# Patient Record
Sex: MALE | Race: WHITE | Employment: UNEMPLOYED | URBAN - METROPOLITAN AREA
[De-identification: names, ages, dates, MRNs, and addresses within clinical notes are randomized per-mention and may not be internally consistent; named-entity substitution may affect disease eponyms.]

---

## 2019-08-18 ENCOUNTER — TRANSFERRED RECORDS (OUTPATIENT)
Dept: HEALTH INFORMATION MANAGEMENT | Facility: CLINIC | Age: 59
End: 2019-08-18

## 2019-08-19 ENCOUNTER — TRANSFERRED RECORDS (OUTPATIENT)
Dept: HEALTH INFORMATION MANAGEMENT | Facility: CLINIC | Age: 59
End: 2019-08-19

## 2019-08-22 ENCOUNTER — TRANSFERRED RECORDS (OUTPATIENT)
Dept: HEALTH INFORMATION MANAGEMENT | Facility: CLINIC | Age: 59
End: 2019-08-22

## 2019-08-23 ENCOUNTER — TRANSFERRED RECORDS (OUTPATIENT)
Dept: HEALTH INFORMATION MANAGEMENT | Facility: CLINIC | Age: 59
End: 2019-08-23

## 2019-08-26 ENCOUNTER — MEDICAL CORRESPONDENCE (OUTPATIENT)
Dept: HEALTH INFORMATION MANAGEMENT | Facility: CLINIC | Age: 59
End: 2019-08-26

## 2019-08-26 NOTE — TELEPHONE ENCOUNTER
REFERRAL INFORMATION:    Referring provider:   Dr. Romi Snowden    Referring providers clinic: [facility]      Reason for visit/diagnosis  : Maxillary Sinus cancer    RECORDS REQUESTED FROM:       Clinic name Comments Records Status Imaging Status   [facility] 8/26/19 referral   Scanned in [facility]   08/22/2019 RIGHT NASAL ENDOSCOPY AND BIOPSIES OF MAXILLARY SINUS MASS (Case: [id number] ) req 8/26    [facility] images 8/19/19 MR Orbit Face  8/19/19 Ct Orbit  11/21/13 CT Head Care Everywhere  req 8/26 - PACS                       8/26/19 5:30PM sent a fax to [facility] for pathology slides to be mailed out . Sent a fax to [facility] spec [facility] for recs and [facility] for images - [facility]    8/28/19 10:23AM [facility] faxed back no notes, patient was only seen inpatient by Dr Edge - [facility]   8/28/19 12:12PM images from [facility] in PACS, pathology from [facility] received and sent to surgical pathology with consult form - amay

## 2019-08-26 NOTE — TELEPHONE ENCOUNTER
ONCOLOGY INTAKE: Records Information      APPT INFORMATION:  Referring provider:  Dr. Romi Snowden  Referring provider s clinic:  [facility]  Reason for visit/diagnosis:  Maxillary Sinus cancer  Has patient been notified of appointment date and time?: yes    RECORDS INFORMATION:  Were the records received with the referral (via [employer])? no    Has patient been seen for any external appt for this diagnosis? yes    If yes, where? [facility]    Has patient had any imaging or procedures outside of [place]  [place] for this condition? yes      If Yes, where? [facility]    ADDITIONAL INFORMATION:

## 2019-08-28 ENCOUNTER — DOCUMENTATION ONLY (OUTPATIENT)
Dept: CARE COORDINATION | Facility: CLINIC | Age: 59
End: 2019-08-28

## 2019-08-28 LAB — COPATH REPORT: NORMAL

## 2019-08-28 PROCEDURE — 00000346 ZZHCL STATISTIC REVIEW OUTSIDE SLIDES TC 88321: Performed by: OTOLARYNGOLOGY

## 2019-08-29 ENCOUNTER — OFFICE VISIT (OUTPATIENT)
Dept: OTOLARYNGOLOGY | Facility: CLINIC | Age: 59
End: 2019-08-29
Payer: MEDICARE

## 2019-08-29 VITALS
WEIGHT: 172 LBS | DIASTOLIC BLOOD PRESSURE: 74 MMHG | SYSTOLIC BLOOD PRESSURE: 131 MMHG | HEIGHT: 72 IN | RESPIRATION RATE: 16 BRPM | BODY MASS INDEX: 23.3 KG/M2 | HEART RATE: 78 BPM

## 2019-08-29 DIAGNOSIS — C31.0 MAXILLARY SINUS CANCER (H): Primary | ICD-10-CM

## 2019-08-29 RX ORDER — OXYCODONE HYDROCHLORIDE 10 MG/1
TABLET ORAL
Refills: 0 | Status: ON HOLD | COMMUNITY
Start: 2019-08-28 | End: 2019-09-30

## 2019-08-29 ASSESSMENT — MIFFLIN-ST. JEOR: SCORE: 1638.19

## 2019-08-29 ASSESSMENT — PAIN SCALES - GENERAL: PAINLEVEL: MILD PAIN (3)

## 2019-08-29 NOTE — LETTER
2019       RE: Eduardo Rubio  [address] [id number]  [facility] [address]     Dear Colleague,    Thank you for referring your patient, Eduardo Rubio, to the [facility] EAR NOSE AND THROAT at [facility]. Please see a copy of my visit note below.      Otolaryngology Clinic      Name: Eduardo Rubio  MRN: [id number]  Age: 58 year old  : 1960  2019   Referring Provider: Romi Snowden     Patient presents for consult regarding:  Right maxillary sinus papillary squamous carcinoma     History of Present Illness:   Eduardo Rubio is a 58 year old male who presents for consultation regarding a right maxillary sinus mass.  The patient presented to the ED on 19 for right facial pain and pressure, blurred vision and numbness. They performed MRI of the orbit which revealed right maxillary sinus mass as below. He was seen by Dr. Romi Snowden of ENT specialty care who performed a biopsy with results of squamous cell carcinoma and was referred here for further evaluation.     The patient reports 10 days of numbness around his right maxilla with constant burning and pounding pain behind his right eye and entire right maxilla. He has been taking tylenol 3 for the pain and had difficulty sleeping secondary to this. He has had worsening blurred vision since onset but is still able to see. Has had occasional nosebleeds usually after blowing his nose. Has had a plugged sensation in his left nostril for about 10 days as well. He has never been a wood worker, denies daily chemical exposures. He currently smokes, estimates about 3/4 pack a day. Denies alcohol consumption. No significant known medical history, did have a gastric ulcer requiring surgical intervention about 4 years ago.     The patient is retired and lives in [place] with a roommate. He reports significant family support in the area.     Review of Systems:   Pertinent items are noted in HPI or as in patient  entered ROS below, remainder of complete ROS is negative.     Active Medications:     Current Outpatient Medications:      oxyCODONE IR (ROXICODONE) 10 MG tablet, TK 1 T PO 5 TIMES A DAY PRF SEVERE PAIN RELIEF., Disp: , Rfl: 0      Allergies:   Patient has no known allergies.      Past Medical History:  Maxillary sinus mass     Past Surgical History:  No past surgical history on file.    Family History:   No family history on file.      Social History:   Presents to clinic Accompanied by a friend  Tobacco Use: Current daily smoker  Alcohol Use: None     Physical Exam:   /74 (BP Location: Right arm, Patient Position: Sitting, Cuff Size: Adult Regular)   Pulse 78   Resp 16   Ht 1.829 m (6')   Wt 78 kg (172 lb)   BMI 23.33 kg/m         Constitutional: The patient was well-groomed and in no acute distress.    Skin: Warm and pink.    Neurologic: Alert and oriented x3. Cranial nerves: there is hypoesthesia of V2 distribution on the right. All other CN within normal limits. Voice quality is normal.    Psychiatric: The patient's affect was calm, cooperative and appropriate.    Respiratory: Breathing comfortably without stridor or exertion of accessory muscles.    Eyes: Pupils were equal and reactive. Extraocular movements are intact.    Head: Normocephalic and atraumatic. No lesions or scars.    Nose: Sinuses were nontender. Anterior rhinoscopy revealed midline septum and absence of purulence or polyps.    Oral cavity/Oropharynx: Edentulous.  Normal tongue, floor of mouth, buccal mucosa and palate. No lesions or masses on inspection or palpation. No abnormal lymph tissue in the oropharynx.    Neck: The parotids are soft without masses. Supple with normal laryngeal and tracheal landmarks.    Lymphatic: There is no palpable lymphadenopathy or other masses in the neck or parotids.      Nasal endoscopy performed to examine the posterior nasal passages for right maxillary sinus mass.    Verbal consent obtained.  Topical anesthetic/decongestant solution applied per patient request.   A rigid endoscope was passed into each nasal cavity separately.  Findings are as follows:   Left middle meatus:  Normal healthy meatus, no purulence or polyps.    Left posterior nasal cavity:  No masses, lesions or abnormal tissue.   Right middle meatus:  Mass emanating from the right middle meatus, extending towards the nasal floor.  Nasopharynx:  Clear, no lesions, crusting or inflammation    Imaging:  MR Orbit face neck w/wo contrast 8/19/19:   IMPRESSION:   1.  Large, heterogeneously enhancing mass lesion centered in the superior aspect  of the right maxillary sinus with extension to the ostiomeatal unit into the  right nasal cavity, where it engulfs the right superior turbinate. Extension  cephalad into the inferior/extraconal right orbit where it abuts and slightly  elevates the inferior rectus muscle. Extension anteriorly through the anterior  wall of the maxillary sinus. Posteriorly the mass does not appear to involve the  pterygopalatine fossa. 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  2.  Trapped secretions in the inferior maxillary sinus.  3.  The intracranial contents are normal for patient age.    CT orbital w contrast 8/19/19:   Impression:  1. Aggressive appearing somewhat hyperdense masslike process involving the entirety of the right maxillary sinus with extension through the right ostiomeatal unit into the right nasal cavity. There is evidence of dehiscence and erosion of the anterior wall of the maxillary sinus where this masslike process extends into the superficial soft tissues of the right malar region. There is dehiscence and erosion of the inferior floor of the right orbit with extension of the mass into the inferior aspect of the right orbit where it abuts the inferior rectus muscle resulting in stretching of the right optic nerve, slight superior elevation of the right globe, and slight  right-sided proptosis. This process appears aggressive and is of indeterminate etiology, but aggressive infectious processes including fungal etiologies cannot be excluded. Malignancy certainly could present in this fashion. See complete description above. ENT and ophthalmologic consultation suggested.  2. Note made of old appearing fracture of the medial right orbital wall involving the lamina papyracea.    Assessment & Plan:  Mr. Rubio is a 58 year old male with a right sided maxillary mass, confirmed to be squamous cell carcinoma on biopsy. We reviewed his images together and discussed his prognosis including likely right partial or complete visual loss. Will obtain PET scan within the next week and refer to ophthalmology. We discussed the indication for surgery and radiation therapy, including risks and benefits. His case will be discussed at our tumor board to determine a final plan. Neck dissection with lymph node removal may be indicated. He does have a support system with his roommate and family living close by. We discussed possible timeline and expected post-surgical course. All of the patient's questions were answered fully and he is comfortable with the plan.     Scribe Disclosure:  I, Trish Dawn, am serving as a scribe to document services personally performed by Colt Puentes MD at this visit, based upon the provider's statements to me. All documentation has been reviewed by the aforementioned provider prior to being entered into the official medical record.    The documentation recorded by the scribe accurately reflects the services I personally performed and the decisions made by me.      Again, thank you for allowing me to participate in the care of your patient.      Sincerely,    Colt Puentes MD

## 2019-08-29 NOTE — NURSING NOTE
Chief Complaint   Patient presents with     Consult     Maxillary Sinus cancer     /74 (BP Location: Right arm, Patient Position: Sitting, Cuff Size: Adult Regular)   Pulse 78   Resp 16   Ht 1.829 m (6')   Wt 78 kg (172 lb)   BMI 23.33 kg/m      Uriel Coleman CMA

## 2019-08-29 NOTE — PROGRESS NOTES
Otolaryngology Clinic      Name: Eduardo Rubio  MRN: [id number]  Age: 58 year old  : 1960  2019   Referring Provider: Romi Snowden     Patient presents for consult regarding:  Right maxillary sinus papillary squamous carcinoma     History of Present Illness:   Eduadro Rubio is a 58 year old male who presents for consultation regarding a right maxillary sinus mass.  The patient presented to the ED on 19 for right facial pain and pressure, blurred vision and numbness. They performed MRI of the orbit which revealed right maxillary sinus mass as below. He was seen by Dr. Romi Snowden of ENT specialty care who performed a biopsy with results of squamous cell carcinoma and was referred here for further evaluation.     The patient reports 10 days of numbness around his right maxilla with constant burning and pounding pain behind his right eye and entire right maxilla. He has been taking tylenol 3 for the pain and had difficulty sleeping secondary to this. He has had worsening blurred vision since onset but is still able to see. Has had occasional nosebleeds usually after blowing his nose. Has had a plugged sensation in his left nostril for about 10 days as well. He has never been a wood worker, denies daily chemical exposures. He currently smokes, estimates about 3/4 pack a day. Denies alcohol consumption. No significant known medical history, did have a gastric ulcer requiring surgical intervention about 4 years ago.     The patient is retired and lives in [place] with a roommate. He reports significant family support in the area.     Review of Systems:   Pertinent items are noted in HPI or as in patient entered ROS below, remainder of complete ROS is negative.     Active Medications:     Current Outpatient Medications:      oxyCODONE IR (ROXICODONE) 10 MG tablet, TK 1 T PO 5 TIMES A DAY PRF SEVERE PAIN RELIEF., Disp: , Rfl: 0      Allergies:   Patient has no known allergies.       Past Medical History:  Maxillary sinus mass     Past Surgical History:  No past surgical history on file.    Family History:   No family history on file.      Social History:   Presents to clinic Accompanied by a friend  Tobacco Use: Current daily smoker  Alcohol Use: None     Physical Exam:   /74 (BP Location: Right arm, Patient Position: Sitting, Cuff Size: Adult Regular)   Pulse 78   Resp 16   Ht 1.829 m (6')   Wt 78 kg (172 lb)   BMI 23.33 kg/m        Constitutional: The patient was well-groomed and in no acute distress.    Skin: Warm and pink.    Neurologic: Alert and oriented x3. Cranial nerves: there is hypoesthesia of V2 distribution on the right. All other CN within normal limits. Voice quality is normal.    Psychiatric: The patient's affect was calm, cooperative and appropriate.    Respiratory: Breathing comfortably without stridor or exertion of accessory muscles.    Eyes: Pupils were equal and reactive. Extraocular movements are intact.    Head: Normocephalic and atraumatic. No lesions or scars.    Nose: Sinuses were nontender. Anterior rhinoscopy revealed midline septum and absence of purulence or polyps.    Oral cavity/Oropharynx: Edentulous.  Normal tongue, floor of mouth, buccal mucosa and palate. No lesions or masses on inspection or palpation. No abnormal lymph tissue in the oropharynx.    Neck: The parotids are soft without masses. Supple with normal laryngeal and tracheal landmarks.    Lymphatic: There is no palpable lymphadenopathy or other masses in the neck or parotids.      Nasal endoscopy performed to examine the posterior nasal passages for right maxillary sinus mass.    Verbal consent obtained. Topical anesthetic/decongestant solution applied per patient request.   A rigid endoscope was passed into each nasal cavity separately.  Findings are as follows:   Left middle meatus:  Normal healthy meatus, no purulence or polyps.    Left posterior nasal cavity:  No masses, lesions or  abnormal tissue.   Right middle meatus:  Mass emanating from the right middle meatus, extending towards the nasal floor.  Nasopharynx:  Clear, no lesions, crusting or inflammation    Imaging:  MR Orbit face neck w/wo contrast 8/19/19:   IMPRESSION:   1.  Large, heterogeneously enhancing mass lesion centered in the superior aspect  of the right maxillary sinus with extension to the ostiomeatal unit into the  right nasal cavity, where it engulfs the right superior turbinate. Extension  cephalad into the inferior/extraconal right orbit where it abuts and slightly  elevates the inferior rectus muscle. Extension anteriorly through the anterior  wall of the maxillary sinus. Posteriorly the mass does not appear to involve the  pterygopalatine fossa. 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  2.  Trapped secretions in the inferior maxillary sinus.  3.  The intracranial contents are normal for patient age.    CT orbital w contrast 8/19/19:   Impression:  1. Aggressive appearing somewhat hyperdense masslike process involving the entirety of the right maxillary sinus with extension through the right ostiomeatal unit into the right nasal cavity. There is evidence of dehiscence and erosion of the anterior wall of the maxillary sinus where this masslike process extends into the superficial soft tissues of the right malar region. There is dehiscence and erosion of the inferior floor of the right orbit with extension of the mass into the inferior aspect of the right orbit where it abuts the inferior rectus muscle resulting in stretching of the right optic nerve, slight superior elevation of the right globe, and slight right-sided proptosis. This process appears aggressive and is of indeterminate etiology, but aggressive infectious processes including fungal etiologies cannot be excluded. Malignancy certainly could present in this fashion. See complete description above. ENT and ophthalmologic consultation  suggested.  2. Note made of old appearing fracture of the medial right orbital wall involving the lamina papyracea.    Assessment & Plan:  Mr. Rubio is a 58 year old male with a right sided maxillary mass, confirmed to be squamous cell carcinoma on biopsy. We reviewed his images together and discussed his prognosis including likely right partial or complete visual loss. Will obtain PET scan within the next week and refer to ophthalmology. We discussed the indication for surgery and radiation therapy, including risks and benefits. His case will be discussed at our tumor board to determine a final plan. Neck dissection with lymph node removal may be indicated. He does have a support system with his roommate and family living close by. We discussed possible timeline and expected post-surgical course. All of the patient's questions were answered fully and he is comfortable with the plan.     Scribe Disclosure:  I, Trish Dawn, am serving as a scribe to document services personally performed by Colt Puentes MD at this visit, based upon the provider's statements to me. All documentation has been reviewed by the aforementioned provider prior to being entered into the official medical record.    The documentation recorded by the scribe accurately reflects the services I personally performed and the decisions made by me.

## 2019-08-29 NOTE — PATIENT INSTRUCTIONS
1. You were seen in the ENT Clinic today by Dr. Puentes  If you have any questions or concerns after your appointment, please call   - Option 1: ENT Clinic: [phone]  - Option 2: [facility] Nurse Coordinator: [phone]    2. Plan: complete PET  Referral for opthomologist completed.       Thank you for allowing us to be apart of your care!  Evelin Whittaker LPN

## 2019-09-09 ENCOUNTER — HOSPITAL ENCOUNTER (OUTPATIENT)
Dept: PET IMAGING | Facility: CLINIC | Age: 59
Discharge: HOME | End: 2019-09-09
Attending: OTOLARYNGOLOGY | Admitting: OTOLARYNGOLOGY
Payer: MEDICARE

## 2019-09-09 ENCOUNTER — HOSPITAL ENCOUNTER (OUTPATIENT)
Dept: PET IMAGING | Facility: CLINIC | Age: 59
End: 2019-09-09
Attending: OTOLARYNGOLOGY
Payer: MEDICARE

## 2019-09-09 DIAGNOSIS — C31.0 MAXILLARY SINUS CANCER (H): ICD-10-CM

## 2019-09-09 PROCEDURE — 74177 CT ABD & PELVIS W/CONTRAST: CPT

## 2019-09-09 PROCEDURE — 70491 CT SOFT TISSUE NECK W/DYE: CPT

## 2019-09-09 PROCEDURE — A9552 F18 FDG: HCPCS | Performed by: OTOLARYNGOLOGY

## 2019-09-09 PROCEDURE — 34300033 ZZH RX 343: Performed by: OTOLARYNGOLOGY

## 2019-09-09 PROCEDURE — 25000128 H RX IP 250 OP 636: Performed by: OTOLARYNGOLOGY

## 2019-09-09 PROCEDURE — 71260 CT THORAX DX C+: CPT

## 2019-09-09 RX ORDER — IOPAMIDOL 755 MG/ML
10-140 INJECTION, SOLUTION INTRAVASCULAR ONCE
Status: COMPLETED | OUTPATIENT
Start: 2019-09-09 | End: 2019-09-09

## 2019-09-09 RX ADMIN — FLUDEOXYGLUCOSE F-18 10.36 MCI.: 500 INJECTION, SOLUTION INTRAVENOUS at 14:43

## 2019-09-09 RX ADMIN — IOPAMIDOL 105 ML: 755 INJECTION, SOLUTION INTRAVENOUS at 15:38

## 2019-09-10 ENCOUNTER — TELEPHONE (OUTPATIENT)
Dept: OTOLARYNGOLOGY | Facility: CLINIC | Age: 59
End: 2019-09-10

## 2019-09-10 DIAGNOSIS — C31.0 MAXILLARY SINUS CANCER (H): Primary | ICD-10-CM

## 2019-09-10 NOTE — TELEPHONE ENCOUNTER
Pt called with recent PET results    CT NECK  Impression:   4.0 x 3.9 x 4.2 cm FDG avid mass centered in the right maxillary sinus  compatible with squamosal cancer. The mass erodes surrounding osseous  structures including the right orbital floor and maxillary sinus walls  with extension into the orbital cavity, right nasal cavity,  retromaxillary fat pad, premaxillary fat tissues and pterygopalatine  fossa. No cervical lymphadenopathy.    PET  IMPRESSION: This patient with a recently diagnosed maxillary sinus  squamous cell cancer  1. Single 7 mm nodule in the right upper lobe that demonstrates mild  FDG avidity. Follow up in 3 months with a chest CT can be considered.  2. Sequela from chronic granulomatous disease.  4. Moderate coronary artery calcifications.  5. Bladder wall thickening with prostatomegaly.    Explained that there is a mass in the R maxillary sinus (which he's aware of). There is also a small lung nodule that we will have reviewed by lung nodule conference this weekend. Moderate coronary artery calcification noted; suggested f/o with PCP. Explained that we would like to review his case in our Tumor Board this Friday for official recommendation as how to proceed with treatment. Explained that in order to get him into the OR sooner we may need to see a new head a neck provider; Eduardo stated he was fine with this as he just wants move forward with surgery. Direct line given for additional questions/concerns.     Natice Schwab, RN BSN

## 2019-09-11 ENCOUNTER — TELEPHONE (OUTPATIENT)
Dept: ONCOLOGY | Facility: CLINIC | Age: 59
End: 2019-09-11

## 2019-09-11 ENCOUNTER — OFFICE VISIT (OUTPATIENT)
Dept: OPHTHALMOLOGY | Facility: CLINIC | Age: 59
End: 2019-09-11
Attending: OPHTHALMOLOGY
Payer: MEDICARE

## 2019-09-11 ENCOUNTER — PRE VISIT (OUTPATIENT)
Dept: OTOLARYNGOLOGY | Facility: CLINIC | Age: 59
End: 2019-09-11

## 2019-09-11 DIAGNOSIS — C31.0 MAXILLARY SINUS CANCER (H): Primary | ICD-10-CM

## 2019-09-11 DIAGNOSIS — H52.13 MYOPIA OF BOTH EYES: ICD-10-CM

## 2019-09-11 DIAGNOSIS — H05.20 PROPTOSIS: ICD-10-CM

## 2019-09-11 PROCEDURE — 92083 EXTENDED VISUAL FIELD XM: CPT | Mod: ZF | Performed by: OPHTHALMOLOGY

## 2019-09-11 PROCEDURE — G0463 HOSPITAL OUTPT CLINIC VISIT: HCPCS | Mod: ZF

## 2019-09-11 ASSESSMENT — VISUAL ACUITY
METHOD: SNELLEN - LINEAR
OD_SC+: +1
OS_SC: 20/60
OS_PH_SC: 20/40
OD_SC: 20/60
OD_PH_SC: 20/40

## 2019-09-11 ASSESSMENT — SLIT LAMP EXAM - LIDS
COMMENTS: NORMAL, HIGH TEAR LAKE
COMMENTS: NORMAL

## 2019-09-11 ASSESSMENT — CONF VISUAL FIELD
OD_SUPERIOR_TEMPORAL_RESTRICTION: 3
OD_INFERIOR_NASAL_RESTRICTION: 3
METHOD: COUNTING FINGERS
OD_SUPERIOR_NASAL_RESTRICTION: 3
OD_INFERIOR_TEMPORAL_RESTRICTION: 3
OS_NORMAL: 1

## 2019-09-11 ASSESSMENT — TONOMETRY
OS_IOP_MMHG: 12
OD_IOP_MMHG: 16
IOP_METHOD: TONOPEN

## 2019-09-11 ASSESSMENT — EXTERNAL EXAM - LEFT EYE: OS_EXAM: NORMAL

## 2019-09-11 ASSESSMENT — CUP TO DISC RATIO
OD_RATIO: 0.25
OS_RATIO: 0.25

## 2019-09-11 NOTE — TELEPHONE ENCOUNTER
Lung Nodule Conference      Patient Name: Eduardo Rubio    Reason for conference discussion (brief overview): PET IMPRESSION: This patient with a recently diagnosed maxillary sinus   squamous cell cancer 4.0 x 3.9 x 4.2 FDG avid  1. Single 7 mm nodule in the right upper lobe that demonstrates mild   FDG avidity. Follow up in 3 months with a chest CT can be considered.   2. Sequela from chronic granulomatous disease.   Current smoker 3/4 pack a day    Specific Question: Should the new 7mm nodule be biopsied or if short interval f/u recommended?    Pertinent Histology:  Path 8/28: Papillary squamous cell carcinoma with stromal invasion        Referring Physician:  JERMAN BOYD MD    The patient's case was presented at the multidisciplinary conference for the above noted reason.  There was a consensus recommendation for the following actions:     Follow up in 3 months with a CT Scan or EBUS and see thoracic surgeon.          Case Lead:  Rena Orr    Interventional Radiology Staff Present: N/A

## 2019-09-11 NOTE — NURSING NOTE
Chief Complaints and History of Present Illnesses   Patient presents with     maxillary sinus cancer     Chief Complaint(s) and History of Present Illness(es)     maxillary sinus cancer               Comments     Pt states that the pain, pressure and burning in his right eye wakes him up every 1 1/2 hours. Blurred VA, diplopia, shadows.  VA worse in bright light.     Jerri KEYES 8:16 AM September 11, 2019   TERENCE Lambert September 11, 2019 8:39 AM

## 2019-09-11 NOTE — PROGRESS NOTES
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 Puentes of ENT [facility] eye exam prior to pursuing surgical resection and radiation (pending discussion at tumor board). Patient endorses right facial pain, dysesthesia and numbness under the eye and at the upper lip, and blurry vision in the right eye. Associated epiphora, excess lacrimation, and headache. He was prescribed Tobradex and ATs but is only using the ATs. He also picked up OTC Visine. He states all of his symptoms have progressively worsened since diagnosis.      POH: wore glasses in the past but rarely wore them   PMH: right maxillary sinus squamous cell carcinoma, frost bite in toes as a child s/p amputation  FOH: none - healthy  SHx: current every day smoker (0.5-1ppd)    Laboratory:  Surgical path:  A,B) RIGHT MAXILLARY SINUS MASS, BIOPSIES:  1. Squamous cell carcinoma, papillary type, with focal areas of alexis invasive carcinoma  2. Results of p16 will be issued in an addendum    Imaging:  CT soft tissue neck with contrast (9/9/2019)  4.0 x 3.9 x 4.2 cm FDG avid mass centered in the right maxillary sinus  compatible with squamosal cancer. The mass erodes surrounding osseous  structures including the right orbital floor and maxillary sinus walls  with extension into the orbital cavity, right nasal cavity,  retromaxillary fat pad, premaxillary fat tissues and pterygopalatine  fossa. No cervical lymphadenopathy.    PET oncology (9/9/2019):  IMPRESSION: This patient with a recently diagnosed maxillary sinus  squamous cell cancer  1. Single 7 mm nodule in the right upper lobe that demonstrates mild FDG avidity. Follow up in 3 months with a chest CT can be considered.  2. Sequela from chronic granulomatous disease.  4. Moderate coronary artery calcifications.  5. Bladder wall thickening with  prostatomegaly    MR Orbit face neck w/wo contrast (8/19/19):   IMPRESSION:   1.  Large, heterogeneously enhancing mass lesion centered in the superior aspect  of the right maxillary sinus with extension to the ostiomeatal unit into the  right nasal cavity, where it engulfs the right superior turbinate. Extension  cephalad into the inferior/extraconal right orbit where it abuts and slightly  elevates the inferior rectus muscle. Extension anteriorly through the anterior  wall of the maxillary sinus. Posteriorly the mass does not appear to involve the  pterygopalatine fossa. 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  2.  Trapped secretions in the inferior maxillary sinus.  3.  The intracranial contents are normal for patient age.    Visual Field 24-2  right eye: reliable; nonspecific superior field defects.   left eye: reliable; nonspecific field defects.     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   -Mild restriction of EOMs, proptosis, and hyperglobus, not unexpected for tumor size and location. Associated epiphora.    -Numbness and dysesthesia in distribution of infraorbital nerve suggests perineural invasion.   -Overall, the optic nerve and globe appear to be intact and healthy. Will need close monitoring after surgery and radiation therapy.    Plan:   - Continue ATs  - Discuss gabapentin with primary provider  - Return for follow-up after his surgery and radiation complete or if any new change in vision.    (H52.13) Myopia of both eyes  Comment: hold off on MRx today given plans for orbital surgery with ENT.   Plan: Recheck refraction in 1-2 months.       Jordy High MD  Ophthalmology PGY-2  [facility]  Pager: [phone]    Teaching statement:  Complete documentation of historical and exam elements from today's encounter can  be found in the full encounter summary report (not reduplicated in this progress note). I personally obtained the chief complaint(s) and history of present illness.  I confirmed and edited as necessary the review of systems, past medical/surgical history, family history, social history, and examination findings as documented by others; and I examined the patient myself. I personally reviewed the relevant tests, images, and reports as documented above.     I formulated and edited as necessary the assessment and plan and discussed the findings and management plan with the patient and family.    Alona Cedillo MD  Comprehensive Ophthalmology & Ocular Pathology  Department of Ophthalmology and Visual Neurosciences  [email or web address]  Pager [phone]

## 2019-09-13 ENCOUNTER — PRE VISIT (OUTPATIENT)
Dept: SURGERY | Facility: CLINIC | Age: 59
End: 2019-09-13

## 2019-09-13 ENCOUNTER — DOCUMENTATION ONLY (OUTPATIENT)
Dept: SURGERY | Facility: CLINIC | Age: 59
End: 2019-09-13

## 2019-09-13 DIAGNOSIS — C31.0 MAXILLARY SINUS CANCER (H): Primary | ICD-10-CM

## 2019-09-13 NOTE — PROGRESS NOTES
"RN coordinator from ENT, Frank, messaged us back after our lung nodule conference:    \"We discussed this patient at our tumor board today as well. He is going to have a large resection and reconstructive surgery in a few weeks. I believe both providers are in agreement that regardless of what that nodule shows it will not change the course of their treatment plan so 3m scan it is\".  "

## 2019-09-13 NOTE — TELEPHONE ENCOUNTER
FUTURE VISIT INFORMATION      SURGERY INFORMATION:    Date: TBD,pre-op maxillectomy with free flap reconstruction       RECORDS REQUESTED FROM:       Primary Care Provider: Noel    Most recent EKG+ Tracin19- Noel- requested tracing    Most recent Cardiac Stress Test: 19

## 2019-09-13 NOTE — PROGRESS NOTES
Patient contacted JI Marie, about pain.  Oxycodone not working.  He wants to try T#3.    He is currently being treated for maxillary sinus cancer.    I think it is appropriate to try this, or we could even try hydromorphone.    We will start with T#3, #30.    Rx sent.  Juli Christy PA-C, 9/13/2019, 11:06 AM

## 2019-09-16 ENCOUNTER — TELEPHONE (OUTPATIENT)
Dept: SURGERY | Facility: CLINIC | Age: 59
End: 2019-09-16

## 2019-09-16 ENCOUNTER — OFFICE VISIT (OUTPATIENT)
Dept: SURGERY | Facility: CLINIC | Age: 59
End: 2019-09-16
Payer: MEDICARE

## 2019-09-16 ENCOUNTER — ANESTHESIA EVENT (OUTPATIENT)
Dept: SURGERY | Facility: CLINIC | Age: 59
End: 2019-09-16

## 2019-09-16 VITALS
RESPIRATION RATE: 16 BRPM | WEIGHT: 174.9 LBS | OXYGEN SATURATION: 96 % | HEIGHT: 72 IN | TEMPERATURE: 97.9 F | HEART RATE: 83 BPM | SYSTOLIC BLOOD PRESSURE: 119 MMHG | BODY MASS INDEX: 23.69 KG/M2 | DIASTOLIC BLOOD PRESSURE: 86 MMHG

## 2019-09-16 DIAGNOSIS — Z01.818 PREOP EXAMINATION: ICD-10-CM

## 2019-09-16 DIAGNOSIS — Z01.818 PREOP EXAMINATION: Primary | ICD-10-CM

## 2019-09-16 LAB
ALBUMIN SERPL-MCNC: 3.8 G/DL (ref 3.4–5)
ALP SERPL-CCNC: 70 U/L (ref 40–150)
ALT SERPL W P-5'-P-CCNC: 28 U/L (ref 0–70)
ANION GAP SERPL CALCULATED.3IONS-SCNC: 2 MMOL/L (ref 3–14)
AST SERPL W P-5'-P-CCNC: 26 U/L (ref 0–45)
BILIRUB SERPL-MCNC: 0.4 MG/DL (ref 0.2–1.3)
BUN SERPL-MCNC: 10 MG/DL (ref 7–30)
CALCIUM SERPL-MCNC: 9.3 MG/DL (ref 8.5–10.1)
CHLORIDE SERPL-SCNC: 99 MMOL/L (ref 94–109)
CO2 SERPL-SCNC: 30 MMOL/L (ref 20–32)
CREAT SERPL-MCNC: 0.75 MG/DL (ref 0.66–1.25)
ERYTHROCYTE [DISTWIDTH] IN BLOOD BY AUTOMATED COUNT: 14.2 % (ref 10–15)
GFR SERPL CREATININE-BSD FRML MDRD: >90 ML/MIN/{1.73_M2}
GLUCOSE SERPL-MCNC: 102 MG/DL (ref 70–99)
HCT VFR BLD AUTO: 42.6 % (ref 40–53)
HGB BLD-MCNC: 14.1 G/DL (ref 13.3–17.7)
MCH RBC QN AUTO: 32.5 PG (ref 26.5–33)
MCHC RBC AUTO-ENTMCNC: 33.1 G/DL (ref 31.5–36.5)
MCV RBC AUTO: 98 FL (ref 78–100)
PLATELET # BLD AUTO: 221 10E9/L (ref 150–450)
POTASSIUM SERPL-SCNC: 3.8 MMOL/L (ref 3.4–5.3)
PROT SERPL-MCNC: 8.5 G/DL (ref 6.8–8.8)
RBC # BLD AUTO: 4.34 10E12/L (ref 4.4–5.9)
SODIUM SERPL-SCNC: 131 MMOL/L (ref 133–144)
WBC # BLD AUTO: 6 10E9/L (ref 4–11)

## 2019-09-16 PROCEDURE — 86923 COMPATIBILITY TEST ELECTRIC: CPT | Performed by: PHYSICIAN ASSISTANT

## 2019-09-16 RX ORDER — ACETAMINOPHEN 325 MG/1
325-650 TABLET ORAL EVERY 6 HOURS PRN
COMMUNITY
End: 2021-01-01

## 2019-09-16 ASSESSMENT — MIFFLIN-ST. JEOR: SCORE: 1651.34

## 2019-09-16 ASSESSMENT — LIFESTYLE VARIABLES: TOBACCO_USE: 1

## 2019-09-16 NOTE — ANESTHESIA PREPROCEDURE EVALUATION
Anesthesia Pre-Procedure Evaluation    Patient: Eduardo Rubio   MRN:     [id number] Gender:   male   Age:    58 year old :      1960        Preoperative Diagnosis: * No surgery found *        No past medical history on file.   No past surgical history on file.       Anesthesia Evaluation     . Pt has had prior anesthetic.     No history of anesthetic complications          ROS/MED HX    ENT/Pulmonary:     (+)tobacco use, Current use , . .   (-) sleep apnea   Neurologic:  - neg neurologic ROS     Cardiovascular:  - neg cardiovascular ROS   (+) ----. : . . . :. . Previous cardiac testing Echodate:2019results:Stress Testdate:2019 results: date: results: date: results:          METS/Exercise Tolerance:  4 - Raking leaves, gardening   Hematologic:  - neg hematologic  ROS       Musculoskeletal: Comment: Low back pain        GI/Hepatic: Comment: H/o gastric ulcer         Renal/Genitourinary:  - ROS Renal section negative       Endo:  - neg endo ROS       Psychiatric:  - neg psychiatric ROS       Infectious Disease:  - neg infectious disease ROS       Malignancy:   (+) Malignancy History of Other  Other CA right maxillary sinus SCC Active status post         Other: Comment: Pt takes oxycodone 10mg, 2-3 tabs daily  30-45MME daily   (+) H/O Chronic Pain,H/O chronic opiod use ,                        PHYSICAL EXAM:   Mental Status/Neuro: A/A/O; Age Appropriate   Airway: Facies: Feasible  Mallampati: I  Mouth/Opening: Full  TM distance: > 6 cm  Neck ROM: Full   Respiratory: Auscultation: CTAB     Resp. Rate: Normal     Resp. Effort: Normal      CV: Rhythm: Regular  Rate: Age appropriate  Heart: Normal Sounds  Edema: None   Comments: Full upper denture, partial lower denture     Dental: Dentures  Dentures: Upper; Lower                LABS:  CBC:   Lab Results   Component Value Date    WBC 3.0 (L) 10/13/2010    WBC 5.6 2006    HGB 13.8 10/13/2010    HGB 16.0 2006    HCT 41.4 10/13/2010    HCT  47.4 05/24/2006     (L) 10/13/2010     05/24/2006     BMP:   Lab Results   Component Value Date     (H) 10/13/2010     05/24/2006    POTASSIUM 3.9 10/13/2010    POTASSIUM 5.0 05/24/2006    CHLORIDE 109 10/13/2010    CHLORIDE 108 05/24/2006    CO2 29 10/13/2010    CO2 21 05/24/2006    BUN 7 10/13/2010    BUN 11 05/24/2006    CR 0.78 10/13/2010    CR 0.75 (L) 05/24/2006    GLC 89 10/13/2010     05/24/2006     COAGS: No results found for: PTT, INR, FIBR  POC: No results found for: BGM, HCG, HCGS  OTHER:   Lab Results   Component Value Date    MIKEY 8.5 10/13/2010    PHOS 4.7 (H) 10/13/2010    MAG 2.3 10/13/2010    ALBUMIN 4.2 10/13/2010    PROTTOTAL 8.2 10/13/2010    ALT 79 (H) 10/13/2010    AST 98 (H) 10/13/2010    GGT 12 05/24/2006    ALKPHOS 96 10/13/2010    BILITOTAL 0.4 10/13/2010        Preop Vitals    BP Readings from Last 3 Encounters:   08/29/19 131/74    Pulse Readings from Last 3 Encounters:   08/29/19 78      Resp Readings from Last 3 Encounters:   08/29/19 16    SpO2 Readings from Last 3 Encounters:   No data found for SpO2      Temp Readings from Last 1 Encounters:   No data found for Temp    Ht Readings from Last 1 Encounters:   08/29/19 1.829 m (6')      Wt Readings from Last 1 Encounters:   08/29/19 78 kg (172 lb)    Estimated body mass index is 23.33 kg/m  as calculated from the following:    Height as of 8/29/19: 1.829 m (6').    Weight as of 8/29/19: 78 kg (172 lb).     LDA:        ZAIDA FV AN PLAN NO PONV RULE       PAC Discussion and Assessment    ASA Classification: 3  Case is suitable for: Esko  Anesthetic techniques and relevant risks discussed:   Invasive monitoring and risk discussed: No  Types:   Possibility and Risk of blood transfusion discussed: Yes  NPO instructions given:   Additional anesthetic preparation and risks discussed:   Needs early admission to pre-op area:   Other:     PAC Resident/NP Anesthesia Assessment:  Eduardo Rubio is a 58 year old  male scheduled for Right orbital exenteration with maxillectomy and free tissue reconstruction on TBD by  in treatment of right maxillary sinus cancer.  PAC referral for risk assessment and optimization for anesthesia with comorbid conditions of smoking:    Pre-operative considerations:  1.  Cardiac:  Functional status- METS 4. Pt not active at the moment due to loss of sight in right eye, but before he was active. No issues climbing stairs. High risk surgery with 0.4% (RCRI #) risk of major adverse cardiac event.   2.  Pulm:  Airway feasible.  CECIL risk: low  3.  GI:  Risk of PONV score = 1.  If > 2, anti-emetic intervention recommended.    VTE risk: 3% (gender, cancer)    #cardiac  -denies CP, SOB, ANDERSEN, palpitations  -denies cardiac history  -EKG 8/21/2019, sinus bradycardia, cannot rule out anterior infarct  -stress echo 8/21/2019:  FINAL CONCLUSIONS   Normal stress echocardiogram.   Reduced exercise tolerance, achieving 7.0 METs and 93.8% of max predicted heart rate.     #pulmonary  -current smoker, 0.75 ppd  -denies any current cough   -PET scan revealed RUL nodule    #GI  -h/o gastric ulcer approximately 4 years ago  -pt states he had abdominal surgery related to ulcer but could not remember details or surgery type    Patient is optimized and is acceptable candidate for the proposed procedure pending his visits with Dr. Roberts and Dr. Pulliam on 9/20.     Patient discussed with Dr. Pineda.     **For further details of assessment, testing, and physical exam please see H and P completed on same date.          Peace Beltran PA-C, MMS      Reviewed and Signed by PAC Mid-Level Provider/Resident  Mid-Level Provider/Resident: Peace Beltran  Date: 9/16/2019  Time:     Attending Anesthesiologist Anesthesia Assessment:        Anesthesiologist:   Date:   Time:   Pass/Fail:   Disposition:     PAC Pharmacist Assessment:        Pharmacist:   Date:   Time:    Peace Beltran PA-C

## 2019-09-16 NOTE — H&P
Pre-Operative H & P     CC:  Preoperative exam to assess for increased cardiopulmonary risk while undergoing surgery and anesthesia.    Date of Encounter: 9/16/2019  Primary Care Physician:  No Ref-Primary, Physician  Associated Diagnosis: Right maxillary sinus cancer    HPI  Eduardo Rubio is a 58 year old male who presents for pre-operative H & P in preparation for right orbital exenteration, right neck exploration, right maxillectomy, nasogastric tube placement,right Latissmus free flap reconstruction with Dr. Roberts and Dr. Pulliam on 9/24/2019 at [facility] under general anesthesia.    This is a 58 year old male with history of right maxillary sinus mass. He initally presented to outside ED 8/18/19 for right sided facial pain, numbness, right eye visual changes and right nasal drainage.  He is a current everyday smoker.  He was seen by ENT specialty after MRI revealed the mass.  Biopsy proved SCC.  He was referred to [facility] for further evaluation and treatment. PET/CT of the mass of the right maxillary sinus (9/9/19) fills the sinus and invades into the orbital cavity. There is an additional nodule in the right upper lobe of the lung. His case was presented at tumor board and the above procedure is planned.      History is obtained from the patient.     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       Past Surgical History  Past Surgical History:   Procedure Laterality Date     ABDOMEN SURGERY      [facility] [place], surgery related to gastric ulcer     AS AMPUTATION TOE,I-P JT Bilateral     all ten toes       Hx of Blood transfusions/reactions: none     Hx of abnormal bleeding or anti-platelet use: none    Menstrual history: No LMP for male patient.:     Steroid use in the last year: none    Personal or FH with difficulty with Anesthesia:   none    Prior to Admission Medications  Current Outpatient Medications   Medication Sig Dispense Refill     acetaminophen (TYLENOL) 325 MG tablet Take 325-650 mg by mouth every 6 hours as needed for mild pain       acetaminophen-codeine (TYLENOL #3) 300-30 MG tablet Take 1-2 tablets by mouth every 6 hours as needed for severe pain 30 tablet 0     oxyCODONE IR (ROXICODONE) 10 MG tablet TK 1 T PO 5 TIMES A DAY PRF SEVERE PAIN RELIEF.  0       Allergies  No Known Allergies    Social History  Social History     Socioeconomic History     Marital status:      Spouse name: Not on file     Number of children: Not on file     Years of education: Not on file     Highest education level: Not on file   Occupational History     Not on file   Social Needs     Financial resource strain: Not on file     Food insecurity:     Worry: Not on file     Inability: Not on file     Transportation needs:     Medical: Not on file     Non-medical: Not on file   Tobacco Use     Smoking status: Current Every Day Smoker     Packs/day: 0.50     Years: 15.00     Pack years: 7.50     Types: Cigarettes     Start date: 2004     Smokeless tobacco: Never Used   Substance and Sexual Activity     Alcohol use: Not Currently     Drug use: Not Currently     Sexual activity: Not on file   Lifestyle     Physical activity:     Days per week: Not on file     Minutes per session: Not on file     Stress: Not on file   Relationships     Social connections:     Talks on phone: Not on file     Gets together: Not on file     Attends [employer] service: Not on file     Active member of club or organization: Not on file     Attends meetings of clubs or organizations: Not on file     Relationship status: Not on file     Intimate partner violence:     Fear of current or ex partner: Not on file     Emotionally abused: Not on file     Physically abused: Not on file     Forced sexual activity: Not on file   Other Topics Concern     Not on file   Social History  "Narrative     Not on file       Family History  Family History   Problem Relation Age of Onset     Breast Cancer Mother      Glaucoma No family hx of      Macular Degeneration No family hx of      Anesthesia Evaluation     . Pt has had prior anesthetic.     No history of anesthetic complications          ROS/MED HX  The complete review of systems is negative other than noted in the HPI or here.  ENT/Pulmonary:     (+)tobacco use, Current use , . .   (-) sleep apnea   Neurologic:  - neg neurologic ROS     Cardiovascular:  - neg cardiovascular ROS   (+) ----. : . . . :. . Previous cardiac testing Echodate:8/21/2019results:Stress Testdate:8/21/2019 results: date: results: date: results:          METS/Exercise Tolerance:  4 - Raking leaves, gardening   Hematologic:  - neg hematologic  ROS       Musculoskeletal: Comment: Low back pain        GI/Hepatic: Comment: H/o gastric ulcer 2015        Renal/Genitourinary:  - ROS Renal section negative       Endo:  - neg endo ROS       Psychiatric:  - neg psychiatric ROS       Infectious Disease:  - neg infectious disease ROS       Malignancy:   (+) Malignancy History of Other  Other CA right maxillary sinus SCC Active status post         Other: Comment: Pt takes oxycodone 10mg, 2-3 tabs daily  30-45MME daily   (+) H/O Chronic Pain,H/O chronic opiod use ,            PHYSICAL EXAM:   Mental Status/Neuro: A/A/O; Age Appropriate   Airway: Facies: Feasible  Mallampati: I  Mouth/Opening: Full  TM distance: > 6 cm  Neck ROM: Full   Respiratory: Auscultation: CTAB     Resp. Rate: Normal     Resp. Effort: Normal      CV: Rhythm: Regular  Rate: Age appropriate  Heart: Normal Sounds  Edema: None   Comments:         Temp: 97.9  F (36.6  C) Temp src: Oral BP: 119/86 Pulse: 83   Resp: 16 SpO2: 96 %         174 lbs 14.4 oz  6' 0\"   Body mass index is 23.72 kg/m .       Physical Exam  Constitutional: Awake, alert, cooperative, no apparent distress, and appears stated age.  Eyes: Pupils equal, " round and reactive to light, extra ocular muscles intact, sclera clear, conjunctiva normal.  HENT: Normocephalic, oral pharynx with moist mucus membranes, fair dentition, dentures. No goiter appreciated.   Respiratory: Clear to auscultation bilaterally, no crackles or wheezing.  Cardiovascular: Regular rate and rhythm, normal S1 and S2, and no murmur noted.  Carotids +2, no bruits. No edema. Palpable pulses to radial  DP and PT arteries.   GI: Normal bowel sounds, soft, non-distended, non-tender.  Lymph/Hematologic: No cervical lymphadenopathy and no supraclavicular lymphadenopathy.  Genitourinary:  deferred  Skin: Warm and dry.  No rashes at anticipated surgical site.   Musculoskeletal: Full ROM of neck. There is no redness, warmth, or swelling of the joints. Gross motor strength is normal.    Neurologic: Awake, alert, oriented to name, place and time. Cranial nerves II-XII are grossly intact. Gait is normal.   Neuropsychiatric: Calm, cooperative. Normal affect.     Labs: (personally reviewed)  Last Comprehensive Metabolic Panel:  Sodium   Date Value Ref Range Status   09/16/2019 131 (L) 133 - 144 mmol/L Final     Potassium   Date Value Ref Range Status   09/16/2019 3.8 3.4 - 5.3 mmol/L Final     Chloride   Date Value Ref Range Status   09/16/2019 99 94 - 109 mmol/L Final     Carbon Dioxide   Date Value Ref Range Status   09/16/2019 30 20 - 32 mmol/L Final     Anion Gap   Date Value Ref Range Status   09/16/2019 2 (L) 3 - 14 mmol/L Final     Glucose   Date Value Ref Range Status   09/16/2019 102 (H) 70 - 99 mg/dL Final     Urea Nitrogen   Date Value Ref Range Status   09/16/2019 10 7 - 30 mg/dL Final     Creatinine   Date Value Ref Range Status   09/16/2019 0.75 0.66 - 1.25 mg/dL Final     GFR Estimate   Date Value Ref Range Status   09/16/2019 >90 >60 mL/min/[1.73_m2] Final     Comment:     Non  GFR Calc  Starting 12/18/2018, serum creatinine based estimated GFR (eGFR) will be   calculated using  the Chronic Kidney Disease Epidemiology Collaboration   (CKD-EPI) equation.       Calcium   Date Value Ref Range Status   2019 9.3 8.5 - 10.1 mg/dL Final     Bilirubin Total   Date Value Ref Range Status   2019 0.4 0.2 - 1.3 mg/dL Final     Alkaline Phosphatase   Date Value Ref Range Status   2019 70 40 - 150 U/L Final     ALT   Date Value Ref Range Status   2019 28 0 - 70 U/L Final     AST   Date Value Ref Range Status   2019 26 0 - 45 U/L Final       Lab Results   Component Value Date    WBC 6.0 2019     Lab Results   Component Value Date    RBC 4.34 2019     Lab Results   Component Value Date    HGB 14.1 2019     Lab Results   Component Value Date    HCT 42.6 2019     Lab Results   Component Value Date    MCV 98 2019     Lab Results   Component Value Date    MCH 32.5 2019     Lab Results   Component Value Date    MCHC 33.1 2019     Lab Results   Component Value Date    RDW 14.2 2019     Lab Results   Component Value Date     2019       EKG: Personally reviewed but formal cardiology read pendin2019  Sinus bradycardia  Cannot rule out Anterior infarct , age undetermined  Abnormal ECG  No previous ECGs available    Stress test: 2019, Echo Stress without contrast  FINAL CONCLUSIONS   Normal stress echocardiogram.   Reduced exercise tolerance, achieving 7.0 METs and 93.8% of max predicted heart rate.    CT soft tissue neck with contrast 2019                                                                 Impression:      4.0 x 3.9 x 4.2 cm FDG avid mass centered in the right maxillary sinus  compatible with squamosal cancer. The mass erodes surrounding osseous  structures including the right orbital floor and maxillary sinus walls  with extension into the orbital cavity, right nasal cavity,  retromaxillary fat pad, premaxillary fat tissues and pterygopalatine  fossa. No cervical lymphadenopathy.    Combined  Report of:    PET and CT on  9/9/2019 4:11 PM :     1. PET of the neck, chest, abdomen, and pelvis.  2. PET CT Fusion for Attenuation Correction and Anatomical  Localization:    3. Diagnostic CT scan of the chest, abdomen, and pelvis with  intravenous contrast for interpretation.  3. CT of the chest, abdomen and pelvis obtained for diagnostic  interpretation.  4. 3D MIP and PET-CT fused images were processed on an independent  workstation and archived to PACS and reviewed by a radiologist.     Technique:     1. PET: The patient received 10.36 mCi of F-18-FDG; the serum glucose  was 102 prior to administration, body weight was 78 kg. Images were  evaluated in the axial, sagittal, and coronal planes as well as the  rotational whole body MIP. Images were acquired from the Vertex to the  Feet.     UPTAKE WAS MEASURED AT 63 MINUTES.      BACKGROUND:  Liver SUV max= 3.7,   Aorta Blood SUV Max: 3.0.      2. CT: Volumetric acquisition for clinical interpretation of the  chest, abdomen, and pelvis acquired at 3 mm sections . The chest,  abdomen, and pelvis were evaluated at 5 mm sections in bone, soft  tissue, and lung windows.       The patient received 105 cc. Of Isovue 370 intravenously for the  examination.    High resolution images of the neck were obtained with multiple oblique  projection reformats.     3. 3D MIP and PET-CT fused images were processed on an independent  workstation and archived to PACS and reviewed by a radiologist.     INDICATION: maxillary sinus cancer; Maxillary sinus cancer (H)     ADDITIONAL INFORMATION OBTAINED FROM EMR: none     COMPARISON: Outside MRI 8/18/2019, outside CT 8/18/2019     FINDINGS:      HEAD/NECK:  Findings in the head and neck are dictated on dedicated exam.     CHEST:  There is a right apical pulmonary node that measures 7 mm that  demonstrates very trace FDG avidity with some surrounding groundglass.        Numerous calcified granulomas present throughout the lungs.  Apical  predominant emphysematous changes and minimal peripheral reticular  interstitial thickening more pronounced at the bibasilar lungs.     Numerous calcified mediastinal and hilar lymph nodes. There are a  couple subcarinal subcentimeter lymph nodes that are mildly FDG avid  with a SUV max of 4.2. Another area of FDG avidity in a subcentimeter  lymph node adjacent to the calcified right hilar lymph nodes. Adjacent  to the distal esophagus there is a prominent lymph node demonstrates  mild FDG avidity with an SUV max of 3.0 and measures 8 mm in short  axis diameter.     No evidence for active infection.     There is no significant pericardial or pleural effusions. Heart size  is not enlarged. Moderate coronary artery calcifications.        ABDOMEN AND PELVIS:  There is no suspicious FDG uptake in the abdomen or pelvis.     There are no suspicious hepatic lesions. There is no splenomegaly or  evidence for splenic or pancreatic mass lesion. Numerous splenic  granulomas.     There are no suspicious adrenal mass lesions or opaque gallbladder  calculi.  There is symmetric nephrographic renal phase without  hydronephrosis. Bladder wall thickening with prostatomegaly and  numerous prostate calcifications.     There is no evidence for diverticulitis, bowel obstruction or free  fluid.       LOWER EXTREMITIES:   No abnormal masses or hypermetabolic lesions.     BONES:   There are no suspicious lytic or blastic osseous lesions.  There is no  abnormal FDG uptake in the skeleton. Numerous posterior right rib  fractures that demonstrate minimal FDG avidity.  Dextroscoliotic curvature of the lumbar spine. Mild right lateral  subluxation of L4 on L5. Moderate degenerative changes of the  thoracolumbosacral spine. Moderate degenerative changes of the  bilateral hips temperature.                                                                      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 Single 7 mm nodule in the right upper lobe that demonstrates mild  FDG avidity. Follow up in 3 months with a chest CT can be considered.  2. Sequela from chronic granulomatous disease.  4. Moderate coronary artery calcifications.  5. Bladder wall thickening with prostatomegaly.      Outside records reviewed from: care everywhere    ASSESSMENT and PLAN  Eduardo Rubio is a 58 year old male scheduled for Right orbital exenteration with maxillectomy and free tissue reconstruction on TBD by  in treatment of right maxillary sinus cancer.  PAC referral for risk assessment and optimization for anesthesia with comorbid conditions of smoking:    Pre-operative considerations:  1.  Cardiac:  Functional status- METS 4. Pt not active at the moment due to loss of sight in right eye, but before he was active. No issues climbing stairs. High risk surgery with 0.4% (RCRI #) risk of major adverse cardiac event.   2.  Pulm:  Airway feasible.  CECIL risk: low  3.  GI:  Risk of PONV score = 1.  If > 2, anti-emetic intervention recommended.    VTE risk: 3% (gender, cancer)    #cardiac  -denies CP, SOB, ANDERSEN, palpitations  -denies cardiac history  -EKG 8/21/2019, sinus bradycardia, cannot rule out anterior infarct  -stress echo 8/21/2019:  FINAL CONCLUSIONS   Normal stress echocardiogram.   Reduced exercise tolerance, achieving 7.0 METs and 93.8% of max predicted heart rate.     #pulmonary  -current smoker, 0.75 ppd  -denies any current cough   -PET scan revealed RUL nodule    #GI  -h/o gastric ulcer approximately 4 years ago  -pt states he had abdominal surgery related to ulcer but could not remember details or surgery type    #Type and Screen, CBC, CMP ordered today    Patient is optimized and is acceptable candidate for the proposed procedure pending his visits with Dr. Roberts and Dr. Pulliam on 9/20.     NOTE: Sodium today slightly decreased at 131.  Called patient to discuss, left voicemail with call back number.  Sent in  basket message to Dr. Pulliam.      Patient was discussed with Dr Mendes.    Peace Beltran PA-C  Preoperative Assessment Center  [facility]  Phone: [phone]  Fax: [phone]

## 2019-09-16 NOTE — TELEPHONE ENCOUNTER
Spoke to patient on the phone regarding mildly decreased sodium.  Explained this could be a side effect of the tumor.  Recommended he substitute a gatorade for a glass of water during the day.  Pt is agreeable.

## 2019-09-16 NOTE — PATIENT INSTRUCTIONS
Preparing for Your Surgery      Name:  Eduardo Rubio   MRN:  [id number]   :  1960   Today's Date:  2019     Arriving for surgery:  Surgery date:  19  Arrival time:  6AM      Please come to:       [facility] [address]  [address] [place]  [address]    - ? parking is available in front of the hospital      -    Please proceed to [address] on the 3rd floor. [phone]?     - ?If you are in need of directions, wheelchair or escort please stop at the Information Desk in the lobby.  Inform the information person that you are here for surgery; a wheelchair and escort to [address] will be provided.?     What can I eat or drink?  -  You may have solid food or milk products until 8 hours prior to your surgery. Midnight  -  You may have water, apple juice or 7up/Sprite until 2 hours prior to your surgery. 19, 6AM    Which medicines can I take?  Stop Aspirin, vitamins and supplements one week prior to surgery.  Hold Ibuprofen for 24 hours and/or Naproxen for 48 hours prior to surgery.     -  Please take these medications the day of surgery:    Acetaminophen(Tylenol) or Acetaminophen with Codeine as needed for pain    Oxycodone as needed for severe pain    How do I prepare myself?  -  Take two showers: one the night before surgery; and one the morning of surgery.         Use Scrubcare or Hibiclens to wash from neck down, leave soap on your skin for up to one minute.  Do not get soap in your eyes or ears.  You may use your own shampoo and conditioner; no other hair products.   -  Do NOT use lotion, powder, deodorant, or antiperspirant the day of your surgery.  -  Do NOT wear jewelry.  - Do not bring your own medications to the hospital, except for inhalers and eye   drops.  -  Bring your ID and insurance card.      Questions or Concerns:  -If you are scheduled on the [facility] or [facility] and have questions or concerns regarding the day of surgery, please call  Preadmission Nursing at [phone].       -For questions after surgery please call your surgeons office.           AFTER YOUR SURGERY  Breathing exercises   Breathing exercises help you recover faster. Take deep breaths and let the air out slowly. This will:     Help you wake up after surgery.    Help prevent complications like pneumonia.  Preventing complications will help you go home sooner.   We may give you a breathing device (incentive spirometer) to encourage you to breathe deeply.   Nausea and vomiting   You may feel sick to your stomach after surgery; if so, let your nurse know.    Pain control:  After surgery, you may have pain. Our goal is to help you manage your pain. Pain medicine will help you feel comfortable enough to do activities that will help you heal.  These activities may include breathing exercises, walking and physical therapy.   To help your health care team treat your pain we will ask: 1) If you have pain  2) where it is located 3) describe your pain in your words  Methods of pain control include medications given by mouth, vein or by nerve block for some surgeries.  Sequential Compression Device (SCD) or Pneumo Boots:  You may need to wear SCD S on your legs or feet. These are wraps connected to a machine that pumps in air and releases it. The repeated pumping helps prevent blood clots from forming.

## 2019-09-20 ENCOUNTER — OFFICE VISIT (OUTPATIENT)
Dept: OTOLARYNGOLOGY | Facility: CLINIC | Age: 59
End: 2019-09-20
Payer: MEDICARE

## 2019-09-20 VITALS
WEIGHT: 175 LBS | TEMPERATURE: 98.1 F | HEIGHT: 72 IN | BODY MASS INDEX: 23.7 KG/M2 | SYSTOLIC BLOOD PRESSURE: 114 MMHG | RESPIRATION RATE: 16 BRPM | DIASTOLIC BLOOD PRESSURE: 69 MMHG | HEART RATE: 79 BPM

## 2019-09-20 VITALS
RESPIRATION RATE: 16 BRPM | HEART RATE: 79 BPM | WEIGHT: 175 LBS | SYSTOLIC BLOOD PRESSURE: 114 MMHG | BODY MASS INDEX: 23.7 KG/M2 | TEMPERATURE: 98.1 F | DIASTOLIC BLOOD PRESSURE: 69 MMHG | HEIGHT: 72 IN

## 2019-09-20 DIAGNOSIS — C31.0 MAXILLARY SINUS CANCER (H): Primary | ICD-10-CM

## 2019-09-20 RX ORDER — GABAPENTIN 100 MG/1
100 CAPSULE ORAL AT BEDTIME
Qty: 60 CAPSULE | Refills: 1 | Status: SHIPPED | OUTPATIENT
Start: 2019-09-20 | End: 2019-09-23

## 2019-09-20 ASSESSMENT — PAIN SCALES - GENERAL
PAINLEVEL: NO PAIN (0)
PAINLEVEL: NO PAIN (0)

## 2019-09-20 ASSESSMENT — MIFFLIN-ST. JEOR
SCORE: 1651.79
SCORE: 1651.79

## 2019-09-20 NOTE — PATIENT INSTRUCTIONS
1. Please call the ENT clinic with any questions,concerns, new or worsening symptoms.    -Clinic number is [phone]   - Svitlana's direct line (Dr. Roberts's and 's nurse) [phone]  2. A prescription was sent to your pharmacy for Tylenol #3 and Gabapentin     Arriving for surgery:  Surgery date:  9/24/19  Arrival time:  6AM       Please come to:        [facility] [address]  [address] [place]  [address]     - ? parking is available in front of the hospital      -    Please proceed to [address] on the 3rd floor. [phone]?     - ?If you are in need of directions, wheelchair or escort please stop at the Information Desk in the lobby.  Inform the information person that you are here for surgery; a wheelchair and escort to [address] will be provided.?      What can I eat or drink?  -  You may have solid food or milk products until 8 hours prior to your surgery. Midnight  -  You may have water, apple juice or 7up/Sprite until 2 hours prior to your surgery. 9/24/19, 6AM     Which medicines can I take?  Stop Aspirin, vitamins and supplements one week prior to surgery.  Hold Ibuprofen for 24 hours and/or Naproxen for 48 hours prior to surgery.      -  Please take these medications the day of surgery:          Acetaminophen(Tylenol) or Acetaminophen with Codeine as needed for pain          Oxycodone as needed for severe pain     How do I prepare myself?  -  Take two showers: one the night before surgery; and one the morning of surgery.           Use Scrubcare or Hibiclens to wash from neck down, leave soap on your skin for up to one minute.  Do not get soap in your eyes or ears.  You may use your own shampoo and conditioner; no other hair products.   -  Do NOT use lotion, powder, deodorant, or antiperspirant the day of your surgery.  -  Do NOT wear jewelry.  - Do not bring your own medications to the hospital, except for inhalers and eye   drops.  -  Bring  your ID and insurance card.

## 2019-09-20 NOTE — PATIENT INSTRUCTIONS
1. Please call the ENT clinic with any questions,concerns, new or worsening symptoms.    -Clinic number is [phone]   - Svitlana's direct line (Dr. Roberts's and 's nurse) [phone]  2. A prescription was sent to your pharmacy for Tylenol #3 and Gabapentin     Arriving for surgery:  Surgery date:  9/24/19  Arrival time:  6AM       Please come to:        [facility] [address]  [address] [place]  [address]     - ? parking is available in front of the hospital      -    Please proceed to [address] on the 3rd floor. [phone]?     - ?If you are in need of directions, wheelchair or escort please stop at the Information Desk in the lobby.  Inform the information person that you are here for surgery; a wheelchair and escort to [address] will be provided.?      What can I eat or drink?  -  You may have solid food or milk products until 8 hours prior to your surgery. Midnight  -  You may have water, apple juice or 7up/Sprite until 2 hours prior to your surgery. 9/24/19, 6AM     Which medicines can I take?  Stop Aspirin, vitamins and supplements one week prior to surgery.  Hold Ibuprofen for 24 hours and/or Naproxen for 48 hours prior to surgery.      -  Please take these medications the day of surgery:          Acetaminophen(Tylenol) or Acetaminophen with Codeine as needed for pain          Oxycodone as needed for severe pain     How do I prepare myself?  -  Take two showers: one the night before surgery; and one the morning of surgery.           Use Scrubcare or Hibiclens to wash from neck down, leave soap on your skin for up to one minute.  Do not get soap in your eyes or ears.  You may use your own shampoo and conditioner; no other hair products.   -  Do NOT use lotion, powder, deodorant, or antiperspirant the day of your surgery.  -  Do NOT wear jewelry.  - Do not bring your own medications to the hospital, except for inhalers and eye   drops.  -  Bring  your ID and insurance card.

## 2019-09-20 NOTE — PROGRESS NOTES
Dear Dr. Roberts:    I had the pleasure of meeting Eduardo Rubio in consultation today at the [facility] at your request.     History of Present Illness:   Eduardo Rubio is a 58 year old man with a T4N0 SCC of the maxillary sinus. The patient has a history of facial pain and numbness along with vision changes that resulted in a visit to the ER on 8/18/2019. An MRI was obtained which demonstrated a maxillary sinus mass with extension to the orbit with involvement of the inferior rectus muscle. He had a biopsy performed locally which was consistent with SCC. He saw Dr Wick on 8/29/2019. He was referred to ophthalmology for evaluation of his vision changes. He had a PET scan which showed the tumor in the maxillary sinus with bony erosion, extension to orbit, small lung nodule, no ramona disease. His case was reviewed at tumor conference with recommendation for surgery with total maxillectomy and orbital exenteration with free flap reconstruction. He saw Dr Roberts today to discuss surgery. He is scheduled for surgery next week.    He continues to report pain. He has double vision and leaves his eye patched to help with his symptoms. He has lip and cheek numbness.      Past medical/surgical history: gastric ulcer requiring surgical intervention, foot surgery. Reports being otherwise healthy.    Social history: Smoker of 1/2 ppd. No alcohol use. Not currently working.     Family history: negative for H&N cancer    MEDICATIONS:     Current Outpatient Medications   Medication Sig Dispense Refill     acetaminophen (TYLENOL) 325 MG tablet Take 325-650 mg by mouth every 6 hours as needed for mild pain       oxyCODONE IR (ROXICODONE) 10 MG tablet TK 1 T PO 5 TIMES A DAY PRF SEVERE PAIN RELIEF.  0     acetaminophen-codeine (TYLENOL #3) 300-30 MG tablet Take 1-2 tablets by mouth every 6 hours as needed for severe pain 30 tablet 0     gabapentin (NEURONTIN) 100 MG capsule Take 1  capsule (100 mg) by mouth At Bedtime You can increase to 200mg at night after 1 week, then after an additional week you can increase to 300mg at night. 60 capsule 1       ALLERGIES:  No Known Allergies    HABITS/SOCIAL HISTORY:   Smoker 1/2 ppd  No chewing tobacco use  Lives with girlfriend or with friends  No alcohol use  Not currently employed    Social History     Socioeconomic History     Marital status:      Spouse name: Not on file     Number of children: Not on file     Years of education: Not on file     Highest education level: Not on file   Occupational History     Not on file   Social Needs     Financial resource strain: Not on file     Food insecurity:     Worry: Not on file     Inability: Not on file     Transportation needs:     Medical: Not on file     Non-medical: Not on file   Tobacco Use     Smoking status: Current Every Day Smoker     Packs/day: 0.50     Years: 15.00     Pack years: 7.50     Types: Cigarettes     Start date: 2004     Smokeless tobacco: Never Used   Substance and Sexual Activity     Alcohol use: Not Currently     Drug use: Not Currently     Sexual activity: Not on file   Lifestyle     Physical activity:     Days per week: Not on file     Minutes per session: Not on file     Stress: Not on file   Relationships     Social connections:     Talks on phone: Not on file     Gets together: Not on file     Attends Judaism service: Not on file     Active member of club or organization: Not on file     Attends meetings of clubs or organizations: Not on file     Relationship status: Not on file     Intimate partner violence:     Fear of current or ex partner: Not on file     Emotionally abused: Not on file     Physically abused: Not on file     Forced sexual activity: Not on file   Other Topics Concern     Not on file   Social History Narrative     Not on file       PAST MEDICAL HISTORY:   Past Medical History:   Diagnosis Date     Gastric ulcer      Low back pain      Maxillary  sinus cancer (H)      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  Patient Supplied Answers to Review of Systems  [facility] ROS 9/20/2019   Constitutional Problems with sleep   Neurology Numbness, Headache   Eyes Double vision   Musculoskeletal Back pain   Endocrine Heat or cold intolerance         PHYSICAL EXAMINATION:   /69 (BP Location: Left arm, Patient Position: Sitting, Cuff Size: Adult Regular)   Pulse 79   Temp 98.1  F (36.7  C) (Oral)   Resp 16   Ht 1.829 m (6')   Wt 79.4 kg (175 lb)   BMI 23.73 kg/m     Appearance:   normal; NAD, age-appropriate appearance, well-developed, normal habitus   Communication:   normal; communicates verbally, normal voice quality   Head/Face:   inspection -  Normal; no scars or visible lesions   Salivary glands -  Normal size, no tenderness, swelling, or palpable masses   Facial strength -  Normal and symmetric bilateral; H/B I/VI   Skin:  normal, no rash   Ocular Motility:  diplopia in multiple gazes   Ears:  auricle (AD) -  normal  auricle (AS) -  normal  Normal clinical speech reception   Nose:  Ext. inspection -  Normal   Oral Cavity:  lips -  Normal mucosa, oral competence, and stoma size  Upper denture, lower partial plate with few remaining teeth - patient declined removed dentures  Tongue protrudes midline   Neck: No visible mass or asymmetry    Normal range of motion   Lymphatic:  no abnormal nodes   Cardiovascular:  warm, pink, well-perfused extremities without swelling, tenderness, or edema    Respiratory:  Normal respiratory effort, no stridor   Neuro/Psych.:  mood/affect -  normal  mental status -  normal  cranial nerves -  V2 numbness on right        RESULTS REVIEWED:   I reviewed the MRI and CT images which show the primary lesion in the maxillary sinus, bony erosion, involvement of the inferior rectus    I reviewed the referral notes from Dr Bronson    IMPRESSION AND PLAN:   Eduardo Rubio is a 58 year old man with a likely T4N0 SCC of the right maxilla. He is indicated for surgical resection with maxillectomy and orbital exenteration. Given the extent of the resection he will need a free flap reconstruction. I believe he would be best served by a right latissimus free flap (soft tissue).    Microvascular reconstructive techniques were counseled to the patient. In this procedure, tissue is harvested with its associated blood supply from a distant site of the body, and then transplanted to the wound. The blood supply is then sutured with the aid of a microscope to an artery and vein near the wound so that the tissue can survive in its new environment. The main risk of the surgery is insult to the connected artery and vein, such as by a blood clot, which can then lead to flap death. In the case that a blood clot is detected, the patient will be taken directly to the operating room in an effort to salvage the flap tissue. The risk of complete flap failure is estimated in the literature at between 1-5%. Other risks of surgery include hematoma, infection, donor site weakness, scarring, and poor cosmesis. We discussed the risks of blood transfusion. He will likely have lifelong nasal obstruction after surgery. The patient was counseled that he will be hospitalized for about 7 days, with about 3 days in the ICU. He will be NPO after surgery with a NG tube in place. His diet will be determined based on his wound healing.    He was counseled that he will not be allowed to smoke in the hospital. We  discussed the risks of vasoconstriction from nicotine. He is planning to quit smoking the day of surgery.     After a lengthy discussion he is in agreement with proceeding as scheduled next week.    Thank you very much for the opportunity to participate in the care of your patient.      Teresa Pulliam MD, M.D.  Otolaryngology- Head & Neck Surgery      This note was dictated with voice recognition software and then edited. Please excuse any unintentional errors.           CC:  Jose Roberts MD  Otolaryngology/Head & Neck Surgery  [facility]      Colt Puentes MD  Otolaryngology/Head & Neck Surgery  [facility]

## 2019-09-20 NOTE — PROGRESS NOTES
Teaching Flowsheet - ENT   Relevant Diagnosis: maxillary sinus cancer  Teaching Topic: right orbital exenteration, right neck exploration, right maxillectomy,nasogastric tube placement,right Latissmus free flap reconstruction  Person(s) involved in teaching: patient        Motivation Level:  Asks Questions:   Yes  Eager to Learn:   Yes  Cooperative:   Yes  Receptive (willing/able to accept information):   Yes  Comments: Reviewed pre-op H and P,  NPO prior to  surgery,  pre-op scrub (given Hibiclens)  Reviewed post-op  cares , activity and pain.     Patient demonstrates understanding of the following:  Reason for the appointment, diagnosis and treatment plan:   Yes  Knowledge of proper use of medications and conditions for which they are ordered (with special attention to potential side effects or drug interactions):  stop aspirin products 1 week before surgery Yes  Which situations necessitate calling provider and whom to contact:   Yes  Nutritional needs and diet plan:   Yes  Pain management techniques:   Yes  Patient instructed on hand hygiene:  Yes  How and/when to access community resources:   Yes     Infection Prevention:  Patient   demonstrates understanding of the following:  Surgical procedure site care taught Yes  Signs and symptoms of infection taught Yes  Wound care taught Yes  Instructional Materials Used/Given: pre- op booklet,verbal  Instruction.      Svitlana Hammonds, RN, BSN

## 2019-09-20 NOTE — NURSING NOTE
Chief Complaint   Patient presents with     Consult     Maxillary sinus cancer      /69 (BP Location: Left arm, Patient Position: Sitting, Cuff Size: Adult Regular)   Pulse 79   Temp 98.1  F (36.7  C) (Oral)   Resp 16   Ht 1.829 m (6')   Wt 79.4 kg (175 lb)   BMI 23.73 kg/m      Uriel Coleman, CMA

## 2019-09-20 NOTE — LETTER
9/20/2019       RE: Eduardo Rubio  [address] [address]  [facility] [address]     Dear Colleague,    Thank you for referring your patient, Eduardo Rubio, to the [facility] EAR [facility] at [facility]. Please see a copy of my visit note below.    HISTORY OF PRESENT ILLNESS:  Mr. Rubio is seen at the request of Dr. 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  He had noticed numbness in his right cheek and some swelling underneath his eye.  He currently has double vision. CT scan revealed a destructive lesion in the medial wall in the right maxillary sinus with destruction of the orbital floor as well and involvement of the inferior rectus.  We did review his case at Tumor Board and recommended right maxillectomy with orbital exenteration given that the orbital margin would be quite close as it is very close to the globe and also the fact that postoperative radiation is likely to be indicated and would result in orbital pain if the eye was still in place.  The patient is aware of this.  He currently only has burning and numbness over his right cheek and upper lip.  He does have double vision but does not have much in the way of orbital pain today.  He has not noticed any lumps or bumps in his neck.  He does not have any numbness in his forehead.         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     Past Surgical History:   Procedure Laterality Date     ABDOMEN SURGERY      [facility], surgery related to gastric ulcer     AS AMPUTATION TOE,I-P JT Bilateral     all ten toes       Current Outpatient Medications:      acetaminophen (TYLENOL) 325 MG tablet, Take 325-650 mg by mouth every 6 hours as needed for mild pain, Disp: , Rfl:      acetaminophen-codeine (TYLENOL #3) 300-30 MG tablet, Take 1-2 tablets by mouth  every 6 hours as needed for severe pain, Disp: 30 tablet, Rfl: 0     gabapentin (NEURONTIN) 100 MG capsule, Take 1 capsule (100 mg) by mouth At Bedtime You can increase to 200mg at night after 1 week, then after an additional week you can increase to 300mg at night., Disp: 60 capsule, Rfl: 1     oxyCODONE IR (ROXICODONE) 10 MG tablet, TK 1 T PO 5 TIMES A DAY PRF SEVERE PAIN RELIEF., Disp: , Rfl: 0  No Known Allergies  Social History     Tobacco Use     Smoking status: Current Every Day Smoker     Packs/day: 0.50     Years: 15.00     Pack years: 7.50     Types: Cigarettes     Start date: 2004     Smokeless tobacco: Never Used   Substance Use Topics     Alcohol use: Not Currently     Review Of Systems  Skin: negative  Eyes: negative  Ears/Nose/Throat: negative  Respiratory: No shortness of breath, dyspnea on exertion, cough, or hemoptysis  Cardiovascular: negative  Gastrointestinal: negative  Genitourinary: negative  Musculoskeletal: negative  Neurologic: negative  Psychiatric: negative  Hematologic/Lymphatic/Immunologic: negative  Endocrine: negative      PHYSICAL EXAMINATION:  He is well appearing, in no distress.  He has a dressing taped over the eye which I removed.  The skin in the infraorbital area is very close to the tumor, if not involved, although it has not been broken through yet.  I only did anterior rhinoscopy today given that he has had prior exams and I can see on the CT scan that the tumor is abutting the septum.  He is reluctant to repeat the endoscopic exam today.  Examination of the oral cavity reveals he is edentulous in the maxilla.  I do not see any evidence of tumor breaking through the palate.  Examination of the neck reveals no mass or lymphadenopathy.      IMAGING:  The patient has a CT scan that shows destruction of the medial maxillary wall involving the inferior turbinate and tumor either abutting or invading the septum.  It is also invading the ethmoids and the orbital floor.  The floor  of the maxillary sinus itself is not destroyed, but there is some break in the posterolateral maxillary sinus.  The pterygopalatine fossa does not appear to be diffusely infiltrated and the pterygoid plates appear to be intact.      IMPRESSION:  T3 versus T4 maxillary sinus carcinoma.      PLAN:   1.  The patient will require orbital exenteration with maxillectomy.  I believe he will need resection of the palate for obtaining a margin.  I had considered entering through the anterior face of the maxillary sinus inferiorly to try to clear the tumor that way and leave his palate in place, but I believe that we will be very close to the tumor that way.  Thus, I believe he would be most well served by a traditional maxillectomy.   2.  Dr. Pulliam will see him today to discuss reconstruction.   3.  I counseled him about the feeding tube and I do not think he will need a trach, but it is a small possibility.   4.  He understands he will need postoperative radiation.  We will also plan for a right neck exploration for vessel procurement.       CC:      Colt Puentes M.D.  Department of Otolaryngology-Head & Neck Surgery  [address] [place] [address]    Teresa Pulliam M.D.   Department of Otolaryngology- Head & Neck Surgery  [address] [place] [address]        Teaching Flowsheet - ENT   Relevant Diagnosis: maxillary sinus cancer  Teaching Topic: right orbital exenteration, right neck exploration, right maxillectomy,nasogastric tube placement,right Latissmus free flap reconstruction  Person(s) involved in teaching: patient        Motivation Level:  Asks Questions:   Yes  Eager to Learn:   Yes  Cooperative:   Yes  Receptive (willing/able to accept information):   Yes  Comments: Reviewed pre-op H and P,  NPO prior to  surgery,  pre-op scrub (given Hibiclens)  Reviewed post-op  cares , activity and pain.     Patient demonstrates understanding of the  following:  Reason for the appointment, diagnosis and treatment plan:   Yes  Knowledge of proper use of medications and conditions for which they are ordered (with special attention to potential side effects or drug interactions):  stop aspirin products 1 week before surgery Yes  Which situations necessitate calling provider and whom to contact:   Yes  Nutritional needs and diet plan:   Yes  Pain management techniques:   Yes  Patient instructed on hand hygiene:  Yes  How and/when to access community resources:   Yes     Infection Prevention:  Patient   demonstrates understanding of the following:  Surgical procedure site care taught Yes  Signs and symptoms of infection taught Yes  Wound care taught Yes  Instructional Materials Used/Given: pre- op booklet,verbal  Instruction.      Svitlana Hammonds, RN, BSN      HISTORY OF PRESENT ILLNESS:  Mr. Rubio is a patient who has been diagnosed with a right-sided maxillary squamous cell carcinoma.  He has previously been reviewed here at our Tumor Board and he appears to have a tumor that is slightly more superiorly located with destruction of medial maxillary wall and the orbital floor with involvement of the inferior rectus muscle.  The patient himself has noted double vision for some time.  He is unsure about the quality of his vision, but does note numbness in his entire infraorbital cheek and upper lip with some burning sensation.  He currently prefers to keep the eye covered as it reduces the sensation of double vision.  He has not noticed any masses in his mouth.      Again, thank you for allowing me to participate in the care of your patient.      Sincerely,    Jose Roberts MD

## 2019-09-20 NOTE — LETTER
9/20/2019       RE: Eduardo Rubio  [address] [address]  [facility] [address]     Dear Colleague,    Thank you for referring your patient, Eduardo Rubio, to the [facility] EAR NOSE [facility] THROAT at [facility]. Please see a copy of my visit note below.    Dear Dr. Roberts:    I had the pleasure of meeting Eduardo Rubio in consultation today at the [facility] at your request.     History of Present Illness:   Eduardo Rubio is a 58 year old man with a T4N0 SCC of the maxillary sinus. The patient has a history of facial pain and numbness along with vision changes that resulted in a visit to the ER on 8/18/2019. An MRI was obtained which demonstrated a maxillary sinus mass with extension to the orbit with involvement of the inferior rectus muscle. He had a biopsy performed locally which was consistent with SCC. He saw Dr Wick on 8/29/2019. He was referred to ophthalmology for evaluation of his vision changes. He had a PET scan which showed the tumor in the maxillary sinus with bony erosion, extension to orbit, small lung nodule, no ramona disease. His case was reviewed at tumor conference with recommendation for surgery with total maxillectomy and orbital exenteration with free flap reconstruction. He saw Dr Roberts today to discuss surgery. He is scheduled for surgery next week.    He continues to report pain. He has double vision and leaves his eye patched to help with his symptoms. He has lip and cheek numbness.      Past medical/surgical history: gastric ulcer requiring surgical intervention, foot surgery. Reports being otherwise healthy.    Social history: Smoker of 1/2 ppd. No alcohol use. Not currently working.     Family history: negative for H&N cancer    MEDICATIONS:     Current Outpatient Medications   Medication Sig Dispense Refill     acetaminophen (TYLENOL) 325 MG tablet Take 325-650 mg by mouth every 6 hours as needed for mild pain        oxyCODONE IR (ROXICODONE) 10 MG tablet TK 1 T PO 5 TIMES A DAY PRF SEVERE PAIN RELIEF.  0     acetaminophen-codeine (TYLENOL #3) 300-30 MG tablet Take 1-2 tablets by mouth every 6 hours as needed for severe pain 30 tablet 0     gabapentin (NEURONTIN) 100 MG capsule Take 1 capsule (100 mg) by mouth At Bedtime You can increase to 200mg at night after 1 week, then after an additional week you can increase to 300mg at night. 60 capsule 1       ALLERGIES:  No Known Allergies    HABITS/SOCIAL HISTORY:   Smoker 1/2 ppd  No chewing tobacco use  Lives with girlfriend or with friends  No alcohol use  Not currently employed    Social History     Socioeconomic History     Marital status:      Spouse name: Not on file     Number of children: Not on file     Years of education: Not on file     Highest education level: Not on file   Occupational History     Not on file   Social Needs     Financial resource strain: Not on file     Food insecurity:     Worry: Not on file     Inability: Not on file     Transportation needs:     Medical: Not on file     Non-medical: Not on file   Tobacco Use     Smoking status: Current Every Day Smoker     Packs/day: 0.50     Years: 15.00     Pack years: 7.50     Types: Cigarettes     Start date: 2004     Smokeless tobacco: Never Used   Substance and Sexual Activity     Alcohol use: Not Currently     Drug use: Not Currently     Sexual activity: Not on file   Lifestyle     Physical activity:     Days per week: Not on file     Minutes per session: Not on file     Stress: Not on file   Relationships     Social connections:     Talks on phone: Not on file     Gets together: Not on file     Attends Anabaptism service: Not on file     Active member of club or organization: Not on file     Attends meetings of clubs or organizations: Not on file     Relationship status: Not on file     Intimate partner violence:     Fear of current or ex partner: Not on file     Emotionally abused: Not on file      Physically abused: Not on file     Forced sexual activity: Not on file   Other Topics Concern     Not on file   Social History Narrative     Not on file       PAST MEDICAL HISTORY:   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  Patient Supplied Answers to Review of Systems  [facility] ROS 9/20/2019   Constitutional Problems with sleep   Neurology Numbness, Headache   Eyes Double vision   Musculoskeletal Back pain   Endocrine Heat or cold intolerance         PHYSICAL EXAMINATION:   /69 (BP Location: Left arm, Patient Position: Sitting, Cuff Size: Adult Regular)   Pulse 79   Temp 98.1  F (36.7  C) (Oral)   Resp 16   Ht 1.829 m (6')   Wt 79.4 kg (175 lb)   BMI 23.73 kg/m      Appearance:   normal; NAD, age-appropriate appearance, well-developed, normal habitus   Communication:   normal; communicates verbally, normal voice quality   Head/Face:   inspection -  Normal; no scars or visible lesions   Salivary glands -  Normal size, no tenderness, swelling, or palpable masses   Facial strength -  Normal and symmetric bilateral; H/B I/VI   Skin:  normal, no rash   Ocular Motility:  diplopia in multiple gazes   Ears:  auricle (AD) -  normal  auricle (AS) -  normal  Normal clinical speech reception   Nose:  Ext. inspection -  Normal   Oral Cavity:  lips -  Normal mucosa, oral competence, and stoma size  Upper denture, lower partial plate with few  remaining teeth - patient declined removed dentures  Tongue protrudes midline   Neck: No visible mass or asymmetry    Normal range of motion   Lymphatic:  no abnormal nodes   Cardiovascular:  warm, pink, well-perfused extremities without swelling, tenderness, or edema   Respiratory:  Normal respiratory effort, no stridor   Neuro/Psych.:  mood/affect -  normal  mental status -  normal  cranial nerves -  V2 numbness on right        RESULTS REVIEWED:   I reviewed the MRI and CT images which show the primary lesion in the maxillary sinus, bony erosion, involvement of the inferior rectus    I reviewed the referral notes from Dr Bronson    IMPRESSION AND PLAN:   Eduardo Rubio is a 58 year old man with a likely T4N0 SCC of the right maxilla. He is indicated for surgical resection with maxillectomy and orbital exenteration. Given the extent of the resection he will need a free flap reconstruction. I believe he would be best served by a right latissimus free flap (soft tissue).    Microvascular reconstructive techniques were counseled to the patient. In this procedure, tissue is harvested with its associated blood supply from a distant site of the body, and then transplanted to the wound. The blood supply is then sutured with the aid of a microscope to an artery and vein near the wound so that the tissue can survive in its new environment. The main risk of the surgery is insult to the connected artery and vein, such as by a blood clot, which can then lead to flap death. In the case that a blood clot is detected, the patient will be taken directly to the operating room in an effort to salvage the flap tissue. The risk of complete flap failure is estimated in the literature at between 1-5%. Other risks of surgery include hematoma, infection, donor site weakness, scarring, and poor cosmesis. We discussed the risks of blood transfusion. He will likely have lifelong nasal obstruction after surgery. The patient was counseled  that he will be hospitalized for about 7 days, with about 3 days in the ICU. He will be NPO after surgery with a NG tube in place. His diet will be determined based on his wound healing.    He was counseled that he will not be allowed to smoke in the hospital. We discussed the risks of vasoconstriction from nicotine. He is planning to quit smoking the day of surgery.     After a lengthy discussion he is in agreement with proceeding as scheduled next week.    Thank you very much for the opportunity to participate in the care of your patient.      Teresa Pulliam MD, M.D.  Otolaryngology- Head & Neck Surgery      This note was dictated with voice recognition software and then edited. Please excuse any unintentional errors.     CC:  Jose Roberts MD  Otolaryngology/Head & Neck Surgery  [facility] [address]      Colt Puentes MD  Otolaryngology/Head & Neck Surgery  [facility]

## 2019-09-20 NOTE — NURSING NOTE
Chief Complaint   Patient presents with     Consult     Maxillectomy     /69 (BP Location: Left arm, Patient Position: Sitting, Cuff Size: Adult Regular)   Pulse 79   Temp 98.1  F (36.7  C) (Oral)   Resp 16   Ht 1.829 m (6')   Wt 79.4 kg (175 lb)   BMI 23.73 kg/m      Uriel Coleman, CMA

## 2019-09-20 NOTE — PROGRESS NOTES
HISTORY OF PRESENT ILLNESS:  Mr. Rubio is seen at the request of Dr. 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  He had noticed numbness in his right cheek and some swelling underneath his eye.  He currently has double vision. CT scan revealed a destructive lesion in the medial wall in the right maxillary sinus with destruction of the orbital floor as well and involvement of the inferior rectus.  We did review his case at Tumor Board and recommended right maxillectomy with orbital exenteration given that the orbital margin would be quite close as it is very close to the globe and also the fact that postoperative radiation is likely to be indicated and would result in orbital pain if the eye was still in place.  The patient is aware of this.  He currently only has burning and numbness over his right cheek and upper lip.  He does have double vision but does not have much in the way of orbital pain today.  He has not noticed any lumps or bumps in his neck.  He does not have any numbness in his forehead.         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     Past Surgical History:   Procedure Laterality Date     ABDOMEN SURGERY      [facility] [place], surgery related to gastric ulcer     AS AMPUTATION TOE,I-P JT Bilateral     all ten toes       Current Outpatient Medications:      acetaminophen (TYLENOL) 325 MG tablet, Take 325-650 mg by mouth every 6 hours as needed for mild pain, Disp: , Rfl:      acetaminophen-codeine (TYLENOL #3) 300-30 MG tablet, Take 1-2 tablets by mouth every 6 hours as needed for severe pain, Disp: 30 tablet, Rfl: 0     gabapentin (NEURONTIN) 100 MG capsule, Take 1 capsule (100 mg) by mouth At Bedtime You can increase to 200mg at night after 1 week, then after an additional week you can increase to 300mg at night., Disp: 60  capsule, Rfl: 1     oxyCODONE IR (ROXICODONE) 10 MG tablet, TK 1 T PO 5 TIMES A DAY PRF SEVERE PAIN RELIEF., Disp: , Rfl: 0  No Known Allergies  Social History     Tobacco Use     Smoking status: Current Every Day Smoker     Packs/day: 0.50     Years: 15.00     Pack years: 7.50     Types: Cigarettes     Start date: 2004     Smokeless tobacco: Never Used   Substance Use Topics     Alcohol use: Not Currently     Review Of Systems  Skin: negative  Eyes: negative  Ears/Nose/Throat: negative  Respiratory: No shortness of breath, dyspnea on exertion, cough, or hemoptysis  Cardiovascular: negative  Gastrointestinal: negative  Genitourinary: negative  Musculoskeletal: negative  Neurologic: negative  Psychiatric: negative  Hematologic/Lymphatic/Immunologic: negative  Endocrine: negative      PHYSICAL EXAMINATION:  He is well appearing, in no distress.  He has a dressing taped over the eye which I removed.  The skin in the infraorbital area is very close to the tumor, if not involved, although it has not been broken through yet.  I only did anterior rhinoscopy today given that he has had prior exams and I can see on the CT scan that the tumor is abutting the septum.  He is reluctant to repeat the endoscopic exam today.  Examination of the oral cavity reveals he is edentulous in the maxilla.  I do not see any evidence of tumor breaking through the palate.  Examination of the neck reveals no mass or lymphadenopathy.      IMAGING:  The patient has a CT scan that shows destruction of the medial maxillary wall involving the inferior turbinate and tumor either abutting or invading the septum.  It is also invading the ethmoids and the orbital floor.  The floor of the maxillary sinus itself is not destroyed, but there is some break in the posterolateral maxillary sinus.  The pterygopalatine fossa does not appear to be diffusely infiltrated and the pterygoid plates appear to be intact.      IMPRESSION:  T3 versus T4 maxillary sinus  carcinoma.      PLAN:   1.  The patient will require orbital exenteration with maxillectomy.  I believe he will need resection of the palate for obtaining a margin.  I had considered entering through the anterior face of the maxillary sinus inferiorly to try to clear the tumor that way and leave his palate in place, but I believe that we will be very close to the tumor that way.  Thus, I believe he would be most well served by a traditional maxillectomy.   2.  Dr. Pulliam will see him today to discuss reconstruction.   3.  I counseled him about the feeding tube and I do not think he will need a trach, but it is a small possibility.   4.  He understands he will need postoperative radiation.  We will also plan for a right neck exploration for vessel procurement.       CC:      Colt Puentes M.D.  Department of Otolaryngology-Head & Neck Surgery  [address] [place] [address]    Teresa Pulliam M.D.   Department of Otolaryngology- Head & Neck Surgery  [address] [place], [place] [address]

## 2019-09-23 DIAGNOSIS — C31.0 MAXILLARY SINUS CANCER (H): ICD-10-CM

## 2019-09-23 RX ORDER — ACETAMINOPHEN 325 MG/1
325-650 TABLET ORAL EVERY 6 HOURS PRN
Qty: 100 TABLET | Refills: 1 | Status: CANCELLED | OUTPATIENT
Start: 2019-09-23

## 2019-09-23 RX ORDER — GABAPENTIN 100 MG/1
100 CAPSULE ORAL AT BEDTIME
Qty: 60 CAPSULE | Refills: 1 | Status: ON HOLD | OUTPATIENT
Start: 2019-09-23 | End: 2019-09-30

## 2019-09-23 NOTE — TELEPHONE ENCOUNTER
Tylenol      HISTORICAL ONLY      LAST CLINIC VISIT: 9-20-19  Future Office visit:  none    Routing refill request to provider for review/approval because:  Medication is reported/historical      Kathleen M Doege RN

## 2019-09-23 NOTE — TELEPHONE ENCOUNTER
[facility] Call Center    Phone Message    May a detailed message be left on voicemail: yes    Reason for Call: Other: Patient said he did not get his scripts for GABEPENTIN and Tylenol. He said he call the [facility] and they have not received it.     Action Taken: Other: ump ENT

## 2019-09-24 ENCOUNTER — SURGERY (OUTPATIENT)
Age: 59
End: 2019-09-24
Payer: MEDICARE

## 2019-09-24 ENCOUNTER — APPOINTMENT (OUTPATIENT)
Dept: GENERAL RADIOLOGY | Facility: CLINIC | Age: 59
DRG: 131 | End: 2019-09-24
Attending: OTOLARYNGOLOGY
Payer: MEDICARE

## 2019-09-24 ENCOUNTER — ANESTHESIA (OUTPATIENT)
Dept: SURGERY | Facility: CLINIC | Age: 59
DRG: 131 | End: 2019-09-24
Payer: MEDICARE

## 2019-09-24 ENCOUNTER — ANESTHESIA EVENT (OUTPATIENT)
Dept: SURGERY | Facility: CLINIC | Age: 59
DRG: 131 | End: 2019-09-24
Payer: MEDICARE

## 2019-09-24 ENCOUNTER — HOSPITAL ENCOUNTER (INPATIENT)
Facility: CLINIC | Age: 59
LOS: 6 days | Discharge: HOME HEALTH | DRG: 131 | End: 2019-09-30
Attending: OTOLARYNGOLOGY
Payer: MEDICARE

## 2019-09-24 DIAGNOSIS — Z98.890 S/P FLAP GRAFT: Primary | ICD-10-CM

## 2019-09-24 PROBLEM — C44.92 SCC (SQUAMOUS CELL CARCINOMA): Status: ACTIVE | Noted: 2019-09-24

## 2019-09-24 LAB
ABO + RH BLD: NORMAL
ABO + RH BLD: NORMAL
ANION GAP SERPL CALCULATED.3IONS-SCNC: 7 MMOL/L (ref 3–14)
ANION GAP SERPL CALCULATED.3IONS-SCNC: 9 MMOL/L (ref 3–14)
BASE DEFICIT BLDA-SCNC: 0.8 MMOL/L
BASE EXCESS BLDA CALC-SCNC: 1.1 MMOL/L
BASE EXCESS BLDA CALC-SCNC: 1.7 MMOL/L
BASE EXCESS BLDA CALC-SCNC: 2.3 MMOL/L
BASE EXCESS BLDA CALC-SCNC: 2.4 MMOL/L
BLD GP AB SCN SERPL QL: NORMAL
BLD PROD TYP BPU: NORMAL
BLOOD BANK CMNT PATIENT-IMP: NORMAL
BLOOD BANK CMNT PATIENT-IMP: NORMAL
BUN SERPL-MCNC: 7 MG/DL (ref 7–30)
BUN SERPL-MCNC: 8 MG/DL (ref 7–30)
CA-I BLD-MCNC: 4.5 MG/DL (ref 4.4–5.2)
CA-I BLD-MCNC: 4.8 MG/DL (ref 4.4–5.2)
CA-I BLD-MCNC: 5 MG/DL (ref 4.4–5.2)
CA-I BLD-MCNC: 5.1 MG/DL (ref 4.4–5.2)
CA-I BLD-MCNC: 6.3 MG/DL (ref 4.4–5.2)
CALCIUM SERPL-MCNC: 8.5 MG/DL (ref 8.5–10.1)
CALCIUM SERPL-MCNC: 8.7 MG/DL (ref 8.5–10.1)
CHLORIDE SERPL-SCNC: 99 MMOL/L (ref 94–109)
CHLORIDE SERPL-SCNC: 99 MMOL/L (ref 94–109)
CO2 SERPL-SCNC: 26 MMOL/L (ref 20–32)
CO2 SERPL-SCNC: 27 MMOL/L (ref 20–32)
CREAT SERPL-MCNC: 0.49 MG/DL (ref 0.66–1.25)
CREAT SERPL-MCNC: 0.66 MG/DL (ref 0.66–1.25)
ERYTHROCYTE [DISTWIDTH] IN BLOOD BY AUTOMATED COUNT: 13.5 % (ref 10–15)
GFR SERPL CREATININE-BSD FRML MDRD: >90 ML/MIN/{1.73_M2}
GFR SERPL CREATININE-BSD FRML MDRD: >90 ML/MIN/{1.73_M2}
GLUCOSE BLD-MCNC: 175 MG/DL (ref 70–99)
GLUCOSE BLD-MCNC: 177 MG/DL (ref 70–99)
GLUCOSE BLD-MCNC: 182 MG/DL (ref 70–99)
GLUCOSE BLD-MCNC: 188 MG/DL (ref 70–99)
GLUCOSE BLD-MCNC: 189 MG/DL (ref 70–99)
GLUCOSE BLDC GLUCOMTR-MCNC: 162 MG/DL (ref 70–99)
GLUCOSE BLDC GLUCOMTR-MCNC: 94 MG/DL (ref 70–99)
GLUCOSE SERPL-MCNC: 106 MG/DL (ref 70–99)
GLUCOSE SERPL-MCNC: 159 MG/DL (ref 70–99)
HCO3 BLD-SCNC: 25 MMOL/L (ref 21–28)
HCO3 BLD-SCNC: 27 MMOL/L (ref 21–28)
HCO3 BLD-SCNC: 28 MMOL/L (ref 21–28)
HCT VFR BLD AUTO: 24.2 % (ref 40–53)
HGB BLD-MCNC: 10 G/DL (ref 13.3–17.7)
HGB BLD-MCNC: 11.5 G/DL (ref 13.3–17.7)
HGB BLD-MCNC: 13.1 G/DL (ref 13.3–17.7)
HGB BLD-MCNC: 8.2 G/DL (ref 13.3–17.7)
HGB BLD-MCNC: 8.9 G/DL (ref 13.3–17.7)
HGB BLD-MCNC: 9.6 G/DL (ref 13.3–17.7)
MAGNESIUM SERPL-MCNC: 1.5 MG/DL (ref 1.6–2.3)
MCH RBC QN AUTO: 32 PG (ref 26.5–33)
MCHC RBC AUTO-ENTMCNC: 33.9 G/DL (ref 31.5–36.5)
MCV RBC AUTO: 95 FL (ref 78–100)
NUM BPU REQUESTED: 4
O2/TOTAL GAS SETTING VFR VENT: 27 %
O2/TOTAL GAS SETTING VFR VENT: 30 %
O2/TOTAL GAS SETTING VFR VENT: 31 %
O2/TOTAL GAS SETTING VFR VENT: 33 %
O2/TOTAL GAS SETTING VFR VENT: 33 %
PCO2 BLD: 47 MM HG (ref 35–45)
PCO2 BLD: 50 MM HG (ref 35–45)
PCO2 BLD: 51 MM HG (ref 35–45)
PH BLD: 7.34 PH (ref 7.35–7.45)
PH BLD: 7.34 PH (ref 7.35–7.45)
PH BLD: 7.36 PH (ref 7.35–7.45)
PH BLD: 7.37 PH (ref 7.35–7.45)
PH BLD: 7.38 PH (ref 7.35–7.45)
PHOSPHATE SERPL-MCNC: 4.2 MG/DL (ref 2.5–4.5)
PLATELET # BLD AUTO: 199 10E9/L (ref 150–450)
PO2 BLD: 101 MM HG (ref 80–105)
PO2 BLD: 80 MM HG (ref 80–105)
PO2 BLD: 81 MM HG (ref 80–105)
PO2 BLD: 84 MM HG (ref 80–105)
PO2 BLD: 87 MM HG (ref 80–105)
POTASSIUM BLD-SCNC: 3.6 MMOL/L (ref 3.4–5.3)
POTASSIUM BLD-SCNC: 3.8 MMOL/L (ref 3.4–5.3)
POTASSIUM BLD-SCNC: 4.1 MMOL/L (ref 3.4–5.3)
POTASSIUM BLD-SCNC: 4.2 MMOL/L (ref 3.4–5.3)
POTASSIUM BLD-SCNC: 4.4 MMOL/L (ref 3.4–5.3)
POTASSIUM SERPL-SCNC: 3.5 MMOL/L (ref 3.4–5.3)
POTASSIUM SERPL-SCNC: 4.1 MMOL/L (ref 3.4–5.3)
RBC # BLD AUTO: 2.56 10E12/L (ref 4.4–5.9)
SODIUM BLD-SCNC: 134 MMOL/L (ref 133–144)
SODIUM BLD-SCNC: 135 MMOL/L (ref 133–144)
SODIUM SERPL-SCNC: 133 MMOL/L (ref 133–144)
SODIUM SERPL-SCNC: 134 MMOL/L (ref 133–144)
SPECIMEN EXP DATE BLD: NORMAL
WBC # BLD AUTO: 13.4 10E9/L (ref 4–11)

## 2019-09-24 PROCEDURE — 25000132 ZZH RX MED GY IP 250 OP 250 PS 637: Mod: GY | Performed by: STUDENT IN AN ORGANIZED HEALTH CARE EDUCATION/TRAINING PROGRAM

## 2019-09-24 PROCEDURE — 25000131 ZZH RX MED GY IP 250 OP 636 PS 637: Mod: GY | Performed by: STUDENT IN AN ORGANIZED HEALTH CARE EDUCATION/TRAINING PROGRAM

## 2019-09-24 PROCEDURE — 40000275 ZZH STATISTIC RCP TIME EA 10 MIN

## 2019-09-24 PROCEDURE — 0KBF0ZZ EXCISION OF RIGHT TRUNK MUSCLE, OPEN APPROACH: ICD-10-PCS | Performed by: OTOLARYNGOLOGY

## 2019-09-24 PROCEDURE — 0HX5XZZ TRANSFER CHEST SKIN, EXTERNAL APPROACH: ICD-10-PCS | Performed by: OTOLARYNGOLOGY

## 2019-09-24 PROCEDURE — 25800030 ZZH RX IP 258 OP 636: Performed by: OTOLARYNGOLOGY

## 2019-09-24 PROCEDURE — 88311 DECALCIFY TISSUE: CPT | Performed by: OTOLARYNGOLOGY

## 2019-09-24 PROCEDURE — 01BK0ZX EXCISION OF HEAD AND NECK SYMPATHETIC NERVE, OPEN APPROACH, DIAGNOSTIC: ICD-10-PCS | Performed by: OTOLARYNGOLOGY

## 2019-09-24 PROCEDURE — 25000565 ZZH ISOFLURANE, EA 15 MIN: Performed by: OTOLARYNGOLOGY

## 2019-09-24 PROCEDURE — 27810169 ZZH OR IMPLANT GENERAL: Performed by: OTOLARYNGOLOGY

## 2019-09-24 PROCEDURE — 25800030 ZZH RX IP 258 OP 636

## 2019-09-24 PROCEDURE — 27210794 ZZH OR GENERAL SUPPLY STERILE: Performed by: OTOLARYNGOLOGY

## 2019-09-24 PROCEDURE — 40000014 ZZH STATISTIC ARTERIAL MONITORING DAILY

## 2019-09-24 PROCEDURE — 40000986 XR ABDOMEN PORT 1 VW

## 2019-09-24 PROCEDURE — 20000004 ZZH R&B ICU UMMC

## 2019-09-24 PROCEDURE — 25000125 ZZHC RX 250: Performed by: OTOLARYNGOLOGY

## 2019-09-24 PROCEDURE — 27210995 ZZH RX 272: Performed by: OTOLARYNGOLOGY

## 2019-09-24 PROCEDURE — 87640 STAPH A DNA AMP PROBE: CPT | Performed by: STUDENT IN AN ORGANIZED HEALTH CARE EDUCATION/TRAINING PROGRAM

## 2019-09-24 PROCEDURE — 37000009 ZZH ANESTHESIA TECHNICAL FEE, EACH ADDTL 15 MIN: Performed by: OTOLARYNGOLOGY

## 2019-09-24 PROCEDURE — 80048 BASIC METABOLIC PNL TOTAL CA: CPT | Performed by: STUDENT IN AN ORGANIZED HEALTH CARE EDUCATION/TRAINING PROGRAM

## 2019-09-24 PROCEDURE — 99221 1ST HOSP IP/OBS SF/LOW 40: CPT | Mod: GC

## 2019-09-24 PROCEDURE — P9041 ALBUMIN (HUMAN),5%, 50ML: HCPCS | Performed by: NURSE ANESTHETIST, CERTIFIED REGISTERED

## 2019-09-24 PROCEDURE — 25000132 ZZH RX MED GY IP 250 OP 250 PS 637: Mod: GY | Performed by: NURSE ANESTHETIST, CERTIFIED REGISTERED

## 2019-09-24 PROCEDURE — 0KR207Z REPLACEMENT OF RIGHT NECK MUSCLE WITH AUTOLOGOUS TISSUE SUBSTITUTE, OPEN APPROACH: ICD-10-PCS | Performed by: OTOLARYNGOLOGY

## 2019-09-24 PROCEDURE — 84100 ASSAY OF PHOSPHORUS: CPT | Performed by: STUDENT IN AN ORGANIZED HEALTH CARE EDUCATION/TRAINING PROGRAM

## 2019-09-24 PROCEDURE — 88309 TISSUE EXAM BY PATHOLOGIST: CPT | Performed by: OTOLARYNGOLOGY

## 2019-09-24 PROCEDURE — 82330 ASSAY OF CALCIUM: CPT | Performed by: OTOLARYNGOLOGY

## 2019-09-24 PROCEDURE — 25000128 H RX IP 250 OP 636: Performed by: NURSE ANESTHETIST, CERTIFIED REGISTERED

## 2019-09-24 PROCEDURE — 82803 BLOOD GASES ANY COMBINATION: CPT | Performed by: OTOLARYNGOLOGY

## 2019-09-24 PROCEDURE — 40000170 ZZH STATISTIC PRE-PROCEDURE ASSESSMENT II: Performed by: OTOLARYNGOLOGY

## 2019-09-24 PROCEDURE — 84295 ASSAY OF SERUM SODIUM: CPT | Performed by: OTOLARYNGOLOGY

## 2019-09-24 PROCEDURE — 25000128 H RX IP 250 OP 636: Performed by: STUDENT IN AN ORGANIZED HEALTH CARE EDUCATION/TRAINING PROGRAM

## 2019-09-24 PROCEDURE — 25800030 ZZH RX IP 258 OP 636: Performed by: NURSE ANESTHETIST, CERTIFIED REGISTERED

## 2019-09-24 PROCEDURE — 37000008 ZZH ANESTHESIA TECHNICAL FEE, 1ST 30 MIN: Performed by: OTOLARYNGOLOGY

## 2019-09-24 PROCEDURE — 82947 ASSAY GLUCOSE BLOOD QUANT: CPT | Performed by: OTOLARYNGOLOGY

## 2019-09-24 PROCEDURE — 88305 TISSUE EXAM BY PATHOLOGIST: CPT | Performed by: OTOLARYNGOLOGY

## 2019-09-24 PROCEDURE — 84132 ASSAY OF SERUM POTASSIUM: CPT | Performed by: OTOLARYNGOLOGY

## 2019-09-24 PROCEDURE — 87641 MR-STAPH DNA AMP PROBE: CPT | Performed by: STUDENT IN AN ORGANIZED HEALTH CARE EDUCATION/TRAINING PROGRAM

## 2019-09-24 PROCEDURE — 88313 SPECIAL STAINS GROUP 2: CPT | Performed by: OTOLARYNGOLOGY

## 2019-09-24 PROCEDURE — 83735 ASSAY OF MAGNESIUM: CPT | Performed by: STUDENT IN AN ORGANIZED HEALTH CARE EDUCATION/TRAINING PROGRAM

## 2019-09-24 PROCEDURE — 25000128 H RX IP 250 OP 636: Performed by: OTOLARYNGOLOGY

## 2019-09-24 PROCEDURE — 00000146 ZZHCL STATISTIC GLUCOSE BY METER IP

## 2019-09-24 PROCEDURE — 36000070 ZZH SURGERY LEVEL 5 EA 15 ADDTL MIN - UMMC: Performed by: OTOLARYNGOLOGY

## 2019-09-24 PROCEDURE — 85027 COMPLETE CBC AUTOMATED: CPT | Performed by: STUDENT IN AN ORGANIZED HEALTH CARE EDUCATION/TRAINING PROGRAM

## 2019-09-24 PROCEDURE — 25000125 ZZHC RX 250: Performed by: NURSE ANESTHETIST, CERTIFIED REGISTERED

## 2019-09-24 PROCEDURE — 88331 PATH CONSLTJ SURG 1 BLK 1SPC: CPT | Performed by: OTOLARYNGOLOGY

## 2019-09-24 PROCEDURE — 80048 BASIC METABOLIC PNL TOTAL CA: CPT | Performed by: PHYSICIAN ASSISTANT

## 2019-09-24 PROCEDURE — 09TQ0ZZ RESECTION OF RIGHT MAXILLARY SINUS, OPEN APPROACH: ICD-10-PCS | Performed by: OTOLARYNGOLOGY

## 2019-09-24 PROCEDURE — 36415 COLL VENOUS BLD VENIPUNCTURE: CPT | Performed by: PHYSICIAN ASSISTANT

## 2019-09-24 PROCEDURE — 36000068 ZZH SURGERY LEVEL 5 1ST 30 MIN - UMMC: Performed by: OTOLARYNGOLOGY

## 2019-09-24 PROCEDURE — 0KR107Z REPLACEMENT OF FACIAL MUSCLE WITH AUTOLOGOUS TISSUE SUBSTITUTE, OPEN APPROACH: ICD-10-PCS | Performed by: OTOLARYNGOLOGY

## 2019-09-24 PROCEDURE — 08T0XZZ RESECTION OF RIGHT EYE, EXTERNAL APPROACH: ICD-10-PCS | Performed by: OTOLARYNGOLOGY

## 2019-09-24 DEVICE — IMP PROBE DOPPLER FLOW STD CUFF DP-SDP001: Type: IMPLANTABLE DEVICE | Site: NECK | Status: FUNCTIONAL

## 2019-09-24 DEVICE — IMP DEVICE ANASTOMOTIC 3.0MM COUPLER GOLD GEM2754: Type: IMPLANTABLE DEVICE | Site: MAXILLA | Status: FUNCTIONAL

## 2019-09-24 RX ORDER — DEXAMETHASONE SODIUM PHOSPHATE 10 MG/ML
10 INJECTION, SOLUTION INTRAMUSCULAR; INTRAVENOUS ONCE
Status: COMPLETED | OUTPATIENT
Start: 2019-09-24 | End: 2019-09-24

## 2019-09-24 RX ORDER — SODIUM CHLORIDE, SODIUM LACTATE, POTASSIUM CHLORIDE, CALCIUM CHLORIDE 600; 310; 30; 20 MG/100ML; MG/100ML; MG/100ML; MG/100ML
INJECTION, SOLUTION INTRAVENOUS CONTINUOUS PRN
Status: DISCONTINUED | OUTPATIENT
Start: 2019-09-24 | End: 2019-09-24

## 2019-09-24 RX ORDER — AMOXICILLIN 250 MG
1 CAPSULE ORAL 2 TIMES DAILY PRN
Status: DISCONTINUED | OUTPATIENT
Start: 2019-09-24 | End: 2019-09-30 | Stop reason: HOSPADM

## 2019-09-24 RX ORDER — SODIUM CHLORIDE, SODIUM LACTATE, POTASSIUM CHLORIDE, CALCIUM CHLORIDE 600; 310; 30; 20 MG/100ML; MG/100ML; MG/100ML; MG/100ML
INJECTION, SOLUTION INTRAVENOUS CONTINUOUS
Status: DISCONTINUED | OUTPATIENT
Start: 2019-09-24 | End: 2019-09-30 | Stop reason: HOSPADM

## 2019-09-24 RX ORDER — POTASSIUM CHLORIDE 7.45 MG/ML
10 INJECTION INTRAVENOUS
Status: DISCONTINUED | OUTPATIENT
Start: 2019-09-24 | End: 2019-09-30 | Stop reason: HOSPADM

## 2019-09-24 RX ORDER — NICOTINE POLACRILEX 4 MG
15-30 LOZENGE BUCCAL
Status: DISCONTINUED | OUTPATIENT
Start: 2019-09-24 | End: 2019-09-30 | Stop reason: HOSPADM

## 2019-09-24 RX ORDER — HYDROMORPHONE HYDROCHLORIDE 1 MG/ML
.3-.5 INJECTION, SOLUTION INTRAMUSCULAR; INTRAVENOUS; SUBCUTANEOUS
Status: DISCONTINUED | OUTPATIENT
Start: 2019-09-24 | End: 2019-09-28

## 2019-09-24 RX ORDER — SODIUM CHLORIDE, SODIUM LACTATE, POTASSIUM CHLORIDE, CALCIUM CHLORIDE 600; 310; 30; 20 MG/100ML; MG/100ML; MG/100ML; MG/100ML
INJECTION, SOLUTION INTRAVENOUS
Status: COMPLETED
Start: 2019-09-24 | End: 2019-09-24

## 2019-09-24 RX ORDER — GINSENG 100 MG
CAPSULE ORAL 2 TIMES DAILY
Status: DISCONTINUED | OUTPATIENT
Start: 2019-09-24 | End: 2019-09-24 | Stop reason: HOSPADM

## 2019-09-24 RX ORDER — AMPICILLIN AND SULBACTAM 2; 1 G/1; G/1
3 INJECTION, POWDER, FOR SOLUTION INTRAMUSCULAR; INTRAVENOUS
Status: COMPLETED | OUTPATIENT
Start: 2019-09-24 | End: 2019-09-24

## 2019-09-24 RX ORDER — PROPOFOL 10 MG/ML
INJECTION, EMULSION INTRAVENOUS PRN
Status: DISCONTINUED | OUTPATIENT
Start: 2019-09-24 | End: 2019-09-24

## 2019-09-24 RX ORDER — CALCIUM CHLORIDE 100 MG/ML
INJECTION INTRAVENOUS; INTRAVENTRICULAR PRN
Status: DISCONTINUED | OUTPATIENT
Start: 2019-09-24 | End: 2019-09-24

## 2019-09-24 RX ORDER — GABAPENTIN 250 MG/5ML
100 SOLUTION ORAL AT BEDTIME
Status: DISCONTINUED | OUTPATIENT
Start: 2019-09-24 | End: 2019-09-30 | Stop reason: HOSPADM

## 2019-09-24 RX ORDER — GLYCOPYRROLATE 0.2 MG/ML
INJECTION, SOLUTION INTRAMUSCULAR; INTRAVENOUS PRN
Status: DISCONTINUED | OUTPATIENT
Start: 2019-09-24 | End: 2019-09-24

## 2019-09-24 RX ORDER — ONDANSETRON 4 MG/1
4 TABLET, FILM COATED ORAL EVERY 6 HOURS
Status: COMPLETED | OUTPATIENT
Start: 2019-09-24 | End: 2019-09-26

## 2019-09-24 RX ORDER — POTASSIUM CHLORIDE 29.8 MG/ML
20 INJECTION INTRAVENOUS
Status: DISCONTINUED | OUTPATIENT
Start: 2019-09-24 | End: 2019-09-30 | Stop reason: HOSPADM

## 2019-09-24 RX ORDER — ACETAMINOPHEN 325 MG/1
650 TABLET ORAL EVERY 4 HOURS PRN
Status: DISCONTINUED | OUTPATIENT
Start: 2019-09-24 | End: 2019-09-30 | Stop reason: HOSPADM

## 2019-09-24 RX ORDER — POTASSIUM CHLORIDE 750 MG/1
20-40 TABLET, EXTENDED RELEASE ORAL
Status: DISCONTINUED | OUTPATIENT
Start: 2019-09-24 | End: 2019-09-30 | Stop reason: HOSPADM

## 2019-09-24 RX ORDER — ASPIRIN 325 MG
325 TABLET ORAL DAILY
Status: DISCONTINUED | OUTPATIENT
Start: 2019-09-25 | End: 2019-09-30 | Stop reason: HOSPADM

## 2019-09-24 RX ORDER — FENTANYL CITRATE 50 UG/ML
INJECTION, SOLUTION INTRAMUSCULAR; INTRAVENOUS PRN
Status: DISCONTINUED | OUTPATIENT
Start: 2019-09-24 | End: 2019-09-24

## 2019-09-24 RX ORDER — OXYCODONE HCL 5 MG/5 ML
5-10 SOLUTION, ORAL ORAL EVERY 4 HOURS PRN
Status: DISCONTINUED | OUTPATIENT
Start: 2019-09-24 | End: 2019-09-26

## 2019-09-24 RX ORDER — PROCHLORPERAZINE 25 MG
25 SUPPOSITORY, RECTAL RECTAL EVERY 12 HOURS PRN
Status: DISCONTINUED | OUTPATIENT
Start: 2019-09-24 | End: 2019-09-30 | Stop reason: HOSPADM

## 2019-09-24 RX ORDER — PROCHLORPERAZINE MALEATE 10 MG
10 TABLET ORAL EVERY 6 HOURS PRN
Status: DISCONTINUED | OUTPATIENT
Start: 2019-09-24 | End: 2019-09-30 | Stop reason: HOSPADM

## 2019-09-24 RX ORDER — AMPICILLIN AND SULBACTAM 2; 1 G/1; G/1
3 INJECTION, POWDER, FOR SOLUTION INTRAMUSCULAR; INTRAVENOUS EVERY 6 HOURS
Status: COMPLETED | OUTPATIENT
Start: 2019-09-25 | End: 2019-09-26

## 2019-09-24 RX ORDER — POTASSIUM CL/LIDO/0.9 % NACL 10MEQ/0.1L
10 INTRAVENOUS SOLUTION, PIGGYBACK (ML) INTRAVENOUS
Status: DISCONTINUED | OUTPATIENT
Start: 2019-09-24 | End: 2019-09-30 | Stop reason: HOSPADM

## 2019-09-24 RX ORDER — AMOXICILLIN 250 MG
2 CAPSULE ORAL 2 TIMES DAILY PRN
Status: DISCONTINUED | OUTPATIENT
Start: 2019-09-24 | End: 2019-09-30 | Stop reason: HOSPADM

## 2019-09-24 RX ORDER — ONDANSETRON 4 MG/1
4 TABLET, ORALLY DISINTEGRATING ORAL EVERY 6 HOURS PRN
Status: DISCONTINUED | OUTPATIENT
Start: 2019-09-24 | End: 2019-09-30 | Stop reason: HOSPADM

## 2019-09-24 RX ORDER — POTASSIUM CHLORIDE 1.5 G/1.58G
20-40 POWDER, FOR SOLUTION ORAL
Status: DISCONTINUED | OUTPATIENT
Start: 2019-09-24 | End: 2019-09-30 | Stop reason: HOSPADM

## 2019-09-24 RX ORDER — POLYETHYLENE GLYCOL 3350 17 G/17G
17 POWDER, FOR SOLUTION ORAL DAILY PRN
Status: DISCONTINUED | OUTPATIENT
Start: 2019-09-24 | End: 2019-09-30 | Stop reason: HOSPADM

## 2019-09-24 RX ORDER — AMOXICILLIN 250 MG
2 CAPSULE ORAL
Status: DISCONTINUED | OUTPATIENT
Start: 2019-09-24 | End: 2019-09-30 | Stop reason: HOSPADM

## 2019-09-24 RX ORDER — BISACODYL 10 MG
10 SUPPOSITORY, RECTAL RECTAL DAILY PRN
Status: DISCONTINUED | OUTPATIENT
Start: 2019-09-24 | End: 2019-09-30 | Stop reason: HOSPADM

## 2019-09-24 RX ORDER — CHLORHEXIDINE GLUCONATE ORAL RINSE 1.2 MG/ML
15 SOLUTION DENTAL EVERY 8 HOURS
Status: DISCONTINUED | OUTPATIENT
Start: 2019-09-25 | End: 2019-09-25

## 2019-09-24 RX ORDER — ONDANSETRON 2 MG/ML
4 INJECTION INTRAMUSCULAR; INTRAVENOUS EVERY 6 HOURS PRN
Status: DISCONTINUED | OUTPATIENT
Start: 2019-09-24 | End: 2019-09-30 | Stop reason: HOSPADM

## 2019-09-24 RX ORDER — DEXTROSE MONOHYDRATE 25 G/50ML
25-50 INJECTION, SOLUTION INTRAVENOUS
Status: DISCONTINUED | OUTPATIENT
Start: 2019-09-24 | End: 2019-09-30 | Stop reason: HOSPADM

## 2019-09-24 RX ORDER — NALOXONE HYDROCHLORIDE 0.4 MG/ML
.1-.4 INJECTION, SOLUTION INTRAMUSCULAR; INTRAVENOUS; SUBCUTANEOUS
Status: DISCONTINUED | OUTPATIENT
Start: 2019-09-24 | End: 2019-09-30 | Stop reason: HOSPADM

## 2019-09-24 RX ORDER — ONDANSETRON 2 MG/ML
INJECTION INTRAMUSCULAR; INTRAVENOUS PRN
Status: DISCONTINUED | OUTPATIENT
Start: 2019-09-24 | End: 2019-09-24

## 2019-09-24 RX ORDER — LIDOCAINE HYDROCHLORIDE 20 MG/ML
INJECTION, SOLUTION INFILTRATION; PERINEURAL PRN
Status: DISCONTINUED | OUTPATIENT
Start: 2019-09-24 | End: 2019-09-24

## 2019-09-24 RX ORDER — ALBUMIN, HUMAN INJ 5% 5 %
SOLUTION INTRAVENOUS CONTINUOUS PRN
Status: DISCONTINUED | OUTPATIENT
Start: 2019-09-24 | End: 2019-09-24

## 2019-09-24 RX ORDER — MAGNESIUM SULFATE HEPTAHYDRATE 40 MG/ML
4 INJECTION, SOLUTION INTRAVENOUS EVERY 4 HOURS PRN
Status: DISCONTINUED | OUTPATIENT
Start: 2019-09-24 | End: 2019-09-30 | Stop reason: HOSPADM

## 2019-09-24 RX ORDER — NALOXONE HYDROCHLORIDE 0.4 MG/ML
.1-.4 INJECTION, SOLUTION INTRAMUSCULAR; INTRAVENOUS; SUBCUTANEOUS
Status: DISCONTINUED | OUTPATIENT
Start: 2019-09-24 | End: 2019-09-24

## 2019-09-24 RX ORDER — DEXAMETHASONE SODIUM PHOSPHATE 4 MG/ML
10 INJECTION, SOLUTION INTRA-ARTICULAR; INTRALESIONAL; INTRAMUSCULAR; INTRAVENOUS; SOFT TISSUE EVERY 8 HOURS
Status: COMPLETED | OUTPATIENT
Start: 2019-09-25 | End: 2019-09-25

## 2019-09-24 RX ADMIN — ROCURONIUM BROMIDE 20 MG: 10 INJECTION INTRAVENOUS at 18:09

## 2019-09-24 RX ADMIN — ONDANSETRON 4 MG: 2 INJECTION INTRAMUSCULAR; INTRAVENOUS at 08:45

## 2019-09-24 RX ADMIN — SODIUM CHLORIDE, POTASSIUM CHLORIDE, SODIUM LACTATE AND CALCIUM CHLORIDE: 600; 310; 30; 20 INJECTION, SOLUTION INTRAVENOUS at 10:20

## 2019-09-24 RX ADMIN — METHYLENE BLUE 0.1 ML: 5 INJECTION INTRAVENOUS at 09:15

## 2019-09-24 RX ADMIN — ROCURONIUM BROMIDE 30 MG: 10 INJECTION INTRAVENOUS at 19:20

## 2019-09-24 RX ADMIN — ROCURONIUM BROMIDE 20 MG: 10 INJECTION INTRAVENOUS at 17:44

## 2019-09-24 RX ADMIN — CALCIUM CHLORIDE 200 MG: 100 INJECTION, SOLUTION INTRAVENOUS at 17:10

## 2019-09-24 RX ADMIN — MIDAZOLAM 2 MG: 1 INJECTION INTRAMUSCULAR; INTRAVENOUS at 08:15

## 2019-09-24 RX ADMIN — FENTANYL CITRATE 50 MCG: 50 INJECTION, SOLUTION INTRAMUSCULAR; INTRAVENOUS at 09:25

## 2019-09-24 RX ADMIN — AMPICILLIN SODIUM AND SULBACTAM SODIUM 1.5 G: 2; 1 INJECTION, POWDER, FOR SOLUTION INTRAMUSCULAR; INTRAVENOUS at 15:00

## 2019-09-24 RX ADMIN — SODIUM CHLORIDE, POTASSIUM CHLORIDE, SODIUM LACTATE AND CALCIUM CHLORIDE: 600; 310; 30; 20 INJECTION, SOLUTION INTRAVENOUS at 22:21

## 2019-09-24 RX ADMIN — FENTANYL CITRATE 50 MCG: 50 INJECTION, SOLUTION INTRAMUSCULAR; INTRAVENOUS at 13:22

## 2019-09-24 RX ADMIN — FENTANYL CITRATE 50 MCG: 50 INJECTION, SOLUTION INTRAMUSCULAR; INTRAVENOUS at 09:31

## 2019-09-24 RX ADMIN — ROCURONIUM BROMIDE 20 MG: 10 INJECTION INTRAVENOUS at 14:11

## 2019-09-24 RX ADMIN — SENNOSIDES AND DOCUSATE SODIUM 2 TABLET: 8.6; 5 TABLET ORAL at 22:29

## 2019-09-24 RX ADMIN — ROCURONIUM BROMIDE 20 MG: 10 INJECTION INTRAVENOUS at 09:00

## 2019-09-24 RX ADMIN — GABAPENTIN 100 MG: 250 SUSPENSION ORAL at 22:28

## 2019-09-24 RX ADMIN — ALBUMIN HUMAN: 0.05 INJECTION, SOLUTION INTRAVENOUS at 18:36

## 2019-09-24 RX ADMIN — Medication 2 G: at 20:15

## 2019-09-24 RX ADMIN — ROCURONIUM BROMIDE 20 MG: 10 INJECTION INTRAVENOUS at 16:35

## 2019-09-24 RX ADMIN — CALCIUM CHLORIDE 200 MG: 100 INJECTION, SOLUTION INTRAVENOUS at 13:30

## 2019-09-24 RX ADMIN — REMIFENTANIL HYDROCHLORIDE 0.1 MCG/KG/MIN: 1 INJECTION, POWDER, LYOPHILIZED, FOR SOLUTION INTRAVENOUS at 10:18

## 2019-09-24 RX ADMIN — DEXAMETHASONE SODIUM PHOSPHATE 10 MG: 10 INJECTION, SOLUTION INTRAMUSCULAR; INTRAVENOUS at 16:45

## 2019-09-24 RX ADMIN — ROCURONIUM BROMIDE 20 MG: 10 INJECTION INTRAVENOUS at 18:35

## 2019-09-24 RX ADMIN — ALBUMIN HUMAN: 0.05 INJECTION, SOLUTION INTRAVENOUS at 13:00

## 2019-09-24 RX ADMIN — EPINEPHRINE 10 ML: 1 INJECTION PARENTERAL at 09:10

## 2019-09-24 RX ADMIN — ALBUMIN HUMAN: 0.05 INJECTION, SOLUTION INTRAVENOUS at 15:48

## 2019-09-24 RX ADMIN — FENTANYL CITRATE 50 MCG: 50 INJECTION, SOLUTION INTRAMUSCULAR; INTRAVENOUS at 16:47

## 2019-09-24 RX ADMIN — ALBUMIN HUMAN: 0.05 INJECTION, SOLUTION INTRAVENOUS at 11:32

## 2019-09-24 RX ADMIN — LIDOCAINE HYDROCHLORIDE: 10 INJECTION, SOLUTION INFILTRATION; PERINEURAL at 20:00

## 2019-09-24 RX ADMIN — AMPICILLIN SODIUM AND SULBACTAM SODIUM 1.5 G: 2; 1 INJECTION, POWDER, FOR SOLUTION INTRAMUSCULAR; INTRAVENOUS at 13:00

## 2019-09-24 RX ADMIN — CALCIUM CHLORIDE 500 MG: 100 INJECTION, SOLUTION INTRAVENOUS at 11:34

## 2019-09-24 RX ADMIN — SODIUM CHLORIDE, POTASSIUM CHLORIDE, SODIUM LACTATE AND CALCIUM CHLORIDE: 600; 310; 30; 20 INJECTION, SOLUTION INTRAVENOUS at 16:55

## 2019-09-24 RX ADMIN — ROCURONIUM BROMIDE 50 MG: 10 INJECTION INTRAVENOUS at 08:33

## 2019-09-24 RX ADMIN — EPINEPHRINE 6 ML: 1 INJECTION PARENTERAL at 09:29

## 2019-09-24 RX ADMIN — ROCURONIUM BROMIDE 10 MG: 10 INJECTION INTRAVENOUS at 09:47

## 2019-09-24 RX ADMIN — AMPICILLIN SODIUM AND SULBACTAM SODIUM 3 G: 2; 1 INJECTION, POWDER, FOR SOLUTION INTRAMUSCULAR; INTRAVENOUS at 09:00

## 2019-09-24 RX ADMIN — POLYETHYLENE GLYCOL 400 AND PROPYLENE GLYCOL 3 DROP: 4; 3 SOLUTION/ DROPS OPHTHALMIC at 08:33

## 2019-09-24 RX ADMIN — FENTANYL CITRATE 50 MCG: 50 INJECTION, SOLUTION INTRAMUSCULAR; INTRAVENOUS at 19:20

## 2019-09-24 RX ADMIN — SUGAMMADEX 160 MG: 100 INJECTION, SOLUTION INTRAVENOUS at 20:24

## 2019-09-24 RX ADMIN — ROCURONIUM BROMIDE 30 MG: 10 INJECTION INTRAVENOUS at 14:42

## 2019-09-24 RX ADMIN — CALCIUM CHLORIDE 300 MG: 100 INJECTION, SOLUTION INTRAVENOUS at 13:04

## 2019-09-24 RX ADMIN — ENOXAPARIN SODIUM 40 MG: 40 INJECTION SUBCUTANEOUS at 17:15

## 2019-09-24 RX ADMIN — HYDROMORPHONE HYDROCHLORIDE 0.5 MG: 1 INJECTION, SOLUTION INTRAMUSCULAR; INTRAVENOUS; SUBCUTANEOUS at 23:21

## 2019-09-24 RX ADMIN — CALCIUM CHLORIDE 500 MG: 100 INJECTION, SOLUTION INTRAVENOUS at 11:56

## 2019-09-24 RX ADMIN — LIDOCAINE HYDROCHLORIDE 100 ML: 10 INJECTION, SOLUTION INFILTRATION; PERINEURAL at 13:16

## 2019-09-24 RX ADMIN — CHLORHEXIDINE GLUCONATE 0.12% ORAL RINSE 15 ML: 1.2 LIQUID ORAL at 23:23

## 2019-09-24 RX ADMIN — ROCURONIUM BROMIDE 30 MG: 10 INJECTION INTRAVENOUS at 13:24

## 2019-09-24 RX ADMIN — SODIUM CHLORIDE, POTASSIUM CHLORIDE, SODIUM LACTATE AND CALCIUM CHLORIDE: 600; 310; 30; 20 INJECTION, SOLUTION INTRAVENOUS at 12:15

## 2019-09-24 RX ADMIN — AMPICILLIN SODIUM AND SULBACTAM SODIUM 1.5 G: 2; 1 INJECTION, POWDER, FOR SOLUTION INTRAMUSCULAR; INTRAVENOUS at 19:00

## 2019-09-24 RX ADMIN — ROCURONIUM BROMIDE 20 MG: 10 INJECTION INTRAVENOUS at 10:03

## 2019-09-24 RX ADMIN — DEXAMETHASONE SODIUM PHOSPHATE 10 MG: 10 INJECTION, SOLUTION INTRAMUSCULAR; INTRAVENOUS at 08:45

## 2019-09-24 RX ADMIN — SODIUM CHLORIDE, POTASSIUM CHLORIDE, SODIUM LACTATE AND CALCIUM CHLORIDE: 600; 310; 30; 20 INJECTION, SOLUTION INTRAVENOUS at 08:15

## 2019-09-24 RX ADMIN — HYDROMORPHONE HYDROCHLORIDE 0.5 MG: 1 INJECTION, SOLUTION INTRAMUSCULAR; INTRAVENOUS; SUBCUTANEOUS at 20:15

## 2019-09-24 RX ADMIN — ROCURONIUM BROMIDE 20 MG: 10 INJECTION INTRAVENOUS at 15:42

## 2019-09-24 RX ADMIN — AMPICILLIN SODIUM AND SULBACTAM SODIUM 1.5 G: 2; 1 INJECTION, POWDER, FOR SOLUTION INTRAMUSCULAR; INTRAVENOUS at 17:00

## 2019-09-24 RX ADMIN — FENTANYL CITRATE 50 MCG: 50 INJECTION, SOLUTION INTRAMUSCULAR; INTRAVENOUS at 10:12

## 2019-09-24 RX ADMIN — FENTANYL CITRATE 50 MCG: 50 INJECTION, SOLUTION INTRAMUSCULAR; INTRAVENOUS at 21:01

## 2019-09-24 RX ADMIN — MIDAZOLAM 2 MG: 1 INJECTION INTRAMUSCULAR; INTRAVENOUS at 08:10

## 2019-09-24 RX ADMIN — ROCURONIUM BROMIDE 20 MG: 10 INJECTION INTRAVENOUS at 17:10

## 2019-09-24 RX ADMIN — PROPOFOL 100 MG: 10 INJECTION, EMULSION INTRAVENOUS at 08:32

## 2019-09-24 RX ADMIN — CALCIUM CHLORIDE 300 MG: 100 INJECTION, SOLUTION INTRAVENOUS at 13:32

## 2019-09-24 RX ADMIN — FENTANYL CITRATE 50 MCG: 50 INJECTION, SOLUTION INTRAMUSCULAR; INTRAVENOUS at 09:00

## 2019-09-24 RX ADMIN — ALBUMIN HUMAN: 0.05 INJECTION, SOLUTION INTRAVENOUS at 14:07

## 2019-09-24 RX ADMIN — FENTANYL CITRATE 50 MCG: 50 INJECTION, SOLUTION INTRAMUSCULAR; INTRAVENOUS at 18:11

## 2019-09-24 RX ADMIN — THROMBIN HUMAN 1 KIT: 2000 POWDER, FOR SOLUTION TOPICAL at 14:31

## 2019-09-24 RX ADMIN — AMPICILLIN SODIUM AND SULBACTAM SODIUM 1.5 G: 2; 1 INJECTION, POWDER, FOR SOLUTION INTRAMUSCULAR; INTRAVENOUS at 11:00

## 2019-09-24 RX ADMIN — PROPOFOL 60 MG: 10 INJECTION, EMULSION INTRAVENOUS at 08:33

## 2019-09-24 RX ADMIN — EPINEPHRINE 2 ML: 1 INJECTION PARENTERAL at 12:03

## 2019-09-24 RX ADMIN — ALBUMIN HUMAN: 0.05 INJECTION, SOLUTION INTRAVENOUS at 11:21

## 2019-09-24 RX ADMIN — LIDOCAINE HYDROCHLORIDE 100 MG: 20 INJECTION, SOLUTION INFILTRATION; PERINEURAL at 08:31

## 2019-09-24 RX ADMIN — ONDANSETRON HYDROCHLORIDE 4 MG: 4 TABLET, FILM COATED ORAL at 22:28

## 2019-09-24 RX ADMIN — GLYCOPYRROLATE 0.2 MG: 0.2 INJECTION, SOLUTION INTRAMUSCULAR; INTRAVENOUS at 12:29

## 2019-09-24 ASSESSMENT — PAIN DESCRIPTION - DESCRIPTORS: DESCRIPTORS: ACHING

## 2019-09-24 ASSESSMENT — MIFFLIN-ST. JEOR: SCORE: 1660.13

## 2019-09-24 NOTE — OP NOTE
Date of Procedure: 9/24/2019    Attending Physician: Teresa Pulliam MD    Fellow Physician: Sherry Chapa MD    Resident Physicians:   1. Isauro Vega MD  2. 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    Postoperative Diagnosis: same    Anesthesia: General    Blood loss: 50 cc    Specimens:   ID Type Source Tests Collected by Time   A : Inferior Rectus Posterior Margin  Tissue Mouth SURGICAL PATHOLOGY EXAM Jose Roberts MD 9/24/2019 11:23 AM   Priority: STAT        B : Posterior Wall Of Maxillary Sinus Tissue Mouth SURGICAL PATHOLOGY EXAM Jose Roberts MD 9/24/2019 11:23 AM   Priority: Routine        C : middle turbinate margin  Tissue Mouth SURGICAL PATHOLOGY EXAM Jose Roberts MD 9/24/2019 11:28 AM   Priority: STAT        D : orbital apex margin  Tissue Mouth SURGICAL PATHOLOGY EXAM Jose Roberts MD 9/24/2019 11:30 AM   Priority: STAT        E : pterygopalatine fossa margin  Tissue Mouth SURGICAL PATHOLOGY EXAM Jose Roberts MD 9/24/2019 11:32 AM   Priority: STAT        F : pterygopalatine ganglion Tissue Mouth SURGICAL PATHOLOGY EXAM Jose Roberts MD 9/24/2019 11:36 AM   Priority: STAT        G : additional pterygopalatine fossa Tissue Mouth SURGICAL PATHOLOGY EXAM Jose Roberts MD 9/24/2019 11:38 AM   Priority: STAT        H : pterygoid muscle at plate Tissue Mouth SURGICAL PATHOLOGY EXAM Jose Roberts MD 9/24/2019 11:39 AM   Priority: STAT        I : buccal fat Tissue Mouth SURGICAL PATHOLOGY EXAM Jose Roberts MD 9/24/2019 11:43 AM   Priority: Routine        J : Radical Maxillectomy Tissue Mouth SURGICAL PATHOLOGY EXAM Jose Roberts MD 9/24/2019 11:46 AM           K : right level 1B Tissue Other SURGICAL PATHOLOGY EXAM Jose Roberts MD 9/24/2019 12:14 PM   Priority: Routine         L : reresection lateral nasal wall  Tissue Other SURGICAL PATHOLOGY EXAM Jose Roberts MD 9/24/2019 12:39 PM   Priority: Routine        M : lateral nasal wall margin near superior turbinate Tissue Other SURGICAL PATHOLOGY EXAM Jose Roberts MD 9/24/2019 12:39 PM   Priority: STAT        N : pterygopalatine fossa reresection #2 Tissue Mouth SURGICAL PATHOLOGY EXAM Jose Roberts MD 9/24/2019 12:42 PM   Priority: Routine        O : vidian nerve Tissue Other SURGICAL PATHOLOGY EXAM Jose Roberts MD 9/24/2019 12:46 PM   Priority: STAT        P : tissue in vidian canal Tissue Mouth SURGICAL PATHOLOGY EXAM Jose Roberts MD 9/24/2019 1:23 PM   Priority: Routine        Q : Final vidian canal margin for frozen  Tissue Mouth SURGICAL PATHOLOGY EXAM Jose Robrets MD 9/24/2019 1:23 PM   Priority: Routine          Implants:  MARQUEZ drain x 2   1/4 in penrose  Cook implantable Doppler    Complications: None    Findings:  Latissimus free flap harvested with 15 x 7.5 cm skin paddle for reconstruction of right palate and right orbital exenteration site  Ischemia time: 2885-4772  Thoracodorsal artery anastomosed to facial artery - large size mismatch with facial artery of smaller diameter than thoracodorsal artery  Thoracodorsal vein anastomosed to facial vein with 3.0 mm   Latissimus donor site closed with local advancement flaps with undermining of 11 x 24 cm anteriorly and 24 x 4 cm posteriorly, with removal of two 7 x 5 cm burrows triangles    Indications:  Eduardo Rubio is a 58 year old man with a T4N0 SCC of the maxillary sinus. The patient has a history of facial pain and numbness along with vision changes that resulted in a visit to the ER on 8/18/2019. An MRI was obtained which demonstrated a maxillary sinus mass with extension to the orbit with involvement of the inferior rectus muscle. He had a biopsy performed locally which was consistent with SCC. He had a PET scan which  showed the tumor in the maxillary sinus with bony erosion, extension to orbit, small lung nodule, no ramona disease. His case was reviewed at tumor conference with recommendation for surgery with total maxillectomy and orbital exenteration with free flap reconstruction.     Description of Procedure:  After Dr Roberts had completed the resection, the defect was inspected. It consisted of a right orbital exenteration, right ethmoidectomy, right total maxillectomy with resection of the right hard palate with sparing of the soft palate. A template was created of the defect. The patient was deemed most appropriate for a latissimus free flap reconstruction.     The patient had been prepped and draped for a right latissimus flap. The patient was positioned into a lateral position (right side up) with the beanbag inflated. The latissimus muscle borders were palpated.  The iliac crest and the mid-axillary line were marked. The planned 7.5 x 17 cm skin paddle was marked. The anterior incision was made with a 15 blade down through the skin, extending the incision inferiorly and superiorly beyond the planned skin paddle in order to have adequate access. The pectoralis muscle and serratus muscle were identified. The latissimus muscle was then encountered. The anterior border of the latissimus muscle was released and retracted laterally. The latissimus was released working from anterior to posterior, while attempting to identify the latissimus pedicle. The pedicle was identified on the surface of the latissimus muscle. The posterior skin cut was made with a 15 blade and then extended with the monopolar cautery through the subcutaneous fat down to the latissimus muscle. The inferior border of the latissimus muscle was released near the iliac crest using monopolar cautery. The latissimus muscle was divided posteriorly near the spine. The muscle was then raised off the posterior chest wall, clipping the small perforating vessels.  The latissimus muscle continued to be released posteriorly working from inferior to superior. The serratus pedicle was able to be visualized entering into the thoracodorsal artery. The angular artery branched from the serratus artery. The superior aspect of the latissimus muscle was defined. The latissimus muscle was divided superiorly, taking care when dividing the muscle to avoid injury to the pedicle.  At this point the serratus pedicle was divided. The latissimus muscle was then pedicled off the thoracodorsal artery and vein. These were traced superiorly toward the axilla in order to ensure adequate length. The nerve was clipped. The artery and vein were divided and the flap was made ischemic.      Attention was turned to the closure of the back wound. Wide undermining was performed along the back, about 11 cm anteriorly and 4 cm posteriorly, for about 24 cm in length. Hemostasis was obtained. Two 10 mm fully perforated flat drains were then placed in the wound and secured with a nylon suture. [place] triangles measuring about 5 x 7 cm were excised to eliminate tissue redundancy. The incision was closed with a combination of buried interrupted 2-0 and 3-0 vicryl sutures followed by a running 4-0 nylon. The incision was dressed with bacitracin, telfa and tegaderm.  The beanbag was deflated and the patient was repositioned into a supine position.    The defect was irrigated with saline. Considerable time was spent obtaining hemostasis along the orbital apex, ethmoids, and pterygopalatine fossa using a combination of bipolar, surgiflo, surgicel, and gelfoam. A tunnel was created from the maxillary defect to the neck incision using a tonsil hemostat and then widened with the Marc dilators. The latissimus flap was brought to the head of the bed and placed within the defect. The distal end of the skin paddle was placed within the orbital defect and the proximal skin paddle was placed within the hard palate defect.  The pedicle was passed into the neck through the tunnel using a penrose. The pedicle was unable to reach the neck vasculature. The flap was removed from the defect and taken to the back table. The latissimus muscle along the proximal end of the flap was debulked lateral to the pedicle to try to increase the relative length of the pedicle with decreased bulk. The flap was replaced in the defect, again with the proximal skin paddle along the hard palate and the distal skin paddle along the orbital exenteration. The pedicle was passed through the tunnel and with some tension was able to reach the neck vasculature. The proximal flap skin paddle was sutured to the soft palate mucosa with 3-0 vicryl in horizontal mattress fashion. The flap was then sutured along the hard palate mucosa medially with 3-0 vicryl in horizontal mattress fashion. The flap was then closed to the buccal mucosa laterally with 3-0 vicryl, taking care around the pedicle.     Attention was then turned to the anastamosis of the vessels. The vessels had been placed into the right neck through the tunnel, taking care to ensure that the pedicle was not compressed within the tunnel. The facial artery and the facial vein were inspected and deemed appropriate for use.  The microscope was brought into place. The facial artery and facial vein were cleaned circumferentially. The distal most aspect of the facial artery was ligated and a vascular clamp was placed across the base after testing for adequate flow. The facial vein was ligated distally and a vascular clamp was placed across its base. The vessel dilator was used to dilate the vessels which were then irrigated. The thoracodorsal artery and vein were similarly cleaned circumferentially and dilated.     The facial artery and thoracodorsal artery were brought into apposition using a double 3A vascular clamp and circumferential interrupted 9-0 nylon was used to approximate the arteries. There was a size  mismatch between the facial artery and thoracodorsal artery, with the thoracodorsal artery significantly larger in diameter. The venous coupling sizer had been used to measure the size of the veins. The coupling device was brought into place and a 3.0 mm  used to anastomose the veins. The vein was placed lateral and superficial to the artery. Papaverine was applied to the vessels. The vascular clamp was released off the artery with good flow noted. The vein clamp was then released. A Kreix implantable doppler was placed around the artery and strong signal was obtained. The Kreix implantable doppler was sutured to the skin.     The midportion of the skin paddle of the flap was de-epithelialized where it was under the cheek skin flap, for about 7 cm. The de-epithelialized portion of the skin paddle inferiorly was closed to the intraoral gingival mucosa to re-create the gingivobuccal sulcus. At the midline lip, the orbicularis oris muscle was reapproximated to itself. The lateral rhinotomy incision was closed with buried interrupted 3-0 vicryl, taking care to re-approximate the vermilion border with a 4-0 chromic. The red lip was closed with buried interrupted 3-0 vicryl followed by a 4-0 chromic. The lateral rhinotomy incision was then closed with an interrupted 5-0 prolene suture. The distal end of the skin paddle was closed to the remnant lower eyelid and the lateral nasal wall with buried interrupted 3-0 vicryl. Some of the distal latissimus muscle was debulked with monopolar cautery to assist with closure of the defect. The skin paddle of the latissimus flap was then closed to the superior remnant eyelid with 3-0 vicryl. The flap was then inset superficially with a running 5-0 prolene.     A 1/4 inch penrose was placed within the neck, medial to the vessels and secured with a 3-0 nylon. The neck incision was closed with a buried interrupted 3-0 vicryl followed by a running 4-0 prolene. Care was taken with the  skin closure as the anastomoses were just deep to the skin. Bacitracin was applied to the incisions. An NG tube was placed on the left side of the nose and secured to the septum with a silk suture. The patient was handed back to anesthesia for extubation. He was transported to the SICU in stable condition.     I was present for and participated in the entire reconstructive portion of the procedure.       Teresa Pulliam MD    Department of Otolaryngology

## 2019-09-24 NOTE — ANESTHESIA PROCEDURE NOTES
Arterial Line Procedure Note  Staff:     Anesthesiologist:  Josh Marquez MD    Resident/CRNA:  Kaylyn Pineda APRN CRNA    Arterial line performed by resident/CRNA in presence of a teaching physician    Location: In OR After Induction  Procedure Start/Stop Times:     patient identified, IV checked, site marked, risks and benefits discussed, informed consent, monitors and equipment checked, pre-op evaluation and at physician/surgeon's request      Correct Patient: Yes      Correct Position: Yes      Correct Site: Yes      Correct Procedure: Yes      Correct Laterality:  Yes    Site Marked:  N/A  Line Placement:     Procedure:  Arterial Line    Insertion Site:  Radial    Insertion laterality:  Left    Skin Prep: Chloraprep      Patient Prep: mask, sterile gloves, hat and hand hygiene      Local skin infiltration:  None    Ultrasound Guided?: No      Catheter size:  20 gauge, Quick cath    Cath secured with: other (comment)      Cath secured with comment:  Tape and tagaderm    Dressing:  Tegaderm    Complications:  None obvious    Arterial waveform: Yes      IBP within 10% of NIBP: Yes

## 2019-09-24 NOTE — ANESTHESIA PREPROCEDURE EVALUATION
Anesthesia Pre-Procedure Evaluation    Patient: Eduardo Rubio   MRN:     [id number] Gender:   male   Age:    58 year old :      1960        Preoperative Diagnosis: Maxillary Sinus Cancer   Procedure(s):  Right Orbital Exenteration  Right Neck Exploration  Right Maxillectomy, Nasogastric Tube Placement  Right Latissmus Free Flap Reconstruction     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      Past Surgical History:   Procedure Laterality Date     ABDOMEN SURGERY      [facility], surgery related to gastric ulcer     AS AMPUTATION TOE,I-P JT Bilateral     all ten toes          Anesthesia Evaluation     . Pt has had prior anesthetic.            ROS/MED HX    ENT/Pulmonary:       Neurologic:       Cardiovascular:         METS/Exercise Tolerance:     Hematologic:         Musculoskeletal:         GI/Hepatic:         Renal/Genitourinary:         Endo:         Psychiatric:         Infectious Disease:         Malignancy:         Other:                         PHYSICAL EXAM:   Mental Status/Neuro:    Airway: Facies: Feasible  Mallampati: I  Mouth/Opening: Full  TM distance: > 6 cm  Neck ROM: Full   Respiratory: Auscultation: CTAB     Resp. Rate: Normal     Resp. Effort: Normal      CV: Rhythm: Regular  Rate: Age appropriate  Heart: Normal Sounds  Edema: None   Comments:      Dental: Normal Dentition                LABS:  CBC:   Lab Results   Component Value Date    WBC 6.0 2019    WBC 3.0 (L) 10/13/2010    HGB 14.1 2019    HGB 13.8 10/13/2010    HCT 42.6 2019    HCT 41.4 10/13/2010     2019     (L) 10/13/2010     BMP:   Lab Results   Component Value Date     2019     (L) 2019    POTASSIUM 3.5 2019    POTASSIUM 3.8 2019    CHLORIDE 99 2019    CHLORIDE 99 2019    CO2 27 2019    CO2 30 2019    BUN 7 2019    BUN  "10 09/16/2019    CR 0.66 09/24/2019    CR 0.75 09/16/2019     (H) 09/24/2019     (H) 09/16/2019     COAGS: No results found for: PTT, INR, FIBR  POC:   Lab Results   Component Value Date    BGM 94 09/24/2019     OTHER:   Lab Results   Component Value Date    MIKEY 8.7 09/24/2019    PHOS 4.7 (H) 10/13/2010    MAG 2.3 10/13/2010    ALBUMIN 3.8 09/16/2019    PROTTOTAL 8.5 09/16/2019    ALT 28 09/16/2019    AST 26 09/16/2019    GGT 12 05/24/2006    ALKPHOS 70 09/16/2019    BILITOTAL 0.4 09/16/2019        Preop Vitals    BP Readings from Last 3 Encounters:   09/24/19 (!) 123/90   09/20/19 114/69   09/20/19 114/69    Pulse Readings from Last 3 Encounters:   09/24/19 83   09/20/19 79   09/20/19 79      Resp Readings from Last 3 Encounters:   09/24/19 18   09/20/19 16   09/20/19 16    SpO2 Readings from Last 3 Encounters:   09/24/19 100%   09/16/19 96%      Temp Readings from Last 1 Encounters:   09/24/19 36.6  C (97.8  F) (Oral)    Ht Readings from Last 1 Encounters:   09/24/19 1.829 m (6' 0.01\")      Wt Readings from Last 1 Encounters:   09/24/19 80.2 kg (176 lb 12.9 oz)    Estimated body mass index is 23.97 kg/m  as calculated from the following:    Height as of this encounter: 1.829 m (6' 0.01\").    Weight as of this encounter: 80.2 kg (176 lb 12.9 oz).     LDA:  Arterial Line 09/24/19 (Active)   Number of days: 0        Assessment:   ASA SCORE: 2    H&P: History and physical reviewed and following examination; no interval change.     Smoking Status:  Active Smoker       - patient did not smoke on day of surgery       - instructed to abstain from smoking on day of procedure   NPO Status: NPO Appropriate     Plan:   Anes. Type:  General   Pre-Medication: None   Induction:  IV (Standard)   Airway: ETT; Oral   Access/Monitoring: PIV   Maintenance: Balanced     Postop Plan:   Postop Pain: Opioids  Postop Sedation/Airway: Not planned  Disposition: Inpatient/Admit     PONV Management:   Adult Risk Factors:, Postop " Opioids   Prevention: Ondansetron, Dexamethasone     CONSENT: Direct conversation   Plan and risks discussed with: Patient   Blood Products: Consented (ALL Blood Products)                   Josh Marquez MD

## 2019-09-24 NOTE — H&P
SURGICAL ICU ADMISSION NOTE  9/24/2019    PRIMARY TEAM: ENT   PRIMARY PHYSICIAN: Dr. Pulliam    REASON FOR CRITICAL CARE ADMISSION: Flap monitoring   ADMITTING PHYSICIAN: Dr. Gtz    ASSESSMENT: Eduardo Rubio is a 58 year old man with PMH tobacco use, toe amputation, LBP, gastric ulcer, and T4N0 SCC of the right maxillary sinus s/p Latissimus myocutaneous free flap reconstruction with microvascular anastomosis with Local advancement flaps on 9/24/19 with Dr. Pulliam.    PLAN:   Neuro/ pain/ sedation:  #Acute post-op pain  -Monitor neurological status. Notify the MD for any acute changes in exam.  -prn IV Dilaudid, prn oxy, and prn tylenol for pain.  - Gabapentin qhs    HEENT:  - doppler monitoring of flaps  - flap checks by ENT  - Avoid straps across face     Pulmonary care:   -PET 9/9/19: 1. Single 7 mm nodule in the right upper lobe that demonstrates mild  FDG avidity.   -Supplemental oxygen to keep saturation above 92 %.     Cardiovascular:    - 8/21/2019: stress echocardiogram without contrast normal   -Monitor hemodynamic status. MAP goal >65  - Avoid pressors and fluid boluses, contact ENT if needed to maintain MAPs  - Start ASA daily     GI care:   -h/o gastric ulcer ~2015 (status post abdominal surgery, does not know details)   - NPO no exceptions  - scheduled zofran q6h  - NGT in place okay for meds  - MARQUEZ x2 from latissimus donor site     Fluids/ Electrolytes/ Nutrition:   -LR 75/hr for IV fluid hydration  -Avoid additional boluses. Discuss with ENT if necessary.  -ICU electrolyte replacement protocol  - Will consider tube feeds tomorrow     Renal/ Fluid Balance:    -Urine output is adequate so far.  -Will continue to monitor intake and output.  - [facility]     Endocrine:    - Monitor for steroid-induced hyperglycemia  - Decadron 10mg q8h  - Blood glucose checks q4h  - Med SSI     ID/ Antibiotics:  -Perioperative abx Unasyn x48 hours     Heme:     #Acute blood loss anemia (EBL 1.3L)  - Monitor hgb      Prophylaxis:    -Mechanical prophylaxis for DVT.   - Lovenox ppx     MSK:    -PT and OT consulted. Appreciate recs.     Lines/ tubes/ drains:  -NGT  -Left radial A-line  - Left foot PIV  - MARQUEZ x2 on right flank  - Cohn     Disposition:  -Surgical ICU.     Patient seen, findings and plan discussed with surgical ICU staff, Dr. Tresa Huynh,   General Surgery, PGY2  [id number]    - - - - - - - - - - - - - - - - - - - - - - - - - - - - - - - - - - - - - - - - - - - - - - - - - - - - - - - - - - - - - - - - - - - - - - - -     HISTORY PRESENTING ILLNESS:   Eduardo Rubio is a 58 year old man with a T4N0 SCC of the maxillary sinus. The patient has a history of facial pain and numbness along with vision changes that resulted in a visit to the ER on 8/18/2019. An MRI was obtained which demonstrated a maxillary sinus mass with extension to the orbit with involvement of the inferior rectus muscle. He had a biopsy performed locally which was consistent with SCC. He saw Dr Wick on 8/29/2019. He was referred to ophthalmology for evaluation of his vision changes. He had a PET scan which showed the tumor in the maxillary sinus with bony erosion, extension to orbit, small lung nodule, no ramona disease. His case was reviewed at tumor conference with recommendation for surgery with total maxillectomy and orbital exenteration with free flap reconstruction. He presents for the above elective surgery.    REVIEW OF SYSTEMS: 10 point ROS neg other than the symptoms noted above in the HPI.    PAST MEDICAL HISTORY:    has a past medical history of Gastric ulcer, Low back pain, Maxillary sinus cancer (H), and Toe amputation status (H).    SURGICAL HISTORY:    has a past surgical history that includes AMPUTATION TOE,I-P JT (Bilateral) and Abdomen surgery.    SOCIAL HISTORY:    reports that he has been smoking cigarettes. He started smoking about 15 years ago. He has a 7.50 pack-year smoking history. He has never used  smokeless tobacco. He reports previous alcohol use. He reports previous drug use.    FAMILY HISTORY: N/A    ALLERGIES:    No Known Allergies    MEDICATIONS:  No current facility-administered medications on file prior to encounter.   aspirin (ASA) 325 MG EC tablet, Take 325 mg by mouth every 6 hours as needed for moderate pain  oxyCODONE IR (ROXICODONE) 10 MG tablet, TK 1 T PO 5 TIMES A DAY PRF SEVERE PAIN RELIEF.  acetaminophen (TYLENOL) 325 MG tablet, Take 325-650 mg by mouth every 6 hours as needed for mild pain        PHYSICAL EXAMINATION:  Temp:  [97.8  F (36.6  C)] 97.8  F (36.6  C)  Pulse:  [83] 83  Resp:  [18] 18  BP: (123)/(90) 123/90  SpO2:  [100 %] 100 %    GEN: NAD   HEENT: right globe enlarged with flap c/d/i, palette flap intact; right neck flap intact; NJT in place  CV: Non cyanotic, RRR  RESP: nonlabored on room air  ABD: Soft, NT, ND, R flank dressing c/d/i, MARQUEZ x2 with serosanguinous output  : scott with yellow urine  EXT: WWP, amputated toes bilaterally  SKIN: Normal  PSYCH: Cooperative      LABS: Reviewed.   Arterial Blood Gases   No lab results found in last 7 days.  Complete Blood Count   No lab results found in last 7 days.  Basic Metabolic Panel  Recent Labs   Lab 09/24/19  0610      POTASSIUM 3.5   CHLORIDE 99   CO2 27   BUN 7   CR 0.66   *     Liver Function Tests  No lab results found in last 7 days.  Pancreatic Enzymes  No lab results found in last 7 days.  Coagulation Profile  No lab results found in last 7 days.  Lactate  Invalid input(s): LACTATE    IMAGING:  No results found for this or any previous visit (from the past 24 hour(s)).

## 2019-09-25 ENCOUNTER — APPOINTMENT (OUTPATIENT)
Dept: GENERAL RADIOLOGY | Facility: CLINIC | Age: 59
DRG: 131 | End: 2019-09-25
Attending: OTOLARYNGOLOGY
Payer: MEDICARE

## 2019-09-25 ENCOUNTER — APPOINTMENT (OUTPATIENT)
Dept: PHYSICAL THERAPY | Facility: CLINIC | Age: 59
DRG: 131 | End: 2019-09-25
Attending: OTOLARYNGOLOGY
Payer: MEDICARE

## 2019-09-25 LAB
ALBUMIN SERPL-MCNC: 3.3 G/DL (ref 3.4–5)
ANION GAP SERPL CALCULATED.3IONS-SCNC: 7 MMOL/L (ref 3–14)
BUN SERPL-MCNC: 8 MG/DL (ref 7–30)
CALCIUM SERPL-MCNC: 8.4 MG/DL (ref 8.5–10.1)
CHLORIDE SERPL-SCNC: 99 MMOL/L (ref 94–109)
CO2 SERPL-SCNC: 26 MMOL/L (ref 20–32)
CREAT SERPL-MCNC: 0.52 MG/DL (ref 0.66–1.25)
ERYTHROCYTE [DISTWIDTH] IN BLOOD BY AUTOMATED COUNT: 13.7 % (ref 10–15)
GFR SERPL CREATININE-BSD FRML MDRD: >90 ML/MIN/{1.73_M2}
GLUCOSE BLDC GLUCOMTR-MCNC: 137 MG/DL (ref 70–99)
GLUCOSE BLDC GLUCOMTR-MCNC: 138 MG/DL (ref 70–99)
GLUCOSE BLDC GLUCOMTR-MCNC: 147 MG/DL (ref 70–99)
GLUCOSE BLDC GLUCOMTR-MCNC: 149 MG/DL (ref 70–99)
GLUCOSE BLDC GLUCOMTR-MCNC: 153 MG/DL (ref 70–99)
GLUCOSE SERPL-MCNC: 144 MG/DL (ref 70–99)
HCT VFR BLD AUTO: 23.4 % (ref 40–53)
HGB BLD-MCNC: 7.8 G/DL (ref 13.3–17.7)
MAGNESIUM SERPL-MCNC: 2.9 MG/DL (ref 1.6–2.3)
MCH RBC QN AUTO: 32 PG (ref 26.5–33)
MCHC RBC AUTO-ENTMCNC: 33.3 G/DL (ref 31.5–36.5)
MCV RBC AUTO: 96 FL (ref 78–100)
MRSA DNA SPEC QL NAA+PROBE: NEGATIVE
PHOSPHATE SERPL-MCNC: 4.3 MG/DL (ref 2.5–4.5)
PLATELET # BLD AUTO: 204 10E9/L (ref 150–450)
POTASSIUM SERPL-SCNC: 4 MMOL/L (ref 3.4–5.3)
PREALB SERPL IA-MCNC: 20 MG/DL (ref 15–45)
RBC # BLD AUTO: 2.44 10E12/L (ref 4.4–5.9)
SODIUM SERPL-SCNC: 132 MMOL/L (ref 133–144)
SPECIMEN SOURCE: NORMAL
TSH SERPL DL<=0.005 MIU/L-ACNC: 0.34 MU/L (ref 0.4–4)
WBC # BLD AUTO: 14.9 10E9/L (ref 4–11)

## 2019-09-25 PROCEDURE — 97116 GAIT TRAINING THERAPY: CPT | Mod: GP

## 2019-09-25 PROCEDURE — 40000141 ZZH STATISTIC PERIPHERAL IV START W/O US GUIDANCE

## 2019-09-25 PROCEDURE — 25000131 ZZH RX MED GY IP 250 OP 636 PS 637: Mod: GY | Performed by: STUDENT IN AN ORGANIZED HEALTH CARE EDUCATION/TRAINING PROGRAM

## 2019-09-25 PROCEDURE — 27210429 ZZH NUTRITION PRODUCT INTERMEDIATE LITER

## 2019-09-25 PROCEDURE — 20000004 ZZH R&B ICU UMMC

## 2019-09-25 PROCEDURE — 25000128 H RX IP 250 OP 636: Performed by: STUDENT IN AN ORGANIZED HEALTH CARE EDUCATION/TRAINING PROGRAM

## 2019-09-25 PROCEDURE — 27210338 ZZH CIRCUIT HUMID FACE/TRACH MSK

## 2019-09-25 PROCEDURE — 40000986 XR ABDOMEN PORT 1 VW

## 2019-09-25 PROCEDURE — 84443 ASSAY THYROID STIM HORMONE: CPT | Performed by: STUDENT IN AN ORGANIZED HEALTH CARE EDUCATION/TRAINING PROGRAM

## 2019-09-25 PROCEDURE — 97530 THERAPEUTIC ACTIVITIES: CPT | Mod: GP

## 2019-09-25 PROCEDURE — 25000132 ZZH RX MED GY IP 250 OP 250 PS 637: Mod: GY | Performed by: STUDENT IN AN ORGANIZED HEALTH CARE EDUCATION/TRAINING PROGRAM

## 2019-09-25 PROCEDURE — 25000132 ZZH RX MED GY IP 250 OP 250 PS 637: Mod: GY | Performed by: PHYSICIAN ASSISTANT

## 2019-09-25 PROCEDURE — 99207 ZZC NO CHARGE LOS: CPT | Performed by: PHYSICIAN ASSISTANT

## 2019-09-25 PROCEDURE — 80069 RENAL FUNCTION PANEL: CPT | Performed by: STUDENT IN AN ORGANIZED HEALTH CARE EDUCATION/TRAINING PROGRAM

## 2019-09-25 PROCEDURE — 97162 PT EVAL MOD COMPLEX 30 MIN: CPT | Mod: GP

## 2019-09-25 PROCEDURE — 25800030 ZZH RX IP 258 OP 636: Performed by: PHYSICIAN ASSISTANT

## 2019-09-25 PROCEDURE — 25000125 ZZHC RX 250

## 2019-09-25 PROCEDURE — 84134 ASSAY OF PREALBUMIN: CPT | Performed by: STUDENT IN AN ORGANIZED HEALTH CARE EDUCATION/TRAINING PROGRAM

## 2019-09-25 PROCEDURE — 85027 COMPLETE CBC AUTOMATED: CPT | Performed by: STUDENT IN AN ORGANIZED HEALTH CARE EDUCATION/TRAINING PROGRAM

## 2019-09-25 PROCEDURE — 00000146 ZZHCL STATISTIC GLUCOSE BY METER IP

## 2019-09-25 PROCEDURE — 83735 ASSAY OF MAGNESIUM: CPT | Performed by: STUDENT IN AN ORGANIZED HEALTH CARE EDUCATION/TRAINING PROGRAM

## 2019-09-25 RX ORDER — LIDOCAINE HYDROCHLORIDE 20 MG/ML
JELLY TOPICAL
Status: COMPLETED
Start: 2019-09-25 | End: 2019-09-25

## 2019-09-25 RX ORDER — AMINO AC/PROTEIN HYDR/WHEY PRO 10G-100/30
2 LIQUID (ML) ORAL DAILY
Status: DISCONTINUED | OUTPATIENT
Start: 2019-09-25 | End: 2019-09-30 | Stop reason: HOSPADM

## 2019-09-25 RX ORDER — CHLORHEXIDINE GLUCONATE ORAL RINSE 1.2 MG/ML
15 SOLUTION DENTAL 4 TIMES DAILY
Status: DISCONTINUED | OUTPATIENT
Start: 2019-09-25 | End: 2019-09-30 | Stop reason: HOSPADM

## 2019-09-25 RX ADMIN — OXYCODONE HYDROCHLORIDE 5 MG: 5 SOLUTION ORAL at 22:33

## 2019-09-25 RX ADMIN — DEXAMETHASONE SODIUM PHOSPHATE 10 MG: 4 INJECTION, SOLUTION INTRAMUSCULAR; INTRAVENOUS at 00:30

## 2019-09-25 RX ADMIN — MAGNESIUM SULFATE HEPTAHYDRATE 4 G: 40 INJECTION, SOLUTION INTRAVENOUS at 01:34

## 2019-09-25 RX ADMIN — AMPICILLIN SODIUM AND SULBACTAM SODIUM 3 G: 2; 1 INJECTION, POWDER, FOR SOLUTION INTRAMUSCULAR; INTRAVENOUS at 06:45

## 2019-09-25 RX ADMIN — CHLORHEXIDINE GLUCONATE 0.12% ORAL RINSE 15 ML: 1.2 LIQUID ORAL at 16:23

## 2019-09-25 RX ADMIN — SODIUM CHLORIDE, POTASSIUM CHLORIDE, SODIUM LACTATE AND CALCIUM CHLORIDE: 600; 310; 30; 20 INJECTION, SOLUTION INTRAVENOUS at 17:35

## 2019-09-25 RX ADMIN — CHLORHEXIDINE GLUCONATE 0.12% ORAL RINSE 15 ML: 1.2 LIQUID ORAL at 20:41

## 2019-09-25 RX ADMIN — DEXAMETHASONE SODIUM PHOSPHATE 10 MG: 4 INJECTION, SOLUTION INTRAMUSCULAR; INTRAVENOUS at 16:23

## 2019-09-25 RX ADMIN — OXYCODONE HYDROCHLORIDE 10 MG: 5 SOLUTION ORAL at 18:22

## 2019-09-25 RX ADMIN — INSULIN ASPART 1 UNITS: 100 INJECTION, SOLUTION INTRAVENOUS; SUBCUTANEOUS at 11:53

## 2019-09-25 RX ADMIN — GABAPENTIN 100 MG: 250 SUSPENSION ORAL at 22:17

## 2019-09-25 RX ADMIN — ONDANSETRON HYDROCHLORIDE 4 MG: 4 TABLET, FILM COATED ORAL at 03:00

## 2019-09-25 RX ADMIN — OXYCODONE HYDROCHLORIDE 5 MG: 5 SOLUTION ORAL at 01:58

## 2019-09-25 RX ADMIN — INSULIN ASPART 1 UNITS: 100 INJECTION, SOLUTION INTRAVENOUS; SUBCUTANEOUS at 20:38

## 2019-09-25 RX ADMIN — AMPICILLIN SODIUM AND SULBACTAM SODIUM 3 G: 2; 1 INJECTION, POWDER, FOR SOLUTION INTRAMUSCULAR; INTRAVENOUS at 20:40

## 2019-09-25 RX ADMIN — ASPIRIN 325 MG ORAL TABLET 325 MG: 325 PILL ORAL at 07:42

## 2019-09-25 RX ADMIN — ACETAMINOPHEN 650 MG: 325 TABLET, FILM COATED ORAL at 00:45

## 2019-09-25 RX ADMIN — AMPICILLIN SODIUM AND SULBACTAM SODIUM 3 G: 2; 1 INJECTION, POWDER, FOR SOLUTION INTRAMUSCULAR; INTRAVENOUS at 00:44

## 2019-09-25 RX ADMIN — CHLORHEXIDINE GLUCONATE 0.12% ORAL RINSE 15 ML: 1.2 LIQUID ORAL at 07:42

## 2019-09-25 RX ADMIN — AMPICILLIN SODIUM AND SULBACTAM SODIUM 3 G: 2; 1 INJECTION, POWDER, FOR SOLUTION INTRAMUSCULAR; INTRAVENOUS at 14:11

## 2019-09-25 RX ADMIN — INSULIN ASPART 1 UNITS: 100 INJECTION, SOLUTION INTRAVENOUS; SUBCUTANEOUS at 03:45

## 2019-09-25 RX ADMIN — OXYCODONE HYDROCHLORIDE 10 MG: 5 SOLUTION ORAL at 07:42

## 2019-09-25 RX ADMIN — CHLORHEXIDINE GLUCONATE 0.12% ORAL RINSE 15 ML: 1.2 LIQUID ORAL at 11:50

## 2019-09-25 RX ADMIN — SENNOSIDES AND DOCUSATE SODIUM 2 TABLET: 8.6; 5 TABLET ORAL at 20:30

## 2019-09-25 RX ADMIN — ENOXAPARIN SODIUM 40 MG: 40 INJECTION SUBCUTANEOUS at 18:13

## 2019-09-25 RX ADMIN — DEXAMETHASONE SODIUM PHOSPHATE 10 MG: 4 INJECTION, SOLUTION INTRAMUSCULAR; INTRAVENOUS at 07:42

## 2019-09-25 RX ADMIN — ONDANSETRON HYDROCHLORIDE 4 MG: 4 TABLET, FILM COATED ORAL at 08:04

## 2019-09-25 RX ADMIN — HYDROMORPHONE HYDROCHLORIDE 0.5 MG: 1 INJECTION, SOLUTION INTRAMUSCULAR; INTRAVENOUS; SUBCUTANEOUS at 04:11

## 2019-09-25 RX ADMIN — OXYCODONE HYDROCHLORIDE 10 MG: 5 SOLUTION ORAL at 12:00

## 2019-09-25 RX ADMIN — ONDANSETRON HYDROCHLORIDE 4 MG: 4 TABLET, FILM COATED ORAL at 20:30

## 2019-09-25 RX ADMIN — LIDOCAINE HYDROCHLORIDE: 20 JELLY TOPICAL at 09:59

## 2019-09-25 RX ADMIN — SENNOSIDES AND DOCUSATE SODIUM 2 TABLET: 8.6; 5 TABLET ORAL at 07:42

## 2019-09-25 RX ADMIN — MULTIVIT AND MINERALS-FERROUS GLUCONATE 9 MG IRON/15 ML ORAL LIQUID 15 ML: at 11:50

## 2019-09-25 RX ADMIN — ONDANSETRON HYDROCHLORIDE 4 MG: 4 TABLET, FILM COATED ORAL at 14:11

## 2019-09-25 ASSESSMENT — ACTIVITIES OF DAILY LIVING (ADL)
ADLS_ACUITY_SCORE: 13
ADLS_ACUITY_SCORE: 11
ADLS_ACUITY_SCORE: 11

## 2019-09-25 ASSESSMENT — PAIN DESCRIPTION - DESCRIPTORS
DESCRIPTORS: SHARP
DESCRIPTORS: ACHING
DESCRIPTORS: ACHING;DISCOMFORT
DESCRIPTORS: SHARP
DESCRIPTORS: SHARP

## 2019-09-25 ASSESSMENT — MIFFLIN-ST. JEOR: SCORE: 1646.13

## 2019-09-25 NOTE — PLAN OF CARE
Admitted/transferred from: O.R.  Reason for admission/transfer: Frequent flap checks post ENT surgery  Patient status upon admission/transfer: Extubated, drowsy, following commands, arterial doppler in place. NJ tube sutured in place.  Right eye flap pale, arterial pulse at suture site strong, warm.  Continuous arterial doppler in neck site strong.  Inner mouth flap pale and warm. 2 MARQUEZ flank sites  Intact.   Interventions: Labs drawn, Abdominal X-RAY done to check NJ placement. FLAP checks q hour.   MIV at 75/cchr. EKG monitoring. Patient on room air.    Plan: Pain control. Monitor for flap changes. Keep NPO.  Follow lab results.    2 RN skin assessment: completed by gabriel robledo and Mahsa Penaloza RN.  Result of skin assessment and interventions/actions:No adverse conditions visualized.  Reposition q 2 hours.    Height, weight, drug calc weight: done  Patient belongings: On 4 A. One bag, one backpack  MDRO education (if applicable):

## 2019-09-25 NOTE — ANESTHESIA CARE TRANSFER NOTE
Patient: Eduardo Rubio    Procedure(s):  Right Orbital Exenteration  Right Neck Exploration  Right Radicall Maxillectomy, Nasogastric Tube Placement  Right Latissmus Free Flap Reconstruction    Diagnosis: Maxillary Sinus Cancer  Diagnosis Additional Information: No value filed.    Anesthesia Type:   No value filed.     Note:  Airway :Face Mask  Patient transferred to:ICU  Comments: Anesthesia Care Transfer Note    Patient: Eduardo Rubio    Transferred to: 4A    Patient vital signs: stable    Airway: none    Monitors placed. VSS. PIVs, alanna patent. 8L 02 FM (blow by). Patient awake, comfortable. Report given and care transferred to RN.     Nelsy Velasquez CRNA   9/24/2019  ICU Handoff: Call for PAUSE to initiate/utilize ICU HANDOFF, Identified Patient, Identified Responsible Provider, Reviewed the Pertinent Medical History, Discussed Surgical Course, Reviewed Intra-OP Anesthesia Management and Issues during Anesthesia, Set Expectations for Post Procedure Period and Allowed Opportunity for Questions and Acknowledgement of Understanding      Vitals: (Last set prior to Anesthesia Care Transfer)    CRNA VITALS  9/24/2019 2013 - 9/24/2019 2053 9/24/2019             Resp Rate (observed):  18    EKG:  Sinus rhythm                Electronically Signed By: ABNER Ramos CRNA  September 24, 2019  8:53 PM

## 2019-09-25 NOTE — PROGRESS NOTES
"Otolaryngology Progress Note  September 25, 2019    S: Flap check done at 2PM and 630PM. Flap is stable.     O: /63   Pulse 73   Temp 98.2  F (36.8  C) (Axillary)   Resp 13   Ht 1.829 m (6' 0.01\")   Wt 78.8 kg (173 lb 11.6 oz)   SpO2 94%   BMI 23.56 kg/m      General: Alert and oriented x 3, No acute distress              HEENT: Right orbital aspect of flap pale, soft, warm , strong arterial Doppler signal at inferior suture. Right palatal intraoral flap warm, soft, with minimum dried crusting, without evidence of congestion, no hematoma or incision dehiscence. Neck flat with clean/dry/intact suture line. Right neck Penrose drain with serosanguinous output. Right neck Implantable Doppler with strong arterial signal.              MSK: Right back with 2 MARQUEZ drains with serosang drainage, dressing covering surgical incision with minimum blood, no ecchymosis              Pulmonary: Breathing non-labored, no stridor, no accessory muscle use.       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     - Flap currently stable    Sommer Sandoval MD PGY1   Otolaryngology-Head & Neck Surgery  Please contact ENT with questions by dialing [phone] and entering job code [phone] when prompted.  "

## 2019-09-25 NOTE — PROGRESS NOTES
"Otolaryngology Progress Note  September 25, 2019    S: Flap check and repositioning of NG tube.     O: /64   Pulse 79   Temp 98.8  F (37.1  C) (Axillary)   Resp 14   Ht 1.829 m (6' 0.01\")   Wt 78.8 kg (173 lb 11.6 oz)   SpO2 94%   BMI 23.56 kg/m                  General: Alert and oriented x 3, No acute distress              HEENT: Right orbital aspect of flap pale, soft, warm , strong arterial Doppler signal at inferior suture. Right palatal intraoral flap warm, soft, with minimum dried crusting, without evidence of congestion, no hematoma or incision dehiscence. Neck flat with clean/dry/intact suture line. Right neck Penrose drain with serosanguinous output. Right neck Implantable Doppler with strong arterial signal.              MSK: Right back with 2 MARQUEZ drains with serosang drainage, dressing covering surgical incision with minimum blood, no ecchymosis              Pulmonary: Breathing non-labored, no stridor, no accessory muscle use.    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    - Flap currently stable  - NG tube advanced from 60cm to 70cm. KUB ordered for placement confirmation.       Sommer Sandoval MD PGY1   Otolaryngology-Head & Neck Surgery  Please contact ENT with questions by dialing [phone] and entering job code [phone] when prompted.  "

## 2019-09-25 NOTE — PROGRESS NOTES
CLINICAL NUTRITION SERVICES - ASSESSMENT NOTE     Nutrition Prescription    RECOMMENDATIONS FOR MDs/PROVIDERS TO ORDER:  Diet adv as appropriate.     Malnutrition Status:    Non-severe malnutrition in the context of acute illness    Recommendations already ordered by Registered Dietitian (RD):  1. Once new TF is placed and confirmed by XR, begin TF with NUtrem @ 10 ml/hr and adv by 10 ml Q8, ( ONLY advance if K+/mg++ WNL and Phos >1.9) to goal @ 65 ml/hr.       - Nutren 1.5 @ 65 ml/hr to provide 2340 kcals, 106 g PRO, 1186 mL H2O, 275 g CHO and no fiber daily.  + 2 pkts Prosource   TF+ Prosource: 2420 kcals ( 31), 128 g pro ( 1.6)      2. Monitor K+/Mg++/Phos daily with TF start and advancement to goal infusion to evaluate for refeeding risk, replace per protocol       3. Order free water flushes 30 ml every 4 hours for tube patency.      4. Recommend Certavite (15 ml/day via FT) to ensure adequate micronutrient needs being met.    Future/Additional Recommendations:  Once tolerating continuous goal TF and approp to transition to [place] Bolus regimen, recommend do so as follows: begin first bolus with 0.5 cans (125 ml) and if tolerates after approx 4 hrs without GI complaints and/or residuals < 500 ml, rec adv each subsequent bolus by 0.5 cans (125 ml) every ~4 hrs until reach goal regimen of 2 cans BID for a total of 6 cans daily   + 2 pkts Prosource   *Do not give feedings at night while pt asleep (during the day only).  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 KUB ordered for placement confirmation.     Per pt, reports that he did have a 30# wt loss noted, but then a 15# wt gain possible in the pact 6 weeks? He reports he had no issues with PO intakes or appetite.    CURRENT NUTRITION ORDERS  Diet: NPO    LABS  Na: 132 (L), Cr: .52 (L), Labs reviewed    MEDICATIONS  Medications reviewed    ANTHROPOMETRICS  Height: 182.9 cm (6' .008\")  Most Recent Weight: 78.8 kg (173 lb 11.6 oz)    IBW: 80.9 kg  BMI: Normal BMI  Weight History: Per pt, reports a 30# wt loss and then a 14# wt gain however difficult to obtain dates/ time frames. Reports that his UBW was 195# prior to his diagnosis.  Wt Readings from Last 10 Encounters:   09/25/19 78.8 kg (173 lb 11.6 oz)   09/20/19 79.4 kg (175 lb)   09/20/19 79.4 kg (175 lb)   09/16/19 79.3 kg (174 lb 14.4 oz)   08/29/19 78 kg (172 lb)     Dosing Weight: 79 kg ( actual)    ASSESSED NUTRITION NEEDS  Estimated Energy Needs: 4149-6182 kcals/day (30 - 35 kcals/kg )  Justification: Increased needs and Post-op  Estimated Protein Needs: 119-158 grams protein/day (1.5 - 2 grams of pro/kg)  Justification: Hypercatabolism with acute illness, Increased needs and Post-op  Estimated Fluid Needs: 1 mL/kcal/day  Justification: Maintenance and Per provider pending fluid status    PHYSICAL FINDINGS  See malnutrition section below.  toe amputation    MALNUTRITION  % Intake: Decreased intake does not meet criteria  % Weight Loss: > 5% in 1 month (severe)  Subcutaneous Fat Loss: Thoracic/intercostal:  mild  Muscle Loss: Thoracic region (clavicle, acromium bone, deltoid, trapezius, pectoral) and Upper arm (bicep, tricep): mild  Fluid Accumulation/Edema: None noted  Malnutrition Diagnosis: Non-severe malnutrition in the context of acute illness    NUTRITION DIAGNOSIS  Inadequate oral intake related to NPO diet order 2/2 to ENT surgery inhibiting " PO intakes as evidenced by need for En to provide 100% of needs.    INTERVENTIONS  Implementation  Nutrition Education: Provided education on role of RD and nutrition POC.    Enteral Nutrition - Initiate     Goals  Total avg nutritional intake to meet a minimum of 30 kcal/kg and 1.5 g PRO/kg daily (per dosing wt 79 kg).     Monitoring/Evaluation  Progress toward goals will be monitored and evaluated per protocol.      Rani Martines, LUPE, MS, LD  SICU: [phone]

## 2019-09-25 NOTE — PROGRESS NOTES
09/25/19 1600   Quick Adds   Type of Visit Initial PT Evaluation   Living Environment   Lives With friend(s)   Living Arrangements house   Home Accessibility no concerns   Transportation Anticipated family or friend will provide   Living Environment Comment Per pt he is planning to stay in a friends house/apartment that has an elevator to access and no stairs within the home. Has access to a walk-in shower. Pt is not current working and does not drive. Enjoys walking his dog.    Self-Care   Usual Activity Tolerance good   Current Activity Tolerance moderate   Regular Exercise No   Equipment Currently Used at Home none   Functional Level Prior   Ambulation 0-->independent   Transferring 0-->independent   Toileting 0-->independent   Bathing 0-->independent   Communication 0-->understands/communicates without difficulty   Swallowing 0-->swallows foods/liquids without difficulty   Cognition 0 - no cognition issues reported   Fall history within last six months no   Which of the above functional risks had a recent onset or change? ambulation;transferring;toileting;bathing;dressing   Prior Functional Level Comment Pt was previously IND with ADLs and IADLs per discussion with patient.    General Information   Onset of Illness/Injury or Date of Surgery - Date 09/24/19   Referring Physician Nahid Morin MD   Patient/Family Goals Statement to get out of the hospital and into his own clothes   Pertinent History of Current Problem (include personal factors and/or comorbidities that impact the POC)  58 year old man with PMH tobacco use, toe amputation, LBP, gastric ulcer, and T4N0 SCC of the right maxillary sinus s/p Latissimus myocutaneous free flap reconstruction with microvascular anastomosis with Local advancement flaps on 9/24/19 with Dr. Pulliam   Precautions/Limitations fall precautions   Heart Disease Risk Factors Smoking;Gender;Age  (tobacco history)   General Observations Appears to have B transmetatarsal  amputations   General Info Comments Per pt he would like to only stay in hospital for 4 days-1 week vs the 2 weeks the MDs told him he would be here.   Cognitive Status Examination   Orientation orientation to person, place and time   Level of Consciousness alert   Follows Commands and Answers Questions 100% of the time   Personal Safety and Judgment at risk behaviors demonstrated   Pain Assessment   Patient Currently in Pain Yes, see Vital Sign flowsheet   Integumentary/Edema   Integumentary/Edema Comments Facia edema   Posture    Posture Forward head position   Range of Motion (ROM)   ROM Comment BUE and BLE grossly WFL   Strength   Strength Comments BUE and BLE grossly demonstrate at least 3/5   Bed Mobility   Bed Mobility Comments Supine > sit with CGA   Transfer Skills   Transfer Comments Sit <> stand with CGA   Gait   Gait Comments Ambulation x 10' with CGA   Balance   Balance Comments Unsteadiness with gait and R deviation   Sensory Examination   Sensory Perception Comments Impaired vision on R   General Therapy Interventions   Planned Therapy Interventions balance training;bed mobility training;gait training;neuromuscular re-education;ROM;strengthening;stretching;transfer training;risk factor education;progressive activity/exercise;home program guidelines   Clinical Impression   Criteria for Skilled Therapeutic Intervention yes, treatment indicated   PT Diagnosis Impaired functional mobility   Influenced by the following impairments vision, balance, pain, activity tolerance, impulsive   Functional limitations due to impairments gait, stairs, transfers, bed mobility, functional endurance   Clinical Presentation Evolving/Changing   Clinical Presentation Rationale S/p orbital/maxillary reconstruction, medical history, amputations, clinical reasoning   Clinical Decision Making (Complexity) Moderate complexity   Therapy Frequency 5x/week   Predicted Duration of Therapy Intervention (days/wks) 2 weeks  "  Anticipated Discharge Disposition Home with Assist;Home with Outpatient Therapy   Risk & Benefits of therapy have been explained Yes   Patient, Family & other staff in agreement with plan of care Yes   [facility] TM \"6 Clicks\"   2016, Trustees of [facility], under license to [employer].  All rights reserved.   6 Clicks Short Forms Basic Mobility Inpatient Short Form   [facility] AM-PAC  \"6 Clicks\" V.2 Basic Mobility Inpatient Short Form   1. Turning from your back to your side while in a flat bed without using bedrails? 3 - A Little   2. Moving from lying on your back to sitting on the side of a flat bed without using bedrails? 3 - A Little   3. Moving to and from a bed to a chair (including a wheelchair)? 3 - A Little   4. Standing up from a chair using your arms (e.g., wheelchair, or bedside chair)? 3 - A Little   5. To walk in hospital room? 3 - A Little   6. Climbing 3-5 steps with a railing? 3 - A Little   Basic Mobility Raw Score (Score out of 24.Lower scores equate to lower levels of function) 18   Total Evaluation Time   Total Evaluation Time (Minutes) 10     "

## 2019-09-25 NOTE — PLAN OF CARE
4A - PT evaluation completed and treatment initiated.   Discharge Planner PT   Patient plan for discharge: to a friends home  Current status: Pt CGA-min A with mobility, cues for safety and slowing gait due somewhat impulsive, minor unsteadiness, and quick moving, Pt able to visually scan to right but noted increased preference for ambulating closer to wall on R side. Min A to don shorts in supine. VSS throughout ambulation, Recommend pt ambulate 3-4x/day with RN staff and gait belt.  Barriers to return to prior living situation: medical status, impaired function mobility  Recommendations for discharge: home with assist, OP PT  Rationale for recommendations: Pt moving well with CGA-min A, minor unsteadiness and visual deficits are pts primary deficits, Anticipate will continued progress will be appropriate for home with assist and may benefit from PT to improve deficits.        Entered by: Jami Shields 09/25/2019 5:58 PM

## 2019-09-25 NOTE — PROGRESS NOTES
"/63   Pulse 73   Temp 98.2  F (36.8  C) (Axillary)   Resp 13   Ht 1.829 m (6' 0.01\")   Wt 78.8 kg (173 lb 11.6 oz)   SpO2 94%   BMI 23.56 kg/m       Post- op Day 1- Latissiumus myocutaneous free flap reconstruction w/t microvascular anastomosis w/t local advancement flaps.    HEENT: Swelling right face, nose, lip, palate- incision sutures- scant sero sang drainage.   Neuro: A&O times 4.   Cardiac: HR 70's- 80's, 's/ 60's, MAP 80's, Sinus rhythm.   Respiratory: Lungs clear, productive sputum/ oral suction with RR tube.   GI/: Cohn removed 1630 after PT eval, hypo active BS, +flatus & no BM post surgery- senna.  Diet/Appetite: Strict NPO/ TF started at 15 ml/hr with 30 ml Q4H flush, next increase by 10ml ~2200. Goal 65.  Skin: Face, nose, upper lip sutured incision with scant sero sang drainage. R neck incision sutures/ penrose drain- minimal drainage. Flap internal upper palate/ pale, doppler strong signal- continuous/ dry drainage. R orbital flap, pale, doppler signal strong, sutures intact, scant drainage. R lateral back incision with MARQUEZ bulb- small sero sang drainage. Incision site covered- flap site, dressing marked no change- MD perform 1 st dx change.   LDA: PIV X2, dx cdi. R lateral back MARQUEZ with small sero sang.   Activity: Up with SBA/ ambulated in hallway w/t PT.  Pain: Oxy with adequate pain mgt.  Plan: NPO for 2 weeks/ PLC tomorrow for TF & drain teaching.    "

## 2019-09-25 NOTE — ANESTHESIA POSTPROCEDURE EVALUATION
Anesthesia POST Procedure Evaluation    Patient: Eduardo Rubio   MRN:     [id number] Gender:   male   Age:    58 year old :      1960        Preoperative Diagnosis: Maxillary Sinus Cancer   Procedure(s):  Right Orbital Exenteration  Right Neck Exploration  Right Radicall Maxillectomy, Nasogastric Tube Placement  Right Latissmus Free Flap Reconstruction   Postop Comments: No value filed.       Anesthesia Type:  Not documented  No value filed.    Reportable Event: NO     PAIN: Uncomplicated   Sign Out status: Comfortable, Well controlled pain     PONV: No PONV   Sign Out status:  No Nausea or Vomiting     Neuro/Psych: Uneventful perioperative course   Sign Out Status: Preoperative baseline; Age appropriate mentation     Airway/Resp.: Uneventful perioperative course   Sign Out Status: Non labored breathing, age appropriate RR; Resp. Status within EXPECTED Parameters     CV: Uneventful perioperative course   Sign Out status: Appropriate BP and perfusion indices; Appropriate HR/Rhythm     Disposition:   Sign Out in:  ICU  Disposition:  ICU  Recovery Course: Recovery in ICU  Follow-Up: Not required           Last Anesthesia Record Vitals:  CRNA VITALS  2019 2013 - 2019 2113      2019             EKG:  Sinus rhythm          Last PACU Vitals:  Vitals Value Taken Time   /79 2019  9:12 PM   Temp     Pulse 71 2019  9:12 PM   Resp 13 2019  9:24 PM   SpO2 96 % 2019  9:24 PM   Temp src     NIBP     Pulse     SpO2     Resp     Temp     Ht Rate     Temp 2     Vitals shown include unvalidated device data.      Electronically Signed By: Estuardo Monroy MD, 2019, 9:26 PM

## 2019-09-25 NOTE — PLAN OF CARE
OT-4a: Hold- per PT, pt may not have acute OT needs this admission, will hold OT eval and initiate if needs arise, PT will follow.

## 2019-09-25 NOTE — PROGRESS NOTES
SURGICAL ICU PROGRESS NOTE  September 25, 2019      ASSESSMENT: Eduardo Rubio is a 58 year old man with PMH tobacco use, toe amputation, LBP, gastric ulcer, and T4N0 SCC of the right maxillary sinus s/p Latissimus myocutaneous free flap reconstruction with microvascular anastomosis with Local advancement flaps on 9/24/19 with Dr. Pulliam.    CHANGES TODAY:   - NG to be advanced by ENT   - TF to start today, decrease IVF as able.   - Continue scheduled zofran   - Cohn out, remove arterial line, remove PIV in foot     PLAN:   Neuro/ pain/ sedation:    #Acute post-op pain  - Monitor neurological status.   - prn IV Dilaudid, prn oxy, and prn tylenol for pain.  - Gabapentin 100qhs     HEENT:  # S/p maxillary sinus s/p latissimus free flap with ENT 9/24/19 due to SCC of right maxillary sinus   - doppler monitoring of flaps, q 1 hr checks   - drain and wound cares per ENT.   - Decadron per ENT x 3 doses. Unasyn x48 hour.   - Avoid straps across face and neck per ENT      Pulmonary care:   # no active issues   - PET 9/9/19: 1. Single 7 mm nodule in the right upper lobe that demonstrates mild  FDG avidity.   - Supplemental oxygen to keep saturation above 92 %.    Cardiovascular:    # no issues   - 8/21/2019: stress echocardiogram without contrast normal   - Monitor hemodynamic status. MAP goal >65  - Avoid pressors and fluid boluses, contact ENT if needed to maintain MAPs  - ASA  325mg daily     GI care:   # h/o gastric ulcer ~2015 (status post abdominal surgery, does not know details)   - scheduled zofran q6h  - NGT in place okay for meds after NG advanced today         Fluids/ Electrolytes/ Nutrition:   # hyponatremia   # hypomagnesia  - LR 75/hr for IV fluid hydration, decrease MIVF rate when feeds increased.   - ICU electrolyte replacement protocol in place      Renal/ Fluid Balance:    # no issue  - Urine output is adequate so far.  - Will continue to monitor intake and output.  - Cohn cares      Endocrine:    #  stress hyperglycemia  - Monitor for steroid-induced hyperglycemia  - Decadron 10mg q8h PER ENT   - Blood glucose checks q4h. Med SSI      ID/ Antibiotics:  # leukocytosis   - reactive due to surgery, perioperative abx Unasyn x48 hours total       Heme:     # Acute blood loss anemia (EBL 1.3L)  - Monitor hgb, transfuse if hgb <7.0 or active bleeding        MSK:   # weakness and deconditioning of critical illness   - PT and OT consulted. Appreciate recs.     General cares and Prophylaxis:    - Mechanical prophylaxis for DVT and heparin 5000 subcutaneous TID      Lines/ tubes/ drains:  - NGT  - Left foot PIV  - MARQUEZ x2 on right flank  - Cohn      Disposition:  - Surgical ICU for flap checks x 72 hours      Time spent on this Encounter   Billing:  I spent total of 30 minutes bedside and on the inpatient unit today managing the care of Eduardo Rubio in relation to the issues listed in this note.    Rajan Lehman PA-C    ====================================  SUBJECTIVE:  Course reviewed. No acute events overnight. Reports pain controlled with current regimen. Denies chest pain, shortness of breath, fever or chills.      OBJECTIVE:     1. VITAL SIGNS:   Temp:  [97.5  F (36.4  C)-99.3  F (37.4  C)] 98.8  F (37.1  C)  Pulse:  [70-80] 73  Heart Rate:  [64-83] 75  Resp:  [9-24] 10  BP: ()/(57-79) 102/63  MAP:  [63 mmHg-119 mmHg] 119 mmHg  Arterial Line BP: ()/(46-86) 165/83  FiO2 (%):  [4 %] 4 %  SpO2:  [92 %-100 %] 96 %    2. INTAKE/ OUTPUT:   I/O last 3 completed shifts:  In: 5212.5 [I.V.:3517.5; NG/GT:195]  Out: 3103 [Urine:1706; Drains:97; Blood:1300]    3. PHYSICAL EXAMINATION:   General: sitting up in bed, awake, alert and interactive   HEENT: right eye flap (+) doppler, incision intact, edges well approximated. NG in place and sutured. OP clear. Penrose drain in right neck, serosangious drainage.   Neuro: A&Ox3, NAD. LOAIZA.   Resp: Breathing non-labored. BBS, on room air.   CV: RRR, S1, S2   Abdomen: Soft,  Non-distended, Non-tender.   Extremities: warm and well perfused, calves soft and compressible. Pulses 2+.     4. INVESTIGATIONS:   Arterial Blood Gases   Recent Labs   Lab 09/24/19 1854 09/24/19  1605 09/24/19  1339 09/24/19  1129   PH 7.36 7.38 7.37 7.34*   PCO2 50* 47* 47* 47*   PO2 87 84 101 81   HCO3 28 28 27 25     Complete Blood Count   Recent Labs   Lab 09/25/19  0340 09/24/19 2213 09/24/19 1854 09/24/19  1605   WBC 14.9* 13.4*  --   --    HGB 7.8* 8.2* 8.9* 9.6*    199  --   --      Basic Metabolic Panel  Recent Labs   Lab 09/25/19 0340 09/24/19 2213 09/24/19 1854 09/24/19  1605  09/24/19  0610   * 134 134 135   < > 133   POTASSIUM 4.0 4.1 4.1 4.4   < > 3.5   CHLORIDE 99 99  --   --   --  99   CO2 26 26  --   --   --  27   BUN 8 8  --   --   --  7   CR 0.52* 0.49*  --   --   --  0.66   * 159* 182* 175*   < > 106*    < > = values in this interval not displayed.     Liver Function Tests  Recent Labs   Lab 09/25/19 0340   ALBUMIN 3.3*     Pancreatic Enzymes  No lab results found in last 7 days.  Coagulation Profile  No lab results found in last 7 days.      =========================================

## 2019-09-25 NOTE — PROGRESS NOTES
"Otolaryngology Progress Note  September 25, 2019    S: No acute events overnight. Flap stable overnight. Patient denies N/V no uncontrolled pain.     O: BP 99/57 (BP Location: Left arm)   Pulse 74   Temp 98.8  F (37.1  C) (Axillary)   Resp 12   Ht 1.829 m (6' 0.01\")   Wt 78.8 kg (173 lb 11.6 oz)   SpO2 94%   BMI 23.56 kg/m     General: Alert and oriented x 3, No acute distress   HEENT: Right orbital aspect of flap pale, soft, warm , strong arterial Doppler signal at inferior suture. Right palatal intraoral flap warm, soft, with minimum dried crusting, without evidence of congestion, no hematoma or incision dehiscence. Neck flat with clean/dry/intact suture line. Right neck Penrose drain with serosanguinous output. Right neck Implantable Doppler with strong arterial signal.   MSK: Right back with 2 MARQUEZ drains with serosang drainage, dressing covering surgical incision with minimum blood, no ecchymosis   Pulmonary: Breathing non-labored, no stridor, no accessory muscle use.      Intake/Output Summary (Last 24 hours) at 9/25/2019 0835  Last data filed at 9/25/2019 0800  Gross per 24 hour   Intake 5543.5 ml   Output 3103 ml   Net 2440.5 ml     MARQUEZ drain output(s): (last 24 hours)/(last shift)  1- Right sup chest -/27/15  2- Right inf chest -35/20  3 - Right neck penrose     LABS:  ROUTINE IP LABS (Last four results)  BMP  Recent Labs   Lab 09/25/19  0340 09/24/19  2213 09/24/19  1854 09/24/19  1605  09/24/19  0610   * 134 134 135   < > 133   POTASSIUM 4.0 4.1 4.1 4.4   < > 3.5   CHLORIDE 99 99  --   --   --  99   MIKEY 8.4* 8.5  --   --   --  8.7   CO2 26 26  --   --   --  27   BUN 8 8  --   --   --  7   CR 0.52* 0.49*  --   --   --  0.66   * 159* 182* 175*   < > 106*    < > = values in this interval not displayed.     CBC  Recent Labs   Lab 09/25/19  0340 09/24/19  2213 09/24/19  1854 09/24/19  1605   WBC 14.9* 13.4*  --   --    RBC 2.44* 2.56*  --   --    HGB 7.8* 8.2* 8.9* 9.6*   HCT 23.4* 24.2*  --   " --    MCV 96 95  --   --    MCH 32.0 32.0  --   --    MCHC 33.3 33.9  --   --    RDW 13.7 13.5  --   --     199  --   --      INRNo lab results found in last 7 days.    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    Neuro:  - Pain control: PRN tylenol,  IV Dilaudid, oxy,   - Gabapentin at bedtime    HEENT:  - Incision cares: clean with 0.9% sodium chloride and apply Aquaphor Q8H   - Peridex TID   - Humidified mask tent. Do not secure around neck but place on chest angled towards mouth. To prevent crusting of oral hard palate flap.   - Monitor and record MARQUEZ drain output Qshift  - Nurse Flap Check:   - Q1H x POD 0-3   -  Q4H POD 4-6   - Q8H POD 6+   - ENT Flap Check: Q4H 24hrs, Q6H x 48hrs, Q8H x 48hrs, Q12hrs   - 3 doses decadron post-op     Respiratory:  - PET 9/9/19 7mm nodule right upper lobe with mild FDG avidity  - supplemental O2 PRN to keep sats >92%    CV:  - hemodynamically stable. MaP > 65  - Avoid pressors and fluid boluses, contact ENT if needed to maintain MAPs  - ASA daily    FEN/GI:  - Gastric ulcer 2015 (status post abdominal surgery, does not know details)   - NPO no exceptions  - NGT left nare in place okay for meds   - tube projecting over GE junction. Will advance and obtain XR  - Scheduled zofran q6h  - Bowel regimen: PRN Dulcolax , Miralax/Glycolax, senna     :  - Catheter in place can be discontinued      Endo  - Post-op Decadron 10mg q8H. Only 24hrs post.     ID/Heme:  -Perioperative abx Unasyn x48 hours  - Pre-op Hgb 13. Post op 7.8    - Monitor. Transfuse if  < 7     PPX:  - Lovenox   - SCDs  - IS    Dispo: Surgical ICU    -- Patient and above plan discussed with Dr. Garcia  suki.     Sommer Sandoval MD PGY1   Otolaryngology-Head & Neck Surgery  Please contact ENT with questions by dialing * [phone] and entering job code [phone] when prompted.

## 2019-09-25 NOTE — PROGRESS NOTES
ENT Free Flap Check  09/25/19 1765    S: No issues with the flap per nursing. Patient denies uncontrolled pain. His greatest area of discomfort is from the bridle of the feeding tube. Mild incisional pain at the right flank.    O: Temp: 99.3  F (37.4  C) Temp src: Axillary BP: 105/63 Pulse: 72 Heart Rate: 73 Resp: 8 SpO2: 95 % O2 Device: None (Room air)    General - No acute distress, patient resting comfortably in bed.  HEENT - Right orbital aspect of flap pale, soft, warm to the touch but slightly cooler than surrounding native skin, with strong arterial Doppler signal at inferior suture. Right palatal intraoral flap warm, soft, appears viable without evidence of congestion, no hematoma or incision dehiscence. Neck flat with clean/dry/intact suture line. Penrose drain with serosanguinous output. Implantable Doppler with strong arterial signal.  MSK: Right latissimus dorsi donor site intact, no evidence of hematoma or incision dehiscence. MARQUEZ drains x2 with appropriate serosanguinous output from the right flank.  Pulm - Non-labored breathing on room air without stridor or stertor, muffled speech, nasal feeding tube in place    A/P: 58-year-old man POD #1 right maxillectomy and orbital exenteration with right latissimus dorsi free flap reconstruction. Flap appears healthy and viable.    - Continue current plan of care, Q4H resident checks and Q1H nursing checks.    Daxa Salguero MD, PGY-2  Otolaryngology-Head & Neck Surgery  Pager: [phone]

## 2019-09-25 NOTE — PROGRESS NOTES
ENT Free Flap Check  09/25/19 1230    S: No issues with the flap per nursing. Patient denies uncontrolled pain and is in good spirits.    O: Temp: 98.6  F (37  C) Temp src: Axillary BP: 112/68 Pulse: 80 Heart Rate: 80 Resp: 11 SpO2: 98 % O2 Device: None (Room air)    General - No acute distress, patient resting comfortably in bed.  HEENT - Right orbital aspect of flap pale, soft, warm to the touch, with strong arterial Doppler signal at inferior suture. Right palatal intraoral flap warm, soft, appears viable without evidence of congestion, no hematoma or incision dehiscence. Neck flat with clean/dry/intact suture line. Penrose drain with serosanguinous output. Implantable Doppler with strong arterial signal.  MSK: Right latissimus dorsi donor site intact, no evidence of hematoma or incision dehiscence. MARQUEZ drains x2 with appropriate serosanguinous output from the right flank.  Pulm - Non-labored breathing on room air without stridor or stertor, muffled speech, nasal feeding tube in place    A/P: 58-year-old man POD #1 right maxillectomy and orbital exenteration with right latissimus dorsi free flap reconstruction. Flap appears healthy and viable.    - Continue current plan of care, Q4H resident checks and Q1H nursing checks.    Daxa Salguero MD, PGY-2  Otolaryngology-Head & Neck Surgery  Pager: [phone]

## 2019-09-25 NOTE — PLAN OF CARE
D/I: Patient's palate flap and right eye flap, continue to be warm, pale in color and have strong        Dopplerable arterial pulses.  Two MARQUEZ drains have put out about 100 ml for the past 8 hours.        Patient state most of his pain is from his nose area, and the right flank Freeflap graft site.           IV dilaudid and oxycodone have been controlling pain well per patient.           Right eye flap is remarkably swollen.  Decadron IV is being given per schedule.           ENT physicians have examined patient several times and performed flap checks.          VSS.  MAPS > 65. No IV fluid bolus' given,  MIV aat 75 ml/hr.  HGB 7.8 this morning.   A: Flaps remain viable.  Hemodynamically stable.    P: continue serial flap checks q hour/24 hours.  Keep room warm.  Increase activity as able. discontinue       Cohn this a.m.  Begin tube feeds later today per nutrition.

## 2019-09-26 ENCOUNTER — HOME INFUSION (PRE-WILLOW HOME INFUSION) (OUTPATIENT)
Dept: PHARMACY | Facility: CLINIC | Age: 59
End: 2019-09-26

## 2019-09-26 ENCOUNTER — APPOINTMENT (OUTPATIENT)
Dept: PHYSICAL THERAPY | Facility: CLINIC | Age: 59
DRG: 131 | End: 2019-09-26
Attending: OTOLARYNGOLOGY
Payer: MEDICARE

## 2019-09-26 LAB
ANION GAP SERPL CALCULATED.3IONS-SCNC: 7 MMOL/L (ref 3–14)
BUN SERPL-MCNC: 10 MG/DL (ref 7–30)
CALCIUM SERPL-MCNC: 8.2 MG/DL (ref 8.5–10.1)
CHLORIDE SERPL-SCNC: 100 MMOL/L (ref 94–109)
CO2 SERPL-SCNC: 28 MMOL/L (ref 20–32)
CREAT SERPL-MCNC: 0.56 MG/DL (ref 0.66–1.25)
ERYTHROCYTE [DISTWIDTH] IN BLOOD BY AUTOMATED COUNT: 14.1 % (ref 10–15)
GFR SERPL CREATININE-BSD FRML MDRD: >90 ML/MIN/{1.73_M2}
GLUCOSE BLDC GLUCOMTR-MCNC: 108 MG/DL (ref 70–99)
GLUCOSE BLDC GLUCOMTR-MCNC: 112 MG/DL (ref 70–99)
GLUCOSE BLDC GLUCOMTR-MCNC: 120 MG/DL (ref 70–99)
GLUCOSE BLDC GLUCOMTR-MCNC: 125 MG/DL (ref 70–99)
GLUCOSE BLDC GLUCOMTR-MCNC: 145 MG/DL (ref 70–99)
GLUCOSE BLDC GLUCOMTR-MCNC: 147 MG/DL (ref 70–99)
GLUCOSE SERPL-MCNC: 148 MG/DL (ref 70–99)
HCT VFR BLD AUTO: 22.9 % (ref 40–53)
HGB BLD-MCNC: 7.4 G/DL (ref 13.3–17.7)
MAGNESIUM SERPL-MCNC: 2.2 MG/DL (ref 1.6–2.3)
MCH RBC QN AUTO: 32.2 PG (ref 26.5–33)
MCHC RBC AUTO-ENTMCNC: 32.3 G/DL (ref 31.5–36.5)
MCV RBC AUTO: 100 FL (ref 78–100)
PHOSPHATE SERPL-MCNC: 3.7 MG/DL (ref 2.5–4.5)
PLATELET # BLD AUTO: 199 10E9/L (ref 150–450)
POTASSIUM SERPL-SCNC: 3.8 MMOL/L (ref 3.4–5.3)
RBC # BLD AUTO: 2.3 10E12/L (ref 4.4–5.9)
SODIUM SERPL-SCNC: 136 MMOL/L (ref 133–144)
WBC # BLD AUTO: 17.5 10E9/L (ref 4–11)

## 2019-09-26 PROCEDURE — 80048 BASIC METABOLIC PNL TOTAL CA: CPT | Performed by: STUDENT IN AN ORGANIZED HEALTH CARE EDUCATION/TRAINING PROGRAM

## 2019-09-26 PROCEDURE — 25000128 H RX IP 250 OP 636: Performed by: STUDENT IN AN ORGANIZED HEALTH CARE EDUCATION/TRAINING PROGRAM

## 2019-09-26 PROCEDURE — 97116 GAIT TRAINING THERAPY: CPT | Mod: GP | Performed by: REHABILITATION PRACTITIONER

## 2019-09-26 PROCEDURE — 20000004 ZZH R&B ICU UMMC

## 2019-09-26 PROCEDURE — 84100 ASSAY OF PHOSPHORUS: CPT | Performed by: STUDENT IN AN ORGANIZED HEALTH CARE EDUCATION/TRAINING PROGRAM

## 2019-09-26 PROCEDURE — 25000132 ZZH RX MED GY IP 250 OP 250 PS 637: Mod: GY | Performed by: STUDENT IN AN ORGANIZED HEALTH CARE EDUCATION/TRAINING PROGRAM

## 2019-09-26 PROCEDURE — 83735 ASSAY OF MAGNESIUM: CPT | Performed by: STUDENT IN AN ORGANIZED HEALTH CARE EDUCATION/TRAINING PROGRAM

## 2019-09-26 PROCEDURE — 00000146 ZZHCL STATISTIC GLUCOSE BY METER IP

## 2019-09-26 PROCEDURE — 25000131 ZZH RX MED GY IP 250 OP 636 PS 637: Mod: GY | Performed by: STUDENT IN AN ORGANIZED HEALTH CARE EDUCATION/TRAINING PROGRAM

## 2019-09-26 PROCEDURE — 36415 COLL VENOUS BLD VENIPUNCTURE: CPT | Performed by: STUDENT IN AN ORGANIZED HEALTH CARE EDUCATION/TRAINING PROGRAM

## 2019-09-26 PROCEDURE — 85027 COMPLETE CBC AUTOMATED: CPT

## 2019-09-26 PROCEDURE — 25000132 ZZH RX MED GY IP 250 OP 250 PS 637: Mod: GY | Performed by: PHYSICIAN ASSISTANT

## 2019-09-26 PROCEDURE — 27210429 ZZH NUTRITION PRODUCT INTERMEDIATE LITER

## 2019-09-26 PROCEDURE — 97530 THERAPEUTIC ACTIVITIES: CPT | Mod: GP | Performed by: REHABILITATION PRACTITIONER

## 2019-09-26 RX ORDER — OXYCODONE HCL 5 MG/5 ML
5-10 SOLUTION, ORAL ORAL
Status: DISCONTINUED | OUTPATIENT
Start: 2019-09-26 | End: 2019-09-29

## 2019-09-26 RX ORDER — POLYETHYLENE GLYCOL 3350 17 G/17G
17 POWDER, FOR SOLUTION ORAL DAILY
Status: DISCONTINUED | OUTPATIENT
Start: 2019-09-26 | End: 2019-09-30 | Stop reason: HOSPADM

## 2019-09-26 RX ORDER — CYCLOBENZAPRINE HCL 5 MG
5-10 TABLET ORAL 3 TIMES DAILY PRN
Status: DISCONTINUED | OUTPATIENT
Start: 2019-09-26 | End: 2019-09-29

## 2019-09-26 RX ADMIN — ACETAMINOPHEN 650 MG: 325 TABLET, FILM COATED ORAL at 01:44

## 2019-09-26 RX ADMIN — AMPICILLIN SODIUM AND SULBACTAM SODIUM 3 G: 2; 1 INJECTION, POWDER, FOR SOLUTION INTRAMUSCULAR; INTRAVENOUS at 07:53

## 2019-09-26 RX ADMIN — OXYCODONE HYDROCHLORIDE 10 MG: 5 SOLUTION ORAL at 01:44

## 2019-09-26 RX ADMIN — POLYETHYLENE GLYCOL 3350 17 G: 17 POWDER, FOR SOLUTION ORAL at 07:52

## 2019-09-26 RX ADMIN — ACETAMINOPHEN 650 MG: 325 TABLET, FILM COATED ORAL at 10:57

## 2019-09-26 RX ADMIN — MULTIVIT AND MINERALS-FERROUS GLUCONATE 9 MG IRON/15 ML ORAL LIQUID 15 ML: at 07:52

## 2019-09-26 RX ADMIN — ONDANSETRON HYDROCHLORIDE 4 MG: 4 TABLET, FILM COATED ORAL at 13:25

## 2019-09-26 RX ADMIN — Medication 2 PACKET: at 08:12

## 2019-09-26 RX ADMIN — OXYCODONE HYDROCHLORIDE 10 MG: 5 SOLUTION ORAL at 11:29

## 2019-09-26 RX ADMIN — ONDANSETRON HYDROCHLORIDE 4 MG: 4 TABLET, FILM COATED ORAL at 07:52

## 2019-09-26 RX ADMIN — SENNOSIDES AND DOCUSATE SODIUM 2 TABLET: 8.6; 5 TABLET ORAL at 07:52

## 2019-09-26 RX ADMIN — CHLORHEXIDINE GLUCONATE 0.12% ORAL RINSE 15 ML: 1.2 LIQUID ORAL at 11:01

## 2019-09-26 RX ADMIN — ACETAMINOPHEN 650 MG: 325 TABLET, FILM COATED ORAL at 06:11

## 2019-09-26 RX ADMIN — OXYCODONE HYDROCHLORIDE 10 MG: 5 SOLUTION ORAL at 06:10

## 2019-09-26 RX ADMIN — ENOXAPARIN SODIUM 40 MG: 40 INJECTION SUBCUTANEOUS at 17:09

## 2019-09-26 RX ADMIN — GABAPENTIN 100 MG: 250 SUSPENSION ORAL at 20:19

## 2019-09-26 RX ADMIN — CHLORHEXIDINE GLUCONATE 0.12% ORAL RINSE 15 ML: 1.2 LIQUID ORAL at 07:52

## 2019-09-26 RX ADMIN — CHLORHEXIDINE GLUCONATE 0.12% ORAL RINSE 15 ML: 1.2 LIQUID ORAL at 15:07

## 2019-09-26 RX ADMIN — AMPICILLIN SODIUM AND SULBACTAM SODIUM 3 G: 2; 1 INJECTION, POWDER, FOR SOLUTION INTRAMUSCULAR; INTRAVENOUS at 14:36

## 2019-09-26 RX ADMIN — AMPICILLIN SODIUM AND SULBACTAM SODIUM 3 G: 2; 1 INJECTION, POWDER, FOR SOLUTION INTRAMUSCULAR; INTRAVENOUS at 01:49

## 2019-09-26 RX ADMIN — ASPIRIN 325 MG ORAL TABLET 325 MG: 325 PILL ORAL at 07:52

## 2019-09-26 RX ADMIN — ONDANSETRON HYDROCHLORIDE 4 MG: 4 TABLET, FILM COATED ORAL at 01:44

## 2019-09-26 RX ADMIN — OXYCODONE HYDROCHLORIDE 10 MG: 5 SOLUTION ORAL at 15:07

## 2019-09-26 RX ADMIN — INSULIN ASPART 1 UNITS: 100 INJECTION, SOLUTION INTRAVENOUS; SUBCUTANEOUS at 00:13

## 2019-09-26 RX ADMIN — CHLORHEXIDINE GLUCONATE 0.12% ORAL RINSE 15 ML: 1.2 LIQUID ORAL at 20:27

## 2019-09-26 RX ADMIN — POTASSIUM CHLORIDE 20 MEQ: 1.5 POWDER, FOR SOLUTION ORAL at 06:10

## 2019-09-26 RX ADMIN — AMPICILLIN SODIUM AND SULBACTAM SODIUM 3 G: 2; 1 INJECTION, POWDER, FOR SOLUTION INTRAMUSCULAR; INTRAVENOUS at 20:31

## 2019-09-26 RX ADMIN — SENNOSIDES AND DOCUSATE SODIUM 2 TABLET: 8.6; 5 TABLET ORAL at 20:19

## 2019-09-26 RX ADMIN — OXYCODONE HYDROCHLORIDE 10 MG: 5 SOLUTION ORAL at 20:19

## 2019-09-26 RX ADMIN — INSULIN ASPART 1 UNITS: 100 INJECTION, SOLUTION INTRAVENOUS; SUBCUTANEOUS at 04:19

## 2019-09-26 ASSESSMENT — ACTIVITIES OF DAILY LIVING (ADL)
ADLS_ACUITY_SCORE: 13

## 2019-09-26 ASSESSMENT — PAIN DESCRIPTION - DESCRIPTORS
DESCRIPTORS: ACHING

## 2019-09-26 ASSESSMENT — MIFFLIN-ST. JEOR: SCORE: 1625.13

## 2019-09-26 NOTE — PROGRESS NOTES
"ENT Free Flap Check Telephone   September 26, 2019    S: No issues per nursing with the flap.    O: /61   Pulse 64   Temp 98.2  F (36.8  C) (Axillary)   Resp 9   Ht 1.829 m (6' 0.01\")   Wt 78.8 kg (173 lb 11.6 oz)   SpO2 96%   BMI 23.56 kg/m    Flap appears viable and does not appear congested, no hematoma visualized, pulses doppler able.       A/p: stable flap  -continue current plan of care  -page with concerns    Nahid Morin MD  ENT Resident  Pgr [phone]    09/26/19 1:33 AM    "

## 2019-09-26 NOTE — PLAN OF CARE
"S: Stated pain prevented rest with just 5mg oxy. Increased to 10mg and added tylenol. Pt reported was comfortable enough to sleep between flap checks. Flaps pale, warm, soft with strong arterial flow via doppler. Pt refused bladder scan. Did finally void 0100. Agreed to put SCD's on at that time. Refused hygiene except for oral care. Refused full skin inspection-\"there aren't any sores on my butt, I move enough, you don't need to look at it\". Did turn enough to check right flank drsg. Increased TF per orders.  B: S/p resections/flaps.  A: Stable.  R: Flap checks per orders. Pain meds as needed. Encourage CHG bath to keep bacterial load down for infection prevention. Check lower back/buttocks when next out of bed. Monitor electrolytes and replete as needed. Advance TF if parameters met.  "

## 2019-09-26 NOTE — PROGRESS NOTES
SURGICAL ICU PROGRESS NOTE  September 26, 2019      ASSESSMENT: Eduardo Rubio is a 58 year old man with PMH tobacco use, toe amputation, LBP, gastric ulcer, and T4N0 SCC of the right maxillary sinus s/p Latissimus myocutaneous free flap reconstruction with microvascular anastomosis with local advancement flaps on 9/24/19 with Dr. Pulliam.    CHANGES TODAY:   - Continue flap checks q1 hour   - Increase bowel regimen today   - Oxycodone 5-10 mg q3 prn pain, add prn muscle relaxant  - TKO IVF     PLAN:    Neuro/ pain/ sedation:  #Acute post-op pain  - prn IV Dilaudid, prn oxy, and prn tylenol for pain.  - add prn muscle relaxant   - Gabapentin 100qhs     HEENT:  # S/p maxillary sinus s/p latissimus free flap with ENT 9/24/19 due to SCC of right maxillary sinus   - doppler monitoring of flaps, q 1 hr checks   -  Wound cares and drain management per ENT  ENT.   - Decadron per ENT x 3 doses.  Unasyn x48 hour (perioperative abx)  - Avoid straps across face and neck per ENT      Pulmonary care:   # no active issues   - PET 9/9/19: 1. Single 7 mm nodule in the right upper lobe that demonstrates mild FDG avidity.   - Supplemental oxygen with humifity to keep saturation above 92 %.     Cardiovascular:    # no issues   - 8/21/2019: stress echocardiogram without contrast normal   - Monitor hemodynamic status. MAP goal >65  - Avoid pressors and fluid boluses, contact ENT if needed. None required thus far.   - ASA  325mg daily     GI care:   # h/o gastric ulcer ~2015 (status post abdominal surgery, does not know details)   - scheduled zofran q6h x48 hours per ENT   - NGT in place for tube feed. RD following. NPO for now     Fluids/ Electrolytes/ Nutrition:   # s/p NG   - RD following. Plan for NG feeds increase towards goal. Free water flushes.    - TKO IVF   - ICU electrolyte replacement protocol in place      Renal/ Fluid Balance:    # no issue  - Will continue to monitor intake and output.  - Cohn removed 9/25/19, voiding  without issues       Endocrine:    # stress hyperglycemia  - Monitor for steroid-induced hyperglycemia  - Decadron 10mg q8h PER ENT, set to complete today   - Blood glucose checks q4h. Med SSI      ID/ Antibiotics:  # leukocytosis   - reactive due to surgery, perioperative abx Unasyn x48 hours total       Heme:     # Acute blood loss anemia (EBL 1.3L)  - Monitor hgb, transfuse if hgb <7.0 or active bleeding        MSK:   # weakness and deconditioning of critical illness   - PT and OT consulted. Appreciate recs.  - OOB today ,ambulate halls      General cares and Prophylaxis:    - Mechanical prophylaxis for DVT  - Lovenox      Lines/ tubes/ drains:  - NGT  - Right neck penrose drank  - MARQUEZ x2 on right flank  - Cohn      Disposition:  - Surgical ICU for flap checks x 72 hours      Time spent on this Encounter   Billing:  I spent total of 30 minutes bedside and on the inpatient unit today managing the care of Eduardo Rubio in relation to the issues listed in this note.    Rajan Lehman PA-C    ====================================  SUBJECTIVE:  Course reviewed. No acute events overnight. Pt reports pain controlled with current regimen.  Wants ice water and nicotine patch this am. Denies chest pain, shortness of breath, fever or chills. Reports passing flatus, denies abdominal pain or nausea.     OBJECTIVE:     1. VITAL SIGNS:   Temp:  [98.2  F (36.8  C)-99.2  F (37.3  C)] 98.8  F (37.1  C)  Pulse:  [64-98] 70  Heart Rate:  [] 64  Resp:  [8-34] 10  BP: ()/(59-75) 117/68  MAP:  [119 mmHg] 119 mmHg  Arterial Line BP: (165)/(83) 165/83  FiO2 (%):  [28 %] 28 %  SpO2:  [92 %-99 %] 94 %    2. INTAKE/ OUTPUT:   I/O last 3 completed shifts:  In: 3000 [I.V.:2000; NG/GT:690]  Out: 2251 [Urine:2170; Drains:81]    3. PHYSICAL EXAMINATION:   General: sitting up in bed, awake, alert and interactive   HEENT: Right sided facial swelling. Right eye flap (+) doppler, incision intact, edges well approximated. NG in place and  sutured. OP clear. Penrose drain in right neck.   Neuro: A&Ox3, NAD. LOAIZA.   Resp: Breathing non-labored. BBS, on room air.   CV: RRR, S1, S2   Chest: MARQUEZ drains x2 with pink serous fluid in MARQUEZ bulbs. No TTP.   Abdomen: Soft, Non-distended, Non-tender.   Extremities: warm and well perfused, calves soft and compressible. Pulses 2+.     4. INVESTIGATIONS:   Arterial Blood Gases   Recent Labs   Lab 09/24/19 1854 09/24/19  1605 09/24/19  1339 09/24/19  1129   PH 7.36 7.38 7.37 7.34*   PCO2 50* 47* 47* 47*   PO2 87 84 101 81   HCO3 28 28 27 25     Complete Blood Count   Recent Labs   Lab 09/26/19 0424 09/25/19 0340 09/24/19 2213 09/24/19  1854   WBC 17.5* 14.9* 13.4*  --    HGB 7.4* 7.8* 8.2* 8.9*    204 199  --      Basic Metabolic Panel  Recent Labs   Lab 09/26/19  0424 09/25/19  0340 09/24/19  2213 09/24/19  1854  09/24/19  0610    132* 134 134   < > 133   POTASSIUM 3.8 4.0 4.1 4.1   < > 3.5   CHLORIDE 100 99 99  --   --  99   CO2 28 26 26  --   --  27   BUN 10 8 8  --   --  7   CR 0.56* 0.52* 0.49*  --   --  0.66   * 144* 159* 182*   < > 106*    < > = values in this interval not displayed.     Liver Function Tests  Recent Labs   Lab 09/25/19  0340   ALBUMIN 3.3*       =========================================

## 2019-09-26 NOTE — PLAN OF CARE
Cardiac: Sinus rhythm. BP 110s/60s. Afebrile.     Pulm: Lungs clear. Room air with humidity applied for flap, per ENT.     Neuro: Intact. Pain managed with PRN tylenol and oxy. Found home meds (Percocet and Flexiril) in patient's backpack around 0900. Sent with security, team notified. Patient did not confirm or deny if he had taken any of these meds. Will continue to monitor.     GI: Passing gas, no BM.   NG sutured in left nostril. TF at 45mL/hr. Next rate change at 0000 to 55. Goal rate 65.    : Voids in urinal.    Skin: Right flank incision w/ sutures open to air, small amount of serosang drainage. 2 JPs on right, minimal serosang output. Right neck penrose open, no drainage this shift.   Thin serosang drainage from right nostril.     LDAs:  2 PIV  NG @ 70 in left nostril     Jesi Daly RN on 9/26/2019 at 4:35 PM

## 2019-09-26 NOTE — PROGRESS NOTES
"Otolaryngology Progress Note  September 26, 2019    S: No acute events overnight. Flap stable overnight. Patient denies any concerns.     O: /59   Pulse 70   Temp 98.8  F (37.1  C) (Axillary)   Resp 8   Ht 1.829 m (6' 0.01\")   Wt 76.7 kg (169 lb 1.5 oz)   SpO2 94%   BMI 22.93 kg/m     General: Alert and oriented x 3, No acute distress   HEENT: Right external orbital aspect of flap pale, soft, warm, strong handheld arterial Doppler signal at inferior suture. Right palatal intraoral flap warm, soft, with minimum dried crusting, without evidence of congestion, no hematoma or incision dehiscence. Neck flat with clean/dry/intact suture line. Right neck Penrose drain with small amount of serosanguinous output. Right neck Implantable Doppler with strong arterial signal.   MSK: Right back/flank with 2 MARQUEZ drains with serosang drainage, island dressing removed, incision c/d/i   Pulmonary: Breathing non-labored, no stridor, no accessory muscle use.    Intake/Output Summary (Last 24 hours) at 9/26/2019 0844  Last data filed at 9/26/2019 0800  Gross per 24 hour   Intake 3144 ml   Output 2111 ml   Net 1033 ml     MARQUEZ drain output(s): (last 24 hours)/(last shift)  1- Right sup chest 20 / 0 / 25  2- Right inf chest 21 / 0 / 15    LABS:  ROUTINE IP LABS (Last four results)  BMP  Recent Labs   Lab 09/26/19 0424 09/25/19 0340 09/24/19 2213 09/24/19  1854  09/24/19  0610    132* 134 134   < > 133   POTASSIUM 3.8 4.0 4.1 4.1   < > 3.5   CHLORIDE 100 99 99  --   --  99   MIKEY 8.2* 8.4* 8.5  --   --  8.7   CO2 28 26 26  --   --  27   BUN 10 8 8  --   --  7   CR 0.56* 0.52* 0.49*  --   --  0.66   * 144* 159* 182*   < > 106*    < > = values in this interval not displayed.     CBC  Recent Labs   Lab 09/26/19 0424 09/25/19  0340 09/24/19 2213 09/24/19  4154   WBC 17.5* 14.9* 13.4*  --    RBC 2.30* 2.44* 2.56*  --    HGB 7.4* 7.8* 8.2* 8.9*   HCT 22.9* 23.4* 24.2*  --     96 95  --    MCH 32.2 32.0 32.0  --  "   MCHC 32.3 33.3 33.9  --    RDW 14.1 13.7 13.5  --     204 199  --      INRNo lab results found in last 7 days.    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    Neuro:  - Pain control: PRN tylenol,  PRN oxycodone, PRN IV dilaudid for breakthough pain only   - Gabapentin at bedtime    HEENT:  - Nothing around the head or neck (no ties, straps, ect)  - Incision cares: clean with 0.9% sodium chloride and apply Aquaphor Q8H   - Peridex QID   - Humidified mask tent. Do not secure around neck but place on chest angled towards mouth. To prevent crusting of oral hard palate flap.   - Monitor and record MARQUEZ drain output Qshift  - Nurse Flap Check:   - Q1H x POD 0-3   -  Q4H POD 4-6   - Q8H POD 6+   - ENT Flap Check: Q4H 24hrs, Q6H x 48hrs, Q8H x 48hrs, Q12hrs   - 3 doses decadron post-op     Respiratory:  - PET 9/9/19 7mm nodule right upper lobe with mild FDG avidity  - supplemental O2 PRN to keep sats >92%    CV:  - hemodynamically stable. MaP > 65  - Avoid pressors and fluid boluses, contact ENT if needed to maintain MAPs  - ASA 325mg daily for flap  - Lovenox 40mg daily     FEN/GI:  - NPO no exceptions 2/2 oral flap  - Non severe malnutrition in the context of acute illness:    - NGT left nare - TFs advancing to goal    - Nutrition following   - Scheduled zofran q6h x 48h post-op  - Bowel regimen: PRN Dulcolax , Miralax/Glycolax, senna  - Hx Gastric ulcer 2015 (status post abdominal surgery, does not know details)      /Fluids:  - Cohn removed yesterday, voiding independently   - mIVF TKO     Endo  - Post-op Decadron 10mg q8H x 24hrs post.     ID:  - Afebrile  - Perioperative  antibiotics Unasyn x 48h post op     PPX:  - Lovenox   - SCDs  - IS    Dispo: Surgical ICU x 72h post-op for flap monitoring    -- Patient and above plan discussed with Dr. Jose cohn and Dr. Pulliam.     ANG PrasadC  Otolaryngology-Head & Neck Surgery  Please contact ENT by dialing * [phone] and entering job code [phone].  ns by dialing * [phone] and entering job code [phone] when prompted.

## 2019-09-26 NOTE — PROGRESS NOTES
Care Coordinator Progress Note    Admission Date/Time:  9/24/2019  Attending MD:  Jose Roberts MD    Data  Chart reviewed, discussed with interdisciplinary team.   Patient was admitted for:    S/P flap graft  Squamous cell carcinoma.    Concerns with insurance coverage for discharge needs: None.  Current Living Situation: Patient lives alone.  Support System: Siblings and friends- Supportive and Involved  Services Involved: None  Transportation at Discharge: Family or friend will provide  Barriers to Discharge: Medical stability.    Assessment  Pt is POD # 2 right maxillectomy and orbital exenteration with right latissimus dorsi free flap reconstruction.  ENT team reported pt will be discharge over the weekend and will be going home with TF.  Pt will need arrangement made.  PT/OT evaluated pt and home with family and OP PT is rec.    Coordination of Care and Referrals: Provided patient/family with options for Home Infusion for TF.     RNCC visited pt to introduce RNCC role and discussed about the discharge plan.  RNCC role discussed and contact info provided.  Pt stated the team have been updating him well about the plan of care.  RNCC discussed about TF and home care service.  Pt stated he is ind. with all his cares and declined for RN service and agreed referral to be send to [facility] only for TF and supplies.  Referral sent to [facility] and request coverage info for TF.  [facility] reported pt has 100% coverage and a monthly $ 3.20 deductible.  RNCC shared coverage info with pt.  Pt agreed with coverage info.  Pt lives alone and stated he is planning to stay with his friend.  Pt doesn't have his friend address.  Pt agreed to get his friend address and will provide to RNCC.      Plan  Anticipated Discharge Date:  TBD.  Anticipated Discharge Plan:   Home with family assist, OP PT and TF.  [facility] will provide the feeding and supplies.  Pt will provide his discharge place address.   RNCC will cont to follow plan of  care.      Aishwarya Sheridan RN, PHN, BSN  4A and 4E/ ICU  Care Coordinator  Phone: [phone]  Pager: [phone]

## 2019-09-26 NOTE — PLAN OF CARE
Discharge Planner PT   Patient plan for discharge: anticipates discharge to his friend's home  Current status: Pt amb ~ 750 feet with CGA, minimal path deviation, no overt LOB. Pt tx supine->sit with SBA. Pt tx sit->stand with SBA.   Barriers to return to prior living situation: medical status, impaired balance, decreased strength  Recommendations for discharge: Home with Assist  Rationale for recommendations: Anticipate pt will be safe to discharge home when medically ready for discharge.        Entered by: Chelo Curtis 09/26/2019 12:29 PM

## 2019-09-26 NOTE — OP NOTE
Procedure Date: 09/24/2019      SURGEON:  Jose Roberts MD   RESIDENT SURGEONS:     1.  Isauro Vega MD   2.  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   PROCEDURES PERFORMED:   1.  Total maxillectomy with orbital exenteration.   2.  Exploration of right neck for procurement of vessels appropriate for microvascular anastomosis.   INDICATIONS FOR PROCEDURE:  Eduardo Rubio is a 58-year-old man with recently diagnosed maxillary sinus tumor.  This is consistent with papillary squamous cell carcinoma.  Staging PET CT was without any evidence of regional or distal metastases.  He therefore presents for the above-stated procedure.  Orbital exenteration was indicated given the invasion of the tumor into the inferior rectus muscle.  Consent was obtained after explanation of the risks, benefits and alternatives.   FINDINGS:     1.  The right maxilla and orbital contents were excised en bloc with the exception of the posterior maxillary sinus wall which was sent to the mental [facility].   2.  Intraoperative frozen sections were notable for a suspicious area of the middle turbinate as well as the vidian nerve.  The vidian nerve was biopsied at its foramen and the frozen section indicated that this was likely positive as well.   3.  The facial artery and vein were identified and level IB then prepared for a free tissue transfer.   ANESTHESIA:  General endotracheal.   ESTIMATED BLOOD LOSS:  1000 mL for our portion.   CONDITION:  The patient is to remain in the operating room to undergo reconstruction of defect with a latissimus free flap reconstruction by Dr. Pulliam.   DESCRIPTION OF PROCEDURE:  The patient was met in the preoperative area, where informed consent was obtained.  He was then brought back to the operating room and transferred to the operating table, and laid in the  supine position.  General anesthesia was induced and he was intubated with a wire reinforced endotracheal tube.  This was sutured into the retromolar trigone and the hard palate.  Head of bed was rotated 180 degrees.  The patient was adequately positioned such that he would be able to be placed into a lateral position for free flap harvest following the ablation.  A Isaac Murillo incision was marked and infiltrated with 1% lidocaine with epinephrine.  The head, neck, chest, and right aspect of the patient's body were prepped and draped in the usual fashion.  The institutional timeout was performed to verify the correct patient, procedure and site, and all present were in agreement.     We began by incising the lateral rhinotomy and lip splitting incisions with a #15 blade.  These were carried down to the subcutaneous tissues.  Given the close proximity of tumor to the skin, we carefully dissected laterally over the malar cheek area.  Fortunately, there was no evidence of tumor invasion into the skin and we were able to save this.  Supra and subciliary incisions were fashioned out to the lateral canthus and dissection was established superficial to the orbital septum.  The lip split was then completed.  Our mucosal incision extended into the gingival buccal sulcus of the maxilla.  This was carried posterior to the maxillary tuberosity and brought anteriorly to the midline hard palate.  The orbital contents were dissected out in a subperiosteal fashion.  Having completed the soft tissue dissection, we then proceeded with the bony osteotomies.  After osteotomies were completed at the zygomatic arch and zygomaticofrontal suture line with a sagittal saw.  The medial orbit and nasal maxillary buttress osteotomies were then completed in a similar fashion. The sagittal osteotomy through the midline palate was completed with the reciprocating saw.  Finally, the posterior osteotomy through the pterygoid plates was completed  with a combination of osteotomes, maxillectomy scissors, and a right angled sagittal saw.  The specimen was downfractured and remaining soft tissue attachments were transected.  The internal maxillary artery was transected and this maneuver identified clips.  The specimen was then handed off the field.  Frozen sections were taken, most notably at the maxillary nerves, pterygopalatine ganglion, median nerve, posterior maxillary sinus wall, and the inferior rectus muscle.   Additional incisions re-resected at the middle turbinate given the concern for atypical cells.  There was a positive margin at the vidian nerve, which was taken at its exit from the vidian canal.  Additional resection in this canal was not possible. Hemostasis was then confirmed.  The oral cavity and orbit were packed.   Attention was then turned to the right neck.  The facial notch was palpated.  A transverse cervical incision was made approximately 2 fingerbreadths beneath the mandible.  Skin was incised sharply with a #15 blade and carried down to the subcutaneous tissues.  The cyst was identified and divided.  The marginal mandibular nerve was identified coursing over the submandibular gland and the superficial investing fascia.  The facial vessels were then identified, isolated, and ligated.  The patient then remained in the operating room to undergo reconstruction of the defect to the microvascular free tissue transfer.  That portion of the procedure will be dictated under a separate cover by Dr. Pulliam.   Dr. Jose Haddad was present for all portions of the procedure.         JOSE HADDAD MD       As dictated by TENNILLE KLEIN MD       I was present for the entire procedure  Jose Haddad M.D.  I was present for the entire procedure  Jose Haddad M.D.       D: 2019   T: 2019   MT: LAWSON      Name:     CLYDE WEST   MRN:      [id number]        Account:        [id number]   :      1960            Procedure Date: 09/24/2019      Document: [id number]

## 2019-09-26 NOTE — CONSULTS
Met with Eduardo at his bedside for NG and bolus TF instructions. Patient was attentive and asked appropriate questions. He was able to properly demonstrate how to flush his NG tube and how to set up a bolus TF with a gravity bag. Eduardo would like to practice flushing his NG and administering his meds with his nurses.    Literature given: Handwashing and Skin Care, NG Tube Cares at home, Tube Feeding at Home-Bolus Method Using Syringe and Plunger, and Tube Feedings at Home-[place] Method.

## 2019-09-26 NOTE — PROGRESS NOTES
0925: Patient requested backpack, opened. Pills bottles found: green pill bottle containing 8 pills of Percocet (4 of which were in half portions), orange bottle containing 4 pills of Cyclobenzaprine. Patient did not confirm or deny taking any of the pills. When asked, stated that takes at home to counteract diarrhea from antibiotics but did not confirm or deny taking while here. Security called to lock up medications, patient states understanding. Would not allow RN to look through rest of back pack to inventory other belongings.  0950: SICU notified. RN and Nurse Manager searched patient's backpack with patient's permission. 10 Nicotine patches found, removed to be sent to security (patient gave permission).  Other belongings found in backpack: dentures, toothpaste and toothbrush, shampoo and conditioner, hair balm, dentures cleanser, white t-shirts, 2 wash cloths, cell phone X2.

## 2019-09-26 NOTE — PLAN OF CARE
Cardiac: Sinus rhythm. BP 110s/60s. Afebrile.      Pulm: Lungs clear. Room air with humidity applied for flap, per ENT.      Neuro: Intact. Pain managed with PRN tylenol and oxy. Found home meds (Percocet and Flexiril) in patient's backpack around 0900. Sent with security, team notified. Patient did not confirm or deny if he had taken any of these meds. Will continue to monitor.      GI: Passing gas, BM this evening.   NG sutured in left nostril. TF at 45mL/hr. Next rate change at 0000 to 55. Goal rate 65.     : Voids in urinal.     Skin: Right flank incision w/ sutures open to air, small amount of serosang drainage. 2 JPs on right, minimal serosang output. Right neck penrose open, no drainage this shift.   Thin serosang drainage from right nostril.      LDAs:  2 PIV  NG @ 70 in left nostril      Jesi Daly RN on 9/26/2019 at 5:40 PM

## 2019-09-26 NOTE — PROGRESS NOTES
HISTORY OF PRESENT ILLNESS:  Mr. Rubio is a patient who has been diagnosed with a right-sided maxillary squamous cell carcinoma.  He has previously been reviewed here at our Tumor Board and he appears to have a tumor that is slightly more superiorly located with destruction of medial maxillary wall and the orbital floor with involvement of the inferior rectus muscle.  The patient himself has noted double vision for some time.  He is unsure about the quality of his vision, but does note numbness in his entire infraorbital cheek and upper lip with some burning sensation.  He currently prefers to keep the eye covered as it reduces the sensation of double vision.  He has not noticed any masses in his mouth.

## 2019-09-26 NOTE — PROGRESS NOTES
Therapy: Enteral  Insurance: [place] and [place]  Ded:$3.20 monthly ([place])  100% coverage  Patient meets Medicare criteria for enteral TF    In reference to admission date 9/24/19 [facility] 4AB to check enteral TF coverage.    Please contact Intake with any questions, [phone] or In Basket pool, [facility] ([phone]).

## 2019-09-27 ENCOUNTER — APPOINTMENT (OUTPATIENT)
Dept: PHYSICAL THERAPY | Facility: CLINIC | Age: 59
DRG: 131 | End: 2019-09-27
Attending: OTOLARYNGOLOGY
Payer: MEDICARE

## 2019-09-27 LAB
ANION GAP SERPL CALCULATED.3IONS-SCNC: 4 MMOL/L (ref 3–14)
BUN SERPL-MCNC: 13 MG/DL (ref 7–30)
CALCIUM SERPL-MCNC: 8.2 MG/DL (ref 8.5–10.1)
CHLORIDE SERPL-SCNC: 102 MMOL/L (ref 94–109)
CO2 SERPL-SCNC: 31 MMOL/L (ref 20–32)
CREAT SERPL-MCNC: 0.64 MG/DL (ref 0.66–1.25)
ERYTHROCYTE [DISTWIDTH] IN BLOOD BY AUTOMATED COUNT: 13.9 % (ref 10–15)
GFR SERPL CREATININE-BSD FRML MDRD: >90 ML/MIN/{1.73_M2}
GLUCOSE BLDC GLUCOMTR-MCNC: 102 MG/DL (ref 70–99)
GLUCOSE BLDC GLUCOMTR-MCNC: 102 MG/DL (ref 70–99)
GLUCOSE BLDC GLUCOMTR-MCNC: 103 MG/DL (ref 70–99)
GLUCOSE BLDC GLUCOMTR-MCNC: 105 MG/DL (ref 70–99)
GLUCOSE BLDC GLUCOMTR-MCNC: 107 MG/DL (ref 70–99)
GLUCOSE BLDC GLUCOMTR-MCNC: 116 MG/DL (ref 70–99)
GLUCOSE SERPL-MCNC: 98 MG/DL (ref 70–99)
HCT VFR BLD AUTO: 23.5 % (ref 40–53)
HGB BLD-MCNC: 7.6 G/DL (ref 13.3–17.7)
MAGNESIUM SERPL-MCNC: 2 MG/DL (ref 1.6–2.3)
MCH RBC QN AUTO: 31.8 PG (ref 26.5–33)
MCHC RBC AUTO-ENTMCNC: 32.3 G/DL (ref 31.5–36.5)
MCV RBC AUTO: 98 FL (ref 78–100)
PHOSPHATE SERPL-MCNC: 4 MG/DL (ref 2.5–4.5)
PLATELET # BLD AUTO: 228 10E9/L (ref 150–450)
POTASSIUM SERPL-SCNC: 3.7 MMOL/L (ref 3.4–5.3)
RBC # BLD AUTO: 2.39 10E12/L (ref 4.4–5.9)
SODIUM SERPL-SCNC: 138 MMOL/L (ref 133–144)
WBC # BLD AUTO: 8.5 10E9/L (ref 4–11)

## 2019-09-27 PROCEDURE — 12000001 ZZH R&B MED SURG/OB UMMC

## 2019-09-27 PROCEDURE — 27210429 ZZH NUTRITION PRODUCT INTERMEDIATE LITER

## 2019-09-27 PROCEDURE — 85027 COMPLETE CBC AUTOMATED: CPT | Performed by: STUDENT IN AN ORGANIZED HEALTH CARE EDUCATION/TRAINING PROGRAM

## 2019-09-27 PROCEDURE — 80048 BASIC METABOLIC PNL TOTAL CA: CPT | Performed by: STUDENT IN AN ORGANIZED HEALTH CARE EDUCATION/TRAINING PROGRAM

## 2019-09-27 PROCEDURE — 83735 ASSAY OF MAGNESIUM: CPT | Performed by: STUDENT IN AN ORGANIZED HEALTH CARE EDUCATION/TRAINING PROGRAM

## 2019-09-27 PROCEDURE — 36415 COLL VENOUS BLD VENIPUNCTURE: CPT | Performed by: STUDENT IN AN ORGANIZED HEALTH CARE EDUCATION/TRAINING PROGRAM

## 2019-09-27 PROCEDURE — 84100 ASSAY OF PHOSPHORUS: CPT | Performed by: STUDENT IN AN ORGANIZED HEALTH CARE EDUCATION/TRAINING PROGRAM

## 2019-09-27 PROCEDURE — 00000146 ZZHCL STATISTIC GLUCOSE BY METER IP

## 2019-09-27 PROCEDURE — 25000132 ZZH RX MED GY IP 250 OP 250 PS 637: Mod: GY | Performed by: PHYSICIAN ASSISTANT

## 2019-09-27 PROCEDURE — 25000128 H RX IP 250 OP 636: Performed by: STUDENT IN AN ORGANIZED HEALTH CARE EDUCATION/TRAINING PROGRAM

## 2019-09-27 PROCEDURE — 25000125 ZZHC RX 250: Performed by: STUDENT IN AN ORGANIZED HEALTH CARE EDUCATION/TRAINING PROGRAM

## 2019-09-27 PROCEDURE — 40000894 ZZH STATISTIC OT IP EVAL DEFER: Performed by: OCCUPATIONAL THERAPIST

## 2019-09-27 PROCEDURE — 97116 GAIT TRAINING THERAPY: CPT | Mod: GP | Performed by: PHYSICAL THERAPIST

## 2019-09-27 PROCEDURE — 97530 THERAPEUTIC ACTIVITIES: CPT | Mod: GP | Performed by: PHYSICAL THERAPIST

## 2019-09-27 PROCEDURE — 25000132 ZZH RX MED GY IP 250 OP 250 PS 637: Mod: GY | Performed by: STUDENT IN AN ORGANIZED HEALTH CARE EDUCATION/TRAINING PROGRAM

## 2019-09-27 PROCEDURE — 99233 SBSQ HOSP IP/OBS HIGH 50: CPT | Performed by: NURSE PRACTITIONER

## 2019-09-27 RX ADMIN — OXYCODONE HYDROCHLORIDE 10 MG: 5 SOLUTION ORAL at 08:34

## 2019-09-27 RX ADMIN — GABAPENTIN 100 MG: 250 SUSPENSION ORAL at 20:04

## 2019-09-27 RX ADMIN — ENOXAPARIN SODIUM 40 MG: 40 INJECTION SUBCUTANEOUS at 17:05

## 2019-09-27 RX ADMIN — CHLORHEXIDINE GLUCONATE 0.12% ORAL RINSE 15 ML: 1.2 LIQUID ORAL at 20:04

## 2019-09-27 RX ADMIN — POTASSIUM CHLORIDE 20 MEQ: 1.5 POWDER, FOR SOLUTION ORAL at 06:37

## 2019-09-27 RX ADMIN — ACETAMINOPHEN 650 MG: 325 TABLET, FILM COATED ORAL at 14:09

## 2019-09-27 RX ADMIN — Medication 2 G: at 06:34

## 2019-09-27 RX ADMIN — CYCLOBENZAPRINE HYDROCHLORIDE 5 MG: 5 TABLET, FILM COATED ORAL at 17:06

## 2019-09-27 RX ADMIN — ACETAMINOPHEN 650 MG: 325 TABLET, FILM COATED ORAL at 04:44

## 2019-09-27 RX ADMIN — CHLORHEXIDINE GLUCONATE 0.12% ORAL RINSE 15 ML: 1.2 LIQUID ORAL at 08:34

## 2019-09-27 RX ADMIN — SENNOSIDES AND DOCUSATE SODIUM 2 TABLET: 8.6; 5 TABLET ORAL at 20:04

## 2019-09-27 RX ADMIN — ASPIRIN 325 MG ORAL TABLET 325 MG: 325 PILL ORAL at 08:34

## 2019-09-27 RX ADMIN — CHLORHEXIDINE GLUCONATE 0.12% ORAL RINSE 15 ML: 1.2 LIQUID ORAL at 11:42

## 2019-09-27 RX ADMIN — OXYCODONE HYDROCHLORIDE 10 MG: 5 SOLUTION ORAL at 00:33

## 2019-09-27 RX ADMIN — OXYCODONE HYDROCHLORIDE 10 MG: 5 SOLUTION ORAL at 04:44

## 2019-09-27 RX ADMIN — CHLORHEXIDINE GLUCONATE 0.12% ORAL RINSE 15 ML: 1.2 LIQUID ORAL at 17:05

## 2019-09-27 RX ADMIN — SENNOSIDES AND DOCUSATE SODIUM 2 TABLET: 8.6; 5 TABLET ORAL at 08:34

## 2019-09-27 RX ADMIN — OXYCODONE HYDROCHLORIDE 10 MG: 5 SOLUTION ORAL at 14:09

## 2019-09-27 RX ADMIN — ACETAMINOPHEN 650 MG: 325 TABLET, FILM COATED ORAL at 00:33

## 2019-09-27 RX ADMIN — POLYETHYLENE GLYCOL 3350 17 G: 17 POWDER, FOR SOLUTION ORAL at 08:34

## 2019-09-27 RX ADMIN — OXYCODONE HYDROCHLORIDE 10 MG: 5 SOLUTION ORAL at 17:05

## 2019-09-27 RX ADMIN — Medication 2 PACKET: at 09:54

## 2019-09-27 RX ADMIN — MULTIVIT AND MINERALS-FERROUS GLUCONATE 9 MG IRON/15 ML ORAL LIQUID 15 ML: at 08:34

## 2019-09-27 RX ADMIN — OXYCODONE HYDROCHLORIDE 10 MG: 5 SOLUTION ORAL at 20:04

## 2019-09-27 ASSESSMENT — ACTIVITIES OF DAILY LIVING (ADL)
ADLS_ACUITY_SCORE: 13

## 2019-09-27 ASSESSMENT — PAIN DESCRIPTION - DESCRIPTORS
DESCRIPTORS: ACHING;SORE
DESCRIPTORS: ACHING;SORE
DESCRIPTORS: ACHING
DESCRIPTORS: ACHING;SORE

## 2019-09-27 ASSESSMENT — MIFFLIN-ST. JEOR: SCORE: 1598.13

## 2019-09-27 NOTE — PLAN OF CARE
OT 4AB: OT DEFER. Per chart review and discussion with interdisciplinary team and patient.  Pt. Is indep. with  BADLs and amb. with SBA without AD. No acute IP OT needs identified at this time. OT orders completed.

## 2019-09-27 NOTE — PROGRESS NOTES
Care Coordinator - Discharge Planning    Admission Date/Time:  9/24/2019  Attending MD:  Jose Roberts MD     Data  Chart reviewed, discussed with interdisciplinary team.   Patient was admitted for:   1. S/P flap graft    2. Squamous cell carcinoma         Assessment   Full assessment completed in previous note     Pt remain  in ICU for frequent flap check.  See yesterday's note, 9/26 regarding TF and supplies arrangement.  [facility] will provide the service.  Pt will be staying with his friend.  Pt provided his friend place address.  Alyson Nieto  [address]  [place] [address]  Pt phone # [phone]    Coordination of Care and Referrals: Provided patient/family with options for Home Infusion for TF      Plan  Anticipated Discharge Date:  TBD.  Anticipated Discharge Plan:  Home with family assist, OP PT and TF.  pt will be staying with his friend,  Alyson Nieto  [address]  [place] [address]  Pt phone # [phone].  [facility] will provide the feeding and supplies.  RNCC will cont to follow plan of care.      Aishwarya Sheridan RN, PHN, BSN  4A and 4E/ ICU  Care Coordinator  Phone: [phone]  Pager: [phone]

## 2019-09-27 NOTE — PLAN OF CARE
S: States pain adequately controlled with prns. X2 right eye socket flap cool to touch and harder to find arterial flow with doppler. Spontaneously warmed with stronger pulse. More swelling noted than previous night. ENT here 00 and aware. X1 c/o heartburn. Noted had scrunched down in bed. Resolved with repositioning. Tolerated subsequent increase in tube feeding. MIV adjusted so that TF rate + MIV = goal rate.  B: S/p flap x2.  A: Stable.  R: Monitor flap, replace K/Mg, increase TF to goal 0800 and saline lock IV.

## 2019-09-27 NOTE — PLAN OF CARE
PT 4A    Discharge Planner PT   Patient plan for discharge: friend's home  Current status: patient transferred OOB independently, ambulated 800' without AD and SBA.  Gait deviations present due to hx B toe amputations but no overt LOB.    Barriers to return to prior living situation: medical status  Recommendations for discharge: friends home/home  Rationale for recommendations: anticipate patient will be independent with functional mobility during expected length of acute stay.        Entered by: Casey Pardo 09/27/2019 11:48 AM

## 2019-09-27 NOTE — PLAN OF CARE
Neuro: AxOx4. Mild generalized weakness. Able to walk in hallway with SBA.    Cardiac: NSR. Normotensive and afebrile.    Resp: Clear lung sounds on room air.    GI/: TF now at goal 65mL/hr. BM this morning. Voiding without issue.     Skin: Right eye flap increased swelling throughout the day, with good arterial pulse, pale and warm to touch. Right neck flap warm to touch with strong pulse. Right flank C/D/I JPsx2 with small amount of drainage.    Plan: May transfer to floor after 2000 pending bed availability.

## 2019-09-27 NOTE — PROGRESS NOTES
"Otolaryngology Progress Note  September 27, 2019    S: No acute events overnight. Patient is doing well.  TF 55ml/hr.     O: /67   Pulse 67   Temp 98.4  F (36.9  C) (Axillary)   Resp 17   Ht 1.829 m (6' 0.01\")   Wt 74 kg (163 lb 2.3 oz)   SpO2 94%   BMI 22.12 kg/m      General: Alert and oriented x 3, No acute distress              HEENT: Right orbital aspect of flap pale, soft, warm , strong arterial Doppler signal at inferior suture. Right palatal intraoral flap warm, soft, with minimum dried crusting, without evidence of congestion, no hematoma or incision dehiscence. Neck flat with clean/dry/intact suture line. Right neck Penrose drain with serosanguinous output. Right neck Implantable Doppler with strong arterial signal.              MSK: Right back with 2 MARQUEZ drains with serosang drainage, no ecchymosis              Pulmonary: Breathing non-labored, no stridor, no accessory muscle use.       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     Neuro:  - Pain control: PRN tylenol,  IV Dilaudid, oxy,   - Gabapentin at bedtime     HEENT:  - Incision cares: clean with 0.9% sodium chloride and apply Aquaphor Q8H   - Peridex TID   - Humidified mask tent. Do not secure around neck but place on chest angled towards mouth. To prevent crusting of oral hard palate flap.   - Monitor and record MARQUEZ drain output Qshift  - Nurse Flap Check:      "         - Q1H x POD 0-3              -  Q4H POD 4-6              - Q8H POD 6+   - ENT Flap Check: Q4H 24hrs, Q6H x 48hrs, Q8H x 48hrs, Q12hrs   - 3 doses decadron post-op      Respiratory:  - PET 9/9/19 7mm nodule right upper lobe with mild FDG avidity  - supplemental O2 PRN to keep sats >92%     CV:  - hemodynamically stable. MAP > 65  - Avoid pressors and fluid boluses, contact ENT if needed to maintain MAPs  - ASA daily     FEN/GI:  - Gastric ulcer 2015 (status post abdominal surgery, does not know details)   - NPO no exceptions  - NGT left nare in place okay for meds  - Scheduled zofran q6h  - Bowel regimen: PRN Dulcolax , Miralax/Glycolax, senna      :  - Catheter in place can be discontinued      Endo  - Post-op Decadron 10mg q8H (Completed)     ID/Heme:  -Perioperative abx Unasyn x48 hours (Complete)  - Pre-op Hgb 13. Post op 7.8               - Monitor. Transfuse if  < 7      PPX:  - Lovenox   - SCDs  - IS     Dispo: Surgical ICU     -- Patient and above plan discussed with Dr. Jose cohn.     Sommer Sandoval MD PGY1   Otolaryngology-Head & Neck Surgery  Please contact ENT with questions by dialing [phone] and entering job code [phone] when prompted.

## 2019-09-27 NOTE — PROGRESS NOTES
SURGICAL ICU PROGRESS NOTE  September 26, 2019      ASSESSMENT: Eduardo Rubio is a 58 year old man with PMH tobacco use, toe amputation, LBP, gastric ulcer, and T4N0 SCC of the right maxillary sinus s/p Latissimus myocutaneous free flap reconstruction with microvascular anastomosis with local advancement flaps on 9/24/19 with Dr. Pulliam.    CHANGES TODAY:   - Continue tube feeds at goal   - Continue frequent flap checks  - TTF after 72 hours at 2000    PLAN:  Neuro/ pain/ sedation:  #Acute post-op pain  - prn IV Dilaudid, prn oxy, and prn tylenol for pain.  - add prn muscle relaxant   - Gabapentin 100qhs     HEENT:  # S/p maxillary sinus s/p latissimus free flap with ENT 9/24/19 due to SCC of right maxillary sinus   - doppler monitoring of flaps, q 1 hr checks   - Wound cares and drain management per ENT   - Decadron per ENT x 3 doses.  Unasyn x48 hour (perioperative abx)  - Avoid straps across face and neck per ENT      Pulmonary care:   # no active issues   - PET 9/9/19: 1. Single 7 mm nodule in the right upper lobe that demonstrates mild FDG avidity.   - Supplemental oxygen with humifity to keep saturation above 92 %.     Cardiovascular:    # no issues   - 8/21/2019: stress echocardiogram without contrast normal   - Monitor hemodynamic status. MAP goal >65  - Avoid pressors and fluid boluses, contact ENT if needed. None required thus far.   - ASA  325mg daily     GI care:   # h/o gastric ulcer ~2015 (status post abdominal surgery, does not know details)   - scheduled zofran q6h x48 hours per ENT completed  - NGT in place for tube feed. RD following.    Fluids/ Electrolytes/ Nutrition:   #Dysphagia  - RD following.   - gastric TF at goal, tolerating well.   - TKO IVF   - ICU electrolyte replacement protocol in place      Renal/ Fluid Balance:    # no active issues  - Will continue to monitor intake and output.  - No sonia      Endocrine:    # stress hyperglycemia  - Monitor for steroid-induced hyperglycemia  -  Decadron 10mg q8h PER ENT completed  - Blood glucose checks q4h. Med sliding scale insulin available. Currently euglycemic.       ID/ Antibiotics:  # leukocytosis resolved  - reactive due to surgery, perioperative abx Unasyn x48 hours total       Heme:     # Acute blood loss anemia (EBL 1.3L)  - Monitor hgb, transfuse if hgb <7.0 or active bleeding   - hemoglobin stable at 7.6       MSK:   # weakness and deconditioning of critical illness   - PT and OT consulted. Appreciate recs.  - OOB today ,ambulate halls      General cares and Prophylaxis:    - Mechanical prophylaxis for DVT  - Lovenox      Lines/ tubes/ drains:  - NGT  - Right neck penrose drank  - MARQUEZ x2 on right flank        Disposition:  - Surgical ICU for flap checks x 72 hours. Transfer to floor at 2000     [facility]    ====================================  SUBJECTIVE:  Course reviewed. No acute events overnight. Pt reports pain controlled with current regimen.  Denies nausea, chest pain, abdominal pain, dyspnea. Passing gas.     OBJECTIVE:     1. VITAL SIGNS:   Temp:  [98.2  F (36.8  C)-98.8  F (37.1  C)] 98.5  F (36.9  C)  Pulse:  [59-69] 67  Heart Rate:  [58-73] 68  Resp:  [7-23] 15  BP: (100-123)/(62-75) 118/67  SpO2:  [92 %-99 %] 97 %    2. INTAKE/ OUTPUT:   I/O last 3 completed shifts:  In: 2835 [I.V.:850; NG/GT:980]  Out: 875 [Urine:725; Drains:150]    3. PHYSICAL EXAMINATION:   General: sitting up in bed, awake, alert and interactive   HEENT: Right sided facial swelling. Right eye flap (+) doppler, incision intact, edges well approximated. NG in place and sutured. OP clear. Penrose drain in right neck.   Neuro: A&Ox3, NAD. LOAIZA.   Resp: Breathing non-labored. BBS, on room air.   CV: RRR, S1, S2   Chest: MARQUEZ drains x2 with pink serous fluid in MARQUEZ bulbs. No TTP.   Abdomen: Soft, Non-distended, Non-tender.   Extremities: warm and well perfused, calves soft and compressible. Pulses 2+.     4. INVESTIGATIONS:   Arterial Blood Gases   Recent Labs    Lab 09/24/19  1854 09/24/19  1605 09/24/19  1339 09/24/19  1129   PH 7.36 7.38 7.37 7.34*   PCO2 50* 47* 47* 47*   PO2 87 84 101 81   HCO3 28 28 27 25     Complete Blood Count   Recent Labs   Lab 09/27/19  0455 09/26/19  0424 09/25/19  0340 09/24/19  2213   WBC 8.5 17.5* 14.9* 13.4*   HGB 7.6* 7.4* 7.8* 8.2*    199 204 199     Basic Metabolic Panel  Recent Labs   Lab 09/27/19  0455 09/26/19  0424 09/25/19  0340 09/24/19  2213    136 132* 134   POTASSIUM 3.7 3.8 4.0 4.1   CHLORIDE 102 100 99 99   CO2 31 28 26 26   BUN 13 10 8 8   CR 0.64* 0.56* 0.52* 0.49*   GLC 98 148* 144* 159*     Liver Function Tests  Recent Labs   Lab 09/25/19  0340   ALBUMIN 3.3*       =========================================

## 2019-09-27 NOTE — PROGRESS NOTES
"ENT Free Flap Check  September 27, 2019, 0:00- In person    S: Nursing reports that the skin paddle was a little cool earlier but now is warm and doppler has always been found, skin never pale    O: /75   Pulse 69   Temp 98.3  F (36.8  C) (Axillary)   Resp 17   Ht 1.829 m (6' 0.01\")   Wt 76.7 kg (169 lb 1.5 oz)   SpO2 96%   BMI 22.93 kg/m    General: A&O x3, NAD  HEENT: Flap appears viable, warm, soft, without evidence of congestion at both eye site and intraoral. Strong implantable doppler signal, strong and easily dopplered with hand held. Incisions lines clean, dry, intact. Neck is soft and flat without signs of hematoma.  Pulmonary: breathing comfortably on room air  Extremities: right flank is without evidence of hematoma    A/P: Stable flap  -- continue current plan of care    Leila Saeed MD PGY4  Otolaryngology-Head & Neck Surgery  To contact ENT please dial [phone] and enter job code [phone].    "

## 2019-09-28 LAB
BLD PROD TYP BPU: NORMAL
BLD UNIT ID BPU: 0
BLOOD PRODUCT CODE: NORMAL
BPU ID: NORMAL
ERYTHROCYTE [DISTWIDTH] IN BLOOD BY AUTOMATED COUNT: 13.6 % (ref 10–15)
GLUCOSE BLDC GLUCOMTR-MCNC: 106 MG/DL (ref 70–99)
GLUCOSE BLDC GLUCOMTR-MCNC: 107 MG/DL (ref 70–99)
GLUCOSE BLDC GLUCOMTR-MCNC: 108 MG/DL (ref 70–99)
GLUCOSE BLDC GLUCOMTR-MCNC: 117 MG/DL (ref 70–99)
GLUCOSE BLDC GLUCOMTR-MCNC: 125 MG/DL (ref 70–99)
GLUCOSE BLDC GLUCOMTR-MCNC: 95 MG/DL (ref 70–99)
HCT VFR BLD AUTO: 24.1 % (ref 40–53)
HGB BLD-MCNC: 7.8 G/DL (ref 13.3–17.7)
MAGNESIUM SERPL-MCNC: 2.3 MG/DL (ref 1.6–2.3)
MCH RBC QN AUTO: 32 PG (ref 26.5–33)
MCHC RBC AUTO-ENTMCNC: 32.4 G/DL (ref 31.5–36.5)
MCV RBC AUTO: 99 FL (ref 78–100)
PHOSPHATE SERPL-MCNC: 5.1 MG/DL (ref 2.5–4.5)
PLATELET # BLD AUTO: 265 10E9/L (ref 150–450)
RBC # BLD AUTO: 2.44 10E12/L (ref 4.4–5.9)
TRANSFUSION STATUS PATIENT QL: NORMAL
WBC # BLD AUTO: 6.7 10E9/L (ref 4–11)

## 2019-09-28 PROCEDURE — 25000128 H RX IP 250 OP 636: Performed by: PHYSICIAN ASSISTANT

## 2019-09-28 PROCEDURE — 36415 COLL VENOUS BLD VENIPUNCTURE: CPT | Performed by: PHYSICIAN ASSISTANT

## 2019-09-28 PROCEDURE — 25000132 ZZH RX MED GY IP 250 OP 250 PS 637: Mod: GY | Performed by: PHYSICIAN ASSISTANT

## 2019-09-28 PROCEDURE — 12000001 ZZH R&B MED SURG/OB UMMC

## 2019-09-28 PROCEDURE — 83735 ASSAY OF MAGNESIUM: CPT | Performed by: PHYSICIAN ASSISTANT

## 2019-09-28 PROCEDURE — 85027 COMPLETE CBC AUTOMATED: CPT | Performed by: PHYSICIAN ASSISTANT

## 2019-09-28 PROCEDURE — 00000146 ZZHCL STATISTIC GLUCOSE BY METER IP

## 2019-09-28 PROCEDURE — 84100 ASSAY OF PHOSPHORUS: CPT | Performed by: PHYSICIAN ASSISTANT

## 2019-09-28 PROCEDURE — 27210429 ZZH NUTRITION PRODUCT INTERMEDIATE LITER

## 2019-09-28 RX ADMIN — CYCLOBENZAPRINE HYDROCHLORIDE 5 MG: 5 TABLET, FILM COATED ORAL at 07:59

## 2019-09-28 RX ADMIN — CYCLOBENZAPRINE HYDROCHLORIDE 5 MG: 5 TABLET, FILM COATED ORAL at 18:44

## 2019-09-28 RX ADMIN — OXYCODONE HYDROCHLORIDE 10 MG: 5 SOLUTION ORAL at 04:10

## 2019-09-28 RX ADMIN — ASPIRIN 325 MG ORAL TABLET 325 MG: 325 PILL ORAL at 08:08

## 2019-09-28 RX ADMIN — CHLORHEXIDINE GLUCONATE 0.12% ORAL RINSE 15 ML: 1.2 LIQUID ORAL at 12:05

## 2019-09-28 RX ADMIN — ACETAMINOPHEN 650 MG: 325 TABLET, FILM COATED ORAL at 00:17

## 2019-09-28 RX ADMIN — POLYETHYLENE GLYCOL 3350 17 G: 17 POWDER, FOR SOLUTION ORAL at 08:07

## 2019-09-28 RX ADMIN — OXYCODONE HYDROCHLORIDE 10 MG: 5 SOLUTION ORAL at 15:30

## 2019-09-28 RX ADMIN — OXYCODONE HYDROCHLORIDE 10 MG: 5 SOLUTION ORAL at 07:59

## 2019-09-28 RX ADMIN — OXYCODONE HYDROCHLORIDE 10 MG: 5 SOLUTION ORAL at 22:29

## 2019-09-28 RX ADMIN — CHLORHEXIDINE GLUCONATE 0.12% ORAL RINSE 15 ML: 1.2 LIQUID ORAL at 08:08

## 2019-09-28 RX ADMIN — CHLORHEXIDINE GLUCONATE 0.12% ORAL RINSE 15 ML: 1.2 LIQUID ORAL at 15:30

## 2019-09-28 RX ADMIN — ENOXAPARIN SODIUM 40 MG: 40 INJECTION SUBCUTANEOUS at 18:48

## 2019-09-28 RX ADMIN — ACETAMINOPHEN 650 MG: 325 TABLET, FILM COATED ORAL at 04:10

## 2019-09-28 RX ADMIN — Medication 2 PACKET: at 12:05

## 2019-09-28 RX ADMIN — OXYCODONE HYDROCHLORIDE 10 MG: 5 SOLUTION ORAL at 12:05

## 2019-09-28 RX ADMIN — OXYCODONE HYDROCHLORIDE 10 MG: 5 SOLUTION ORAL at 00:17

## 2019-09-28 RX ADMIN — POLYETHYLENE GLYCOL 3350 17 G: 17 POWDER, FOR SOLUTION ORAL at 15:47

## 2019-09-28 RX ADMIN — SENNOSIDES AND DOCUSATE SODIUM 2 TABLET: 8.6; 5 TABLET ORAL at 08:08

## 2019-09-28 RX ADMIN — SENNOSIDES AND DOCUSATE SODIUM 2 TABLET: 8.6; 5 TABLET ORAL at 20:52

## 2019-09-28 RX ADMIN — CHLORHEXIDINE GLUCONATE 0.12% ORAL RINSE 15 ML: 1.2 LIQUID ORAL at 20:52

## 2019-09-28 RX ADMIN — GABAPENTIN 100 MG: 250 SUSPENSION ORAL at 20:52

## 2019-09-28 RX ADMIN — OXYCODONE HYDROCHLORIDE 10 MG: 5 SOLUTION ORAL at 18:44

## 2019-09-28 RX ADMIN — MULTIVIT AND MINERALS-FERROUS GLUCONATE 9 MG IRON/15 ML ORAL LIQUID 15 ML: at 08:08

## 2019-09-28 ASSESSMENT — ACTIVITIES OF DAILY LIVING (ADL)
ADLS_ACUITY_SCORE: 13
ADLS_ACUITY_SCORE: 11
ADLS_ACUITY_SCORE: 13

## 2019-09-28 ASSESSMENT — MIFFLIN-ST. JEOR: SCORE: 1630.15

## 2019-09-28 NOTE — PLAN OF CARE
Status: POD#4 R maxillectomy and orbital exenteration with right latissimus dorsi free flap reconstruction   Vitals: VSS on RA  Neuros: Pt is A&Ox4. AMBROCIO R eye. Facial asymmetry d/t R facial swelling    IV: PIV SL  Resp/trach: LS clear. Humidified face tent on per orders, pt removing independently   Diet: NPO. TF via NG at goal of 65ml/hr  Bowel status: Last BM 9/27  : Voiding spontanoeusly   Skin: R orbital flap pale, soft and warm with + hand held Doppler (difficulty getting pulse earlier in shift, see note). R intraoral flap warm and soft. Strong implantable continuous Doppler. R neck incision intact with sutures. R neck penrose drain; minimal output. R back/flank incision intact with sutures. All incisions cleansed with NS and aquaphor applied. MARQUEZ x2 to R back/flank with serosang output.     Pain: Pain managed with PRN Tylenol and Oxycodone   Activity: Up with SBA, steady on feet  Social: No family or visitors present  Plan: Continue to monitor and follow POC

## 2019-09-28 NOTE — PLAN OF CARE
Status: POD#4 R maxillectomy and orbital exenteration with right latissimus dorsi free flap reconstruction  VS: VSS on RA  Neuros: A&Ox4. R facial numbness d/t surgery; swelling on R side of face  GI: NPO; transitioning to bolus feeds; tolerating well. Next bolus due at 1930 (1 can). Pt performing med/bolus admin w/RN supervision. No BM today, passing gas  : Voiding w/out difficulty  IV: PIV SL  Activity: Up indpendently  Pain: Incisional pain managed well w/oxycodone and flexeril  Skin:R orbital flap pale, soft and warm, Doppler +. R intraoral flap warm and soft. Implantable continuous Doppler +. R neck incision intact with sutures. R neck penrose drain, no output this shift. R back/flank incision intact with sutures. Incisional care complete. MARQUEZ x2 to R back/flank with serosang output.  Social: Family at bedside, supportive  Plan of care: Continue to increase bolus feeds as tolerated. Continue to encourage independence w/jazmin, TF PLC completed. Possible discharge Monday/Tuesday

## 2019-09-28 NOTE — PROVIDER NOTIFICATION
RN in room to perform assessment and complete q2hr flap checks. Attempted doppler check to R eye for 30 mins but unable to get a pulse. Also tried 2 different dopplers. Continuous doppler strong. ENT Resident Tay phone paged and txt paged. No response yet.      * ENT Tay came bedside to assess flap with RN. Doppler check was successful and pulse heard. Stated to check again at 0400. Stated the team would be around by 0600

## 2019-09-28 NOTE — PROGRESS NOTES
"Otolaryngology Progress Note     S: No acute events overnight. Patient is doing well.       O: /68 (BP Location: Left arm)   Pulse 70   Temp 97.9  F (36.6  C) (Axillary)   Resp 14   Ht 1.829 m (6' 0.01\")   Wt 77.2 kg (170 lb 3.2 oz)   SpO2 94%   BMI 23.08 kg/m                 General: Alert and oriented x 3, No acute distress              HEENT: Right orbital aspect of flap pale, soft, warm , strong arterial Doppler signal at inferior suture. Right palatal intraoral flap warm, soft, with minimum dried crusting, without evidence of congestion, no hematoma or incision dehiscence. Neck flat with clean/dry/intact suture line. Right neck Penrose drain removed on rounds. Right neck Implantable Doppler with strong arterial signal.              MSK: Right back with 2 MARQUEZ drains with serosang drainage, no ecchymosis              Pulmonary: Breathing non-labored, no stridor, no accessory muscle use.         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     Neuro:  - Pain: tylenol, oxycodone. Taking tylenol 650 x3 yesterday and 10 mg q4h oxy consistently  - Gabapentin at bedtime     HEENT:  - Incision cares: clean with 0.9% sodium chloride and apply Aquaphor Q8H   - Peridex TID   - Humidified mask tent. Do not secure around neck but place on chest angled towards mouth. To prevent crusting of oral hard palate flap.   - Monitor and record MARQUEZ drain output Qshift  - Nurse Flap Check:              -  Q4H POD 4-6              - Q8H POD 6+  - 3 doses " decadron post-op      Respiratory:  - PET 9/9/19 7mm nodule right upper lobe with mild FDG avidity  - supplemental O2 PRN to keep sats >92%     CV:  - hemodynamically stable  - ASA daily     FEN/GI:  - Gastric ulcer 2015 (status post abdominal surgery, does not know details)   - NPO no exceptions  - NGT left nare in place okay for meds  - Scheduled zofran q6h  - Bowel regimen: PRN Dulcolax, Miralax/Glycolax, senna   - Bolus tube feeds today  + 2 pkts Prosource      : Catheter removed, voiding spontaneously.     Endo  - Post-op Decadron 10mg q8H (Completed)     ID/Heme:  -Perioperative abx Unasyn x48 hours (Complete)  - Pre-op Hgb 13. Post op 7.8               - Monitor. Transfuse if  < 7      PPX:  - Lovenox   - SCDs  - IS     Dispo: Pending clinical course     -- Patient and above plan discussed with Dr. Jose cohn.

## 2019-09-28 NOTE — PLAN OF CARE
Per Mobility Level Guideline;  Bed/chair alarm Yes.  Patient may ambulate stand by assist  Patient requires the following assistive equipment: None   PT/OT consult orders in place Yes

## 2019-09-28 NOTE — PLAN OF CARE
Status: POD#4 R maxillectomy and orbital exenteration with right latissimus dorsi free flap reconstruction   Vitals: VSS on RA  Neuros: AO x 4, facial asymmetry, R side periorbital edema d/t surgery.   IV: PIV SL  Resp/trach: LS clear, equal bilaterally.   Diet: NPO. Switched to bolus feeds at 1300, start first bolus around 1530 (1/2 can).   Bowel status: Last BM 9/27  : Voiding spontaneously   Skin: R orbital flap pale, soft and warm, positive Doppler. R intraoral flap warm and soft. Strong implantable continuous Doppler. R neck incision intact with sutures. R neck penrose drain, no output on our shift. R back/flank incision intact with sutures. All incisions cleansed with NS and aquaphor applied. MARQUEZ x2 to R back/flank with serosang output.     Pain: Pain managed with PRN Tylenol and Oxycodone   Activity: Up with SBA, steady on feet, walks in hallways.   Social: No family or visitors present  Plan: Continue to monitor and follow POC

## 2019-09-28 NOTE — PROGRESS NOTES
"ENT Free Flap Check     S: Nursing reports that the doppler was not able to be found. It was found and was the same as prior exams. Flap is warm and appears similar to prior exam.      O: /75 (BP Location: Left arm)   Pulse 70   Temp 98.1  F (36.7  C) (Axillary)   Resp 16   Ht 1.829 m (6' 0.01\")   Wt 74 kg (163 lb 2.3 oz)   SpO2 97%   BMI 22.12 kg/m    General: A&O x3, NAD  HEENT: Flap appears viable, warm, soft, without evidence of congestion at both eye site and intraoral. Strong implantable doppler signal, strong and easily dopplered with hand held. Incisions lines clean, dry, intact. Neck is soft and flat without signs of hematoma.  Pulmonary: breathing comfortably on room air  Extremities: right flank is without evidence of hematoma     A/P: Stable flap  -- continue current plan of care    Tyler Cross MD   "

## 2019-09-29 LAB
ERYTHROCYTE [DISTWIDTH] IN BLOOD BY AUTOMATED COUNT: 13.7 % (ref 10–15)
GLUCOSE BLDC GLUCOMTR-MCNC: 106 MG/DL (ref 70–99)
GLUCOSE BLDC GLUCOMTR-MCNC: 108 MG/DL (ref 70–99)
GLUCOSE BLDC GLUCOMTR-MCNC: 123 MG/DL (ref 70–99)
GLUCOSE BLDC GLUCOMTR-MCNC: 179 MG/DL (ref 70–99)
GLUCOSE BLDC GLUCOMTR-MCNC: 79 MG/DL (ref 70–99)
GLUCOSE BLDC GLUCOMTR-MCNC: 91 MG/DL (ref 70–99)
HCT VFR BLD AUTO: 25.5 % (ref 40–53)
HGB BLD-MCNC: 8.4 G/DL (ref 13.3–17.7)
MAGNESIUM SERPL-MCNC: 2.3 MG/DL (ref 1.6–2.3)
MCH RBC QN AUTO: 32.3 PG (ref 26.5–33)
MCHC RBC AUTO-ENTMCNC: 32.9 G/DL (ref 31.5–36.5)
MCV RBC AUTO: 98 FL (ref 78–100)
PHOSPHATE SERPL-MCNC: 3.8 MG/DL (ref 2.5–4.5)
PLATELET # BLD AUTO: 302 10E9/L (ref 150–450)
RBC # BLD AUTO: 2.6 10E12/L (ref 4.4–5.9)
WBC # BLD AUTO: 6.7 10E9/L (ref 4–11)

## 2019-09-29 PROCEDURE — 25000132 ZZH RX MED GY IP 250 OP 250 PS 637: Mod: GY | Performed by: STUDENT IN AN ORGANIZED HEALTH CARE EDUCATION/TRAINING PROGRAM

## 2019-09-29 PROCEDURE — 00000146 ZZHCL STATISTIC GLUCOSE BY METER IP

## 2019-09-29 PROCEDURE — 84100 ASSAY OF PHOSPHORUS: CPT | Performed by: PHYSICIAN ASSISTANT

## 2019-09-29 PROCEDURE — 85027 COMPLETE CBC AUTOMATED: CPT | Performed by: PHYSICIAN ASSISTANT

## 2019-09-29 PROCEDURE — 36415 COLL VENOUS BLD VENIPUNCTURE: CPT | Performed by: PHYSICIAN ASSISTANT

## 2019-09-29 PROCEDURE — 12000001 ZZH R&B MED SURG/OB UMMC

## 2019-09-29 PROCEDURE — 25000132 ZZH RX MED GY IP 250 OP 250 PS 637: Mod: GY | Performed by: PHYSICIAN ASSISTANT

## 2019-09-29 PROCEDURE — 25000128 H RX IP 250 OP 636: Performed by: PHYSICIAN ASSISTANT

## 2019-09-29 PROCEDURE — 83735 ASSAY OF MAGNESIUM: CPT | Performed by: PHYSICIAN ASSISTANT

## 2019-09-29 PROCEDURE — 90682 RIV4 VACC RECOMBINANT DNA IM: CPT | Performed by: PHYSICIAN ASSISTANT

## 2019-09-29 RX ORDER — OXYCODONE HCL 5 MG/5 ML
5 SOLUTION, ORAL ORAL
Status: DISCONTINUED | OUTPATIENT
Start: 2019-09-29 | End: 2019-09-30 | Stop reason: HOSPADM

## 2019-09-29 RX ORDER — CYCLOBENZAPRINE HCL 5 MG
5 TABLET ORAL 3 TIMES DAILY PRN
Status: DISCONTINUED | OUTPATIENT
Start: 2019-09-29 | End: 2019-09-30 | Stop reason: HOSPADM

## 2019-09-29 RX ADMIN — Medication 2 PACKET: at 08:09

## 2019-09-29 RX ADMIN — CHLORHEXIDINE GLUCONATE 0.12% ORAL RINSE 15 ML: 1.2 LIQUID ORAL at 12:45

## 2019-09-29 RX ADMIN — CYCLOBENZAPRINE HYDROCHLORIDE 5 MG: 5 TABLET, FILM COATED ORAL at 03:21

## 2019-09-29 RX ADMIN — CHLORHEXIDINE GLUCONATE 0.12% ORAL RINSE 15 ML: 1.2 LIQUID ORAL at 08:09

## 2019-09-29 RX ADMIN — CHLORHEXIDINE GLUCONATE 0.12% ORAL RINSE 15 ML: 1.2 LIQUID ORAL at 15:54

## 2019-09-29 RX ADMIN — SENNOSIDES AND DOCUSATE SODIUM 2 TABLET: 8.6; 5 TABLET ORAL at 17:26

## 2019-09-29 RX ADMIN — CYCLOBENZAPRINE HYDROCHLORIDE 5 MG: 5 TABLET, FILM COATED ORAL at 20:18

## 2019-09-29 RX ADMIN — ASPIRIN 325 MG ORAL TABLET 325 MG: 325 PILL ORAL at 08:07

## 2019-09-29 RX ADMIN — CHLORHEXIDINE GLUCONATE 0.12% ORAL RINSE 15 ML: 1.2 LIQUID ORAL at 20:01

## 2019-09-29 RX ADMIN — INSULIN ASPART 1 UNITS: 100 INJECTION, SOLUTION INTRAVENOUS; SUBCUTANEOUS at 08:09

## 2019-09-29 RX ADMIN — GABAPENTIN 100 MG: 250 SUSPENSION ORAL at 22:03

## 2019-09-29 RX ADMIN — OXYCODONE HYDROCHLORIDE 10 MG: 5 SOLUTION ORAL at 03:21

## 2019-09-29 RX ADMIN — POLYETHYLENE GLYCOL 3350 17 G: 17 POWDER, FOR SOLUTION ORAL at 08:08

## 2019-09-29 RX ADMIN — MULTIVIT AND MINERALS-FERROUS GLUCONATE 9 MG IRON/15 ML ORAL LIQUID 15 ML: at 08:11

## 2019-09-29 RX ADMIN — INFLUENZA A VIRUS A/BRISBANE/02/2018 (H1N1) RECOMBINANT HEMAGGLUTININ ANTIGEN, INFLUENZA A VIRUS A/KANSAS/14/2017 (H3N2) RECOMBINANT HEMAGGLUTININ ANTIGEN, INFLUENZA B VIRUS B/PHUKET/3073/2013 RECOMBINANT HEMAGGLUTININ ANTIGEN, AND INFLUENZA B VIRUS B/MARYLAND/15/2016 RECOMBINANT HEMAGGLUTININ ANTIGEN 0.5 ML: 45; 45; 45; 45 INJECTION INTRAMUSCULAR at 11:20

## 2019-09-29 RX ADMIN — OXYCODONE HYDROCHLORIDE 5 MG: 5 SOLUTION ORAL at 14:40

## 2019-09-29 RX ADMIN — OXYCODONE HYDROCHLORIDE 10 MG: 5 SOLUTION ORAL at 06:53

## 2019-09-29 RX ADMIN — ENOXAPARIN SODIUM 40 MG: 40 INJECTION SUBCUTANEOUS at 17:26

## 2019-09-29 RX ADMIN — OXYCODONE HYDROCHLORIDE 5 MG: 5 SOLUTION ORAL at 17:26

## 2019-09-29 RX ADMIN — SENNOSIDES AND DOCUSATE SODIUM 2 TABLET: 8.6; 5 TABLET ORAL at 08:11

## 2019-09-29 RX ADMIN — POLYETHYLENE GLYCOL 3350 17 G: 17 POWDER, FOR SOLUTION ORAL at 17:26

## 2019-09-29 RX ADMIN — OXYCODONE HYDROCHLORIDE 5 MG: 5 SOLUTION ORAL at 11:17

## 2019-09-29 RX ADMIN — SENNOSIDES AND DOCUSATE SODIUM 2 TABLET: 8.6; 5 TABLET ORAL at 20:01

## 2019-09-29 RX ADMIN — OXYCODONE HYDROCHLORIDE 5 MG: 5 SOLUTION ORAL at 20:18

## 2019-09-29 ASSESSMENT — ACTIVITIES OF DAILY LIVING (ADL)
ADLS_ACUITY_SCORE: 11

## 2019-09-29 ASSESSMENT — MIFFLIN-ST. JEOR: SCORE: 1617.91

## 2019-09-29 NOTE — PLAN OF CARE
[id number]  Status: POD#5 R maxillectomy and orbital exenteration with right latissimus dorsi free flap reconstruction  VS: VSS on RA  Neuros: A&Ox4. R facial numbness d/t surgery; swelling on R side of face  GI: NPO; tolerating boluses well, 5 and 1/2 cans completed. Pt performing med/bolus admin w/RN supervision. No BM today, passing gas, bowel meds given  : Voiding w/out difficulty  IV: PIV SL  Activity: Up indpendently  Pain: Incisional pain managed well w/oxycodone and flexeril  Skin:R orbital flap pale, soft and warm, Doppler +. R intraoral flap warm and soft. Implantable continuous Doppler +. R neck incision intact with sutures. R neck penrose drain, no output this shift. R back/flank incision intact with sutures. Incisional care complete. MARQUEZ x2 to R back/flank with serosang output.  Social: Family at bedside, supportive  Plan of care: Continue to encourage independence w/cares, TF PLC completed. Possible discharge Monday/Tuesday

## 2019-09-29 NOTE — PLAN OF CARE
Status: POD#5 R maxillectomy and orbital exenteration with right latissimus dorsi free flap reconstruction   Vitals: VSS on RA  Neuros: Pt is A&Ox4. Facial Asymmetry, localized swelling on R. side d/t surgery, AMBROCIO R eye.   IV: PIV SL  Resp/trach: LS clear.    Diet: NPO; bolus TF. At goal, 2 cans at 1200, pt. tolerated well.   Bowel status: Last BM 9/27  : Voiding spontaneously   Skin: R orbital flap, + Doppler, soft, pale. R intraoral flap, + implantable Doppler, pale, soft, warm. R neck incision, sutured, CDI. R back incision, sutures, OBI, CDI. 2 back/flank MARQUEZ, serosanguinous output. All incisions cleaned and aquaphor applied.   Pain: PRN Oxycodone Q3   Activity: Up independently  Social: No family or visitors present overnight  Plan: Continue to monitor and follow POC , potential d/c to home on Tuesday per pt.

## 2019-09-29 NOTE — PROGRESS NOTES
"Otolaryngology Progress Note     S: No acute events overnight. Patient is doing well.       O: /69 (BP Location: Left arm)   Pulse 70   Temp 97.8  F (36.6  C) (Axillary)   Resp 14   Ht 1.829 m (6' 0.01\")   Wt 76 kg (167 lb 8 oz)   SpO2 95%   BMI 22.71 kg/m                 General: Alert and oriented x 3, No acute distress              HEENT: Right orbital aspect of flap pale, soft, warm , strong arterial Doppler signal at inferior suture. Right palatal intraoral flap warm, soft, with minimum dried crusting, without evidence of congestion, no hematoma or incision dehiscence. Neck flat with clean/dry/intact suture line. Right neck Implantable Doppler with strong arterial signal.              MSK: Right back with 2 MARQUEZ drains with serosang drainage, no ecchymosis              Pulmonary: Breathing non-labored, no stridor, no accessory muscle use.         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 Taking tylenol 650 x3 yesterday and 10 mg q4h oxy consistently, decreasing pain meds today  - Gabapentin at bedtime     HEENT:  - Incision cares: clean with 0.9% sodium chloride and apply Aquaphor Q8H   - Peridex TID   - Humidified mask tent. Do not secure around neck but place on chest angled towards mouth. To prevent crusting of oral hard palate flap.   - Monitor and record MARQUEZ drain output Qshift  - Nurse Flap Check:              -  Q4H POD 4-6              - Q8H POD 6+     Respiratory: PET 9/9/19 7mm nodule right upper lobe with mild FDG " avidity  - supplemental O2 PRN to keep sats >92%     CV: hemodynamically stable  - ASA daily     FEN/GI: Gastric ulcer 2015 (status post abdominal surgery, does not know details)   - NPO no exceptions  - NGT left nare in place okay for meds  - Scheduled zofran q6h  - Bowel regimen: PRN Dulcolax, Miralax/Glycolax, senna   -Tolerating bolus tube feeds with plan to reach goal today  -+ 2 pkts Prosource      : Catheter removed, voiding spontaneously.     ID/Heme: Perioperative abx Unasyn x48 hours (Complete)     PPX:  - Lovenox   - SCDs  - IS     Dispo: Pending clinical course     -- Patient and above plan discussed with Dr. Jose Roberts and Dr. Pulliam

## 2019-09-29 NOTE — PROGRESS NOTES
"Flap Check  9/28/2019     S: no issues; patient anxious to go home    O: /76 (BP Location: Left arm)   Pulse 70   Temp 97.9  F (36.6  C) (Axillary)   Resp 16   Ht 1.829 m (6' 0.01\")   Wt 77.2 kg (170 lb 3.2 oz)   SpO2 97%   BMI 23.08 kg/m    A&O x3, NAD  Flap appears viable, warm, soft, without evidence of congestion; intraoral flap very clean w/o crusting. Strong implantable doppler signal, strong and easily dopplered with hand held. Incisions lines clean, dry, intact. Neck is soft and flat without signs of hematoma.  Breathing comfortably on room air  Right flank is without evidence of hematoma     A/P: Stable flap  -- continue current plan of care    Stephanie Crain MD  OtoHNS PGY4     "

## 2019-09-29 NOTE — PLAN OF CARE
Per Mobility Level Guideline;  Bed/chair alarm No.  Patient may ambulate independently  Patient requires the following assistive equipment: None  PT/OT consult orders in place No

## 2019-09-29 NOTE — PLAN OF CARE
Status: POD#5 R maxillectomy and orbital exenteration with right latissimus dorsi free flap reconstruction   Vitals: VSS on RA  Neuros: Pt is A&Ox4. AMBROCIO R eye. Facial asymmetry d/t R facial swelling. Slight R facial numbness present since surgery   IV: PIV SL  Resp/trach: LS clear.    Diet: NPO. Transitioned to bolus TF. Received 1 can last evening. Will given 1.5 cans this AM. Pt administering medications and bolus feedings with RN supervision   Bowel status: Last BM 9/27  : Voiding spontanoeusly   Skin: R orbital flap pale, soft and warm with + hand held Doppler. R intraoral flap warm and soft. Strong implantable continuous Doppler. R neck incision intact with sutures. R neck penrose drain removed yesterday. R back/flank incision intact with sutures. All incisions cleansed with NS and aquaphor applied. MARQUEZ x2 to R back/flank with serosang output.     Pain: Pain managed with PRN Flexeril and Oxycodone   Activity: Up independently  Social: No family or visitors present overnight  Plan: TF PLC completed yesterday. Continue to monitor and follow POC

## 2019-09-29 NOTE — PROGRESS NOTES
"ENT Free Flap Check  September 29, 2019    S: No issues per nursing with the flap. Patient wants us to \"start filling out the discharge paperwork\" as he is planning on leaving on Tuesday and is excited to get out of here.    O: /75 (BP Location: Left arm)   Pulse 70   Temp 97.8  F (36.6  C) (Axillary)   Resp 16   Ht 1.829 m (6' 0.01\")   Wt 76 kg (167 lb 8 oz)   SpO2 96%   BMI 22.71 kg/m    General: NAD, very pleasant  HEENT: Flap appears viable, warm, soft, without evidence of congestion. Strong implantable doppler signal, and hand-held signal found easily near inferior prolene suture at eye defect. Incisions lines clean, dry, intact. Intraoral portion is excellent. Suture line intact and healthy in appearance.  Pulmonary: breathing comfortably on RA  Extremities: flank is soft and flat, drains with serous fluid    A/P: Stable flap  -- continue current plan of care    Leila Saeed MD PGY4  Otolaryngology-Head & Neck Surgery  To contact ENT please dial [phone] and enter job code [phone].    "

## 2019-09-30 ENCOUNTER — APPOINTMENT (OUTPATIENT)
Dept: PHYSICAL THERAPY | Facility: CLINIC | Age: 59
DRG: 131 | End: 2019-09-30
Attending: OTOLARYNGOLOGY
Payer: MEDICARE

## 2019-09-30 ENCOUNTER — HOME INFUSION (PRE-WILLOW HOME INFUSION) (OUTPATIENT)
Dept: PHARMACY | Facility: CLINIC | Age: 59
End: 2019-09-30

## 2019-09-30 VITALS
SYSTOLIC BLOOD PRESSURE: 116 MMHG | DIASTOLIC BLOOD PRESSURE: 75 MMHG | WEIGHT: 167.5 LBS | BODY MASS INDEX: 22.69 KG/M2 | TEMPERATURE: 97.8 F | OXYGEN SATURATION: 98 % | HEIGHT: 72 IN | HEART RATE: 70 BPM | RESPIRATION RATE: 16 BRPM

## 2019-09-30 LAB
ERYTHROCYTE [DISTWIDTH] IN BLOOD BY AUTOMATED COUNT: 13.9 % (ref 10–15)
GLUCOSE BLDC GLUCOMTR-MCNC: 133 MG/DL (ref 70–99)
GLUCOSE BLDC GLUCOMTR-MCNC: 83 MG/DL (ref 70–99)
GLUCOSE BLDC GLUCOMTR-MCNC: 97 MG/DL (ref 70–99)
GLUCOSE BLDC GLUCOMTR-MCNC: 98 MG/DL (ref 70–99)
HCT VFR BLD AUTO: 26.5 % (ref 40–53)
HGB BLD-MCNC: 8.5 G/DL (ref 13.3–17.7)
MAGNESIUM SERPL-MCNC: 2.1 MG/DL (ref 1.6–2.3)
MCH RBC QN AUTO: 32.2 PG (ref 26.5–33)
MCHC RBC AUTO-ENTMCNC: 32.1 G/DL (ref 31.5–36.5)
MCV RBC AUTO: 100 FL (ref 78–100)
PHOSPHATE SERPL-MCNC: 3.6 MG/DL (ref 2.5–4.5)
PLATELET # BLD AUTO: 313 10E9/L (ref 150–450)
RBC # BLD AUTO: 2.64 10E12/L (ref 4.4–5.9)
WBC # BLD AUTO: 7.5 10E9/L (ref 4–11)

## 2019-09-30 PROCEDURE — 36415 COLL VENOUS BLD VENIPUNCTURE: CPT | Performed by: PHYSICIAN ASSISTANT

## 2019-09-30 PROCEDURE — 97116 GAIT TRAINING THERAPY: CPT | Mod: GP

## 2019-09-30 PROCEDURE — 25000132 ZZH RX MED GY IP 250 OP 250 PS 637: Mod: GY | Performed by: PHYSICIAN ASSISTANT

## 2019-09-30 PROCEDURE — 84100 ASSAY OF PHOSPHORUS: CPT | Performed by: PHYSICIAN ASSISTANT

## 2019-09-30 PROCEDURE — 85027 COMPLETE CBC AUTOMATED: CPT | Performed by: PHYSICIAN ASSISTANT

## 2019-09-30 PROCEDURE — 00000146 ZZHCL STATISTIC GLUCOSE BY METER IP

## 2019-09-30 PROCEDURE — 25000132 ZZH RX MED GY IP 250 OP 250 PS 637: Mod: GY | Performed by: STUDENT IN AN ORGANIZED HEALTH CARE EDUCATION/TRAINING PROGRAM

## 2019-09-30 PROCEDURE — 83735 ASSAY OF MAGNESIUM: CPT | Performed by: PHYSICIAN ASSISTANT

## 2019-09-30 RX ORDER — AMINO AC/PROTEIN HYDR/WHEY PRO 10G-100/30
2 LIQUID (ML) ORAL DAILY
Qty: 28 PACKET | Refills: 0 | Status: SHIPPED | OUTPATIENT
Start: 2019-10-01 | End: 2019-11-01

## 2019-09-30 RX ORDER — GABAPENTIN 250 MG/5ML
100 SOLUTION ORAL AT BEDTIME
Qty: 14 ML | Refills: 0 | Status: SHIPPED | OUTPATIENT
Start: 2019-09-30 | End: 2019-11-01

## 2019-09-30 RX ORDER — POLYETHYLENE GLYCOL 3350 17 G/17G
17 POWDER, FOR SOLUTION ORAL DAILY PRN
Qty: 15 PACKET | Refills: 0 | Status: SHIPPED | OUTPATIENT
Start: 2019-09-30 | End: 2019-12-13

## 2019-09-30 RX ORDER — OXYCODONE HCL 5 MG/5 ML
5 SOLUTION, ORAL ORAL EVERY 4 HOURS PRN
Qty: 300 ML | Refills: 0 | Status: SHIPPED | OUTPATIENT
Start: 2019-09-30 | End: 2019-11-14

## 2019-09-30 RX ORDER — CYCLOBENZAPRINE HCL 5 MG
5 TABLET ORAL DAILY PRN
Qty: 3 TABLET | Refills: 0 | Status: SHIPPED | OUTPATIENT
Start: 2019-09-30 | End: 2020-01-22

## 2019-09-30 RX ORDER — CHLORHEXIDINE GLUCONATE ORAL RINSE 1.2 MG/ML
15 SOLUTION DENTAL 4 TIMES DAILY
Qty: 473 ML | Refills: 0 | Status: SHIPPED | OUTPATIENT
Start: 2019-09-30 | End: 2019-11-01

## 2019-09-30 RX ORDER — AMOXICILLIN 250 MG
1 CAPSULE ORAL 2 TIMES DAILY PRN
Qty: 30 TABLET | Refills: 0 | Status: SHIPPED | OUTPATIENT
Start: 2019-09-30 | End: 2020-01-22

## 2019-09-30 RX ADMIN — CYCLOBENZAPRINE HYDROCHLORIDE 5 MG: 5 TABLET, FILM COATED ORAL at 04:49

## 2019-09-30 RX ADMIN — MULTIVIT AND MINERALS-FERROUS GLUCONATE 9 MG IRON/15 ML ORAL LIQUID 15 ML: at 07:59

## 2019-09-30 RX ADMIN — CHLORHEXIDINE GLUCONATE 0.12% ORAL RINSE 15 ML: 1.2 LIQUID ORAL at 12:26

## 2019-09-30 RX ADMIN — Medication 2 PACKET: at 08:00

## 2019-09-30 RX ADMIN — SENNOSIDES AND DOCUSATE SODIUM 2 TABLET: 8.6; 5 TABLET ORAL at 07:59

## 2019-09-30 RX ADMIN — OXYCODONE HYDROCHLORIDE 5 MG: 5 SOLUTION ORAL at 00:43

## 2019-09-30 RX ADMIN — OXYCODONE HYDROCHLORIDE 5 MG: 5 SOLUTION ORAL at 04:47

## 2019-09-30 RX ADMIN — POLYETHYLENE GLYCOL 3350 17 G: 17 POWDER, FOR SOLUTION ORAL at 07:58

## 2019-09-30 RX ADMIN — CHLORHEXIDINE GLUCONATE 0.12% ORAL RINSE 15 ML: 1.2 LIQUID ORAL at 07:59

## 2019-09-30 RX ADMIN — OXYCODONE HYDROCHLORIDE 5 MG: 5 SOLUTION ORAL at 12:24

## 2019-09-30 RX ADMIN — ASPIRIN 325 MG ORAL TABLET 325 MG: 325 PILL ORAL at 07:58

## 2019-09-30 ASSESSMENT — ACTIVITIES OF DAILY LIVING (ADL)
ADLS_ACUITY_SCORE: 11

## 2019-09-30 ASSESSMENT — PAIN DESCRIPTION - DESCRIPTORS: DESCRIPTORS: ACHING

## 2019-09-30 NOTE — PLAN OF CARE
Status: POD#6 R maxillectomy and orbital exenteration with right latissimus dorsi free flap reconstruction   Vitals: VSS on RA  Neuros: Pt is A&Ox4. AMBROCIO R eye. Facial asymmetry d/t R facial swelling. Slight R facial numbness present since surgery   IV: PIV SL  Resp/trach: LS clear.    Diet: NPO. Transitioned to bolus TF. Will start 2 cans this AM. Pt administering medications and bolus feedings with RN supervision   Bowel status: Last BM 9/27. C/o constipation, declines suppository  : Voiding spontanoeusly   Skin: R orbital flap pale, soft and warm with + hand held Doppler. R intraoral flap pale pink, warm and soft. Strong implantable continuous Doppler. R neck incision intact with sutures. R back/flank incision intact with sutures. All incisions cleansed with NS and aquaphor applied. MARQUEZ x2 to R back/flank with serosang output.     Pain: Pain managed with PRN Flexeril and Oxycodone   Activity: Up independently  Social: No family or visitors present overnight  Plan: TF PLC completed 9/28. Possible discharge Tuesday. Continue to monitor and follow POC

## 2019-09-30 NOTE — PROGRESS NOTES
Patient discharged home to [facility] today at 1430 via wheelchair transport to [facility]. Patient is stable and medically ready for discharge. Patient was sent home with all belongings and discharge medications were filled and sent with patient. Discharge paperwork was given to and discussed with patient. Extra info regarding MARQUZE care & TF printed from [facility] and sent with pt. Patient verbalized understanding of discharge instructions and follow up appointments. Pt performed teach-back for TF & MARQUEZ cares this shift. Will continue to monitor.

## 2019-09-30 NOTE — PLAN OF CARE
6A Discharge Planner PT   Patient plan for discharge: home/friend's home with assist prn  Current status: Supine <> EOB, SBA for drains. Transfers, SBA for drains, mild unsteadiness on feet upon standing though able to adjust safely with increased steps. Pt mobilizing near reported baseline. Ambulated > 1,000 ft, IND without assistive device - Continues to demonstrate unequal step lengths and inconsistently with an increased ROBIN and/or too NBS 2/2 B toe amputees and new visual deficit post op - no overt LOB as pt is able to adjust safely. Pt scanning hallways and completing turns in busy hallways. Completed 1 x 3 stairs with intermittent use of 1 railing, step through pattern for ascent/descent - steady on feet. Recommend SBA for hallway ambulation.     Barriers to return to prior living situation: medical status  Recommendations for discharge: home/friend's home with assist prn  Rationale for recommendations: Pt is safe to discharge to home/friends home though would recommend increased assistance for IADLs.          Entered by: Estella Paige 09/30/2019 8:45 AM

## 2019-09-30 NOTE — DISCHARGE SUMMARY
Discharge Summary  Eduardo Rubio  [id number]  1960    Date of Admission: 9/24/2019  Date of Discharge: 9/30/2019    Admission Diagnosis: Maxillary Sinus Cancer  Squamous cell carcinoma  Secondary malignancy of bone    Discharge Diagnosis:   Squamous cell carcinoma of maxillary sinus  Non-severe malnutrition in the context of acute illness  Secondary malignancy of bone  Secondary malignancy of nerve    Procedures:  Date: 9/24/2019  Procedure(s):  Right Orbital Exenteration  Right Neck Exploration  Right Radicall Maxillectomy, Nasogastric Tube Placement  Right Latissmus Free Flap Reconstruction    Pathology: pending  SCC involving maxillary sinus with bony erosion, positive margin at vidian nerve    HPI: Eduardo Rubio is a 58 year old male with history of gastric ulcer, tobacco use disorder, 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 It was recommended that he undergo operative intervention and the patient consented to the above procedure after detailed explanation of the risks and benefits of said procedure.    Hospital Course: The patient was admitted to the hospital and underwent the above mentioned procedure. He tolerated the procedure without any intra- or leigh ann-operative complications. Please see the operative report for full details of the procedure. The patient was admitted for post-operative monitoring. His postoperative course was uneventful. At discharge, the patient's pain was well controlled, the patient was voiding on his own, and was ambulating and tolerating a bolus tube feeding diet. He was discharged with MARQUEZ drains in place.    Discharge Exam:  Vitals:    09/29/19 0730 09/29/19 1520 09/30/19 0030 09/30/19 0739   BP: 105/69 115/75 113/73 116/75   BP Location: Left arm Left arm Right arm Left arm   Pulse:       Resp: 14 16 16 16   Temp: 97.8  F (36.6  C) 97.8  F (36.6  C) 98.2  F  (36.8  C) 97.8  F (36.6  C)   TempSrc: Axillary Axillary Axillary Axillary   SpO2: 95% 96% 97% 98%   Weight:       Height:                       General: Alert and oriented x 3, No acute distress              HEENT: Right orbit is surgically absent with free flap overlying defect. Right orbital aspect of flap pale, soft, warm , strong arterial Doppler signal at inferior suture. Right palatal intraoral flap warm, soft, with minimum dried crusting, without evidence of congestion, no hematoma or incision dehiscence. Neck flat with clean/dry/intact suture line.              MSK: Right back/flank with 2 MARQUEZ drains with serosang drainage, no ecchymosis              Pulmonary: Breathing non-labored, no stridor, no accessory muscle use.     Discharge Medications:   Eduardo Rubio   Home Medication Instructions PORTIA:[id number]    Printed on:09/30/19 7793   Medication Information                      acetaminophen (TYLENOL) 325 MG tablet  Take 325-650 mg by mouth every 6 hours as needed for mild pain             aspirin (ASA) 325 MG EC tablet  Take 325 mg by mouth every 6 hours as needed for moderate pain             chlorhexidine (PERIDEX) 0.12 % solution  Swish and spit 15 mLs in mouth 4 times daily             cyclobenzaprine (FLEXERIL) 5 MG tablet  1 tablet (5 mg) by Per Feeding Tube route daily as needed for muscle spasms             gabapentin (NEURONTIN) 250 MG/5ML solution  2 mLs (100 mg) by Per Feeding Tube route At Bedtime for 7 days             multivitamins w/minerals (CERTAVITE) liquid  15 mLs by Per Feeding Tube route daily for 14 days             order for DME  Equipment being ordered: Nasogastric bolus tube feeding supplies  Formula: Nutren 1.5, 6 cans per day  Gravity feeding bags  60 mL syringes    Treatment Diagnosis: 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, s/p flap graft             oxyCODONE (ROXICODONE) 5 MG/5ML solution  5 mLs (5 mg) by Per NG tube route every 4 hours as needed for moderate to  severe pain             polyethylene glycol (MIRALAX/GLYCOLAX) packet  17 g by Per NG tube route daily as needed for constipation             protein modular (PROSOURCE TF) LIQD  2 packets by Per Feeding Tube route daily for 14 days             senna-docusate (SENOKOT-S/PERICOLACE) 8.6-50 MG tablet  1 tablet by Per Feeding Tube route 2 times daily as needed for constipation                 Discharge Procedure Orders   Home infusion referral   Referral Priority: Routine   Number of Visits Requested: 1     Reason for your hospital stay   Order Comments: Post-operative care     Adult [facility] Follow-up and recommended labs and tests   Order Comments: Follow up in ENT clinic with Dr. Pulliam as directed. Please call the clinic with questions/concerns: [phone].    Otolaryngology/ENT Clinic:  [facility]  [address] [place] [address]      Appointments on [place] and/or [facility] (with [facility] or [facility] provider or service). Call [phone] if you haven't heard regarding these appointments within 7 days of discharge.     Activity   Order Comments: Your activity upon discharge: No heavy lifting greater than 10 lbs and no strenuous exercise for 2 weeks or until follow up appointment. No driving while taking narcotic pain medications.     Order Specific Question Answer Comments   Is discharge order? Yes      Monitor and record   Order Comments: You are going home with surgical drains in place. Empty the drains and record output 3 times per day. Squeeze the bulb of the drain and replace cap.  If you have multiple drains, record the outputs separately. Once output is less than 30 mL in 24 hours, please contact the ENT clinic to schedule an appointment for drain removal.     When to contact your care team   Order Comments: Please notify your doctor if you experience wound breakdown, sustained bleeding from the wound site, or increasing redness,  "swelling, and/or purulent malorodorous discharge from the wound site which may indicate infection. If you feel it is acute, or experience sudden changes in breathing, chest pain, or excessive sleepiness/somnolence please return to the emergency department or call 911. If you have questions or concerns during the day please call ENT clinic and [phone]. If at night you can call [facility] at [phone] and ask for the \"ENT resident on call\".     Wound care and dressings   Order Comments: Instructions to care for your wound at home: Keep incisions clean and dry. Apply Aquaphor ointment to incisions three times daily to keep moist. You may shower, do not soak, scrub, or submerge incisions under water. If you have a surgical drain, do not get the drain site wet until 24 hours after the drain has been removed.     Full Code     Order Specific Question Answer Comments   Code status determined by: Discussion with patient/legal decision maker      Diet   Order Comments: Follow this diet upon discharge:Nothing to eat or drink by mouth. Bolus tube feeding via nasogastric feeding tube. Formula: Nutren 1.5, 6 cans per day. Give 2 cans three times daily, separate feedings by 3-4 hours. Flush tube with 120 mL water before and after each bolus and with 30 mL water before and after medications.     Order Specific Question Answer Comments   Is discharge order? Yes        Dispo: To home in good condition. All of the patient's questions/concerns have been addressed at this time.     Michelle Herrera PA-C  Otolaryngology-Head & Neck Surgery  Please contact ENT by dialing * [phone] and entering job code [phone].      Teaching statement:  I saw the patient on the day of discharge. Comfortable discharging with MARQUEZ drains and doing NG feeds. Follow-up in clinic in about a week.    Teresa Pulliam MD    Department of Otolaryngology    "

## 2019-10-01 ENCOUNTER — APPOINTMENT (OUTPATIENT)
Dept: LAB | Facility: CLINIC | Age: 59
End: 2019-10-01
Attending: OTOLARYNGOLOGY
Payer: MEDICARE

## 2019-10-01 ENCOUNTER — PATIENT OUTREACH (OUTPATIENT)
Dept: CARE COORDINATION | Facility: CLINIC | Age: 59
End: 2019-10-01

## 2019-10-01 PROBLEM — C44.92 SQUAMOUS CELL CARCINOMA OF SKIN, UNSPECIFIED: Status: ACTIVE | Noted: 2019-09-24

## 2019-10-01 NOTE — PROGRESS NOTES
This is a recent snapshot of the patient's [facility] medical record.  For current drug dose and complete information and questions, call [phone] or In Basket pool, [facility] home infusion ([id number])  CSN Number:  [id number]

## 2019-10-01 NOTE — PLAN OF CARE
Physical Therapy Discharge Summary    Reason for therapy discharge:    Discharged to home.    Progress towards therapy goal(s). See goals on Care Plan in [facility] electronic health record for goal details.  Goals met : adequate for a safe discharge home.    Therapy recommendation(s):    No further therapy is recommended.

## 2019-10-01 NOTE — PROGRESS NOTES
Date: 10/4/2019 Status: Toro   Time: 8:15 AM  8:15 AM Length: 5  5   Visit Type: ENT TUMOR CONFERENCE [2371] PORTIA:     Provider: ENT TUMOR CONFERENCE  Jose Roberts MD (Surgical Onc) Department:  VIR TUMOR CONF   VIR TUMOR CONF   Patient was contacted by an RN for post DC follow up so no duplicate post DC follow up call will be made

## 2019-10-02 ENCOUNTER — PATIENT OUTREACH (OUTPATIENT)
Dept: OTOLARYNGOLOGY | Facility: CLINIC | Age: 59
End: 2019-10-02

## 2019-10-02 NOTE — PROGRESS NOTES
Called to check in with patient following discharge. He states that he is doing well since leaving the hospital. He has 2 MARQUEZ drains in place which both are still putting out greater then 30 ml in 24 hours. He will continue to empty and monitor this output and contact writer when they are less than 30 ml so we can plan for removal.     Patient does report his friend he is staying with has some concern with the swelling behind where his eye was removed. They sent the below picture which was reviewed with Dr. Pulliam:               Dr. Pulliam believes that the amount of swelling is similar to after surgery and we can continue with his appointment scheduled on Monday for follow-up. Patient was advised to continue to monitor and contact writer if there is any new or worsening symptoms.     Svitlana Hammonds, RN, BSN

## 2019-10-04 ENCOUNTER — OFFICE VISIT (OUTPATIENT)
Dept: OTOLARYNGOLOGY | Facility: CLINIC | Age: 59
End: 2019-10-04
Payer: MEDICARE

## 2019-10-04 ENCOUNTER — THERAPY VISIT (OUTPATIENT)
Dept: SPEECH THERAPY | Facility: CLINIC | Age: 59
End: 2019-10-04
Payer: MEDICARE

## 2019-10-04 VITALS
HEIGHT: 72 IN | DIASTOLIC BLOOD PRESSURE: 80 MMHG | SYSTOLIC BLOOD PRESSURE: 151 MMHG | RESPIRATION RATE: 17 BRPM | HEART RATE: 84 BPM | WEIGHT: 172 LBS | BODY MASS INDEX: 23.3 KG/M2

## 2019-10-04 DIAGNOSIS — C31.0 MAXILLARY SINUS CANCER (H): Primary | ICD-10-CM

## 2019-10-04 DIAGNOSIS — R13.12 OROPHARYNGEAL DYSPHAGIA: ICD-10-CM

## 2019-10-04 LAB — COPATH REPORT: NORMAL

## 2019-10-04 ASSESSMENT — MIFFLIN-ST. JEOR: SCORE: 1633.19

## 2019-10-04 ASSESSMENT — PAIN SCALES - GENERAL: PAINLEVEL: NO PAIN (0)

## 2019-10-04 NOTE — DISCHARGE INSTRUCTIONS
Full liquid:     strained creamy soups, tea, juice, jell-o, milkshakes, pudding, popsicles, yogurt, custard, frozen yogurt, plain ice cream, boost, ensure, resource, other liquid supplements, tea or coffee with milk, cream,  sugar, or honey. Cream of wheat, grits, oatmeal, cream of rice

## 2019-10-04 NOTE — NURSING NOTE
Chief Complaint   Patient presents with     RECHECK     follow up      Blood pressure (!) 151/80, pulse 84, resp. rate 17, height 1.829 m (6'), weight 78 kg (172 lb).    Juanjose Carrasco LPN

## 2019-10-04 NOTE — LETTER
10/4/2019       RE: Eduardo Rubio  [address] [address]  [facility] [address]     Dear Colleague,    Thank you for referring your patient, Eduardo Rubio, to the [facility] EAR NOSE AND THROAT at [facility]. Please see a copy of my visit note below.    Dear Dr. Roberts:    I had the pleasure of seeing Eduardo Rubio in follow-up today at the [facility].     History of Present Illness:   Eduardo Rubio is a 59 year old man with a T4N0 SCC of the maxillary sinus. The patient has a history of facial pain and numbness along with vision changes that resulted in a visit to the ER on 8/18/2019. An MRI was obtained which demonstrated a maxillary sinus mass with extension to the orbit with involvement of the inferior rectus muscle. He had a biopsy performed locally which was consistent with SCC. He saw Dr Wick on 8/29/2019. He was referred to ophthalmology for evaluation of his vision changes. He had a PET scan which showed the tumor in the maxillary sinus with bony erosion, extension to orbit, small lung nodule, no ramona disease. His case was reviewed at tumor conference with recommendation for surgery with total maxillectomy and orbital exenteration with free flap reconstruction.     Teaching statement:  He comes in today for follow-up. He was taken to the OR on 9/24/2019. He had reconstruction with a latissimus free flap. His postoperative course was uncomplicated. His final pathology demonstrated a 4.4 cm SCC with involvement of the zygomatic and nasal bone, PNI, no LVSI, positive margin at the pterygopalatine fossa with positive vidian nerve. He was recommended for postoperative radiation with consideration of weekly cisplatin. He comes in today because he accidentally pulled out his NG today. He has swelling of his flap and face which he notices is worse in the morning, better throughout the day. He says one of his drains is ready to be removed  from his back.      MEDICATIONS:     Current Outpatient Medications   Medication Sig Dispense Refill     acetaminophen (TYLENOL) 325 MG tablet Take 325-650 mg by mouth every 6 hours as needed for mild pain       aspirin (ASA) 325 MG EC tablet Take 325 mg by mouth every 6 hours as needed for moderate pain       chlorhexidine (PERIDEX) 0.12 % solution Swish and spit 15 mLs in mouth 4 times daily 473 mL 0     multivitamins w/minerals (CERTAVITE) liquid 15 mLs by Per Feeding Tube route daily for 14 days 210 mL 0     order for DME Equipment being ordered: Nasogastric bolus tube feeding supplies  Formula: Nutren 1.5, 6 cans per day  Gravity feeding bags  60 mL syringes    Treatment Diagnosis: 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, s/p flap graft 14 days 1     oxyCODONE (ROXICODONE) 5 MG/5ML solution 5 mLs (5 mg) by Per NG tube route every 4 hours as needed for moderate to severe pain 300 mL 0     polyethylene glycol (MIRALAX/GLYCOLAX) packet 17 g by Per NG tube route daily as needed for constipation 15 packet 0     protein modular (PROSOURCE TF) LIQD 2 packets by Per Feeding Tube route daily for 14 days 28 packet 0     senna-docusate (SENOKOT-S/PERICOLACE) 8.6-50 MG tablet 1 tablet by Per Feeding Tube route 2 times daily as needed for constipation 30 tablet 0     cyclobenzaprine (FLEXERIL) 5 MG tablet 1 tablet (5 mg) by Per Feeding Tube route daily as needed for muscle spasms (Patient not taking: Reported on 10/4/2019) 3 tablet 0     gabapentin (NEURONTIN) 250 MG/5ML solution 2 mLs (100 mg) by Per Feeding Tube route At Bedtime for 7 days (Patient not taking: Reported on 10/4/2019) 14 mL 0       ALLERGIES:  No Known Allergies    HABITS/SOCIAL HISTORY:   Smoker 1/2 ppd  No chewing tobacco use  Lives with girlfriend or with friends  No alcohol use  Not currently employed    Social History     Socioeconomic History     Marital status:      Spouse name: Not on file     Number of children: Not on file     Years  of education: Not on file     Highest education level: Not on file   Occupational History     Not on file   Social Needs     Financial resource strain: Not on file     Food insecurity:     Worry: Not on file     Inability: Not on file     Transportation needs:     Medical: Not on file     Non-medical: Not on file   Tobacco Use     Smoking status: Current Every Day Smoker     Packs/day: 0.50     Years: 15.00     Pack years: 7.50     Types: Cigarettes     Start date: 2004     Smokeless tobacco: Never Used   Substance and Sexual Activity     Alcohol use: Not Currently     Drug use: Not Currently     Sexual activity: Not on file   Lifestyle     Physical activity:     Days per week: Not on file     Minutes per session: Not on file     Stress: Not on file   Relationships     Social connections:     Talks on phone: Not on file     Gets together: Not on file     Attends Faith service: Not on file     Active member of club or organization: Not on file     Attends meetings of clubs or organizations: Not on file     Relationship status: Not on file     Intimate partner violence:     Fear of current or ex partner: Not on file     Emotionally abused: Not on file     Physically abused: Not on file     Forced sexual activity: Not on file   Other Topics Concern     Not on file   Social History Narrative     Not on file       PAST MEDICAL HISTORY:   Past Medical History:   Diagnosis Date     Gastric ulcer      Low back pain      Maxillary sinus cancer (H)      Toe amputation status     all ten toes        PAST SURGICAL HISTORY:   Past Surgical History:   Procedure Laterality Date     ABDOMEN SURGERY      [facility], surgery related to gastric ulcer     AS AMPUTATION TOE,I-P JT Bilateral     all ten toes     EXENTERATION ORBIT Right 9/24/2019    Procedure: Right Orbital Exenteration;  Surgeon: Jose Roberts MD;  Location: [facility] OR     EXPLORE NECK Right 9/24/2019    Procedure: Right Neck Exploration;  Surgeon: Jose Roberts,  MD;  Location: [facility] OR     GRAFT FREE VASCULARIZED LATISSIMUS DORSI Right 9/24/2019    Procedure: Right Latissmus Free Flap Reconstruction;  Surgeon: Teresa Pulliam MD;  Location: [facility] OR     OSTEOTOMY MAXILLA N/A 9/24/2019    Procedure: Right Radicall Maxillectomy, Nasogastric Tube Placement;  Surgeon: Jose Roberts MD;  Location: [facility] OR       FAMILY HISTORY:    Family History   Problem Relation Age of Onset     Breast Cancer Mother      Glaucoma No family hx of      Macular Degeneration No family hx of        REVIEW OF SYSTEMS:  12 point ROS was negative other than the symptoms noted above in the HPI.  Patient Supplied Answers to Review of Systems  [facility] ENT ROS 10/4/2019   Constitutional Weight loss, Problems with sleep   Neurology Dizzy spells, Headache   Eyes -   Ears, Nose, Throat Nasal congestion or drainage   Musculoskeletal -   Endocrine -         PHYSICAL EXAMINATION:   BP (!) 151/80   Pulse 84   Resp 17   Ht 1.829 m (6')   Wt 78 kg (172 lb)   BMI 23.33 kg/m      Patient in NAD  Breathing comfortably on room air  NG almost completely out on left side of nose, stitch still in place  Healthy appearing skin paddle at orbital exenteration site  Some swelling of the right face and right orbit, soft, unable to aspirate any fluid   Healthy appearing skin paddle intraorally, no dehiscence, no significant swelling  Lateral rhinotomy incision healing appropriately  Latissimus donor site healing appropriately, nylon sutures in place, 2 MARQUEZ drains with serous fluid      RESULTS REVIEWED:   SPECIMEN(S):   A: Inferior rectus posterior margin   B: Posterior wall of maxillary sinus   C: Middle turbinate margin   D: Orbital apex margin   E: Pterygopalatine fossa margin   F: Pterygopalatine ganglion   G: Additional pterygopalatine fossa   H: Pterygoid muscle at plate   I: Buccal fat   J: Radical maxillectomy   K: Right level 1B   L: Reresection lateral nasal wall   M: Lateral nasal wall margin, near superior  turbinate   N: Pterygopalatine fossa reresection #2   O: Vidian nerve   P: Tissue in vidian canal   Q: Final vidian canal margin     FINAL DIAGNOSIS:   A. INFERIOR RECTUS POSTERIOR MARGIN, EXCISION:   - Skeletal muscle, negative for malignancy.     B. POSTERIOR WALL OF MAXILLARY SINUS, BIOPSY:   - Bone, negative for malignancy.     C. MIDDLE TURBINATE MARGIN, EXCISION:   - POSITIVE FOR SQUAMOUS CELL CARCINOMA.     D. ORBITAL APEX MARGIN, EXCISION:   - Negative for malignancy.     E. PTERYGOPALATINE FOSSA MARGIN, EXCISION:   - POSITIVE FOR SQUAMOUS CELL CARCINOMA.     F. PTERYGOPALATINE GANGLION, EXCISION:   - POSITIVE FOR SQUAMOUS CELL CARCINOMA.   - Perineural invasion is present.     G. ADDITIONAL PTERYGOPALATINE FOSSA, EXCISION:   - Negative for malignancy.     H. PTERYGOID MUSCLE AT PLATE, BIOPSY:   - Skeletal muscle, negative for malignancy.     I. BUCCAL FAT, BIOPSY:   - Negative for malignancy.     J. MAXILLA, RIGHT, RADICAL MAXILLECTOMY WITH ORBITAL EXENTERATION:   - INVASIVE KERATINIZING SQUAMOUS CELL CARCINOMA, moderately   differentiated, 4.4 cm in greatest dimension.   - Tumor is centered in maxilla and involves zygomatic and nasal bones.   - Angiolymphatic invasion is not seen.   - Perineural invasion is present.   - Surgical margins are negative for tumor in this part; closest margin is   anterolateral at 2 mm.   - AJCC pathologic staging is pT4a N0.   - See synoptic report.   - Eye globe pathologic findings will be reported in addendum.     K. LYMPH NODE, RIGHT LEVEL 1B, EXCISION:   - One lymph node, negative for malignancy (0/1).     L. LATERAL NASAL WALL, RERESECTION:   - FOCALLY POSITIVE FOR SQUAMOUS CELL CARCINOMA.     M. LATERAL NASAL WALL MARGIN, NEAR SUPERIOR TURBINATE, EXCISION:   - Sinonasal mucosa, negative for malignancy.     N. PTERYGOPALATINE FOSSA RERESECTION #2, EXCISION:   - POSITIVE FOR SQUAMOUS CELL CARCINOMA.   - Perineural invasion is also present.     O. VIDIAN NERVE, BIOPSY:   -  POSITIVE FOR SQUAMOUS CELL CARCINOMA.     P. TISSUE IN VIDIAN CANAL, BIOPSY:   - POSITIVE FOR SQUAMOUS CELL CARCINOMA.     Q. FINAL VIDIAN CANAL MARGIN, BIOPSY:   - POSITIVE FOR SQUAMOUS CELL CARCINOMA.   - Michelle/intraneural invasion is also present.     Report Name: Nasal Cavity and Paranasal Sinuses        Status: Submitted Checklist Inst: 1      Last Updated By: Jose Martin Menard M.D., [facility], 10/03/2019   12:12:37   Part(s) Involved:   J: Radical maxillectomy     Synoptic Report:     SPECIMEN     Procedure:         - Radical maxillectomy         right orbital exenteration     TUMOR     Tumor Site:         - Paranasal sinus(es), maxillary     Histologic Type:           - Squamous cell carcinoma, keratinizing     Tumor Laterality:         - Right     Histologic Grade:         - G2: Moderately differentiated     Tumor Focality:         - Unifocal     Tumor Size: 4.4 x 4.0 x 3.8 cm     Tumor Extension: involves nasal bone and zygomatic bone     Lymphovascular Invasion:         - Not Identified     Perineural Invasion:         - Present     MARGINS     Margins:         - Involved by invasive tumor       Margin(s), per Orientation:           final vidian canal margin     LYMPH NODES     Number of Lymph Nodes Involved:         - 0     Number of Lymph Nodes Examined: 1     PATHOLOGIC STAGE CLASSIFICATION (PTNM, AJCC 8TH EDITION)     Primary Tumor (pT):         - pT4a     Regional Lymph Nodes (pN):         - pN0       IMPRESSION AND PLAN:   Eduardo Rubio is a 59 year old man with a T4N0 SCC of the right maxilla. He underwent surgical resection with maxillectomy and orbital exenteration with reconstruction using a latissimus free flap.     His pathology was reviewed at tumor conference today.  He is reminded for postoperative radiation and consideration of weekly cisplatin given the positive margin at the vidian canal.  This information was communicated to the patient today.    The rest of his NG tube was removed  as it was sitting at 10 cm at the nose.  Given that he is healing well we will go ahead and let him start a liquid diet after evaluation by speech pathology today.  I do not want him starting on solid foods yet.      He does have quite a bit of swelling of the right side of his face and behind his orbital exenteration reconstruction.  However on attempted aspiration there was no fluid obtained.  He was instructed to keep his head elevated.  There may be just swelling secondary to laying flat as he says it is worse in the morning and better throughout the day.    He is scheduled for an appointment on Monday.  I would like to keep this appointment given the fact that we are starting him on a diet in order to ensure that he is doing well.    Thank you very much for the opportunity to participate in the care of your patient.      Teresa Pulliam MD, M.D.  Otolaryngology- Head & Neck Surgery      This note was dictated with voice recognition software and then edited. Please excuse any unintentional errors.     CC:  Jose Roberts MD  Otolaryngology/Head & Neck Surgery  [facility]      Colt Puentes MD  Otolaryngology/Head & Neck Surgery  [facility]

## 2019-10-04 NOTE — PATIENT INSTRUCTIONS
1. Please continue with plan to follow-up with Dr. Pulliam on Monday at 12:20pm.   2. Please call the ENT clinic with any questions,concerns, new or worsening symptoms.    -Clinic number is [phone]   - Svitlana's direct line (Dr. Pulliam's nurse) [phone]  3. You are scheduled for a dental appointment on Monday as you are aware of. Please continue with plan for this appointment.   4. You are scheduled to see Dr. Santillan for radiation consult on Friday 10/11 at 10:00am. This is located on the first floor of the [facility] ([address])  5. We will arrange a medical oncology consult and contact you with this information.

## 2019-10-04 NOTE — TELEPHONE ENCOUNTER
ONCOLOGY INTAKE: Records Information      APPT INFORMATION: 11/19/19 - House - [facility]  Referring provider:  Shasha  Referring provider s clinic:  ENT Onc  Reason for visit/diagnosis:  maxillary sinus cancer  Has patient been notified of appointment date and time?: Yes    RECORDS INFORMATION:  Were the records received with the referral (via [employer])? Internal referral    Has patient been seen for any external appt for this diagnosis? No    If yes, where? NA    Has patient had any imaging or procedures outside of [facility] for this condition? No      If Yes, where? NA    ADDITIONAL INFORMATION:  Scheduled via inbasket from Svitlana B  Call to pt - voicemail left requesting a return call to confirm  Pt added to waitlist

## 2019-10-05 NOTE — PROGRESS NOTES
Dear Dr. Roberts:    I had the pleasure of seeing Eduardo Rubio in follow-up today at the [facility].     History of Present Illness:   Eduardo Rubio is a 59 year old man with a T4N0 SCC of the maxillary sinus. The patient has a history of facial pain and numbness along with vision changes that resulted in a visit to the ER on 8/18/2019. An MRI was obtained which demonstrated a maxillary sinus mass with extension to the orbit with involvement of the inferior rectus muscle. He had a biopsy performed locally which was consistent with SCC. He saw Dr Wick on 8/29/2019. He was referred to ophthalmology for evaluation of his vision changes. He had a PET scan which showed the tumor in the maxillary sinus with bony erosion, extension to orbit, small lung nodule, no ramona disease. His case was reviewed at tumor conference with recommendation for surgery with total maxillectomy and orbital exenteration with free flap reconstruction. He was taken to the OR on 9/24/2019. He had reconstruction with a latissimus free flap. His postoperative course was uncomplicated. His final pathology demonstrated a 4.4 cm SCC with involvement of the zygomatic and nasal bone, PNI, no LVSI, positive margin at the pterygopalatine fossa with positive vidian nerve. He was recommended for postoperative radiation with consideration of weekly cisplatin.     Teaching statement:  He comes in today for follow-up. He was last seen 10/4/2019. At that time his NG was dislodged. He was started on a liquid diet.  He says he is doing well with the liquid diet. He thinks his drain is ready to be removed. He is worried he accidentally partially pulled the drain this morning. He saw dental today and is going to get some teeth pulled.      MEDICATIONS:     Current Outpatient Medications   Medication Sig Dispense Refill     acetaminophen (TYLENOL) 325 MG tablet Take 325-650 mg by mouth every 6 hours as needed for mild pain        aspirin (ASA) 325 MG EC tablet Take 325 mg by mouth every 6 hours as needed for moderate pain       chlorhexidine (PERIDEX) 0.12 % solution Swish and spit 15 mLs in mouth 4 times daily 473 mL 0     cyclobenzaprine (FLEXERIL) 5 MG tablet 1 tablet (5 mg) by Per Feeding Tube route daily as needed for muscle spasms (Patient not taking: Reported on 10/4/2019) 3 tablet 0     gabapentin (NEURONTIN) 250 MG/5ML solution 2 mLs (100 mg) by Per Feeding Tube route At Bedtime for 7 days (Patient not taking: Reported on 10/4/2019) 14 mL 0     multivitamins w/minerals (CERTAVITE) liquid 15 mLs by Per Feeding Tube route daily for 14 days 210 mL 0     order for DME Equipment being ordered: Nasogastric bolus tube feeding supplies  Formula: Nutren 1.5, 6 cans per day  Gravity feeding bags  60 mL syringes    Treatment Diagnosis: 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, s/p flap graft 14 days 1     oxyCODONE (ROXICODONE) 5 MG/5ML solution 5 mLs (5 mg) by Per NG tube route every 4 hours as needed for moderate to severe pain 300 mL 0     polyethylene glycol (MIRALAX/GLYCOLAX) packet 17 g by Per NG tube route daily as needed for constipation 15 packet 0     protein modular (PROSOURCE TF) LIQD 2 packets by Per Feeding Tube route daily for 14 days 28 packet 0     senna-docusate (SENOKOT-S/PERICOLACE) 8.6-50 MG tablet 1 tablet by Per Feeding Tube route 2 times daily as needed for constipation 30 tablet 0       ALLERGIES:  No Known Allergies    HABITS/SOCIAL HISTORY:   Smoker 1/2 ppd  No chewing tobacco use  Lives with girlfriend or with friends  No alcohol use  Not currently employed    Social History     Socioeconomic History     Marital status:      Spouse name: Not on file     Number of children: Not on file     Years of education: Not on file     Highest education level: Not on file   Occupational History     Not on file   Social Needs     Financial resource strain: Not on file     Food insecurity:     Worry: Not on  file     Inability: Not on file     Transportation needs:     Medical: Not on file     Non-medical: Not on file   Tobacco Use     Smoking status: Current Every Day Smoker     Packs/day: 0.50     Years: 15.00     Pack years: 7.50     Types: Cigarettes     Start date: 2004     Smokeless tobacco: Never Used   Substance and Sexual Activity     Alcohol use: Not Currently     Drug use: Not Currently     Sexual activity: Not on file   Lifestyle     Physical activity:     Days per week: Not on file     Minutes per session: Not on file     Stress: Not on file   Relationships     Social connections:     Talks on phone: Not on file     Gets together: Not on file     Attends Anabaptist service: Not on file     Active member of club or organization: Not on file     Attends meetings of clubs or organizations: Not on file     Relationship status: Not on file     Intimate partner violence:     Fear of current or ex partner: Not on file     Emotionally abused: Not on file     Physically abused: Not on file     Forced sexual activity: Not on file   Other Topics Concern     Not on file   Social History Narrative     Not on file       PAST MEDICAL HISTORY:   Past Medical History:   Diagnosis Date     Gastric ulcer      Low back pain      Maxillary sinus cancer (H)      Toe amputation status     all ten toes        PAST SURGICAL HISTORY:   Past Surgical History:   Procedure Laterality Date     ABDOMEN SURGERY      [facility], surgery related to gastric ulcer     AS AMPUTATION TOE,I-P JT Bilateral     all ten toes     EXENTERATION ORBIT Right 9/24/2019    Procedure: Right Orbital Exenteration;  Surgeon: Jose Roberts MD;  Location: [facility] OR     EXPLORE NECK Right 9/24/2019    Procedure: Right Neck Exploration;  Surgeon: Jose Roberts MD;  Location: [facility] OR     GRAFT FREE VASCULARIZED LATISSIMUS DORSI Right 9/24/2019    Procedure: Right Latissmus Free Flap Reconstruction;  Surgeon: Teresa Pulliam MD;  Location: [facility] OR     OSTEOTOMY  MAXILLA N/A 9/24/2019    Procedure: Right Radicall Maxillectomy, Nasogastric Tube Placement;  Surgeon: Jose Roberts MD;  Location: [facility] OR       FAMILY HISTORY:    Family History   Problem Relation Age of Onset     Breast Cancer Mother      Glaucoma No family hx of      Macular Degeneration No family hx of        REVIEW OF SYSTEMS:  12 point ROS was negative other than the symptoms noted above in the HPI.  Patient Supplied Answers to Review of Systems  [facility] ROS 10/4/2019   Constitutional Weight loss, Problems with sleep   Neurology Dizzy spells, Headache   Eyes -   Ears, Nose, Throat Nasal congestion or drainage   Musculoskeletal -   Endocrine -         PHYSICAL EXAMINATION:   Wt 78.9 kg (174 lb)   BMI 23.60 kg/m     Patient in NAD  Breathing comfortably on room air  Healthy appearing skin paddle at orbital exenteration site  Some swelling of the right face and right orbit, soft, decreased from previous   Healthy appearing skin paddle intraorally, no dehiscence, incomplete healing of medial suture line but no obvious breakdown, no significant swelling  Lateral rhinotomy incision healing appropriately  Latissimus donor site healing appropriately, nylon sutures in place, MARQUEZ drain with serous fluid and some thick clot within the tubing      RESULTS REVIEWED:         IMPRESSION AND PLAN:   Eduardo Rubio is a 59 year old man with a T4N0 SCC of the right maxilla. He underwent surgical resection with maxillectomy and orbital exenteration with reconstruction using a latissimus free flap.     I would like him to stay on the liquid diet to allow a bit more healing.     He saw dental today. He has an appointment scheduled with radiation oncology.    I had speech do pre-radiation teaching today.    He will see Dr Roberts next week for another wound check. I will see him one more time before radiation after he starts a solid diet.    Thank you very much for the opportunity to participate in the care of your patient.       Teresa Pulliam MD, M.D.  Otolaryngology- Head & Neck Surgery      This note was dictated with voice recognition software and then edited. Please excuse any unintentional errors.           CC:  Jose Roberts MD  Otolaryngology/Head & Neck Surgery  [facility] 396      Colt Puentes MD  Otolaryngology/Head & Neck Surgery  [facility] 396

## 2019-10-05 NOTE — PROGRESS NOTES
Dear Dr. Roberts:    I had the pleasure of seeing Eduardo Rubio in follow-up today at the [facility].     History of Present Illness:   Eduardo Rubio is a 59 year old man with a T4N0 SCC of the maxillary sinus. The patient has a history of facial pain and numbness along with vision changes that resulted in a visit to the ER on 8/18/2019. An MRI was obtained which demonstrated a maxillary sinus mass with extension to the orbit with involvement of the inferior rectus muscle. He had a biopsy performed locally which was consistent with SCC. He saw Dr Wick on 8/29/2019. He was referred to ophthalmology for evaluation of his vision changes. He had a PET scan which showed the tumor in the maxillary sinus with bony erosion, extension to orbit, small lung nodule, no ramona disease. His case was reviewed at tumor conference with recommendation for surgery with total maxillectomy and orbital exenteration with free flap reconstruction.     Teaching statement:  He comes in today for follow-up. He was taken to the OR on 9/24/2019. He had reconstruction with a latissimus free flap. His postoperative course was uncomplicated. His final pathology demonstrated a 4.4 cm SCC with involvement of the zygomatic and nasal bone, PNI, no LVSI, positive margin at the pterygopalatine fossa with positive vidian nerve. He was recommended for postoperative radiation with consideration of weekly cisplatin. He comes in today because he accidentally pulled out his NG today. He has swelling of his flap and face which he notices is worse in the morning, better throughout the day. He says one of his drains is ready to be removed from his back.      MEDICATIONS:     Current Outpatient Medications   Medication Sig Dispense Refill     acetaminophen (TYLENOL) 325 MG tablet Take 325-650 mg by mouth every 6 hours as needed for mild pain       aspirin (ASA) 325 MG EC tablet Take 325 mg by mouth every 6 hours as  needed for moderate pain       chlorhexidine (PERIDEX) 0.12 % solution Swish and spit 15 mLs in mouth 4 times daily 473 mL 0     multivitamins w/minerals (CERTAVITE) liquid 15 mLs by Per Feeding Tube route daily for 14 days 210 mL 0     order for DME Equipment being ordered: Nasogastric bolus tube feeding supplies  Formula: Nutren 1.5, 6 cans per day  Gravity feeding bags  60 mL syringes    Treatment Diagnosis: 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, s/p flap graft 14 days 1     oxyCODONE (ROXICODONE) 5 MG/5ML solution 5 mLs (5 mg) by Per NG tube route every 4 hours as needed for moderate to severe pain 300 mL 0     polyethylene glycol (MIRALAX/GLYCOLAX) packet 17 g by Per NG tube route daily as needed for constipation 15 packet 0     protein modular (PROSOURCE TF) LIQD 2 packets by Per Feeding Tube route daily for 14 days 28 packet 0     senna-docusate (SENOKOT-S/PERICOLACE) 8.6-50 MG tablet 1 tablet by Per Feeding Tube route 2 times daily as needed for constipation 30 tablet 0     cyclobenzaprine (FLEXERIL) 5 MG tablet 1 tablet (5 mg) by Per Feeding Tube route daily as needed for muscle spasms (Patient not taking: Reported on 10/4/2019) 3 tablet 0     gabapentin (NEURONTIN) 250 MG/5ML solution 2 mLs (100 mg) by Per Feeding Tube route At Bedtime for 7 days (Patient not taking: Reported on 10/4/2019) 14 mL 0       ALLERGIES:  No Known Allergies    HABITS/SOCIAL HISTORY:   Smoker 1/2 ppd  No chewing tobacco use  Lives with girlfriend or with friends  No alcohol use  Not currently employed    Social History     Socioeconomic History     Marital status:      Spouse name: Not on file     Number of children: Not on file     Years of education: Not on file     Highest education level: Not on file   Occupational History     Not on file   Social Needs     Financial resource strain: Not on file     Food insecurity:     Worry: Not on file     Inability: Not on file     Transportation needs:     Medical: Not on  file     Non-medical: Not on file   Tobacco Use     Smoking status: Current Every Day Smoker     Packs/day: 0.50     Years: 15.00     Pack years: 7.50     Types: Cigarettes     Start date: 2004     Smokeless tobacco: Never Used   Substance and Sexual Activity     Alcohol use: Not Currently     Drug use: Not Currently     Sexual activity: Not on file   Lifestyle     Physical activity:     Days per week: Not on file     Minutes per session: Not on file     Stress: Not on file   Relationships     Social connections:     Talks on phone: Not on file     Gets together: Not on file     Attends Spiritism service: Not on file     Active member of club or organization: Not on file     Attends meetings of clubs or organizations: Not on file     Relationship status: Not on file     Intimate partner violence:     Fear of current or ex partner: Not on file     Emotionally abused: Not on file     Physically abused: Not on file     Forced sexual activity: Not on file   Other Topics Concern     Not on file   Social History Narrative     Not on file       PAST MEDICAL HISTORY:   Past Medical History:   Diagnosis Date     Gastric ulcer      Low back pain      Maxillary sinus cancer (H)      Toe amputation status     all ten toes        PAST SURGICAL HISTORY:   Past Surgical History:   Procedure Laterality Date     ABDOMEN SURGERY      [facility], surgery related to gastric ulcer     AS AMPUTATION TOE,I-P JT Bilateral     all ten toes     EXENTERATION ORBIT Right 9/24/2019    Procedure: Right Orbital Exenteration;  Surgeon: Jose Roberts MD;  Location: [facility] OR     EXPLORE NECK Right 9/24/2019    Procedure: Right Neck Exploration;  Surgeon: Jose Roberts MD;  Location: [facility] OR     GRAFT FREE VASCULARIZED LATISSIMUS DORSI Right 9/24/2019    Procedure: Right Latissmus Free Flap Reconstruction;  Surgeon: Teresa Pulliam MD;  Location: [facility] OR     OSTEOTOMY MAXILLA N/A 9/24/2019    Procedure: Right Radicall Maxillectomy, Nasogastric  Tube Placement;  Surgeon: Jose Roberts MD;  Location:  OR       FAMILY HISTORY:    Family History   Problem Relation Age of Onset     Breast Cancer Mother      Glaucoma No family hx of      Macular Degeneration No family hx of        REVIEW OF SYSTEMS:  12 point ROS was negative other than the symptoms noted above in the HPI.  Patient Supplied Answers to Review of Systems  [facility] ROS 10/4/2019   Constitutional Weight loss, Problems with sleep   Neurology Dizzy spells, Headache   Eyes -   Ears, Nose, Throat Nasal congestion or drainage   Musculoskeletal -   Endocrine -         PHYSICAL EXAMINATION:   BP (!) 151/80   Pulse 84   Resp 17   Ht 1.829 m (6')   Wt 78 kg (172 lb)   BMI 23.33 kg/m     Patient in NAD  Breathing comfortably on room air  NG almost completely out on left side of nose, stitch still in place  Healthy appearing skin paddle at orbital exenteration site  Some swelling of the right face and right orbit, soft, unable to aspirate any fluid   Healthy appearing skin paddle intraorally, no dehiscence, no significant swelling  Lateral rhinotomy incision healing appropriately  Latissimus donor site healing appropriately, nylon sutures in place, 2 MARQUEZ drains with serous fluid      RESULTS REVIEWED:   SPECIMEN(S):   A: Inferior rectus posterior margin   B: Posterior wall of maxillary sinus   C: Middle turbinate margin   D: Orbital apex margin   E: Pterygopalatine fossa margin   F: Pterygopalatine ganglion   G: Additional pterygopalatine fossa   H: Pterygoid muscle at plate   I: Buccal fat   J: Radical maxillectomy   K: Right level 1B   L: Reresection lateral nasal wall   M: Lateral nasal wall margin, near superior turbinate   N: Pterygopalatine fossa reresection #2   O: Vidian nerve   P: Tissue in vidian canal   Q: Final vidian canal margin     FINAL DIAGNOSIS:   A. INFERIOR RECTUS POSTERIOR MARGIN, EXCISION:   - Skeletal muscle, negative for malignancy.     B. POSTERIOR WALL OF MAXILLARY SINUS,  BIOPSY:   - Bone, negative for malignancy.     C. MIDDLE TURBINATE MARGIN, EXCISION:   - POSITIVE FOR SQUAMOUS CELL CARCINOMA.     D. ORBITAL APEX MARGIN, EXCISION:   - Negative for malignancy.     E. PTERYGOPALATINE FOSSA MARGIN, EXCISION:   - POSITIVE FOR SQUAMOUS CELL CARCINOMA.     F. PTERYGOPALATINE GANGLION, EXCISION:   - POSITIVE FOR SQUAMOUS CELL CARCINOMA.   - Perineural invasion is present.     G. ADDITIONAL PTERYGOPALATINE FOSSA, EXCISION:   - Negative for malignancy.     H. PTERYGOID MUSCLE AT PLATE, BIOPSY:   - Skeletal muscle, negative for malignancy.     I. BUCCAL FAT, BIOPSY:   - Negative for malignancy.     J. MAXILLA, RIGHT, RADICAL MAXILLECTOMY WITH ORBITAL EXENTERATION:   - INVASIVE KERATINIZING SQUAMOUS CELL CARCINOMA, moderately   differentiated, 4.4 cm in greatest dimension.   - Tumor is centered in maxilla and involves zygomatic and nasal bones.   - Angiolymphatic invasion is not seen.   - Perineural invasion is present.   - Surgical margins are negative for tumor in this part; closest margin is   anterolateral at 2 mm.   - AJCC pathologic staging is pT4a N0.   - See synoptic report.   - Eye globe pathologic findings will be reported in addendum.     K. LYMPH NODE, RIGHT LEVEL 1B, EXCISION:   - One lymph node, negative for malignancy (0/1).     L. LATERAL NASAL WALL, RERESECTION:   - FOCALLY POSITIVE FOR SQUAMOUS CELL CARCINOMA.     M. LATERAL NASAL WALL MARGIN, NEAR SUPERIOR TURBINATE, EXCISION:   - Sinonasal mucosa, negative for malignancy.     N. PTERYGOPALATINE FOSSA RERESECTION #2, EXCISION:   - POSITIVE FOR SQUAMOUS CELL CARCINOMA.   - Perineural invasion is also present.     O. VIDIAN NERVE, BIOPSY:   - POSITIVE FOR SQUAMOUS CELL CARCINOMA.     P. TISSUE IN VIDIAN CANAL, BIOPSY:   - POSITIVE FOR SQUAMOUS CELL CARCINOMA.     Q. FINAL VIDIAN CANAL MARGIN, BIOPSY:   - POSITIVE FOR SQUAMOUS CELL CARCINOMA.   - Michelle/intraneural invasion is also present.     Report Name: Nasal Cavity and  Paranasal Sinuses        Status: Submitted Checklist Inst: 1      Last Updated By: Jose Martin Menard M.D., [facility], 10/03/2019   12:12:37   Part(s) Involved:   J: Radical maxillectomy     Synoptic Report:     SPECIMEN     Procedure:         - Radical maxillectomy         right orbital exenteration     TUMOR     Tumor Site:         - Paranasal sinus(es), maxillary     Histologic Type:           - Squamous cell carcinoma, keratinizing     Tumor Laterality:         - Right     Histologic Grade:         - G2: Moderately differentiated     Tumor Focality:         - Unifocal     Tumor Size: 4.4 x 4.0 x 3.8 cm     Tumor Extension: involves nasal bone and zygomatic bone     Lymphovascular Invasion:         - Not Identified     Perineural Invasion:         - Present     MARGINS     Margins:         - Involved by invasive tumor       Margin(s), per Orientation:           final vidian canal margin     LYMPH NODES     Number of Lymph Nodes Involved:         - 0     Number of Lymph Nodes Examined: 1     PATHOLOGIC STAGE CLASSIFICATION (PTNM, AJCC 8TH EDITION)     Primary Tumor (pT):         - pT4a     Regional Lymph Nodes (pN):         - pN0       IMPRESSION AND PLAN:   Eduardo Rubio is a 59 year old man with a T4N0 SCC of the right maxilla. He underwent surgical resection with maxillectomy and orbital exenteration with reconstruction using a latissimus free flap.     His pathology was reviewed at tumor conference today.  He is reminded for postoperative radiation and consideration of weekly cisplatin given the positive margin at the vidian canal.  This information was communicated to the patient today.    The rest of his NG tube was removed as it was sitting at 10 cm at the nose.  Given that he is healing well we will go ahead and let him start a liquid diet after evaluation by speech pathology today.  I do not want him starting on solid foods yet.      He does have quite a bit of swelling of the right side of his face  and behind his orbital exenteration reconstruction.  However on attempted aspiration there was no fluid obtained.  He was instructed to keep his head elevated.  There may be just swelling secondary to laying flat as he says it is worse in the morning and better throughout the day.    He is scheduled for an appointment on Monday.  I would like to keep this appointment given the fact that we are starting him on a diet in order to ensure that he is doing well.    Thank you very much for the opportunity to participate in the care of your patient.      Teresa Pulliam MD, M.D.  Otolaryngology- Head & Neck Surgery      This note was dictated with voice recognition software and then edited. Please excuse any unintentional errors.           CC:  Jose Roberts MD  Otolaryngology/Head & Neck Surgery  [facility] [address]      Colt Puentes MD  Otolaryngology/Head & Neck Surgery  [facility] 396

## 2019-10-07 ENCOUNTER — DOCUMENTATION ONLY (OUTPATIENT)
Dept: DENTISTRY | Facility: CLINIC | Age: 59
End: 2019-10-07

## 2019-10-07 ENCOUNTER — OFFICE VISIT (OUTPATIENT)
Dept: OTOLARYNGOLOGY | Facility: CLINIC | Age: 59
End: 2019-10-07
Payer: MEDICARE

## 2019-10-07 ENCOUNTER — HOME INFUSION (PRE-WILLOW HOME INFUSION) (OUTPATIENT)
Dept: PHARMACY | Facility: CLINIC | Age: 59
End: 2019-10-07

## 2019-10-07 VITALS — WEIGHT: 174 LBS | BODY MASS INDEX: 23.6 KG/M2

## 2019-10-07 DIAGNOSIS — C31.0 MAXILLARY SINUS CANCER (H): Primary | ICD-10-CM

## 2019-10-07 ASSESSMENT — PAIN SCALES - GENERAL: PAINLEVEL: MILD PAIN (3)

## 2019-10-07 NOTE — PROGRESS NOTES
"Dental Service Outpatient Consultation        Eduardo Rubio MRN# [id number]   YOB: 1960 Age: 59 year old              Chief Complaint:   \"They told me to come here. Can you take my stiches out?\"     History is obtained from the patient       History of Present Illness:   This patient is a 59 year old male who presents with SSC of the right maxillary sinus and underwent surgical resection on 9/24/19, to the [facility] for dental clearance exam prior to chemoradiation treatment.               Past Medical History:     Past Medical History:   Diagnosis Date     Gastric ulcer      Low back pain      Maxillary sinus cancer (H)      Toe amputation status     all ten toes             Past Surgical History:     Past Surgical History:   Procedure Laterality Date     ABDOMEN SURGERY      [facility], surgery related to gastric ulcer     AS AMPUTATION TOE,I-P JT Bilateral     all ten toes     EXENTERATION ORBIT Right 9/24/2019    Procedure: Right Orbital Exenteration;  Surgeon: Jose Roberts MD;  Location: [facility] OR     EXPLORE NECK Right 9/24/2019    Procedure: Right Neck Exploration;  Surgeon: Jose Roberts MD;  Location: [facility] OR     GRAFT FREE VASCULARIZED LATISSIMUS DORSI Right 9/24/2019    Procedure: Right Latissmus Free Flap Reconstruction;  Surgeon: Teresa Pulliam MD;  Location: [facility] OR     OSTEOTOMY MAXILLA N/A 9/24/2019    Procedure: Right Radicall Maxillectomy, Nasogastric Tube Placement;  Surgeon: Jose Roberts MD;  Location: [facility] OR               Social History:     Social History     Tobacco Use     Smoking status: Current Every Day Smoker     Packs/day: 0.50     Years: 15.00     Pack years: 7.50     Types: Cigarettes     Start date: 2004     Smokeless tobacco: Never Used   Substance Use Topics     Alcohol use: Not Currently             Family History:     Family History   Problem Relation Age of Onset     Breast Cancer Mother      Glaucoma No family hx " of      Macular Degeneration No family hx of              Immunizations:     Immunization History   Administered Date(s) Administered     Influenza Quad, Recombinant, p-free (RIV4) 09/29/2019             Allergies:   No Known Allergies          Medications:     Current Outpatient Medications Ordered in Epic   Medication     acetaminophen (TYLENOL) 325 MG tablet     aspirin (ASA) 325 MG EC tablet     chlorhexidine (PERIDEX) 0.12 % solution     cyclobenzaprine (FLEXERIL) 5 MG tablet     gabapentin (NEURONTIN) 250 MG/5ML solution     multivitamins w/minerals (CERTAVITE) liquid     order for DME     oxyCODONE (ROXICODONE) 5 MG/5ML solution     polyethylene glycol (MIRALAX/GLYCOLAX) packet     protein modular (PROSOURCE TF) LIQD     senna-docusate (SENOKOT-S/PERICOLACE) 8.6-50 MG tablet     No current Epic-ordered facility-administered medications on file.             Review of Systems:   ROS negative except for that noted in HPI.          Physical Exam:   Constitutional  Vital signs (3 of these items)  BP: 142/80  Pulse: 82  General appearance: pt wearing bandana to cover right eye and face  Ability to communicate/voice quality: normal   Constitutional: alert, active, mild distress and cooperative  Head: Right eye and face with swelling. Right eye sutures in tact.   Neck: Neck supple. No adenopathy. Thyroid symmetric, normal size,  No swelling, masses or cervical lymphadenopathy.     TMJ: Mild trismus.  No clicking, popping, or crepitus.     Soft Tissue exam:  Tongue and FOM normal in appearance.   Hard palate, soft palate, right buccal and labial mucosa with swelling. Sutures present in area of hard palate.    Gingiva: generalized erythema and edema, soft.    Hard Tissue exam:  Edentulous maxilla, multiple missing teeth in mandible. Tooth #21,23,24,25,26,27,28 present in mandible. Multiple carious lesions and clinical attachment loss present.     Neurologic: Gait normal. Reflexes normal and symmetric. Sensation grossly  WNL.  Psychiatric: mentation appears normal          Data:   Radiographic interpretation: 4 periapical radiographs exposed today, multiple radiolucencies noted which are consistent with caries, horizontal bone loss.   Date: 10/7/19  Osseous pathology: negative   Pulpal pathology: negative   Periodontal Pathology: stage II grade B periodontitis   Caries: active   Odontogenic pathology: negative          Assessment and Plan:   Assessment: Active periodontitis, stage II grade B, with multiple carious lesions on remaining teeth, poor prognosis.    Plan: Extract remaining dentition, teeth #21,23,24,25,26,27,28.       The patient was seen by and discussed with: Jordy Tyson DDS, Azar Narvaez DDS      Next Appointment: 10/15/19 @1:00 PM   Procedure: sofia Brennan PGY 1  Pager: [phone]

## 2019-10-07 NOTE — NURSING NOTE
Chief Complaint   Patient presents with     RECHECK     Follow-up     Weight 78.9 kg (174 lb).      Elaine Meek EMT

## 2019-10-07 NOTE — PROGRESS NOTES
10/04/19 1200   General Information   Type Of Visit Initial   Start Of Care Date 10/04/19   Referring Physician Dr. Teresa Pulliam   Orders Evaluate And Treat   Orders Comment Clinical Swallow Eval   Medical Diagnosis Maxillary sinus cancer; oropharyngeal dysphagia   Onset Of Illness/injury Or Date Of Surgery 09/24/19   Precautions/limitations Swallowing Precautions   Hearing Adequate for conversation   Pertinent History of Current Problem/OT: Additional Occupational Profile Info Eduardo Rubio is a 59 year old male with a T4N0 SCC of the maxillary sinus s/p total maxillectomy and orbital exenteration with latissimus free flap reconstruction on 9/24/19.  He has been recommended for postoperative radiation with consideration of weekly cisplatin.  Patient was seen in conjunction with ENT clinic today as he accidentally pulled out his NG tube.  Upon clinical interview today, patient denied difficulty swallowing prior to surgery.  He reported he is not swallowing anything since surgery other than his saliva which he is able to swallow without difficulty.   Respiratory Status Room air   Prior Level Of Function Swallowing   Prior Level Of Function Comment NPO; no difficulties prior to surgery per pt report   General Observations Pt pleasant and cooperative throughout evaluation   Patient/family Goals To eat and drink by mouth; to avoid having NG replaced   Clinical Swallow Evaluation   Oral Musculature anomalies present   Structural Abnormalities present  (s/p total maxillectomy and orbital exenteration; free flap)   Dentition upper dentures  (not wearing today)   Mucosal Quality adequate   Mandibular Strength and Mobility intact   Oral Labial Strength and Mobility impaired retraction;impaired pursing;impaired seal;impaired coordination   Lingual Strength and Mobility WFL   Velar Elevation intact   Buccal Strength and Mobility impaired   Laryngeal Function Cough;Swallow;Voicing initiated   Oral Musculature Comments Right  sided facial weakness.    Additional Documentation Yes   Clinical Swallow Eval: Thin Liquid Texture Trial   Mode of Presentation, Thin Liquids cup;self-fed   Volume of Liquid or Food Presented ~8 oz   Oral Phase of Swallow other (see comments)  (occasional anterior spillage)   Oral Residue right lip drooling   Pharyngeal Phase of Swallow intact   Diagnostic Statement No overt s/sx of aspiration.    Educational Assessment   Barriers to Learning No barriers   Esophageal Phase of Swallow   Patient reports or presents with symptoms of esophageal dysphagia No   General Therapy Interventions   Planned Therapy Interventions Dysphagia Treatment   Dysphagia treatment Oropharyngeal exercise training;Instruction of safe swallow strategies;Modified diet education;Compensatory strategies for swallowing   Swallow Eval: Clinical Impressions   Skilled Criteria for Therapy Intervention Skilled criteria met.  Treatment indicated.   Functional Assessment Scale (FAS) 5   Treatment Diagnosis Mild oral dysphagia   Diet texture recommendations Full liquid  (per MD discretion)   Recommended Feeding/Eating Techniques alternate between small bites and sips of food/liquid;hard swallow w/ each bite or sip;small sips/bites;other (see comments)  (oral cares as able)   Rehab Potential good, to achieve stated therapy goals   Therapy Frequency   (1x/week x 4 weeks)   Anticipated Discharge Disposition home w/ outpatient services   Risks and Benefits of Treatment have been explained. Yes   Patient, family and/or staff in agreement with Plan of Care Yes   Clinical Impression Comments Patient presents with mild-moderate oral dysphagia s/p total maxillectomy and orbital exenteration with free flap reconstruction.  Patient demonstrates reduced strength, coordination and range of motion of oral musculature on the right side of the face, resulting in occasional anterior spillage of thin liquids.  Otherwise,  oral musculature is within functional limits.   Patient demonstrates no overt signs or symptoms of aspiration with thin liquids.  Only thin liquids were trialed today per MD request.  Per discussion with MD, recommend initiation of full thin liquid diet.  Diet to be advanced per MD discretion.  Recommend use of swallowing strategies including small sips and slow pace.  Patient trained on diet recommendations and swallowing strategies as listed above.  He verbalizes agreement to return to clinic on Monday to evaluate tolerance of full thin liquid diet and use of swallowing strategies.    Swallow Goals   SLP Swallow Goals 1;2   Swallow Goal 1   Goal Identifier Diet   Goal Description 1.  Patient will tolerate soft moist solid foods and thin liquids with independent use of swallowing strategies and no overt signs or symptoms of aspiration and at least 90% of trials.   Target Date 01/03/20   Swallow Goal 2   Goal Identifier Exercises   Goal Description 2.  Pt will improve ROM and strength of tongue, BOT, pharynx and jaw by completing 10 repetitions per exercise at least 3 times daily with minimal written or verbal cues.    Target Date 01/03/20   Total Session Time   SLP Eval: oral/pharyngeal swallow function, clinical minutes (38123) 15   Total Evaluation Time 15     Thank you for the referral of Eduardo Rubio. If you have any questions about this report, please contact me using the information below.     Tahmina Cruz MA, CCC-SLP  Speech-Language Pathologist   [facility]  Department of Otolaryngology/D&T - 4th floor  Pager: [phone]  Phone: [phone]  Email: [email or web address]

## 2019-10-07 NOTE — PATIENT INSTRUCTIONS
1. Please follow-up in clinic on Monday 10/14 at 9:00am.   2. Please call the ENT clinic with any questions,concerns, new or worsening symptoms.    -Clinic number is [phone]   - Svitlana's direct line (Dr. Pulliam's nurse) [phone]

## 2019-10-07 NOTE — LETTER
10/7/2019        RE: Eduardo Rubio  [address] [id number]  [facility] [facility] [place] [address]     Dear Colleague,    Thank you for referring your patient, Eduardo Rubio, to the [facility] at [facility]. Please see a copy of my visit note below.    Dear Dr. Roberts:    I had the pleasure of seeing Eduardo Rubio in follow-up today at the [facility].     History of Present Illness:   Eduardo Rubio is a 59 year old man with a T4N0 SCC of the maxillary sinus. The patient has a history of facial pain and numbness along with vision changes that resulted in a visit to the ER on 8/18/2019. An MRI was obtained which demonstrated a maxillary sinus mass with extension to the orbit with involvement of the inferior rectus muscle. He had a biopsy performed locally which was consistent with SCC. He saw Dr Wick on 8/29/2019. He was referred to ophthalmology for evaluation of his vision changes. He had a PET scan which showed the tumor in the maxillary sinus with bony erosion, extension to orbit, small lung nodule, no ramona disease. His case was reviewed at tumor conference with recommendation for surgery with total maxillectomy and orbital exenteration with free flap reconstruction. He was taken to the OR on 9/24/2019. He had reconstruction with a latissimus free flap. His postoperative course was uncomplicated. His final pathology demonstrated a 4.4 cm SCC with involvement of the zygomatic and nasal bone, PNI, no LVSI, positive margin at the pterygopalatine fossa with positive vidian nerve. He was recommended for postoperative radiation with consideration of weekly cisplatin.     Teaching statement:  He comes in today for follow-up. He was last seen 10/4/2019. At that time his NG was dislodged. He was started on a liquid diet.  He says he is doing well with the liquid diet. He thinks his drain is ready to be removed. He is worried he accidentally  partially pulled the drain this morning. He saw dental today and is going to get some teeth pulled.      MEDICATIONS:     Current Outpatient Medications   Medication Sig Dispense Refill     acetaminophen (TYLENOL) 325 MG tablet Take 325-650 mg by mouth every 6 hours as needed for mild pain       aspirin (ASA) 325 MG EC tablet Take 325 mg by mouth every 6 hours as needed for moderate pain       chlorhexidine (PERIDEX) 0.12 % solution Swish and spit 15 mLs in mouth 4 times daily 473 mL 0     cyclobenzaprine (FLEXERIL) 5 MG tablet 1 tablet (5 mg) by Per Feeding Tube route daily as needed for muscle spasms (Patient not taking: Reported on 10/4/2019) 3 tablet 0     gabapentin (NEURONTIN) 250 MG/5ML solution 2 mLs (100 mg) by Per Feeding Tube route At Bedtime for 7 days (Patient not taking: Reported on 10/4/2019) 14 mL 0     multivitamins w/minerals (CERTAVITE) liquid 15 mLs by Per Feeding Tube route daily for 14 days 210 mL 0     order for DME Equipment being ordered: Nasogastric bolus tube feeding supplies  Formula: Nutren 1.5, 6 cans per day  Gravity feeding bags  60 mL syringes    Treatment Diagnosis: 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, s/p flap graft 14 days 1     oxyCODONE (ROXICODONE) 5 MG/5ML solution 5 mLs (5 mg) by Per NG tube route every 4 hours as needed for moderate to severe pain 300 mL 0     polyethylene glycol (MIRALAX/GLYCOLAX) packet 17 g by Per NG tube route daily as needed for constipation 15 packet 0     protein modular (PROSOURCE TF) LIQD 2 packets by Per Feeding Tube route daily for 14 days 28 packet 0     senna-docusate (SENOKOT-S/PERICOLACE) 8.6-50 MG tablet 1 tablet by Per Feeding Tube route 2 times daily as needed for constipation 30 tablet 0       ALLERGIES:  No Known Allergies    HABITS/SOCIAL HISTORY:   Smoker 1/2 ppd  No chewing tobacco use  Lives with girlfriend or with friends  No alcohol use  Not currently employed    Social History     Socioeconomic History     Marital  status:      Spouse name: Not on file     Number of children: Not on file     Years of education: Not on file     Highest education level: Not on file   Occupational History     Not on file   Social Needs     Financial resource strain: Not on file     Food insecurity:     Worry: Not on file     Inability: Not on file     Transportation needs:     Medical: Not on file     Non-medical: Not on file   Tobacco Use     Smoking status: Current Every Day Smoker     Packs/day: 0.50     Years: 15.00     Pack years: 7.50     Types: Cigarettes     Start date: 2004     Smokeless tobacco: Never Used   Substance and Sexual Activity     Alcohol use: Not Currently     Drug use: Not Currently     Sexual activity: Not on file   Lifestyle     Physical activity:     Days per week: Not on file     Minutes per session: Not on file     Stress: Not on file   Relationships     Social connections:     Talks on phone: Not on file     Gets together: Not on file     Attends Faith service: Not on file     Active member of club or organization: Not on file     Attends meetings of clubs or organizations: Not on file     Relationship status: Not on file     Intimate partner violence:     Fear of current or ex partner: Not on file     Emotionally abused: Not on file     Physically abused: Not on file     Forced sexual activity: Not on file   Other Topics Concern     Not on file   Social History Narrative     Not on file       PAST MEDICAL HISTORY:   Past Medical History:   Diagnosis Date     Gastric ulcer      Low back pain      Maxillary sinus cancer (H)      Toe amputation status     all ten toes        PAST SURGICAL HISTORY:   Past Surgical History:   Procedure Laterality Date     ABDOMEN SURGERY      [facility], surgery related to gastric ulcer     AS AMPUTATION TOE,I-P JT Bilateral     all ten toes     EXENTERATION ORBIT Right 9/24/2019    Procedure: Right Orbital Exenteration;  Surgeon: Jose Roberts MD;  Location:  OR     [facility]  NECK Right 9/24/2019    Procedure: Right Neck Exploration;  Surgeon: Jose Roberts MD;  Location: [facility] OR     GRAFT FREE VASCULARIZED LATISSIMUS DORSI Right 9/24/2019    Procedure: Right Latissmus Free Flap Reconstruction;  Surgeon: Teresa Pulliam MD;  Location: [facility] OR     OSTEOTOMY MAXILLA N/A 9/24/2019    Procedure: Right Radicall Maxillectomy, Nasogastric Tube Placement;  Surgeon: Jose Roberts MD;  Location: [facility] OR       FAMILY HISTORY:    Family History   Problem Relation Age of Onset     Breast Cancer Mother      Glaucoma No family hx of      Macular Degeneration No family hx of        REVIEW OF SYSTEMS:  12 point ROS was negative other than the symptoms noted above in the HPI.  Patient Supplied Answers to Review of Systems  [facility] ROS 10/4/2019   Constitutional Weight loss, Problems with sleep   Neurology Dizzy spells, Headache   Eyes -   Ears, Nose, Throat Nasal congestion or drainage   Musculoskeletal -   Endocrine -         PHYSICAL EXAMINATION:   Wt 78.9 kg (174 lb)   BMI 23.60 kg/m      Patient in NAD  Breathing comfortably on room air  Healthy appearing skin paddle at orbital exenteration site  Some swelling of the right face and right orbit, soft, decreased from previous   Healthy appearing skin paddle intraorally, no dehiscence, incomplete healing of medial suture line but no obvious breakdown, no significant swelling  Lateral rhinotomy incision healing appropriately  Latissimus donor site healing appropriately, nylon sutures in place, MARQUEZ drain with serous fluid and some thick clot within the tubing      RESULTS REVIEWED:         IMPRESSION AND PLAN:   Eduardo Rubio is a 59 year old man with a T4N0 SCC of the right maxilla. He underwent surgical resection with maxillectomy and orbital exenteration with reconstruction using a latissimus free flap.     I would like him to stay on the liquid diet to allow a bit more healing.     He saw dental today. He has an appointment scheduled with  radiation oncology.    I had speech do pre-radiation teaching today.    He will see Dr Roberts next week for another wound check. I will see him one more time before radiation after he starts a solid diet.    Thank you very much for the opportunity to participate in the care of your patient.      Teresa Pulliam MD, M.D.  Otolaryngology- Head & Neck Surgery      This note was dictated with voice recognition software and then edited. Please excuse any unintentional errors.           CC:  Jose Roberts MD  Otolaryngology/Head & Neck Surgery  [facility] [address]      Colt Puentes MD  Otolaryngology/Head & Neck Surgery  [facility] [address]

## 2019-10-07 NOTE — PROGRESS NOTES
Attending addendum:   I saw and examined the patient with the resident and agree with the documented plan of care.    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 His pathology demonstrates a 4.4 x 4 x 3.8 cm moderately differentiated squamous cell carcinoma with involvement of the zygomatic and nasal bones, perineural invasion and microscopically positive margins involving the vidian nerve at the vidian canal. On examination, he continues to have marked edema within the right maxillectomy surgical bed although he does report that this has significantly decreased in size over the past week.      Given the adverse pathologic features including the presence of a positive surgical margin, I concurred with the tumor board's recommendations to proceed with adjuvant chemoradiotherapy with the goal of improved locoregional disease control. I specifically discussed the plan of care consisting of 66 Gy delivered in 33 daily fractions delivered to the surgical bed with elective coverage of the high-risk lymphatics. I also briefly discussed the use of concurrent chemotherapy for radiosensitization purposes although deferred a more extensive discussion on this topic to my colleagues in medical oncology. I reviewed the acute and late-term radiation-induced toxicities associated with head and neck radiotherapy in general as well as the potential side effects associated with my planned regimen and answered Mr. Rubio's questions to his stated satisfaction. I will have Mr. Rubio return to clinic on Monday, 10/21/2019, for a CT-simulation session with treatment to tentatively start the week of 10/28/2019.    Eric Santillan MD/PhD    Dept of Radiation Oncology  [facility]           Department of Radiation Oncology  [facility]  [address] [address]  [phone]       Consultation Note    Name: Eduardo Rubio MRN: [id number]   : 1960   Date of Service: 10/11/2019  Referring: Dr. 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 Rubio is a 59 year old male who was recently diagnosed with a right maxillary sinus cancer after he initially presented to an outside ED in 2019 with complaints of right facial pressure, numbness, right-sided visual change and nasal drainage for several days' duration. Imaging workup revealed a large lesion in the right maxillary sinus. Biopsy on 2019 ([id number]) was consistent with p16 negative papillary squamous cell carcinoma. Mr. Rubio was referred to [facility]. Staging PET/CT on 2019 measured the FDG-avid maxillary mass at 4.0 x 3.9 x 4.2 cm. There was tumor extension into the right orbital cavity superiorly, the right nasal cavity medially, the retromaxillary fat region posterolaterally, the right pterygopalatine fossa posteriorly, and the premaxillary fat anteriorly. In the orbital cavity there was abutment of the inferior rectus muscle. There was no evidence of lymphadenopathy or systemic disease.     Mr. Rubio underwent a total maxillectomy with orbital exenteration on 2019 with Dr. Roberts, followed by reconstruction with a latissimus free flap with Dr. Pulliam. Surgical pathology ([id number]) showed a 4.4 cm moderately differentiated squamous cell carcinoma, (-) LVSI, (+)PNI. There was a positive margin at the pterygopalatine fossa, specifically the vidian nerve taken at its exit from the vidian canal, and additional resection into the canal was not possible. Mr. Rubio's case was discussed in the [facility]'s multidisciplinary head and neck tumor board, with the consensus  recommendation to proceed with adjuvant chemoradiation given the positive margin status.    Mr. Rubio presents today for consultation. On exam, he is recovering well from surgery. He has remnant pain from surgery which is well-controlled with oxycodone 5 mg every 4 hour as needed. He had 2 MARQUEZ drains in place post operatively, and had one removed recently when he was seen by Dr. Pulliam. He is concerned that an area of swelling has developed in the right lateral back where the removed drain previously was situated. He endorses right-sided facial numbness, denies any vision/hearing change, focal weakness/change in sensation, dysphasia, odynophagia or any respiratory symptoms. The remaining of his ROS was within normal.    Past Medical History:    Tobacco abuse    Gastric ulcer    Maxillary sinus cancer per HPI    Past Surgical History:  Per HPI  Toe amputation  Abdomen surgery, unspecified, related to gastric ulcer    Chemotherapy History:  None    Radiation History:  None    Pregnant: Not Applicable  Implanted Cardiac Devices: No    Medications:  Current Outpatient Medications   Medication     acetaminophen (TYLENOL) 325 MG tablet     aspirin (ASA) 325 MG EC tablet     chlorhexidine (PERIDEX) 0.12 % solution     cyclobenzaprine (FLEXERIL) 5 MG tablet     gabapentin (NEURONTIN) 250 MG/5ML solution     multivitamins w/minerals (CERTAVITE) liquid     order for DME     oxyCODONE (ROXICODONE) 5 MG/5ML solution     polyethylene glycol (MIRALAX/GLYCOLAX) packet     protein modular (PROSOURCE TF) LIQD     senna-docusate (SENOKOT-S/PERICOLACE) 8.6-50 MG tablet     No current facility-administered medications for this visit.      Allergies:  No Known Allergies    Social History:  Tobacco: Current 1ppd, since 2004, 7.5 pack-year  Alcohol: Not currently    Family History:    Mother had a history of breast cancer    Review of Systems   A 10-point review of systems was performed. Pertinent findings are noted in the  HPI.    Physical Exam   ECOG Status: 0    VITALS: /81   Wt 78 kg (172 lb)   BMI 23.33 kg/m    GEN: Appears well, alert, oriented, and in NAD  HEENT: Bulky right orbital/maxillary free flap. Mild surrounding erythema with no palpable induration or masses. Left eye with normal EOM.  NECK: Mild fibrosis within the right upper neck. No palpable cervical or supraclavicular lymphadenopathy  CV: Good distal perfusion  RESP: Breathing comfortably on room air  SKIN: Very mild erythema surrounding the drain site along the right lateral chest wall. No fluctuance or induration.  NEURO: Normal and symmetric motor and sensory exam in bilateral upper and lower extremities, normal gait  PSYCH: Appropriate mood and affect    Imaging/Path/Labs   Imaging: Reviewed    Path: Reviewed    Labs: Reviewed    Assessment    Mr. 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 He is status post  total maxillectomy with orbital exenteration. There was a positive margin at the vidian nerve.    Plan   We had a long discussion with Mr. Rubio regarding the rationale for the use of adjuvant radiotherapy in the treatment of his disease. Our intension is to improve locoregional disease control. We discussed our tentative plan to treat the highest-risk areas to a dose of 66 Gy delivered in 33 once-daily fractions with additional elective coverage of the surrounding at-risk tissues and ipsilateral cervical lymphatics Ib and II. We reviewed the logistics, alteratives, and potential acute and late-term toxicities associated with this regimen. Mr. Rubio asked plenty of questions, which were answered to his satisfaction, and verbalized understanding. An informed consent was signed. He will return on 10/21/2019 for CT simulation with a plant to start treatment on 10/28/2019. In the interim, he was instructed to call or return to clinic with any additional questions  or concerns and was furthermore strongly counseled to not resume cigarette smoking.     The patient was seen and discussed with staff, Dr. Santillan.    Daphne Cochran MD  Resident, PGY-2  Department of Radiation Oncology  [facility]

## 2019-10-08 NOTE — PROGRESS NOTES
This is a recent snapshot of the patient's [facility] medical record.  For current drug dose and complete information and questions, call [phone] or In Basket pool, [facility] home infusion ([id number])  CSN Number:  [id number]

## 2019-10-10 ENCOUNTER — HOME INFUSION (PRE-WILLOW HOME INFUSION) (OUTPATIENT)
Dept: PHARMACY | Facility: CLINIC | Age: 59
End: 2019-10-10

## 2019-10-10 NOTE — TELEPHONE ENCOUNTER
*Pt was rescheduled from 11/19/19 with Dr House to 10/23/19 with Dr Ferraro*      ONCOLOGY INTAKE: Records Information        APPT INFORMATION: 10/23/19 - J[facility] - [facility]  Referring provider:  Shasha  Referring provider s clinic:  [facility]  Reason for visit/diagnosis:  maxillary sinus cancer  Has patient been notified of appointment date and time?: Yes     RECORDS INFORMATION:  Were the records received with the referral (via [employer])? Internal referral     Has patient been seen for any external appt for this diagnosis? No     If yes, where? NA     Has patient had any imaging or procedures outside of [place]  [facility] for this condition? No                                If Yes, where? NA     ADDITIONAL INFORMATION:  Rescheduled via inbasket from Cailin LEBLANC & Shira MOLINA  I called & spoke with pt to confirm

## 2019-10-11 ENCOUNTER — OFFICE VISIT (OUTPATIENT)
Dept: RADIATION ONCOLOGY | Facility: CLINIC | Age: 59
End: 2019-10-11
Attending: OTOLARYNGOLOGY
Payer: MEDICARE

## 2019-10-11 VITALS — DIASTOLIC BLOOD PRESSURE: 81 MMHG | WEIGHT: 172 LBS | SYSTOLIC BLOOD PRESSURE: 126 MMHG | BODY MASS INDEX: 23.33 KG/M2

## 2019-10-11 DIAGNOSIS — C31.0 MAXILLARY SINUS CANCER (H): ICD-10-CM

## 2019-10-11 PROCEDURE — G0463 HOSPITAL OUTPT CLINIC VISIT: HCPCS | Performed by: RADIOLOGY

## 2019-10-11 ASSESSMENT — LIFESTYLE VARIABLES: SUBSTANCE_ABUSE: 0

## 2019-10-11 ASSESSMENT — ENCOUNTER SYMPTOMS
DOUBLE VISION: 0
FLANK PAIN: 0
MYALGIAS: 0
DIARRHEA: 0
HEMATURIA: 0
TREMORS: 0
INSOMNIA: 0
FREQUENCY: 0
HALLUCINATIONS: 0
DIZZINESS: 1
NAUSEA: 0
EYE PAIN: 1
CONSTIPATION: 1
DEPRESSION: 0
ORTHOPNEA: 0
WEIGHT LOSS: 1
BLOOD IN STOOL: 0
FALLS: 0
FEVER: 1
MEMORY LOSS: 0
CLAUDICATION: 0
VOMITING: 0
HEADACHES: 0
HEMOPTYSIS: 0
BLURRED VISION: 1
FOCAL WEAKNESS: 0
DYSURIA: 0
STRIDOR: 0
ABDOMINAL PAIN: 0
BACK PAIN: 1
TINGLING: 0
SHORTNESS OF BREATH: 0
PALPITATIONS: 0
SEIZURES: 0
POLYDIPSIA: 0
EYE DISCHARGE: 0
HEARTBURN: 0
COUGH: 0
SPEECH CHANGE: 0
DIAPHORESIS: 0
EYE REDNESS: 0
SPUTUM PRODUCTION: 0
LOSS OF CONSCIOUSNESS: 0
WHEEZING: 0
PHOTOPHOBIA: 0
SINUS PAIN: 0
SORE THROAT: 0
NECK PAIN: 0
CHILLS: 1
NERVOUS/ANXIOUS: 1
SENSORY CHANGE: 0
BRUISES/BLEEDS EASILY: 0
WEAKNESS: 0
PND: 0

## 2019-10-11 NOTE — LETTER
10/11/2019      RE: Eduardo Rubio  [address] [facility] [address] [address]  [facility] [address]       Attending addendum:   I saw and examined the patient with the resident and agree with the documented plan of care.    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 His pathology demonstrates a 4.4 x 4 x 3.8 cm moderately differentiated squamous cell carcinoma with involvement of the zygomatic and nasal bones, perineural invasion and microscopically positive margins involving the vidian nerve at the vidian canal. On examination, he continues to have marked edema within the right maxillectomy surgical bed although he does report that this has significantly decreased in size over the past week.      Given the adverse pathologic features including the presence of a positive surgical margin, I concurred with the tumor board's recommendations to proceed with adjuvant chemoradiotherapy with the goal of improved locoregional disease control. I specifically discussed the plan of care consisting of 66 Gy delivered in 33 daily fractions delivered to the surgical bed with elective coverage of the high-risk lymphatics. I also briefly discussed the use of concurrent chemotherapy for radiosensitization purposes although deferred a more extensive discussion on this topic to my colleagues in medical oncology. I reviewed the acute and late-term radiation-induced toxicities associated with head and neck radiotherapy in general as well as the potential side effects associated with my planned regimen and answered Mr. Rubio's questions to his stated satisfaction. I will have Mr. Rubio return to clinic on Monday, 10/21/2019, for a CT-simulation session with treatment to tentatively start the week of 10/28/2019.    Eric Santillan MD/PhD    Dept of Radiation  Oncology  [facility]           Department of Radiation Oncology  [facility]  [address] [place] [address]  [phone]       Consultation Note    Name: Eduardo Rubio MRN: [id number]   : 1960   Date of Service: 10/11/2019  Referring: Dr. 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 Rubio is a 59 year old male who was recently diagnosed with a right maxillary sinus cancer after he initially presented to an outside ED in 2019 with complaints of right facial pressure, numbness, right-sided visual change and nasal drainage for several days' duration. Imaging workup revealed a large lesion in the right maxillary sinus. Biopsy on 2019 ([id number]) was consistent with p16 negative papillary squamous cell carcinoma. Mr. Rubio was referred to [facility]. Staging PET/CT on 2019 measured the FDG-avid maxillary mass at 4.0 x 3.9 x 4.2 cm. There was tumor extension into the right orbital cavity superiorly, the right nasal cavity medially, the retromaxillary fat region posterolaterally, the right pterygopalatine fossa posteriorly, and the premaxillary fat anteriorly. In the orbital cavity there was abutment of the inferior rectus muscle. There was no evidence of lymphadenopathy or systemic disease.     Mr. Rubio underwent a total maxillectomy with orbital exenteration on 2019 with Dr. Roberts, followed by reconstruction with a latissimus free flap with Dr. Pulliam. Surgical pathology ([id number]) showed a 4.4 cm moderately differentiated squamous cell carcinoma, (-) LVSI, (+)PNI. There was a positive margin at the pterygopalatine fossa, specifically the vidian nerve taken at its exit from the vidian canal, and additional resection into the canal was not possible. Mr. Rubio's case was discussed  in the [facility]'s multidisciplinary head and neck tumor board, with the consensus recommendation to proceed with adjuvant chemoradiation given the positive margin status.    Mr. Rubio presents today for consultation. On exam, he is recovering well from surgery. He has remnant pain from surgery which is well-controlled with oxycodone 5 mg every 4 hour as needed. He had 2 MARQUEZ drains in place post operatively, and had one removed recently when he was seen by Dr. Pulliam. He is concerned that an area of swelling has developed in the right lateral back where the removed drain previously was situated. He endorses right-sided facial numbness, denies any vision/hearing change, focal weakness/change in sensation, dysphasia, odynophagia or any respiratory symptoms. The remaining of his ROS was within normal.    Past Medical History:    Tobacco abuse    Gastric ulcer    Maxillary sinus cancer per HPI    Past Surgical History:  Per HPI  Toe amputation  Abdomen surgery, unspecified, related to gastric ulcer    Chemotherapy History:  None    Radiation History:  None    Pregnant: Not Applicable  Implanted Cardiac Devices: No    Medications:  Current Outpatient Medications   Medication     acetaminophen (TYLENOL) 325 MG tablet     aspirin (ASA) 325 MG EC tablet     chlorhexidine (PERIDEX) 0.12 % solution     cyclobenzaprine (FLEXERIL) 5 MG tablet     gabapentin (NEURONTIN) 250 MG/5ML solution     multivitamins w/minerals (CERTAVITE) liquid     order for DME     oxyCODONE (ROXICODONE) 5 MG/5ML solution     polyethylene glycol (MIRALAX/GLYCOLAX) packet     protein modular (PROSOURCE TF) LIQD     senna-docusate (SENOKOT-S/PERICOLACE) 8.6-50 MG tablet     No current facility-administered medications for this visit.      Allergies:  No Known Allergies    Social History:  Tobacco: Current 1ppd, since 2004, 7.5 pack-year  Alcohol: Not currently    Family History:    Mother had a history of breast cancer    Review of  Systems   A 10-point review of systems was performed. Pertinent findings are noted in the HPI.    Physical Exam   ECOG Status: 0    VITALS: /81   Wt 78 kg (172 lb)   BMI 23.33 kg/m     GEN: Appears well, alert, oriented, and in NAD  HEENT: Bulky right orbital/maxillary free flap. Mild surrounding erythema with no palpable induration or masses. Left eye with normal EOM.  NECK: Mild fibrosis within the right upper neck. No palpable cervical or supraclavicular lymphadenopathy  CV: Good distal perfusion  RESP: Breathing comfortably on room air  SKIN: Very mild erythema surrounding the drain site along the right lateral chest wall. No fluctuance or induration.  NEURO: Normal and symmetric motor and sensory exam in bilateral upper and lower extremities, normal gait  PSYCH: Appropriate mood and affect    Imaging/Path/Labs   Imaging: Reviewed    Path: Reviewed    Labs: Reviewed    Assessment    Mr. 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 He is status post  total maxillectomy with orbital exenteration. There was a positive margin at the vidian nerve.    Plan   We had a long discussion with Mr. Rubio regarding the rationale for the use of adjuvant radiotherapy in the treatment of his disease. Our intension is to improve locoregional disease control. We discussed our tentative plan to treat the highest-risk areas to a dose of 66 Gy delivered in 33 once-daily fractions with additional elective coverage of the surrounding at-risk tissues and ipsilateral cervical lymphatics Ib and II.  We reviewed the logistics, alteratives, and potential acute and late-term toxicities associated with this regimen. Mr. Rubio asked plenty of questions, which were answered to his satisfaction, and verbalized understanding. An informed consent was signed. He will return on 10/21/2019 for CT simulation with a plant to start treatment on  10/28/2019.  In the interim, he was instructed to call or return to clinic with any additional questions or concerns and was furthermore strongly counseled to not resume cigarette smoking.     The patient was seen and discussed with staff, Dr. Santillan.    Daphne Cochran MD  Resident, PGY-2  Department of Radiation Oncology  [facility]             HPI    INITIAL PATIENT ASSESSMENT    Diagnosis: Max Sinus Cancer    Prior radiation therapy: None    Prior chemotherapy: None    Prior hormonal therapy:No    Pain Eval:  Current history of pain associated with this visit:   Intensity: 5/10  Current: dull and aching  Location: All over  Treatment: Oxycodone every 4 hours    Psychosocial  Living arrangements: Lives alone  Fall Risk: independent   referral needs: Not needed    Advanced Directive: No  Implantable Cardiac Device? No        Nurse face-to-face time: Level 5:  over 15 min face to face time  Review of Systems   Constitutional: Positive for chills, fever, malaise/fatigue and weight loss. Negative for diaphoresis.   HENT: Positive for congestion. Negative for ear discharge, ear pain, hearing loss, nosebleeds, sinus pain, sore throat and tinnitus.    Eyes: Positive for blurred vision and pain. Negative for double vision, photophobia, discharge and redness.   Respiratory: Negative for cough, hemoptysis, sputum production, shortness of breath, wheezing and stridor.    Cardiovascular: Negative for chest pain, palpitations, orthopnea, claudication, leg swelling and PND.   Gastrointestinal: Positive for constipation. Negative for abdominal pain, blood in stool, diarrhea, heartburn, melena, nausea and vomiting.   Genitourinary: Negative for dysuria, flank pain, frequency, hematuria and urgency.   Musculoskeletal: Positive for back pain. Negative for falls, joint pain, myalgias and neck pain.   Skin: Negative for itching and rash.   Neurological: Positive for dizziness. Negative for tingling,  tremors, sensory change, speech change, focal weakness, seizures, loss of consciousness, weakness and headaches.   Endo/Heme/Allergies: Negative for environmental allergies and polydipsia. Does not bruise/bleed easily.   Psychiatric/Behavioral: Negative for depression, hallucinations, memory loss, substance abuse and suicidal ideas. The patient is nervous/anxious. The patient does not have insomnia.        Eric Santillan MD

## 2019-10-11 NOTE — PROGRESS NOTES
HPI    INITIAL PATIENT ASSESSMENT    Diagnosis: Max Sinus Cancer    Prior radiation therapy: None    Prior chemotherapy: None    Prior hormonal therapy:No    Pain Eval:  Current history of pain associated with this visit:   Intensity: 5/10  Current: dull and aching  Location: All over  Treatment: Oxycodone every 4 hours    Psychosocial  Living arrangements: Lives alone  Fall Risk: independent   referral needs: Not needed    Advanced Directive: No  Implantable Cardiac Device? No        Nurse face-to-face time: Level 5:  over 15 min face to face time  Review of Systems   Constitutional: Positive for chills, fever, malaise/fatigue and weight loss. Negative for diaphoresis.   HENT: Positive for congestion. Negative for ear discharge, ear pain, hearing loss, nosebleeds, sinus pain, sore throat and tinnitus.    Eyes: Positive for blurred vision and pain. Negative for double vision, photophobia, discharge and redness.   Respiratory: Negative for cough, hemoptysis, sputum production, shortness of breath, wheezing and stridor.    Cardiovascular: Negative for chest pain, palpitations, orthopnea, claudication, leg swelling and PND.   Gastrointestinal: Positive for constipation. Negative for abdominal pain, blood in stool, diarrhea, heartburn, melena, nausea and vomiting.   Genitourinary: Negative for dysuria, flank pain, frequency, hematuria and urgency.   Musculoskeletal: Positive for back pain. Negative for falls, joint pain, myalgias and neck pain.   Skin: Negative for itching and rash.   Neurological: Positive for dizziness. Negative for tingling, tremors, sensory change, speech change, focal weakness, seizures, loss of consciousness, weakness and headaches.   Endo/Heme/Allergies: Negative for environmental allergies and polydipsia. Does not bruise/bleed easily.   Psychiatric/Behavioral: Negative for depression, hallucinations, memory loss, substance abuse and suicidal ideas. The patient is  nervous/anxious. The patient does not have insomnia.

## 2019-10-14 ENCOUNTER — THERAPY VISIT (OUTPATIENT)
Dept: SPEECH THERAPY | Facility: CLINIC | Age: 59
End: 2019-10-14
Payer: MEDICARE

## 2019-10-14 ENCOUNTER — OFFICE VISIT (OUTPATIENT)
Dept: OTOLARYNGOLOGY | Facility: CLINIC | Age: 59
End: 2019-10-14
Payer: MEDICARE

## 2019-10-14 VITALS
RESPIRATION RATE: 16 BRPM | SYSTOLIC BLOOD PRESSURE: 117 MMHG | TEMPERATURE: 97.7 F | HEART RATE: 93 BPM | HEIGHT: 72 IN | OXYGEN SATURATION: 98 % | WEIGHT: 168.8 LBS | DIASTOLIC BLOOD PRESSURE: 71 MMHG | BODY MASS INDEX: 22.86 KG/M2

## 2019-10-14 DIAGNOSIS — C31.0 MAXILLARY SINUS CANCER (H): ICD-10-CM

## 2019-10-14 DIAGNOSIS — R13.12 OROPHARYNGEAL DYSPHAGIA: Primary | ICD-10-CM

## 2019-10-14 DIAGNOSIS — C31.0 MAXILLARY SINUS CANCER (H): Primary | ICD-10-CM

## 2019-10-14 ASSESSMENT — PAIN SCALES - GENERAL: PAINLEVEL: MODERATE PAIN (4)

## 2019-10-14 ASSESSMENT — MIFFLIN-ST. JEOR: SCORE: 1618.67

## 2019-10-14 NOTE — PROGRESS NOTES
This is a recent snapshot of the patient's [facility] medical record.  For current drug dose and complete information and questions, call [phone] or In Basket pool, [facility]v home infusion ([id number])  CSN Number:  [id number]

## 2019-10-14 NOTE — PROGRESS NOTES
Right flank sutures removed without difficulty. Patient educated on signs and symptoms of infection and incision care.    MARQUEZ drain still greater than 30ml in 24 hours. MARQUEZ drain secured in place with Tegederm dressing and patient will call when less than 30ml in 24 hours to plan for removal.     Svitlana Hammonds, RN, BSN

## 2019-10-14 NOTE — PROGRESS NOTES
HISTORY OF PRESENT ILLNESS:  Mr. Rubio returns.  He is about three weeks out from a right-sided radical maxillectomy with orbital exenteration for squamous cell carcinoma.  He had a positive margin in the vidian canal and is expected to get radiation therapy.  Dr. Pulliam performed a latissimus flap.  He has done very well in the hospital and postoperatively.  Today, he notes that one of his drains was taken out last week.   He was started on liquids and is enjoying this, but would like to advance his diet.  He also notes that he did have some swelling in his flank incision recently.      PHYSICAL EXAMINATION:  The latissimus flap continues to look well and is swollen at the site of the orbital reconstruction but continues to look healthy.  Intraorally, the healing seems excellent.  Circumferentially in the mouth, I do not see any evidence of any breakdown.  Examination of the right flank reveals the incision is healing nicely.  Sutures will be removed today.      IMPRESSION:  Healing well.      PLAN:   1.  I feel comfortable advancing him slightly to soft foods.  He will see Nelida Giron about that today.   2.  He will follow up with Dr. Pulliam in about 10 days.

## 2019-10-14 NOTE — PATIENT INSTRUCTIONS
1. Please follow-up in clinic on 10/21 at 9:00am with Dr. Pulliam.   2. Please call the ENT clinic with any questions,concerns, new or worsening symptoms.    -Clinic number is [phone]   - Svitlana's direct line (Dr. Roberts's nurse) [phone]

## 2019-10-14 NOTE — NURSING NOTE
Chief Complaint   Patient presents with     Post-op Visit     Right Orbital Exenteration, DOS 09/24     /71 (BP Location: Right arm, Patient Position: Sitting, Cuff Size: Adult Regular)   Pulse 93   Temp 97.7  F (36.5  C) (Oral)   Resp 16   Ht 1.829 m (6')   Wt 76.6 kg (168 lb 12.8 oz)   SpO2 98%   BMI 22.89 kg/m      Uriel Coleman, CMA

## 2019-10-14 NOTE — LETTER
10/14/2019       RE: Eduardo Rubio  [address] [facility] [address] [address]  [facility] [address]     Dear Colleague,    Thank you for referring your patient, Eduardo Rubio, to the [facility] EAR NOSE AND THROAT at [facility]. Please see a copy of my visit note below.    HISTORY OF PRESENT ILLNESS:  Mr. Rubio returns.  He is about three weeks out from a right-sided radical maxillectomy with orbital exenteration for squamous cell carcinoma.  He had a positive margin in the vidian canal and is expected to get radiation therapy.  Dr. Pulliam performed a latissimus flap.  He has done very well in the hospital and postoperatively.  Today, he notes that one of his drains was taken out last week.   He was started on liquids and is enjoying this, but would like to advance his diet.  He also notes that he did have some swelling in his flank incision recently.      PHYSICAL EXAMINATION:  The latissimus flap continues to look well and is swollen at the site of the orbital reconstruction but continues to look healthy.  Intraorally, the healing seems excellent.  Circumferentially in the mouth, I do not see any evidence of any breakdown.  Examination of the right flank reveals the incision is healing nicely.  Sutures will be removed today.      IMPRESSION:  Healing well.      PLAN:   1.  I feel comfortable advancing him slightly to soft foods.  He will see Nelida Giron about that today.   2.  He will follow up with Dr. Pulliam in about 10 days.         Right flank sutures removed without difficulty. Patient educated on signs and symptoms of infection and incision care.    MARQUEZ drain still greater than 30ml in 24 hours. MARQUEZ drain secured in place with Tegederm dressing and patient will call when less than 30ml in 24 hours to plan for removal.     Svitlana Hammonds, RN, BSN      Again, thank you for allowing me to participate in the care of your patient.      Sincerely,    Jose Roberts,  MD

## 2019-10-17 ENCOUNTER — PATIENT OUTREACH (OUTPATIENT)
Dept: OTOLARYNGOLOGY | Facility: CLINIC | Age: 59
End: 2019-10-17

## 2019-10-17 NOTE — PROGRESS NOTES
Received a call from patient who states that he woke up and his MARQUEZ drain from his side was laying on the ground. Patient instructed to cover drain site with gauze and tape and monitor for signs of swelling or redness. He does state that he output from the drain was slowing over the last few days and was close to 30ml in 24 hour period. He was instructed to call if he has any new swelling redness or warmth at the site.     He is scheduled to follow-up with Dr. Pulliam on Monday but was encouraged to call writer sooner with any concerns. He verbalized understanding of this.     Update sent to Dr. Pulliam.     Svitlana Hammonds, RN, BSN

## 2019-10-18 ENCOUNTER — THERAPY VISIT (OUTPATIENT)
Dept: SPEECH THERAPY | Facility: CLINIC | Age: 59
End: 2019-10-18
Payer: MEDICARE

## 2019-10-18 ENCOUNTER — PATIENT OUTREACH (OUTPATIENT)
Dept: OTOLARYNGOLOGY | Facility: CLINIC | Age: 59
End: 2019-10-18

## 2019-10-18 ENCOUNTER — OFFICE VISIT (OUTPATIENT)
Dept: OTOLARYNGOLOGY | Facility: CLINIC | Age: 59
End: 2019-10-18
Payer: MEDICARE

## 2019-10-18 VITALS
OXYGEN SATURATION: 100 % | BODY MASS INDEX: 23.09 KG/M2 | TEMPERATURE: 98.9 F | HEART RATE: 100 BPM | WEIGHT: 170.5 LBS | HEIGHT: 72 IN | RESPIRATION RATE: 16 BRPM

## 2019-10-18 DIAGNOSIS — R13.12 OROPHARYNGEAL DYSPHAGIA: ICD-10-CM

## 2019-10-18 DIAGNOSIS — C31.0 MAXILLARY SINUS CANCER (H): Primary | ICD-10-CM

## 2019-10-18 ASSESSMENT — MIFFLIN-ST. JEOR: SCORE: 1626.38

## 2019-10-18 ASSESSMENT — PAIN SCALES - GENERAL: PAINLEVEL: MILD PAIN (3)

## 2019-10-18 NOTE — LETTER
10/18/2019       RE: Eduardo Rubio  [address] [facility] [address] [address]  [facility] [address]     Dear Colleague,    Thank you for referring your patient, Eduardo Rubio, to the [facility] EAR NOSE AND THROAT at [facility]. Please see a copy of my visit note below.    Dear Dr. Roberts:    I had the pleasure of seeing Eduardo Rubio in follow-up today at the [facility] Otolaryngology [facility].     History of Present Illness:   Eduardo Rubio is a 59 year old man with a T4N0 SCC of the maxillary sinus. The patient has a history of facial pain and numbness along with vision changes that resulted in a visit to the ER on 8/18/2019. An MRI was obtained which demonstrated a maxillary sinus mass with extension to the orbit with involvement of the inferior rectus muscle. He had a biopsy performed locally which was consistent with SCC. He saw Dr Wick on 8/29/2019. He was referred to ophthalmology for evaluation of his vision changes. He had a PET scan which showed the tumor in the maxillary sinus with bony erosion, extension to orbit, small lung nodule, no ramona disease. His case was reviewed at tumor conference with recommendation for surgery with total maxillectomy and orbital exenteration with free flap reconstruction. He was taken to the OR on 9/24/2019. He had reconstruction with a latissimus free flap. His postoperative course was uncomplicated. His final pathology demonstrated a 4.4 cm SCC with involvement of the zygomatic and nasal bone, PNI, no LVSI, positive margin at the pterygopalatine fossa with positive vidian nerve. He was recommended for postoperative radiation with consideration of weekly cisplatin.     Teaching statement:  He comes in today for follow-up. He was last seen 10/14/2019. At that time he had his diet advanced to soft foods. He comes in today because earlier this week he accidentally dislodged his MARQUEZ drain from his back. He is concerned  there may be some swelling in his back. He is tolerating his diet by mouth. He is having some difficulty eating secondary to pain from his dental extractions.       MEDICATIONS:     Current Outpatient Medications   Medication Sig Dispense Refill     acetaminophen (TYLENOL) 325 MG tablet Take 325-650 mg by mouth every 6 hours as needed for mild pain       aspirin (ASA) 325 MG EC tablet Take 325 mg by mouth every 6 hours as needed for moderate pain       chlorhexidine (PERIDEX) 0.12 % solution Swish and spit 15 mLs in mouth 4 times daily (Patient not taking: Reported on 10/18/2019) 473 mL 0     cyclobenzaprine (FLEXERIL) 5 MG tablet 1 tablet (5 mg) by Per Feeding Tube route daily as needed for muscle spasms (Patient not taking: Reported on 10/4/2019) 3 tablet 0     gabapentin (NEURONTIN) 250 MG/5ML solution 2 mLs (100 mg) by Per Feeding Tube route At Bedtime for 7 days (Patient not taking: Reported on 10/4/2019) 14 mL 0     order for DME Equipment being ordered: Nasogastric bolus tube feeding supplies  Formula: Nutren 1.5, 6 cans per day  Gravity feeding bags  60 mL syringes    Treatment Diagnosis: 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, s/p flap graft (Patient not taking: Reported on 10/18/2019) 14 days 1     oxyCODONE (ROXICODONE) 5 MG/5ML solution 5 mLs (5 mg) by Per NG tube route every 4 hours as needed for moderate to severe pain (Patient not taking: Reported on 10/18/2019) 300 mL 0     polyethylene glycol (MIRALAX/GLYCOLAX) packet 17 g by Per NG tube route daily as needed for constipation (Patient not taking: Reported on 10/18/2019) 15 packet 0     senna-docusate (SENOKOT-S/PERICOLACE) 8.6-50 MG tablet 1 tablet by Per Feeding Tube route 2 times daily as needed for constipation (Patient not taking: Reported on 10/18/2019) 30 tablet 0       ALLERGIES:  No Known Allergies    HABITS/SOCIAL HISTORY:   Smoker 1/2 ppd  No chewing tobacco use  Lives with girlfriend or with friends  No alcohol use  Not currently  employed    Social History     Socioeconomic History     Marital status:      Spouse name: Not on file     Number of children: Not on file     Years of education: Not on file     Highest education level: Not on file   Occupational History     Not on file   Social Needs     Financial resource strain: Not on file     Food insecurity:     Worry: Not on file     Inability: Not on file     Transportation needs:     Medical: Not on file     Non-medical: Not on file   Tobacco Use     Smoking status: Current Every Day Smoker     Packs/day: 0.50     Years: 15.00     Pack years: 7.50     Types: Cigarettes     Start date: 2004     Smokeless tobacco: Never Used   Substance and Sexual Activity     Alcohol use: Not Currently     Drug use: Not Currently     Sexual activity: Not on file   Lifestyle     Physical activity:     Days per week: Not on file     Minutes per session: Not on file     Stress: Not on file   Relationships     Social connections:     Talks on phone: Not on file     Gets together: Not on file     Attends Oriental orthodox service: Not on file     Active member of club or organization: Not on file     Attends meetings of clubs or organizations: Not on file     Relationship status: Not on file     Intimate partner violence:     Fear of current or ex partner: Not on file     Emotionally abused: Not on file     Physically abused: Not on file     Forced sexual activity: Not on file   Other Topics Concern     Not on file   Social History Narrative     Not on file       PAST MEDICAL HISTORY:   Past Medical History:   Diagnosis Date     Gastric ulcer      Low back pain      Maxillary sinus cancer (H)      Toe amputation status     all ten toes        PAST SURGICAL HISTORY:   Past Surgical History:   Procedure Laterality Date     ABDOMEN SURGERY      [facility], surgery related to gastric ulcer     AS AMPUTATION TOE,I-P JT Bilateral     all ten toes     EXENTERATION ORBIT Right 9/24/2019    Procedure: Right Orbital  Exenteration;  Surgeon: Jose Roberts MD;  Location: [facility]     EXPLORE NECK Right 9/24/2019    Procedure: Right Neck Exploration;  Surgeon: Jose Roberts MD;  Location: [facility] OR     GRAFT FREE VASCULARIZED LATISSIMUS DORSI Right 9/24/2019    Procedure: Right Latissmus Free Flap Reconstruction;  Surgeon: Teresa Pulliam MD;  Location: [facility] OR     OSTEOTOMY MAXILLA N/A 9/24/2019    Procedure: Right Radicall Maxillectomy, Nasogastric Tube Placement;  Surgeon: Jose Roberts MD;  Location: [facility] OR       FAMILY HISTORY:    Family History   Problem Relation Age of Onset     Breast Cancer Mother      Glaucoma No family hx of      Macular Degeneration No family hx of        REVIEW OF SYSTEMS:  12 point ROS was negative other than the symptoms noted above in the HPI.  Patient Supplied Answers to Review of Systems  [facility] ENT ROS 10/14/2019   Constitutional Unexplained fatigue   Neurology Numbness   Eyes Double vision   Ears, Nose, Throat -   Musculoskeletal -   Endocrine -         PHYSICAL EXAMINATION:   Pulse 100   Temp 98.9  F (37.2  C) (Oral)   Resp 16   Ht 1.829 m (6')   Wt 77.3 kg (170 lb 8 oz)   SpO2 100%   BMI 23.12 kg/m      Patient in NAD  Breathing comfortably on room air  Healthy appearing skin paddle at orbital exenteration site, some crusting present along orbital suture line (removed)  Some swelling of the right face and right orbit, soft, significantly decreased  Healthy appearing skin paddle intraorally, no dehiscence, no suture line breakdown, no significant swelling, dental extraction sites with vicryl sutures in place  Lateral rhinotomy incision healing appropriately  Latissimus donor site healed, MARQUEZ drain site with mild ecchymosis and minimal drainage. Some redundancy at superior aspect of incision with questionable fluid collection       RESULTS REVIEWED:         IMPRESSION AND PLAN:   Eduardo Rubio is a 59 year old man with a T4N0 SCC of the right maxilla. He underwent surgical  resection with maxillectomy and orbital exenteration with reconstruction using a latissimus free flap.     He is doing well. He should continue the soft diet especially in light of the recent dental extractions.     He is concerned about a seroma at his latissimus donor site. If there is anything its minimal and he was not inclined to have an attempt at needle aspiration. We gave him an abdominal binder to wear.    He was seen by SLP today.    He has an appointment on Monday. He will call to cancel if he does not have any new issues over the weekend.      Thank you very much for the opportunity to participate in the care of your patient.      Teresa Pulliam MD, M.D.  Otolaryngology- Head & Neck Surgery      This note was dictated with voice recognition software and then edited. Please excuse any unintentional errors.     CC:  Jose Roberts MD  Otolaryngology/Head & Neck Surgery  [facility] 396      Colt Puentes MD  Otolaryngology/Head & Neck Surgery  [facility] 396

## 2019-10-18 NOTE — PROGRESS NOTES
Patient calls to state that he has some swelling at his MARQUEZ drain site. He was planning to come to clinic to  an abdominal binder today but feels that there is new swelling at the drain site.  Discussed with patient that we should have him return to clinic today to evaluate prior to the weekend.     Patient will follow-up with Dr. Pulliam this afternoon.     Svitlana Hammonds, RN, BSN

## 2019-10-18 NOTE — PROGRESS NOTES
Dear Dr. Roberts:    I had the pleasure of seeing Eduardo Rubio in follow-up today at the [facility] Otolaryngology Clinic.     History of Present Illness:   Eduardo Rubio is a 59 year old man with a T4N0 SCC of the maxillary sinus. The patient has a history of facial pain and numbness along with vision changes that resulted in a visit to the ER on 8/18/2019. An MRI was obtained which demonstrated a maxillary sinus mass with extension to the orbit with involvement of the inferior rectus muscle. He had a biopsy performed locally which was consistent with SCC. He saw Dr Wick on 8/29/2019. He was referred to ophthalmology for evaluation of his vision changes. He had a PET scan which showed the tumor in the maxillary sinus with bony erosion, extension to orbit, small lung nodule, no ramona disease. His case was reviewed at tumor conference with recommendation for surgery with total maxillectomy and orbital exenteration with free flap reconstruction. He was taken to the OR on 9/24/2019. He had reconstruction with a latissimus free flap. His postoperative course was uncomplicated. His final pathology demonstrated a 4.4 cm SCC with involvement of the zygomatic and nasal bone, PNI, no LVSI, positive margin at the pterygopalatine fossa with positive vidian nerve. He was recommended for postoperative radiation with consideration of weekly cisplatin.     Teaching statement:  He comes in today for follow-up. He was last seen 10/14/2019. At that time he had his diet advanced to soft foods. He comes in today because earlier this week he accidentally dislodged his MARQUEZ drain from his back. He is concerned there may be some swelling in his back. He is tolerating his diet by mouth. He is having some difficulty eating secondary to pain from his dental extractions.       MEDICATIONS:     Current Outpatient Medications   Medication Sig Dispense Refill     acetaminophen (TYLENOL) 325 MG tablet Take 325-650 mg by mouth  every 6 hours as needed for mild pain       aspirin (ASA) 325 MG EC tablet Take 325 mg by mouth every 6 hours as needed for moderate pain       chlorhexidine (PERIDEX) 0.12 % solution Swish and spit 15 mLs in mouth 4 times daily (Patient not taking: Reported on 10/18/2019) 473 mL 0     cyclobenzaprine (FLEXERIL) 5 MG tablet 1 tablet (5 mg) by Per Feeding Tube route daily as needed for muscle spasms (Patient not taking: Reported on 10/4/2019) 3 tablet 0     gabapentin (NEURONTIN) 250 MG/5ML solution 2 mLs (100 mg) by Per Feeding Tube route At Bedtime for 7 days (Patient not taking: Reported on 10/4/2019) 14 mL 0     order for DME Equipment being ordered: Nasogastric bolus tube feeding supplies  Formula: Nutren 1.5, 6 cans per day  Gravity feeding bags  60 mL syringes    Treatment Diagnosis: 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, s/p flap graft (Patient not taking: Reported on 10/18/2019) 14 days 1     oxyCODONE (ROXICODONE) 5 MG/5ML solution 5 mLs (5 mg) by Per NG tube route every 4 hours as needed for moderate to severe pain (Patient not taking: Reported on 10/18/2019) 300 mL 0     polyethylene glycol (MIRALAX/GLYCOLAX) packet 17 g by Per NG tube route daily as needed for constipation (Patient not taking: Reported on 10/18/2019) 15 packet 0     senna-docusate (SENOKOT-S/PERICOLACE) 8.6-50 MG tablet 1 tablet by Per Feeding Tube route 2 times daily as needed for constipation (Patient not taking: Reported on 10/18/2019) 30 tablet 0       ALLERGIES:  No Known Allergies    HABITS/SOCIAL HISTORY:   Smoker 1/2 ppd  No chewing tobacco use  Lives with girlfriend or with friends  No alcohol use  Not currently employed    Social History     Socioeconomic History     Marital status:      Spouse name: Not on file     Number of children: Not on file     Years of education: Not on file     Highest education level: Not on file   Occupational History     Not on file   Social Needs     Financial resource strain: Not  on file     Food insecurity:     Worry: Not on file     Inability: Not on file     Transportation needs:     Medical: Not on file     Non-medical: Not on file   Tobacco Use     Smoking status: Current Every Day Smoker     Packs/day: 0.50     Years: 15.00     Pack years: 7.50     Types: Cigarettes     Start date: 2004     Smokeless tobacco: Never Used   Substance and Sexual Activity     Alcohol use: Not Currently     Drug use: Not Currently     Sexual activity: Not on file   Lifestyle     Physical activity:     Days per week: Not on file     Minutes per session: Not on file     Stress: Not on file   Relationships     Social connections:     Talks on phone: Not on file     Gets together: Not on file     Attends Hoahaoism service: Not on file     Active member of club or organization: Not on file     Attends meetings of clubs or organizations: Not on file     Relationship status: Not on file     Intimate partner violence:     Fear of current or ex partner: Not on file     Emotionally abused: Not on file     Physically abused: Not on file     Forced sexual activity: Not on file   Other Topics Concern     Not on file   Social History Narrative     Not on file       PAST MEDICAL HISTORY:   Past Medical History:   Diagnosis Date     Gastric ulcer      Low back pain      Maxillary sinus cancer (H)      Toe amputation status     all ten toes        PAST SURGICAL HISTORY:   Past Surgical History:   Procedure Laterality Date     [facility]     AS AMPUTATION TOE,I-P JT Bilateral     all ten toes     EXENTERATION ORBIT Right 9/24/2019    Procedure: Right Orbital Exenteration;  Surgeon: Jose Roberts MD;  Location: [facility] OR     EXPLORE NECK Right 9/24/2019    Procedure: Right Neck Exploration;  Surgeon: Jose Roberts MD;  Location: [facility] OR     GRAFT FREE VASCULARIZED LATISSIMUS DORSI Right 9/24/2019    Procedure: Right Latissmus Free Flap Reconstruction;  Surgeon: Shasha  Teresa Castellano MD;  Location: [facility] OR     OSTEOTOMY MAXILLA N/A 9/24/2019    Procedure: Right Radicall Maxillectomy, Nasogastric Tube Placement;  Surgeon: Jose Roberts MD;  Location: [facility] OR       FAMILY HISTORY:    Family History   Problem Relation Age of Onset     Breast Cancer Mother      Glaucoma No family hx of      Macular Degeneration No family hx of        REVIEW OF SYSTEMS:  12 point ROS was negative other than the symptoms noted above in the HPI.  Patient Supplied Answers to Review of Systems  [facility] ENT ROS 10/14/2019   Constitutional Unexplained fatigue   Neurology Numbness   Eyes Double vision   Ears, Nose, Throat -   Musculoskeletal -   Endocrine -         PHYSICAL EXAMINATION:   Pulse 100   Temp 98.9  F (37.2  C) (Oral)   Resp 16   Ht 1.829 m (6')   Wt 77.3 kg (170 lb 8 oz)   SpO2 100%   BMI 23.12 kg/m     Patient in NAD  Breathing comfortably on room air  Healthy appearing skin paddle at orbital exenteration site, some crusting present along orbital suture line (removed)  Some swelling of the right face and right orbit, soft, significantly decreased  Healthy appearing skin paddle intraorally, no dehiscence, no suture line breakdown, no significant swelling, dental extraction sites with vicryl sutures in place  Lateral rhinotomy incision healing appropriately  Latissimus donor site healed, MARQUEZ drain site with mild ecchymosis and minimal drainage. Some redundancy at superior aspect of incision with questionable fluid collection       RESULTS REVIEWED:         IMPRESSION AND PLAN:   Eduardo Rubio is a 59 year old man with a T4N0 SCC of the right maxilla. He underwent surgical resection with maxillectomy and orbital exenteration with reconstruction using a latissimus free flap.     He is doing well. He should continue the soft diet especially in light of the recent dental extractions.     He is concerned about a seroma at his latissimus donor site. If there is anything its minimal and he was not  inclined to have an attempt at needle aspiration. We gave him an abdominal binder to wear.    He was seen by SLP today.    He has an appointment on Monday. He will call to cancel if he does not have any new issues over the weekend.      Thank you very much for the opportunity to participate in the care of your patient.      Teresa Pulliam MD, M.D.  Otolaryngology- Head & Neck Surgery      This note was dictated with voice recognition software and then edited. Please excuse any unintentional errors.           CC:  Jose Roberts MD  Otolaryngology/Head & Neck Surgery  [facility] 396      Colt Puentes MD  Otolaryngology/Head & Neck Surgery  [facility] 396

## 2019-10-18 NOTE — NURSING NOTE
Chief Complaint   Patient presents with     Post-op Visit     Right Latissmus Free Flap Reconstruction, DOS 09/24     Pulse 100   Temp 98.9  F (37.2  C) (Oral)   Resp 16   Ht 1.829 m (6')   Wt 77.3 kg (170 lb 8 oz)   SpO2 100%   BMI 23.12 kg/m        Uriel Coleman CMA

## 2019-10-21 ENCOUNTER — ALLIED HEALTH/NURSE VISIT (OUTPATIENT)
Dept: RADIATION ONCOLOGY | Facility: CLINIC | Age: 59
End: 2019-10-21
Attending: RADIOLOGY
Payer: MEDICARE

## 2019-10-21 DIAGNOSIS — C31.0 MAXILLARY SINUS CANCER (H): Primary | ICD-10-CM

## 2019-10-21 PROCEDURE — 77334 RADIATION TREATMENT AID(S): CPT | Performed by: RADIOLOGY

## 2019-10-21 NOTE — PROGRESS NOTES
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 Pulliam.     CHANGES TODAY:   - Continue tube feeds at goal   - Continue frequent flap checks  - TTF after 72 hours at 2000     PLAN:  Neuro/ pain/ sedation:  #Acute post-op pain  - prn IV Dilaudid, prn oxy, and prn tylenol for pain.  - add prn muscle relaxant   - Gabapentin 100qhs     HEENT:  # S/p maxillary sinus s/p latissimus free flap with ENT 9/24/19 due to SCC of right maxillary sinus   - doppler monitoring of flaps, q 1 hr checks   - Wound cares and drain management per ENT   - Decadron per ENT x 3 doses.  Unasyn x48 hour (perioperative abx)  - Avoid straps across face and neck per ENT      Pulmonary care:   # no active issues   - PET 9/9/19: 1. Single 7 mm nodule in the right upper lobe that demonstrates mild FDG avidity.   - Supplemental oxygen with humifity to keep saturation above 92 %.     Cardiovascular:    # no issues   - 8/21/2019: stress echocardiogram without contrast normal   - Monitor hemodynamic status. MAP goal >65  - Avoid pressors and fluid boluses, contact ENT if needed. None required thus far.   - ASA  325mg daily     GI care:   # h/o gastric ulcer ~2015 (status post abdominal surgery, does not know details)   - scheduled zofran q6h x48 hours per ENT completed  - NGT in place for tube feed. RD following.     Fluids/ Electrolytes/ Nutrition:   #Dysphagia  - RD following.   - gastric TF at goal, tolerating well.   - TKO IVF   - ICU electrolyte replacement protocol in place      Renal/ Fluid Balance:    # no active issues  - Will continue to monitor intake and output.  - No sonia      Endocrine:    # stress hyperglycemia  - Monitor for steroid-induced  hyperglycemia  - Decadron 10mg q8h PER ENT completed  - Blood glucose checks q4h. Med sliding scale insulin available. Currently euglycemic.       ID/ Antibiotics:  # leukocytosis resolved  - reactive due to surgery, perioperative abx Unasyn x48 hours total       Heme:     # Acute blood loss anemia (EBL 1.3L)  - Monitor hgb, transfuse if hgb <7.0 or active bleeding   - hemoglobin stable at 7.6       MSK:   # weakness and deconditioning of critical illness   - PT and OT consulted. Appreciate recs.  - OOB today ,ambulate halls       General cares and Prophylaxis:    - Mechanical prophylaxis for DVT  - Lovenox       Lines/ tubes/ drains:  - NGT  - Right neck penrose drank  - MARQUEZ x2 on right flank         Disposition:  - Surgical ICU for flap checks x 72 hours. Transfer to floor at 2000     [facility]     ====================================  SUBJECTIVE:  Course reviewed. No acute events overnight. Pt reports pain controlled with current regimen.  Denies nausea, chest pain, abdominal pain, dyspnea. Passing gas.      OBJECTIVE:      1. VITAL SIGNS:   Temp:  [98.2  F (36.8  C)-98.8  F (37.1  C)] 98.5  F (36.9  C)  Pulse:  [59-69] 67  Heart Rate:  [58-73] 68  Resp:  [7-23] 15  BP: (100-123)/(62-75) 118/67  SpO2:  [92 %-99 %] 97 %     2. INTAKE/ OUTPUT:   I/O last 3 completed shifts:  In: 2835 [I.V.:850; NG/GT:980]  Out: 875 [Urine:725; Drains:150]     3. PHYSICAL EXAMINATION:   General: sitting up in bed, awake, alert and interactive   HEENT: Right sided facial swelling. Right eye flap (+) doppler, incision intact, edges well approximated. NG in place and sutured. OP clear. Penrose drain in right neck.   Neuro: A&Ox3, NAD. LOAIZA.   Resp: Breathing non-labored. BBS, on room air.   CV: RRR, S1, S2   Chest: MARQUEZ drains x2 with pink serous fluid in MARQUEZ bulbs. No TTP.   Abdomen: Soft, Non-distended, Non-tender.   Extremities: warm and well perfused, calves soft and compressible. Pulses 2+.      4. INVESTIGATIONS:   Arterial  Blood Gases          Recent Labs   Lab 09/24/19  1854 09/24/19  1605 09/24/19  1339 09/24/19  1129   PH 7.36 7.38 7.37 7.34*   PCO2 50* 47* 47* 47*   PO2 87 84 101 81   HCO3 28 28 27 25      Complete Blood Count          Recent Labs   Lab 09/27/19  0455 09/26/19  0424 09/25/19  0340 09/24/19  2213   WBC 8.5 17.5* 14.9* 13.4*   HGB 7.6* 7.4* 7.8* 8.2*    199 204 199      Basic Metabolic Panel         Recent Labs   Lab 09/27/19  0455 09/26/19  0424 09/25/19  0340 09/24/19  2213    136 132* 134   POTASSIUM 3.7 3.8 4.0 4.1   CHLORIDE 102 100 99 99   CO2 31 28 26 26   BUN 13 10 8 8   CR 0.64* 0.56* 0.52* 0.49*   GLC 98 148* 144* 159*      Liver Function Tests      Recent Labs   Lab 09/25/19  0340   ALBUMIN 3.3*         =========================================               Cosigned by: Hema Gutierrez MD at 9/27/2019  1:24 PM

## 2019-10-21 NOTE — PROGRESS NOTES
Radiation Therapy Patient Education    Person involved with teaching: Patient and brother    Patient educational needs for self management of treatment-related side effects assessment completed.  EPIC Patient Ed tab contains Patient Learning Assessment    Education Materials Given  Radiation Therapy for Head and Neck    Educational Topics Discussed  Side effects expected, Pain management, Skin care and When to call MD/RN    Response To Teaching  Verbalizes understanding    GYN Only  Vaginal Dilator-given and educated: N/A    Referrals sent: Dental and Nutrition    Chemotherapy?  No

## 2019-10-23 ENCOUNTER — ONCOLOGY VISIT (OUTPATIENT)
Dept: ONCOLOGY | Facility: CLINIC | Age: 59
End: 2019-10-23
Attending: OTOLARYNGOLOGY
Payer: MEDICARE

## 2019-10-23 ENCOUNTER — PRE VISIT (OUTPATIENT)
Dept: ONCOLOGY | Facility: CLINIC | Age: 59
End: 2019-10-23

## 2019-10-23 ENCOUNTER — PATIENT OUTREACH (OUTPATIENT)
Dept: ONCOLOGY | Facility: CLINIC | Age: 59
End: 2019-10-23

## 2019-10-23 ENCOUNTER — PATIENT OUTREACH (OUTPATIENT)
Dept: CARE COORDINATION | Facility: CLINIC | Age: 59
End: 2019-10-23

## 2019-10-23 VITALS
HEIGHT: 70 IN | RESPIRATION RATE: 16 BRPM | WEIGHT: 169.8 LBS | OXYGEN SATURATION: 98 % | HEART RATE: 74 BPM | TEMPERATURE: 98.4 F | SYSTOLIC BLOOD PRESSURE: 138 MMHG | BODY MASS INDEX: 24.31 KG/M2 | DIASTOLIC BLOOD PRESSURE: 80 MMHG

## 2019-10-23 DIAGNOSIS — R13.12 OROPHARYNGEAL DYSPHAGIA: Primary | ICD-10-CM

## 2019-10-23 DIAGNOSIS — C31.0 SQUAMOUS CELL CARCINOMA OF MAXILLARY SINUS (H): ICD-10-CM

## 2019-10-23 DIAGNOSIS — C31.0 MAXILLARY SINUS CANCER (H): ICD-10-CM

## 2019-10-23 PROCEDURE — 99205 OFFICE O/P NEW HI 60 MIN: CPT | Mod: ZP | Performed by: INTERNAL MEDICINE

## 2019-10-23 RX ORDER — LORAZEPAM 2 MG/ML
0.5 INJECTION INTRAMUSCULAR EVERY 4 HOURS PRN
Status: CANCELLED
Start: 2019-12-13

## 2019-10-23 RX ORDER — METHYLPREDNISOLONE SODIUM SUCCINATE 125 MG/2ML
125 INJECTION, POWDER, LYOPHILIZED, FOR SOLUTION INTRAMUSCULAR; INTRAVENOUS
Status: CANCELLED
Start: 2019-11-20

## 2019-10-23 RX ORDER — SODIUM CHLORIDE 9 MG/ML
1000 INJECTION, SOLUTION INTRAVENOUS CONTINUOUS PRN
Status: CANCELLED
Start: 2019-11-20

## 2019-10-23 RX ORDER — PALONOSETRON 0.05 MG/ML
0.25 INJECTION, SOLUTION INTRAVENOUS ONCE
Status: CANCELLED
Start: 2019-11-20

## 2019-10-23 RX ORDER — PALONOSETRON 0.05 MG/ML
0.25 INJECTION, SOLUTION INTRAVENOUS ONCE
Status: CANCELLED
Start: 2019-12-13

## 2019-10-23 RX ORDER — EPINEPHRINE 1 MG/ML
0.3 INJECTION, SOLUTION INTRAMUSCULAR; SUBCUTANEOUS EVERY 5 MIN PRN
Status: CANCELLED | OUTPATIENT
Start: 2019-12-13

## 2019-10-23 RX ORDER — ALBUTEROL SULFATE 90 UG/1
1-2 AEROSOL, METERED RESPIRATORY (INHALATION)
Status: CANCELLED
Start: 2019-12-13

## 2019-10-23 RX ORDER — MEPERIDINE HYDROCHLORIDE 25 MG/ML
25 INJECTION INTRAMUSCULAR; INTRAVENOUS; SUBCUTANEOUS EVERY 30 MIN PRN
Status: CANCELLED | OUTPATIENT
Start: 2019-11-20

## 2019-10-23 RX ORDER — EPINEPHRINE 0.3 MG/.3ML
0.3 INJECTION SUBCUTANEOUS EVERY 5 MIN PRN
Status: CANCELLED | OUTPATIENT
Start: 2019-11-01

## 2019-10-23 RX ORDER — NALOXONE HYDROCHLORIDE 0.4 MG/ML
.1-.4 INJECTION, SOLUTION INTRAMUSCULAR; INTRAVENOUS; SUBCUTANEOUS
Status: CANCELLED | OUTPATIENT
Start: 2019-12-13

## 2019-10-23 RX ORDER — DIPHENHYDRAMINE HYDROCHLORIDE 50 MG/ML
50 INJECTION INTRAMUSCULAR; INTRAVENOUS
Status: CANCELLED
Start: 2019-11-20

## 2019-10-23 RX ORDER — METHYLPREDNISOLONE SODIUM SUCCINATE 125 MG/2ML
125 INJECTION, POWDER, LYOPHILIZED, FOR SOLUTION INTRAMUSCULAR; INTRAVENOUS
Status: CANCELLED
Start: 2019-12-13

## 2019-10-23 RX ORDER — NALOXONE HYDROCHLORIDE 0.4 MG/ML
.1-.4 INJECTION, SOLUTION INTRAMUSCULAR; INTRAVENOUS; SUBCUTANEOUS
Status: CANCELLED | OUTPATIENT
Start: 2019-11-20

## 2019-10-23 RX ORDER — NALOXONE HYDROCHLORIDE 0.4 MG/ML
.1-.4 INJECTION, SOLUTION INTRAMUSCULAR; INTRAVENOUS; SUBCUTANEOUS
Status: CANCELLED | OUTPATIENT
Start: 2019-11-01

## 2019-10-23 RX ORDER — EPINEPHRINE 1 MG/ML
0.3 INJECTION, SOLUTION INTRAMUSCULAR; SUBCUTANEOUS EVERY 5 MIN PRN
Status: CANCELLED | OUTPATIENT
Start: 2019-11-01

## 2019-10-23 RX ORDER — EPINEPHRINE 0.3 MG/.3ML
0.3 INJECTION SUBCUTANEOUS EVERY 5 MIN PRN
Status: CANCELLED | OUTPATIENT
Start: 2019-11-20

## 2019-10-23 RX ORDER — ALBUTEROL SULFATE 0.83 MG/ML
2.5 SOLUTION RESPIRATORY (INHALATION)
Status: CANCELLED | OUTPATIENT
Start: 2019-11-01

## 2019-10-23 RX ORDER — METHYLPREDNISOLONE SODIUM SUCCINATE 125 MG/2ML
125 INJECTION, POWDER, LYOPHILIZED, FOR SOLUTION INTRAMUSCULAR; INTRAVENOUS
Status: CANCELLED
Start: 2019-11-01

## 2019-10-23 RX ORDER — EPINEPHRINE 1 MG/ML
0.3 INJECTION, SOLUTION INTRAMUSCULAR; SUBCUTANEOUS EVERY 5 MIN PRN
Status: CANCELLED | OUTPATIENT
Start: 2019-11-20

## 2019-10-23 RX ORDER — PALONOSETRON 0.05 MG/ML
0.25 INJECTION, SOLUTION INTRAVENOUS ONCE
Status: CANCELLED
Start: 2019-11-01

## 2019-10-23 RX ORDER — DIPHENHYDRAMINE HYDROCHLORIDE 50 MG/ML
50 INJECTION INTRAMUSCULAR; INTRAVENOUS
Status: CANCELLED
Start: 2019-11-01

## 2019-10-23 RX ORDER — ALBUTEROL SULFATE 90 UG/1
1-2 AEROSOL, METERED RESPIRATORY (INHALATION)
Status: CANCELLED
Start: 2019-11-20

## 2019-10-23 RX ORDER — SODIUM CHLORIDE 9 MG/ML
1000 INJECTION, SOLUTION INTRAVENOUS CONTINUOUS PRN
Status: CANCELLED
Start: 2019-11-01

## 2019-10-23 RX ORDER — MEPERIDINE HYDROCHLORIDE 25 MG/ML
25 INJECTION INTRAMUSCULAR; INTRAVENOUS; SUBCUTANEOUS EVERY 30 MIN PRN
Status: CANCELLED | OUTPATIENT
Start: 2019-11-01

## 2019-10-23 RX ORDER — EPINEPHRINE 0.3 MG/.3ML
0.3 INJECTION SUBCUTANEOUS EVERY 5 MIN PRN
Status: CANCELLED | OUTPATIENT
Start: 2019-12-13

## 2019-10-23 RX ORDER — ALBUTEROL SULFATE 0.83 MG/ML
2.5 SOLUTION RESPIRATORY (INHALATION)
Status: CANCELLED | OUTPATIENT
Start: 2019-11-20

## 2019-10-23 RX ORDER — LORAZEPAM 2 MG/ML
0.5 INJECTION INTRAMUSCULAR EVERY 4 HOURS PRN
Status: CANCELLED
Start: 2019-11-01

## 2019-10-23 RX ORDER — SODIUM CHLORIDE 9 MG/ML
1000 INJECTION, SOLUTION INTRAVENOUS CONTINUOUS PRN
Status: CANCELLED
Start: 2019-12-13

## 2019-10-23 RX ORDER — ALBUTEROL SULFATE 0.83 MG/ML
2.5 SOLUTION RESPIRATORY (INHALATION)
Status: CANCELLED | OUTPATIENT
Start: 2019-12-13

## 2019-10-23 RX ORDER — MEPERIDINE HYDROCHLORIDE 25 MG/ML
25 INJECTION INTRAMUSCULAR; INTRAVENOUS; SUBCUTANEOUS EVERY 30 MIN PRN
Status: CANCELLED | OUTPATIENT
Start: 2019-12-13

## 2019-10-23 RX ORDER — LORAZEPAM 2 MG/ML
0.5 INJECTION INTRAMUSCULAR EVERY 4 HOURS PRN
Status: CANCELLED
Start: 2019-11-20

## 2019-10-23 RX ORDER — DIPHENHYDRAMINE HYDROCHLORIDE 50 MG/ML
50 INJECTION INTRAMUSCULAR; INTRAVENOUS
Status: CANCELLED
Start: 2019-12-13

## 2019-10-23 RX ORDER — ALBUTEROL SULFATE 90 UG/1
1-2 AEROSOL, METERED RESPIRATORY (INHALATION)
Status: CANCELLED
Start: 2019-11-01

## 2019-10-23 ASSESSMENT — MIFFLIN-ST. JEOR: SCORE: 1598.95

## 2019-10-23 ASSESSMENT — PAIN SCALES - GENERAL: PAINLEVEL: NO PAIN (0)

## 2019-10-23 NOTE — PROGRESS NOTES
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  - 9/24/19: Total maxillectomy with orbital exenteration performed by Dr. Roberts, followed by reconstruction with a latissimus free flap with Dr. Pulliam.   - Surgical pathology showed a 4.4 cm moderately differentiated squamous cell carcinoma, (-) LVSI, (+)PNI. There was a positive margin at the pterygopalatine fossa, specifically the vidian nerve taken at its exit from the vidian canal, and additional resection into the canal was not possible.    CURRENT INTERVENTIONS:  Post surgery with planned radiation therapy     HISTORY OF PRESENT ILLNESS   Eduardo Rubio is 59 year old  who has been diagnosed with locally advanced right maxillary sinus cancer    Eduardo is accompanied by his niece and her 2 young kids at this visit. History is per charts and per patient.     Eduardo presented to an outside ED in 08/18/2019 with complaints of right facial pressure, numbness, right-sided visual change and nasal drainage for several days' duration. Imaging workup included an MRI which demonstrated a maxillary sinus mass with extension to the orbit with involvement of the inferior rectus muscle. 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 Mr. Rubio was referred to [facility]. He had staging PET/CT on 9/9/2019 which revealed a FDG-avid maxillary mass at 4.0 x 3.9 x 4.2  cm. There was tumor extension into the right orbital cavity superiorly, the right nasal cavity medially, the retromaxillary fat region posterolaterally, the right pterygopalatine fossa posteriorly, and the premaxillary fat anteriorly. In the orbital cavity there was abutment of the inferior rectus muscle. There was no evidence of lymphadenopathy or systemic disease. His case was reviewed at tumor conference with recommendation for surgery with total maxillectomy and orbital exenteration with free flap reconstruction.     Mr. Rubio underwent a total maxillectomy with orbital exenteration on 9/24/2019 with Dr. Roberts, followed by reconstruction with a latissimus free flap with Dr. Pulliam. Surgical pathology showed a 4.4 cm moderately differentiated squamous cell carcinoma, (-) LVSI, (+)PNI. There was a positive margin at the pterygopalatine fossa, specifically the vidian nerve taken at its exit from the vidian canal, and additional resection into the canal was not possible. Mr. Rubio's case was discussed in the [facility]'s multidisciplinary head and neck tumor board, with the consensus recommendation to proceed with adjuvant chemoradiation given the positive margin status.    Eduardo has recovered from his surgery. He has been taking in soft food like mashed potatoes. His weight has been stable. He denies any hearing loss. He denies any chronic illness - no HTN, DM TII, CAD, peripheral neuropathy.      PAST MEDICAL HISTORY     Past Medical History:   Diagnosis Date     Gastric ulcer      Low back pain      Maxillary sinus cancer (H)      Toe amputation status     all ten toes          CURRENT OUTPATIENT MEDICATIONS     Current Outpatient Medications   Medication Sig     acetaminophen (TYLENOL) 325 MG tablet Take 325-650 mg by mouth every 6 hours as needed for mild pain     aspirin (ASA) 325 MG EC tablet Take 325 mg by mouth every 6 hours as needed for moderate pain     chlorhexidine (PERIDEX)  "0.12 % solution Swish and spit 15 mLs in mouth 4 times daily (Patient not taking: Reported on 10/18/2019)     cyclobenzaprine (FLEXERIL) 5 MG tablet 1 tablet (5 mg) by Per Feeding Tube route daily as needed for muscle spasms (Patient not taking: Reported on 10/4/2019)     gabapentin (NEURONTIN) 250 MG/5ML solution 2 mLs (100 mg) by Per Feeding Tube route At Bedtime for 7 days (Patient not taking: Reported on 10/4/2019)     order for DME Equipment being ordered: Nasogastric bolus tube feeding supplies  Formula: Nutren 1.5, 6 cans per day  Gravity feeding bags  60 mL syringes    Treatment Diagnosis: 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, s/p flap graft (Patient not taking: Reported on 10/18/2019)     oxyCODONE (ROXICODONE) 5 MG/5ML solution 5 mLs (5 mg) by Per NG tube route every 4 hours as needed for moderate to severe pain (Patient not taking: Reported on 10/18/2019)     polyethylene glycol (MIRALAX/GLYCOLAX) packet 17 g by Per NG tube route daily as needed for constipation (Patient not taking: Reported on 10/18/2019)     senna-docusate (SENOKOT-S/PERICOLACE) 8.6-50 MG tablet 1 tablet by Per Feeding Tube route 2 times daily as needed for constipation (Patient not taking: Reported on 10/18/2019)     No current facility-administered medications for this visit.         ALLERGIES    No Known Allergies     SOCIAL HISTORY     Social History     Patient does not qualify to have social determinant information on file (likely too young).   Social History Narrative     Not on file          FAMILY HISTORY        REVIEW OF SYSTEMS   As above in the HPI, o/w complete 12-point ROS was negative.     PHYSICAL EXAM   /80   Pulse 74   Temp 98.4  F (36.9  C) (Oral)   Resp 16   Ht 1.79 m (5' 10.47\")   Wt 77 kg (169 lb 12.8 oz)   SpO2 98%   BMI 24.04 kg/m  `   Wt Readings from Last 3 Encounters:   10/18/19 77.3 kg (170 lb 8 oz)   10/14/19 76.6 kg (168 lb 12.8 oz)   10/11/19 78 kg (172 lb)     GEN: NAD  HEENT: " PERRL, EOMI, no icterus, injection or pallor. Oropharynx is clear.  NECK: no cervical or supraclavicular lymphadenopathy  LUNGS: clear bilaterally  CV: regular, no murmurs, rubs, or gallops  ABDOMEN: soft, non-tender, non-distended, normal bowel sounds, no hepatosplenomegaly by percussion or palpation  EXT: warm, well perfused, no edema  NEURO: alert  SKIN: no rashes     LABORATORY AND IMAGING STUDIES     Recent Labs   Lab Test 09/27/19  0455 09/26/19  0424 09/25/19  0340 09/24/19  2213 09/24/19  1854  09/24/19  0610    136 132* 134 134   < > 133   POTASSIUM 3.7 3.8 4.0 4.1 4.1   < > 3.5   CHLORIDE 102 100 99 99  --   --  99   CO2 31 28 26 26  --   --  27   ANIONGAP 4 7 7 9  --   --  7   BUN 13 10 8 8  --   --  7   CR 0.64* 0.56* 0.52* 0.49*  --   --  0.66   GLC 98 148* 144* 159* 182*   < > 106*   MIKEY 8.2* 8.2* 8.4* 8.5  --   --  8.7    < > = values in this interval not displayed.     Recent Labs   Lab Test 09/30/19  0638 09/29/19  0710 09/28/19  0716 09/27/19  0455 09/26/19  0424   MAG 2.1 2.3 2.3 2.0 2.2   PHOS 3.6 3.8 5.1* 4.0 3.7     Recent Labs   Lab Test 09/30/19  0638 09/29/19  0710 09/28/19  0716 09/27/19  0455 09/26/19  0424   WBC 7.5 6.7 6.7 8.5 17.5*   HGB 8.5* 8.4* 7.8* 7.6* 7.4*    302 265 228 199    98 99 98 100     Recent Labs   Lab Test 09/25/19  0340 09/16/19  0839   BILITOTAL  --  0.4   ALKPHOS  --  70   ALT  --  28   AST  --  26   ALBUMIN 3.3* 3.8     TSH   Date Value Ref Range Status   09/25/2019 0.34 (L) 0.40 - 4.00 mU/L Final     No results for input(s): CEA in the last 00072 hours.  Results for orders placed or performed during the hospital encounter of 09/24/19   XR Abdomen Port 1 View    Narrative    Exam:  XR ABDOMEN PORT 1 VW, 9/24/2019 10:19 PM    History: Check NG/OG tube placement    Comparison:  PET/CT 9/9/2019    Findings:  Single supine radiograph of the abdomen. Enteric tube tip  projects over the stomach with the sidehole at the GE junction. Right  upper  quadrant drain. Mild gaseous distention of the colon. No  abnormal dilated small bowel. No pneumatosis or portal venous gas.  Visualized lung bases are clear.      Impression    Impression:    1. Feeding tube sidehole projects over the GE junction. Recommend  advancement.  2. Mild nonobstructive gaseous distention of the colon.    I have personally reviewed the examination and initial interpretation  and I agree with the findings.    KINGA GARRISON MD   XR Abdomen Port 1 View    Narrative    Examination:  XR ABDOMEN PORT 1 VW    Date:  9/25/2019 10:42 AM     Clinical Information: NG tube to left nare. Initially located at GE  junction advanced from 60 to 70. Please confirm placement     Comparison: Abdominal radiograph 9/24/2019.    Findings:     Gastric tube is visualized coursing into the stomach with the tip near  the gastric fundus. Right upper quadrant MARQUEZ drain. Surgical clips  noted overlying the right upper quadrant. Unchanged, mild gaseous  distention of the colon. No pneumatosis or portal venous gas. The  visualized lung bases are clear. Multilevel degenerative changes of  the lumbar spine with a rightward convexity.      Impression    Impression:  1. Gastric tube in correct position, with side port projecting over  the gastric fundus.  2. Unchanged mild nonobstructive gaseous distention of the colon.    I have personally reviewed the examination and initial interpretation  and I agree with the findings.    ALIYA ROGER MD       Lab Results   Component Value Date    PATH  09/24/2019     Patient Name: CLYDE WEST  MR#: [id number]  Specimen #: [id number]  Collected: 9/24/2019  Received: 9/24/2019  Reported: 10/3/2019 12:14  Ordering Phy(s): MIGUE HADDAD  Additional Phy(s): KERRI BROWN    For improved result formatting, select 'View Enhanced Report Format' under   Linked Documents section.    +++Original Report Follows Addendum+++    TO ORIGINAL REPORT  Status: Signed Out  Date  Ordered:10/4/2019  Date Reported:10/4/2019 13:20  Signed Out By: Alona Cedillo M.D., [facility]    INTERPRETATION:  This addendum is to report the findings of the right eye globe, part J.    Microscopic Description:  The tissue consists of a globe. Focal hemorrhage is present in the stroma   of the limbal conjunctiva. The  remainder of the limbal conjunctiva is unremarkable. The corneal   epithelium is intact with hydropic  degeneration. Rosenthal's layer is intact. The stroma is unremarkable.   Descemet's membrane is unremarkable on PAS  stain. There is mild corneal endothelial cell loss. The anterior chamber   is deep. The angle is open. There is  an artifactitious separation of the ciliary body from the scleral spur on   one side. The iris is unremarkable.  There is stromal hyalinization of the ciliary processes. The lens   demonstrates early cortical liquefaction  near the equator. The vitreous cavity is clear. The neurosensory retina,   retinal pigment epithelium, and  choroid are unremarkable. The optic nerve head is not present in the   sections examined. The sclera is  unremarkable. No tumor is identified in the optic nerve margin cross   sections. The globe is uninvolved by  tumor.    Final Diagnosis:  Eye, right, exenteration as part of radical maxillectomy:  - No involvement of globe or optic nerve by tumor in the sections   examined.  - Presence of mild cortical cataract    ORIGINAL REPORT:    SPECIMEN(S):  A: Inferior rectus posterior margin  B: Posterior wall of maxillary sinus  C: Middle turbinate margin  D: Orbital apex margin  E: Pterygopalatine fossa margin  F: Pterygopalatine ganglion  G: Additional pterygopalatine fossa  H: Pterygoid muscle at plate  I: Buccal fat  J: Radical maxillectomy  K: Right level 1B  L: Reresection lateral nasal wall  M: Lateral nasal wall margin, near superior turbinate  N: Pterygopalatine fossa reresection #2  O: Vidian nerve  P: Tissue in vidian canal  Q: Final vidian  canal margin    FINAL DIAGNOSIS:  A. INFERIOR RECTUS POSTERIOR MARGIN, EXCISION:  - Skeletal muscle, negative for malignancy.    B. POSTERIOR WALL OF MAXILLARY SINUS, BIOPSY:  - Bone, negative for malignancy.    C. MIDDLE TURBINATE MARGIN, EXCISION:  - POSITIVE FOR SQUAMOUS CELL CARCINOMA.    D. ORBITAL APEX MARGIN, EXCISION:  - Negative for malignancy.    E. PTERYGOPALATINE FOSSA MARGIN, EXCISION:  - POSITIVE FOR SQUAMOUS CELL CARCINOMA.    F. PTERYGOPALATINE GANGLION, EXCISION:  - POSITIVE FOR SQUAMOUS CELL CARCINOMA.  - Perineural invasion is present.    G. ADDITIONAL PTERYGOPALATINE FOSSA, EXCISION:  - Negative for malignancy.    H. PTERYGOID MUSCLE AT PLATE, BIOPSY:  - Skeletal muscle, negative for malignancy.    I. BUCCAL FAT, BIOPSY:  - Negative for malignancy.    J. MAXILLA, RIGHT, RADICAL MAXILLECTOMY WITH ORBITAL EXENTERATION:  - INVASIVE KERATINIZING SQUAMOUS CELL CARCINOMA, moderately   differentiated, 4.4 cm in greatest dimension.  - Tumor is centered in maxilla and involves zygomatic and nasal bones.  - Angiolymphatic invasion is not seen.  - Perineural invasion is present.  - Surgical margins are negative for tumor in this part; closest margin is   anterolateral at 2 mm.  - AJCC pathologic staging is pT4a N0.  - See synoptic report.  - Eye globe pathologic findings will be reported in addendum.    K. LYMPH NODE, RIGHT LEVEL 1B, EXCISION:  - One lymph node, negative for malignancy (0/1).    L. LATERAL NASAL WALL, RERESECTION:  - FOCALLY POSITIVE FOR SQUAMOUS CELL CARCINOMA.    M. LATERAL NASAL WALL MARGIN, NEAR SUPERIOR TURBINATE, EXCISION:  - Sinonasal mucosa, negative for malignancy.    N. PTERYGOPALATINE FOSSA RERESECTION #2, EXCISION:  - POSITIVE FOR SQUAMOUS CELL CARCINOMA.  - Perineural invasion is also present.    O. VIDIAN NERVE, BIOPSY:  - POSITIVE FOR SQUAMOUS CELL CARCINOMA.    P. TISSUE IN VIDIAN CANAL, BIOPSY:  - POSITIVE FOR SQUAMOUS CELL CARCINOMA.    Q. FINAL VIDIAN CANAL MARGIN,  "BIOPSY:  - POSITIVE FOR SQUAMOUS CELL CARCINOMA.  - Michelle/intraneural invasion is also present.    Report Name: Nasal Cavity and Paranasal Sinuses       Status: Submitted Checklist Inst: 1      Last Updated By: Jose Martin Menard M.D., Danielle, 10/03/2019  12:12:37  Part(s) Involved:  J: Radical maxillectomy    Synoptic Report:    SPECIMEN    Procedure:        - Radical maxillectomy        right orbital exenteration    TUMOR    Tumor Site:        - Paranasal sinus(es), maxillary    Histologic Type:          - Squamous cell carcinoma, keratinizing    Tumor Laterality:        - Right    Histologic Grade:        - G2: Moderately differentiated    Tumor Focality:        - Unifocal    Tumor Size: 4.4 x 4.0 x 3.8 cm    Tumor Extension: involves nasal bone and zygomatic bone    Lymphovascular Invasion:        - Not Identified    Perineural Invasion:        - Present    MARGINS    Margins:        - Involved by invasive tumor      Margin(s), per Orientation:          final vidian canal margin    LYMPH NODES    Number of Lymph Nodes Involved:        - 0    Number of Lymph Nodes Examined: 1    PATHOLOGIC STAGE CLASSIFICATION (PTNM, AJCC 8TH EDITION)    Primary Tumor (pT):        - pT4a    Regional Lymph Nodes (pN):        - pN0    COMMENTS   Best block for ancillary tests is J6.    CAP eCC February 2019 Annual Release    I have personally reviewed all specimens and/or slides, including the   listed special stains, and used them  with my medical judgement to determine or confirm the final diagnosis.    Electronically signed out by:    Jose Martin Menard M.D., [place]le    CLINICAL HISTORY:  58 year-old man with papillary squamous cell carcinoma of right maxilla   (see [id number])    GROSS:  A: The specimen is received fresh with proper patient identification,   labeled \"inferior rectus posterior  margin\".  The specimen consists of a 1.2 x 0.7 x 0.2 cm red-tan piece of   soft tissue, which is submitted  entirely for frozen " "and subsequently submitted in cassette A1FS for   permanent.    B: The specimen is received in formalin with proper patient   identification, labeled \"posterior wall of  maxillary sinus\".  The specimen consists of a 1.5 x 1.5 x 0.6 cm piece of   white-tan bone which is submitted  entirely in cassette B1DE.    C: The specimen is received fresh with proper patient identification,   labeled \"middle turbinate margin\".  The  specimen consists of a 3.4 x 1.1 x 0.5 cm red-tan piece of soft tissue   which is submitted entirely for frozen  and subsequently in cassette C1FS for permanent.    D: The specimen is received fresh with proper patient identification,   labeled \"orbital Granton margin\".  The  specimen consists of a 0.5 x 0.4 x 0.1 cm red-tan piece of soft tissue   which is submitted entirely for frozen  and subsequently in cassette D1FS for permanent.    E: The specimen is received fresh with proper patient identification,   labeled \"pterygopalatine fossa margin\".  The specimen consists of a 0.5 x 0.2 x 0.1 cm red-tan piece of soft tissue   which is submitted entirely for  frozen and subsequently in cassette E1FS for permanent.    F: The specimen is received fresh with proper patient identification,   labeled \"pterygopalatine ganglion\".  The  specimen consists of a 0.5 x 0.1 x 0.1 cm red-tan piece of soft tissue   which is submitted entirely for frozen  and subsequently submitted in cassette F1FS for permanent.    G: The specimen is received fresh with proper patient identification,   labeled \"additional pterygopalatine  fossa\".  The specimen consists of a 0.6 x 0.5 x 0.1 cm red-tan piece of   soft tissue which is submitted  entirely for frozen and subsequently in cassette G1FS for permanent.    H: The specimen is r...       ASSESSMENT    1. Stage IV [id number] right maxillary sinus squamous cell cancer   2. No medical comorbidities  3. Nicotine abuse - chronic smoker   4. Poor social support; lives alone with a " dog    DISCUSSION   I had a lengthy discussion with the patient who is alone at this clinic visit. He has locally advanced maxillary sinus squamous cell cancer that extended in to the right orbit. His surgical margin was positive making it a high risk disease for recurrence.  He would benefit from multi-modality treatment owing to a high risk for recurrence. Chemotherapy concurrent with radiation therapy will be the standard adjuvant therapy to decrease the risk of recurrence.     Radiation therapy is the primary backbone of adjuvant treatment. The role of chemotherapy in this setting is to sensitize radiation therapy. We reviewed in detail the two approved frontline agents; cisplatin and cetuximab. Cisplatin which is given intravenously once every three weeks along with the radiation therapy has activity against a broad range of cancers. Cisplatin causes a lot of nausea, vomiting, can diminish hearing, lead to renal insufficiency and peripheral neuropathy. It can suppress blood counts - including white blood cells, red blood cells and platelets. This might put him at risk for serious life threatening infection and he should report every incidence of fever with temperature >100.4 the same day.    I reviewed options of weekly carboplatin with paclitaxel or weekly cisplatin. All of these agents are administered weekly and better tolerated than the high dose cisplatin. However all of the landmark studies have been done with high dose cisplatin. Weekly chemotherapy has been reserved for patients who are unfit for high dose cisplatin therapy.     Several current and past trials have added one or more drugs to cisplatin to improve its efficacy, with no definite benefit as yet. It is more important to complete the course of chemoradiation therapy than to be very aggressive upfront with multiple agents as radiation sensitizer with questionable benefit and have to delay or worse discontinue treatment midway owing to  toxicity.     We will coordinate with radiation oncology and initiate treatment with cisplatin on the same day as his radiation therapy start date.    I also discussed port-a-cath and PEG placement with the patient. The need for port-a-cath is not mandatory, but many patients need intravenous fluid support during course of treatment and in addition to this port-a-cath placement comes in handy for all blood draws and also cisplatin administration. He had a PEG placed but he notes that the PEG tube fell off and he does not have it any more. He is doing well without the tube for now. He would like to defer replacement of his PEG. He is already able to only handle soft diet, it would only get more and more challenging over time. We could address this at a later date.     We discussed side effects of radiation therapy including mucositis, decreased salivary secretions, hypothyroidism. This would again be reinforced at patient visits in Radiation Oncology.       PLAN   1. We recommend chemotherapy with cisplatin q. 3 weeks as a radiation sensitizer.  2. Risks, benefits, alternatives have been discussed with patient. Printed information on chemotherapy has been provided to him.   3. Smoking: Will encourage to quit smoking  4. We will coordinate the chemotherapy along with the radiation therapy and start it on the same day as radiation therapy.  5. He would be seen weekly while on active treatment either by me or by a nurse practitioner    Over 80 minutes were spent face to face with patient with more than 50% of time spent counselling and co-ordinating care.    Javier Montana ,  Division of Hematology, Oncology & Transplantation  [facility].

## 2019-10-23 NOTE — PROGRESS NOTES
"Oncology RN Care Coordination Note:     Met with Eduardo and his niece to discuss chemotherapy and resources available at the [facility]. Provided patient with \"My Cancer Guidebook\" and [employer] printouts on Cisplatin. Reviewed administration, side effects (including myelosuppression, nausea/vomiting, diarrhea/constipation, hair loss, mouth sores) and ongoing symptom management by APPs in clinic. Provided phone numbers for triage and after hours care. Highlighted steps to expect when getting chemotherapy (check in, labs, pre- meds, infusion), Discussed that chemo treatment may be delayed a day or two due to blood counts, infusion schedule or patient's need to modify. Included a one page resource of symptoms and when to contact the Cancer Clinic with questions or concerns.      Eduardo signed Authorization to Discuss paperwork. Discussed and encouraged Eduardo to sign up for [employer] to assist in managing appointments and asking future questions of health care team.    Dr. Ferraro had discussed a port with the patient, but he would prefer to start with peripheral IV's.      Eduardo and his niece had told Dr. Ferraro they were worried about his transportation to radiation 5 days a week and Dr. Ferraro requested social work go to speak with him.  Writer spoke with NAOMI Willingham and she states she connected with Svitlana Mackay, [facility] with radiation oncology and they have already advised patient he has transportation available via his medicaid insurance and he has the contact information for them to set up all rides.     Answered all of Eduardo's other questions to his stated satisfaction.                    Cailin Diaz RN BSN   [facility]  Nurse Coordinator        "

## 2019-10-23 NOTE — LETTER
10/23/2019       RE: Eduardo Rubio  [address] [facility] [address] [address]  [facility] [address]     Dear Colleague,    Thank you for referring your patient, Eduardo Rubio, to the [facility]. Please see a copy of my visit note below.    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  - 9/24/19: T otal maxillectomy with orbital exenteration performed by Dr. Roberts, followed by reconstruction with a latissimus free flap with Dr. Pulliam.   - Surgical pathology showed a 4.4 cm moderately differentiated squamous cell carcinoma, (-) LVSI, (+)PNI. There was a positive margin at the pterygopalatine fossa, specifically the vidian nerve taken at its exit from the vidian canal, and additional resection into the canal was not possible.    CURRENT INTERVENTIONS:  Post surgery with planned radiation therapy     HISTORY OF PRESENT ILLNESS   Eduardo Rubio is 59 year old  who has been diagnosed with locally advanced right maxillary sinus cancer    Eduardo is accompanied by his niece and her 2 young kids at this visit. History is per charts and per patient.     Eduardo presented to an outside ED in 08/18/2019 with complaints of right facial pressure, numbness, right-sided visual change and nasal drainage for several days' duration. Imaging workup included an MRI which demonstrated a maxillary sinus mass with extension to the orbit with  involvement of the inferior rectus muscle. 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 Mr. Rubio was referred to [facility]. He had staging PET/CT on 9/9/2019 which revealed a FDG-avid maxillary mass at 4.0 x 3.9 x 4.2 cm. There was tumor extension into the right orbital cavity superiorly, the right nasal cavity medially, the retromaxillary fat region posterolaterally, the right pterygopalatine fossa posteriorly, and the premaxillary fat anteriorly. In the orbital cavity there was abutment of the inferior rectus muscle. There was no evidence of lymphadenopathy or systemic disease. His case was reviewed at tumor conference with recommendation for surgery with total maxillectomy and orbital exenteration with free flap reconstruction.     Mr. Rubio underwent a total maxillectomy with orbital exenteration on 9/24/2019 with Dr. Roberts, followed by reconstruction with a latissimus free flap with Dr. Pulliam. Surgical pathology showed a 4.4 cm moderately differentiated squamous cell carcinoma, (-) LVSI, (+)PNI. There was a positive margin at the pterygopalatine fossa, specifically the vidian nerve taken at its exit from the vidian canal, and additional resection into the canal was not possible. Mr. Rubio's case was discussed in the [facility]'s multidisciplinary head and neck tumor board, with the consensus recommendation to proceed with adjuvant chemoradiation given the positive margin status.    Eduardo has recovered from his surgery. He has been taking in soft food like mashed potatoes. His weight has been stable. He denies any hearing loss. He denies any chronic illness - no HTN, DM TII, CAD, peripheral neuropathy.      PAST MEDICAL HISTORY     Past Medical History:   Diagnosis Date     Gastric ulcer      Low back pain      Maxillary sinus cancer (H)      Toe amputation status     all ten toes          CURRENT OUTPATIENT MEDICATIONS     Current Outpatient  Medications   Medication Sig     acetaminophen (TYLENOL) 325 MG tablet Take 325-650 mg by mouth every 6 hours as needed for mild pain     aspirin (ASA) 325 MG EC tablet Take 325 mg by mouth every 6 hours as needed for moderate pain     chlorhexidine (PERIDEX) 0.12 % solution Swish and spit 15 mLs in mouth 4 times daily (Patient not taking: Reported on 10/18/2019)     cyclobenzaprine (FLEXERIL) 5 MG tablet 1 tablet (5 mg) by Per Feeding Tube route daily as needed for muscle spasms (Patient not taking: Reported on 10/4/2019)     gabapentin (NEURONTIN) 250 MG/5ML solution 2 mLs (100 mg) by Per Feeding Tube route At Bedtime for 7 days (Patient not taking: Reported on 10/4/2019)     order for DME Equipment being ordered: Nasogastric bolus tube feeding supplies  Formula: Nutren 1.5, 6 cans per day  Gravity feeding bags  60 mL syringes    Treatment Diagnosis: 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, s/p flap graft (Patient not taking: Reported on 10/18/2019)     oxyCODONE (ROXICODONE) 5 MG/5ML solution 5 mLs (5 mg) by Per NG tube route every 4 hours as needed for moderate to severe pain (Patient not taking: Reported on 10/18/2019)     polyethylene glycol (MIRALAX/GLYCOLAX) packet 17 g by Per NG tube route daily as needed for constipation (Patient not taking: Reported on 10/18/2019)     senna-docusate (SENOKOT-S/PERICOLACE) 8.6-50 MG tablet 1 tablet by Per Feeding Tube route 2 times daily as needed for constipation (Patient not taking: Reported on 10/18/2019)     No current facility-administered medications for this visit.         ALLERGIES    No Known Allergies     SOCIAL HISTORY     Social History     Patient does not qualify to have social determinant information on file (likely too young).   Social History Narrative     Not on file          FAMILY HISTORY        REVIEW OF SYSTEMS   As above in the HPI, o/w complete 12-point ROS was negative.     PHYSICAL EXAM   /80   Pulse 74   Temp 98.4  F (36.9  C)  "(Oral)   Resp 16   Ht 1.79 m (5' 10.47\")   Wt 77 kg (169 lb 12.8 oz)   SpO2 98%   BMI 24.04 kg/m   `   Wt Readings from Last 3 Encounters:   10/18/19 77.3 kg (170 lb 8 oz)   10/14/19 76.6 kg (168 lb 12.8 oz)   10/11/19 78 kg (172 lb)     GEN: NAD  HEENT: PERRL, EOMI, no icterus, injection or pallor. Oropharynx is clear.  NECK: no cervical or supraclavicular lymphadenopathy  LUNGS: clear bilaterally  CV: regular, no murmurs, rubs, or gallops  ABDOMEN: soft, non-tender, non-distended, normal bowel sounds, no hepatosplenomegaly by percussion or palpation  EXT: warm, well perfused, no edema  NEURO: alert  SKIN: no rashes     LABORATORY AND IMAGING STUDIES     Recent Labs   Lab Test 09/27/19  0455 09/26/19  0424 09/25/19  0340 09/24/19  2213 09/24/19  1854  09/24/19  0610    136 132* 134 134   < > 133   POTASSIUM 3.7 3.8 4.0 4.1 4.1   < > 3.5   CHLORIDE 102 100 99 99  --   --  99   CO2 31 28 26 26  --   --  27   ANIONGAP 4 7 7 9  --   --  7   BUN 13 10 8 8  --   --  7   CR 0.64* 0.56* 0.52* 0.49*  --   --  0.66   GLC 98 148* 144* 159* 182*   < > 106*   MIKEY 8.2* 8.2* 8.4* 8.5  --   --  8.7    < > = values in this interval not displayed.     Recent Labs   Lab Test 09/30/19  0638 09/29/19  0710 09/28/19  0716 09/27/19  0455 09/26/19  0424   MAG 2.1 2.3 2.3 2.0 2.2   PHOS 3.6 3.8 5.1* 4.0 3.7     Recent Labs   Lab Test 09/30/19  0638 09/29/19  0710 09/28/19  0716 09/27/19  0455 09/26/19  0424   WBC 7.5 6.7 6.7 8.5 17.5*   HGB 8.5* 8.4* 7.8* 7.6* 7.4*    302 265 228 199    98 99 98 100     Recent Labs   Lab Test 09/25/19  0340 09/16/19  0839   BILITOTAL  --  0.4   ALKPHOS  --  70   ALT  --  28   AST  --  26   ALBUMIN 3.3* 3.8     TSH   Date Value Ref Range Status   09/25/2019 0.34 (L) 0.40 - 4.00 mU/L Final     No results for input(s): CEA in the last 07376 hours.  Results for orders placed or performed during the hospital encounter of 09/24/19   XR Abdomen Port 1 View    Narrative    Exam:  XR " ABDOMEN PORT 1 VW, 9/24/2019 10:19 PM    History: Check NG/OG tube placement    Comparison:  PET/CT 9/9/2019    Findings:  Single supine radiograph of the abdomen. Enteric tube tip  projects over the stomach with the sidehole at the GE junction. Right  upper quadrant drain. Mild gaseous distention of the colon. No  abnormal dilated small bowel. No pneumatosis or portal venous gas.  Visualized lung bases are clear.      Impression    Impression:    1. Feeding tube sidehole projects over the GE junction. Recommend  advancement.  2. Mild nonobstructive gaseous distention of the colon.    I have personally reviewed the examination and initial interpretation  and I agree with the findings.    KINGA GARRISON MD   XR Abdomen Port 1 View    Narrative    Examination:  XR ABDOMEN PORT 1 VW    Date:  9/25/2019 10:42 AM     Clinical Information: NG tube to left nare. Initially located at GE  junction advanced from 60 to 70. Please confirm placement     Comparison: Abdominal radiograph 9/24/2019.    Findings:     Gastric tube is visualized coursing into the stomach with the tip near  the gastric fundus. Right upper quadrant MARQUEZ drain. Surgical clips  noted overlying the right upper quadrant. Unchanged, mild gaseous  distention of the colon. No pneumatosis or portal venous gas. The  visualized lung bases are clear. Multilevel degenerative changes of  the lumbar spine with a rightward convexity.      Impression    Impression:  1. Gastric tube in correct position, with side port projecting over  the gastric fundus.  2. Unchanged mild nonobstructive gaseous distention of the colon.    I have personally reviewed the examination and initial interpretation  and I agree with the findings.    ALIYA ROGER MD       Lab Results   Component Value Date    PATH  09/24/2019     Patient Name: CLYDE WEST  MR#: [id number]  Specimen #: [id number]  Collected: 9/24/2019  Received: 9/24/2019  Reported: 10/3/2019 12:14  Ordering  Phy(s): MIGUE HADDAD  Additional Phy(s): KERRI BROWN    For improved result formatting, select 'View Enhanced Report Format' under   Linked Documents section.    +++Original Report Follows Addendum+++    TO ORIGINAL REPORT  Status: Signed Out  Date Ordered:10/4/2019  Date Reported:10/4/2019 13:20  Signed Out By: Alona Cedillo M.D., [facility]    INTERPRETATION:  This addendum is to report the findings of the right eye globe, part J.    Microscopic Description:  The tissue consists of a globe. Focal hemorrhage is present in the stroma   of the limbal conjunctiva. The  remainder of the limbal conjunctiva is unremarkable. The corneal   epithelium is intact with hydropic  degeneration. Rosenthal's layer is intact. The stroma is unremarkable.   Descemet's membrane is unremarkable on PAS  stain. There is mild corneal endothelial cell loss. The anterior chamber   is deep. The angle is open. There is  an artifactitious separation of the ciliary body from the scleral spur on   one side. The iris is unremarkable.  There is stromal hyalinization of the ciliary processes. The lens   demonstrates early cortical liquefaction  near the equator. The vitreous cavity is clear. The neurosensory retina,   retinal pigment epithelium, and  choroid are unremarkable. The optic nerve head is not present in the   sections examined. The sclera is  unremarkable. No tumor is identified in the optic nerve margin cross   sections. The globe is uninvolved by  tumor.    Final Diagnosis:  Eye, right, exenteration as part of radical maxillectomy:  - No involvement of globe or optic nerve by tumor in the sections   examined.  - Presence of mild cortical cataract    ORIGINAL REPORT:    SPECIMEN(S):  A: Inferior rectus posterior margin  B: Posterior wall of maxillary sinus  C: Middle turbinate margin  D: Orbital apex margin  E: Pterygopalatine fossa margin  F: Pterygopalatine ganglion  G: Additional pterygopalatine fossa  H: Pterygoid muscle  at plate  I: Buccal fat  J: Radical maxillectomy  K: Right level 1B  L: Reresection lateral nasal wall  M: Lateral nasal wall margin, near superior turbinate  N: Pterygopalatine fossa reresection #2  O: Vidian nerve  P: Tissue in vidian canal  Q: Final vidian canal margin    FINAL DIAGNOSIS:  A. INFERIOR RECTUS POSTERIOR MARGIN, EXCISION:  - Skeletal muscle, negative for malignancy.    B. POSTERIOR WALL OF MAXILLARY SINUS, BIOPSY:  - Bone, negative for malignancy.    C. MIDDLE TURBINATE MARGIN, EXCISION:  - POSITIVE FOR SQUAMOUS CELL CARCINOMA.    D. ORBITAL APEX MARGIN, EXCISION:  - Negative for malignancy.    E. PTERYGOPALATINE FOSSA MARGIN, EXCISION:  - POSITIVE FOR SQUAMOUS CELL CARCINOMA.    F. PTERYGOPALATINE GANGLION, EXCISION:  - POSITIVE FOR SQUAMOUS CELL CARCINOMA.  - Perineural invasion is present.    G. ADDITIONAL PTERYGOPALATINE FOSSA, EXCISION:  - Negative for malignancy.    H. PTERYGOID MUSCLE AT PLATE, BIOPSY:  - Skeletal muscle, negative for malignancy.    I. BUCCAL FAT, BIOPSY:  - Negative for malignancy.    J. MAXILLA, RIGHT, RADICAL MAXILLECTOMY WITH ORBITAL EXENTERATION:  - INVASIVE KERATINIZING SQUAMOUS CELL CARCINOMA, moderately   differentiated, 4.4 cm in greatest dimension.  - Tumor is centered in maxilla and involves zygomatic and nasal bones.  - Angiolymphatic invasion is not seen.  - Perineural invasion is present.  - Surgical margins are negative for tumor in this part; closest margin is   anterolateral at 2 mm.  - AJCC pathologic staging is pT4a N0.  - See synoptic report.  - Eye globe pathologic findings will be reported in addendum.    K. LYMPH NODE, RIGHT LEVEL 1B, EXCISION:  - One lymph node, negative for malignancy (0/1).    L. LATERAL NASAL WALL, RERESECTION:  - FOCALLY POSITIVE FOR SQUAMOUS CELL CARCINOMA.    M. LATERAL NASAL WALL MARGIN, NEAR SUPERIOR TURBINATE, EXCISION:  - Sinonasal mucosa, negative for malignancy.    N. PTERYGOPALATINE FOSSA RERESECTION #2, EXCISION:  -  POSITIVE FOR SQUAMOUS CELL CARCINOMA.  - Perineural invasion is also present.    O. VIDIAN NERVE, BIOPSY:  - POSITIVE FOR SQUAMOUS CELL CARCINOMA.    P. TISSUE IN VIDIAN CANAL, BIOPSY:  - POSITIVE FOR SQUAMOUS CELL CARCINOMA.    Q. FINAL VIDIAN CANAL MARGIN, BIOPSY:  - POSITIVE FOR SQUAMOUS CELL CARCINOMA.  - Michelle/intraneural invasion is also present.    Report Name: Nasal Cavity and Paranasal Sinuses       Status: Submitted Checklist Inst: 1      Last Updated By: Jose Martin Menard M.D., Danielle, 10/03/2019  12:12:37  Part(s) Involved:  J: Radical maxillectomy    Synoptic Report:    SPECIMEN    Procedure:        - Radical maxillectomy        right orbital exenteration    TUMOR    Tumor Site:        - Paranasal sinus(es), maxillary    Histologic Type:          - Squamous cell carcinoma, keratinizing    Tumor Laterality:        - Right    Histologic Grade:        - G2: Moderately differentiated    Tumor Focality:        - Unifocal    Tumor Size: 4.4 x 4.0 x 3.8 cm    Tumor Extension: involves nasal bone and zygomatic bone    Lymphovascular Invasion:        - Not Identified    Perineural Invasion:        - Present    MARGINS    Margins:        - Involved by invasive tumor      Margin(s), per Orientation:          final vidian canal margin    LYMPH NODES    Number of Lymph Nodes Involved:        - 0    Number of Lymph Nodes Examined: 1    PATHOLOGIC STAGE CLASSIFICATION (PTNM, AJCC 8TH EDITION)    Primary Tumor (pT):        - pT4a    Regional Lymph Nodes (pN):        - pN0    COMMENTS   Best block for ancillary tests is J6.    [facility] February 2019 Annual Release    I have personally reviewed all specimens and/or slides, including the   listed special stains, and used them  with my medical judgement to determine or confirm the final diagnosis.    Electronically signed out by:    Jose Martin Meanrd M.D., [place]le    CLINICAL HISTORY:  58 year-old man with papillary squamous cell carcinoma of right maxilla   (see  "[id number])    GROSS:  A: The specimen is received fresh with proper patient identification,   labeled \"inferior rectus posterior  margin\".  The specimen consists of a 1.2 x 0.7 x 0.2 cm red-tan piece of   soft tissue, which is submitted  entirely for frozen and subsequently submitted in cassette A1FS for   permanent.    B: The specimen is received in formalin with proper patient   identification, labeled \"posterior wall of  maxillary sinus\".  The specimen consists of a 1.5 x 1.5 x 0.6 cm piece of   white-tan bone which is submitted  entirely in cassette B1DE.    C: The specimen is received fresh with proper patient identification,   labeled \"middle turbinate margin\".  The  specimen consists of a 3.4 x 1.1 x 0.5 cm red-tan piece of soft tissue   which is submitted entirely for frozen  and subsequently in cassette C1FS for permanent.    D: The specimen is received fresh with proper patient identification,   labeled \"orbital Walton margin\".  The  specimen consists of a 0.5 x 0.4 x 0.1 cm red-tan piece of soft tissue   which is submitted entirely for frozen  and subsequently in cassette D1FS for permanent.    E: The specimen is received fresh with proper patient identification,   labeled \"pterygopalatine fossa margin\".  The specimen consists of a 0.5 x 0.2 x 0.1 cm red-tan piece of soft tissue   which is submitted entirely for  frozen and subsequently in cassette E1FS for permanent.    F: The specimen is received fresh with proper patient identification,   labeled \"pterygopalatine ganglion\".  The  specimen consists of a 0.5 x 0.1 x 0.1 cm red-tan piece of soft tissue   which is submitted entirely for frozen  and subsequently submitted in cassette F1FS for permanent.    G: The specimen is received fresh with proper patient identification,   labeled \"additional pterygopalatine  fossa\".  The specimen consists of a 0.6 x 0.5 x 0.1 cm red-tan piece of   soft tissue which is submitted  entirely for frozen and subsequently in " cassette G1FS for permanent.    H: The specimen is r...       ASSESSMENT    1. Stage IV [id number] right maxillary sinus squamous cell cancer   2. No medical comorbidities  3. Nicotine abuse - chronic smoker   4. Poor social support; lives alone with a dog    DISCUSSION   I had a lengthy discussion with the patient who is alone at this clinic visit. He has locally advanced maxillary sinus squamous cell cancer that extended in to the right orbit. His surgical margin was positive making it a high risk disease for recurrence.  He would benefit from multi-modality treatment owing to a high risk for recurrence. Chemotherapy concurrent with radiation therapy will be the standard adjuvant therapy to decrease the risk of recurrence.     Radiation therapy is the primary backbone of adjuvant treatment. The role of chemotherapy in this setting is to sensitize radiation therapy. We reviewed in detail the two approved frontline agents; cisplatin and cetuximab. Cisplatin which is given intravenously once every three weeks along with the radiation therapy has activity against a broad range of cancers. Cisplatin causes a lot of nausea, vomiting, can diminish hearing, lead to renal insufficiency and peripheral neuropathy. It can suppress blood counts - including white blood cells, red blood cells and platelets. This might put him at risk for serious life threatening infection and he should report every incidence of fever with temperature >100.4 the same day.    I reviewed options of weekly carboplatin with paclitaxel or weekly cisplatin. All of these agents are administered weekly and better tolerated than the high dose cisplatin. However all of the landmark studies have been done with high dose cisplatin. Weekly chemotherapy has been reserved for patients who are unfit for high dose cisplatin therapy.     Several current and past trials have added one or more drugs to cisplatin to improve its efficacy, with no definite benefit as  yet. It is more important to complete the course of chemoradiation therapy than to be very aggressive upfront with multiple agents as radiation sensitizer with questionable benefit and have to delay or worse discontinue treatment midway owing to toxicity.     We will coordinate with radiation oncology and initiate treatment with cisplatin on the same day as his radiation therapy start date.    I also discussed port-a-cath and PEG placement with the patient. The need for port-a-cath is not mandatory, but many patients need intravenous fluid support during course of treatment and in addition to this port-a-cath placement comes in handy for all blood draws and also cisplatin administration. He had a PEG placed but he notes that the PEG tube fell off and he does not have it any more. He is doing well without the tube for now. He would like to defer replacement of his PEG. He is already able to only handle soft diet, it would only get more and more challenging over time. We could address this at a later date.     We discussed side effects of radiation therapy including mucositis, decreased salivary secretions, hypothyroidism. This would again be reinforced at patient visits in Radiation Oncology.       PLAN   1. We recommend chemotherapy with cisplatin q. 3 weeks as a radiation sensitizer.  2. Risks, benefits, alternatives have been discussed with patient. Printed information on chemotherapy has been provided to him.   3. Smoking: Will encourage to quit smoking  4. We will coordinate the chemotherapy along with the radiation therapy and start it on the same day as radiation therapy.  5. He would be seen weekly while on active treatment either by me or by a nurse practitioner    Over 80 minutes were spent face to face with patient with more than 50% of time spent counselling and co-ordinating care.    Javier Montana ,  Division of Hematology, Oncology & Transplantation  [facility].

## 2019-10-23 NOTE — PROGRESS NOTES
Social Work Follow-Up Encounter Visit  Oncology Clinic    Data/Intervention:  Patient Name:  Eduardo Rubio  /Age:  1960 (59 year old)    Reason for Follow-Up:  Social work was asked by RNCC to meet with patient and his niece regarding transportation resources for radiation appointments.  SW also spoke with radiation oncology RNCC who indicated having discussed patient's medical transportation benefits through insurance repeatedly with patient.     Collaborated With:    -Patient   -Niece    SW met with patient and niece in exam room.  SW introduced self and role.  Patient immediately left room with medical assistant and SW primarily spoke with niece. Niece reported that she has been attempting to help patient in coordinating rides as patient has been taking the bus.  She indicated trying to drive him herself when able but she has several young children and is not able to assist with every appointment, particularly his frequent radiation schedule.  SW indicated patient has free medical transportation to appointments through his insurance and gave niece written instructions on setting up rides and utilizing this benefit.  Niece indicated understanding of education given.  Niece also asked about any home services that may be able to assist patient at home as he lives alone.  RICCARDO also gave number for [facility] and encouraged niece and patient to call to have patient assessed for CADI waiver which may be able to assist in setting up PCA services and other home supports.  Niece indicated appreciation for SW assistance.  RICCARDO contact information provided    Resources Provided:  [facility] contact ([phone]). Patient/family needs to call 3 business days in advance of appointment to set up ride through insurance.  [facility]    Assessment:  Niece engaged well in conversation, demonstrated appropriate understanding of education provided.  She asked to be listed as a contact individual to  assist patient in managing appointments.  Per discussion with niece and other conversations with radiation RNCC and oncology RNCC, patient is not always reliable in remembering to utilize benefits/services available.     Plan:  SW provided patient/family with writer's contact information and availability.   SW available to assist with needs.     Soo Yeon Han, MSW, [facility]  Pager: [phone]  Phone: [phone]

## 2019-10-28 ENCOUNTER — TELEPHONE (OUTPATIENT)
Dept: ONCOLOGY | Facility: CLINIC | Age: 59
End: 2019-10-28

## 2019-10-28 ENCOUNTER — APPOINTMENT (OUTPATIENT)
Dept: RADIATION ONCOLOGY | Facility: CLINIC | Age: 59
End: 2019-10-28
Attending: RADIOLOGY
Payer: MEDICARE

## 2019-10-28 PROCEDURE — 77386 ZZH IMRT TREATMENT DELIVERY, COMPLEX: CPT | Performed by: RADIOLOGY

## 2019-10-28 PROCEDURE — 77331 SPECIAL RADIATION DOSIMETRY: CPT | Performed by: RADIOLOGY

## 2019-10-28 PROCEDURE — 77332 RADIATION TREATMENT AID(S): CPT | Mod: XU | Performed by: RADIOLOGY

## 2019-10-28 NOTE — TELEPHONE ENCOUNTER
Eduardo called wanting to see about rides to [facility] appts. Gave eduardo the [employer] number that RICCARDO gave last week.

## 2019-10-29 ENCOUNTER — TELEPHONE (OUTPATIENT)
Dept: ONCOLOGY | Facility: CLINIC | Age: 59
End: 2019-10-29

## 2019-10-29 ENCOUNTER — APPOINTMENT (OUTPATIENT)
Dept: RADIATION ONCOLOGY | Facility: CLINIC | Age: 59
End: 2019-10-29
Attending: RADIOLOGY
Payer: MEDICARE

## 2019-10-29 PROCEDURE — 77386 ZZH IMRT TREATMENT DELIVERY, COMPLEX: CPT | Performed by: RADIOLOGY

## 2019-10-29 NOTE — TELEPHONE ENCOUNTER
Tobacco Treatment Program at the [facility] attempted to reach Mr. Rubio on 10/29/2019 regarding the tobacco cessation program to help Mr. Rubio to quit smoking. We will attempt to reach Mr. Rubio another time.     N[facility]y [facility]ado [facility]  Tobacco Treatment Specialist  PH: [phone]

## 2019-10-29 NOTE — PROGRESS NOTES
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 Pullima.     CHANGES TODAY:   - NG to be advanced by ENT todayy.  - TF to start today, decrease IVF as able.   - Continue scheduled zofran   - Cohn out, remove arterial line, remove PIV in foot      PLAN:   Neuro/ pain/ sedation:  #Acute post-op pain  - Monitor neurological status.   - prn IV Dilaudid, prn oxy, and prn tylenol for pain.  - Gabapentin 100qhs     HEENT:  # S/p maxillary sinus s/p latissimus free flap with ENT 9/24/19 due to SCC of right maxillary sinus   - doppler monitoring of flaps, q 1 hr checks   - drain and wound cares per ENT.   - Decadron per ENT x 3 doses. Unasyn x48 hour.   - Avoid straps across face and neck per ENT      Pulmonary care:   # no active issues   - PET 9/9/19: 1. Single 7 mm nodule in the right upper lobe that demonstrates mild  FDG avidity.   - Supplemental oxygen to keep saturation above 92 %.    Cardiovascular:    # no issues   - 8/21/2019: stress echocardiogram without contrast normal   - Monitor hemodynamic status. MAP goal >65  - Avoid pressors and fluid boluses, contact ENT if needed to maintain MAPs  - ASA  325mg daily     GI care:   # h/o gastric ulcer ~2015 (status post abdominal surgery, does not know details)     - scheduled zofran q6h  - NGT in place okay for meds after NG advanced today          Fluids/ Electrolytes/ Nutrition:   # hyponatremia   # hypomagnesia  - LR 75/hr for IV fluid hydration, decrease MIVF rate when feeds increased.   - ICU electrolyte replacement protocol in place      Renal/ Fluid Balance:    # no issue  - Urine output is adequate so far.  - Will continue to monitor intake and output.  - Lalit  cares      Endocrine:    # stress hyperglycemia  - Monitor for steroid-induced hyperglycemia  - Decadron 10mg q8h PER ENT   - Blood glucose checks q4h. Med SSI      ID/ Antibiotics:  # leukocytosis   - reactive due to surgery, perioperative abx Unasyn x48 hours total       Heme:     # Acute blood loss anemia (EBL 1.3L)  - Monitor hgb, transfuse if hgb <7.0 or active bleeding        MSK:   # weakness and deconditioning of critical illness   - PT and OT consulted. Appreciate recs.      General cares and Prophylaxis:    - Mechanical prophylaxis for DVT and heparin 5000 subcutaneous TID       Lines/ tubes/ drains:  - NGT  - Left foot PIV  - MARQUEZ x2 on right flank  - Cohn      Disposition:  - Surgical ICU for flap checks x 72 hours      Time spent on this Encounter   Billing:  I spent total of 30 minutes bedside and on the inpatient unit today managing the care of Eduardo Rubio in relation to the issues listed in this note.     Rajan Lehman PA-C     ====================================  SUBJECTIVE:  Course reviewed. No acute events overnight. Reports pain controlled with current regimen. Denies chest pain, shortness of breath, fever or chills.       OBJECTIVE:      1. VITAL SIGNS:   Temp:  [97.5  F (36.4  C)-99.3  F (37.4  C)] 98.8  F (37.1  C)  Pulse:  [70-80] 73  Heart Rate:  [64-83] 75  Resp:  [9-24] 10  BP: ()/(57-79) 102/63  MAP:  [63 mmHg-119 mmHg] 119 mmHg  Arterial Line BP: ()/(46-86) 165/83  FiO2 (%):  [4 %] 4 %  SpO2:  [92 %-100 %] 96 %     2. INTAKE/ OUTPUT:   I/O last 3 completed shifts:  In: 5212.5 [I.V.:3517.5; NG/GT:195]  Out: 3103 [Urine:1706; Drains:97; Blood:1300]     3. PHYSICAL EXAMINATION:   General: sitting up in bed, awake, alert and interactive   HEENT: right eye flap (+) doppler, incision intact, edges well approximated. NG in place and sutured. OP clear. Penrose drain in right neck, serosangious drainage.   Neuro: A&Ox3, NAD. LOAIZA.   Resp: Breathing non-labored. BBS, on room air.    CV: RRR, S1, S2   Abdomen: Soft, Non-distended, Non-tender.   Extremities: warm and well perfused, calves soft and compressible. Pulses 2+.      4. INVESTIGATIONS:   Arterial Blood Gases          Recent Labs   Lab 09/24/19 1854 09/24/19  1605 09/24/19  1339 09/24/19  1129   PH 7.36 7.38 7.37 7.34*   PCO2 50* 47* 47* 47*   PO2 87 84 101 81   HCO3 28 28 27 25      Complete Blood Count          Recent Labs   Lab 09/25/19 0340 09/24/19 2213 09/24/19 1854 09/24/19  1605   WBC 14.9* 13.4*  --   --    HGB 7.8* 8.2* 8.9* 9.6*    199  --   --       Basic Metabolic Panel           Recent Labs   Lab 09/25/19 0340 09/24/19 2213 09/24/19 1854 09/24/19  1605   09/24/19  0610   * 134 134 135   < > 133   POTASSIUM 4.0 4.1 4.1 4.4   < > 3.5   CHLORIDE 99 99  --   --   --  99   CO2 26 26  --   --   --  27   BUN 8 8  --   --   --  7   CR 0.52* 0.49*  --   --   --  0.66   * 159* 182* 175*   < > 106*    < > = values in this interval not displayed.      Liver Function Tests  Recent Labs   Lab 09/25/19 0340   ALBUMIN 3.3*      Pancreatic Enzymes  No lab results found in last 7 days.  Coagulation Profile  No lab results found in last 7 days.       =========================================

## 2019-10-30 ENCOUNTER — ANESTHESIA EVENT (OUTPATIENT)
Dept: SURGERY | Facility: AMBULATORY SURGERY CENTER | Age: 59
End: 2019-10-30

## 2019-10-30 ENCOUNTER — APPOINTMENT (OUTPATIENT)
Dept: RADIATION ONCOLOGY | Facility: CLINIC | Age: 59
End: 2019-10-30
Attending: RADIOLOGY
Payer: MEDICARE

## 2019-10-30 DIAGNOSIS — C31.0 SQUAMOUS CELL CARCINOMA OF MAXILLARY SINUS (H): Primary | ICD-10-CM

## 2019-10-30 PROCEDURE — 77386 ZZH IMRT TREATMENT DELIVERY, COMPLEX: CPT | Performed by: RADIOLOGY

## 2019-10-31 ENCOUNTER — OFFICE VISIT (OUTPATIENT)
Dept: RADIATION ONCOLOGY | Facility: CLINIC | Age: 59
End: 2019-10-31
Attending: RADIOLOGY
Payer: MEDICARE

## 2019-10-31 ENCOUNTER — ANESTHESIA (OUTPATIENT)
Dept: SURGERY | Facility: AMBULATORY SURGERY CENTER | Age: 59
End: 2019-10-31

## 2019-10-31 ENCOUNTER — ANCILLARY PROCEDURE (OUTPATIENT)
Dept: RADIOLOGY | Facility: AMBULATORY SURGERY CENTER | Age: 59
End: 2019-10-31
Attending: PHYSICIAN ASSISTANT
Payer: MEDICARE

## 2019-10-31 ENCOUNTER — HOSPITAL ENCOUNTER (OUTPATIENT)
Facility: AMBULATORY SURGERY CENTER | Age: 59
End: 2019-10-31
Attending: PHYSICIAN ASSISTANT
Payer: MEDICARE

## 2019-10-31 VITALS
DIASTOLIC BLOOD PRESSURE: 64 MMHG | HEIGHT: 72 IN | BODY MASS INDEX: 22.35 KG/M2 | WEIGHT: 165 LBS | OXYGEN SATURATION: 100 % | HEART RATE: 68 BPM | TEMPERATURE: 98.3 F | SYSTOLIC BLOOD PRESSURE: 110 MMHG | RESPIRATION RATE: 16 BRPM

## 2019-10-31 VITALS
WEIGHT: 169 LBS | SYSTOLIC BLOOD PRESSURE: 149 MMHG | DIASTOLIC BLOOD PRESSURE: 81 MMHG | BODY MASS INDEX: 22.92 KG/M2 | HEART RATE: 85 BPM

## 2019-10-31 DIAGNOSIS — C31.0 SQUAMOUS CELL CARCINOMA OF MAXILLARY SINUS (H): ICD-10-CM

## 2019-10-31 DIAGNOSIS — C31.0 MAXILLARY SINUS CANCER (H): Primary | ICD-10-CM

## 2019-10-31 DIAGNOSIS — C31.0 SQUAMOUS CELL CARCINOMA OF MAXILLARY SINUS (H): Primary | ICD-10-CM

## 2019-10-31 LAB
ERYTHROCYTE [DISTWIDTH] IN BLOOD BY AUTOMATED COUNT: 14.5 % (ref 10–15)
HCT VFR BLD AUTO: 36.1 % (ref 40–53)
HGB BLD-MCNC: 11.6 G/DL (ref 13.3–17.7)
INR PPP: 1.04 (ref 0.86–1.14)
MCH RBC QN AUTO: 29.9 PG (ref 26.5–33)
MCHC RBC AUTO-ENTMCNC: 32.1 G/DL (ref 31.5–36.5)
MCV RBC AUTO: 93 FL (ref 78–100)
PLATELET # BLD AUTO: 355 10E9/L (ref 150–450)
RBC # BLD AUTO: 3.88 10E12/L (ref 4.4–5.9)
WBC # BLD AUTO: 5.2 10E9/L (ref 4–11)

## 2019-10-31 PROCEDURE — 77386 ZZH IMRT TREATMENT DELIVERY, COMPLEX: CPT | Performed by: RADIOLOGY

## 2019-10-31 DEVICE — CATH PORT POWERPORT CLEARVUE SLIM 6FR 5616000: Type: IMPLANTABLE DEVICE | Site: CHEST  WALL | Status: FUNCTIONAL

## 2019-10-31 RX ORDER — LIDOCAINE 40 MG/G
CREAM TOPICAL
Status: DISCONTINUED | OUTPATIENT
Start: 2019-10-31 | End: 2019-11-01 | Stop reason: HOSPADM

## 2019-10-31 RX ORDER — OXYCODONE HYDROCHLORIDE 5 MG/1
5 TABLET ORAL EVERY 4 HOURS PRN
Status: DISCONTINUED | OUTPATIENT
Start: 2019-10-31 | End: 2019-11-01 | Stop reason: HOSPADM

## 2019-10-31 RX ORDER — FENTANYL CITRATE 50 UG/ML
25-50 INJECTION, SOLUTION INTRAMUSCULAR; INTRAVENOUS
Status: DISCONTINUED | OUTPATIENT
Start: 2019-10-31 | End: 2019-11-01 | Stop reason: HOSPADM

## 2019-10-31 RX ORDER — NICOTINE POLACRILEX 4 MG
15-30 LOZENGE BUCCAL
Status: DISCONTINUED | OUTPATIENT
Start: 2019-10-31 | End: 2019-11-01 | Stop reason: HOSPADM

## 2019-10-31 RX ORDER — DEXTROSE MONOHYDRATE 25 G/50ML
25-50 INJECTION, SOLUTION INTRAVENOUS
Status: DISCONTINUED | OUTPATIENT
Start: 2019-10-31 | End: 2019-11-01 | Stop reason: HOSPADM

## 2019-10-31 RX ORDER — SODIUM CHLORIDE, SODIUM LACTATE, POTASSIUM CHLORIDE, CALCIUM CHLORIDE 600; 310; 30; 20 MG/100ML; MG/100ML; MG/100ML; MG/100ML
INJECTION, SOLUTION INTRAVENOUS CONTINUOUS
Status: DISCONTINUED | OUTPATIENT
Start: 2019-10-31 | End: 2019-11-01 | Stop reason: HOSPADM

## 2019-10-31 RX ORDER — CEFAZOLIN SODIUM 2 G/50ML
2 SOLUTION INTRAVENOUS
Status: COMPLETED | OUTPATIENT
Start: 2019-10-31 | End: 2019-10-31

## 2019-10-31 RX ORDER — MEPERIDINE HYDROCHLORIDE 25 MG/ML
12.5 INJECTION INTRAMUSCULAR; INTRAVENOUS; SUBCUTANEOUS
Status: DISCONTINUED | OUTPATIENT
Start: 2019-10-31 | End: 2019-11-01 | Stop reason: HOSPADM

## 2019-10-31 RX ORDER — LIDOCAINE HYDROCHLORIDE 20 MG/ML
INJECTION, SOLUTION INFILTRATION; PERINEURAL PRN
Status: DISCONTINUED | OUTPATIENT
Start: 2019-10-31 | End: 2019-10-31

## 2019-10-31 RX ORDER — ONDANSETRON 4 MG/1
4 TABLET, ORALLY DISINTEGRATING ORAL EVERY 30 MIN PRN
Status: DISCONTINUED | OUTPATIENT
Start: 2019-10-31 | End: 2019-11-01 | Stop reason: HOSPADM

## 2019-10-31 RX ORDER — ACETAMINOPHEN 325 MG/1
975 TABLET ORAL ONCE
Status: COMPLETED | OUTPATIENT
Start: 2019-10-31 | End: 2019-10-31

## 2019-10-31 RX ORDER — HYDROMORPHONE HYDROCHLORIDE 1 MG/ML
.3-.5 INJECTION, SOLUTION INTRAMUSCULAR; INTRAVENOUS; SUBCUTANEOUS EVERY 10 MIN PRN
Status: DISCONTINUED | OUTPATIENT
Start: 2019-10-31 | End: 2019-11-01 | Stop reason: HOSPADM

## 2019-10-31 RX ORDER — PROPOFOL 10 MG/ML
INJECTION, EMULSION INTRAVENOUS CONTINUOUS PRN
Status: DISCONTINUED | OUTPATIENT
Start: 2019-10-31 | End: 2019-10-31

## 2019-10-31 RX ORDER — PROPOFOL 10 MG/ML
INJECTION, EMULSION INTRAVENOUS PRN
Status: DISCONTINUED | OUTPATIENT
Start: 2019-10-31 | End: 2019-10-31

## 2019-10-31 RX ORDER — SODIUM CHLORIDE 9 MG/ML
INJECTION, SOLUTION INTRAVENOUS CONTINUOUS
Status: DISCONTINUED | OUTPATIENT
Start: 2019-10-31 | End: 2019-11-01 | Stop reason: HOSPADM

## 2019-10-31 RX ORDER — ONDANSETRON 2 MG/ML
4 INJECTION INTRAMUSCULAR; INTRAVENOUS EVERY 30 MIN PRN
Status: DISCONTINUED | OUTPATIENT
Start: 2019-10-31 | End: 2019-11-01 | Stop reason: HOSPADM

## 2019-10-31 RX ORDER — HEPARIN SODIUM (PORCINE) LOCK FLUSH IV SOLN 100 UNIT/ML 100 UNIT/ML
5 SOLUTION INTRAVENOUS
Status: DISCONTINUED | OUTPATIENT
Start: 2019-10-31 | End: 2019-11-01 | Stop reason: HOSPADM

## 2019-10-31 RX ORDER — HEPARIN SODIUM,PORCINE 10 UNIT/ML
5 VIAL (ML) INTRAVENOUS EVERY 24 HOURS
Status: DISCONTINUED | OUTPATIENT
Start: 2019-10-31 | End: 2019-11-01 | Stop reason: HOSPADM

## 2019-10-31 RX ORDER — NALOXONE HYDROCHLORIDE 0.4 MG/ML
.1-.4 INJECTION, SOLUTION INTRAMUSCULAR; INTRAVENOUS; SUBCUTANEOUS
Status: DISCONTINUED | OUTPATIENT
Start: 2019-10-31 | End: 2019-11-01 | Stop reason: HOSPADM

## 2019-10-31 RX ADMIN — PROPOFOL 150 MCG/KG/MIN: 10 INJECTION, EMULSION INTRAVENOUS at 07:59

## 2019-10-31 RX ADMIN — SODIUM CHLORIDE, SODIUM LACTATE, POTASSIUM CHLORIDE, CALCIUM CHLORIDE: 600; 310; 30; 20 INJECTION, SOLUTION INTRAVENOUS at 07:12

## 2019-10-31 RX ADMIN — PROPOFOL 30 MG: 10 INJECTION, EMULSION INTRAVENOUS at 08:18

## 2019-10-31 RX ADMIN — ACETAMINOPHEN 975 MG: 325 TABLET ORAL at 07:11

## 2019-10-31 RX ADMIN — CEFAZOLIN SODIUM 2 G: 2 SOLUTION INTRAVENOUS at 08:04

## 2019-10-31 RX ADMIN — LIDOCAINE HYDROCHLORIDE 40 MG: 20 INJECTION, SOLUTION INFILTRATION; PERINEURAL at 08:00

## 2019-10-31 ASSESSMENT — MIFFLIN-ST. JEOR: SCORE: 1601.44

## 2019-10-31 ASSESSMENT — LIFESTYLE VARIABLES: TOBACCO_USE: 1

## 2019-10-31 NOTE — PROGRESS NOTES
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 No issues.    ED visits/Hospitalizations:  None    Missed Treatments:  None    Subjective: Mr. Rubio presents to clinic today for his weekly on-treatment visit. He has tolerated the initiation of radiotherapy well with no significant acute radiation-induced toxicities. He had a port placed earlier today and tolerated this uneventfully.    ROS:   Constitutional  Pain (0-10): 0  Fatigue: None    CNS  Headaches: None    ENT  Mucositis: None    Cardiopulmonary  Dyspnea: None    GI  Nausea/vomiting: None    Nutrition  PEG: No  Diet: Regular    Integumentary  Dermatitis: None    Objective:   Current weight: 76.7 kg    BP: 149/81 (sitting), 122/73 (standing)  Pulse: 85 (sitting), 82 (standing)    General: 59-year-old gentleman seated comfortably in examination chair in no acute distress  HEENT: Healthy-appearing free flap. Prior surgical incisions are well-healed with no evidence of dehiscence. Left eye with normal extraocular movement. Moist mucous membranes. No visible oral cavity lesions.  Pulmonary: No wheezing, stridor or respiratory distress  Skin: No erythema    Treatment-related toxicities (CTCAE v5.0):  None    Assessment:    Mr. 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 He is receiving adjuvant chemoradiotherapy for improved locoregional disease  control and has tolerated the initiation of radiation well with no significant acute treatment-related toxicities.    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

## 2019-10-31 NOTE — OP NOTE
Interventional Radiology Brief Post Procedure Note    Procedure: Chest port placement    Proceduralist: Natasha Mishra PA-C    Assistant: None    Time Out: Prior to the start of the procedure and with procedural staff participation, I verbally confirmed the patient s identity using two indicators, relevant allergies, that the procedure was appropriate and matched the consent or emergent situation, and that the correct equipment/implants were available. Immediately prior to starting the procedure I conducted the Time Out with the procedural staff and re-confirmed the patient s name, procedure, and site/side. (The Joint Commission universal protocol was followed.)  Yes    Sedation: Monitored Anesthesia Care (MAC) administered and documented by Anesthesia Care Provider    Findings: Completed image-guided placement of 6 Russian 28 cm single lumen power-injectable central venous chest port via right internal jugular vein. Aspirates and flushes freely, heparin locked and is ready for immediate use.    Estimated Blood Loss: Minimal    Fluoroscopy Time: 0.2 minute(s)    SPECIMENS: None    Complications: 1. None     Condition: Stable    Plan: Ready for immediate use. Follow up per primary team.     Comments: See dictated procedure note for full details.    Natasha Mishra PA-C

## 2019-10-31 NOTE — ANESTHESIA POSTPROCEDURE EVALUATION
Anesthesia POST Procedure Evaluation    Patient: Eduardo Rubio   MRN:     [id number] Gender:   male   Age:    59 year old :      1960        Preoperative Diagnosis: Squamous cell carcinoma of maxillary sinus (H) [C31.0]   Procedure(s):  place venous access chest port   Postop Comments: No value filed.       Anesthesia Type:  Not documented  No value filed.    Reportable Event: NO     PAIN: Uncomplicated   Sign Out status: Comfortable, Well controlled pain     PONV: No PONV   Sign Out status:  No Nausea or Vomiting     Neuro/Psych: Uneventful perioperative course   Sign Out Status: Preoperative baseline; Age appropriate mentation     Airway/Resp.: Uneventful perioperative course   Sign Out Status: Non labored breathing, age appropriate RR; Resp. Status within EXPECTED Parameters     CV: Uneventful perioperative course   Sign Out status: Appropriate BP and perfusion indices; Appropriate HR/Rhythm     Disposition:   Sign Out in:  Phase II  Disposition:  Home  Recovery Course: Uneventful  Follow-Up: Not required           Last Anesthesia Record Vitals:  CRNA VITALS  10/31/2019 0809 - 10/31/2019 0909      10/31/2019             Resp Rate (observed):  (!) 3    Resp Rate (set):  10          Last PACU Vitals:  Vitals Value Taken Time   BP     Temp     Pulse     Resp     SpO2     Temp src Available 10/31/2019  8:30 AM   NIBP 113/72 10/31/2019  8:36 AM   Pulse 70 10/31/2019  8:38 AM   SpO2 100 % 10/31/2019  8:38 AM   Resp     Temp     Ht Rate 67 10/31/2019  8:37 AM   Temp 2           Electronically Signed By: Joseph Mcfadden MD, 2019, 11:26 AM

## 2019-10-31 NOTE — ANESTHESIA CARE TRANSFER NOTE
Patient: Eduardo Rubio    Procedure(s):  place venous access chest port    Diagnosis: Squamous cell carcinoma of maxillary sinus (H) [C31.0]  Diagnosis Additional Information: No value filed.    Anesthesia Type:   No value filed.     Note:  Airway :Room Air  Patient transferred to:Phase II  Comments: 112/72  100%  98.3-64-18  Handoff Report: Identifed the Patient, Identified the Reponsible Provider, Reviewed the pertinent medical history, Discussed the surgical course, Reviewed Intra-OP anesthesia mangement and issues during anesthesia, Set expectations for post-procedure period and Allowed opportunity for questions and acknowledgement of understanding      Vitals: (Last set prior to Anesthesia Care Transfer)    CRNA VITALS  10/31/2019 0809 - 10/31/2019 0844      10/31/2019             Resp Rate (observed):  (!) 3    Resp Rate (set):  10                Electronically Signed By: ABNER Mejia CRNA  October 31, 2019  8:44 AM

## 2019-10-31 NOTE — DISCHARGE INSTRUCTIONS
A collaboration between [facility] and [facility]  Experts in minimally invasive, targeted treatments performed using imaging guidance    Venous Access Device,  Port Catheter or Tunneled or Non-Tunneled Central Line Placement    Today you had a procedure today to install a venous access device; either a tunneled central vein catheter or a subcutaneous port catheter.    One of our Radiology PAs performed this procedure for you today:  ? Julian Zhou PA-C  ? JORDYN Matos PA-C  ? Jt Batista PA-C  ? Natasha Mishra PA-C   ? Cirilo Olivares PA-C    After you go home:  - Drink plenty of fluids.  Generally 6-8 (8 ounce) glasses a day is recommended.  - Resume your regular diet unless otherwise ordered by a medical provider.  - Keep any applied tape/gauze dressings clean and dry.  Change tape/gauze dressings if they get wet or soiled.  - You may shower the following day after procedure, however cover and protect from moisture any tape/gauze dressings.  You may let water hit and run over dried skin glue, but do not scrub.  Pat the area dry after showering.  - Port placement incisions are closed with absorbable suture, meaning they do not need to be removed at a later date, and a topical skin adhesive (skin glue).  This glue will wear off in 7-14 days.  Do not remove before this time.  If 14 days have passed and residual glue is present, you may gently remove it.  - Do not apply gels, lotions, or ointments to the glue site for the first 10 days as this may cause the glue to prematurely soften and fail.  - Do not perform strenuous activities or lift greater than 10 pounds for the next three days.  - If there is bleeding or oozing from the procedure site, apply firm pressure to the area for 5-10 minutes.  If the bleeding continues seek medical advice at the numbers below.  - Mild procedure site discomfort can be treated with an ice pack and over-the-counter pain  relievers.        For 24 hours after any sedation used:  - Relax and take it easy.  No strenuous activities.  - Do not drive or operate machines at home or at work.  - No alcohol consumption.  - Do not make any important or legal decisions.    Call our Interventional Radiology (IR) service if:  - If you start bleeding from the procedure site.  If you do start to bleed from the site, lie down and hold some pressure on the site.  Our radiology provider can help you decide if you need to return to the hospital.  - If you have new or worsening pain related to the procedure.  - If you have concerning swelling at the procedure site.  - If you develop persistent nausea or vomiting.  - If you develop hives or a rash or any unexplained itching.  - If you have a fever of greater than 100.5  F and chills in the first 5 days after procedure.  - Any other concerns related to your procedure.      [facility]  Interventional Radiology (IR)  [address] [place] [address]    Contact Number:  [phone]  (IR control desk)  - Monday - Friday 8:00 am - 4:30 pm    After hours for urgent concerns:  [phone]  - After 4:30 pm Monday - Friday, Weekends and Holidays.   - Ask for Interventional Radiology on-call.  Someone is available 24 hours a day.  - [facility] toll free number:  [phone]

## 2019-10-31 NOTE — ANESTHESIA PREPROCEDURE EVALUATION
Anesthesia Pre-Procedure Evaluation    Patient: Eduardo Rubio   MRN:     [id number] Gender:   male   Age:    59 year old :      1960        Preoperative Diagnosis: Squamous cell carcinoma of maxillary sinus (H) [C31.0]   Procedure(s):  Chest Port Placement     Past Medical History:   Diagnosis Date     Gastric ulcer      Low back pain      Maxillary sinus cancer (H)      Toe amputation status     all ten toes      Past Surgical History:   Procedure Laterality Date     ABDOMEN SURGERY      [facility], surgery related to gastric ulcer     AS AMPUTATION TOE,I-P JT Bilateral     all ten toes     EXENTERATION ORBIT Right 2019    Procedure: Right Orbital Exenteration;  Surgeon: Jose Roberts MD;  Location: [facility] OR     EXPLORE NECK Right 2019    Procedure: Right Neck Exploration;  Surgeon: Jose Roberts MD;  Location: [facility] OR     GRAFT FREE VASCULARIZED LATISSIMUS DORSI Right 2019    Procedure: Right Latissmus Free Flap Reconstruction;  Surgeon: Teresa Pulliam MD;  Location: [facility] OR     OSTEOTOMY MAXILLA N/A 2019    Procedure: Right Radicall Maxillectomy, Nasogastric Tube Placement;  Surgeon: Jose Roberts MD;  Location: [facility] OR          Anesthesia Evaluation     . Pt has had prior anesthetic.     No history of anesthetic complications          ROS/MED HX    ENT/Pulmonary:     (+)tobacco use, Current use , . .    Neurologic:       Cardiovascular:         METS/Exercise Tolerance:     Hematologic:         Musculoskeletal:         GI/Hepatic:     (+) GERD Asymptomatic on medication, Other GI/Hepatic Gastric ulcer      Renal/Genitourinary:         Endo:         Psychiatric:         Infectious Disease:         Malignancy:   (+) Malignancy History of Other  Other CA Maxillary sinus SCC status post         Other:                         PHYSICAL EXAM:   Mental Status/Neuro: A/A/O   Airway: Facies: Feasible (Right maxillary CA swelling)  Mallampati: II  Mouth/Opening: Limited  TM distance: >  6 cm  Neck ROM: Full   Respiratory:   Resp. Rate: Normal     Resp. Effort: Normal      CV:    Comments:                      LABS:  CBC:   Lab Results   Component Value Date    WBC 5.2 10/31/2019    WBC 7.5 09/30/2019    HGB 11.6 (L) 10/31/2019    HGB 8.5 (L) 09/30/2019    HCT 36.1 (L) 10/31/2019    HCT 26.5 (L) 09/30/2019     10/31/2019     09/30/2019     BMP:   Lab Results   Component Value Date     09/27/2019     09/26/2019    POTASSIUM 3.7 09/27/2019    POTASSIUM 3.8 09/26/2019    CHLORIDE 102 09/27/2019    CHLORIDE 100 09/26/2019    CO2 31 09/27/2019    CO2 28 09/26/2019    BUN 13 09/27/2019    BUN 10 09/26/2019    CR 0.64 (L) 09/27/2019    CR 0.56 (L) 09/26/2019    GLC 98 09/27/2019     (H) 09/26/2019     COAGS:   Lab Results   Component Value Date    INR 1.04 10/31/2019     POC:   Lab Results   Component Value Date    BGM 83 09/30/2019     OTHER:   Lab Results   Component Value Date    PH 7.36 09/24/2019    MIKEY 8.2 (L) 09/27/2019    PHOS 3.6 09/30/2019    MAG 2.1 09/30/2019    ALBUMIN 3.3 (L) 09/25/2019    PROTTOTAL 8.5 09/16/2019    ALT 28 09/16/2019    AST 26 09/16/2019    GGT 12 05/24/2006    ALKPHOS 70 09/16/2019    BILITOTAL 0.4 09/16/2019    TSH 0.34 (L) 09/25/2019        Preop Vitals    BP Readings from Last 3 Encounters:   10/31/19 113/77   10/23/19 138/80   10/14/19 117/71    Pulse Readings from Last 3 Encounters:   10/31/19 80   10/23/19 74   10/18/19 100      Resp Readings from Last 3 Encounters:   10/31/19 16   10/23/19 16   10/18/19 16    SpO2 Readings from Last 3 Encounters:   10/31/19 99%   10/23/19 98%   10/18/19 100%      Temp Readings from Last 1 Encounters:   10/31/19 36.6  C (97.9  F) (Oral)    Ht Readings from Last 1 Encounters:   10/31/19 1.829 m (6')      Wt Readings from Last 1 Encounters:   10/31/19 74.8 kg (165 lb)    Estimated body mass index is 22.38 kg/m  as calculated from the following:    Height as of this encounter: 1.829 m (6').    Weight as  of this encounter: 74.8 kg (165 lb).     LDA:  Peripheral IV 10/31/19 Left Hand (Active)   Site Assessment WDL 10/31/2019  7:04 AM   Line Status Infusing 10/31/2019  7:04 AM   Phlebitis Scale 0-->no symptoms 10/31/2019  7:04 AM   Infiltration Scale 0 10/31/2019  7:04 AM   Infiltration Site Treatment Method  None 10/31/2019  7:04 AM   Extravasation? No 10/31/2019  7:04 AM   Dressing Intervention New dressing  10/31/2019  7:04 AM   Number of days: 0       Closed/Suction Drain 1 Right;Superior Other (Comment) Bulb 10 Khmer flank (Active)   Number of days: 37       Closed/Suction Drain 2 Right;Inferior Other (Comment) Bulb 10 Khmer flank (Active)   Number of days: 37       NG/OG Tube Nasogastric 12 fr Left nostril (Active)   Number of days: 37        Assessment:   ASA SCORE: 3    H&P: History and physical reviewed and following examination; no interval change.   Smoking Status:  Active Smoker   NPO Status: NPO Appropriate     Plan:   Anes. Type:  MAC   Pre-Medication: None   Induction:  IV (Standard)   Airway: Native Airway   Access/Monitoring: PIV   Maintenance: Propofol Sedation     Postop Plan:   Postop Pain: None  Postop Sedation/Airway: Not planned  Disposition: Outpatient     PONV Management:    Prevention:, Propofol     CONSENT: Direct conversation   Plan and risks discussed with: Patient   Blood Products: Consent Deferred (Minimal Blood Loss)                   Joseph Mcfadden MD

## 2019-11-01 ENCOUNTER — TELEPHONE (OUTPATIENT)
Dept: ONCOLOGY | Facility: CLINIC | Age: 59
End: 2019-11-01

## 2019-11-01 ENCOUNTER — INFUSION THERAPY VISIT (OUTPATIENT)
Dept: ONCOLOGY | Facility: CLINIC | Age: 59
End: 2019-11-01
Attending: RADIOLOGY
Payer: MEDICARE

## 2019-11-01 ENCOUNTER — APPOINTMENT (OUTPATIENT)
Dept: RADIATION ONCOLOGY | Facility: CLINIC | Age: 59
End: 2019-11-01
Attending: RADIOLOGY
Payer: MEDICARE

## 2019-11-01 VITALS
OXYGEN SATURATION: 96 % | WEIGHT: 167.3 LBS | DIASTOLIC BLOOD PRESSURE: 72 MMHG | TEMPERATURE: 97.6 F | HEART RATE: 79 BPM | SYSTOLIC BLOOD PRESSURE: 117 MMHG | RESPIRATION RATE: 14 BRPM | BODY MASS INDEX: 22.69 KG/M2

## 2019-11-01 DIAGNOSIS — Z98.890 S/P FLAP GRAFT: ICD-10-CM

## 2019-11-01 DIAGNOSIS — C31.0 MAXILLARY SINUS CANCER (H): Primary | ICD-10-CM

## 2019-11-01 DIAGNOSIS — C44.92 SQUAMOUS CELL CARCINOMA OF SKIN, UNSPECIFIED: ICD-10-CM

## 2019-11-01 LAB
ALBUMIN SERPL-MCNC: 3.2 G/DL (ref 3.4–5)
ALP SERPL-CCNC: 66 U/L (ref 40–150)
ALT SERPL W P-5'-P-CCNC: 29 U/L (ref 0–70)
ANION GAP SERPL CALCULATED.3IONS-SCNC: 4 MMOL/L (ref 3–14)
AST SERPL W P-5'-P-CCNC: 26 U/L (ref 0–45)
BASOPHILS # BLD AUTO: 0 10E9/L (ref 0–0.2)
BASOPHILS NFR BLD AUTO: 0.5 %
BILIRUB SERPL-MCNC: 0.3 MG/DL (ref 0.2–1.3)
BUN SERPL-MCNC: 8 MG/DL (ref 7–30)
CALCIUM SERPL-MCNC: 8.6 MG/DL (ref 8.5–10.1)
CHLORIDE SERPL-SCNC: 99 MMOL/L (ref 94–109)
CO2 SERPL-SCNC: 29 MMOL/L (ref 20–32)
CREAT SERPL-MCNC: 0.62 MG/DL (ref 0.66–1.25)
DIFFERENTIAL METHOD BLD: ABNORMAL
EOSINOPHIL # BLD AUTO: 0.1 10E9/L (ref 0–0.7)
EOSINOPHIL NFR BLD AUTO: 3 %
ERYTHROCYTE [DISTWIDTH] IN BLOOD BY AUTOMATED COUNT: 14.5 % (ref 10–15)
GFR SERPL CREATININE-BSD FRML MDRD: >90 ML/MIN/{1.73_M2}
GLUCOSE SERPL-MCNC: 96 MG/DL (ref 70–99)
HCT VFR BLD AUTO: 34.6 % (ref 40–53)
HGB BLD-MCNC: 11.2 G/DL (ref 13.3–17.7)
IMM GRANULOCYTES # BLD: 0 10E9/L (ref 0–0.4)
IMM GRANULOCYTES NFR BLD: 0.2 %
LYMPHOCYTES # BLD AUTO: 0.9 10E9/L (ref 0.8–5.3)
LYMPHOCYTES NFR BLD AUTO: 20.2 %
MAGNESIUM SERPL-MCNC: 2.1 MG/DL (ref 1.6–2.3)
MCH RBC QN AUTO: 29.9 PG (ref 26.5–33)
MCHC RBC AUTO-ENTMCNC: 32.4 G/DL (ref 31.5–36.5)
MCV RBC AUTO: 92 FL (ref 78–100)
MONOCYTES # BLD AUTO: 0.6 10E9/L (ref 0–1.3)
MONOCYTES NFR BLD AUTO: 14.7 %
NEUTROPHILS # BLD AUTO: 2.7 10E9/L (ref 1.6–8.3)
NEUTROPHILS NFR BLD AUTO: 61.4 %
NRBC # BLD AUTO: 0 10*3/UL
NRBC BLD AUTO-RTO: 0 /100
PLATELET # BLD AUTO: 350 10E9/L (ref 150–450)
POTASSIUM SERPL-SCNC: 3.9 MMOL/L (ref 3.4–5.3)
PROT SERPL-MCNC: 7.7 G/DL (ref 6.8–8.8)
RBC # BLD AUTO: 3.75 10E12/L (ref 4.4–5.9)
SODIUM SERPL-SCNC: 132 MMOL/L (ref 133–144)
WBC # BLD AUTO: 4.4 10E9/L (ref 4–11)

## 2019-11-01 PROCEDURE — 25800030 ZZH RX IP 258 OP 636: Mod: ZF | Performed by: INTERNAL MEDICINE

## 2019-11-01 PROCEDURE — 25000128 H RX IP 250 OP 636: Mod: ZF | Performed by: RADIOLOGY

## 2019-11-01 PROCEDURE — 25000128 H RX IP 250 OP 636: Mod: ZF | Performed by: INTERNAL MEDICINE

## 2019-11-01 PROCEDURE — 85025 COMPLETE CBC W/AUTO DIFF WBC: CPT | Performed by: INTERNAL MEDICINE

## 2019-11-01 PROCEDURE — 80053 COMPREHEN METABOLIC PANEL: CPT | Performed by: INTERNAL MEDICINE

## 2019-11-01 PROCEDURE — 96415 CHEMO IV INFUSION ADDL HR: CPT

## 2019-11-01 PROCEDURE — 96413 CHEMO IV INFUSION 1 HR: CPT

## 2019-11-01 PROCEDURE — 96375 TX/PRO/DX INJ NEW DRUG ADDON: CPT

## 2019-11-01 PROCEDURE — 83735 ASSAY OF MAGNESIUM: CPT | Performed by: INTERNAL MEDICINE

## 2019-11-01 PROCEDURE — 77336 RADIATION PHYSICS CONSULT: CPT | Performed by: RADIOLOGY

## 2019-11-01 PROCEDURE — 96367 TX/PROPH/DG ADDL SEQ IV INF: CPT

## 2019-11-01 PROCEDURE — 77386 ZZH IMRT TREATMENT DELIVERY, COMPLEX: CPT | Performed by: RADIOLOGY

## 2019-11-01 PROCEDURE — 25000125 ZZHC RX 250: Mod: ZF | Performed by: INTERNAL MEDICINE

## 2019-11-01 RX ORDER — HEPARIN SODIUM (PORCINE) LOCK FLUSH IV SOLN 100 UNIT/ML 100 UNIT/ML
5 SOLUTION INTRAVENOUS
Status: DISCONTINUED | OUTPATIENT
Start: 2019-11-01 | End: 2019-11-01 | Stop reason: HOSPADM

## 2019-11-01 RX ORDER — PROCHLORPERAZINE MALEATE 10 MG
10 TABLET ORAL EVERY 6 HOURS PRN
Qty: 30 TABLET | Refills: 2 | Status: SHIPPED | OUTPATIENT
Start: 2019-11-01 | End: 2019-12-27

## 2019-11-01 RX ORDER — PALONOSETRON 0.05 MG/ML
0.25 INJECTION, SOLUTION INTRAVENOUS ONCE
Status: COMPLETED | OUTPATIENT
Start: 2019-11-01 | End: 2019-11-01

## 2019-11-01 RX ORDER — CHLORHEXIDINE GLUCONATE ORAL RINSE 1.2 MG/ML
15 SOLUTION DENTAL 4 TIMES DAILY
Qty: 473 ML | Refills: 0 | Status: SHIPPED | OUTPATIENT
Start: 2019-11-01 | End: 2020-01-22

## 2019-11-01 RX ORDER — LORAZEPAM 0.5 MG/1
0.5 TABLET ORAL EVERY 4 HOURS PRN
Qty: 30 TABLET | Refills: 2 | Status: SHIPPED | OUTPATIENT
Start: 2019-11-01 | End: 2019-12-06

## 2019-11-01 RX ORDER — DEXAMETHASONE 4 MG/1
TABLET ORAL
Qty: 8 TABLET | Refills: 2 | Status: SHIPPED | OUTPATIENT
Start: 2019-11-02 | End: 2019-11-08

## 2019-11-01 RX ADMIN — CISPLATIN 200 MG: 1 INJECTION, SOLUTION INTRAVENOUS at 13:01

## 2019-11-01 RX ADMIN — DEXAMETHASONE SODIUM PHOSPHATE: 10 INJECTION, SOLUTION INTRAMUSCULAR; INTRAVENOUS at 11:57

## 2019-11-01 RX ADMIN — PALONOSETRON HYDROCHLORIDE 0.25 MG: 0.25 INJECTION, SOLUTION INTRAVENOUS at 12:31

## 2019-11-01 RX ADMIN — FAMOTIDINE 20 MG: 10 INJECTION INTRAVENOUS at 12:32

## 2019-11-01 RX ADMIN — HEPARIN 5 ML: 100 SYRINGE at 15:13

## 2019-11-01 RX ADMIN — SODIUM CHLORIDE 1000 ML: 9 INJECTION, SOLUTION INTRAVENOUS at 12:55

## 2019-11-01 RX ADMIN — MAGNESIUM SULFATE HEPTAHYDRATE: 500 INJECTION, SOLUTION INTRAMUSCULAR; INTRAVENOUS at 11:27

## 2019-11-01 ASSESSMENT — PAIN SCALES - GENERAL: PAINLEVEL: MILD PAIN (2)

## 2019-11-01 NOTE — PATIENT INSTRUCTIONS
Contact Numbers  [facility]: [phone]    After Hours:  [phone]  Triage: [phone]    Please call the [facility] Triage line if you experience a temperature greater than or equal to 100.5, shaking chills, have uncontrolled nausea, vomiting and/or diarrhea, dizziness, shortness of breath, chest pain, bleeding, unexplained bruising, or if you have any other new/concerning symptoms, questions or concerns.     If it is after hours, weekends, or holidays, please call the main hospital  at  [phone] and ask to speak to the Oncology doctor on call.     If you are having any concerning symptoms or wish to speak to a provider before your next infusion visit, please call your care coordinator or triage to notify them so we can adequately serve you.     If you need a refill on a narcotic prescription or other medication, please call triage before your infusion appointment.

## 2019-11-01 NOTE — PROGRESS NOTES
"Infusion Nursing Note:  Eduardo Rubio presents today for C1D1 Cisplatin.    Patient seen by provider today: No   present during visit today: Not Applicable.    Note: XRT RN called the infusion room today and said the pt started treatment on 10/28 and that chemotherapy was not scheduled until 11/8.  With this protocol chemotherapy should start on D1 of XRT.  Dr Ferraro's checkout orders from 10/23 were to start chemotherapy on 10/28 in addition to his note from 10/23 which states \"We will coordinate with radiation oncology and initiate treatment with cisplatin on the same day as his radiation therapy start date. \".  Eduardo was contacted at home and was able to get to infusion prior to his scheduled XRT @1545 today.  IB sent to Dr Ferraro, Saba, and Rajwinder about this issue so pt has proper follow up.  IVF + electrolyte bag hung first in case pt had to finish his IVF at XRT, they will only hang NS.    Intravenous Access:  Implanted Port.    Treatment Conditions:  Lab Results   Component Value Date    HGB 11.2 11/01/2019     Lab Results   Component Value Date    WBC 4.4 11/01/2019      Lab Results   Component Value Date    ANEU 2.7 11/01/2019     Lab Results   Component Value Date     11/01/2019      Lab Results   Component Value Date     11/01/2019                   Lab Results   Component Value Date    POTASSIUM 3.9 11/01/2019           Lab Results   Component Value Date    MAG 2.1 11/01/2019            Lab Results   Component Value Date    CR 0.62 11/01/2019                   Lab Results   Component Value Date    MIKEY 8.6 11/01/2019                Lab Results   Component Value Date    BILITOTAL 0.3 11/01/2019           Lab Results   Component Value Date    ALBUMIN 3.2 11/01/2019                    Lab Results   Component Value Date    ALT 29 11/01/2019           Lab Results   Component Value Date    AST 26 11/01/2019       Results reviewed, labs MET treatment parameters, ok to proceed with " treatment.      Post Infusion Assessment:  Patient tolerated injection without incident.  Blood return noted pre and post infusion.  Site patent and intact, free from redness, edema or discomfort.  No evidence of extravasations.  Access discontinued per protocol.       Discharge Plan:   Prescription refills given for DEX, Compazine, Ativan, and Chlorhexidine.  Discharge instructions reviewed with: Patient.  Patient and/or family verbalized understanding of discharge instructions and all questions answered.  Copy of AVS reviewed with patient and/or family.  Patient will return 11/8 to see provider/labs.  Patient discharged in stable condition accompanied by: self to XRT.  Departure Mode: Ambulatory.    Kim Galan RN

## 2019-11-01 NOTE — TELEPHONE ENCOUNTER
PA Initiation    Medication: lorazepam - pending  Insurance Company: [employer] - Phone [phone] Fax [phone]  Pharmacy Filling the Rx: [place], [place] - [address] [place] [address] [id number]  Filling Pharmacy Phone: [phone]  Filling Pharmacy Fax: [phone]  Start Date: 11/1/2019

## 2019-11-04 ENCOUNTER — APPOINTMENT (OUTPATIENT)
Dept: RADIATION ONCOLOGY | Facility: CLINIC | Age: 59
End: 2019-11-04
Attending: RADIOLOGY
Payer: MEDICARE

## 2019-11-04 PROCEDURE — 77386 ZZH IMRT TREATMENT DELIVERY, COMPLEX: CPT | Performed by: RADIOLOGY

## 2019-11-04 NOTE — TELEPHONE ENCOUNTER
Prior Authorization Approval    Authorization Effective Date: 12/31/2018  Authorization Expiration Date: 12/31/2019  Medication: lorazepam - approved  Approved Dose/Quantity: 30/5  Reference #: [id number]  Insurance Company: [employer] - Phone [phone] Fax [phone]  Expected CoPay: NA     CoPay Card Available: No    Foundation Assistance Needed: NA  Which Pharmacy is filling the prescription (Not needed for infusion/clinic administered): [facility] [address] 0-270  Pharmacy Notified: Yes  Patient Notified: No

## 2019-11-05 ENCOUNTER — APPOINTMENT (OUTPATIENT)
Dept: RADIATION ONCOLOGY | Facility: CLINIC | Age: 59
End: 2019-11-05
Attending: RADIOLOGY
Payer: MEDICARE

## 2019-11-05 PROCEDURE — 77334 RADIATION TREATMENT AID(S): CPT | Performed by: RADIOLOGY

## 2019-11-05 PROCEDURE — 77331 SPECIAL RADIATION DOSIMETRY: CPT | Performed by: RADIOLOGY

## 2019-11-05 PROCEDURE — 77386 ZZH IMRT TREATMENT DELIVERY, COMPLEX: CPT | Performed by: RADIOLOGY

## 2019-11-06 ENCOUNTER — THERAPY VISIT (OUTPATIENT)
Dept: SPEECH THERAPY | Facility: CLINIC | Age: 59
End: 2019-11-06
Payer: MEDICARE

## 2019-11-06 ENCOUNTER — APPOINTMENT (OUTPATIENT)
Dept: RADIATION ONCOLOGY | Facility: CLINIC | Age: 59
End: 2019-11-06
Attending: RADIOLOGY
Payer: MEDICARE

## 2019-11-06 DIAGNOSIS — C31.0 MAXILLARY SINUS CANCER (H): Primary | ICD-10-CM

## 2019-11-06 DIAGNOSIS — R13.12 OROPHARYNGEAL DYSPHAGIA: ICD-10-CM

## 2019-11-06 PROCEDURE — 77386 ZZH IMRT TREATMENT DELIVERY, COMPLEX: CPT | Performed by: RADIOLOGY

## 2019-11-07 ENCOUNTER — APPOINTMENT (OUTPATIENT)
Dept: RADIATION ONCOLOGY | Facility: CLINIC | Age: 59
End: 2019-11-07
Attending: RADIOLOGY
Payer: MEDICARE

## 2019-11-07 VITALS
BODY MASS INDEX: 23.14 KG/M2 | HEART RATE: 72 BPM | WEIGHT: 170.6 LBS | SYSTOLIC BLOOD PRESSURE: 122 MMHG | DIASTOLIC BLOOD PRESSURE: 80 MMHG

## 2019-11-07 DIAGNOSIS — C31.0 MAXILLARY SINUS CANCER (H): Primary | ICD-10-CM

## 2019-11-07 PROCEDURE — 77386 ZZH IMRT TREATMENT DELIVERY, COMPLEX: CPT | Performed by: RADIOLOGY

## 2019-11-08 ENCOUNTER — APPOINTMENT (OUTPATIENT)
Dept: RADIATION ONCOLOGY | Facility: CLINIC | Age: 59
End: 2019-11-08
Attending: RADIOLOGY
Payer: MEDICARE

## 2019-11-08 ENCOUNTER — ONCOLOGY VISIT (OUTPATIENT)
Dept: ONCOLOGY | Facility: CLINIC | Age: 59
End: 2019-11-08
Attending: PHYSICIAN ASSISTANT
Payer: MEDICARE

## 2019-11-08 ENCOUNTER — APPOINTMENT (OUTPATIENT)
Dept: LAB | Facility: CLINIC | Age: 59
End: 2019-11-08
Attending: INTERNAL MEDICINE
Payer: MEDICARE

## 2019-11-08 VITALS
RESPIRATION RATE: 16 BRPM | WEIGHT: 167.9 LBS | OXYGEN SATURATION: 99 % | DIASTOLIC BLOOD PRESSURE: 79 MMHG | TEMPERATURE: 98.7 F | SYSTOLIC BLOOD PRESSURE: 125 MMHG | BODY MASS INDEX: 22.77 KG/M2 | HEART RATE: 75 BPM

## 2019-11-08 DIAGNOSIS — C31.0 MAXILLARY SINUS CANCER (H): Primary | ICD-10-CM

## 2019-11-08 PROBLEM — S22.41XD CLOSED FRACTURE OF MULTIPLE RIBS OF RIGHT SIDE WITH ROUTINE HEALING: Status: ACTIVE | Noted: 2017-10-22

## 2019-11-08 PROBLEM — J34.89 MAXILLARY SINUS MASS: Status: ACTIVE | Noted: 2019-08-18

## 2019-11-08 LAB
ALBUMIN SERPL-MCNC: 3.2 G/DL (ref 3.4–5)
ALP SERPL-CCNC: 54 U/L (ref 40–150)
ALT SERPL W P-5'-P-CCNC: 99 U/L (ref 0–70)
ANION GAP SERPL CALCULATED.3IONS-SCNC: 5 MMOL/L (ref 3–14)
AST SERPL W P-5'-P-CCNC: 55 U/L (ref 0–45)
BASOPHILS # BLD AUTO: 0 10E9/L (ref 0–0.2)
BASOPHILS NFR BLD AUTO: 0.3 %
BILIRUB SERPL-MCNC: 0.3 MG/DL (ref 0.2–1.3)
BUN SERPL-MCNC: 18 MG/DL (ref 7–30)
CALCIUM SERPL-MCNC: 8.4 MG/DL (ref 8.5–10.1)
CHLORIDE SERPL-SCNC: 96 MMOL/L (ref 94–109)
CO2 SERPL-SCNC: 27 MMOL/L (ref 20–32)
CREAT SERPL-MCNC: 0.66 MG/DL (ref 0.66–1.25)
DIFFERENTIAL METHOD BLD: ABNORMAL
EOSINOPHIL # BLD AUTO: 0.1 10E9/L (ref 0–0.7)
EOSINOPHIL NFR BLD AUTO: 0.9 %
ERYTHROCYTE [DISTWIDTH] IN BLOOD BY AUTOMATED COUNT: 14.4 % (ref 10–15)
GFR SERPL CREATININE-BSD FRML MDRD: >90 ML/MIN/{1.73_M2}
GLUCOSE SERPL-MCNC: 88 MG/DL (ref 70–99)
HCT VFR BLD AUTO: 33.7 % (ref 40–53)
HGB BLD-MCNC: 11 G/DL (ref 13.3–17.7)
IMM GRANULOCYTES # BLD: 0.1 10E9/L (ref 0–0.4)
IMM GRANULOCYTES NFR BLD: 0.7 %
LYMPHOCYTES # BLD AUTO: 1 10E9/L (ref 0.8–5.3)
LYMPHOCYTES NFR BLD AUTO: 14 %
MCH RBC QN AUTO: 29.2 PG (ref 26.5–33)
MCHC RBC AUTO-ENTMCNC: 32.6 G/DL (ref 31.5–36.5)
MCV RBC AUTO: 89 FL (ref 78–100)
MONOCYTES # BLD AUTO: 0.5 10E9/L (ref 0–1.3)
MONOCYTES NFR BLD AUTO: 7.5 %
NEUTROPHILS # BLD AUTO: 5.2 10E9/L (ref 1.6–8.3)
NEUTROPHILS NFR BLD AUTO: 76.6 %
NRBC # BLD AUTO: 0 10*3/UL
NRBC BLD AUTO-RTO: 0 /100
PLATELET # BLD AUTO: 219 10E9/L (ref 150–450)
POTASSIUM SERPL-SCNC: 3.6 MMOL/L (ref 3.4–5.3)
PROT SERPL-MCNC: 7.2 G/DL (ref 6.8–8.8)
RBC # BLD AUTO: 3.77 10E12/L (ref 4.4–5.9)
SODIUM SERPL-SCNC: 128 MMOL/L (ref 133–144)
WBC # BLD AUTO: 6.8 10E9/L (ref 4–11)

## 2019-11-08 PROCEDURE — 77336 RADIATION PHYSICS CONSULT: CPT | Performed by: RADIOLOGY

## 2019-11-08 PROCEDURE — G0463 HOSPITAL OUTPT CLINIC VISIT: HCPCS | Mod: 25

## 2019-11-08 PROCEDURE — 25000128 H RX IP 250 OP 636: Mod: ZF | Performed by: PHYSICIAN ASSISTANT

## 2019-11-08 PROCEDURE — 99214 OFFICE O/P EST MOD 30 MIN: CPT | Mod: ZP | Performed by: PHYSICIAN ASSISTANT

## 2019-11-08 PROCEDURE — 36591 DRAW BLOOD OFF VENOUS DEVICE: CPT

## 2019-11-08 PROCEDURE — 85025 COMPLETE CBC W/AUTO DIFF WBC: CPT | Performed by: PHYSICIAN ASSISTANT

## 2019-11-08 PROCEDURE — 80053 COMPREHEN METABOLIC PANEL: CPT | Performed by: PHYSICIAN ASSISTANT

## 2019-11-08 PROCEDURE — 77386 ZZH IMRT TREATMENT DELIVERY, COMPLEX: CPT | Performed by: RADIOLOGY

## 2019-11-08 RX ORDER — HEPARIN SODIUM (PORCINE) LOCK FLUSH IV SOLN 100 UNIT/ML 100 UNIT/ML
5 SOLUTION INTRAVENOUS EVERY 8 HOURS
Status: DISCONTINUED | OUTPATIENT
Start: 2019-11-08 | End: 2019-11-10 | Stop reason: HOSPADM

## 2019-11-08 RX ORDER — CYCLOBENZAPRINE HCL 10 MG
TABLET ORAL
Refills: 0 | COMMUNITY
Start: 2019-10-30

## 2019-11-08 RX ADMIN — HEPARIN 5 ML: 100 SYRINGE at 13:36

## 2019-11-08 ASSESSMENT — PAIN SCALES - GENERAL: PAINLEVEL: NO PAIN (0)

## 2019-11-08 NOTE — NURSING NOTE
Chief Complaint   Patient presents with     Port Draw     accessed with power needle, heparin locked, vitals checked     Oncology Clinic Visit     Return; Mixillary Sinus Ca     Desire Schuler RN on 11/8/2019 at 1:38 PM

## 2019-11-08 NOTE — LETTER
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  - 9/24/19: Total maxillectomy with orbital exenteration performed by Dr. Roberts, followed by reconstruction with a latissimus free flap with Dr. Pulliam.   - Surgical pathology showed a 4.4 cm moderately differentiated squamous cell carcinoma, (-) LVSI, (+)PNI. There was a positive margin at the pterygopalatine fossa, specifically the vidian nerve taken at its exit from the vidian canal, and additional resection into the canal was not possible.    CURRENT INTERVENTIONS:  Post surgery with planned radiation therapy     HISTORY OF PRESENT ILLNESS   Eduardo Rubio is 59 year old  who has been diagnosed with locally advanced right maxillary sinus cancer.    Eduardo presented to an outside ED in 08/18/2019 with complaints of right facial pressure, numbness, right-sided visual change and nasal drainage for several days' duration. Imaging workup included an MRI which demonstrated a maxillary sinus mass with extension to the orbit with involvement of the inferior rectus muscle. 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 Mr. Rubio was referred to [facility]. He had staging PET/CT on 9/9/2019 which revealed a FDG-avid maxillary mass at 4.0 x 3.9 x 4.2 cm. There was tumor extension into the right orbital cavity superiorly, the right nasal cavity medially, the retromaxillary fat region posterolaterally, the right pterygopalatine fossa posteriorly, and the premaxillary fat  anteriorly. In the orbital cavity there was abutment of the inferior rectus muscle. There was no evidence of lymphadenopathy or systemic disease. His case was reviewed at tumor conference with recommendation for surgery with total maxillectomy and orbital exenteration with free flap reconstruction.     Mr. Rubio underwent a total maxillectomy with orbital exenteration on 9/24/2019 with Dr. Roberts, followed by reconstruction with a latissimus free flap with Dr. Pulliam. Surgical pathology showed a 4.4 cm moderately differentiated squamous cell carcinoma, (-) LVSI, (+)PNI. There was a positive margin at the pterygopalatine fossa, specifically the vidian nerve taken at its exit from the vidian canal, and additional resection into the canal was not possible. Mr. Rubio's case was discussed in the [facility]'s multidisciplinary head and neck tumor board, with the consensus recommendation to proceed with adjuvant chemoradiation given the positive margin status.    INTERIM HISTORY:  Eduardo presents today for close followup.      Doing the s/s swishes about once a day. Doesn't feel like he has any sores in his mouth. Pain is better after the dental work he had. Eating soft foods, though eating well. His sister in law has been cooking for him. Appetite is good. Doing 2-3 protein shakes a day. Drinking well. Knows he has to force himself to eat to keep up with his nutrition. Denies any N/V. Having stools every other day. Not using any Senna currently. Denies any fevers or chills. Now has a thermometer and is checking his temp at home. Denies any SOB or CP. Had a cough previously though this has resolved. He is trying to quit smoking. Denies any neuropathy. Denies any edema.      ROS: 10 point ROS neg other than the symptoms noted above in the HPI.       PAST MEDICAL HISTORY     Past Medical History:   Diagnosis Date     Gastric ulcer      Low back pain      Maxillary sinus cancer (H)      Toe amputation  status     all ten toes          CURRENT OUTPATIENT MEDICATIONS     Current Outpatient Medications   Medication Sig     acetaminophen (TYLENOL) 325 MG tablet Take 325-650 mg by mouth every 6 hours as needed for mild pain     chlorhexidine (PERIDEX) 0.12 % solution Swish and spit 15 mLs in mouth 4 times daily     cyclobenzaprine (FLEXERIL) 10 MG tablet TK 1 T PO HS PRN FOR MUSCLE SPASM RELIEF     cyclobenzaprine (FLEXERIL) 5 MG tablet 1 tablet (5 mg) by Per Feeding Tube route daily as needed for muscle spasms     LORazepam (ATIVAN) 0.5 MG tablet Take 1 tablet (0.5 mg) by mouth every 4 hours as needed (Anxiety, Nausea/Vomiting or Sleep)     oxyCODONE (ROXICODONE) 5 MG/5ML solution 5 mLs (5 mg) by Per NG tube route every 4 hours as needed for moderate to severe pain     polyethylene glycol (MIRALAX/GLYCOLAX) packet 17 g by Per NG tube route daily as needed for constipation     prochlorperazine (COMPAZINE) 10 MG tablet Take 1 tablet (10 mg) by mouth every 6 hours as needed (Nausea/Vomiting) (Patient not taking: Reported on 11/8/2019)     senna-docusate (SENOKOT-S/PERICOLACE) 8.6-50 MG tablet 1 tablet by Per Feeding Tube route 2 times daily as needed for constipation (Patient not taking: Reported on 11/8/2019)     Current Facility-Administered Medications   Medication     heparin 100 UNIT/ML injection 5 mL        ALLERGIES    No Known Allergies     SOCIAL HISTORY     Social History     Patient does not qualify to have social determinant information on file (likely too young).   Social History Narrative     Not on file          FAMILY HISTORY        REVIEW OF SYSTEMS   As above in the HPI, o/w complete 12-point ROS was negative.     PHYSICAL EXAM   /79   Pulse 75   Temp 98.7  F (37.1  C)   Resp 16   Wt 76.2 kg (167 lb 14.4 oz)   SpO2 99%   BMI 22.77 kg/m   `   Wt Readings from Last 3 Encounters:   11/08/19 76.2 kg (167 lb 14.4 oz)   11/07/19 77.4 kg (170 lb 9.6 oz)   11/01/19 75.9 kg (167 lb 4.8 oz)     GEN:  NAD  HEENT: PERRL, EOMI, no icterus, injection or pallor. Oropharynx is clear.  NECK: no cervical or supraclavicular lymphadenopathy. Right face bulky from free flap.  LUNGS: clear bilaterally, no wheezing or distress  CV: regular, no murmurs, rubs, or gallops  ABDOMEN: soft, non-tender, non-distended, normal bowel sounds, no hepatosplenomegaly by percussion or palpation  EXT: warm, well perfused, no edema  NEURO: alert  SKIN: no rashes or erythema, particularly over radiation fields     LABORATORY AND IMAGING STUDIES      11/8/2019 13:28   Sodium 128 (L)   Potassium 3.6   Chloride 96   Carbon Dioxide 27   Urea Nitrogen 18   Creatinine 0.66   GFR Estimate >90   GFR Estimate If Black >90   Calcium 8.4 (L)   Anion Gap 5   Albumin 3.2 (L)   Protein Total 7.2   Bilirubin Total 0.3   Alkaline Phosphatase 54   ALT 99 (H)   AST 55 (H)   Glucose 88   WBC 6.8   Hemoglobin 11.0 (L)   Hematocrit 33.7 (L)   Platelet Count 219   RBC Count 3.77 (L)   MCV 89   MCH 29.2   MCHC 32.6   RDW 14.4   Diff Method Automated Method   % Neutrophils 76.6   % Lymphocytes 14.0   % Monocytes 7.5   % Eosinophils 0.9   % Basophils 0.3   % Immature Granulocytes 0.7   Nucleated RBCs 0   Absolute Neutrophil 5.2   Absolute Lymphocytes 1.0   Absolute Monocytes 0.5   Absolute Eosinophils 0.1   Absolute Basophils 0.0   Abs Immature Granulocytes 0.1   Absolute Nucleated RBC 0.0             ASSESSMENT    1. 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 No medical comorbidities  3. Nicotine abuse - chronic smoker   4. Poor social support; lives alone with a dog    Locally advanced maxillary sinus squamous cell cancer that extended in to the right orbit.   -s/p total right maxilectomy with orbital exenteration   -surgical margin were positive making it a high risk disease for recurrence.    -had port placed on 10/31  -decided to initiate adjuvant chemoradiation. Started radiation on 10/28 though due to scheduling delays, did not start  chemotherapy until 11/1. Has now received 1 dose of high dose cisplatin which he tolerated well with mild side effects.   -plan for HD cisplatin as a radiosensitizer every 3 weeks while undergoing RT (~6 1/2 weeks). Next on 11/20  -will continue weekly MARY KAY visits      FEN  -previously had a PEG though had it removed at patient's instruction  -currently eating well, mainly soft solid foods. Drinking well  -Na downtrending, now 128. Dr. Santillan discussed with the patient and he had been sodium restricting. Will have him increased salt intake and recheck early next week. Will also get urine to r/o any other disease processes  -he has not yet met with nutritionist. Will request appt asap. May need to replace PEG tube depending on his tolerance with treatment   -continue to follow with ST    Mucositis   -mild. No discrete sores. Currently using s/s swishes about once a day. Told him to increase to at least 5x/day, especially after meals to help with mouth tenderness  -continue chlorhexidine swishes as well    Rajwinder Saeed PA-C  [facility]  [address] [place] [address]  [phone]

## 2019-11-08 NOTE — PROGRESS NOTES
RADIATION ONCOLOGY WEEKLY ON TREATMENT VISIT   Encounter Date: 2019    Patient Name: Eduardo Rubio  MRN: [id number]  : 1960     Disease and Stage: pT4a N0 M0 squamous cell carcinoma of the right maxillary sinus  Treatment Site: Right face and neck  Current Dose/Planned Total Dose: 1800 / 6600 cGy  Daily Fraction Size: 200 cGy/day, 5 times/week  Concurrent Chemotherapy: Yes  Drug and Frequency: Cisplatin (100 mg/m ) every 3 weeks    Treatment Summary:    10/28/2019: Initiation of radiotherapy    10/31/2019: Tolerating treatment well. No issues.    2019: Cycle 1, day 1 of cisplatin chemotherapy    2019: Mildly increased odynophagia otherwise tolerating treatment well.    ED visits/Hospitalizations:  None    Missed Treatments:  None    Subjective: Mr. Rubio presents to clinic today for his weekly on-treatment visit. He describes minimal odynophagia with his symptoms rated as a 2/10 in severity. He does not currently require any pain medication and is eating a regular diet without difficulty. He specifically denies any headaches, nausea/vomiting, fever/chills, dyspnea, chest pain or abdominal pain with his remaining ROS otherwise negative.    ROS:   Constitutional  Pain (0-10): 2 (mouth), 0 (throat), 2 (skin)  Fatigue: Mild symptoms    CNS  Headaches: None    ENT  Mucositis: Mild symptoms    Cardiopulmonary  Dyspnea: None    GI  Nausea/vomiting: None    Nutrition  PEG: No  Diet: Regular    Integumentary  Dermatitis: None    Objective:   Current weight: 77.4 kg  Last week's weight: 76.7 kg  Starting weight: 76.7 kg    BP: 122/80 (sitting), 118/72 (standing)  Pulse: 72 (sitting), 76 (standing)     General: 59-year-old gentleman seated comfortably in an examination chair in no acute distress  HEENT: Mildly decreased bulk of the free flap. Left eye with normal extraocular movements. No scleral injection. Moist mucous membranes. No rhinorrhea or epistaxis. No oral cavity lesions. No  evidence of oral thrush.  Pulmonary: No wheezing, stridor or respiratory distress  Skin: No erythema    Treatment-related toxicities (CTCAE v5.0):  1. Mucositis: Grade 1: Asymptomatic or mild symptoms; intervention not indicated     Assessment:    Mr. 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 He is receiving adjuvant chemoradiotherapy for improved locoregional disease control. He is tolerating treatment very well with no significant acute radiation-induced toxicities.    Plan:   pT4a N0 M0 squamous cell carcinoma of the right maxillary sinus:    Continue chemoradiotherapy    Pain management:    Recommended use of as-needed Tylenol for mild to moderate symptoms    Fluids/Electrolytes/Nutrition:    Continue diet as tolerated    Dermatitis:    Continue BID/TID moisturizer use to the right lateral face and neck    Mucositis:    Continue frequent salt/soda rinses    Mosaiq chart and setup information reviewed  IGRT images reviewed    Medication Review  Med Note: No changes per pt    Eric Santillan MD/PhD    Dept of Radiation Oncology  [facility]

## 2019-11-08 NOTE — NURSING NOTE
Oncology Rooming Note    November 8, 2019 1:43 PM   Eduardo Rubio is a 59 year old male who presents for:    Chief Complaint   Patient presents with     Port Draw     accessed with power needle, heparin locked, vitals checked     Oncology Clinic Visit     Return; Mixillary Sinus Ca     Initial Vitals: /79   Pulse 75   Temp 98.7  F (37.1  C)   Resp 16   Wt 76.2 kg (167 lb 14.4 oz)   SpO2 99%   BMI 22.77 kg/m   Estimated body mass index is 22.77 kg/m  as calculated from the following:    Height as of 10/31/19: 1.829 m (6').    Weight as of this encounter: 76.2 kg (167 lb 14.4 oz). Body surface area is 1.97 meters squared.  No Pain (0) Comment: Data Unavailable   No LMP for male patient.  Allergies reviewed: Yes  Medications reviewed: Yes    Medications: Medication refills not needed today.  Pharmacy name entered into [employer]: [id number] - [place], [place] - [address] AT [address]    Clinical concerns: Lab results       Tess Aldana CMA

## 2019-11-08 NOTE — PROGRESS NOTES
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  - 9/24/19: Total maxillectomy with orbital exenteration performed by Dr. Roberts, followed by reconstruction with a latissimus free flap with Dr. Pulliam.   - Surgical pathology showed a 4.4 cm moderately differentiated squamous cell carcinoma, (-) LVSI, (+)PNI. There was a positive margin at the pterygopalatine fossa, specifically the vidian nerve taken at its exit from the vidian canal, and additional resection into the canal was not possible.    CURRENT INTERVENTIONS:  Post surgery with planned radiation therapy     HISTORY OF PRESENT ILLNESS   Eduardo Rubio is 59 year old  who has been diagnosed with locally advanced right maxillary sinus cancer.    Eduardo presented to an outside ED in 08/18/2019 with complaints of right facial pressure, numbness, right-sided visual change and nasal drainage for several days' duration. Imaging workup included an MRI which demonstrated a maxillary sinus mass with extension to the orbit with involvement of the inferior rectus muscle. 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 Mr. Rubio was referred to [facility]. He had staging PET/CT on 9/9/2019 which revealed a FDG-avid maxillary mass at 4.0 x 3.9 x 4.2 cm. There was tumor extension into the right orbital cavity superiorly, the right nasal cavity medially, the retromaxillary fat region posterolaterally, the right pterygopalatine fossa posteriorly, and the premaxillary fat anteriorly. In the orbital cavity there was abutment of the inferior rectus muscle. There was no evidence of  lymphadenopathy or systemic disease. His case was reviewed at tumor conference with recommendation for surgery with total maxillectomy and orbital exenteration with free flap reconstruction.     Mr. Rubio underwent a total maxillectomy with orbital exenteration on 9/24/2019 with Dr. Roberts, followed by reconstruction with a latissimus free flap with Dr. Pulliam. Surgical pathology showed a 4.4 cm moderately differentiated squamous cell carcinoma, (-) LVSI, (+)PNI. There was a positive margin at the pterygopalatine fossa, specifically the vidian nerve taken at its exit from the vidian canal, and additional resection into the canal was not possible. Mr. Rubio's case was discussed in the [facility]'s multidisciplinary head and neck tumor board, with the consensus recommendation to proceed with adjuvant chemoradiation given the positive margin status.    INTERIM HISTORY:  Eduardo presents today for close followup.      Doing the s/s swishes about once a day. Doesn't feel like he has any sores in his mouth. Pain is better after the dental work he had. Eating soft foods, though eating well. His sister in law has been cooking for him. Appetite is good. Doing 2-3 protein shakes a day. Drinking well. Knows he has to force himself to eat to keep up with his nutrition. Denies any N/V. Having stools every other day. Not using any Senna currently. Denies any fevers or chills. Now has a thermometer and is checking his temp at home. Denies any SOB or CP. Had a cough previously though this has resolved. He is trying to quit smoking. Denies any neuropathy. Denies any edema.      ROS: 10 point ROS neg other than the symptoms noted above in the HPI.       PAST MEDICAL HISTORY     Past Medical History:   Diagnosis Date     Gastric ulcer      Low back pain      Maxillary sinus cancer (H)      Toe amputation status     all ten toes          CURRENT OUTPATIENT MEDICATIONS     Current Outpatient Medications   Medication  Sig     acetaminophen (TYLENOL) 325 MG tablet Take 325-650 mg by mouth every 6 hours as needed for mild pain     chlorhexidine (PERIDEX) 0.12 % solution Swish and spit 15 mLs in mouth 4 times daily     cyclobenzaprine (FLEXERIL) 10 MG tablet TK 1 T PO HS PRN FOR MUSCLE SPASM RELIEF     cyclobenzaprine (FLEXERIL) 5 MG tablet 1 tablet (5 mg) by Per Feeding Tube route daily as needed for muscle spasms     LORazepam (ATIVAN) 0.5 MG tablet Take 1 tablet (0.5 mg) by mouth every 4 hours as needed (Anxiety, Nausea/Vomiting or Sleep)     oxyCODONE (ROXICODONE) 5 MG/5ML solution 5 mLs (5 mg) by Per NG tube route every 4 hours as needed for moderate to severe pain     polyethylene glycol (MIRALAX/GLYCOLAX) packet 17 g by Per NG tube route daily as needed for constipation     prochlorperazine (COMPAZINE) 10 MG tablet Take 1 tablet (10 mg) by mouth every 6 hours as needed (Nausea/Vomiting) (Patient not taking: Reported on 11/8/2019)     senna-docusate (SENOKOT-S/PERICOLACE) 8.6-50 MG tablet 1 tablet by Per Feeding Tube route 2 times daily as needed for constipation (Patient not taking: Reported on 11/8/2019)     Current Facility-Administered Medications   Medication     heparin 100 UNIT/ML injection 5 mL        ALLERGIES    No Known Allergies     SOCIAL HISTORY     Social History     Patient does not qualify to have social determinant information on file (likely too young).   Social History Narrative     Not on file          FAMILY HISTORY        REVIEW OF SYSTEMS   As above in the HPI, o/w complete 12-point ROS was negative.     PHYSICAL EXAM   /79   Pulse 75   Temp 98.7  F (37.1  C)   Resp 16   Wt 76.2 kg (167 lb 14.4 oz)   SpO2 99%   BMI 22.77 kg/m  `   Wt Readings from Last 3 Encounters:   11/08/19 76.2 kg (167 lb 14.4 oz)   11/07/19 77.4 kg (170 lb 9.6 oz)   11/01/19 75.9 kg (167 lb 4.8 oz)     GEN: NAD  HEENT: PERRL, EOMI, no icterus, injection or pallor. Oropharynx is clear.  NECK: no cervical or  supraclavicular lymphadenopathy. Right face bulky from free flap.  LUNGS: clear bilaterally, no wheezing or distress  CV: regular, no murmurs, rubs, or gallops  ABDOMEN: soft, non-tender, non-distended, normal bowel sounds, no hepatosplenomegaly by percussion or palpation  EXT: warm, well perfused, no edema  NEURO: alert  SKIN: no rashes or erythema, particularly over radiation fields     LABORATORY AND IMAGING STUDIES      11/8/2019 13:28   Sodium 128 (L)   Potassium 3.6   Chloride 96   Carbon Dioxide 27   Urea Nitrogen 18   Creatinine 0.66   GFR Estimate >90   GFR Estimate If Black >90   Calcium 8.4 (L)   Anion Gap 5   Albumin 3.2 (L)   Protein Total 7.2   Bilirubin Total 0.3   Alkaline Phosphatase 54   ALT 99 (H)   AST 55 (H)   Glucose 88   WBC 6.8   Hemoglobin 11.0 (L)   Hematocrit 33.7 (L)   Platelet Count 219   RBC Count 3.77 (L)   MCV 89   MCH 29.2   MCHC 32.6   RDW 14.4   Diff Method Automated Method   % Neutrophils 76.6   % Lymphocytes 14.0   % Monocytes 7.5   % Eosinophils 0.9   % Basophils 0.3   % Immature Granulocytes 0.7   Nucleated RBCs 0   Absolute Neutrophil 5.2   Absolute Lymphocytes 1.0   Absolute Monocytes 0.5   Absolute Eosinophils 0.1   Absolute Basophils 0.0   Abs Immature Granulocytes 0.1   Absolute Nucleated RBC 0.0             ASSESSMENT    1. 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 No medical comorbidities  3. Nicotine abuse - chronic smoker   4. Poor social support; lives alone with a dog    Locally advanced maxillary sinus squamous cell cancer that extended in to the right orbit.   -s/p total right maxilectomy with orbital exenteration   -surgical margin were positive making it a high risk disease for recurrence.    -had port placed on 10/31  -decided to initiate adjuvant chemoradiation. Started radiation on 10/28 though due to scheduling delays, did not start chemotherapy until 11/1. Has now received 1 dose of high dose cisplatin which he tolerated well with  mild side effects.   -plan for HD cisplatin as a radiosensitizer every 3 weeks while undergoing RT (~6 1/2 weeks). Next on 11/20  -will continue weekly MARY K[facility] visits      FEN  -previously had a PEG though had it removed at patient's instruction  -currently eating well, mainly soft solid foods. Drinking well  -Na downtrending, now 128. Dr. Santillan discussed with the patient and he had been sodium restricting. Will have him increased salt intake and recheck early next week. Will also get urine to r/o any other disease processes  -he has not yet met with nutritionist. Will request appt asap. May need to replace PEG tube depending on his tolerance with treatment   -continue to follow with ST    Mucositis   -mild. No discrete sores. Currently using s/s swishes about once a day. Told him to increase to at least 5x/day, especially after meals to help with mouth tenderness  -continue chlorhexidine swishes as well    Rajwinder Saeed PA-C  [facility]  [address], [place] [address] [phone]

## 2019-11-11 ENCOUNTER — APPOINTMENT (OUTPATIENT)
Dept: RADIATION ONCOLOGY | Facility: CLINIC | Age: 59
End: 2019-11-11
Attending: RADIOLOGY
Payer: MEDICARE

## 2019-11-11 PROCEDURE — 77386 ZZH IMRT TREATMENT DELIVERY, COMPLEX: CPT | Performed by: RADIOLOGY

## 2019-11-11 PROCEDURE — 77338 DESIGN MLC DEVICE FOR IMRT: CPT | Mod: 59 | Performed by: RADIOLOGY

## 2019-11-11 PROCEDURE — 77300 RADIATION THERAPY DOSE PLAN: CPT | Performed by: RADIOLOGY

## 2019-11-11 PROCEDURE — 77301 RADIOTHERAPY DOSE PLAN IMRT: CPT | Performed by: RADIOLOGY

## 2019-11-12 ENCOUNTER — APPOINTMENT (OUTPATIENT)
Dept: RADIATION ONCOLOGY | Facility: CLINIC | Age: 59
End: 2019-11-12
Attending: RADIOLOGY
Payer: MEDICARE

## 2019-11-12 PROCEDURE — 77386 ZZH IMRT TREATMENT DELIVERY, COMPLEX: CPT | Performed by: RADIOLOGY

## 2019-11-12 NOTE — PROGRESS NOTES
OUTPATIENT SWALLOW  EVALUATION  PLAN OF TREATMENT FOR OUTPATIENT REHABILITATION  (COMPLETE FOR INITIAL CLAIMS ONLY)  Patient's Last Name, First Name, M.I.  YOB: 1960  Eduardo Rubio        Provider's Name   MOOSE Nicole   Medical Record No.  [id number]     Start of Care Date:  10/04/19   Onset Date:  09/24/19   Type:     ___PT   ____OT  ___X_SLP Medical Diagnosis:        Treatment Diagnosis:  Mild oral dysphagia Visits from SOC:  1     _________________________________________________________________________________  Plan of Treatment/Functional Goals:  Planned Therapy Interventions: Dysphagia Treatment  Dysphagia treatment: Oropharyngeal exercise training, Instruction of safe swallow strategies, Modified diet education, Compensatory strategies for swallowing                     Goals   1. Goal Identifier: Diet       Goal Description: 1.  Patient will tolerate soft moist solid foods and thin liquids with independent use of swallowing strategies and no overt signs or symptoms of aspiration and at least 90% of trials.       Target Date: 01/03/20           2. Goal Identifier: Exercises       Goal Description: 2.  Pt will improve ROM and strength of tongue, BOT, pharynx and jaw by completing 10 repetitions per exercise at least 3 times daily with minimal written or verbal cues.        Target Date: 01/03/20           3.                             4.                             5.                            6.                              7.                              8.                              Therapy Frequency: (1x/week x 4 weeks)       Tahmina Cruz SLP       I CERTIFY THE NEED FOR THESE SERVICES FURNISHED UNDER        THIS PLAN OF TREATMENT AND WHILE UNDER MY CARE     (Physician attestation of this document indicates review and certification of the therapy plan).                               Referring  Physician: Dr. Teresa Pulliam    Initial Assessment        See Epic Evaluation Start Of Care Date: 10/04/19

## 2019-11-13 ENCOUNTER — APPOINTMENT (OUTPATIENT)
Dept: RADIATION ONCOLOGY | Facility: CLINIC | Age: 59
End: 2019-11-13
Attending: RADIOLOGY
Payer: MEDICARE

## 2019-11-13 PROCEDURE — 77386 ZZH IMRT TREATMENT DELIVERY, COMPLEX: CPT | Performed by: RADIOLOGY

## 2019-11-14 ENCOUNTER — OFFICE VISIT (OUTPATIENT)
Dept: RADIATION ONCOLOGY | Facility: CLINIC | Age: 59
End: 2019-11-14
Attending: RADIOLOGY
Payer: MEDICARE

## 2019-11-14 VITALS
SYSTOLIC BLOOD PRESSURE: 151 MMHG | WEIGHT: 169.3 LBS | DIASTOLIC BLOOD PRESSURE: 76 MMHG | BODY MASS INDEX: 22.96 KG/M2 | HEART RATE: 96 BPM

## 2019-11-14 DIAGNOSIS — Z98.890 S/P FLAP GRAFT: ICD-10-CM

## 2019-11-14 DIAGNOSIS — C44.92 SQUAMOUS CELL CARCINOMA OF SKIN, UNSPECIFIED: ICD-10-CM

## 2019-11-14 DIAGNOSIS — G89.3 CANCER ASSOCIATED PAIN: ICD-10-CM

## 2019-11-14 DIAGNOSIS — C31.0 MAXILLARY SINUS CANCER (H): Primary | ICD-10-CM

## 2019-11-14 PROCEDURE — 77386 ZZH IMRT TREATMENT DELIVERY, COMPLEX: CPT | Performed by: RADIOLOGY

## 2019-11-14 RX ORDER — NYSTATIN 100000/ML
500000 SUSPENSION, ORAL (FINAL DOSE FORM) ORAL 4 TIMES DAILY
Qty: 100 ML | Refills: 0 | Status: SHIPPED | OUTPATIENT
Start: 2019-11-14 | End: 2019-12-27

## 2019-11-14 RX ORDER — OXYCODONE HCL 5 MG/5 ML
5 SOLUTION, ORAL ORAL EVERY 4 HOURS PRN
Qty: 300 ML | Refills: 0 | Status: SHIPPED | OUTPATIENT
Start: 2019-11-14 | End: 2019-11-29

## 2019-11-14 RX ORDER — LIDOCAINE HYDROCHLORIDE 20 MG/ML
15 SOLUTION OROPHARYNGEAL EVERY 6 HOURS PRN
Qty: 100 ML | Refills: 3 | Status: SHIPPED | OUTPATIENT
Start: 2019-11-14 | End: 2019-12-10

## 2019-11-14 RX ORDER — GUAIFENESIN 200 MG/1
400 TABLET ORAL EVERY 4 HOURS PRN
Qty: 90 TABLET | Refills: 3 | Status: SHIPPED | OUTPATIENT
Start: 2019-11-14 | End: 2020-01-22

## 2019-11-14 RX ORDER — SALIVA STIMULANT COMB. NO.3
5 SPRAY, NON-AEROSOL (ML) MUCOUS MEMBRANE 4 TIMES DAILY PRN
Qty: 1 BOTTLE | Refills: 1 | Status: SHIPPED | OUTPATIENT
Start: 2019-11-14 | End: 2020-01-22

## 2019-11-14 NOTE — PROGRESS NOTES
RADIATION ONCOLOGY WEEKLY ON TREATMENT VISIT   Encounter Date: 2019    Patient Name: Eduardo Rubio  MRN: [id number]  : 1960     Disease and Stage: pT4a N0 M0 squamous cell carcinoma of the right maxillary sinus  Treatment Site: Right face and neck  Current Dose/Planned Total Dose: 2800 / 6600 cGy  Daily Fraction Size: 200 cGy/day, 5 times/week  Concurrent Chemotherapy: Yes  Drug and Frequency: Cisplatin (100 mg/m ) every 3 weeks    Treatment Summary:    10/28/2019: Initiation of radiotherapy    10/31/2019: Tolerating treatment well. No issues.    2019: Cycle 1, day 1 of cisplatin chemotherapy    2019: Mildly increased odynophagia otherwise tolerating treatment well.    2019: Weekly RT visit. Current dose of 2800 cGy. Moderately increased dermatitis and mucositis.    ED visits/Hospitalizations:  None    Missed Treatments:  None    Subjective: Mr. Rubio presents to clinic today for his weekly on-treatment visit. He has noted slightly increased dermatitis and mucositis over the past week and has switched to a softer diet to maintain his caloric intake. He currently describes his pain as a 4/10 in severity which he has been treating with as-needed acetaminophen and occasional use of as-needed oxycodone. He reports increased intake of fluids with electrolytes such as Gatorade/Powerade after he was found to be slightly hyponatremic. He denies any nausea/vomiting, fever/chills, dyspnea, chest pain, abdominal pain or constipation with his remaining ROS otherwise negative.    ROS:   Constitutional  Pain (0-10): 4 (mouth), 4 (throat), 4 (skin)  Fatigue: Moderate symptoms    CNS  Headaches: None    ENT  Mucositis: Mild to moderate symptoms    Cardiopulmonary  Dyspnea: None    GI  Nausea/vomiting: None    Nutrition  PEG: No  Diet: Soft diet    Integumentary  Dermatitis: Moderate symptoms    Objective:   Current weight: 76.8 kg  Last week's weight: 77.4 kg  Starting weight: 76.7  kg    BP: 151/76 (sitting), 140/90 (standing)  Pulse: 96 (sitting), 98 (standing)     General: 59-year-old gentleman seated comfortably in an examination chair in no acute distress  HEENT: Left eye with normal extraocular movements. No scleral injection or tearing. Mild nasal congestion with dried secretions in the bilateral nasal passages. Moderately dry mucous membranes with mildly thickened oral cavity secretions. Patchy mucositis most prominently involving the upper and lower gingiva and right buccal mucosa. Patchy areas concerning for oral thrush involving the right gingivobuccal sulcus.  Pulmonary: No wheezing, stridor or respiratory distress  Skin: Moderate erythema involving the right esther-face without desquamation    Treatment-related toxicities (CTCAE v5.0):  1. Mucositis: Grade 2: Moderate pain; not interfering with oral intake; modified diet indicated  2. Dermatitis: Grade 2: Moderate to brisk erythema; patchy moist desquamation, mostly confined to skin folds and creases; moderate erythema     Assessment:    Mr. 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 He is receiving adjuvant chemoradiotherapy for improved locoregional disease control. He is tolerating treatment reasonably well with the anticipated acute mucositis and dermatitis.    Plan:   pT4a N0 M0 squamous cell carcinoma of the right maxillary sinus:    Continue chemoradiotherapy    Pain management:    Continue as-needed acetaminophen for mild to moderate pain     Start viscous lidocaine as needed for mild to moderate symptoms    Start oxycodone 5-10 mg every 4 hours for moderate to severe breakthrough pain    Fluids/Electrolytes/Nutrition:    Continue diet as tolerated    Repeat labs tomorrow for electrolyte recheck    Dermatitis:    Continue BID Aquaphor application to the right lateral face and neck    Mucositis:    Continue frequent  salt/soda rinses    Start as if needed guaifenesin for thickened oral cavity secretions    Start diet ginger ale gargles    Start Biotene rinses    Oral thrush:    Start oral nystatin    Mosaiq chart and setup information reviewed  IGRT images reviewed    Medication list reviewed    Eric Santillan MD/PhD    Dept of Radiation Oncology  [facility]

## 2019-11-15 ENCOUNTER — APPOINTMENT (OUTPATIENT)
Dept: LAB | Facility: CLINIC | Age: 59
End: 2019-11-15
Attending: PHYSICIAN ASSISTANT
Payer: MEDICARE

## 2019-11-15 ENCOUNTER — ONCOLOGY VISIT (OUTPATIENT)
Dept: ONCOLOGY | Facility: CLINIC | Age: 59
End: 2019-11-15
Attending: PHYSICIAN ASSISTANT
Payer: MEDICARE

## 2019-11-15 ENCOUNTER — APPOINTMENT (OUTPATIENT)
Dept: RADIATION ONCOLOGY | Facility: CLINIC | Age: 59
End: 2019-11-15
Attending: RADIOLOGY
Payer: MEDICARE

## 2019-11-15 VITALS
WEIGHT: 167.9 LBS | SYSTOLIC BLOOD PRESSURE: 118 MMHG | RESPIRATION RATE: 16 BRPM | HEART RATE: 66 BPM | BODY MASS INDEX: 22.77 KG/M2 | DIASTOLIC BLOOD PRESSURE: 71 MMHG | TEMPERATURE: 98.1 F | OXYGEN SATURATION: 97 %

## 2019-11-15 DIAGNOSIS — C31.0 MAXILLARY SINUS CANCER (H): Primary | ICD-10-CM

## 2019-11-15 LAB
ALBUMIN SERPL-MCNC: 3.2 G/DL (ref 3.4–5)
ALP SERPL-CCNC: 50 U/L (ref 40–150)
ALT SERPL W P-5'-P-CCNC: 53 U/L (ref 0–70)
ANION GAP SERPL CALCULATED.3IONS-SCNC: 3 MMOL/L (ref 3–14)
AST SERPL W P-5'-P-CCNC: 32 U/L (ref 0–45)
BASOPHILS # BLD AUTO: 0 10E9/L (ref 0–0.2)
BASOPHILS NFR BLD AUTO: 0.4 %
BILIRUB SERPL-MCNC: 0.2 MG/DL (ref 0.2–1.3)
BUN SERPL-MCNC: 13 MG/DL (ref 7–30)
CALCIUM SERPL-MCNC: 8.6 MG/DL (ref 8.5–10.1)
CHLORIDE SERPL-SCNC: 100 MMOL/L (ref 94–109)
CO2 SERPL-SCNC: 29 MMOL/L (ref 20–32)
CREAT SERPL-MCNC: 0.63 MG/DL (ref 0.66–1.25)
DIFFERENTIAL METHOD BLD: ABNORMAL
EOSINOPHIL # BLD AUTO: 0 10E9/L (ref 0–0.7)
EOSINOPHIL NFR BLD AUTO: 0.7 %
ERYTHROCYTE [DISTWIDTH] IN BLOOD BY AUTOMATED COUNT: 14.6 % (ref 10–15)
GFR SERPL CREATININE-BSD FRML MDRD: >90 ML/MIN/{1.73_M2}
GLUCOSE SERPL-MCNC: 104 MG/DL (ref 70–99)
HCT VFR BLD AUTO: 31.8 % (ref 40–53)
HGB BLD-MCNC: 10.2 G/DL (ref 13.3–17.7)
IMM GRANULOCYTES # BLD: 0 10E9/L (ref 0–0.4)
IMM GRANULOCYTES NFR BLD: 0.2 %
LYMPHOCYTES # BLD AUTO: 0.5 10E9/L (ref 0.8–5.3)
LYMPHOCYTES NFR BLD AUTO: 11 %
MCH RBC QN AUTO: 29 PG (ref 26.5–33)
MCHC RBC AUTO-ENTMCNC: 32.1 G/DL (ref 31.5–36.5)
MCV RBC AUTO: 90 FL (ref 78–100)
MONOCYTES # BLD AUTO: 0.5 10E9/L (ref 0–1.3)
MONOCYTES NFR BLD AUTO: 10.3 %
NEUTROPHILS # BLD AUTO: 3.5 10E9/L (ref 1.6–8.3)
NEUTROPHILS NFR BLD AUTO: 77.4 %
NRBC # BLD AUTO: 0 10*3/UL
NRBC BLD AUTO-RTO: 0 /100
OSMOLALITY SERPL: 282 MMOL/KG (ref 275–295)
OSMOLALITY UR: 874 MMOL/KG (ref 100–1200)
PLATELET # BLD AUTO: 171 10E9/L (ref 150–450)
POTASSIUM SERPL-SCNC: 4 MMOL/L (ref 3.4–5.3)
PROT SERPL-MCNC: 7 G/DL (ref 6.8–8.8)
RBC # BLD AUTO: 3.52 10E12/L (ref 4.4–5.9)
SODIUM SERPL-SCNC: 133 MMOL/L (ref 133–144)
SODIUM UR-SCNC: 64 MMOL/L
WBC # BLD AUTO: 4.5 10E9/L (ref 4–11)

## 2019-11-15 PROCEDURE — 83935 ASSAY OF URINE OSMOLALITY: CPT | Performed by: PHYSICIAN ASSISTANT

## 2019-11-15 PROCEDURE — 84300 ASSAY OF URINE SODIUM: CPT | Performed by: PHYSICIAN ASSISTANT

## 2019-11-15 PROCEDURE — 77336 RADIATION PHYSICS CONSULT: CPT | Performed by: RADIOLOGY

## 2019-11-15 PROCEDURE — 25000128 H RX IP 250 OP 636: Mod: ZF | Performed by: PHYSICIAN ASSISTANT

## 2019-11-15 PROCEDURE — 83930 ASSAY OF BLOOD OSMOLALITY: CPT | Performed by: PHYSICIAN ASSISTANT

## 2019-11-15 PROCEDURE — 99214 OFFICE O/P EST MOD 30 MIN: CPT | Mod: ZP | Performed by: PHYSICIAN ASSISTANT

## 2019-11-15 PROCEDURE — G0463 HOSPITAL OUTPT CLINIC VISIT: HCPCS | Mod: ZF

## 2019-11-15 PROCEDURE — 85025 COMPLETE CBC W/AUTO DIFF WBC: CPT | Performed by: PHYSICIAN ASSISTANT

## 2019-11-15 PROCEDURE — 77386 ZZH IMRT TREATMENT DELIVERY, COMPLEX: CPT | Performed by: RADIOLOGY

## 2019-11-15 PROCEDURE — 80053 COMPREHEN METABOLIC PANEL: CPT | Performed by: PHYSICIAN ASSISTANT

## 2019-11-15 PROCEDURE — 36591 DRAW BLOOD OFF VENOUS DEVICE: CPT

## 2019-11-15 RX ORDER — HEPARIN SODIUM (PORCINE) LOCK FLUSH IV SOLN 100 UNIT/ML 100 UNIT/ML
5 SOLUTION INTRAVENOUS
Status: COMPLETED | OUTPATIENT
Start: 2019-11-15 | End: 2019-11-15

## 2019-11-15 RX ADMIN — HEPARIN 5 ML: 100 SYRINGE at 13:26

## 2019-11-15 ASSESSMENT — PAIN SCALES - GENERAL: PAINLEVEL: MILD PAIN (3)

## 2019-11-15 NOTE — LETTER
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  - 9/24/19: Total maxillectomy with orbital exenteration performed by Dr. Roberts, followed by reconstruction with a latissimus free flap with Dr. Pulliam.   - Surgical pathology showed a 4.4 cm moderately differentiated squamous cell carcinoma, (-) LVSI, (+)PNI. There was a positive margin at the pterygopalatine fossa, specifically the vidian nerve taken at its exit from the vidian canal, and additional resection into the canal was not possible.    CURRENT INTERVENTIONS:  Post surgery with planned radiation therapy     HISTORY OF PRESENT ILLNESS   Eduardo Rubio is 59 year old  who has been diagnosed with locally advanced right maxillary sinus cancer.    Eduardo presented to an outside ED in 08/18/2019 with complaints of right facial pressure, numbness, right-sided visual change and nasal drainage for several days' duration. Imaging workup included an MRI which demonstrated a maxillary sinus mass with extension to the orbit with involvement of the inferior rectus muscle. 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 Mr. Rubio was referred to [facility]. He had staging PET/CT on 9/9/2019 which revealed a FDG-avid maxillary mass at 4.0 x 3.9 x 4.2 cm. There was tumor extension into the right orbital cavity superiorly, the right nasal cavity medially, the retromaxillary fat region posterolaterally, the right pterygopalatine fossa posteriorly, and the premaxillary fat  anteriorly. In the orbital cavity there was abutment of the inferior rectus muscle. There was no evidence of lymphadenopathy or systemic disease. His case was reviewed at tumor conference with recommendation for surgery with total maxillectomy and orbital exenteration with free flap reconstruction.     Mr. Rubio underwent a total maxillectomy with orbital exenteration on 9/24/2019 with Dr. Roberts, followed by reconstruction with a latissimus free flap with Dr. Pulliam. Surgical pathology showed a 4.4 cm moderately differentiated squamous cell carcinoma, (-) LVSI, (+)PNI. There was a positive margin at the pterygopalatine fossa, specifically the vidian nerve taken at its exit from the vidian canal, and additional resection into the canal was not possible. Mr. Rubio's case was discussed in the [facility]'s multidisciplinary head and neck tumor board, with the consensus recommendation to proceed with adjuvant chemoradiation given the positive margin status.    INTERIM HISTORY:  Eduardo presents today for close followup.      He states that overall he thinks he is doing well.  He does have some mouth sores and a dry mouth.  He has not yet picked up any of the medications that Dr. Santillan prescribed him because he has not been able to make it to the pharmacy.  He states that has been harder to eat now so he is eating softer foods.  He is also having taste changes.  He is having a little bit more pain to do so he is using more regular pain medication.  He states that he has been able to drink well and now is incorporating Gatorade into his fluids.  He denies any nausea vomiting.  He denies any change in stool.  He denies any fevers chills.  He denies any edema or neuropathy.  He denies any hearing changes.    ROS: 10 point ROS neg other than the symptoms noted above in the HPI.       PAST MEDICAL HISTORY     Past Medical History:   Diagnosis Date     Gastric ulcer      Low back pain      Maxillary sinus  cancer (H)      Toe amputation status     all ten toes          CURRENT OUTPATIENT MEDICATIONS     Current Outpatient Medications   Medication Sig     artificial saliva (BIOTENE MT) SOLN solution Swish and spit 5 mLs in mouth 4 times daily as needed for dry mouth     cyclobenzaprine (FLEXERIL) 10 MG tablet TK 1 T PO HS PRN FOR MUSCLE SPASM RELIEF     guaiFENesin (ORGANIDIN) 200 MG tablet Take 2 tablets (400 mg) by mouth every 4 hours as needed (Thickened secretions)     lidocaine (XYLOCAINE) 2 % solution Swish and swallow 15 mLs in mouth every 6 hours as needed for moderate pain     LORazepam (ATIVAN) 0.5 MG tablet Take 1 tablet (0.5 mg) by mouth every 4 hours as needed (Anxiety, Nausea/Vomiting or Sleep)     oxyCODONE (ROXICODONE) 5 MG/5ML solution 5 mLs (5 mg) by Per NG tube route every 4 hours as needed for moderate to severe pain     polyethylene glycol (MIRALAX/GLYCOLAX) packet 17 g by Per NG tube route daily as needed for constipation     prochlorperazine (COMPAZINE) 10 MG tablet Take 1 tablet (10 mg) by mouth every 6 hours as needed (Nausea/Vomiting)     senna-docusate (SENOKOT-S/PERICOLACE) 8.6-50 MG tablet 1 tablet by Per Feeding Tube route 2 times daily as needed for constipation     acetaminophen (TYLENOL) 325 MG tablet Take 325-650 mg by mouth every 6 hours as needed for mild pain     chlorhexidine (PERIDEX) 0.12 % solution Swish and spit 15 mLs in mouth 4 times daily     cyclobenzaprine (FLEXERIL) 5 MG tablet 1 tablet (5 mg) by Per Feeding Tube route daily as needed for muscle spasms     nystatin (MYCOSTATIN) 093009 UNIT/ML suspension Take 5 mLs (500,000 Units) by mouth 4 times daily     Current Facility-Administered Medications   Medication     sodium chloride (PF) 0.9% PF flush 20 mL        ALLERGIES    No Known Allergies     SOCIAL HISTORY     Social History     Social History Narrative     Not on file          FAMILY HISTORY        REVIEW OF SYSTEMS   As above in the HPI, o/w complete 12-point ROS  was negative.     PHYSICAL EXAM   /71 (BP Location: Right arm, Patient Position: Sitting, Cuff Size: Adult Regular)   Pulse 66   Temp 98.1  F (36.7  C) (Tympanic)   Resp 16   Wt 76.2 kg (167 lb 14.4 oz)   SpO2 97%   BMI 22.77 kg/m   `   Wt Readings from Last 3 Encounters:   11/21/19 74.4 kg (164 lb)   11/20/19 74.5 kg (164 lb 3.2 oz)   11/15/19 76.2 kg (167 lb 14.4 oz)     GEN: NAD  HEENT: PERRL, EOMI, no icterus, injection or pallor. Thrush +  NECK: no cervical or supraclavicular lymphadenopathy. Right face bulky from free flap.  LUNGS: clear bilaterally, no wheezing or distress  CV: regular, no murmurs, rubs, or gallops  ABDOMEN: soft, non-tender, non-distended, normal bowel sounds, no hepatosplenomegaly by percussion or palpation  EXT: warm, well perfused, no edema  NEURO: alert  SKIN: no rashes or erythema, particularly over radiation fields     LABORATORY AND IMAGING STUDIES      11/15/2019 13:33   Sodium 133   Potassium 4.0   Chloride 100   Carbon Dioxide 29   Urea Nitrogen 13   Creatinine 0.63 (L)   GFR Estimate >90   GFR Estimate If Black >90   Calcium 8.6   Anion Gap 3   Albumin 3.2 (L)   Protein Total 7.0   Bilirubin Total 0.2   Alkaline Phosphatase 50   ALT 53   AST 32   Osmolality 282   Glucose 104 (H)   WBC 4.5   Hemoglobin 10.2 (L)   Hematocrit 31.8 (L)   Platelet Count 171   RBC Count 3.52 (L)   MCV 90   MCH 29.0   MCHC 32.1   RDW 14.6   Diff Method Automated Method   % Neutrophils 77.4   % Lymphocytes 11.0   % Monocytes 10.3   % Eosinophils 0.7   % Basophils 0.4   % Immature Granulocytes 0.2   Nucleated RBCs 0   Absolute Neutrophil 3.5   Absolute Lymphocytes 0.5 (L)   Absolute Monocytes 0.5   Absolute Eosinophils 0.0   Absolute Basophils 0.0   Abs Immature Granulocytes 0.0   Absolute Nucleated RBC 0.0      11/15/2019 13:38   Sodium Urine mmol/L 64   Urine Osmolality 874           ASSESSMENT    1. 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 No medical  comorbidities  3. Nicotine abuse - chronic smoker   4. Poor social support; lives alone with a dog    Locally advanced maxillary sinus squamous cell cancer that extended in to the right orbit.   -s/p total right maxilectomy with orbital exenteration   -surgical margin were positive making it a high risk disease for recurrence.    -had port placed on 10/31  -decided to initiate adjuvant chemoradiation. Started radiation on 10/28 though due to scheduling delays, did not start chemotherapy until 11/1. Has now received 1 dose of high dose cisplatin which he tolerated well with mild side effects.   -plan for HD cisplatin as a radiosensitizer every 3 weeks while undergoing RT (~6 1/2 weeks). Next on 11/20 (next week)  -will continue weekly [facility] visits      FEN  -previously had a PEG though had it removed at patient's instruction  -having more difficulties eating well, now eating mainly soft solid foods.   -Drinking well, incorporating Gatorade now. Na WNL today. Continue adding solute rich fluids  -he has not yet met with nutritionist. Request for an appt. May need to replace PEG tube depending on his tolerance with treatment   -continue to follow with ST    Mucositis   -mild. No discrete sores. Currently using s/s swishes about once a day. Told him to increase to at least 5x/day, especially after meals to help with mouth tenderness  -continue chlorhexidine swishes as well  -recommended diet ginger ale swishes  -sent biotene though hasnt picked up  -can use oxycodone and APAP prn     Thrush  -has not picked up Nystatin yet. Should start as soon as he gets it    Rajwinder Saeed PA-C  [facility]  [address] [place] [address] [phone]

## 2019-11-15 NOTE — NURSING NOTE
"Chief Complaint   Patient presents with     Port Draw     labs drawn from port by rn.  vs taken     Port accessed with 20 gauge 3/4\" gripper needle and labs drawn by rn; urine collected and sent as well.  Port flushed with NS and heparin.  Pt tolerated well.  VS taken.  Pt checked in for next appt.    Cathy Perdue RN    "

## 2019-11-15 NOTE — PROGRESS NOTES
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  - 9/24/19: Total maxillectomy with orbital exenteration performed by Dr. Roberts, followed by reconstruction with a latissimus free flap with Dr. Pulliam.   - Surgical pathology showed a 4.4 cm moderately differentiated squamous cell carcinoma, (-) LVSI, (+)PNI. There was a positive margin at the pterygopalatine fossa, specifically the vidian nerve taken at its exit from the vidian canal, and additional resection into the canal was not possible.    CURRENT INTERVENTIONS:  Post surgery with planned radiation therapy     HISTORY OF PRESENT ILLNESS   Eduardo Rubio is 59 year old  who has been diagnosed with locally advanced right maxillary sinus cancer.    Eduardo presented to an outside ED in 08/18/2019 with complaints of right facial pressure, numbness, right-sided visual change and nasal drainage for several days' duration. Imaging workup included an MRI which demonstrated a maxillary sinus mass with extension to the orbit with involvement of the inferior rectus muscle. 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 Mr. Rubio was referred to [facility]. He had staging PET/CT on 9/9/2019 which revealed a FDG-avid maxillary mass at 4.0 x 3.9 x 4.2 cm. There was tumor extension into the right orbital cavity superiorly, the right nasal cavity medially, the retromaxillary fat region posterolaterally, the right pterygopalatine fossa posteriorly, and the premaxillary fat anteriorly. In the orbital cavity there was abutment of the inferior rectus muscle. There was no evidence of  lymphadenopathy or systemic disease. His case was reviewed at tumor conference with recommendation for surgery with total maxillectomy and orbital exenteration with free flap reconstruction.     Mr. Rubio underwent a total maxillectomy with orbital exenteration on 9/24/2019 with Dr. Roberts, followed by reconstruction with a latissimus free flap with Dr. Pulliam. Surgical pathology showed a 4.4 cm moderately differentiated squamous cell carcinoma, (-) LVSI, (+)PNI. There was a positive margin at the pterygopalatine fossa, specifically the vidian nerve taken at its exit from the vidian canal, and additional resection into the canal was not possible. Mr. Rubio's case was discussed in the [facility]'s multidisciplinary head and neck tumor board, with the consensus recommendation to proceed with adjuvant chemoradiation given the positive margin status.    INTERIM HISTORY:  Eduardo presents today for close followup.      He states that overall he thinks he is doing well.  He does have some mouth sores and a dry mouth.  He has not yet picked up any of the medications that Dr. Santillan prescribed him because he has not been able to make it to the pharmacy.  He states that has been harder to eat now so he is eating softer foods.  He is also having taste changes.  He is having a little bit more pain to do so he is using more regular pain medication.  He states that he has been able to drink well and now is incorporating Gatorade into his fluids.  He denies any nausea vomiting.  He denies any change in stool.  He denies any fevers chills.  He denies any edema or neuropathy.  He denies any hearing changes.    ROS: 10 point ROS neg other than the symptoms noted above in the HPI.       PAST MEDICAL HISTORY     Past Medical History:   Diagnosis Date     Gastric ulcer      Low back pain      Maxillary sinus cancer (H)      Toe amputation status     all ten toes          CURRENT OUTPATIENT MEDICATIONS     Current  Outpatient Medications   Medication Sig     artificial saliva (BIOTENE MT) SOLN solution Swish and spit 5 mLs in mouth 4 times daily as needed for dry mouth     cyclobenzaprine (FLEXERIL) 10 MG tablet TK 1 T PO HS PRN FOR MUSCLE SPASM RELIEF     guaiFENesin (ORGANIDIN) 200 MG tablet Take 2 tablets (400 mg) by mouth every 4 hours as needed (Thickened secretions)     lidocaine (XYLOCAINE) 2 % solution Swish and swallow 15 mLs in mouth every 6 hours as needed for moderate pain     LORazepam (ATIVAN) 0.5 MG tablet Take 1 tablet (0.5 mg) by mouth every 4 hours as needed (Anxiety, Nausea/Vomiting or Sleep)     oxyCODONE (ROXICODONE) 5 MG/5ML solution 5 mLs (5 mg) by Per NG tube route every 4 hours as needed for moderate to severe pain     polyethylene glycol (MIRALAX/GLYCOLAX) packet 17 g by Per NG tube route daily as needed for constipation     prochlorperazine (COMPAZINE) 10 MG tablet Take 1 tablet (10 mg) by mouth every 6 hours as needed (Nausea/Vomiting)     senna-docusate (SENOKOT-S/PERICOLACE) 8.6-50 MG tablet 1 tablet by Per Feeding Tube route 2 times daily as needed for constipation     acetaminophen (TYLENOL) 325 MG tablet Take 325-650 mg by mouth every 6 hours as needed for mild pain     chlorhexidine (PERIDEX) 0.12 % solution Swish and spit 15 mLs in mouth 4 times daily     cyclobenzaprine (FLEXERIL) 5 MG tablet 1 tablet (5 mg) by Per Feeding Tube route daily as needed for muscle spasms     nystatin (MYCOSTATIN) 649400 UNIT/ML suspension Take 5 mLs (500,000 Units) by mouth 4 times daily     Current Facility-Administered Medications   Medication     sodium chloride (PF) 0.9% PF flush 20 mL        ALLERGIES    No Known Allergies     SOCIAL HISTORY     Social History     Social History Narrative     Not on file          FAMILY HISTORY        REVIEW OF SYSTEMS   As above in the HPI, o/w complete 12-point ROS was negative.     PHYSICAL EXAM   /71 (BP Location: Right arm, Patient Position: Sitting, Cuff Size:  Adult Regular)   Pulse 66   Temp 98.1  F (36.7  C) (Tympanic)   Resp 16   Wt 76.2 kg (167 lb 14.4 oz)   SpO2 97%   BMI 22.77 kg/m  `   Wt Readings from Last 3 Encounters:   11/21/19 74.4 kg (164 lb)   11/20/19 74.5 kg (164 lb 3.2 oz)   11/15/19 76.2 kg (167 lb 14.4 oz)     GEN: NAD  HEENT: PERRL, EOMI, no icterus, injection or pallor. Thrush +  NECK: no cervical or supraclavicular lymphadenopathy. Right face bulky from free flap.  LUNGS: clear bilaterally, no wheezing or distress  CV: regular, no murmurs, rubs, or gallops  ABDOMEN: soft, non-tender, non-distended, normal bowel sounds, no hepatosplenomegaly by percussion or palpation  EXT: warm, well perfused, no edema  NEURO: alert  SKIN: no rashes or erythema, particularly over radiation fields     LABORATORY AND IMAGING STUDIES      11/15/2019 13:33   Sodium 133   Potassium 4.0   Chloride 100   Carbon Dioxide 29   Urea Nitrogen 13   Creatinine 0.63 (L)   GFR Estimate >90   GFR Estimate If Black >90   Calcium 8.6   Anion Gap 3   Albumin 3.2 (L)   Protein Total 7.0   Bilirubin Total 0.2   Alkaline Phosphatase 50   ALT 53   AST 32   Osmolality 282   Glucose 104 (H)   WBC 4.5   Hemoglobin 10.2 (L)   Hematocrit 31.8 (L)   Platelet Count 171   RBC Count 3.52 (L)   MCV 90   MCH 29.0   MCHC 32.1   RDW 14.6   Diff Method Automated Method   % Neutrophils 77.4   % Lymphocytes 11.0   % Monocytes 10.3   % Eosinophils 0.7   % Basophils 0.4   % Immature Granulocytes 0.2   Nucleated RBCs 0   Absolute Neutrophil 3.5   Absolute Lymphocytes 0.5 (L)   Absolute Monocytes 0.5   Absolute Eosinophils 0.0   Absolute Basophils 0.0   Abs Immature Granulocytes 0.0   Absolute Nucleated RBC 0.0      11/15/2019 13:38   Sodium Urine mmol/L 64   Urine Osmolality 874           ASSESSMENT    1. Stage IV [id number] right maxillary sinus squamous cell cancer   2. No medical comorbidities  3. Nicotine abuse - chronic smoker   4. Poor social support; lives alone with a dog    Locally advanced  maxillary sinus squamous cell cancer that extended in to the right orbit.   -s/p total right maxilectomy with orbital exenteration   -surgical margin were positive making it a high risk disease for recurrence.    -had port placed on 10/31  -decided to initiate adjuvant chemoradiation. Started radiation on 10/28 though due to scheduling delays, did not start chemotherapy until 11/1. Has now received 1 dose of high dose cisplatin which he tolerated well with mild side effects.   -plan for HD cisplatin as a radiosensitizer every 3 weeks while undergoing RT (~6 1/2 weeks). Next on 11/20 (next week)  -will continue weekly [facility] visits      FEN  -previously had a PEG though had it removed at patient's instruction  -having more difficulties eating well, now eating mainly soft solid foods.   -Drinking well, incorporating Gatorade now. Na WNL today. Continue adding solute rich fluids  -he has not yet met with nutritionist. Request for an appt. May need to replace PEG tube depending on his tolerance with treatment   -continue to follow with ST    Mucositis   -mild. No discrete sores. Currently using s/s swishes about once a day. Told him to increase to at least 5x/day, especially after meals to help with mouth tenderness  -continue chlorhexidine swishes as well  -recommended diet ginger ale swishes  -sent biotene though hasnt picked up  -can use oxycodone and APAP prn     Thrush  -has not picked up Nystatin yet. Should start as soon as he gets it    Rajwinder Saeed PA-C  [facility]  [address] [place] [address] [phone]

## 2019-11-15 NOTE — NURSING NOTE
Oncology Rooming Note    November 15, 2019 1:49 PM   Eduardo Rubio is a 59 year old male who presents for:    Chief Complaint   Patient presents with     Port Draw     labs drawn from port by rn.  vs taken     Oncology Clinic Visit     Return; Sinus Ca     Initial Vitals: /71 (BP Location: Right arm, Patient Position: Sitting, Cuff Size: Adult Regular)   Pulse 66   Temp 98.1  F (36.7  C) (Tympanic)   Resp 16   Wt 76.2 kg (167 lb 14.4 oz)   SpO2 97%   BMI 22.77 kg/m   Estimated body mass index is 22.77 kg/m  as calculated from the following:    Height as of 10/31/19: 1.829 m (6').    Weight as of this encounter: 76.2 kg (167 lb 14.4 oz). Body surface area is 1.97 meters squared.  Mild Pain (3) Comment: Data Unavailable   No LMP for male patient.  Allergies reviewed: Yes  Medications reviewed: Yes    Medications: Medication refills not needed today.  Pharmacy name entered into [employer]: [id number] - [facility] [address] AT [address]    Clinical concerns: No new concerns        Tess Aldana CMA

## 2019-11-18 ENCOUNTER — APPOINTMENT (OUTPATIENT)
Dept: RADIATION ONCOLOGY | Facility: CLINIC | Age: 59
End: 2019-11-18
Attending: RADIOLOGY
Payer: MEDICARE

## 2019-11-18 PROCEDURE — 77386 ZZH IMRT TREATMENT DELIVERY, COMPLEX: CPT | Performed by: RADIOLOGY

## 2019-11-19 ENCOUNTER — PRE VISIT (OUTPATIENT)
Dept: ONCOLOGY | Facility: CLINIC | Age: 59
End: 2019-11-19

## 2019-11-19 ENCOUNTER — APPOINTMENT (OUTPATIENT)
Dept: RADIATION ONCOLOGY | Facility: CLINIC | Age: 59
End: 2019-11-19
Attending: RADIOLOGY
Payer: MEDICARE

## 2019-11-19 PROCEDURE — 77386 ZZH IMRT TREATMENT DELIVERY, COMPLEX: CPT | Performed by: RADIOLOGY

## 2019-11-20 ENCOUNTER — APPOINTMENT (OUTPATIENT)
Dept: RADIATION ONCOLOGY | Facility: CLINIC | Age: 59
End: 2019-11-20
Attending: RADIOLOGY
Payer: MEDICARE

## 2019-11-20 ENCOUNTER — INFUSION THERAPY VISIT (OUTPATIENT)
Dept: ONCOLOGY | Facility: CLINIC | Age: 59
End: 2019-11-20
Attending: INTERNAL MEDICINE
Payer: MEDICARE

## 2019-11-20 ENCOUNTER — THERAPY VISIT (OUTPATIENT)
Dept: SPEECH THERAPY | Facility: CLINIC | Age: 59
End: 2019-11-20
Payer: MEDICARE

## 2019-11-20 ENCOUNTER — ONCOLOGY VISIT (OUTPATIENT)
Dept: ONCOLOGY | Facility: CLINIC | Age: 59
End: 2019-11-20
Attending: PHYSICIAN ASSISTANT
Payer: MEDICARE

## 2019-11-20 ENCOUNTER — APPOINTMENT (OUTPATIENT)
Dept: LAB | Facility: CLINIC | Age: 59
End: 2019-11-20
Attending: INTERNAL MEDICINE
Payer: MEDICARE

## 2019-11-20 VITALS
OXYGEN SATURATION: 95 % | TEMPERATURE: 98.6 F | SYSTOLIC BLOOD PRESSURE: 119 MMHG | BODY MASS INDEX: 22.27 KG/M2 | RESPIRATION RATE: 16 BRPM | HEART RATE: 72 BPM | WEIGHT: 164.2 LBS | DIASTOLIC BLOOD PRESSURE: 79 MMHG

## 2019-11-20 DIAGNOSIS — C31.0 MAXILLARY SINUS CANCER (H): Primary | ICD-10-CM

## 2019-11-20 DIAGNOSIS — R13.12 OROPHARYNGEAL DYSPHAGIA: ICD-10-CM

## 2019-11-20 LAB
ALBUMIN SERPL-MCNC: 3.3 G/DL (ref 3.4–5)
ALP SERPL-CCNC: 50 U/L (ref 40–150)
ALT SERPL W P-5'-P-CCNC: 42 U/L (ref 0–70)
ANION GAP SERPL CALCULATED.3IONS-SCNC: 3 MMOL/L (ref 3–14)
AST SERPL W P-5'-P-CCNC: 31 U/L (ref 0–45)
BASOPHILS # BLD AUTO: 0 10E9/L (ref 0–0.2)
BASOPHILS NFR BLD AUTO: 0.4 %
BILIRUB SERPL-MCNC: 0.3 MG/DL (ref 0.2–1.3)
BUN SERPL-MCNC: 10 MG/DL (ref 7–30)
CALCIUM SERPL-MCNC: 8.8 MG/DL (ref 8.5–10.1)
CHLORIDE SERPL-SCNC: 100 MMOL/L (ref 94–109)
CO2 SERPL-SCNC: 30 MMOL/L (ref 20–32)
CREAT SERPL-MCNC: 0.64 MG/DL (ref 0.66–1.25)
DIFFERENTIAL METHOD BLD: ABNORMAL
EOSINOPHIL # BLD AUTO: 0.1 10E9/L (ref 0–0.7)
EOSINOPHIL NFR BLD AUTO: 4.3 %
ERYTHROCYTE [DISTWIDTH] IN BLOOD BY AUTOMATED COUNT: 14.6 % (ref 10–15)
GFR SERPL CREATININE-BSD FRML MDRD: >90 ML/MIN/{1.73_M2}
GLUCOSE SERPL-MCNC: 90 MG/DL (ref 70–99)
HCT VFR BLD AUTO: 31.7 % (ref 40–53)
HGB BLD-MCNC: 10.5 G/DL (ref 13.3–17.7)
IMM GRANULOCYTES # BLD: 0 10E9/L (ref 0–0.4)
IMM GRANULOCYTES NFR BLD: 0 %
LYMPHOCYTES # BLD AUTO: 0.4 10E9/L (ref 0.8–5.3)
LYMPHOCYTES NFR BLD AUTO: 18.1 %
MAGNESIUM SERPL-MCNC: 1.8 MG/DL (ref 1.6–2.3)
MCH RBC QN AUTO: 29.5 PG (ref 26.5–33)
MCHC RBC AUTO-ENTMCNC: 33.1 G/DL (ref 31.5–36.5)
MCV RBC AUTO: 89 FL (ref 78–100)
MONOCYTES # BLD AUTO: 0.3 10E9/L (ref 0–1.3)
MONOCYTES NFR BLD AUTO: 12.5 %
NEUTROPHILS # BLD AUTO: 1.5 10E9/L (ref 1.6–8.3)
NEUTROPHILS NFR BLD AUTO: 64.7 %
NRBC # BLD AUTO: 0 10*3/UL
NRBC BLD AUTO-RTO: 0 /100
PLATELET # BLD AUTO: 170 10E9/L (ref 150–450)
POTASSIUM SERPL-SCNC: 3.8 MMOL/L (ref 3.4–5.3)
PROT SERPL-MCNC: 7.3 G/DL (ref 6.8–8.8)
RBC # BLD AUTO: 3.56 10E12/L (ref 4.4–5.9)
SODIUM SERPL-SCNC: 133 MMOL/L (ref 133–144)
WBC # BLD AUTO: 2.3 10E9/L (ref 4–11)

## 2019-11-20 PROCEDURE — 99215 OFFICE O/P EST HI 40 MIN: CPT | Mod: ZP | Performed by: PHYSICIAN ASSISTANT

## 2019-11-20 PROCEDURE — 96415 CHEMO IV INFUSION ADDL HR: CPT

## 2019-11-20 PROCEDURE — 25000128 H RX IP 250 OP 636: Mod: ZF | Performed by: PHYSICIAN ASSISTANT

## 2019-11-20 PROCEDURE — 96361 HYDRATE IV INFUSION ADD-ON: CPT

## 2019-11-20 PROCEDURE — 96367 TX/PROPH/DG ADDL SEQ IV INF: CPT

## 2019-11-20 PROCEDURE — 25800030 ZZH RX IP 258 OP 636: Mod: ZF | Performed by: INTERNAL MEDICINE

## 2019-11-20 PROCEDURE — G0463 HOSPITAL OUTPT CLINIC VISIT: HCPCS | Mod: ZF

## 2019-11-20 PROCEDURE — 96413 CHEMO IV INFUSION 1 HR: CPT

## 2019-11-20 PROCEDURE — 83735 ASSAY OF MAGNESIUM: CPT | Performed by: INTERNAL MEDICINE

## 2019-11-20 PROCEDURE — 97802 MEDICAL NUTRITION INDIV IN: CPT | Mod: XU | Performed by: DIETITIAN, REGISTERED

## 2019-11-20 PROCEDURE — 77386 ZZH IMRT TREATMENT DELIVERY, COMPLEX: CPT | Performed by: RADIOLOGY

## 2019-11-20 PROCEDURE — 80053 COMPREHEN METABOLIC PANEL: CPT | Performed by: INTERNAL MEDICINE

## 2019-11-20 PROCEDURE — 25000128 H RX IP 250 OP 636: Mod: ZF | Performed by: INTERNAL MEDICINE

## 2019-11-20 PROCEDURE — 96375 TX/PRO/DX INJ NEW DRUG ADDON: CPT

## 2019-11-20 PROCEDURE — 85025 COMPLETE CBC W/AUTO DIFF WBC: CPT | Performed by: INTERNAL MEDICINE

## 2019-11-20 RX ORDER — HEPARIN SODIUM,PORCINE 10 UNIT/ML
5 VIAL (ML) INTRAVENOUS
Status: CANCELLED | OUTPATIENT
Start: 2019-11-20

## 2019-11-20 RX ORDER — HEPARIN SODIUM (PORCINE) LOCK FLUSH IV SOLN 100 UNIT/ML 100 UNIT/ML
5 SOLUTION INTRAVENOUS ONCE
Status: COMPLETED | OUTPATIENT
Start: 2019-11-20 | End: 2019-11-20

## 2019-11-20 RX ORDER — PALONOSETRON 0.05 MG/ML
0.25 INJECTION, SOLUTION INTRAVENOUS ONCE
Status: COMPLETED | OUTPATIENT
Start: 2019-11-20 | End: 2019-11-20

## 2019-11-20 RX ORDER — HEPARIN SODIUM (PORCINE) LOCK FLUSH IV SOLN 100 UNIT/ML 100 UNIT/ML
5 SOLUTION INTRAVENOUS
Status: DISCONTINUED | OUTPATIENT
Start: 2019-11-20 | End: 2019-11-20 | Stop reason: HOSPADM

## 2019-11-20 RX ORDER — HEPARIN SODIUM (PORCINE) LOCK FLUSH IV SOLN 100 UNIT/ML 100 UNIT/ML
5 SOLUTION INTRAVENOUS
Status: CANCELLED | OUTPATIENT
Start: 2019-11-20

## 2019-11-20 RX ADMIN — CISPLATIN 190 MG: 1 INJECTION, SOLUTION INTRAVENOUS at 09:27

## 2019-11-20 RX ADMIN — MAGNESIUM SULFATE HEPTAHYDRATE: 500 INJECTION, SOLUTION INTRAMUSCULAR; INTRAVENOUS at 09:27

## 2019-11-20 RX ADMIN — HEPARIN 5 ML: 100 SYRINGE at 13:14

## 2019-11-20 RX ADMIN — HEPARIN 5 ML: 100 SYRINGE at 06:27

## 2019-11-20 RX ADMIN — DEXAMETHASONE SODIUM PHOSPHATE: 10 INJECTION, SOLUTION INTRAMUSCULAR; INTRAVENOUS at 08:24

## 2019-11-20 RX ADMIN — PALONOSETRON HYDROCHLORIDE 0.25 MG: 0.25 INJECTION, SOLUTION INTRAVENOUS at 08:21

## 2019-11-20 RX ADMIN — SODIUM CHLORIDE 1000 ML: 9 INJECTION, SOLUTION INTRAVENOUS at 07:45

## 2019-11-20 ASSESSMENT — PAIN SCALES - GENERAL: PAINLEVEL: MILD PAIN (3)

## 2019-11-20 NOTE — PATIENT INSTRUCTIONS
Colorectal Cancer Screening: During our visit today, we discussed that it is recommended you receive colorectal cancer screening. Please call or make an appointment with your primary care provider to discuss this. You may also call the [facility] scheduling line ([phone]) to set up a colonoscopy appointment.

## 2019-11-20 NOTE — PROGRESS NOTES
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  - 9/24/19: Total maxillectomy with orbital exenteration performed by Dr. Roberts, followed by reconstruction with a latissimus free flap with Dr. Pulliam.   - Surgical pathology showed a 4.4 cm moderately differentiated squamous cell carcinoma, (-) LVSI, (+)PNI. There was a positive margin at the pterygopalatine fossa, specifically the vidian nerve taken at its exit from the vidian canal, and additional resection into the canal was not possible.    CURRENT INTERVENTIONS:  Concurrent chemoradiation with high dose cisplatin day 1, 11/1/19     HISTORY OF PRESENT ILLNESS   Eduardo Rubio is 59 year old  who has been diagnosed with locally advanced right maxillary sinus cancer.    Eduardo presented to an outside ED in 08/18/2019 with complaints of right facial pressure, numbness, right-sided visual change and nasal drainage for several days' duration. Imaging workup included an MRI which demonstrated a maxillary sinus mass with extension to the orbit with involvement of the inferior rectus muscle. 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 Mr. Rubio was referred to [facility]. He had staging PET/CT on 9/9/2019 which revealed a FDG-avid maxillary mass at 4.0 x 3.9 x 4.2 cm. There was tumor extension into the right orbital cavity superiorly, the right nasal cavity medially, the retromaxillary fat region posterolaterally, the right pterygopalatine fossa posteriorly, and the premaxillary fat anteriorly. In the orbital cavity there was abutment of the inferior  rectus muscle. There was no evidence of lymphadenopathy or systemic disease. His case was reviewed at tumor conference with recommendation for surgery with total maxillectomy and orbital exenteration with free flap reconstruction.     Mr. Rubio underwent a total maxillectomy with orbital exenteration on 9/24/2019 with Dr. Roberts, followed by reconstruction with a latissimus free flap with Dr. Pulliam. Surgical pathology showed a 4.4 cm moderately differentiated squamous cell carcinoma, (-) LVSI, (+)PNI. There was a positive margin at the pterygopalatine fossa, specifically the vidian nerve taken at its exit from the vidian canal, and additional resection into the canal was not possible. Mr. Rubio's case was discussed in the [facility]'s multidisciplinary head and neck tumor board, with the consensus recommendation to proceed with adjuvant chemoradiation given the positive margin status.    INTERIM HISTORY:  Eduardo presents today for close followup.  Patient reports that he has intermittent headaches around his right eye.  He does not take any medicine in particular for this.  He has had intermittent bilateral tinnitus that he feels is a little better recently.  He has had numbness and tingling on his face since surgery that is unchanged.  He has noticed increased redness on his right face.  He is using Aquaphor regularly.  He has had some bleeding in his mouth and some mucositis.  He reports using salt and soda swishes, nystatin, and lidocaine.  He is not able to tell me the quantities or frequency of these.  He has had some phlegm production and thinks he has been taking 1 or 2 tablets of guaifenesin per day.  He reports that he has been taking between 70 to 90 mg of oxycodone per day.  He feels the 5 mg dose was not helpful.  He reports that he has had chronic back pain over the last 8 to 9 years and has had bilateral feet pain since his 10 toes were amputated after having frostbite when he  was 15 years old.  He previously was using significant doses of ibuprofen.  He notes for period of time that he was on prescription pain medicine for his back and feet.  He has noticed a decline in his eating due to mouth pain.  He has been drinking about 3 Ensure per day and also eating some cottage cheese and yogurt.  He would like to meet with a dietitian.  He reports doing okay getting in fluids he is not able to quantify this for me.  He reports that at some point he was previously taking gabapentin, but he thinks it did not help.  He denies other concerns.     PAST MEDICAL HISTORY     Past Medical History:   Diagnosis Date     Gastric ulcer      Low back pain      Maxillary sinus cancer (H)      Toe amputation status     all ten toes          CURRENT OUTPATIENT MEDICATIONS     Current Outpatient Medications   Medication Sig     acetaminophen (TYLENOL) 325 MG tablet Take 325-650 mg by mouth every 6 hours as needed for mild pain     artificial saliva (BIOTENE MT) SOLN solution Swish and spit 5 mLs in mouth 4 times daily as needed for dry mouth     chlorhexidine (PERIDEX) 0.12 % solution Swish and spit 15 mLs in mouth 4 times daily     cyclobenzaprine (FLEXERIL) 10 MG tablet TK 1 T PO HS PRN FOR MUSCLE SPASM RELIEF     cyclobenzaprine (FLEXERIL) 5 MG tablet 1 tablet (5 mg) by Per Feeding Tube route daily as needed for muscle spasms     guaiFENesin (ORGANIDIN) 200 MG tablet Take 2 tablets (400 mg) by mouth every 4 hours as needed (Thickened secretions)     lidocaine (XYLOCAINE) 2 % solution Swish and swallow 15 mLs in mouth every 6 hours as needed for moderate pain     LORazepam (ATIVAN) 0.5 MG tablet Take 1 tablet (0.5 mg) by mouth every 4 hours as needed (Anxiety, Nausea/Vomiting or Sleep)     nystatin (MYCOSTATIN) 922615 UNIT/ML suspension Take 5 mLs (500,000 Units) by mouth 4 times daily     oxyCODONE (ROXICODONE) 5 MG/5ML solution 5 mLs (5 mg) by Per NG tube route every 4 hours as needed for moderate to  severe pain     polyethylene glycol (MIRALAX/GLYCOLAX) packet 17 g by Per NG tube route daily as needed for constipation     prochlorperazine (COMPAZINE) 10 MG tablet Take 1 tablet (10 mg) by mouth every 6 hours as needed (Nausea/Vomiting)     senna-docusate (SENOKOT-S/PERICOLACE) 8.6-50 MG tablet 1 tablet by Per Feeding Tube route 2 times daily as needed for constipation     No current facility-administered medications for this visit.      Facility-Administered Medications Ordered in Other Visits   Medication     sodium chloride (PF) 0.9% PF flush 20 mL        ALLERGIES    No Known Allergies     SOCIAL HISTORY     Social History     Social History Narrative     Not on file      REVIEW OF SYSTEMS   Patient denies any of the following except if noted above: fevers, chills, vision changes, chest pain, dyspnea, abdominal pain, nausea, vomiting, diarrhea, constipation, urinary concerns, issues with sleep or mood.      PHYSICAL EXAM   General: The patient is a pleasant male in no acute distress.  /79 (BP Location: Right arm, Patient Position: Sitting, Cuff Size: Adult Regular)   Pulse 72   Temp 98.6  F (37  C) (Oral)   Resp 16   Wt 74.5 kg (164 lb 3.2 oz)   SpO2 95%   BMI 22.27 kg/m    Wt Readings from Last 10 Encounters:   11/20/19 74.5 kg (164 lb 3.2 oz)   11/15/19 76.2 kg (167 lb 14.4 oz)   11/14/19 76.8 kg (169 lb 4.8 oz)   11/08/19 76.2 kg (167 lb 14.4 oz)   11/07/19 77.4 kg (170 lb 9.6 oz)   11/01/19 75.9 kg (167 lb 4.8 oz)   10/31/19 74.8 kg (165 lb)   10/31/19 76.7 kg (169 lb)   10/23/19 77 kg (169 lb 12.8 oz)   10/18/19 77.3 kg (170 lb 8 oz)   HEENT: Right face is bulky from free flap. EOMI, PERRL. Sclerae are anicteric. Oral mucosa is pink and moist with moderate mucositis, no thrush.   Lymph: Neck is supple with no lymphadenopathy in the cervical or supraclavicular areas.   Heart: Regular rate and rhythm.   Lungs: Clear to auscultation bilaterally.   Abdomen: Bowel sounds present, soft, nontender  with no palpable hepatosplenomegaly or masses.   Extremities: No lower extremity edema noted bilaterally.   Neuro: Cranial nerves II through XII are grossly intact.  Skin: Right face is moderately erythematous. Right neck is mildly erythematous. No open areas. No rashes, petechiae, or bruising noted on exposed skin.     LABORATORY AND IMAGING STUDIES      11/20/2019 06:39   Sodium 133   Potassium 3.8   Chloride 100   Carbon Dioxide 30   Urea Nitrogen 10   Creatinine 0.64 (L)   GFR Estimate >90   GFR Estimate If Black >90   Calcium 8.8   Anion Gap 3   Magnesium 1.8   Albumin 3.3 (L)   Protein Total 7.3   Bilirubin Total 0.3   Alkaline Phosphatase 50   ALT 42   AST 31   Glucose 90   WBC 2.3 (L)   Hemoglobin 10.5 (L)   Hematocrit 31.7 (L)   Platelet Count 170   RBC Count 3.56 (L)   MCV 89   MCH 29.5   MCHC 33.1   RDW 14.6   Diff Method Automated Method   % Neutrophils 64.7   % Lymphocytes 18.1   % Monocytes 12.5   % Eosinophils 4.3   % Basophils 0.4   % Immature Granulocytes 0.0   Nucleated RBCs 0   Absolute Neutrophil 1.5 (L)   Absolute Lymphocytes 0.4 (L)   Absolute Monocytes 0.3   Absolute Eosinophils 0.1   Absolute Basophils 0.0   Abs Immature Granulocytes 0.0   Absolute Nucleated RBC 0.0      ASSESSMENT AND PLAN   1. 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 No medical comorbidities  3. Nicotine abuse - chronic smoker   4. Poor social support; lives alone with a dog    Locally advanced maxillary sinus squamous cell cancer that extended in to the right orbit.   -s/p total right maxilectomy with orbital exenteration   -surgical margin were positive making it a high risk disease for recurrence.    -had port placed on 10/31  -decided to initiate adjuvant chemoradiation. Started radiation on 10/28 though due to scheduling delays, did not start chemotherapy until 11/1. Has now received 1 dose of high dose cisplatin which he tolerated well with mild side effects.   -He will continue with day 22  cisplatin today and continue daily radiation.   -He will continue weekly follow up with medical oncology.   -He is scheduled to complete radiation ~12/9 and day 43 cisplatin is scheduled for 12/13. I will confirm the timing with radiation oncology.     FEN  -previously had a PEG though had it removed at patient's instruction  -He has had more difficulty eating over the last week due to pain and his weight has declined. He will be seeing our dietician later today.   -His sodium remains normal, after previously being a little low. He will continue to drink Powerade.   -His creatinine remains normal.     ENT  Mucositis   -Moderate. He will continue to use salt/soda swishes and lidocaine. He started on oxycodone last week and has quickly dose escalated on his own. I recommend he discuss dose escalations with his medical team. He plans to discuss this with Dr. Santillan tomorrow. I called Dr. Santillan and expressed my concerns about his quick dose escalation.     Radha Bradley PA-C  [facility] [address] [id number] [phone]    Addendum: After seeing the patient today, I looked up his  as I was concerned about his quick dose escalation of oxycodone and mention of chronic back and feet pain. Upon review, it appears it has been seen in a pain clinic for at least the last year, receiving 150 tablets of 10 mg oxycodone per month. I have shared this information with radiation oncology. I will discuss this with the rest of his care team as well. I am concerned that the patient did not disclose this use.

## 2019-11-20 NOTE — PROGRESS NOTES
"CLINICAL NUTRITION SERVICES - ASSESSMENT NOTE    Eduardo Rubio 59 year old referred for MNT related to sinus cancer    Time Spent: 45 minutes  **ABN form signed today - good for 1 year from 11/20/19-11/20/20**  Visit Type: initial  Referring Physician: Radha Bradley PA-C  Pt accompanied by: self - seen during infusion    Nutrition Prescription   Recommendations Suggested by Registered Dietitian (RD):   1. 5-6 small frequent meals  2. 2200-2500kcal,  grams protein/day  3. 3-4 ONS (high calorie/high protein) daily   Malnutrition: None indicated but at risk     NUTRITION HISTORY  Factors affecting nutrition intake include:decreased appetite, mouth sores and odynophagia  Current diet: soft foods  Current appetite/intake: poor  PEG Tube: No - previously had NG tube, refusing PEG tube  Chemotherapy: HD Cisplatin (2 cycles completed)   Radiation: Yes, ~1/2 way through treatment       Eduardo reports that his intake has declined predominantly due to odynophagia.   He has been more focused on soft/pureed foods and ONS.   His ONS include Premier Protein (160kcal, 30g protein) and Orgain Kids (from his sister's kids 200kcal, 10g pro).    He is receptive to trying higher calorie shakes such as Ensure Enlive as he had one today in clinic and liked it.       Diet Recall  Breakfast Yogurt, applesauce or eggs   Lunch Home made chicken noodle soup   Dinner Pasta and meatballs (becoming difficult to eat), choosing ONS as alternative   Snacks Cottage cheese, applesauce, yogurt   Beverages Water, gatorade/powderade     ANTHROPOMETRICS  Height: 72\"  Weight: 164 lbs/75kg  BMI: 22.8  Weight Status:  Normal BMI  IBW: 178 lbs  % IBW: 92%  Weight History: down 6 lb x past 4 weeks.   Wt Readings from Last 10 Encounters:   11/20/19 74.5 kg (164 lb 3.2 oz)   11/15/19 76.2 kg (167 lb 14.4 oz)   11/14/19 76.8 kg (169 lb 4.8 oz)   11/08/19 76.2 kg (167 lb 14.4 oz)   11/07/19 77.4 kg (170 lb 9.6 oz)   11/01/19 75.9 kg (167 lb 4.8 oz) "   10/31/19 74.8 kg (165 lb)   10/31/19 76.7 kg (169 lb)   10/23/19 77 kg (169 lb 12.8 oz)   10/18/19 77.3 kg (170 lb 8 oz)     Dosing Weight: 75kg    Medications/vitamins/minerals/herbals:   Reviewed    Labs:   Labs reviewed    ASSESSED NUTRITION NEEDS:  Estimated Energy Needs: 6604-5660 kcals (30-35 Kcal/Kg)  Justification: increased needs with CRT  Estimated Protein Needs:  grams protein (1.2-1.5 g pro/Kg)  Justification: increased needs with CRT   Estimated Fluid Needs: 3000  mL   Justification: increased needs with Cisplatin    MALNUTRITION:  % Weight Loss:  Weight loss does not meet criteria for malnutrition   % Intake:  Decreased intake does not meet criteria for malnutrition   Subcutaneous Fat Loss:  None observed  Muscle Loss:  None observed  Fluid Retention:  None noted    Malnutrition Diagnosis: Patient does not meet two of the above criteria necessary for diagnosing malnutrition but is at risk    NUTRITION DIAGNOSIS:  Inadequate oral intake related to odynophagia as evidenced by weight loss, patient report    INTERVENTIONS  Provided written & verbal education:   - Discussed ways to maximize and fortify foods with calories and protein via small frequent meals. Reviewed soft/pureed food options to try.    - Advised pt to aim for at least 2500kcal and 110g protein daily.   - Reviewed ONS options (Mass Pro weight haily, Ensure Enlive, Ensure Plus/Boost Plus, CIB, Benecalorie etc). Encouraged utilizing these ONS in home made shakes/smoothies to prevent flavor fatigue.  Encouraged pt to switch to a higher calorie ONS  And add to home made shakes/smoothies daily.       Provided pt with corresponding education materials/handouts on:  High Calorie, High Protein Recipe booklet, Tips to Increase the Calories in Your Diet and Sources of Protein.     Goals  1.  Aim for 5-6 small frequent meals  2.  Aim for 2500kcal and 110g protein/day  3. Weight maintenance       Follow-Up Plans: Pt has RD contact information  for questions.    Pt to follow up with RD in 1-2 weeks at the time of treatment.     MONITORING AND EVALUATION:  -Food and beverage intake  -Liquid meal replacement or supplement  -Weight trends    Alyson Skaggs RDN, LD

## 2019-11-20 NOTE — NURSING NOTE
Chief Complaint   Patient presents with     Port Draw     Labs drawn via port by RN In lab. VS taken. Patient checked in for next appt.     Port accessed with 20 gauge gripper needle by RN, labs collected, line flushed with saline and heparin.  Vitals taken. Pt checked in for appointment.    Lori Victor RN

## 2019-11-20 NOTE — NURSING NOTE
Oncology Rooming Note    November 20, 2019 6:50 AM   Eduardo Rubio is a 59 year old male who presents for:    Chief Complaint   Patient presents with     Port Draw     Labs drawn via port by RN In lab. VS taken. Patient checked in for next appt.     Oncology Clinic Visit     Return Visit for Maxillary sinus cancer      Initial Vitals: /79 (BP Location: Right arm, Patient Position: Sitting, Cuff Size: Adult Regular)   Pulse 72   Temp 98.6  F (37  C) (Oral)   Resp 16   Wt 74.5 kg (164 lb 3.2 oz)   SpO2 95%   BMI 22.27 kg/m   Estimated body mass index is 22.27 kg/m  as calculated from the following:    Height as of 10/31/19: 1.829 m (6').    Weight as of this encounter: 74.5 kg (164 lb 3.2 oz). Body surface area is 1.95 meters squared.  Mild Pain (3) Comment: Data Unavailable   No LMP for male patient.  Allergies reviewed: Yes  Medications reviewed: Yes    Medications: Medication refills not needed today.  Pharmacy name entered into Undertone:    [id number] - [facility] [address] AT [address] [id number] - [place], [place] - [address] [place] [address] [address] AT [facility]    Clinical concerns: Lab Results       Candido Brown

## 2019-11-20 NOTE — LETTER
"11/20/2019       RE: Eduardo Rubio  [address]   [address] [address]  [place] [address]     Dear Colleague,    Thank you for referring your patient, Eduardo Rubio, to the [facility]. Please see a copy of my visit note below.    CLINICAL NUTRITION SERVICES - ASSESSMENT NOTE    Eduardo Rubio 59 year old referred for MNT related to sinus cancer    Time Spent: 45 minutes  **ABN form signed today - good for 1 year from 11/20/19-11/20/20**  Visit Type:  initial  Referring Physician:  Radha Bradley PA-C  Pt accompanied by:  self - seen during infusion    Nutrition Prescription   Recommendations Suggested by Registered Dietitian (RD):   1. 5-6 small frequent meals  2. 2200-2500kcal,  grams protein/day  3. 3-4 ONS (high calorie/high protein) daily   Malnutrition: None indicated but at risk     NUTRITION HISTORY  Factors affecting nutrition intake include:decreased appetite, mouth sores and odynophagia  Current diet: soft foods  Current appetite/intake: poor  PEG Tube: No - previously had NG tube, refusing PEG tube  Chemotherapy: HD Cisplatin (2 cycles completed)   Radiation: Yes, ~1/2 way through treatment       Eduardo reports that his intake has declined predominantly due to odynophagia.   He has been more focused on soft/pureed foods and ONS.   His ONS include Premier Protein (160kcal, 30g protein) and Orgain Kids (from his sister's kids 200kcal, 10g pro).    He is receptive to trying higher calorie shakes such as Ensure Enlive as he had one today in clinic and liked it.       Diet Recall  Breakfast Yogurt, applesauce or eggs   Lunch Home made chicken noodle soup   Dinner Pasta and meatballs (becoming difficult to eat), choosing ONS as alternative   Snacks Cottage cheese, applesauce, yogurt   Beverages Water, gatorade/powderade     ANTHROPOMETRICS  Height: 72\"  Weight: 164 lbs/75kg  BMI: 22.8  Weight Status:  Normal BMI  IBW: 178 lbs  % IBW: 92%  Weight History: down 6 lb x past 4 " weeks.   Wt Readings from Last 10 Encounters:   11/20/19 74.5 kg (164 lb 3.2 oz)   11/15/19 76.2 kg (167 lb 14.4 oz)   11/14/19 76.8 kg (169 lb 4.8 oz)   11/08/19 76.2 kg (167 lb 14.4 oz)   11/07/19 77.4 kg (170 lb 9.6 oz)   11/01/19 75.9 kg (167 lb 4.8 oz)   10/31/19 74.8 kg (165 lb)   10/31/19 76.7 kg (169 lb)   10/23/19 77 kg (169 lb 12.8 oz)   10/18/19 77.3 kg (170 lb 8 oz)     Dosing Weight: 75kg    Medications/vitamins/minerals/herbals:   Reviewed    Labs:   Labs reviewed    ASSESSED NUTRITION NEEDS:  Estimated Energy Needs: 5233-3405 kcals (30-35 Kcal/Kg)  Justification: increased needs with CRT  Estimated Protein Needs:  grams protein (1.2-1.5 g pro/Kg)  Justification: increased needs with CRT   Estimated Fluid Needs: 3000  mL   Justification: increased needs with Cisplatin    MALNUTRITION:  % Weight Loss:  Weight loss does not meet criteria for malnutrition   % Intake:  Decreased intake does not meet criteria for malnutrition   Subcutaneous Fat Loss:  None observed  Muscle Loss:  None observed  Fluid Retention:  None noted    Malnutrition Diagnosis: Patient does not meet two of the above criteria necessary for diagnosing malnutrition but is at risk    NUTRITION DIAGNOSIS:  Inadequate oral intake related to odynophagia as evidenced by weight loss, patient report    INTERVENTIONS  Provided written & verbal education:   - Discussed ways to maximize and fortify foods with calories and protein via small frequent meals. Reviewed soft/pureed food options to try.    - Advised pt to aim for at least 2500kcal and 110g protein daily.   - Reviewed ONS options (Mass Pro weight haily, Ensure Enlive, Ensure Plus/Boost Plus, CIB, Benecalorie etc). Encouraged utilizing these ONS in home made shakes/smoothies to prevent flavor fatigue.  Encouraged pt to switch to a higher calorie ONS  And add to home made shakes/smoothies daily.       Provided pt with corresponding education materials/handouts on:  High Calorie, High  Protein Recipe booklet, Tips to Increase the Calories in Your Diet and Sources of Protein.     Goals  1.  Aim for 5-6 small frequent meals  2.  Aim for 2500kcal and 110g protein/day  3. Weight maintenance     Follow-Up Plans: Pt has RD contact information for questions.    Pt to follow up with RD in 1-2 weeks at the time of treatment.     MONITORING AND EVALUATION:  -Food and beverage intake  -Liquid meal replacement or supplement  -Weight trends    Alyson Skaggs, LUPEN, LD

## 2019-11-20 NOTE — PROGRESS NOTES
Infusion Nursing Note:  Eduardo Rubio presents today for cycle 1, day 22 cisplatin.    Patient seen by provider today: Yes: ADRIEN Ladd   present during visit today: Not Applicable.    Note: Patient rated pain 3/10 today.  He declined the need for any intervention for pain during today's infusion visit. He used viscous lidocaine from home.    Patient requested a refill on nystatin today. ADRIEN Ladd updated. She states that patient does not have thrush so she did not refill the medication.      Intravenous Access:  Implanted Port.    Treatment Conditions:  Lab Results   Component Value Date    HGB 10.5 11/20/2019     Lab Results   Component Value Date    WBC 2.3 11/20/2019      Lab Results   Component Value Date    ANEU 1.5 11/20/2019     Lab Results   Component Value Date     11/20/2019      Lab Results   Component Value Date     11/20/2019                   Lab Results   Component Value Date    POTASSIUM 3.8 11/20/2019           Lab Results   Component Value Date    MAG 1.8 11/20/2019            Lab Results   Component Value Date    CR 0.64 11/20/2019                   Lab Results   Component Value Date    MIKEY 8.8 11/20/2019                Lab Results   Component Value Date    BILITOTAL 0.3 11/20/2019           Lab Results   Component Value Date    ALBUMIN 3.3 11/20/2019                    Lab Results   Component Value Date    ALT 42 11/20/2019           Lab Results   Component Value Date    AST 31 11/20/2019       Results reviewed, labs MET treatment parameters, ok to proceed with treatment.      Post Infusion Assessment:  Patient tolerated infusion without incident. Patient voided prior to cisplatin and frequently throughout infusion  Blood return noted pre and post infusion.  Site patent and intact, free from redness, edema or discomfort.  No evidence of extravasations.  Access discontinued per protocol.       Discharge Plan:   Prescription refills given for ativan and  dexamethasone.  Discharge instructions reviewed with: Patient.  Patient and/or family verbalized understanding of discharge instructions and all questions answered.  Copy of AVS reviewed with patient and/or family.  Patient will return 11/29 for next provider appointment and 12/13 for next chemotherapy   Patient discharged in stable condition accompanied by: self.  Departure Mode: Ambulatory.  Face to Face time: 0.    Nelsy Lord RN

## 2019-11-20 NOTE — PATIENT INSTRUCTIONS
[facility] Triage and after hours / weekends / holidays:  [phone]    Please call the triage or after hours line if you experience a temperature greater than or equal to 100.5, shaking chills, have uncontrolled nausea, vomiting and/or diarrhea, dizziness, shortness of breath, chest pain, bleeding, unexplained bruising, or if you have any other new/concerning symptoms, questions or concerns.      If you are having any concerning symptoms or wish to speak to a provider before your next infusion visit, please call your care coordinator or triage to notify them so we can adequately serve you.     If you need a refill on a narcotic prescription or other medication, please call before your infusion appointment.           November 2019 Sunday Monday Tuesday Wednesday Thursday Friday Saturday                            1    P ONC INFUSION 360  11:00 AM   (360 min.)    ONCOLOGY INFUSION   [facility] EXTERNAL RADIATION TREATMT   3:45 PM   (30 min.)   P RAD ONC [facility]   [facility] Oncology Clinic 2       3     4    [facility] EXTERNAL RADIATION TREATMT   4:00 PM   (30 min.)   P RAD ONC [facility]   [facility] 5    [facility] EXTERNAL RADIATION TREATMT  12:45 PM   (30 min.)   P RAD ONC [facility]   [facility]    P TCT/SIM SUITE   1:00 PM   (60 min.)   Eric Santillan MD   [facility] 6    SWALLOW TREATMENT   1:45 PM   (30 min.)   Tahmina Cruz SLP   [facility]    P EXTERNAL RADIATION TREATMT   3:30 PM   (30 min.)   [facility] RAD ONC [facility]   Radiation Oncology Clinic 7    [facility] EXTERNAL RADIATION TREATMT   3:30 PM   (30 min.)   P RAD ONC [facility]   [facility]    P ON TREATMENT VISIT   4:00 PM   (15 min.)   Eric Santillan MD   [facility] Oncology Clinic 8    [facility] DRAW   1:00 PM   (15 min.)    [facility] DRAW   [facility] Draw    P RETURN   1:25 PM   (50 min.)   Rajwinder Saeed PA-C   [facility]  [facility]    [facility] EXTERNAL RADIATION TREATMT   3:00 PM   (30 min.)   [facility] RAD ONC [facility]   Radiation Oncology [facility] 9       10     11    [facility] EXTERNAL RADIATION TREATMT   3:45 PM   (30 min.)   [facility] RAD ONC [facility]   Radiation Oncology [facility] 12    [facility] EXTERNAL RADIATION TREATMT   3:30 PM   (30 min.)   [facility] RAD ONC [facility]   Radiation Oncology [facility] 13    [facility] EXTERNAL RADIATION TREATMT   3:30 PM   (30 min.)   [facility] RAD ONC [facility]   Radiation Oncology [facility] 14    [facility] EXTERNAL RADIATION TREATMT   3:30 PM   (30 min.)   [facility] RAD ONC [facility]   Radiation Oncology [facility]    [facility] ON TREATMENT VISIT   4:00 PM   (15 min.)   Eric Santillan MD   [facility] 15    P [facility] DRAW   1:00 PM   (15 min.)    [facility] DRAW   [facility] Draw    [facility] RETURN   1:25 PM   (50 min.)   Rajwinder Saeed PA-C [facility]    [facility] EXTERNAL RADIATION TREATMT   3:30 PM   (30 min.)   [facility] RAD ONC [facility]   Radiation Oncology [facility] 16       17     18    [facility] EXTERNAL RADIATION TREATMT   3:45 PM   (30 min.)   [facility] RAD ONC [facility]   [facility] Oncology Clinic 19    [facility] EXTERNAL RADIATION TREATMT   3:30 PM   (30 min.)   P RAD ONC [facility]   Radiation Oncology Clinic 20    [facility] DRAW   6:30 AM   (15 min.)    [facility] DRAW   [facility] Draw    [facility] RETURN   6:45 AM   (50 min.)   Radha Bradley PA-C [facility] ONC INFUSION 360   7:30 AM   (360 min.)   [facility]FUSION   [facility]    UMP NEW   8:45 AM   (60 min.)   Alyson Morales RD   [facility]    SWALLOW TREATMENT   1:45 PM   (30 min.)   Tahmina Cruz SLP   [facility]    [facility] EXTERNAL RADIATION TREATMT   3:30 PM   (30 min.)   [facility] RAD ONC [facility]   [facility]cology [facility] 21    [facility] EXTERNAL RADIATION TREATMT   3:30 PM   (30 min.)   [facility] RAD ONC [facility]   Radiation Oncology [facility]    [facility] ON TREATMENT VISIT   4:00 PM   (15  min.)   Eric Santillan MD   [facility]cology [facility] 22    [facility] EXTERNAL RADIATION TREATMT   3:30 PM   (30 min.)   UMP RAD ONC [facility]   Radiation Oncology Clinic 23       24     25    [facility] EXTERNAL RADIATION TREATMT   3:30 PM   (30 min.)   [facility] RAD ONC [facility]   Radiation Oncology Clinic 26    [facility] EXTERNAL RADIATION TREATMT   3:30 PM   (30 min.)   [facility] RAD ONC [facility]   Radiation Oncology Clinic 27    [facility] EXTERNAL RADIATION TREATMT   3:30 PM   (30 min.)   UMP RAD ONC [facility]   Radiation Oncology Clinic 28     29    [facility] DRAW   1:00 PM   (15 min.)    [facility] LAB DRAW   [facility] Lab Draw    UMP RETURN   1:25 PM   (50 min.)   Rajwinder Saeed PA-C   [facility]    [facility] EXTERNAL RADIATION TREATMT   3:30 PM   (30 min.)   [facility] RAD ONC [facility]   Radiation Oncology Clinic    [facility] ON TREATMENT VISIT   4:00 PM   (15 min.)   Eric Santillan MD   [facility] 30 December 2019 Sunday Monday Tuesday Wednesday Thursday Friday Saturday   1     2    [facility] EXTERNAL RADIATION TREATMT   3:30 PM   (30 min.)   [facility] RAD ONC [facility]   Radiation Oncology Clinic 3    [facility] EXTERNAL RADIATION TREATMT   3:30 PM   (30 min.)   UMP RAD ONC [facility]   Radiation Oncology Clinic 4    SWALLOW TREATMENT   1:45 PM   (30 min.)   Tahmina Cruz SLP   M [facility]    UMP RETURN   2:15 PM   (30 min.)   Alyson Morales RD   M [facility]    [facility] EXTERNAL RADIATION TREATMT   3:30 PM   (30 min.)   UMP RAD ONC [facility]   Radiation Oncology Clinic 5    [facility] EXTERNAL RADIATION TREATMT   3:30 PM   (30 min.)   [facility] RAD ONC [facility]   Radiation Oncology Clinic    [facility] ON TREATMENT VISIT   4:00 PM   (15 min.)   Eric Santillan MD   [facility] 6    [facility] EXTERNAL RADIATION TREATMT   3:30 PM   (30 min.)   [facility] RAD ONC [facility]   [facility]cology Clinic 7       8     9    [facility] EXTERNAL RADIATION TREATMT   3:30 PM   (30 min.)   [facility] RAD ONC  [facility]   Radiation Oncology Clinic 10     11     12     13    [facility]   7[facility]   (15 min.)   [facility] LAB DRAW   [facility] Lab Draw    [facility] RETURN   7:35 AM   (50 min.)   Gianna Ruggiero PA-C   [facility] ONC INFUSION 360   8:30 AM   (360 min.)   [facility] ONCOLOGY INFUSION   [facility] 14       15     16     17     18    SWALLOW TREATMENT   1:45 PM   (30 min.)   Tahmina Cruz SLP   [facility] 19     20     21       22     23     24     25     26     27     28       29     30     31                                        Recent Results (from the past 24 hour(s))   CBC with platelets differential    Collection Time: 11/20/19  6:39 AM   Result Value Ref Range    WBC 2.3 (L) 4.0 - 11.0 10e9/L    RBC Count 3.56 (L) 4.4 - 5.9 10e12/L    Hemoglobin 10.5 (L) 13.3 - 17.7 g/dL    Hematocrit 31.7 (L) 40.0 - 53.0 %    MCV 89 78 - 100 fl    MCH 29.5 26.5 - 33.0 pg    MCHC 33.1 31.5 - 36.5 g/dL    RDW 14.6 10.0 - 15.0 %    Platelet Count 170 150 - 450 10e9/L    Diff Method Automated Method     % Neutrophils 64.7 %    % Lymphocytes 18.1 %    % Monocytes 12.5 %    % Eosinophils 4.3 %    % Basophils 0.4 %    % Immature Granulocytes 0.0 %    Nucleated RBCs 0 0 /100    Absolute Neutrophil 1.5 (L) 1.6 - 8.3 10e9/L    Absolute Lymphocytes 0.4 (L) 0.8 - 5.3 10e9/L    Absolute Monocytes 0.3 0.0 - 1.3 10e9/L    Absolute Eosinophils 0.1 0.0 - 0.7 10e9/L    Absolute Basophils 0.0 0.0 - 0.2 10e9/L    Abs Immature Granulocytes 0.0 0 - 0.4 10e9/L    Absolute Nucleated RBC 0.0    Comprehensive metabolic panel    Collection Time: 11/20/19  6:39 AM   Result Value Ref Range    Sodium 133 133 - 144 mmol/L    Potassium 3.8 3.4 - 5.3 mmol/L    Chloride 100 94 - 109 mmol/L    Carbon Dioxide 30 20 - 32 mmol/L    Anion Gap 3 3 - 14 mmol/L    Glucose 90 70 - 99 mg/dL    Urea Nitrogen 10 7 - 30 mg/dL    Creatinine 0.64 (L) 0.66 - 1.25 mg/dL    GFR Estimate >90 >60 mL/min/[1.73_m2]     GFR Estimate If Black >90 >60 mL/min/[1.73_m2]    Calcium 8.8 8.5 - 10.1 mg/dL    Bilirubin Total 0.3 0.2 - 1.3 mg/dL    Albumin 3.3 (L) 3.4 - 5.0 g/dL    Protein Total 7.3 6.8 - 8.8 g/dL    Alkaline Phosphatase 50 40 - 150 U/L    ALT 42 0 - 70 U/L    AST 31 0 - 45 U/L   Magnesium    Collection Time: 11/20/19  6:39 AM   Result Value Ref Range    Magnesium 1.8 1.6 - 2.3 mg/dL
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
## 2019-11-21 ENCOUNTER — APPOINTMENT (OUTPATIENT)
Dept: RADIATION ONCOLOGY | Facility: CLINIC | Age: 59
End: 2019-11-21
Attending: RADIOLOGY
Payer: MEDICARE

## 2019-11-21 VITALS — WEIGHT: 164 LBS | BODY MASS INDEX: 22.24 KG/M2

## 2019-11-21 DIAGNOSIS — C31.0 MAXILLARY SINUS CANCER (H): Primary | ICD-10-CM

## 2019-11-21 PROCEDURE — 77386 ZZH IMRT TREATMENT DELIVERY, COMPLEX: CPT | Performed by: RADIOLOGY

## 2019-11-21 NOTE — PROGRESS NOTES
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 No issues.    2019: Cycle 1, day 1 of cisplatin chemotherapy    2019: Mildly increased odynophagia otherwise tolerating treatment well.    2019: Weekly RT visit. Current dose of 2800 cGy. Moderately increased dermatitis and mucositis.    2019: Cycle 1, day 22 of cisplatin chemotherapy    2019: Weekly RT visit. Current dose of 3800 cGy. Mildly increased dermatitis and mucositis. Approximately 2 kg weight loss over the past week.    ED visits/Hospitalizations:  None    Missed Treatments:  None    Subjective: Mr. Rubio presents to clinic today for his weekly on-treatment visit. He describes exacerbation in his pain and mucositis over the past weekend and had increased his oxycodone dosing to 20 mg in the morning and prior to bedtime with 10 mg approximately 3 times throughout the rest of the day. This is increased from his daily cumulative dose of 50 mg which is prescribed by an outside physician for management of his chronic pain. He reports that his symptoms have substantially improved over the last several days and he currently reports 2-3/10 pain with only 2-3 10 mg oxycodone tabs daily and with frequent use of his PRN viscous lidocaine. His weight is down approximately 2 kg due to decreased p.o. intake with his increased oral cavity mucositis and he has transition to a soft/liquid diet which he is tolerating without difficulty. His  remaining ROS are positive for mild intermittent headaches and moderate treatment-induced dysgeusia.    ROS:   Constitutional  Pain (0-10): 2 (mouth), 2 (throat), 3 (skin)  Fatigue: Moderate symptoms    CNS  Headaches: Mild intermittent symptoms    ENT  Mucositis: Moderate symptoms    Cardiopulmonary  Dyspnea: None    GI  Nausea/vomiting: None    Nutrition  PEG: No  Diet: Soft/puréed diet    Integumentary  Dermatitis: Moderate symptoms    Objective:   Current weight: 74.4 kg  Last week's weight: 76.8 kg  Starting weight: 76.7 kg    BP: 143/75 (sitting), 154/73 (standing)  Pulse: 71 (sitting), 81 (standing)     General: 59-year-old gentleman seated comfortably in the examination chair in no acute distress  HEENT: Left eye with normal extraocular movements. No scleral injection or excessive tearing. Moderately dry mucous membranes with mildly thickened oral cavity secretions. Confluent mucositis involving the entirety of the hard palate and anterior aspect of the soft palate. No evidence of thrush.  Pulmonary: No wheezing, stridor or respiratory distress  Skin: Moderate erythema involving the right esther-face with small patchy areas of desquamation confined to the skin folds    Treatment-related toxicities (CTCAE v5.0):  1. Mucositis: Grade 2: Moderate pain; not interfering with oral intake; modified diet indicated  2. Dermatitis: Grade 2: Moderate to brisk erythema; patchy moist desquamation, mostly confined to skin folds and creases; moderate erythema     Assessment:    Mr. 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 He is receiving adjuvant chemoradiotherapy for improved locoregional disease control. He has developed worsening mucositis requiring escalating doses of narcotic pain medication with a corresponding decreased amount of p.o. intake and weight loss.    Plan:   pT4a N0 M0 squamous cell carcinoma of  the right maxillary sinus:    Continue chemoradiotherapy    Pain management:    Continue as-needed acetaminophen for mild to moderate pain     Continue viscous lidocaine as needed for mild to moderate symptoms    Continue oxycodone as-needed for breakthrough symptoms if symptoms persist after use of non-opioid analgesics first. He is scheduled to follow-up with his outside physician who is managing his chronic pain for assessment within the next week.    Fluids/Electrolytes/Nutrition:    Continue diet as tolerated    Dermatitis:    Continue BID Aquaphor application to the right lateral face and neck    Mucositis:    Continue frequent salt/soda rinses    Continue as-needed guaifenesin for thickened oral cavity secretions    Continue diet ginger ale gargles    Continue Biotene rinses    Oral thrush:    Resolved. Continue to monitor.    Mosaiq chart and setup information reviewed  IGRT images reviewed    Medication list reviewed    Eric Santillan MD/PhD    Dept of Radiation Oncology  [facility]

## 2019-11-22 ENCOUNTER — APPOINTMENT (OUTPATIENT)
Dept: RADIATION ONCOLOGY | Facility: CLINIC | Age: 59
End: 2019-11-22
Attending: RADIOLOGY
Payer: MEDICARE

## 2019-11-22 PROCEDURE — 77336 RADIATION PHYSICS CONSULT: CPT | Performed by: RADIOLOGY

## 2019-11-22 PROCEDURE — 77386 ZZH IMRT TREATMENT DELIVERY, COMPLEX: CPT | Performed by: RADIOLOGY

## 2019-11-25 NOTE — TELEPHONE ENCOUNTER
Tobacco Treatment Program at the [facility] attempted to reach Mr. Rubio on 11/25/2019 regarding the tobacco cessation program to help Mr. Rubio to quit smoking. We will attempt to reach Mr. Rubio another time.     Nelsy Collado [facility]  Tobacco Treatment Specialist  PH: [phone]

## 2019-11-27 NOTE — PROGRESS NOTES
[facility] CANCER CLINIC  PROGRESS NOTE  Nov 29, 2019    CANCER TYPE: Maxillary sinus squamous cell cancer  STAGE: Kyle ([id number])    TREATMENT SUMMARY:  - 8/19/19: MRI face and orbit demonstrated a maxillary sinus mass with extension to the orbit with involvement of the inferior rectus muscle. 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  - 9/24/19: Total maxillectomy with orbital exenteration performed by Dr. Roberts, followed by reconstruction with a latissimus free flap with Dr. Pulliam.   - Surgical pathology showed a 4.4 cm moderately differentiated squamous cell carcinoma, (-) LVSI, (+)PNI. There was a positive margin at the pterygopalatine fossa, specifically the vidian nerve taken at its exit from the vidian canal, and additional resection into the canal was not possible.    CURRENT INTERVENTIONS:  Concurrent chemoradiation with high dose cisplatin day 1, 11/1/19     HISTORY OF PRESENT ILLNESS   Eduardo Rubio is 59 year old  who has been diagnosed with locally advanced right maxillary sinus cancer.    Eduardo presented to an outside ED in 08/18/2019 with complaints of right facial pressure, numbness, right-sided visual change and nasal drainage for several days' duration. Imaging workup included an MRI which demonstrated a maxillary sinus mass with extension to the orbit with involvement of the inferior rectus muscle. 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 Mr. Rubio was referred to [facility]. He had staging PET/CT on 9/9/2019 which revealed a FDG-avid maxillary mass at 4.0 x 3.9 x 4.2 cm. There was tumor extension into the right orbital cavity superiorly, the right nasal cavity medially, the retromaxillary fat region posterolaterally, the right pterygopalatine fossa posteriorly, and the premaxillary fat anteriorly. In the orbital cavity there was abutment of the inferior  rectus muscle. There was no evidence of lymphadenopathy or systemic disease. His case was reviewed at tumor conference with recommendation for surgery with total maxillectomy and orbital exenteration with free flap reconstruction.     Mr. Rubio underwent a total maxillectomy with orbital exenteration on 9/24/2019 with Dr. Roberts, followed by reconstruction with a latissimus free flap with Dr. Pulliam. Surgical pathology showed a 4.4 cm moderately differentiated squamous cell carcinoma, (-) LVSI, (+)PNI. There was a positive margin at the pterygopalatine fossa, specifically the vidian nerve taken at its exit from the vidian canal, and additional resection into the canal was not possible. Mr. Rubio's case was discussed in the [facility]'s multidisciplinary head and neck tumor board, with the consensus recommendation to proceed with adjuvant chemoradiation given the positive margin status.    INTERIM HISTORY:  Eduardo presents today for close followup.      Symptoms are getting worse. He just wants to get it over with. Appetite is worse. He is using protein supplements, minimum 3x/day. Hard to chew on the right side. Eating primarily soft foods. His tongue and nose are irritated. Using aquaphor on his skin all the time; he carries it with him. Currently lidocaine, s/s and biotene swishes which do help (he also carries these)     ROS: 10 point ROS neg other than the symptoms noted above in the HPI.     PAST MEDICAL HISTORY     Past Medical History:   Diagnosis Date     Gastric ulcer      Low back pain      Maxillary sinus cancer (H)      Toe amputation status     all ten toes          CURRENT OUTPATIENT MEDICATIONS     Current Outpatient Medications   Medication Sig     acetaminophen (TYLENOL) 325 MG tablet Take 325-650 mg by mouth every 6 hours as needed for mild pain     artificial saliva (BIOTENE MT) SOLN solution Swish and spit 5 mLs in mouth 4 times daily as needed for dry mouth      chlorhexidine (PERIDEX) 0.12 % solution Swish and spit 15 mLs in mouth 4 times daily     cyclobenzaprine (FLEXERIL) 10 MG tablet TK 1 T PO HS PRN FOR MUSCLE SPASM RELIEF     cyclobenzaprine (FLEXERIL) 5 MG tablet 1 tablet (5 mg) by Per Feeding Tube route daily as needed for muscle spasms     guaiFENesin (ORGANIDIN) 200 MG tablet Take 2 tablets (400 mg) by mouth every 4 hours as needed (Thickened secretions)     lidocaine (XYLOCAINE) 2 % solution Swish and swallow 15 mLs in mouth every 6 hours as needed for moderate pain     LORazepam (ATIVAN) 0.5 MG tablet Take 1 tablet (0.5 mg) by mouth every 4 hours as needed (Anxiety, Nausea/Vomiting or Sleep)     nystatin (MYCOSTATIN) 743124 UNIT/ML suspension Take 5 mLs (500,000 Units) by mouth 4 times daily     polyethylene glycol (MIRALAX/GLYCOLAX) packet 17 g by Per NG tube route daily as needed for constipation     prochlorperazine (COMPAZINE) 10 MG tablet Take 1 tablet (10 mg) by mouth every 6 hours as needed (Nausea/Vomiting)     senna-docusate (SENOKOT-S/PERICOLACE) 8.6-50 MG tablet 1 tablet by Per Feeding Tube route 2 times daily as needed for constipation     oxyCODONE (ROXICODONE) 5 MG/5ML solution 10 mLs (10 mg) by Per NG tube route every 4 hours as needed for moderate to severe pain     No current facility-administered medications for this visit.         ALLERGIES    No Known Allergies     SOCIAL HISTORY     Social History     Social History Narrative     Not on file      REVIEW OF SYSTEMS   Patient denies any of the following except if noted above: fevers, chills, vision changes, chest pain, dyspnea, abdominal pain, nausea, vomiting, diarrhea, constipation, urinary concerns, issues with sleep or mood.      PHYSICAL EXAM   General: The patient is a pleasant male in no acute distress.  /87 (BP Location: Left arm, Patient Position: Chair, Cuff Size: Adult Regular)   Pulse 65   Temp 97.9  F (36.6  C) (Oral)   Wt 73.4 kg (161 lb 14.4 oz)   SpO2 98%   BMI  21.96 kg/m    Wt Readings from Last 10 Encounters:   11/29/19 73.4 kg (161 lb 14.4 oz)   11/21/19 74.4 kg (164 lb)   11/20/19 74.5 kg (164 lb 3.2 oz)   11/15/19 76.2 kg (167 lb 14.4 oz)   11/14/19 76.8 kg (169 lb 4.8 oz)   11/08/19 76.2 kg (167 lb 14.4 oz)   11/07/19 77.4 kg (170 lb 9.6 oz)   11/01/19 75.9 kg (167 lb 4.8 oz)   10/31/19 74.8 kg (165 lb)   10/31/19 76.7 kg (169 lb)   HEENT: Right face is bulky from free flap. EOMI, PERRL. Sclerae are anicteric. Oral mucosa is pink and moist with moderate mucositis, no thrush.   Lymph: Neck is supple with no lymphadenopathy in the cervical or supraclavicular areas.   Heart: Regular rate and rhythm.   Lungs: Clear to auscultation bilaterally.   Abdomen: Bowel sounds present, soft, nontender with no palpable hepatosplenomegaly or masses.   Extremities: No lower extremity edema noted bilaterally.   Neuro: Cranial nerves II through XII are grossly intact.  Skin: Right face is moderately erythematous. Right neck is mildly erythematous. No open areas. No rashes, petechiae, or bruising noted on exposed skin.     LABORATORY AND IMAGING STUDIES      11/29/2019 13:09   Sodium 133   Potassium 3.2 (L)   Chloride 97   Carbon Dioxide 33 (H)   Urea Nitrogen 16   Creatinine 0.91   GFR Estimate >90   GFR Estimate If Black >90   Calcium 8.6   Anion Gap 3   Magnesium 1.7   Albumin 3.3 (L)   Protein Total 7.2   Bilirubin Total 0.3   Alkaline Phosphatase 54   ALT 57   AST 33   Glucose 92   WBC 2.4 (L)   Hemoglobin 10.3 (L)   Hematocrit 30.1 (L)   Platelet Count 119 (L)   RBC Count 3.48 (L)   MCV 87   MCH 29.6   MCHC 34.2   RDW 15.1 (H)   Diff Method Automated Method   % Neutrophils 58.8   % Lymphocytes 19.8   % Monocytes 20.6   % Eosinophils 0.4   % Basophils 0.4   % Immature Granulocytes 0.0   Nucleated RBCs 0   Absolute Neutrophil 1.4 (L)   Absolute Lymphocytes 0.5 (L)   Absolute Monocytes 0.5   Absolute Eosinophils 0.0   Absolute Basophils 0.0   Abs Immature Granulocytes 0.0   Absolute  Nucleated RBC 0.0   RBC Morphology Consistent with reported results   Platelet Estimate Confirming automated cell count        ASSESSMENT AND PLAN   1. Stage IV [id number] right maxillary sinus squamous cell cancer   2. No medical comorbidities  3. Nicotine abuse - chronic smoker   4. Poor social support; lives alone with a dog    Locally advanced maxillary sinus squamous cell cancer that extended in to the right orbit.   -s/p total right maxilectomy with orbital exenteration   -surgical margin were positive making it a high risk disease for recurrence.    -had port placed on 10/31  -decided to initiate adjuvant chemoradiation. Started radiation on 10/28 though due to scheduling delays, did not start chemotherapy until 11/1. Has now received 2 doses of high dose cisplatin which he tolerated overall well with mild side effects.   --continue daily radiation and weekly follow up with medical oncology.   -He is scheduled to complete radiation ~12/9 and day 43 cisplatin is scheduled for 12/13. Will request for this to be moved though I am not sure it is logistically possible. He has had RT cancelled 3x this week due to the machine being down so he will be done with RT later than expected so 12/13 may work out best     FEN  -previously had a PEG though had it removed at patient's instruction  -met with dietician on 11/20.   -He has had more difficulty eating over the last week due to pain and his weight has declined. Had a long discussion about the importance of nutrition today and that he needs to push protein supplements if he is unable to take in other PO intake. He should be doing at least 3-4 per day.   -His sodium remains normal though now having some hypokalemia. Gave him a list of potassium rich foods to incorporate into his diet. Continue using Powerade to supplement fluids.   -His creatinine is slightly increased. Reiterated the importance of good fluid intake, aiming for 64 oz of fluid/day. Did have cisplatin  last week which also may have effected levels    -will f/u with Alyson again on 12/4    ENT  Mucositis   -worsening. Having sloughing of the roof of his mouth   -holding Nystatin swishes with no thrush noted   -continue doing ginger ale swishes, s/s, lidocaine and biotene   -guaifenesin using in the morning. Not too bothersome, most bothersome in the morning    Pain  -he has been seen in a pain clinic for at least the last year, receiving 150 tablets of 10 mg oxycodone per month. We were also giving him opioids and he self escalated   -Tried to discuss pain medications with him though he states that Dr. Santillan is taking care of it and he will discuss with him. Has follow-up with him after our appt today. Will let Rad Onc manage his pain so that there is no confusion over the opioid prescribing.     Rajwinder Saeed PA-C  [facility]  [address] [place] [address] [phone]

## 2019-11-29 ENCOUNTER — APPOINTMENT (OUTPATIENT)
Dept: LAB | Facility: CLINIC | Age: 59
End: 2019-11-29
Attending: PHYSICIAN ASSISTANT
Payer: MEDICARE

## 2019-11-29 ENCOUNTER — APPOINTMENT (OUTPATIENT)
Dept: RADIATION ONCOLOGY | Facility: CLINIC | Age: 59
End: 2019-11-29
Attending: RADIOLOGY
Payer: MEDICARE

## 2019-11-29 ENCOUNTER — ONCOLOGY VISIT (OUTPATIENT)
Dept: ONCOLOGY | Facility: CLINIC | Age: 59
End: 2019-11-29
Attending: PHYSICIAN ASSISTANT
Payer: MEDICARE

## 2019-11-29 VITALS
TEMPERATURE: 97.9 F | WEIGHT: 161.9 LBS | BODY MASS INDEX: 21.96 KG/M2 | DIASTOLIC BLOOD PRESSURE: 87 MMHG | HEART RATE: 65 BPM | SYSTOLIC BLOOD PRESSURE: 139 MMHG | OXYGEN SATURATION: 98 %

## 2019-11-29 VITALS — SYSTOLIC BLOOD PRESSURE: 140 MMHG | HEART RATE: 72 BPM | DIASTOLIC BLOOD PRESSURE: 85 MMHG

## 2019-11-29 DIAGNOSIS — C31.0 MAXILLARY SINUS CANCER (H): ICD-10-CM

## 2019-11-29 DIAGNOSIS — G89.3 CANCER ASSOCIATED PAIN: ICD-10-CM

## 2019-11-29 DIAGNOSIS — C31.0 MAXILLARY SINUS CANCER (H): Primary | ICD-10-CM

## 2019-11-29 LAB
ALBUMIN SERPL-MCNC: 3.3 G/DL (ref 3.4–5)
ALP SERPL-CCNC: 54 U/L (ref 40–150)
ALT SERPL W P-5'-P-CCNC: 57 U/L (ref 0–70)
ANION GAP SERPL CALCULATED.3IONS-SCNC: 3 MMOL/L (ref 3–14)
AST SERPL W P-5'-P-CCNC: 33 U/L (ref 0–45)
BASOPHILS # BLD AUTO: 0 10E9/L (ref 0–0.2)
BASOPHILS NFR BLD AUTO: 0.4 %
BILIRUB SERPL-MCNC: 0.3 MG/DL (ref 0.2–1.3)
BUN SERPL-MCNC: 16 MG/DL (ref 7–30)
CALCIUM SERPL-MCNC: 8.6 MG/DL (ref 8.5–10.1)
CHLORIDE SERPL-SCNC: 97 MMOL/L (ref 94–109)
CO2 SERPL-SCNC: 33 MMOL/L (ref 20–32)
CREAT SERPL-MCNC: 0.91 MG/DL (ref 0.66–1.25)
DIFFERENTIAL METHOD BLD: ABNORMAL
EOSINOPHIL # BLD AUTO: 0 10E9/L (ref 0–0.7)
EOSINOPHIL NFR BLD AUTO: 0.4 %
ERYTHROCYTE [DISTWIDTH] IN BLOOD BY AUTOMATED COUNT: 15.1 % (ref 10–15)
GFR SERPL CREATININE-BSD FRML MDRD: >90 ML/MIN/{1.73_M2}
GLUCOSE SERPL-MCNC: 92 MG/DL (ref 70–99)
HCT VFR BLD AUTO: 30.1 % (ref 40–53)
HGB BLD-MCNC: 10.3 G/DL (ref 13.3–17.7)
IMM GRANULOCYTES # BLD: 0 10E9/L (ref 0–0.4)
IMM GRANULOCYTES NFR BLD: 0 %
LYMPHOCYTES # BLD AUTO: 0.5 10E9/L (ref 0.8–5.3)
LYMPHOCYTES NFR BLD AUTO: 19.8 %
MAGNESIUM SERPL-MCNC: 1.7 MG/DL (ref 1.6–2.3)
MCH RBC QN AUTO: 29.6 PG (ref 26.5–33)
MCHC RBC AUTO-ENTMCNC: 34.2 G/DL (ref 31.5–36.5)
MCV RBC AUTO: 87 FL (ref 78–100)
MONOCYTES # BLD AUTO: 0.5 10E9/L (ref 0–1.3)
MONOCYTES NFR BLD AUTO: 20.6 %
NEUTROPHILS # BLD AUTO: 1.4 10E9/L (ref 1.6–8.3)
NEUTROPHILS NFR BLD AUTO: 58.8 %
NRBC # BLD AUTO: 0 10*3/UL
NRBC BLD AUTO-RTO: 0 /100
PLATELET # BLD AUTO: 119 10E9/L (ref 150–450)
PLATELET # BLD EST: ABNORMAL 10*3/UL
POTASSIUM SERPL-SCNC: 3.2 MMOL/L (ref 3.4–5.3)
PROT SERPL-MCNC: 7.2 G/DL (ref 6.8–8.8)
RBC # BLD AUTO: 3.48 10E12/L (ref 4.4–5.9)
RBC MORPH BLD: ABNORMAL
SODIUM SERPL-SCNC: 133 MMOL/L (ref 133–144)
WBC # BLD AUTO: 2.4 10E9/L (ref 4–11)

## 2019-11-29 PROCEDURE — 80053 COMPREHEN METABOLIC PANEL: CPT | Performed by: PHYSICIAN ASSISTANT

## 2019-11-29 PROCEDURE — 99214 OFFICE O/P EST MOD 30 MIN: CPT | Mod: ZP | Performed by: PHYSICIAN ASSISTANT

## 2019-11-29 PROCEDURE — 77386 ZZH IMRT TREATMENT DELIVERY, COMPLEX: CPT | Performed by: RADIOLOGY

## 2019-11-29 PROCEDURE — 36591 DRAW BLOOD OFF VENOUS DEVICE: CPT

## 2019-11-29 PROCEDURE — 83735 ASSAY OF MAGNESIUM: CPT | Performed by: PHYSICIAN ASSISTANT

## 2019-11-29 PROCEDURE — 25000128 H RX IP 250 OP 636: Mod: ZF | Performed by: PHYSICIAN ASSISTANT

## 2019-11-29 PROCEDURE — G0463 HOSPITAL OUTPT CLINIC VISIT: HCPCS | Mod: 25

## 2019-11-29 PROCEDURE — 85025 COMPLETE CBC W/AUTO DIFF WBC: CPT | Performed by: PHYSICIAN ASSISTANT

## 2019-11-29 RX ORDER — HEPARIN SODIUM (PORCINE) LOCK FLUSH IV SOLN 100 UNIT/ML 100 UNIT/ML
5 SOLUTION INTRAVENOUS ONCE
Status: COMPLETED | OUTPATIENT
Start: 2019-11-29 | End: 2019-11-29

## 2019-11-29 RX ORDER — OXYCODONE HCL 5 MG/5 ML
10 SOLUTION, ORAL ORAL EVERY 4 HOURS PRN
Qty: 300 ML | Refills: 0 | Status: SHIPPED | OUTPATIENT
Start: 2019-11-29 | End: 2020-01-22

## 2019-11-29 RX ADMIN — HEPARIN 5 ML: 100 SYRINGE at 13:03

## 2019-11-29 ASSESSMENT — PAIN SCALES - GENERAL: PAINLEVEL: MODERATE PAIN (5)

## 2019-11-29 NOTE — PROGRESS NOTES
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 No issues.    2019: Cycle 1, day 1 of cisplatin chemotherapy    2019: Mildly increased odynophagia otherwise tolerating treatment well.    2019: Weekly RT visit. Current dose of 2800 cGy. Moderately increased dermatitis and mucositis.    2019: Cycle 1, day 22 of cisplatin chemotherapy    2019: Weekly RT visit. Current dose of 3800 cGy. Mildly increased dermatitis and mucositis. Approximately 2 kg weight loss over the past week.    2019: Weekly RT visit. Current dose of 4200 cGy. Brisk dermatitis and confluent oral cavity mucositis.     ED visits/Hospitalizations:  None    Missed Treatments:  1. 2019 - 2019: 4-day treatment break between fractions 20-21 secondary to machine downtime.    Subjective: Mr. Rubio presents to clinic today for his weekly on-treatment visit. He reports increased odynophagia over the past week and difficulty tolerating anything other than soft/puréed foods by mouth. He ran out of his PRN oxycodone earlier this week and he notes increased symptoms on examination today. He currently rates his oral cavity pain as a 5/10 in severity which remains suboptimally controlled on PRN acetaminophen and viscous lidocaine. His weight is down approximately 1 kg over the past week. He missed several days of treatment earlier this week after [facility] [place]  followed by machine downtime on Tuesday-Thursday.     ROS:   Constitutional  Pain (0-10): 5 (mouth), 4 (throat), 5 (skin)  Fatigue: Moderate symptoms    CNS  Headaches: Mild intermittent symptoms    ENT  Mucositis: Moderate to severe symptoms    Cardiopulmonary  Dyspnea: None    GI  Nausea/vomiting: None    Nutrition  PEG: No  Diet: Soft/puréed diet    Integumentary  Dermatitis: Moderate symptoms    Objective:   Current weight: 73.3 kg  Last week's weight: 74.4 kg  Starting weight: 76.7 kg    BP: 140/85 (sitting), 129/81 (standing)  Pulse: 72 (sitting), 68 (standing)     General: 59-year-old gentleman seated in the examination chair secondary to pain  HEENT: Left eye with normal extraocular movements. No scleral injection or excessive tearing.  Dry mucous membranes with thickened oral cavity secretions. Brisk mucositis involving the entirety of the hard palate most prominently involving the right maxillary free flap. No evidence of thrush.  Pulmonary: No wheezing, stridor or respiratory distress  Skin: Brisk erythema involving the entirety of the right esther-face with patchy areas of desquamation confined to the skin folds.  There is moderate erythema and edema with desquamation of the inferior aspect of the right nasal vestibule.    Treatment-related toxicities (CTCAE v5.0):  1. Mucositis: Grade 3: Severe pain; interfering with oral intake  2. Dermatitis: Grade 2: Moderate to brisk erythema; patchy moist desquamation, mostly confined to skin folds and creases; moderate erythema     Assessment:    Mr. 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 He is receiving adjuvant chemoradiotherapy for improved locoregional disease control. He continues to have worsening mucositis and dermatitis related to his ongoing head and neck radiotherapy.    Plan:   pT4a N0 M0 squamous cell carcinoma of the right maxillary  sinus:    Continue chemoradiotherapy    Pain management:    Continue as-needed acetaminophen for mild to moderate pain     Continue viscous lidocaine as needed for mild to moderate symptoms    Continue oxycodone as-needed for breakthrough symptoms if symptoms persist after use of non-opioid analgesics first.     Fluids/Electrolytes/Nutrition:    Continue diet as tolerated    Follow-up with registered dietitian on 12/4/2019    Dermatitis:    Continue BID Aquaphor application to the right lateral face and neck    Mucositis:    Continue frequent salt/soda rinses    Continue as-needed guaifenesin for thickened oral cavity secretions    Continue diet ginger ale gargles    Continue Biotene rinses    Oral thrush:    Resolved. Continue to monitor.    [facility] chart and setup information reviewed  IGRT images reviewed    Medication list reviewed    Eric Santillan MD/PhD    Dept of Radiation Oncology  [facility]

## 2019-11-29 NOTE — NURSING NOTE
Chief Complaint   Patient presents with     Port Draw     labs drawn via port by RN in lab     /87 (BP Location: Left arm, Patient Position: Chair, Cuff Size: Adult Regular)   Pulse 65   Temp 97.9  F (36.6  C) (Oral)   Wt 73.4 kg (161 lb 14.4 oz)   SpO2 98%   BMI 21.96 kg/m      Port accessed by RN in lab. Labs collected and sent. Pt tolerated well. Line flushed with Normal Saline & Heparin.   Pt checked in for next appointment.    Ana Armenta RN

## 2019-11-29 NOTE — LETTER
RE: Eduardo Rubio  [address] [facility] [address] [address]  [facility] [address]       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  - 9/24/19: Total maxillectomy with orbital exenteration performed by Dr. Roberts, followed by reconstruction with a latissimus free flap with Dr. Pulliam.   - Surgical pathology showed a 4.4 cm moderately differentiated squamous cell carcinoma, (-) LVSI, (+)PNI. There was a positive margin at the pterygopalatine fossa, specifically the vidian nerve taken at its exit from the vidian canal, and additional resection into the canal was not possible.    CURRENT INTERVENTIONS:  Concurrent chemoradiation with high dose cisplatin day 1, 11/1/19     HISTORY OF PRESENT ILLNESS   Eduardo Rubio is 59 year old  who has been diagnosed with locally advanced right maxillary sinus cancer.    Eduardo presented to an outside ED in 08/18/2019 with complaints of right facial pressure, numbness, right-sided visual change and nasal drainage for several days' duration. Imaging workup included an MRI which demonstrated a maxillary sinus mass with extension to the orbit with involvement of the inferior rectus muscle. 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 Mr. Rubio was referred to [facility]. He had staging PET/CT on 9/9/2019 which revealed a FDG-avid maxillary mass at 4.0 x 3.9 x 4.2 cm. There was tumor extension into the right orbital cavity superiorly, the right nasal cavity medially, the retromaxillary fat region posterolaterally, the right pterygopalatine fossa posteriorly, and the  premaxillary fat anteriorly. In the orbital cavity there was abutment of the inferior rectus muscle. There was no evidence of lymphadenopathy or systemic disease. His case was reviewed at tumor conference with recommendation for surgery with total maxillectomy and orbital exenteration with free flap reconstruction.     Mr. Rubio underwent a total maxillectomy with orbital exenteration on 9/24/2019 with Dr. Roberts, followed by reconstruction with a latissimus free flap with Dr. Pulliam. Surgical pathology showed a 4.4 cm moderately differentiated squamous cell carcinoma, (-) LVSI, (+)PNI. There was a positive margin at the pterygopalatine fossa, specifically the vidian nerve taken at its exit from the vidian canal, and additional resection into the canal was not possible. [facility], with the consensus recommendation to proceed with adjuvant chemoradiation given the positive margin status.    INTERIM HISTORY:  Eduardo presents today for close followup.      Symptoms are getting worse. He just wants to get it over with. Appetite is worse. He is using protein supplements, minimum 3x/day. Hard to chew on the right side. Eating primarily soft foods. His tongue and nose are irritated. Using aquaphor on his skin all the time; he carries it with him. Currently lidocaine, s/s and biotene swishes which do help (he also carries these)     ROS: 10 point ROS neg other than the symptoms noted above in the HPI.     PAST MEDICAL HISTORY     Past Medical History:   Diagnosis Date     Gastric ulcer      Low back pain      Maxillary sinus cancer (H)      Toe amputation status     all ten toes          CURRENT OUTPATIENT MEDICATIONS     Current Outpatient Medications   Medication Sig     acetaminophen (TYLENOL) 325 MG tablet Take 325-650 mg by mouth every 6 hours as needed for mild pain     artificial saliva (BIOTENE MT) SOLN  solution Swish and spit 5 mLs in mouth 4 times daily as needed for dry mouth     chlorhexidine (PERIDEX) 0.12 % solution Swish and spit 15 mLs in mouth 4 times daily     cyclobenzaprine (FLEXERIL) 10 MG tablet TK 1 T PO HS PRN FOR MUSCLE SPASM RELIEF     cyclobenzaprine (FLEXERIL) 5 MG tablet 1 tablet (5 mg) by Per Feeding Tube route daily as needed for muscle spasms     guaiFENesin (ORGANIDIN) 200 MG tablet Take 2 tablets (400 mg) by mouth every 4 hours as needed (Thickened secretions)     lidocaine (XYLOCAINE) 2 % solution Swish and swallow 15 mLs in mouth every 6 hours as needed for moderate pain     LORazepam (ATIVAN) 0.5 MG tablet Take 1 tablet (0.5 mg) by mouth every 4 hours as needed (Anxiety, Nausea/Vomiting or Sleep)     nystatin (MYCOSTATIN) 332427 UNIT/ML suspension Take 5 mLs (500,000 Units) by mouth 4 times daily     polyethylene glycol (MIRALAX/GLYCOLAX) packet 17 g by Per NG tube route daily as needed for constipation     prochlorperazine (COMPAZINE) 10 MG tablet Take 1 tablet (10 mg) by mouth every 6 hours as needed (Nausea/Vomiting)     senna-docusate (SENOKOT-S/PERICOLACE) 8.6-50 MG tablet 1 tablet by Per Feeding Tube route 2 times daily as needed for constipation     oxyCODONE (ROXICODONE) 5 MG/5ML solution 10 mLs (10 mg) by Per NG tube route every 4 hours as needed for moderate to severe pain     No current facility-administered medications for this visit.         ALLERGIES    No Known Allergies     SOCIAL HISTORY     Social History     Social History Narrative     Not on file      REVIEW OF SYSTEMS   Patient denies any of the following except if noted above: fevers, chills, vision changes, chest pain, dyspnea, abdominal pain, nausea, vomiting, diarrhea, constipation, urinary concerns, issues with sleep or mood.      PHYSICAL EXAM   General: The patient is a pleasant male in no acute distress.  /87 (BP Location: Left arm, Patient Position: Chair, Cuff Size: Adult Regular)   Pulse 65    Temp 97.9  F (36.6  C) (Oral)   Wt 73.4 kg (161 lb 14.4 oz)   SpO2 98%   BMI 21.96 kg/m     Wt Readings from Last 10 Encounters:   11/29/19 73.4 kg (161 lb 14.4 oz)   11/21/19 74.4 kg (164 lb)   11/20/19 74.5 kg (164 lb 3.2 oz)   11/15/19 76.2 kg (167 lb 14.4 oz)   11/14/19 76.8 kg (169 lb 4.8 oz)   11/08/19 76.2 kg (167 lb 14.4 oz)   11/07/19 77.4 kg (170 lb 9.6 oz)   11/01/19 75.9 kg (167 lb 4.8 oz)   10/31/19 74.8 kg (165 lb)   10/31/19 76.7 kg (169 lb)   HEENT: Right face is bulky from free flap. EOMI, PERRL. Sclerae are anicteric. Oral mucosa is pink and moist with moderate mucositis, no thrush.   Lymph: Neck is supple with no lymphadenopathy in the cervical or supraclavicular areas.   Heart: Regular rate and rhythm.   Lungs: Clear to auscultation bilaterally.   Abdomen: Bowel sounds present, soft, nontender with no palpable hepatosplenomegaly or masses.   Extremities: No lower extremity edema noted bilaterally.   Neuro: Cranial nerves II through XII are grossly intact.  Skin: Right face is moderately erythematous. Right neck is mildly erythematous. No open areas. No rashes, petechiae, or bruising noted on exposed skin.     LABORATORY AND IMAGING STUDIES      11/29/2019 13:09   Sodium 133   Potassium 3.2 (L)   Chloride 97   Carbon Dioxide 33 (H)   Urea Nitrogen 16   Creatinine 0.91   GFR Estimate >90   GFR Estimate If Black >90   Calcium 8.6   Anion Gap 3   Magnesium 1.7   Albumin 3.3 (L)   Protein Total 7.2   Bilirubin Total 0.3   Alkaline Phosphatase 54   ALT 57   AST 33   Glucose 92   WBC 2.4 (L)   Hemoglobin 10.3 (L)   Hematocrit 30.1 (L)   Platelet Count 119 (L)   RBC Count 3.48 (L)   MCV 87   MCH 29.6   MCHC 34.2   RDW 15.1 (H)   Diff Method Automated Method   % Neutrophils 58.8   % Lymphocytes 19.8   % Monocytes 20.6   % Eosinophils 0.4   % Basophils 0.4   % Immature Granulocytes 0.0   Nucleated RBCs 0   Absolute Neutrophil 1.4 (L)   Absolute Lymphocytes 0.5 (L)   Absolute Monocytes 0.5   Absolute  Eosinophils 0.0   Absolute Basophils 0.0   Abs Immature Granulocytes 0.0   Absolute Nucleated RBC 0.0   RBC Morphology Consistent with reported results   Platelet Estimate Confirming automated cell count        ASSESSMENT AND PLAN   1. 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 No medical comorbidities  3. Nicotine abuse - chronic smoker   4. Poor social support; lives alone with a dog    Locally advanced maxillary sinus squamous cell cancer that extended in to the right orbit.   -s/p total right maxilectomy with orbital exenteration   -surgical margin were positive making it a high risk disease for recurrence.    -had port placed on 10/31  -decided to initiate adjuvant chemoradiation. Started radiation on 10/28 though due to scheduling delays, did not start chemotherapy until 11/1. Has now received 2 doses of high dose cisplatin which he tolerated overall well with mild side effects.   --continue daily radiation and weekly follow up with medical oncology.   -He is scheduled to complete radiation ~12/9 and day 43 cisplatin is scheduled for 12/13. Will request for this to be moved though I am not sure it is logistically possible. He has had RT cancelled 3x this week due to the machine being down so he will be done with RT later than expected so 12/13 may work out best     FEN  -previously had a PEG though had it removed at patient's instruction  -met with dietician on 11/20.   -He has had more difficulty eating over the last week due to pain and his weight has declined. Had a long discussion about the importance of nutrition today and that he needs to push protein supplements if he is unable to take in other PO intake. He should be doing at least 3-4 per day.   -His sodium remains normal though now having some hypokalemia. Gave him a list of potassium rich foods to incorporate into his diet. Continue using Powerade to supplement fluids.   -His creatinine is slightly increased. Reiterated the  importance of good fluid intake, aiming for 64 oz of fluid/day. Did have cisplatin last week which also may have effected levels    -will f/u with Alyson again on 12/4    ENT  Mucositis   -worsening. Having sloughing of the roof of his mouth   -holding Nystatin swishes with no thrush noted   -continue doing ginger ale swishes, s/s, lidocaine and biotene   -guaifenesin using in the morning. Not too bothersome, most bothersome in the morning    Pain  -he has been seen in a pain clinic for at least the last year, receiving 150 tablets of 10 mg oxycodone per month. We were also giving him opioids and he self escalated   -Tried to discuss pain medications with him though he states that Dr. Santillan is taking care of it and he will discuss with him. Has follow-up with him after our appt today. Will let Rad Onc manage his pain so that there is no confusion over the opioid prescribing.     Rajwinder Saeed PA-C  [facility]  [address], [place] [address] [phone]

## 2019-11-29 NOTE — NURSING NOTE
Oncology Rooming Note    November 29, 2019 2:03 PM   Eduardo Rubio is a 59 year old male who presents for:    Chief Complaint   Patient presents with     Port Draw     labs drawn via port by RN in lab     Oncology Clinic Visit     Return - Sinus Ca     Initial Vitals: /87 (BP Location: Left arm, Patient Position: Chair, Cuff Size: Adult Regular)   Pulse 65   Temp 97.9  F (36.6  C) (Oral)   Wt 73.4 kg (161 lb 14.4 oz)   SpO2 98%   BMI 21.96 kg/m   Estimated body mass index is 21.96 kg/m  as calculated from the following:    Height as of 10/31/19: 1.829 m (6').    Weight as of this encounter: 73.4 kg (161 lb 14.4 oz). Body surface area is 1.93 meters squared.  Moderate Pain (5) Comment: Data Unavailable   No LMP for male patient.  Allergies reviewed: Yes  Medications reviewed: Yes    Medications: Medication refills not needed today.  Pharmacy name entered into [facility]:    [id number] - [facility] [address] AT [address] [id number] - [facility] [address] AT [facility]    Clinical concerns: Lab Results       Candido Brown, EMT

## 2019-11-29 NOTE — NURSING NOTE
Chief Complaint   Patient presents with     Port Draw     labs drawn via port by RN in lab     Oncology Clinic Visit     Return - Sinus Ca     Labs Only     port de-accessed by R in lab     /87 (BP Location: Left arm, Patient Position: Chair, Cuff Size: Adult Regular)   Pulse 65   Temp 97.9  F (36.6  C) (Oral)   Wt 73.4 kg (161 lb 14.4 oz)   SpO2 98%   BMI 21.96 kg/m      Port de-accessed by RN in lab. Pt tolerated well.     Ana Armenta, RN

## 2019-11-29 NOTE — PATIENT INSTRUCTIONS
Colorectal Cancer Screening: During our visit today, we discussed that it is recommended you receive colorectal cancer screening. Please call or make an appointment with your primary care provider to discuss this. You may also call the [facility] scheduling line ([phone]) to set up a colonoscopy appointment.

## 2019-11-30 ENCOUNTER — APPOINTMENT (OUTPATIENT)
Dept: RADIATION ONCOLOGY | Facility: CLINIC | Age: 59
End: 2019-11-30
Attending: RADIOLOGY
Payer: MEDICARE

## 2019-11-30 PROCEDURE — 77386 ZZH IMRT TREATMENT DELIVERY, COMPLEX: CPT | Performed by: RADIOLOGY

## 2019-12-01 ENCOUNTER — APPOINTMENT (OUTPATIENT)
Dept: RADIATION ONCOLOGY | Facility: CLINIC | Age: 59
End: 2019-12-01
Attending: RADIOLOGY
Payer: MEDICARE

## 2019-12-01 PROCEDURE — 77386 ZZH IMRT TREATMENT DELIVERY, COMPLEX: CPT | Performed by: RADIOLOGY

## 2019-12-02 ENCOUNTER — OFFICE VISIT (OUTPATIENT)
Dept: RADIATION ONCOLOGY | Facility: CLINIC | Age: 59
End: 2019-12-02
Attending: RADIOLOGY
Payer: MEDICARE

## 2019-12-02 ENCOUNTER — VIRTUAL VISIT (OUTPATIENT)
Dept: ONCOLOGY | Facility: CLINIC | Age: 59
End: 2019-12-02
Attending: INTERNAL MEDICINE
Payer: MEDICARE

## 2019-12-02 VITALS
SYSTOLIC BLOOD PRESSURE: 127 MMHG | HEART RATE: 77 BPM | DIASTOLIC BLOOD PRESSURE: 81 MMHG | BODY MASS INDEX: 21.86 KG/M2 | WEIGHT: 161.2 LBS

## 2019-12-02 DIAGNOSIS — Z72.0 TOBACCO ABUSE DISORDER: Primary | ICD-10-CM

## 2019-12-02 DIAGNOSIS — C31.0 MAXILLARY SINUS CANCER (H): Primary | ICD-10-CM

## 2019-12-02 PROCEDURE — 77386 ZZH IMRT TREATMENT DELIVERY, COMPLEX: CPT | Performed by: RADIOLOGY

## 2019-12-02 PROCEDURE — 40000114 ZZH STATISTIC NO CHARGE CLINIC VISIT

## 2019-12-02 NOTE — LETTER
2019       RE: Eduardo Rubio  [address] [facility] [address] [address]  [facility] [address]     Dear Colleague,    Thank you for referring your patient, Eduardo Rubio, to the RAD[facility]ATION ONCOLOGY CLINIC. Please see a copy of my visit note below.    RADIATION ONCOLOGY WEEKLY ON TREATMENT VISIT   Encounter Date: 2019    Patient Name: Eduardo Rubio  MRN: [id number]  : 1960     Disease and Stage: pT4a N0 M0 squamous cell carcinoma of the right maxillary sinus  Treatment Site: Right face and neck  Current Dose/Planned Total Dose: 4400 / 6600 cGy  Daily Fraction Size: 200 cGy/day, 5 times/week  Concurrent Chemotherapy: Yes  Drug and Frequency: Cisplatin (100 mg/m ) every 3 weeks    Treatment Summary:    10/28/2019: Initiation of radiotherapy    10/31/2019: Tolerating treatment well. No issues.    2019: Cycle 1, day 1 of cisplatin chemotherapy    2019: Mildly increased odynophagia otherwise tolerating treatment well.    2019: Weekly RT visit. Current dose of 2800 cGy. Moderately increased dermatitis and mucositis.    2019: Cycle 1, day 22 of cisplatin chemotherapy    2019: Weekly RT visit. Current dose of 3800 cGy. Mildly increased dermatitis and mucositis. Approximately 2 kg weight loss over the past week.    2019: Weekly RT visit. Current dose of 4200 cGy. Brisk dermatitis and confluent oral cavity mucositis.     ED visits/Hospitalizations:  None    Missed Treatments:  1. 2019 - 2019: 4-day treatment break between fractions 20-21 secondary to machine downtime.    Subjective: Mr. Rubio states he had a good weekend.  He is taking oxycodone 5-7 mg with an occasional 10 mg with adequate relief.  He is eating better and his weight is stable from Friday.      ROS:   Constitutional  Pain (0-10): 5 (mouth), 4 (throat), 5 (skin)  Fatigue: Moderate symptoms    CNS  Headaches: Mild intermittent symptoms    ENT  Mucositis: Moderate to severe  symptoms    Cardiopulmonary  Dyspnea: None    GI  Nausea/vomiting: None    Nutrition  PEG: No  Diet: Soft/puréed diet    Integumentary  Dermatitis: Moderate symptoms    Objective:   Current weight: 73.1kg  Last week's weight: 73.3 kg  Starting weight: 76.7 kg    /81 (Patient Position: Standing)   Pulse 77   Wt 73.1 kg (161 lb 3.2 oz)   BMI 21.86 kg/m         General: 59-year-old gentleman, looks comfortable  HEENT: Left eye with normal extraocular movements. No scleral injection or excessive tearing.  Dry mucous membranes with thickened oral cavity secretions. Brisk mucositis involving the entirety of the hard palate most prominently involving the right maxillary free flap and right nares. No evidence of thrush.  Pulmonary: No wheezing, stridor or respiratory distress  Skin: Brisk erythema involving the entirety of the right esther-face with patchy areas of desquamation confined to the skin folds.  There is moderate erythema and edema with desquamation of the inferior aspect of the right nasal vestibule.    Treatment-related toxicities (CTCAE v5.0):  1. Mucositis: Grade 3: Severe pain; interfering with oral intake  2. Dermatitis: Grade 2: Moderate to brisk erythema; patchy moist desquamation, mostly confined to skin folds and creases; moderate erythema     Assessment:    Mr. 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 He is receiving adjuvant chemoradiotherapy for improved locoregional disease control. He continues to have worsening mucositis and dermatitis related to his ongoing head and neck radiotherapy.    Plan:   pT4a N0 M0 squamous cell carcinoma of the right maxillary sinus:    Continue chemoradiotherapy    Pain management:    Continue as-needed acetaminophen for mild to moderate pain     Continue viscous lidocaine as needed for mild to moderate symptoms    Getting better pain control with  oxycodone    Fluids/Electrolytes/Nutrition:    Continue diet as tolerated    Follow-up with registered dietitian on 12/4/2019    Push fluids and calories.    Dermatitis:    Continue BID Aquaphor application to the right lateral face and neck    Mucositis:    Continue frequent salt/soda rinses    Continue as-needed guaifenesin for thickened oral cavity secretions    Continue diet ginger ale gargles    Continue Biotene rinses    Oral thrush:    Resolved. Continue to monitor.        Medication list reviewed    Courtney Barreto NP  Dept of Radiation Oncology  [facility]   \

## 2019-12-02 NOTE — PROGRESS NOTES
TOBACCO TREATMENT  VISIT      Subjective:      Patient is a 59 year old White male that was contacted to participate in the Tobacco Treatment Program for Nicotine Dependence on 12/2/2019 by the Tobacco Treatment Specialist. Patient  reports that he has been smoking cigarettes. He started smoking about 15 years ago. He has a 7.50 pack-year smoking history. He has never used smokeless tobacco.    Patient states he has been making an attempt to quit smoking. He has been using nicotine patches and reports having a variety of doses. He states being a smoker for a long time with a longest quit of 11 years. He states relapsing  for personal reasons . He has recently gone a week without smoking, but has not been using patches the last 3 days and has been smoking 7 CPD. He reports smoking over a pack prior to this recent quit attempt and was using 21mg nicotine patches to quit for that week. Patient is interested in quitting smoking for improved cancer outcomes. He lists wanting to quit for the other known reasons of tobacco use: cost, smell, impact to health, would like to breathe better and have more energy.     Patient was quick to wrap up visit today in interest of speaking with his provider later today for nicotine patches. He states he will follow up with TTS during his infusion on 12/13 or sooner.          Information:      Agreed to Participate in Program?: Yes    Referral Type: Proactive outreach    Patient age: 59    Gender: Male    Race: White    Cancer type: Other (please comment)    Year of cancer diagnosis: 09/24/19     Smoking Status: Every day smoker     Has attempted to quit in the last 30 days?: Yes     Previous Cessation Medication Used : 21mg nicotine patch     Tobacco Product Used : Cigarettes           Amount of Use (CPD) : 7     Time to First Use : <30min     Time of Last Cigarette: smoked cigarette today (at least one puff)     Readiness (0-10) : 8     Importance (0-10 scale): 10    Confidence (0-10):  7    Barriers to quit: Limited stress coping tools    Visit Type: Phone    Active in Cancer Treatment?: Yes    Medication Treatment Plan: Patient will contact PCP for cessation medication orders       Summary of visit:      Eduardo Rubio is still smoking/using tobacco: Yes  These MI interventions were used: Supported Autonomy, Collaboration, Evocation, Open-ended questions, Reflections: simple and complex and Change talk (evoked)  We discussed: Risks of smoking  Benefits of quitting  Ways to cope with cravings and manage triggers  Patient is ready to quit smoking or continue to stay 100% smoke-free.  Advice to quit and impact of smoking provided to patient: use of cessation medications doubles chances of being tobacco free, reviewed use of current cessation medications, reviewed prior quit attempt success        Plan:      1. Patient will ask for cessation medication orders later today at appt and will have more nicotine patches prior to next visit. He will wear patches daily to stop smoking.          Follow-up arranged? Yes  Amount of time spent counseling: 15 mins    TERRENCE Garcia  Tobacco Treatment Specialist

## 2019-12-02 NOTE — TELEPHONE ENCOUNTER
Tobacco Treatment Team at the [facility] spoke with Mr. Rubio on 12/2/2019 regarding the tobacco cessation program and he agreed to participate with the program.We have set up appt for initial tobacco cessation visit via phone 12/2 at 11:00am    TERRENCE Garcia  Tobacco Treatment Specialist  PH: [phone]

## 2019-12-02 NOTE — PROGRESS NOTES
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 No issues.    2019: Cycle 1, day 1 of cisplatin chemotherapy    2019: Mildly increased odynophagia otherwise tolerating treatment well.    2019: Weekly RT visit. Current dose of 2800 cGy. Moderately increased dermatitis and mucositis.    2019: Cycle 1, day 22 of cisplatin chemotherapy    2019: Weekly RT visit. Current dose of 3800 cGy. Mildly increased dermatitis and mucositis. Approximately 2 kg weight loss over the past week.    2019: Weekly RT visit. Current dose of 4200 cGy. Brisk dermatitis and confluent oral cavity mucositis.     ED visits/Hospitalizations:  None    Missed Treatments:  1. 2019 - 2019: 4-day treatment break between fractions 20-21 secondary to machine downtime.    Subjective: Mr. Rubio states he had a good weekend.  He is taking oxycodone 5-7 mg with an occasional 10 mg with adequate relief.  He is eating better and his weight is stable from Friday.      ROS:   Constitutional  Pain (0-10): 5 (mouth), 4 (throat), 5 (skin)  Fatigue: Moderate symptoms    CNS  Headaches: Mild intermittent symptoms    ENT  Mucositis: Moderate to severe symptoms    Cardiopulmonary  Dyspnea: None    GI  Nausea/vomiting: None    Nutrition  PEG: No  Diet: Soft/puréed diet    Integumentary  Dermatitis: Moderate symptoms    Objective:   Current weight: 73.1kg  Last week's weight: 73.3 kg  Starting weight: 76.7 kg    BP  127/81 (Patient Position: Standing)   Pulse 77   Wt 73.1 kg (161 lb 3.2 oz)   BMI 21.86 kg/m        General: 59-year-old gentleman, looks comfortable  HEENT: Left eye with normal extraocular movements. No scleral injection or excessive tearing.  Dry mucous membranes with thickened oral cavity secretions. Brisk mucositis involving the entirety of the hard palate most prominently involving the right maxillary free flap and right nares. No evidence of thrush.  Pulmonary: No wheezing, stridor or respiratory distress  Skin: Brisk erythema involving the entirety of the right esther-face with patchy areas of desquamation confined to the skin folds.  There is moderate erythema and edema with desquamation of the inferior aspect of the right nasal vestibule.    Treatment-related toxicities (CTCAE v5.0):  1. Mucositis: Grade 3: Severe pain; interfering with oral intake  2. Dermatitis: Grade 2: Moderate to brisk erythema; patchy moist desquamation, mostly confined to skin folds and creases; moderate erythema     Assessment:    Mr. 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 He is receiving adjuvant chemoradiotherapy for improved locoregional disease control. He continues to have worsening mucositis and dermatitis related to his ongoing head and neck radiotherapy.    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    Dermatitis:    Continue BID Aquaphor application to the right lateral face and neck    Mucositis:    Continue  frequent salt/soda rinses    Continue as-needed guaifenesin for thickened oral cavity secretions    Continue diet ginger ale gargles    Continue Biotene rinses    Oral thrush:    Resolved. Continue to monitor.        Medication list reviewed    Courtney Barreto NP  Dept of Radiation Oncology  [facility]

## 2019-12-03 ENCOUNTER — APPOINTMENT (OUTPATIENT)
Dept: RADIATION ONCOLOGY | Facility: CLINIC | Age: 59
End: 2019-12-03
Attending: RADIOLOGY
Payer: MEDICARE

## 2019-12-03 PROCEDURE — 77386 ZZH IMRT TREATMENT DELIVERY, COMPLEX: CPT | Performed by: RADIOLOGY

## 2019-12-03 PROCEDURE — 77336 RADIATION PHYSICS CONSULT: CPT | Performed by: RADIOLOGY

## 2019-12-04 ENCOUNTER — APPOINTMENT (OUTPATIENT)
Dept: RADIATION ONCOLOGY | Facility: CLINIC | Age: 59
End: 2019-12-04
Attending: RADIOLOGY
Payer: MEDICARE

## 2019-12-04 ENCOUNTER — ONCOLOGY VISIT (OUTPATIENT)
Dept: ONCOLOGY | Facility: CLINIC | Age: 59
End: 2019-12-04
Attending: PHYSICIAN ASSISTANT
Payer: MEDICARE

## 2019-12-04 ENCOUNTER — THERAPY VISIT (OUTPATIENT)
Dept: SPEECH THERAPY | Facility: CLINIC | Age: 59
End: 2019-12-04
Payer: MEDICARE

## 2019-12-04 DIAGNOSIS — C31.0 MAXILLARY SINUS CANCER (H): Primary | ICD-10-CM

## 2019-12-04 DIAGNOSIS — R13.12 OROPHARYNGEAL DYSPHAGIA: ICD-10-CM

## 2019-12-04 PROCEDURE — 40000100

## 2019-12-04 PROCEDURE — 77386 ZZH IMRT TREATMENT DELIVERY, COMPLEX: CPT | Performed by: RADIOLOGY

## 2019-12-04 NOTE — LETTER
12/4/2019    RE: Eduardo Rubio  [address] [place]  [address] [address]  [facility] [address]     Dear Colleague,    Thank you for referring your patient, Eduardo Rubio, to the [facility]. Please see a copy of my visit note below.    Nutrition Services:    Briefly met with Eduardo today to check in on nutritional intake/tolerance and weight trends during treatment.    He reports that he continues to strive for at least 2200kcal, 100 g protein/day via ONS (Ensure Enlive), Muscle milk and home made shakes.  He takes a total of 4 shakes/day in addition to home made soups, scrambled eggs, yogurt, cottage cheese and pasta.    He continues to drink >10 cups of both water and Gatorade.   His weight has been fairly stable throughout treatment.     Wt Readings from Last 10 Encounters:   12/02/19 73.1 kg (161 lb 3.2 oz)   11/29/19 73.4 kg (161 lb 14.4 oz)   11/21/19 74.4 kg (164 lb)   11/20/19 74.5 kg (164 lb 3.2 oz)   11/15/19 76.2 kg (167 lb 14.4 oz)   11/14/19 76.8 kg (169 lb 4.8 oz)   11/08/19 76.2 kg (167 lb 14.4 oz)   11/07/19 77.4 kg (170 lb 9.6 oz)   11/01/19 75.9 kg (167 lb 4.8 oz)   10/31/19 74.8 kg (165 lb)     He denies questions and concerns for RD at this time. He verbalizes that he feels he is doing quite well.    He has ~5 more days of radiation and 1 more cycle of chemo remaining.      RD encouraged continued ONS and soft foods, striving for >2200kcal and 100kcal/day.     Alyson Skaggs RDN,   [facility] [place]  [phone]

## 2019-12-04 NOTE — PROGRESS NOTES
Nutrition Services:    Briefly met with Eduardo today to check in on nutritional intake/tolerance and weight trends during treatment.    He reports that he continues to strive for at least 2200kcal, 100 g protein/day via ONS (Ensure Enlive), Muscle milk and home made shakes.  He takes a total of 4 shakes/day in addition to home made soups, scrambled eggs, yogurt, cottage cheese and pasta.    He continues to drink >10 cups of both water and Gatorade.   His weight has been fairly stable throughout treatment.     Wt Readings from Last 10 Encounters:   12/02/19 73.1 kg (161 lb 3.2 oz)   11/29/19 73.4 kg (161 lb 14.4 oz)   11/21/19 74.4 kg (164 lb)   11/20/19 74.5 kg (164 lb 3.2 oz)   11/15/19 76.2 kg (167 lb 14.4 oz)   11/14/19 76.8 kg (169 lb 4.8 oz)   11/08/19 76.2 kg (167 lb 14.4 oz)   11/07/19 77.4 kg (170 lb 9.6 oz)   11/01/19 75.9 kg (167 lb 4.8 oz)   10/31/19 74.8 kg (165 lb)     He denies questions and concerns for RD at this time. He verbalizes that he feels he is doing quite well.    He has ~5 more days of radiation and 1 more cycle of chemo remaining.      RD encouraged continued ONS and soft foods, striving for >2200kcal and 100kcal/day.     Alyson Skaggs RDN, [facility] [place]  [phone]

## 2019-12-05 ENCOUNTER — APPOINTMENT (OUTPATIENT)
Dept: RADIATION ONCOLOGY | Facility: CLINIC | Age: 59
End: 2019-12-05
Attending: RADIOLOGY
Payer: MEDICARE

## 2019-12-05 ENCOUNTER — PATIENT OUTREACH (OUTPATIENT)
Dept: ONCOLOGY | Facility: CLINIC | Age: 59
End: 2019-12-05

## 2019-12-05 VITALS — WEIGHT: 161 LBS | BODY MASS INDEX: 21.84 KG/M2

## 2019-12-05 DIAGNOSIS — C31.0 MAXILLARY SINUS CANCER (H): ICD-10-CM

## 2019-12-05 DIAGNOSIS — C31.0 MAXILLARY SINUS CANCER (H): Primary | ICD-10-CM

## 2019-12-05 PROCEDURE — 77386 ZZH IMRT TREATMENT DELIVERY, COMPLEX: CPT | Performed by: RADIOLOGY

## 2019-12-06 ENCOUNTER — APPOINTMENT (OUTPATIENT)
Dept: RADIATION ONCOLOGY | Facility: CLINIC | Age: 59
End: 2019-12-06
Attending: RADIOLOGY
Payer: MEDICARE

## 2019-12-06 PROCEDURE — 77386 ZZH IMRT TREATMENT DELIVERY, COMPLEX: CPT | Performed by: RADIOLOGY

## 2019-12-06 RX ORDER — LORAZEPAM 0.5 MG/1
0.5 TABLET ORAL EVERY 4 HOURS PRN
Qty: 30 TABLET | Refills: 2 | Status: SHIPPED | OUTPATIENT
Start: 2019-12-06 | End: 2019-12-27

## 2019-12-06 NOTE — PROGRESS NOTES
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 No issues.    2019: Cycle 1, day 1 of cisplatin chemotherapy    2019: Mildly increased odynophagia otherwise tolerating treatment well.    2019: Weekly RT visit. Current dose of 2800 cGy. Moderately increased dermatitis and mucositis.    2019: Cycle 1, day 22 of cisplatin chemotherapy    2019: Weekly RT visit. Current dose of 3800 cGy. Mildly increased dermatitis and mucositis. Approximately 2 kg weight loss over the past week.    2019: Weekly RT visit. Current dose of 4200 cGy. Brisk dermatitis and confluent oral cavity mucositis.     2019: Weekly RT visit. Current dose of 5400 cGy. Stable dermatitis and mucositis.    ED visits/Hospitalizations:  None    Missed Treatments:  1. 2019 - 2019: 4-day treatment break between fractions 20-21 secondary to machine downtime. Two fractions made up over the following weekend    Subjective: Mr. Rubio presents to clinic today for his weekly on-treatment visit. His odynophagia remains well-controlled on his current pain regimen with his symptoms currently rated as a 2-3/10 in severity. He is taking a soft/puréed diet by mouth without difficulty and his weight has been stable over the past week. He reports improved albeit persistent mild intermittent headaches and mild nausea without vomiting with his remaining ROS otherwise negative.    ROS:   Constitutional  Pain (0-10): 3  (mouth), 2 (throat), 3 (skin)  Fatigue: Moderate symptoms    CNS  Headaches: Very mild symptoms    ENT  Mucositis: Moderate to severe symptoms    Cardiopulmonary  Dyspnea: None    GI  Nausea/vomiting: Mild nausea without vomiting    Nutrition  PEG: No  Diet: Soft/puréed diet    Integumentary  Dermatitis: Moderate symptoms    Objective:   Current weight: 73.0 kg  Last week's weight: 73.3 kg  Starting weight: 76.7 kg    BP: 141/83 (sitting), 131/71 (standing)  Pulse: 72 (sitting), 79 (standing)     General: 59-year-old gentleman seated in the examination chair in no acute distress  HEENT: Left eye with normal extraocular movements. No scleral injection or excessive tearing.  Dry mucous membranes with thickened oral cavity secretions. Brisk mucositis involving the entirety of the hard palate, right buccal mucosa and right posterolateral oropharynx. No evidence of thrush.  Pulmonary: No wheezing, stridor or respiratory distress  Skin: Brisk erythema involving the entirety of the right esther-face with patchy areas of desquamation confined to the skin folds. There is stable, moderate erythema and edema with desquamation of the inferior aspect of the right nasal vestibule.    Treatment-related toxicities (CTCAE v5.0):  1. Mucositis: Grade 3: Severe pain; interfering with oral intake  2. Dermatitis: Grade 2: Moderate to brisk erythema; patchy moist desquamation, mostly confined to skin folds and creases; moderate erythema     Assessment:    Mr. 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 He is receiving adjuvant chemoradiotherapy for improved locoregional disease control. He has developed the anticipated acute radiation-induced dermatitis and mucositis with his symptoms largely stable over the past week.    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     Fluids/Electrolytes/Nutrition:    Continue diet as tolerated    Dermatitis:    Continue BID Aquaphor application to the right lateral face and neck    Mucositis:    Continue frequent salt/soda rinses    Continue as-needed guaifenesin for thickened oral cavity secretions    Continue diet ginger ale gargles    Continue Biotene rinses    Oral thrush:    Resolved. Continue to monitor.    [employer] chart and setup information reviewed  IGRT images reviewed    Medication list reviewed    Eric Santillan MD/PhD    Dept of Radiation Oncology  [facility]

## 2019-12-09 ENCOUNTER — APPOINTMENT (OUTPATIENT)
Dept: RADIATION ONCOLOGY | Facility: CLINIC | Age: 59
End: 2019-12-09
Attending: RADIOLOGY
Payer: MEDICARE

## 2019-12-09 PROCEDURE — 77386 ZZH IMRT TREATMENT DELIVERY, COMPLEX: CPT | Performed by: RADIOLOGY

## 2019-12-10 DIAGNOSIS — C31.0 MAXILLARY SINUS CANCER (H): ICD-10-CM

## 2019-12-10 RX ORDER — LIDOCAINE HYDROCHLORIDE 20 MG/ML
15 SOLUTION OROPHARYNGEAL EVERY 4 HOURS PRN
Qty: 100 ML | Refills: 5 | Status: SHIPPED | OUTPATIENT
Start: 2019-12-10 | End: 2019-12-13

## 2019-12-11 ENCOUNTER — OFFICE VISIT (OUTPATIENT)
Dept: RADIATION ONCOLOGY | Facility: CLINIC | Age: 59
End: 2019-12-11
Attending: RADIOLOGY
Payer: MEDICARE

## 2019-12-11 VITALS
SYSTOLIC BLOOD PRESSURE: 150 MMHG | DIASTOLIC BLOOD PRESSURE: 84 MMHG | HEART RATE: 70 BPM | BODY MASS INDEX: 21.84 KG/M2 | WEIGHT: 161 LBS

## 2019-12-11 DIAGNOSIS — C31.0 MAXILLARY SINUS CANCER (H): Primary | ICD-10-CM

## 2019-12-11 DIAGNOSIS — B37.0 THRUSH: ICD-10-CM

## 2019-12-11 PROCEDURE — 77336 RADIATION PHYSICS CONSULT: CPT | Performed by: RADIOLOGY

## 2019-12-11 PROCEDURE — 77386 ZZH IMRT TREATMENT DELIVERY, COMPLEX: CPT | Performed by: RADIOLOGY

## 2019-12-12 RX ORDER — NYSTATIN 100000/ML
500000 SUSPENSION, ORAL (FINAL DOSE FORM) ORAL 4 TIMES DAILY
Qty: 100 ML | Refills: 0 | Status: SHIPPED | OUTPATIENT
Start: 2019-12-12 | End: 2019-12-27

## 2019-12-12 NOTE — PROGRESS NOTES
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    10/31/2019: Tolerating treatment well. No issues.    2019: Cycle 1, day 1 of cisplatin chemotherapy.    2019: Mildly increased odynophagia otherwise tolerating treatment well.    2019: Weekly RT visit. Current dose of 2800 cGy. Moderately increased dermatitis and mucositis.    2019: Cycle 1, day 22 of cisplatin chemotherapy.    2019: Weekly RT visit. Current dose of 3800 cGy. Mildly increased dermatitis and mucositis. Approximately 2 kg weight loss over the past week.    2019: Weekly RT visit. Current dose of 4200 cGy. Brisk dermatitis and confluent oral cavity mucositis.     2019: Weekly RT visit. Current dose of 5400 cGy. Stable dermatitis and mucositis.    2019: Weekly RT visit. Current dose of 6000 cGy. Mildly increased dermatitis and stable mucositis.    ED visits/Hospitalizations:  None    Missed Treatments:  1. 2019 - 2019: 4-day treatment break between fractions 20-21 secondary to machine downtime. Two fractions made up over the following weekend  2. 12/10/2019: 1-day treatment break between fractions 29-30 due to transportation issues    Subjective: Mr. Rubio presents to clinic today for his weekly on-treatment visit. He reports stable oral cavity pain, currently rating his symptoms as a 3/10 in severity. He continues to use PRN oxycodone with frequent use of viscous lidocaine rinses for relief of his symptoms. He  continues to take a soft diet by mouth without difficulty and his weight has remained stable over the past week. His remaining ROS are otherwise unchanged from last week.    ROS:   Constitutional  Pain (0-10): 3 (mouth)  Fatigue: Moderate symptoms    CNS  Headaches: Very mild symptoms    ENT  Mucositis: Moderate to severe symptoms    Cardiopulmonary  Dyspnea: None    GI  Nausea/vomiting: None    Nutrition  PEG: No  Diet: Soft/puréed diet    Integumentary  Dermatitis: Moderate symptoms    Objective:   Current weight: 73.0 kg  Last week's weight: 73.0 kg  Starting weight: 76.7 kg    BP: 150/84 (sitting), 134/75 (standing)  Pulse: 70 (sitting), 68 (standing)     General: 59-year-old gentleman seated in the examination chair in no acute distress  HEENT: Left eye with normal extraocular movements. No scleral injection or excessive tearing. Dry mucous membranes with thickened oral cavity secretions. Brisk mucositis involving the entirety of the hard palate, right buccal mucosa and right posterolateral oropharynx. Patchy areas of oral thrush involving the posterior soft palate and oral tongue.  Pulmonary: No wheezing, stridor or respiratory distress  Skin: Brisk erythema involving the entirety of the right esther-face with patchy areas of desquamation confined to the skin folds. There is stable, moderate erythema and edema with desquamation of the inferior aspect of the right nasal vestibule.    Treatment-related toxicities (CTCAE v5.0):  1. Mucositis: Grade 3: Severe pain; interfering with oral intake  2. Dermatitis: Grade 2: Moderate to brisk erythema; patchy moist desquamation, mostly confined to skin folds and creases; moderate erythema     Assessment:    Mr. 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 He is receiving adjuvant chemoradiotherapy for improved locoregional disease control. He continues to have  stable radiation-induced mucositis and dermatitis on examination.    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     Fluids/Electrolytes/Nutrition:    Continue diet as tolerated    Dermatitis:    Continue BID Aquaphor application to the right lateral face and neck    Mucositis:    Continue frequent salt/soda rinses    Continue as-needed guaifenesin for thickened oral cavity secretions    Continue diet ginger ale gargles    Continue Biotene rinses    Oral thrush:    Restart nystatin    [employer] chart and setup information reviewed  IGRT images reviewed    Medication list reviewed    Eric Santillan MD/PhD    Dept of Radiation Oncology  [facility]

## 2019-12-13 ENCOUNTER — ONCOLOGY VISIT (OUTPATIENT)
Dept: ONCOLOGY | Facility: CLINIC | Age: 59
End: 2019-12-13
Attending: PHYSICIAN ASSISTANT
Payer: MEDICARE

## 2019-12-13 ENCOUNTER — APPOINTMENT (OUTPATIENT)
Dept: LAB | Facility: CLINIC | Age: 59
End: 2019-12-13
Attending: PHYSICIAN ASSISTANT
Payer: MEDICARE

## 2019-12-13 VITALS
RESPIRATION RATE: 18 BRPM | OXYGEN SATURATION: 98 % | TEMPERATURE: 97.8 F | HEART RATE: 74 BPM | SYSTOLIC BLOOD PRESSURE: 137 MMHG | BODY MASS INDEX: 21.43 KG/M2 | DIASTOLIC BLOOD PRESSURE: 82 MMHG | WEIGHT: 158 LBS

## 2019-12-13 DIAGNOSIS — C31.0 MAXILLARY SINUS CANCER (H): Primary | ICD-10-CM

## 2019-12-13 DIAGNOSIS — C31.0 MAXILLARY SINUS CANCER (H): ICD-10-CM

## 2019-12-13 LAB
ALBUMIN SERPL-MCNC: 3.3 G/DL (ref 3.4–5)
ALP SERPL-CCNC: 48 U/L (ref 40–150)
ALT SERPL W P-5'-P-CCNC: 25 U/L (ref 0–70)
ANION GAP SERPL CALCULATED.3IONS-SCNC: 4 MMOL/L (ref 3–14)
AST SERPL W P-5'-P-CCNC: 20 U/L (ref 0–45)
BASOPHILS # BLD AUTO: 0 10E9/L (ref 0–0.2)
BASOPHILS NFR BLD AUTO: 0.5 %
BILIRUB SERPL-MCNC: 0.3 MG/DL (ref 0.2–1.3)
BUN SERPL-MCNC: 10 MG/DL (ref 7–30)
CALCIUM SERPL-MCNC: 8.7 MG/DL (ref 8.5–10.1)
CHLORIDE SERPL-SCNC: 98 MMOL/L (ref 94–109)
CO2 SERPL-SCNC: 30 MMOL/L (ref 20–32)
CREAT SERPL-MCNC: 0.72 MG/DL (ref 0.66–1.25)
DIFFERENTIAL METHOD BLD: ABNORMAL
EOSINOPHIL # BLD AUTO: 0.1 10E9/L (ref 0–0.7)
EOSINOPHIL NFR BLD AUTO: 3.8 %
ERYTHROCYTE [DISTWIDTH] IN BLOOD BY AUTOMATED COUNT: 16.4 % (ref 10–15)
GFR SERPL CREATININE-BSD FRML MDRD: >90 ML/MIN/{1.73_M2}
GLUCOSE SERPL-MCNC: 87 MG/DL (ref 70–99)
HCT VFR BLD AUTO: 30.3 % (ref 40–53)
HGB BLD-MCNC: 10.1 G/DL (ref 13.3–17.7)
IMM GRANULOCYTES # BLD: 0 10E9/L (ref 0–0.4)
IMM GRANULOCYTES NFR BLD: 0 %
LYMPHOCYTES # BLD AUTO: 0.3 10E9/L (ref 0.8–5.3)
LYMPHOCYTES NFR BLD AUTO: 14.8 %
MAGNESIUM SERPL-MCNC: 1.8 MG/DL (ref 1.6–2.3)
MCH RBC QN AUTO: 28.8 PG (ref 26.5–33)
MCHC RBC AUTO-ENTMCNC: 33.3 G/DL (ref 31.5–36.5)
MCV RBC AUTO: 86 FL (ref 78–100)
MONOCYTES # BLD AUTO: 0.4 10E9/L (ref 0–1.3)
MONOCYTES NFR BLD AUTO: 20.1 %
NEUTROPHILS # BLD AUTO: 1.3 10E9/L (ref 1.6–8.3)
NEUTROPHILS NFR BLD AUTO: 60.8 %
NRBC # BLD AUTO: 0 10*3/UL
NRBC BLD AUTO-RTO: 0 /100
PLATELET # BLD AUTO: 153 10E9/L (ref 150–450)
PLATELET # BLD EST: ABNORMAL 10*3/UL
POTASSIUM SERPL-SCNC: 3.7 MMOL/L (ref 3.4–5.3)
PROT SERPL-MCNC: 7.4 G/DL (ref 6.8–8.8)
RBC # BLD AUTO: 3.51 10E12/L (ref 4.4–5.9)
SODIUM SERPL-SCNC: 132 MMOL/L (ref 133–144)
WBC # BLD AUTO: 2.1 10E9/L (ref 4–11)

## 2019-12-13 PROCEDURE — 80053 COMPREHEN METABOLIC PANEL: CPT | Performed by: INTERNAL MEDICINE

## 2019-12-13 PROCEDURE — 96375 TX/PRO/DX INJ NEW DRUG ADDON: CPT

## 2019-12-13 PROCEDURE — 25800030 ZZH RX IP 258 OP 636: Mod: ZF | Performed by: INTERNAL MEDICINE

## 2019-12-13 PROCEDURE — 96415 CHEMO IV INFUSION ADDL HR: CPT

## 2019-12-13 PROCEDURE — 96413 CHEMO IV INFUSION 1 HR: CPT

## 2019-12-13 PROCEDURE — 85025 COMPLETE CBC W/AUTO DIFF WBC: CPT | Performed by: INTERNAL MEDICINE

## 2019-12-13 PROCEDURE — 25000128 H RX IP 250 OP 636: Mod: ZF | Performed by: INTERNAL MEDICINE

## 2019-12-13 PROCEDURE — 96361 HYDRATE IV INFUSION ADD-ON: CPT

## 2019-12-13 PROCEDURE — 83735 ASSAY OF MAGNESIUM: CPT | Performed by: INTERNAL MEDICINE

## 2019-12-13 PROCEDURE — 96368 THER/DIAG CONCURRENT INF: CPT

## 2019-12-13 PROCEDURE — 25000128 H RX IP 250 OP 636: Mod: ZF | Performed by: PHYSICIAN ASSISTANT

## 2019-12-13 PROCEDURE — 96367 TX/PROPH/DG ADDL SEQ IV INF: CPT

## 2019-12-13 PROCEDURE — 99214 OFFICE O/P EST MOD 30 MIN: CPT | Mod: ZP | Performed by: PHYSICIAN ASSISTANT

## 2019-12-13 RX ORDER — HEPARIN SODIUM (PORCINE) LOCK FLUSH IV SOLN 100 UNIT/ML 100 UNIT/ML
5 SOLUTION INTRAVENOUS
Status: CANCELLED | OUTPATIENT
Start: 2019-12-13

## 2019-12-13 RX ORDER — LORAZEPAM 0.5 MG/1
0.5 TABLET ORAL EVERY 4 HOURS PRN
Qty: 30 TABLET | Refills: 2 | Status: SHIPPED | OUTPATIENT
Start: 2019-12-13 | End: 2019-12-27

## 2019-12-13 RX ORDER — DEXAMETHASONE 4 MG/1
TABLET ORAL
Qty: 8 TABLET | Refills: 2 | Status: CANCELLED | OUTPATIENT
Start: 2019-12-14

## 2019-12-13 RX ORDER — HEPARIN SODIUM (PORCINE) LOCK FLUSH IV SOLN 100 UNIT/ML 100 UNIT/ML
5 SOLUTION INTRAVENOUS EVERY 8 HOURS
Status: DISCONTINUED | OUTPATIENT
Start: 2019-12-13 | End: 2019-12-13 | Stop reason: HOSPADM

## 2019-12-13 RX ORDER — HEPARIN SODIUM (PORCINE) LOCK FLUSH IV SOLN 100 UNIT/ML 100 UNIT/ML
5 SOLUTION INTRAVENOUS
Status: DISCONTINUED | OUTPATIENT
Start: 2019-12-13 | End: 2019-12-13 | Stop reason: HOSPADM

## 2019-12-13 RX ORDER — PROCHLORPERAZINE MALEATE 10 MG
10 TABLET ORAL EVERY 6 HOURS PRN
Qty: 30 TABLET | Refills: 2 | Status: SHIPPED | OUTPATIENT
Start: 2019-12-13 | End: 2019-12-27

## 2019-12-13 RX ORDER — LORAZEPAM 0.5 MG/1
0.5 TABLET ORAL EVERY 4 HOURS PRN
Qty: 30 TABLET | Refills: 2 | Status: CANCELLED | OUTPATIENT
Start: 2019-12-13

## 2019-12-13 RX ORDER — HEPARIN SODIUM,PORCINE 10 UNIT/ML
5 VIAL (ML) INTRAVENOUS
Status: CANCELLED | OUTPATIENT
Start: 2019-12-13

## 2019-12-13 RX ORDER — PALONOSETRON 0.05 MG/ML
0.25 INJECTION, SOLUTION INTRAVENOUS ONCE
Status: COMPLETED | OUTPATIENT
Start: 2019-12-13 | End: 2019-12-13

## 2019-12-13 RX ORDER — PROCHLORPERAZINE MALEATE 10 MG
10 TABLET ORAL EVERY 6 HOURS PRN
Qty: 30 TABLET | Refills: 2 | Status: CANCELLED | OUTPATIENT
Start: 2019-12-13

## 2019-12-13 RX ORDER — DEXAMETHASONE 4 MG/1
TABLET ORAL
Qty: 8 TABLET | Refills: 2 | Status: SHIPPED | OUTPATIENT
Start: 2019-12-14 | End: 2019-12-27

## 2019-12-13 RX ORDER — LIDOCAINE HYDROCHLORIDE 20 MG/ML
10 SOLUTION OROPHARYNGEAL
Qty: 500 ML | Refills: 5 | Status: SHIPPED | OUTPATIENT
Start: 2019-12-13 | End: 2019-12-27

## 2019-12-13 RX ADMIN — FOSAPREPITANT: 150 INJECTION, POWDER, LYOPHILIZED, FOR SOLUTION INTRAVENOUS at 09:47

## 2019-12-13 RX ADMIN — SODIUM CHLORIDE 1000 ML: 9 INJECTION, SOLUTION INTRAVENOUS at 08:52

## 2019-12-13 RX ADMIN — PALONOSETRON HYDROCHLORIDE 0.25 MG: 0.25 INJECTION, SOLUTION INTRAVENOUS at 09:45

## 2019-12-13 RX ADMIN — CISPLATIN 190 MG: 1 INJECTION, SOLUTION INTRAVENOUS at 10:19

## 2019-12-13 RX ADMIN — HEPARIN 5 ML: 100 SYRINGE at 07:09

## 2019-12-13 RX ADMIN — MAGNESIUM SULFATE HEPTAHYDRATE: 500 INJECTION, SOLUTION INTRAMUSCULAR; INTRAVENOUS at 10:17

## 2019-12-13 ASSESSMENT — PAIN SCALES - GENERAL: PAINLEVEL: MILD PAIN (3)

## 2019-12-13 NOTE — NURSING NOTE
Oncology Rooming Note    December 13, 2019 7:44 AM   Eduardo Rubio is a 59 year old male who presents for:    Chief Complaint   Patient presents with     Port Draw     Labs drawn via PORT by RN in lab. Line flushed with saline and heprin. VS taken.      Oncology Clinic Visit     Return - Maxillary sinus cancer      Initial Vitals: /82 (BP Location: Right arm, Patient Position: Sitting, Cuff Size: Adult Regular)   Pulse 74   Temp 97.8  F (36.6  C) (Oral)   Resp 18   Wt 71.7 kg (158 lb)   SpO2 98%   BMI 21.43 kg/m   Estimated body mass index is 21.43 kg/m  as calculated from the following:    Height as of 10/31/19: 1.829 m (6').    Weight as of this encounter: 71.7 kg (158 lb). Body surface area is 1.91 meters squared.  Mild Pain (3) Comment: Data Unavailable   No LMP for male patient.  Allergies reviewed: Yes  Medications reviewed: Yes    Medications: MEDICATION REFILLS NEEDED TODAY. Provider was notified.  Pharmacy name entered into [facility]:    [id number] - [facility] [address] [place] [address] AVE AT [address] [facility][id number] - [facility] [address] [place] [address] RD AT [facility]    Clinical concerns: Does he need a steroid for after Chemo?           Candido Brown, EMT

## 2019-12-13 NOTE — PROGRESS NOTES
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  - 9/24/19: Total maxillectomy with orbital exenteration performed by Dr. Roberts, followed by reconstruction with a latissimus free flap with Dr. Pulliam.   - Surgical pathology showed a 4.4 cm moderately differentiated squamous cell carcinoma, (-) LVSI, (+)PNI. There was a positive margin at the pterygopalatine fossa, specifically the vidian nerve taken at its exit from the vidian canal, and additional resection into the canal was not possible.    CURRENT INTERVENTIONS:  Concurrent chemoradiation with high dose cisplatin day 1, 11/1/19     HISTORY OF PRESENT ILLNESS   Eduardo Rubio is 59 year old  who has been diagnosed with locally advanced right maxillary sinus cancer.    Eduardo presented to an outside ED in 08/18/2019 with complaints of right facial pressure, numbness, right-sided visual change and nasal drainage for several days' duration. Imaging workup included an MRI which demonstrated a maxillary sinus mass with extension to the orbit with involvement of the inferior rectus muscle. 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 Mr. Rubio was referred to [facility]. He had staging PET/CT on 9/9/2019 which revealed a FDG-avid maxillary mass at 4.0 x 3.9 x 4.2 cm. There was tumor extension into the right orbital cavity superiorly, the right nasal cavity medially, the retromaxillary fat region posterolaterally, the right pterygopalatine fossa posteriorly, and the premaxillary fat anteriorly. In the orbital cavity there was abutment of the inferior  rectus muscle. There was no evidence of lymphadenopathy or systemic disease. His case was reviewed at tumor conference with recommendation for surgery with total maxillectomy and orbital exenteration with free flap reconstruction.     Mr. Rubio underwent a total maxillectomy with orbital exenteration on 9/24/2019 with Dr. Roberts, followed by reconstruction with a latissimus free flap with Dr. Pulliam. Surgical pathology showed a 4.4 cm moderately differentiated squamous cell carcinoma, (-) LVSI, (+)PNI. There was a positive margin at the pterygopalatine fossa, specifically the vidian nerve taken at its exit from the vidian canal, and additional resection into the canal was not possible. Mr. Rubio's case was discussed in the [facility]'s multidisciplinary head and neck tumor board, with the consensus recommendation to proceed with adjuvant chemoradiation given the positive margin status. Patient received day 1 cisplatin on 11/1/19 and day 22 on 11/20/19. He is here today for routine for routine follow up prior to day 43 cisplatin.     INTERIM HISTORY:  Eduardo presents today for close follow up.  Patient reports that he has been getting more of a stabbing pain in his right thigh.  He is taking a total of about 50 mg of oxycodone per day with 10 mg per dose.  He is occasionally taking 15 mg when he feels the pain is more severe.  He also has mouth and throat pain for which she has been using lidocaine swishes about 10 times per day.  He is taking Mucinex 3-4 times per day.  He is having bowel movements about every other day with the use of 2 senna S per day.  He feels his eating is fair.  He did run out of his nutritional supplements as well as his Biotene.  He reports getting in at least 64 ounces of fluid per day.  He is doing his salt and soda swishes about 6-7 times per day.  He denies other concerns.     PAST MEDICAL HISTORY     Past Medical History:   Diagnosis Date     Gastric ulcer      Low  back pain      Maxillary sinus cancer (H)      Toe amputation status     all ten toes          CURRENT OUTPATIENT MEDICATIONS     Current Outpatient Medications   Medication Sig     acetaminophen (TYLENOL) 325 MG tablet Take 325-650 mg by mouth every 6 hours as needed for mild pain     artificial saliva (BIOTENE MT) SOLN solution Swish and spit 5 mLs in mouth 4 times daily as needed for dry mouth     chlorhexidine (PERIDEX) 0.12 % solution Swish and spit 15 mLs in mouth 4 times daily     cyclobenzaprine (FLEXERIL) 10 MG tablet TK 1 T PO HS PRN FOR MUSCLE SPASM RELIEF     cyclobenzaprine (FLEXERIL) 5 MG tablet 1 tablet (5 mg) by Per Feeding Tube route daily as needed for muscle spasms     guaiFENesin (ORGANIDIN) 200 MG tablet Take 2 tablets (400 mg) by mouth every 4 hours as needed (Thickened secretions)     lidocaine (XYLOCAINE) 2 % solution Swish and swallow 15 mLs in mouth every 4 hours as needed for moderate pain     LORazepam (ATIVAN) 0.5 MG tablet Take 1 tablet (0.5 mg) by mouth every 4 hours as needed (Anxiety, Nausea/Vomiting or Sleep)     nystatin (MYCOSTATIN) 980800 UNIT/ML suspension Take 5 mLs (500,000 Units) by mouth 4 times daily     nystatin (MYCOSTATIN) 004399 UNIT/ML suspension Take 5 mLs (500,000 Units) by mouth 4 times daily     oxyCODONE (ROXICODONE) 5 MG/5ML solution 10 mLs (10 mg) by Per NG tube route every 4 hours as needed for moderate to severe pain     polyethylene glycol (MIRALAX/GLYCOLAX) packet 17 g by Per NG tube route daily as needed for constipation     prochlorperazine (COMPAZINE) 10 MG tablet Take 1 tablet (10 mg) by mouth every 6 hours as needed (Nausea/Vomiting)     senna-docusate (SENOKOT-S/PERICOLACE) 8.6-50 MG tablet 1 tablet by Per Feeding Tube route 2 times daily as needed for constipation     Current Facility-Administered Medications   Medication     heparin 100 UNIT/ML injection 5 mL     sodium chloride (PF) 0.9% PF flush 20 mL        ALLERGIES    No Known Allergies      SOCIAL HISTORY     Social History     Social History Narrative     Not on file      REVIEW OF SYSTEMS   Patient denies any of the following except if noted above: fevers, chills, vision changes, chest pain, dyspnea, abdominal pain, nausea, vomiting, diarrhea, constipation, urinary concerns, issues with sleep or mood.      PHYSICAL EXAM   General: The patient is a pleasant male in no acute distress.  /82 (BP Location: Right arm, Patient Position: Sitting, Cuff Size: Adult Regular)   Pulse 74   Temp 97.8  F (36.6  C) (Oral)   Resp 18   Wt 71.7 kg (158 lb)   SpO2 98%   BMI 21.43 kg/m    Wt Readings from Last 10 Encounters:   12/13/19 71.7 kg (158 lb)   12/11/19 73 kg (161 lb)   12/05/19 73 kg (161 lb)   12/02/19 73.1 kg (161 lb 3.2 oz)   11/29/19 73.4 kg (161 lb 14.4 oz)   11/21/19 74.4 kg (164 lb)   11/20/19 74.5 kg (164 lb 3.2 oz)   11/15/19 76.2 kg (167 lb 14.4 oz)   11/14/19 76.8 kg (169 lb 4.8 oz)   11/08/19 76.2 kg (167 lb 14.4 oz)   HEENT: Right face is bulky from free flap. EOMI, PERRL. Sclerae are anicteric. Oral mucosa is pink and moist with moderate mucositis and thrush.   Lymph: Neck is supple with no lymphadenopathy in the cervical or supraclavicular areas.   Heart: Regular rate and rhythm.   Lungs: Clear to auscultation bilaterally.   Abdomen: Bowel sounds present, soft, nontender with no palpable hepatosplenomegaly or masses.   Extremities: No lower extremity edema noted bilaterally.   Neuro: Cranial nerves II through XII are grossly intact.  Skin: Right face is mildly erythematous. Right neck is mildly erythematous. Open areas noted above right eye socket at incision site, also noted on right lateral nostril and behind right earlobe. No other rashes, petechiae, or bruising noted on exposed skin.     LABORATORY AND IMAGING STUDIES      12/13/2019 07:32   Sodium 132 (L)   Potassium 3.7   Chloride 98   Carbon Dioxide 30   Urea Nitrogen 10   Creatinine 0.72   GFR Estimate >90   GFR Estimate If  Black >90   Calcium 8.7   Anion Gap 4   Magnesium 1.8   Albumin 3.3 (L)   Protein Total 7.4   Bilirubin Total 0.3   Alkaline Phosphatase 48   ALT 25   AST 20   Glucose 87   WBC 2.1 (L)   Hemoglobin 10.1 (L)   Hematocrit 30.3 (L)   Platelet Count 153   RBC Count 3.51 (L)   MCV 86   MCH 28.8   MCHC 33.3   RDW 16.4 (H)   Diff Method Automated Method   % Neutrophils 60.8   % Lymphocytes 14.8   % Monocytes 20.1   % Eosinophils 3.8   % Basophils 0.5   % Immature Granulocytes 0.0   Nucleated RBCs 0   Absolute Neutrophil 1.3 (L)   Absolute Lymphocytes 0.3 (L)   Absolute Monocytes 0.4   Absolute Eosinophils 0.1   Absolute Basophils 0.0   Abs Immature Granulocytes 0.0   Absolute Nucleated RBC 0.0   Platelet Estimate Confirming automated cell count      ASSESSMENT AND PLAN   1. 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 No medical comorbidities  3. Nicotine abuse - chronic smoker   4. Poor social support; lives alone with a dog    Locally advanced maxillary sinus squamous cell cancer that extended in to the right orbit.   -s/p total right maxilectomy with orbital exenteration   -surgical margin were positive making it a high risk disease for recurrence.    -had port placed on 10/31  -decided to initiate adjuvant chemoradiation. Started radiation on 10/28 though due to scheduling delays, did not start chemotherapy until 11/1. Has now received 2 doses of high dose cisplatin which he tolerated overall well with mild side effects. He completed radiation on 12/11/19. He will receive day 43 cisplatin today. He will follow up with medical oncology in 1 week.   -He will need a PET/CT in mid-March. ENT follow-up is scheduled for late January.     FEN  -previously had a PEG though had it removed at patient's instruction  -met with dietician on 11/20.   -Patient's weight continues to decline. Encouraged resuming nutritional supplements and focusing on getting in adequate nutrition and hydration. He will receive IV  NS with his chemotherapy today, which should help with the mildly low sodium. Has seen our dietician.     ENT  Mucositis and thrush  -Persistent. Having sloughing of the roof of his mouth. Will continue with salt/soda swishes, resume Biotene, and continue with viscous lidocaine swishes. He will start Nystatin, which was prescribed yesterday.   -He will continue to use Mucinex for his secretions.     Pain  -he has been seen in a pain clinic for at least the last year, receiving 150 tablets of 10 mg oxycodone per month. He will continue to work with his pain clinic and rad onc regarding pain management. He will continue on oxycodone and lidocaine.    Radiation dermatitis  Recommend applying Aquaphor to all of the open areas.    Radha Bradley PA-C  [facility]  [address], [place] [address] [phone]

## 2019-12-13 NOTE — Clinical Note
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  - 9/24/19: Total maxillectomy with orbital exenteration performed by Dr. Roberts, followed by reconstruction with a latissimus free flap with Dr. Pulliam.   - Surgical pathology showed a 4.4 cm moderately differentiated squamous cell carcinoma, (-) LVSI, (+)PNI. There was a positive margin at the pterygopalatine fossa, specifically the vidian nerve taken at its exit from the vidian canal, and additional resection into the canal was not possible.    CURRENT INTERVENTIONS:  Concurrent chemoradiation with high dose cisplatin day 1, 11/1/19     HISTORY OF PRESENT ILLNESS   Eduardo Rubio is 59 year old  who has been diagnosed with locally advanced right maxillary sinus cancer.    Eduardo presented to an outside ED in 08/18/2019 with complaints of right facial pressure, numbness, right-sided visual change and nasal drainage for several days' duration. Imaging workup included an MRI which demonstrated a maxillary sinus mass with extension to the orbit with involvement of the inferior rectus muscle. 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 Mr. Rubio was referred to [facility]. He had staging PET/CT on 9/9/2019 which revealed a FDG-avid maxillary mass at 4.0 x 3.9 x 4.2 cm. There was tumor extension into the right orbital cavity superiorly, the right nasal cavity medially, the retromaxillary fat region posterolaterally, the right pterygopalatine fossa  posteriorly, and the premaxillary fat anteriorly. In the orbital cavity there was abutment of the inferior rectus muscle. There was no evidence of lymphadenopathy or systemic disease. His case was reviewed at tumor conference with recommendation for surgery with total maxillectomy and orbital exenteration with free flap reconstruction.     Mr. Rubio underwent a total maxillectomy with orbital exenteration on 9/24/2019 with Dr. Roberts, followed by reconstruction with a latissimus free flap with Dr. Pulliam. Surgical pathology showed a 4.4 cm moderately differentiated squamous cell carcinoma, (-) LVSI, (+)PNI. There was a positive margin at the pterygopalatine fossa, specifically the vidian nerve taken at its exit from the vidian canal, and additional resection into the canal was not possible. Mr. Rubio's case was discussed in the [facility]'s multidisciplinary head and neck tumor board, with the consensus recommendation to proceed with adjuvant chemoradiation given the positive margin status.    INTERIM HISTORY:  Eduardo presents today for close followup.      Symptoms are getting worse. He just wants to get it over with. Appetite is worse. He is using protein supplements, minimum 3x/day. Hard to chew on the right side. Eating primarily soft foods. His tongue and nose are irritated. Using aquaphor on his skin all the time; he carries it with him. Currently lidocaine, s/s and biotene swishes which do help (he also carries these)     ROS: 10 point ROS neg other than the symptoms noted above in the HPI.     PAST MEDICAL HISTORY     Past Medical History:   Diagnosis Date     Gastric ulcer      Low back pain      Maxillary sinus cancer (H)      Toe amputation status     all ten toes          CURRENT OUTPATIENT MEDICATIONS     Current Outpatient Medications   Medication Sig     acetaminophen (TYLENOL) 325 MG tablet Take 325-650 mg by mouth every 6 hours as needed for mild pain     artificial saliva  (BIOTENE MT) SOLN solution Swish and spit 5 mLs in mouth 4 times daily as needed for dry mouth     chlorhexidine (PERIDEX) 0.12 % solution Swish and spit 15 mLs in mouth 4 times daily     cyclobenzaprine (FLEXERIL) 10 MG tablet TK 1 T PO HS PRN FOR MUSCLE SPASM RELIEF     cyclobenzaprine (FLEXERIL) 5 MG tablet 1 tablet (5 mg) by Per Feeding Tube route daily as needed for muscle spasms     guaiFENesin (ORGANIDIN) 200 MG tablet Take 2 tablets (400 mg) by mouth every 4 hours as needed (Thickened secretions)     lidocaine (XYLOCAINE) 2 % solution Swish and swallow 15 mLs in mouth every 4 hours as needed for moderate pain     LORazepam (ATIVAN) 0.5 MG tablet Take 1 tablet (0.5 mg) by mouth every 4 hours as needed (Anxiety, Nausea/Vomiting or Sleep)     nystatin (MYCOSTATIN) 894730 UNIT/ML suspension Take 5 mLs (500,000 Units) by mouth 4 times daily     nystatin (MYCOSTATIN) 151904 UNIT/ML suspension Take 5 mLs (500,000 Units) by mouth 4 times daily     oxyCODONE (ROXICODONE) 5 MG/5ML solution 10 mLs (10 mg) by Per NG tube route every 4 hours as needed for moderate to severe pain     polyethylene glycol (MIRALAX/GLYCOLAX) packet 17 g by Per NG tube route daily as needed for constipation     prochlorperazine (COMPAZINE) 10 MG tablet Take 1 tablet (10 mg) by mouth every 6 hours as needed (Nausea/Vomiting)     senna-docusate (SENOKOT-S/PERICOLACE) 8.6-50 MG tablet 1 tablet by Per Feeding Tube route 2 times daily as needed for constipation     Current Facility-Administered Medications   Medication     heparin 100 UNIT/ML injection 5 mL     sodium chloride (PF) 0.9% PF flush 20 mL        ALLERGIES    No Known Allergies     SOCIAL HISTORY     Social History     Social History Narrative     Not on file      REVIEW OF SYSTEMS   Patient denies any of the following except if noted above: fevers, chills, vision changes, chest pain, dyspnea, abdominal pain, nausea, vomiting, diarrhea, constipation, urinary concerns, issues with sleep  or mood.      PHYSICAL EXAM   General: The patient is a pleasant male in no acute distress.  /82 (BP Location: Right arm, Patient Position: Sitting, Cuff Size: Adult Regular)   Pulse 74   Temp 97.8  F (36.6  C) (Oral)   Resp 18   Wt 71.7 kg (158 lb)   SpO2 98%   BMI 21.43 kg/m     Wt Readings from Last 10 Encounters:   12/13/19 71.7 kg (158 lb)   12/11/19 73 kg (161 lb)   12/05/19 73 kg (161 lb)   12/02/19 73.1 kg (161 lb 3.2 oz)   11/29/19 73.4 kg (161 lb 14.4 oz)   11/21/19 74.4 kg (164 lb)   11/20/19 74.5 kg (164 lb 3.2 oz)   11/15/19 76.2 kg (167 lb 14.4 oz)   11/14/19 76.8 kg (169 lb 4.8 oz)   11/08/19 76.2 kg (167 lb 14.4 oz)   HEENT: Right face is bulky from free flap. EOMI, PERRL. Sclerae are anicteric. Oral mucosa is pink and moist with moderate mucositis, no thrush.   Lymph: Neck is supple with no lymphadenopathy in the cervical or supraclavicular areas.   Heart: Regular rate and rhythm.   Lungs: Clear to auscultation bilaterally.   Abdomen: Bowel sounds present, soft, nontender with no palpable hepatosplenomegaly or masses.   Extremities: No lower extremity edema noted bilaterally.   Neuro: Cranial nerves II through XII are grossly intact.  Skin: Right face is moderately erythematous. Right neck is mildly erythematous. No open areas. No rashes, petechiae, or bruising noted on exposed skin.     LABORATORY AND IMAGING STUDIES      11/29/2019 13:09   Sodium 133   Potassium 3.2 (L)   Chloride 97   Carbon Dioxide 33 (H)   Urea Nitrogen 16   Creatinine 0.91   GFR Estimate >90   GFR Estimate If Black >90   Calcium 8.6   Anion Gap 3   Magnesium 1.7   Albumin 3.3 (L)   Protein Total 7.2   Bilirubin Total 0.3   Alkaline Phosphatase 54   ALT 57   AST 33   Glucose 92   WBC 2.4 (L)   Hemoglobin 10.3 (L)   Hematocrit 30.1 (L)   Platelet Count 119 (L)   RBC Count 3.48 (L)   MCV 87   MCH 29.6   MCHC 34.2   RDW 15.1 (H)   Diff Method Automated Method   % Neutrophils 58.8   % Lymphocytes 19.8   % Monocytes 20.6    % Eosinophils 0.4   % Basophils 0.4   % Immature Granulocytes 0.0   Nucleated RBCs 0   Absolute Neutrophil 1.4 (L)   Absolute Lymphocytes 0.5 (L)   Absolute Monocytes 0.5   Absolute Eosinophils 0.0   Absolute Basophils 0.0   Abs Immature Granulocytes 0.0   Absolute Nucleated RBC 0.0   RBC Morphology Consistent with reported results   Platelet Estimate Confirming automated cell count        ASSESSMENT AND PLAN   1. Stage IV [id number] right maxillary sinus squamous cell cancer   2. No medical comorbidities  3. Nicotine abuse - chronic smoker   4. Poor social support; lives alone with a dog    Locally advanced maxillary sinus squamous cell cancer that extended in to the right orbit.   -s/p total right maxilectomy with orbital exenteration   -surgical margin were positive making it a high risk disease for recurrence.    -had port placed on 10/31  -decided to initiate adjuvant chemoradiation. Started radiation on 10/28 though due to scheduling delays, did not start chemotherapy until 11/1. Has now received 2 doses of high dose cisplatin which he tolerated overall well with mild side effects.   --continue daily radiation and weekly follow up with medical oncology.   -He is scheduled to complete radiation ~12/9 and day 43 cisplatin is scheduled for 12/13. Will request for this to be moved though I am not sure it is logistically possible. He has had RT cancelled 3x this week due to the machine being down so he will be done with RT later than expected so 12/13 may work out best     FEN  -previously had a PEG though had it removed at patient's instruction  -met with dietician on 11/20.   -He has had more difficulty eating over the last week due to pain and his weight has declined. Had a long discussion about the importance of nutrition today and that he needs to push protein supplements if he is unable to take in other PO intake. He should be doing at least 3-4 per day.   -His sodium remains normal though now having some  hypokalemia. Gave him a list of potassium rich foods to incorporate into his diet. Continue using Powerade to supplement fluids.   -His creatinine is slightly increased. Reiterated the importance of good fluid intake, aiming for 64 oz of fluid/day. Did have cisplatin last week which also may have effected levels    -will f/u with Alyson again on 12/4    ENT  Mucositis   -worsening. Having sloughing of the roof of his mouth   -holding Nystatin swishes with no thrush noted   -continue doing ginger ale swishes, s/s, lidocaine and biotene   -guaifenesin using in the morning. Not too bothersome, most bothersome in the morning    Pain  -he has been seen in a pain clinic for at least the last year, receiving 150 tablets of 10 mg oxycodone per month. We were also giving him opioids and he self escalated   -Tried to discuss pain medications with him though he states that Dr. Santillan is taking care of it and he will discuss with him. Has follow-up with him after our appt today. Will let Rad Onc manage his pain so that there is no confusion over the opioid prescribing.     Radha Bradley PA-C  [facility]  [address], [place] [id number] [phone]      Radha Bradley PA-C

## 2019-12-13 NOTE — PATIENT INSTRUCTIONS
[facility] Triage and after hours / weekends / holidays:  [phone]    Please call the triage or after hours line if you experience a temperature greater than or equal to 100.5, shaking chills, have uncontrolled nausea, vomiting and/or diarrhea, dizziness, shortness of breath, chest pain, bleeding, unexplained bruising, or if you have any other new/concerning symptoms, questions or concerns.      If you are having any concerning symptoms or wish to speak to a provider before your next infusion visit, please call your care coordinator or triage to notify them so we can adequately serve you.     If you need a refill on a narcotic prescription or other medication, please call before your infusion appointment.                 December 2019 Sunday Monday Tuesday Wednesday Thursday Friday Saturday   1    [facility] EXTERNAL RADIATION TREATMT  12:15 PM   (30 min.)   [facility] RAD ONC [facility]   Radiation Oncology Clinic 2    [facility] TELEPHONE CALL  11:00 AM   (30 min.)   Specialist, Marcel[facility] Tobacco Treatment   [facility] ON TREATMENT VISIT   3:30 PM   (15 min.)   Courtney Barreto APRN CNP   Radiation Oncology Clinic    [facility] EXTERNAL RADIATION TREATMT   3:30 PM   (30 min.)   [facility] RAD ONC [facility]   Radiation Oncology Clinic 3    [facility] EXTERNAL RADIATION TREATMT   3:30 PM   (30 min.)   [facility] RAD ONC [facility]   Radiation Oncology Clinic 4    SWALLOW TREATMENT   1:45 PM   (30 min.)   Tahmina Cruz SLP   [facility]    [facility] RETURN   2:15 PM   (30 min.)   Alyson Morales RD   [facility] EXTERNAL RADIATION TREATMT   3:30 PM   (30 min.)   P RAD ONC [facility]   Radiation Oncology Clinic 5    P EXTERNAL RADIATION TREATMT   3:30 PM   (30 min.)   [facility] RAD ONC [facility]   Radiation Oncology Clinic    [facility] ON TREATMENT VISIT   4:00 PM   (15 min.)   Eric Santillan MD   Radiation Oncology Clinic 6    [facility] EXTERNAL RADIATION TREATMT   3:30 PM   (30 min.)   [facility] RAD ONC [facility]    Radiation Oncology Clinic 7       8     9    [facility] EXTERNAL RADIATION TREATMT   3:30 PM   (30 min.)   [facility] RAD ONC [facility]   Radiation Oncology Clinic 10     11    [facility] ON TREATMENT VISIT   3:30 PM   (15 min.)   Eric Santillan MD   Radiation Oncology Clinic    [facility] EXTERNAL RADIATION TREATMT   3:30 PM   (30 min.)   [facility] RAD ONC [facility]   Radiation Oncology Clinic 12     13    [facility] DRAW   7:00 AM   (15 min.)    [facility] DRAW   [facility] Lab Draw    [facility] RETURN   7:35 AM   (50 min.)   Radha Bradley PA-C   [facility]    [facility] ONC INFUSION 360   8:30 AM   (360 min.)   [facility]FUSION   [facility] 14       15     16     17     18     19     20    [facility] DRAW   9:00 AM   (15 min.)    [facility] DRAW   [facility] Lab Draw    [facility] RETURN   9:15 AM   (50 min.)   Gianna Ruggiero PA-C   [facility] 21       22     23     24     25     26    [facility] RETURN   1:00 PM   (60 min.)   Courtney Barreto APRN CNP   Radiation Oncology Clinic    SWALLOW TREATMENT   2:15 PM   (30 min.)   Nelida Giron SLP   [facility] 27     28       29     30     31                                     January 2020 Sunday Monday Tuesday Wednesday Thursday Friday Saturday                  1     2     3     4       5     6     7     8     9     10     11       12     13     14     15     16     17     18       19     20    [facility] RETURN  11:00 AM   (15 min.)   Jose Roberts MD   [facility] 21     22    [facility] RETURN   2:30 PM   (30 min.)   Eric Santillan MD   [facility] 23     24     25       26     27     28     29     30     31                          Lab Results:  Recent Results (from the past 12 hour(s))   CBC with platelets differential    Collection Time: 12/13/19  7:32 AM   Result Value Ref Range    WBC 2.1 (L) 4.0 - 11.0 10e9/L    RBC Count 3.51 (L) 4.4 - 5.9 10e12/L     Hemoglobin 10.1 (L) 13.3 - 17.7 g/dL    Hematocrit 30.3 (L) 40.0 - 53.0 %    MCV 86 78 - 100 fl    MCH 28.8 26.5 - 33.0 pg    MCHC 33.3 31.5 - 36.5 g/dL    RDW 16.4 (H) 10.0 - 15.0 %    Platelet Count 153 150 - 450 10e9/L    Diff Method Automated Method     % Neutrophils 60.8 %    % Lymphocytes 14.8 %    % Monocytes 20.1 %    % Eosinophils 3.8 %    % Basophils 0.5 %    % Immature Granulocytes 0.0 %    Nucleated RBCs 0 0 /100    Absolute Neutrophil 1.3 (L) 1.6 - 8.3 10e9/L    Absolute Lymphocytes 0.3 (L) 0.8 - 5.3 10e9/L    Absolute Monocytes 0.4 0.0 - 1.3 10e9/L    Absolute Eosinophils 0.1 0.0 - 0.7 10e9/L    Absolute Basophils 0.0 0.0 - 0.2 10e9/L    Abs Immature Granulocytes 0.0 0 - 0.4 10e9/L    Absolute Nucleated RBC 0.0     Platelet Estimate Confirming automated cell count    Comprehensive metabolic panel    Collection Time: 12/13/19  7:32 AM   Result Value Ref Range    Sodium 132 (L) 133 - 144 mmol/L    Potassium 3.7 3.4 - 5.3 mmol/L    Chloride 98 94 - 109 mmol/L    Carbon Dioxide 30 20 - 32 mmol/L    Anion Gap 4 3 - 14 mmol/L    Glucose 87 70 - 99 mg/dL    Urea Nitrogen 10 7 - 30 mg/dL    Creatinine 0.72 0.66 - 1.25 mg/dL    GFR Estimate >90 >60 mL/min/[1.73_m2]    GFR Estimate If Black >90 >60 mL/min/[1.73_m2]    Calcium 8.7 8.5 - 10.1 mg/dL    Bilirubin Total 0.3 0.2 - 1.3 mg/dL    Albumin 3.3 (L) 3.4 - 5.0 g/dL    Protein Total 7.4 6.8 - 8.8 g/dL    Alkaline Phosphatase 48 40 - 150 U/L    ALT 25 0 - 70 U/L    AST 20 0 - 45 U/L   Magnesium    Collection Time: 12/13/19  7:32 AM   Result Value Ref Range    Magnesium 1.8 1.6 - 2.3 mg/dL

## 2019-12-13 NOTE — NURSING NOTE
Chief Complaint   Patient presents with     Port Draw     Labs drawn via PORT by RN in lab. Line flushed with saline and heprin. VS taken.      Hipolito Nichols RN

## 2019-12-13 NOTE — LETTER
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  - 9/24/19: Total maxillectomy with orbital exenteration performed by Dr. Roberts, followed by reconstruction with a latissimus free flap with Dr. Pulliam.   - Surgical pathology showed a 4.4 cm moderately differentiated squamous cell carcinoma, (-) LVSI, (+)PNI. There was a positive margin at the pterygopalatine fossa, specifically the vidian nerve taken at its exit from the vidian canal, and additional resection into the canal was not possible.    CURRENT INTERVENTIONS:  Concurrent chemoradiation with high dose cisplatin day 1, 11/1/19     HISTORY OF PRESENT ILLNESS   Eduardo Rubio is 59 year old  who has been diagnosed with locally advanced right maxillary sinus cancer.    Eduardo presented to an outside ED in 08/18/2019 with complaints of right facial pressure, numbness, right-sided visual change and nasal drainage for several days' duration. Imaging workup included an MRI which demonstrated a maxillary sinus mass with extension to the orbit with involvement of the inferior rectus muscle. 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 Mr. Rubio was referred to [facility]. He had staging PET/CT on 9/9/2019 which revealed a FDG-avid maxillary mass at 4.0 x 3.9 x 4.2 cm. There was tumor extension into the right orbital cavity superiorly, the right nasal cavity medially, the retromaxillary fat region posterolaterally, the right pterygopalatine fossa  posteriorly, and the premaxillary fat anteriorly. In the orbital cavity there was abutment of the inferior rectus muscle. There was no evidence of lymphadenopathy or systemic disease. His case was reviewed at tumor conference with recommendation for surgery with total maxillectomy and orbital exenteration with free flap reconstruction.     Mr. Rubio underwent a total maxillectomy with orbital exenteration on 9/24/2019 with Dr. Roberts, followed by reconstruction with a latissimus free flap with Dr. Pulliam. Surgical pathology showed a 4.4 cm moderately differentiated squamous cell carcinoma, (-) LVSI, (+)PNI. There was a positive margin at the pterygopalatine fossa, specifically the vidian nerve taken at its exit from the vidian canal, and additional resection into the canal was not possible. Mr. Rubio's case was discussed in the [facility]'s multidisciplinary head and neck tumor board, with the consensus recommendation to proceed with adjuvant chemoradiation given the positive margin status.  Patient received day 1 cisplatin on 11/1/19 and day 22 on 11/20/19. He is here today for routine for routine follow up prior to day 43 cisplatin.     INTERIM HISTORY:  Eduardo presents today for close follow up.  Patient reports that he has been getting more of a stabbing pain in his right thigh.  He is taking a total of about 50 mg of oxycodone per day with 10 mg per dose.  He is occasionally taking 15 mg when he feels the pain is more severe.  He also has mouth and throat pain for which she has been using lidocaine swishes about 10 times per day.  He is taking Mucinex 3-4 times per day.  He is having bowel movements about every other day with the use of 2 senna S per day.  He feels his eating is fair.  He did run out of his nutritional supplements as well as his Biotene.  He reports getting in at least 64 ounces of fluid per day.  He is doing his salt and soda swishes about 6-7 times per day.  He denies  other concerns.     PAST MEDICAL HISTORY     Past Medical History:   Diagnosis Date     Gastric ulcer      Low back pain      Maxillary sinus cancer (H)      Toe amputation status     all ten toes          CURRENT OUTPATIENT MEDICATIONS     Current Outpatient Medications   Medication Sig     acetaminophen (TYLENOL) 325 MG tablet Take 325-650 mg by mouth every 6 hours as needed for mild pain     artificial saliva (BIOTENE MT) SOLN solution Swish and spit 5 mLs in mouth 4 times daily as needed for dry mouth     chlorhexidine (PERIDEX) 0.12 % solution Swish and spit 15 mLs in mouth 4 times daily     cyclobenzaprine (FLEXERIL) 10 MG tablet TK 1 T PO HS PRN FOR MUSCLE SPASM RELIEF     cyclobenzaprine (FLEXERIL) 5 MG tablet 1 tablet (5 mg) by Per Feeding Tube route daily as needed for muscle spasms     guaiFENesin (ORGANIDIN) 200 MG tablet Take 2 tablets (400 mg) by mouth every 4 hours as needed (Thickened secretions)     lidocaine (XYLOCAINE) 2 % solution Swish and swallow 15 mLs in mouth every 4 hours as needed for moderate pain     LORazepam (ATIVAN) 0.5 MG tablet Take 1 tablet (0.5 mg) by mouth every 4 hours as needed (Anxiety, Nausea/Vomiting or Sleep)     nystatin (MYCOSTATIN) 104589 UNIT/ML suspension Take 5 mLs (500,000 Units) by mouth 4 times daily     nystatin (MYCOSTATIN) 601294 UNIT/ML suspension Take 5 mLs (500,000 Units) by mouth 4 times daily     oxyCODONE (ROXICODONE) 5 MG/5ML solution 10 mLs (10 mg) by Per NG tube route every 4 hours as needed for moderate to severe pain     polyethylene glycol (MIRALAX/GLYCOLAX) packet 17 g by Per NG tube route daily as needed for constipation     prochlorperazine (COMPAZINE) 10 MG tablet Take 1 tablet (10 mg) by mouth every 6 hours as needed (Nausea/Vomiting)     senna-docusate (SENOKOT-S/PERICOLACE) 8.6-50 MG tablet 1 tablet by Per Feeding Tube route 2 times daily as needed for constipation     Current Facility-Administered Medications   Medication     heparin 100  UNIT/ML injection 5 mL     sodium chloride (PF) 0.9% PF flush 20 mL        ALLERGIES    No Known Allergies     SOCIAL HISTORY     Social History     Social History Narrative     Not on file      REVIEW OF SYSTEMS   Patient denies any of the following except if noted above: fevers, chills, vision changes, chest pain, dyspnea, abdominal pain, nausea, vomiting, diarrhea, constipation, urinary concerns, issues with sleep or mood.      PHYSICAL EXAM   General: The patient is a pleasant male in no acute distress.  /82 (BP Location: Right arm, Patient Position: Sitting, Cuff Size: Adult Regular)   Pulse 74   Temp 97.8  F (36.6  C) (Oral)   Resp 18   Wt 71.7 kg (158 lb)   SpO2 98%   BMI 21.43 kg/m     Wt Readings from Last 10 Encounters:   12/13/19 71.7 kg (158 lb)   12/11/19 73 kg (161 lb)   12/05/19 73 kg (161 lb)   12/02/19 73.1 kg (161 lb 3.2 oz)   11/29/19 73.4 kg (161 lb 14.4 oz)   11/21/19 74.4 kg (164 lb)   11/20/19 74.5 kg (164 lb 3.2 oz)   11/15/19 76.2 kg (167 lb 14.4 oz)   11/14/19 76.8 kg (169 lb 4.8 oz)   11/08/19 76.2 kg (167 lb 14.4 oz)   HEENT: Right face is bulky from free flap. EOMI, PERRL. Sclerae are anicteric. Oral mucosa is pink and moist with moderate mucositis and thrush.   Lymph: Neck is supple with no lymphadenopathy in the cervical or supraclavicular areas.   Heart: Regular rate and rhythm.   Lungs: Clear to auscultation bilaterally.   Abdomen: Bowel sounds present, soft, nontender with no palpable hepatosplenomegaly or masses.   Extremities: No lower extremity edema noted bilaterally.   Neuro: Cranial nerves II through XII are grossly intact.  Skin: Right face is mildly erythematous. Right neck is mildly erythematous. Open areas noted above right eye socket at incision site, also noted on right lateral nostril and behind right earlobe. No other rashes, petechiae, or bruising noted on exposed skin.     LABORATORY AND IMAGING STUDIES      12/13/2019 07:32   Sodium 132 (L)   Potassium  3.7   Chloride 98   Carbon Dioxide 30   Urea Nitrogen 10   Creatinine 0.72   GFR Estimate >90   GFR Estimate If Black >90   Calcium 8.7   Anion Gap 4   Magnesium 1.8   Albumin 3.3 (L)   Protein Total 7.4   Bilirubin Total 0.3   Alkaline Phosphatase 48   ALT 25   AST 20   Glucose 87   WBC 2.1 (L)   Hemoglobin 10.1 (L)   Hematocrit 30.3 (L)   Platelet Count 153   RBC Count 3.51 (L)   MCV 86   MCH 28.8   MCHC 33.3   RDW 16.4 (H)   Diff Method Automated Method   % Neutrophils 60.8   % Lymphocytes 14.8   % Monocytes 20.1   % Eosinophils 3.8   % Basophils 0.5   % Immature Granulocytes 0.0   Nucleated RBCs 0   Absolute Neutrophil 1.3 (L)   Absolute Lymphocytes 0.3 (L)   Absolute Monocytes 0.4   Absolute Eosinophils 0.1   Absolute Basophils 0.0   Abs Immature Granulocytes 0.0   Absolute Nucleated RBC 0.0   Platelet Estimate Confirming automated cell count      ASSESSMENT AND PLAN   1. 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 No medical comorbidities  3. Nicotine abuse - chronic smoker   4. Poor social support; lives alone with a dog    Locally advanced maxillary sinus squamous cell cancer that extended in to the right orbit.   -s/p total right maxilectomy with orbital exenteration   -surgical margin were positive making it a high risk disease for recurrence.    -had port placed on 10/31  -decided to initiate adjuvant chemoradiation. Started radiation on 10/28 though due to scheduling delays, did not start chemotherapy until 11/1. Has now received 2 doses of high dose cisplatin which he tolerated overall well with mild side effects. He completed radiation on 12/11/19. He will receive day 43 cisplatin today. He will follow up with medical oncology in 1 week.   -He will need a PET/CT in mid-March. ENT follow-up is scheduled for late January.     FEN  -previously had a PEG though had it removed at patient's instruction  -met with dietician on 11/20.   -Patient's weight continues to decline. Encouraged  resuming nutritional supplements and focusing on getting in adequate nutrition and hydration. He will receive IV NS with his chemotherapy today, which should help with the mildly low sodium. Has seen our dietician.     ENT  Mucositis and thrush  -Persistent. Having sloughing of the roof of his mouth. Will continue with salt/soda swishes, resume Biotene, and continue with viscous lidocaine swishes. He will start Nystatin, which was prescribed yesterday.   -He will continue to use Mucinex for his secretions.     Pain  -he has been seen in a pain clinic for at least the last year, receiving 150 tablets of 10 mg oxycodone per month. He will continue to work with his pain clinic and rad onc regarding pain management. He will continue on oxycodone and lidocaine.    Radiation dermatitis  Recommend applying Aquaphor to all of the open areas.    Radha Bradley PA-C  [facility]  [address] [place] [address]  [phone]      Radha Bradley PA-C

## 2019-12-13 NOTE — PROGRESS NOTES
Infusion Nursing Note:  Eduardo Edwardsquist presents today for Cycle 1, Day 43 Cisplatin.    Patient seen by provider today: Yes: ADRIEN Ladd   present during visit today: Not Applicable.    Note: Pt assessed prior to infusion appointment. Okay to proceed with treatment. Pt voiding prior to initiating Cisplatin.     Intravenous Access:  Implanted Port.    Treatment Conditions:  Lab Results   Component Value Date    HGB 10.1 12/13/2019     Lab Results   Component Value Date    WBC 2.1 12/13/2019      Lab Results   Component Value Date    ANEU 1.3 12/13/2019     Lab Results   Component Value Date     12/13/2019      Lab Results   Component Value Date     12/13/2019                   Lab Results   Component Value Date    POTASSIUM 3.7 12/13/2019           Lab Results   Component Value Date    MAG 1.8 12/13/2019            Lab Results   Component Value Date    CR 0.72 12/13/2019                   Lab Results   Component Value Date    MIKEY 8.7 12/13/2019                Lab Results   Component Value Date    BILITOTAL 0.3 12/13/2019           Lab Results   Component Value Date    ALBUMIN 3.3 12/13/2019                    Lab Results   Component Value Date    ALT 25 12/13/2019           Lab Results   Component Value Date    AST 20 12/13/2019     Results reviewed, labs MET treatment parameters, ok to proceed with treatment.    Post Infusion Assessment:  Patient tolerated infusion without incident.  Blood return noted pre and post infusion.  Site patent and intact, free from redness, edema or discomfort.  No evidence of extravasations.  Access discontinued per protocol.     Discharge Plan:   Prescription refills given for Ativan, Dexamethasone, Compazine, Lidocaine.  AVS to patient via [employer].  Patient will return 12/20 for next lab and provider appointment.   Patient discharged in stable condition accompanied by: self.  Departure Mode: Ambulatory.    Leigh Ann Temple RN

## 2019-12-16 ENCOUNTER — ONCOLOGY VISIT (OUTPATIENT)
Dept: RADIATION ONCOLOGY | Facility: CLINIC | Age: 59
End: 2019-12-16

## 2019-12-16 ENCOUNTER — VIRTUAL VISIT (OUTPATIENT)
Dept: ONCOLOGY | Facility: CLINIC | Age: 59
End: 2019-12-16
Attending: INTERNAL MEDICINE
Payer: MEDICARE

## 2019-12-16 DIAGNOSIS — Z72.0 TOBACCO ABUSE DISORDER: Primary | ICD-10-CM

## 2019-12-16 PROCEDURE — 40000114 ZZH STATISTIC NO CHARGE CLINIC VISIT

## 2019-12-16 NOTE — PROGRESS NOTES
TOBACCO TREATMENT  VISIT      Subjective:      Patient is a 59 year old White male that was contacted to participate in the Tobacco Treatment Program for Nicotine Dependence on 12/16/2019 by the Tobacco Treatment Specialist. Patient  reports that he has been smoking cigarettes. He started smoking about 15 years ago. He has a 7.50 pack-year smoking history. He has never used smokeless tobacco.     Patient states he has continued smoking, but got nicotine patches through quitline. He states he has 2 cigarettes left for today and will start patches immediately after they are gone. He feels he needs to give up coffee at this time as it is his biggest trigger to smoke, but has noticed dry mouth as well (from coffee?). He is eager to quit and is feeling confident about starting nicotine patches.        Information:      Agreed to Participate in Program?: Yes    Referral Type: Proactive outreach    Patient age: 59    Gender: Male    Race: White    Cancer type: Other (please comment)    Year of cancer diagnosis: 09/24/19     Smoking Status: Every day smoker     Has attempted to quit in the last 30 days?: Yes     Previous Cessation Medication Used : 21mg nicotine patch     Tobacco Product Used : Cigarettes           Amount of Use (CPD) : 7     Time to First Use : <30min     Time of Last Cigarette: smoked cigarette today (at least one puff)     Readiness (0-10) : 8     Importance (0-10 scale): 10    Confidence (0-10): 7    Barriers to quit: Limited stress coping tools    Visit Type: Phone    Active in Cancer Treatment?: Yes    Medication Treatment Plan: Patient will contact PCP for cessation medication orders     Summary of visit:      Eduardo Rubio is still smoking/using tobacco: Yes  These MI interventions were used: Supported Autonomy, Collaboration, Evocation, Open-ended questions, Reflections: simple and complex and Change talk (evoked)  We discussed: Ways to cope with cravings and manage triggers  Hints for managing  nicotine withdrawal  Total abstinence  Patient is ready to quit smoking or continue to stay 100% smoke-free.  Advice to quit and impact of smoking provided to patient: reviewed cessation medication, setting quit date, addressed any questions getting started.         Plan:      Quit Date: 19  1. Patient will start nicotine patches later today and quit smoking. Will stay busy with other activities to avoid smoking.     Follow-up arranged? Yes  Amount of time spent counselin mins    TERRENCE Garcia  Tobacco Treatment Specialist

## 2019-12-20 ENCOUNTER — ONCOLOGY VISIT (OUTPATIENT)
Dept: ONCOLOGY | Facility: CLINIC | Age: 59
End: 2019-12-20
Attending: PHYSICIAN ASSISTANT
Payer: MEDICARE

## 2019-12-20 ENCOUNTER — APPOINTMENT (OUTPATIENT)
Dept: LAB | Facility: CLINIC | Age: 59
End: 2019-12-20
Attending: INTERNAL MEDICINE
Payer: MEDICARE

## 2019-12-20 VITALS
TEMPERATURE: 98 F | RESPIRATION RATE: 16 BRPM | DIASTOLIC BLOOD PRESSURE: 80 MMHG | HEART RATE: 64 BPM | SYSTOLIC BLOOD PRESSURE: 134 MMHG | WEIGHT: 160.2 LBS | OXYGEN SATURATION: 100 % | BODY MASS INDEX: 21.73 KG/M2

## 2019-12-20 DIAGNOSIS — H93.13 TINNITUS, BILATERAL: ICD-10-CM

## 2019-12-20 DIAGNOSIS — C31.0 SQUAMOUS CELL CARCINOMA OF MAXILLARY SINUS (H): ICD-10-CM

## 2019-12-20 DIAGNOSIS — E83.42 HYPOMAGNESEMIA: ICD-10-CM

## 2019-12-20 DIAGNOSIS — E87.6 HYPOKALEMIA: Primary | ICD-10-CM

## 2019-12-20 DIAGNOSIS — C31.0 MAXILLARY SINUS CANCER (H): ICD-10-CM

## 2019-12-20 LAB
ALBUMIN SERPL-MCNC: 3.1 G/DL (ref 3.4–5)
ALP SERPL-CCNC: 57 U/L (ref 40–150)
ALT SERPL W P-5'-P-CCNC: 40 U/L (ref 0–70)
ANION GAP SERPL CALCULATED.3IONS-SCNC: 5 MMOL/L (ref 3–14)
AST SERPL W P-5'-P-CCNC: 37 U/L (ref 0–45)
BASOPHILS # BLD AUTO: 0 10E9/L (ref 0–0.2)
BASOPHILS NFR BLD AUTO: 0.4 %
BILIRUB SERPL-MCNC: 0.2 MG/DL (ref 0.2–1.3)
BUN SERPL-MCNC: 17 MG/DL (ref 7–30)
CALCIUM SERPL-MCNC: 7.5 MG/DL (ref 8.5–10.1)
CHLORIDE SERPL-SCNC: 96 MMOL/L (ref 94–109)
CO2 SERPL-SCNC: 32 MMOL/L (ref 20–32)
CREAT SERPL-MCNC: 0.94 MG/DL (ref 0.66–1.25)
DIFFERENTIAL METHOD BLD: ABNORMAL
EOSINOPHIL # BLD AUTO: 0 10E9/L (ref 0–0.7)
EOSINOPHIL NFR BLD AUTO: 0.4 %
ERYTHROCYTE [DISTWIDTH] IN BLOOD BY AUTOMATED COUNT: 17 % (ref 10–15)
GFR SERPL CREATININE-BSD FRML MDRD: 88 ML/MIN/{1.73_M2}
GLUCOSE SERPL-MCNC: 81 MG/DL (ref 70–99)
HCT VFR BLD AUTO: 28.5 % (ref 40–53)
HGB BLD-MCNC: 9.7 G/DL (ref 13.3–17.7)
IMM GRANULOCYTES # BLD: 0 10E9/L (ref 0–0.4)
IMM GRANULOCYTES NFR BLD: 0.4 %
LYMPHOCYTES # BLD AUTO: 0.5 10E9/L (ref 0.8–5.3)
LYMPHOCYTES NFR BLD AUTO: 16.6 %
MAGNESIUM SERPL-MCNC: 1.2 MG/DL (ref 1.6–2.3)
MCH RBC QN AUTO: 28.7 PG (ref 26.5–33)
MCHC RBC AUTO-ENTMCNC: 34 G/DL (ref 31.5–36.5)
MCV RBC AUTO: 84 FL (ref 78–100)
MONOCYTES # BLD AUTO: 0.4 10E9/L (ref 0–1.3)
MONOCYTES NFR BLD AUTO: 14.8 %
NEUTROPHILS # BLD AUTO: 1.9 10E9/L (ref 1.6–8.3)
NEUTROPHILS NFR BLD AUTO: 67.4 %
NRBC # BLD AUTO: 0 10*3/UL
NRBC BLD AUTO-RTO: 0 /100
PLATELET # BLD AUTO: 130 10E9/L (ref 150–450)
POTASSIUM SERPL-SCNC: 3 MMOL/L (ref 3.4–5.3)
PROT SERPL-MCNC: 6.7 G/DL (ref 6.8–8.8)
RBC # BLD AUTO: 3.38 10E12/L (ref 4.4–5.9)
SODIUM SERPL-SCNC: 132 MMOL/L (ref 133–144)
WBC # BLD AUTO: 2.8 10E9/L (ref 4–11)

## 2019-12-20 PROCEDURE — 85025 COMPLETE CBC W/AUTO DIFF WBC: CPT | Performed by: PHYSICIAN ASSISTANT

## 2019-12-20 PROCEDURE — G0463 HOSPITAL OUTPT CLINIC VISIT: HCPCS | Mod: ZF

## 2019-12-20 PROCEDURE — 99214 OFFICE O/P EST MOD 30 MIN: CPT | Mod: ZP | Performed by: PHYSICIAN ASSISTANT

## 2019-12-20 PROCEDURE — 25000128 H RX IP 250 OP 636: Mod: ZF | Performed by: PHYSICIAN ASSISTANT

## 2019-12-20 PROCEDURE — 83735 ASSAY OF MAGNESIUM: CPT | Performed by: PHYSICIAN ASSISTANT

## 2019-12-20 PROCEDURE — 80053 COMPREHEN METABOLIC PANEL: CPT | Performed by: PHYSICIAN ASSISTANT

## 2019-12-20 RX ORDER — HEPARIN SODIUM (PORCINE) LOCK FLUSH IV SOLN 100 UNIT/ML 100 UNIT/ML
5 SOLUTION INTRAVENOUS EVERY 8 HOURS
Status: DISCONTINUED | OUTPATIENT
Start: 2019-12-20 | End: 2019-12-20 | Stop reason: HOSPADM

## 2019-12-20 RX ORDER — MAGNESIUM OXIDE 400 MG/1
400 TABLET ORAL 2 TIMES DAILY
Qty: 30 TABLET | Refills: 0 | Status: SHIPPED | OUTPATIENT
Start: 2019-12-20 | End: 2020-01-22

## 2019-12-20 RX ORDER — POTASSIUM CHLORIDE 1.5 G/1.58G
20 POWDER, FOR SOLUTION ORAL 2 TIMES DAILY
Qty: 10 PACKET | Refills: 0 | Status: SHIPPED | OUTPATIENT
Start: 2019-12-20 | End: 2020-01-22

## 2019-12-20 RX ADMIN — Medication 5 ML: at 09:00

## 2019-12-20 ASSESSMENT — PAIN SCALES - GENERAL: PAINLEVEL: NO PAIN (0)

## 2019-12-20 NOTE — NURSING NOTE
Chief Complaint   Patient presents with     Port Draw     Gripper needle, heparin locked, vitals checked     Desire Schuler RN on 12/20/2019 at 9:03 AM

## 2019-12-20 NOTE — NURSING NOTE
Oncology Rooming Note    December 20, 2019 9:28 AM   Eduardo Rubio is a 59 year old male who presents for:    Chief Complaint   Patient presents with     Port Draw     Gripper needle, heparin locked, vitals checked     Oncology Clinic Visit     Return - Maxillary sinus cancer      Initial Vitals: /80   Pulse 64   Temp 98  F (36.7  C)   Resp 16   Wt 72.7 kg (160 lb 3.2 oz)   SpO2 100%   BMI 21.73 kg/m   Estimated body mass index is 21.73 kg/m  as calculated from the following:    Height as of 10/31/19: 1.829 m (6').    Weight as of this encounter: 72.7 kg (160 lb 3.2 oz). Body surface area is 1.92 meters squared.  No Pain (0) Comment: Data Unavailable   No LMP for male patient.  Allergies reviewed: Yes  Medications reviewed: Yes    Medications: MEDICATION REFILLS NEEDED TODAY. Provider was notified.  Pharmacy name entered into [facility]:    [facility][id number] - [place], [place] - [address] AT [address] [id number] - [facility] [address] [place] [address] RD AT [facility]    Clinical concerns:  Refill: Nystatin       Candido Brown, EMT

## 2019-12-20 NOTE — PROGRESS NOTES
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  - 9/24/19: Total maxillectomy with orbital exenteration performed by Dr. Roberts, followed by reconstruction with a latissimus free flap with Dr. Pulliam.   - Surgical pathology showed a 4.4 cm moderately differentiated squamous cell carcinoma, (-) LVSI, (+)PNI. There was a positive margin at the pterygopalatine fossa, specifically the vidian nerve taken at its exit from the vidian canal, and additional resection into the canal was not possible.    CURRENT INTERVENTIONS:  Concurrent chemoradiation with high dose cisplatin day 1, 11/1/19, Day 22 11/20/19, Day 43 12/13/19     HISTORY OF PRESENT ILLNESS   Eduardo Rubio is 59 year old  who has been diagnosed with locally advanced right maxillary sinus cancer.    Eduardo presented to an outside ED in 08/18/2019 with complaints of right facial pressure, numbness, right-sided visual change and nasal drainage for several days' duration. Imaging workup included an MRI which demonstrated a maxillary sinus mass with extension to the orbit with involvement of the inferior rectus muscle. 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 Mr. Rubio was referred to [facility]. He had staging PET/CT on 9/9/2019 which revealed a FDG-avid maxillary mass at 4.0 x 3.9 x 4.2 cm. There was tumor extension into the right orbital cavity superiorly, the right nasal cavity medially, the retromaxillary fat region posterolaterally, the right pterygopalatine fossa posteriorly, and the premaxillary fat anteriorly. In the orbital cavity  there was abutment of the inferior rectus muscle. There was no evidence of lymphadenopathy or systemic disease. His case was reviewed at tumor conference with recommendation for surgery with total maxillectomy and orbital exenteration with free flap reconstruction.     Mr. Rubio underwent a total maxillectomy with orbital exenteration on 9/24/2019 with Dr. Roberts, followed by reconstruction with a latissimus free flap with Dr. Pulliam. Surgical pathology showed a 4.4 cm moderately differentiated squamous cell carcinoma, (-) LVSI, (+)PNI. There was a positive margin at the pterygopalatine fossa, specifically the vidian nerve taken at its exit from the vidian canal, and additional resection into the canal was not possible. Mr. Rubio's case was discussed in the [facility]'s multidisciplinary head and neck tumor board, with the consensus recommendation to proceed with adjuvant chemoradiation given the positive margin status. Patient received day 1 cisplatin on 11/1/19 and day 22 on 11/20/19 and Day 43 on 12/13/19.     INTERIM HISTORY:  Eduardo presents today for close follow up. He continues to have pain along his right cheek and right back.  This seems to be quick sharp pain and then they go away.  He is still using oxycodone- about 10 mg at a time, but occasionally will take 20 mg if needed.  He also has mouth and throat pain for which she has been using lidocaine swishes about 10 times per day.  He is taking Mucinex 3-4 times per day.  He is having bowel movements about every other day with the use of 2 senna S per day.  He feels his eating is fair.  He did run out of his nutritional supplements as well as his Biotene.  He reports getting in at least 64 ounces of fluid per day.  He is doing his salt and soda swishes about 6-7 times per day.  He denies other concerns except new ringing in the ears and likely some hearing loss.     PAST MEDICAL HISTORY     Past Medical History:   Diagnosis Date      Gastric ulcer      Low back pain      Maxillary sinus cancer (H)      Toe amputation status     all ten toes          CURRENT OUTPATIENT MEDICATIONS     Current Outpatient Medications   Medication Sig     acetaminophen (TYLENOL) 325 MG tablet Take 325-650 mg by mouth every 6 hours as needed for mild pain     artificial saliva (BIOTENE MT) SOLN solution Swish and spit 5 mLs in mouth 4 times daily as needed for dry mouth     chlorhexidine (PERIDEX) 0.12 % solution Swish and spit 15 mLs in mouth 4 times daily     cyclobenzaprine (FLEXERIL) 10 MG tablet TK 1 T PO HS PRN FOR MUSCLE SPASM RELIEF     cyclobenzaprine (FLEXERIL) 5 MG tablet 1 tablet (5 mg) by Per Feeding Tube route daily as needed for muscle spasms     dexamethasone (DECADRON) 4 MG tablet Take 2 tablets (8 mg) by mouth daily (with breakfast) for 3 days, THEN 1 tablet (4 mg) daily (with breakfast) for 2 days. StartDayAfterChemo     guaiFENesin (ORGANIDIN) 200 MG tablet Take 2 tablets (400 mg) by mouth every 4 hours as needed (Thickened secretions)     lidocaine (XYLOCAINE) 2 % solution Swish and swallow 10 mLs in mouth every 3 hours as needed for moderate pain     LORazepam (ATIVAN) 0.5 MG tablet Take 1 tablet (0.5 mg) by mouth every 4 hours as needed (Anxiety, Nausea/Vomiting or Sleep)     LORazepam (ATIVAN) 0.5 MG tablet Take 1 tablet (0.5 mg) by mouth every 4 hours as needed (Anxiety, Nausea/Vomiting or Sleep)     nystatin (MYCOSTATIN) 787475 UNIT/ML suspension Take 5 mLs (500,000 Units) by mouth 4 times daily     nystatin (MYCOSTATIN) 167800 UNIT/ML suspension Take 5 mLs (500,000 Units) by mouth 4 times daily     oxyCODONE (ROXICODONE) 5 MG/5ML solution 10 mLs (10 mg) by Per NG tube route every 4 hours as needed for moderate to severe pain     prochlorperazine (COMPAZINE) 10 MG tablet Take 1 tablet (10 mg) by mouth every 6 hours as needed (Nausea/Vomiting)     prochlorperazine (COMPAZINE) 10 MG tablet Take 1 tablet (10 mg) by mouth every 6 hours as needed  (Nausea/Vomiting)     senna-docusate (SENOKOT-S/PERICOLACE) 8.6-50 MG tablet 1 tablet by Per Feeding Tube route 2 times daily as needed for constipation     No current facility-administered medications for this visit.         REVIEW OF SYSTEMS   Patient denies any of the following except if noted above: fevers, chills, vision changes, chest pain, dyspnea, abdominal pain, nausea, vomiting, diarrhea, constipation, urinary concerns, issues with sleep or mood.      PHYSICAL EXAM   General: The patient is a pleasant male in no acute distress.  There were no vitals taken for this visit.  Wt Readings from Last 10 Encounters:   12/13/19 71.7 kg (158 lb)   12/11/19 73 kg (161 lb)   12/05/19 73 kg (161 lb)   12/02/19 73.1 kg (161 lb 3.2 oz)   11/29/19 73.4 kg (161 lb 14.4 oz)   11/21/19 74.4 kg (164 lb)   11/20/19 74.5 kg (164 lb 3.2 oz)   11/15/19 76.2 kg (167 lb 14.4 oz)   11/14/19 76.8 kg (169 lb 4.8 oz)   11/08/19 76.2 kg (167 lb 14.4 oz)   HEENT: Right face is bulky from free flap. EOMI, PERRL. Left sclerae are anicteric. Oral mucosa is pink and moist with moderate mucositis on right hard palate but no thrush.   Lymph: Neck is supple with no lymphadenopathy in the cervical or supraclavicular areas.   Heart: Regular rate and rhythm.   Lungs: Clear to auscultation bilaterally.   Abdomen: Bowel sounds present, soft, nontender with no palpable hepatosplenomegaly or masses.   Extremities: No lower extremity edema noted bilaterally.   Neuro: Cranial nerves II through XII are grossly intact.  Skin: Right face is mildly erythematous. Right neck is mildly erythematous. Open areas noted above right eye socket at incision site, also noted on right lateral nostril and behind right earlobe. No other rashes, petechiae, or bruising noted on exposed skin.     LABORATORY AND IMAGING STUDIES      11/20/2019 06:39 11/29/2019 13:09 12/13/2019 07:32 12/20/2019 08:53   Sodium 133 133 132 (L) 132 (L)   Potassium 3.8 3.2 (L) 3.7 3.0 (L)    Chloride 100 97 98 96   Carbon Dioxide 30 33 (H) 30 32   Urea Nitrogen 10 16 10 17   Creatinine 0.64 (L) 0.91 0.72 0.94   GFR Estimate >90 >90 >90 88   GFR Estimate If Black >90 >90 >90 >90   Calcium 8.8 8.6 8.7 7.5 (L)   Anion Gap 3 3 4 5   Magnesium 1.8 1.7 1.8 1.2 (L)   Albumin 3.3 (L) 3.3 (L) 3.3 (L) 3.1 (L)   Protein Total 7.3 7.2 7.4 6.7 (L)   Bilirubin Total 0.3 0.3 0.3 0.2   Alkaline Phosphatase 50 54 48 57   ALT 42 57 25 40   AST 31 33 20 37   Glucose 90 92 87 81   WBC 2.3 (L) 2.4 (L) 2.1 (L) 2.8 (L)   Hemoglobin 10.5 (L) 10.3 (L) 10.1 (L) 9.7 (L)   Hematocrit 31.7 (L) 30.1 (L) 30.3 (L) 28.5 (L)   Platelet Count 170 119 (L) 153 130 (L)   RBC Count 3.56 (L) 3.48 (L) 3.51 (L) 3.38 (L)   MCV 89 87 86 84   MCH 29.5 29.6 28.8 28.7   MCHC 33.1 34.2 33.3 34.0   RDW 14.6 15.1 (H) 16.4 (H) 17.0 (H)   Diff Method Automated Method Automated Method Automated Method Automated Method   % Neutrophils 64.7 58.8 60.8 67.4   % Lymphocytes 18.1 19.8 14.8 16.6   % Monocytes 12.5 20.6 20.1 14.8   % Eosinophils 4.3 0.4 3.8 0.4   % Basophils 0.4 0.4 0.5 0.4   % Immature Granulocytes 0.0 0.0 0.0 0.4   Nucleated RBCs 0 0 0 0   Absolute Neutrophil 1.5 (L) 1.4 (L) 1.3 (L) 1.9        ASSESSMENT AND PLAN   1. 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 No medical comorbidities  3. Nicotine abuse - chronic smoker   4. Poor social support; lives alone with a dog    Locally advanced maxillary sinus squamous cell cancer that extended in to the right orbit.   -s/p total right maxilectomy with orbital exenteration   -surgical margin were positive making it a high risk disease for recurrence.    -completed concurrent chemoradiation therapy  -mucositis improving, see below  -Bilateral ringing and hearing loss since last cycle  -He will need a PET/CT in mid-March. ENT follow-up is scheduled for late January.     FEN  -previously had a PEG though had it removed at patient's instruction  -met with dietician on 11/20.    -Patient's weight continues to decline. Encouraged resuming nutritional supplements and focusing on getting in adequate nutrition and hydration. He is drinking gatorade daily- but just doesn't have any today. Will start daily magnesium and potassium daily     ENT  Mucositis and thrush  -Persistent. Having sloughing of the roof of his mouth. Will continue with salt/soda swishes, resume Biotene, and continue with viscous lidocaine swishes. Will complete Nystatin next week, thrush appears resolved  -He will continue to use Mucinex for his secretions.     Pain  -he has been seen in a pain clinic for at least the last year, receiving 150 tablets of 10 mg oxycodone per month. He will continue to work with his pain clinic and rad onc regarding pain management. He will continue on oxycodone and lidocaine, currently taking 10 mg QID    Radiation dermatitis  Recommend applying Aquaphor to all of the open areas.    Gianna Ruggiero PA-C  [facility]  [address] [place] [id number] [phone]

## 2019-12-26 ENCOUNTER — THERAPY VISIT (OUTPATIENT)
Dept: SPEECH THERAPY | Facility: CLINIC | Age: 59
End: 2019-12-26
Payer: MEDICARE

## 2019-12-26 DIAGNOSIS — C31.0 MAXILLARY SINUS CANCER (H): Primary | ICD-10-CM

## 2019-12-26 DIAGNOSIS — R13.12 OROPHARYNGEAL DYSPHAGIA: ICD-10-CM

## 2019-12-27 ENCOUNTER — OFFICE VISIT (OUTPATIENT)
Dept: RADIATION ONCOLOGY | Facility: CLINIC | Age: 59
End: 2019-12-27
Attending: RADIOLOGY
Payer: MEDICARE

## 2019-12-27 VITALS
DIASTOLIC BLOOD PRESSURE: 79 MMHG | HEART RATE: 64 BPM | SYSTOLIC BLOOD PRESSURE: 145 MMHG | WEIGHT: 162.5 LBS | BODY MASS INDEX: 22.04 KG/M2

## 2019-12-27 DIAGNOSIS — C31.0 MAXILLARY SINUS CANCER (H): Primary | ICD-10-CM

## 2019-12-27 NOTE — PROGRESS NOTES
Radiation Oncology Follow-up Visit  2019    Eduardo Rubio  MRN: [id number]   : 1960     DISEASE TREATED:   Squamous cell carcinoma of the right maxillary sinus, pT4a N0 M0 Stage IV    RADIATION THERAPY DELIVERED:   6600 cGy completed 19    SYSTEMIC TREATMENT:  HD Cisplatin    INTERVAL SINCE COMPLETION OF RADIATION THERAPY:   2 weeks    SUBJECTIVE/HPI:  Eduardo Rubio is a 59 year old male who is here today for routine 2 week follow up after completing radiation therapy.  Overall he is doing well.  His weight is stable.  He is eating a lot of soups.  His face is less swollen and his skin is improving.  He did stop moisturizing, but then noticed how dry it was and has started moisturizing again.  He did have thrush and took some of his nystatin, but then spilled some.  He still has pain in the right cheek and a little in the eye.  He is using oxycodone 10 mg about 3 times a day.  He does try half a pill first.  Still has a lot of phlegm and also right sided congestion.    ROS:  Complete review of systems is negative except for symptoms discussed in subjective above.    Current Outpatient Medications   Medication     acetaminophen (TYLENOL) 325 MG tablet     artificial saliva (BIOTENE MT) SOLN solution     chlorhexidine (PERIDEX) 0.12 % solution     cyclobenzaprine (FLEXERIL) 10 MG tablet     cyclobenzaprine (FLEXERIL) 5 MG tablet     dexamethasone (DECADRON) 4 MG tablet     guaiFENesin (ORGANIDIN) 200 MG tablet     lidocaine (XYLOCAINE) 2 % solution     LORazepam (ATIVAN) 0.5 MG tablet     LORazepam (ATIVAN) 0.5 MG tablet     magnesium oxide (MAG-OX) 400 MG tablet     nystatin (MYCOSTATIN) 749869 UNIT/ML suspension     nystatin (MYCOSTATIN) 728226 UNIT/ML suspension     oxyCODONE (ROXICODONE) 5 MG/5ML solution     prochlorperazine (COMPAZINE) 10 MG tablet     prochlorperazine (COMPAZINE) 10 MG tablet     senna-docusate (SENOKOT-S/PERICOLACE) 8.6-50 MG tablet     No current  facility-administered medications for this visit.         No Known Allergies    Past Medical History:   Diagnosis Date     Gastric ulcer      Low back pain      Maxillary sinus cancer (H)      Toe amputation status     all ten toes         PHYSICAL EXAM:  Gen: Alert, in NAD  Eyes: left eye ERRL, EOMI, sclera anicteric.    HENT     Head: NC/AT     Ears: TM's clear, no external lesions.     Oral Cavity/Oropharynx: Dry mucous membranes with thickened secretions.  Mucositis is much improved.  No signs of thrush currently.  Neck: Moderate erythema of the right face and neck. No moist desquamation. No palpable cervical adenopathy.  He has significantly less swelling of the right side of the face.  Looks like very mild lymphedema may be starting.   Pulm: No wheezing, stridor or respiratory distress  CV: Well-perfused, no cyanosis        LABS AND IMAGING:  Reviewed.    IMPRESSION:   Mr. Rubio is a 59 year old male with a SCC of maxillary sinus s/p chemoradiation now 2 weeks out since completion of treatment and doing well with slow improvement of acute side effects.    PLAN:   1.  Follow up with Dr. Carter in 1 month.   Continue close follow up with ENT and medical oncology.  2. Continue to moisturize with aquaphor and when skin is completely healed can change to preferred moisturizer.  Discussed importance of sun avoidance or sun protection.  3. Fatigue should continue to improve.  4. Continue oral cares with salt and soda rinses.  Continue jaw, neck and swallowing exercises.  5. Treatment related pain should continue to diminish.  Recommended to slowly wean off pain medications when pain decreases.  6. Continue to push fluids and caloric intake to maintain and or gain weight.       Courtney Barreto NP   Radiation Oncology

## 2019-12-27 NOTE — LETTER
2019       RE: Eduardo Rubio  [address] [facility] [id number]     Dear Colleague,    Thank you for referring your patient, Eduardo Rubio, to the RADIATION ONCOLOGY CLINIC. Please see a copy of my visit note below.    Radiation Oncology Follow-up Visit  2019    Eduardo Rubio  MRN: [id number]   : 1960     DISEASE TREATED:   Squamous cell carcinoma of the right maxillary sinus, pT4a N0 M0 Stage IV    RADIATION THERAPY DELIVERED:   6600 cGy completed 19    SYSTEMIC TREATMENT:  HD Cisplatin    INTERVAL SINCE COMPLETION OF RADIATION THERAPY:   2 weeks    SUBJECTIVE/HPI:  Eduardo Rubio is a 59 year old male who is here today for routine 2 week follow up after completing radiation therapy.  Overall he is doing well.  His weight is stable.  He is eating a lot of soups.  His face is less swollen and his skin is improving.  He did stop moisturizing, but then noticed how dry it was and has started moisturizing again.  He did have thrush and took some of his nystatin, but then spilled some.  He still has pain in the right cheek and a little in the eye.  He is using oxycodone 10 mg about 3 times a day.  He does try half a pill first.  Still has a lot of phlegm and also right sided congestion.    ROS:  Complete review of systems is negative except for symptoms discussed in subjective above.    Current Outpatient Medications   Medication     acetaminophen (TYLENOL) 325 MG tablet     artificial saliva (BIOTENE MT) SOLN solution     chlorhexidine (PERIDEX) 0.12 % solution     cyclobenzaprine (FLEXERIL) 10 MG tablet     cyclobenzaprine (FLEXERIL) 5 MG tablet     dexamethasone (DECADRON) 4 MG tablet     guaiFENesin (ORGANIDIN) 200 MG tablet     lidocaine (XYLOCAINE) 2 % solution     LORazepam (ATIVAN) 0.5 MG tablet     LORazepam (ATIVAN) 0.5 MG tablet     magnesium oxide (MAG-OX) 400 MG tablet     nystatin (MYCOSTATIN) 405981 UNIT/ML suspension     nystatin (MYCOSTATIN) 162712  UNIT/ML suspension     oxyCODONE (ROXICODONE) 5 MG/5ML solution     prochlorperazine (COMPAZINE) 10 MG tablet     prochlorperazine (COMPAZINE) 10 MG tablet     senna-docusate (SENOKOT-S/PERICOLACE) 8.6-50 MG tablet     No current facility-administered medications for this visit.         No Known Allergies    Past Medical History:   Diagnosis Date     Gastric ulcer      Low back pain      Maxillary sinus cancer (H)      Toe amputation status     all ten toes         PHYSICAL EXAM:  Gen: Alert, in NAD  Eyes: left eye ERRL, EOMI, sclera anicteric.    HENT     Head: NC/AT     Ears: TM's clear, no external lesions.     Oral Cavity/Oropharynx: Dry mucous membranes with thickened secretions.  Mucositis is much improved.  No signs of thrush currently.  Neck: Moderate erythema of the right face and neck. No moist desquamation. No palpable cervical adenopathy.  He has significantly less swelling of the right side of the face.  Looks like very mild lymphedema may be starting.   Pulm: No wheezing, stridor or respiratory distress  CV: Well-perfused, no cyanosis        LABS AND IMAGING:  Reviewed.    IMPRESSION:   Mr. Rubio is a 59 year old male with a SCC of maxillary sinus s/p chemoradiation now 2 weeks out since completion of treatment and doing well with slow improvement of acute side effects.    PLAN:   1.  Follow up with Dr. Carter in 1 month.   Continue close follow up with ENT and medical oncology.  2. Continue to moisturize with aquaphor and when skin is completely healed can change to preferred moisturizer.  Discussed importance of sun avoidance or sun protection.  3. Fatigue should continue to improve.  4. Continue oral cares with salt and soda rinses.  Continue jaw, neck and swallowing exercises.  5. Treatment related pain should continue to diminish.  Recommended to slowly wean off pain medications when pain decreases.  6. Continue to push fluids and caloric intake to maintain and or gain weight.       Courtney  AVA Barreto   Radiation Oncology        Again, thank you for allowing me to participate in the care of your patient.      Sincerely,    ABNER Mcintosh CNP

## 2019-12-31 NOTE — PROCEDURES
Radiotherapy Treatment Summary          Date of Report: 2019     PATIENT: CLYDE WEST  MEDICAL RECORD NO: [id number]  : 1960     DIAGNOSIS: C31.0 Malignant neoplasm of maxillary sinus  INTENT OF RADIOTHERAPY: Cure  PATHOLOGY: Squamous cell carcinoma of the right maxillary sinus                                  STAGE: pT4a N0 M0  CONCURRENT SYSTEMIC THERAPY: Cisplatin (100 mg/m2) every 3 weeks                   Details of the treatments summarized below are found in records kept in the Department of Radiation Oncology at [facility].     Treatment Summary:  Treatment Site  Dose  Modality From  To  Elapsed Days Fx.  Positive margin        6,600 cGy 06 X  10/28/2019 2019  44  30  Tumor bed         6,000 cGy 06 X  10/28/2019 2019  44  30  Operative bed         5,700 cGy 06 X  10/28/2019 2019  44  30  Elective lymphatics      5,400 cGy 06 X  10/28/2019 2019  44  30     Dose per Fraction:     Positive margin:  220 cGy  Tumor bed:   200 cGy  Operative bed:  190 cGy  Elective lymphatics:  180 cGy        COMMENTS:                      Mr. West is a 59 year old gentleman who was diagnosed with a locally advanced squamous cell carcinoma of the right maxillary sinus in 2019 after he presented with right facial pressure, numbness, right-sided visual change and nasal drainage for several days' duration. He underwent a total maxillectomy with orbital exenteration on 2019 followed by reconstruction with a latissimus free flap. Pathology showed a 4.4 cm moderately differentiated squamous cell carcinoma with negative lymphovascular space invasion and positive perineural invasion. There was a positive margin at the pterygopalatine fossa, specifically from the vidian nerve taken at its exit from the vidian canal, and additional resection into the canal was not possible.      Mr. West received adjuvant chemoradiation as described above. The tumor bed received a total dose of  60 Gy in 30 once-daily fractions delivered using 6 MV photons via a 3-arc volumetric arc therapy technique. The area of the positive margin concerning for residual microscopic disease received 66 Gy; the remainder of the operative bed received 57 Gy; the elective lymphatics consisting of ipsilateral levels IB, II and the superior aspect of III received 54 Gy using a simultaneous integrated boost technique. Radiotherapy was delivered with concurrent high dose cisplatin (100 mg/m2 every 3 weeks). He received all 3 planned chemotherapy infusions.     Mr. Rubio tolerated the initiation of adjuvant therapy well with the development of progressive mucositis and dermatitis approximately midway through therapy. His pain was managed with the use of PRN oxycodone and viscous lidocaine rinses and he was able to maintain adequate PO intake throughout therapy and experienced only mild weight loss (3.7 kg) by the completion of treatment. He did require radiotherapy replanning after his first 10 fractions due to shrinkage of the free flap. He additionally had two breaks in therapy. The first consisted of a 4-day treatment break between fractions 20-21 due to machine downtime while the second consisted of a 1-day break between fractions 29-30 due to transportation issues.      Acute Toxicity Profile by CTCAE v5.0:  Mucositis: Grade 3  Dermatitis: Grade 2     PAIN MANAGEMENT:   Continue PRN acetaminophen and viscous lidocaine for mild to moderate pain  Continue oxycodone 10 mg q4h as needed for breakthrough symptoms with titration off of narcotic pain medication with resolution of radiation-induced mucositis     FOLLOW UP PLAN:      Follow up with NP in radiation oncology clinic on 12/26/2019   Follow-up with MD in radiation oncology clinic on 1/22/2020      Resident Physician: Armani Domingo M.D.   Staff Physician: Eric Santillan M.D., Ph.D.  Physicist: Kulwant Ackerman, PhD     CC:   MD Jose Mccarty MD Megan  Joe RN                                  Radiation Oncology:  [facility] [address] [place] [address]

## 2020-01-01 ENCOUNTER — APPOINTMENT (OUTPATIENT)
Dept: LAB | Facility: CLINIC | Age: 60
End: 2020-01-01
Attending: INTERNAL MEDICINE
Payer: MEDICARE

## 2020-01-01 ENCOUNTER — PATIENT OUTREACH (OUTPATIENT)
Dept: ONCOLOGY | Facility: CLINIC | Age: 60
End: 2020-01-01

## 2020-01-01 ENCOUNTER — ANCILLARY PROCEDURE (OUTPATIENT)
Dept: CT IMAGING | Facility: CLINIC | Age: 60
End: 2020-01-01
Attending: INTERNAL MEDICINE
Payer: MEDICARE

## 2020-01-01 ENCOUNTER — VIRTUAL VISIT (OUTPATIENT)
Dept: ONCOLOGY | Facility: CLINIC | Age: 60
End: 2020-01-01
Attending: PHYSICIAN ASSISTANT
Payer: MEDICARE

## 2020-01-01 ENCOUNTER — INFUSION THERAPY VISIT (OUTPATIENT)
Dept: ONCOLOGY | Facility: CLINIC | Age: 60
End: 2020-01-01
Attending: INTERNAL MEDICINE
Payer: MEDICARE

## 2020-01-01 ENCOUNTER — OFFICE VISIT (OUTPATIENT)
Dept: OTOLARYNGOLOGY | Facility: CLINIC | Age: 60
End: 2020-01-01
Payer: MEDICARE

## 2020-01-01 ENCOUNTER — TELEPHONE (OUTPATIENT)
Dept: ONCOLOGY | Facility: CLINIC | Age: 60
End: 2020-01-01

## 2020-01-01 ENCOUNTER — NURSE TRIAGE (OUTPATIENT)
Dept: NURSING | Facility: CLINIC | Age: 60
End: 2020-01-01

## 2020-01-01 ENCOUNTER — OFFICE VISIT (OUTPATIENT)
Dept: RADIATION ONCOLOGY | Facility: CLINIC | Age: 60
End: 2020-01-01
Attending: RADIOLOGY
Payer: MEDICARE

## 2020-01-01 ENCOUNTER — THERAPY VISIT (OUTPATIENT)
Dept: SPEECH THERAPY | Facility: CLINIC | Age: 60
End: 2020-01-01
Payer: MEDICARE

## 2020-01-01 ENCOUNTER — TELEPHONE (OUTPATIENT)
Dept: OTOLARYNGOLOGY | Facility: CLINIC | Age: 60
End: 2020-01-01

## 2020-01-01 ENCOUNTER — HEALTH MAINTENANCE LETTER (OUTPATIENT)
Age: 60
End: 2020-01-01

## 2020-01-01 VITALS
OXYGEN SATURATION: 98 % | SYSTOLIC BLOOD PRESSURE: 111 MMHG | WEIGHT: 152.6 LBS | DIASTOLIC BLOOD PRESSURE: 66 MMHG | RESPIRATION RATE: 16 BRPM | BODY MASS INDEX: 20.7 KG/M2 | TEMPERATURE: 98 F | HEART RATE: 94 BPM

## 2020-01-01 VITALS
TEMPERATURE: 97.6 F | WEIGHT: 156 LBS | OXYGEN SATURATION: 96 % | BODY MASS INDEX: 21.13 KG/M2 | HEART RATE: 94 BPM | HEIGHT: 72 IN

## 2020-01-01 VITALS
WEIGHT: 155.1 LBS | TEMPERATURE: 97.6 F | OXYGEN SATURATION: 96 % | DIASTOLIC BLOOD PRESSURE: 77 MMHG | SYSTOLIC BLOOD PRESSURE: 121 MMHG | HEART RATE: 83 BPM | BODY MASS INDEX: 21.04 KG/M2 | RESPIRATION RATE: 18 BRPM

## 2020-01-01 VITALS
SYSTOLIC BLOOD PRESSURE: 131 MMHG | OXYGEN SATURATION: 95 % | RESPIRATION RATE: 14 BRPM | HEART RATE: 74 BPM | TEMPERATURE: 97.9 F | DIASTOLIC BLOOD PRESSURE: 78 MMHG | WEIGHT: 159 LBS | BODY MASS INDEX: 21.56 KG/M2

## 2020-01-01 VITALS
TEMPERATURE: 98.2 F | BODY MASS INDEX: 21.29 KG/M2 | HEART RATE: 76 BPM | RESPIRATION RATE: 16 BRPM | OXYGEN SATURATION: 92 % | WEIGHT: 157 LBS | DIASTOLIC BLOOD PRESSURE: 74 MMHG | SYSTOLIC BLOOD PRESSURE: 114 MMHG

## 2020-01-01 VITALS
DIASTOLIC BLOOD PRESSURE: 80 MMHG | HEIGHT: 72 IN | BODY MASS INDEX: 21.17 KG/M2 | WEIGHT: 156.3 LBS | TEMPERATURE: 98.1 F | HEART RATE: 89 BPM | RESPIRATION RATE: 18 BRPM | OXYGEN SATURATION: 96 % | SYSTOLIC BLOOD PRESSURE: 128 MMHG

## 2020-01-01 VITALS — BODY MASS INDEX: 20.75 KG/M2 | DIASTOLIC BLOOD PRESSURE: 78 MMHG | SYSTOLIC BLOOD PRESSURE: 124 MMHG | WEIGHT: 153 LBS

## 2020-01-01 VITALS
HEART RATE: 71 BPM | DIASTOLIC BLOOD PRESSURE: 84 MMHG | BODY MASS INDEX: 21.7 KG/M2 | TEMPERATURE: 98.7 F | WEIGHT: 160 LBS | SYSTOLIC BLOOD PRESSURE: 133 MMHG | RESPIRATION RATE: 13 BRPM

## 2020-01-01 VITALS
WEIGHT: 154.6 LBS | SYSTOLIC BLOOD PRESSURE: 114 MMHG | HEART RATE: 78 BPM | BODY MASS INDEX: 20.97 KG/M2 | TEMPERATURE: 97.8 F | OXYGEN SATURATION: 98 % | RESPIRATION RATE: 16 BRPM | DIASTOLIC BLOOD PRESSURE: 73 MMHG

## 2020-01-01 VITALS — BODY MASS INDEX: 20.34 KG/M2 | WEIGHT: 150 LBS

## 2020-01-01 DIAGNOSIS — C78.02 MALIGNANT NEOPLASM METASTATIC TO BOTH LUNGS (H): Primary | ICD-10-CM

## 2020-01-01 DIAGNOSIS — R13.12 OROPHARYNGEAL DYSPHAGIA: ICD-10-CM

## 2020-01-01 DIAGNOSIS — R53.81 OTHER MALAISE: ICD-10-CM

## 2020-01-01 DIAGNOSIS — C78.01 MALIGNANT NEOPLASM METASTATIC TO BOTH LUNGS (H): ICD-10-CM

## 2020-01-01 DIAGNOSIS — C78.01 MALIGNANT NEOPLASM METASTATIC TO BOTH LUNGS (H): Primary | ICD-10-CM

## 2020-01-01 DIAGNOSIS — C31.0 MAXILLARY SINUS CANCER (H): Primary | ICD-10-CM

## 2020-01-01 DIAGNOSIS — C31.0 MAXILLARY SINUS CANCER (H): ICD-10-CM

## 2020-01-01 DIAGNOSIS — C78.02 MALIGNANT NEOPLASM METASTATIC TO BOTH LUNGS (H): ICD-10-CM

## 2020-01-01 DIAGNOSIS — K11.7 XEROSTOMIA: ICD-10-CM

## 2020-01-01 DIAGNOSIS — L29.9 ITCHING: Primary | ICD-10-CM

## 2020-01-01 DIAGNOSIS — L29.9 ITCHING: ICD-10-CM

## 2020-01-01 DIAGNOSIS — Z72.0 TOBACCO ABUSE DISORDER: Primary | ICD-10-CM

## 2020-01-01 DIAGNOSIS — Z23 NEED FOR PROPHYLACTIC VACCINATION AND INOCULATION AGAINST INFLUENZA: ICD-10-CM

## 2020-01-01 DIAGNOSIS — R53.81 OTHER MALAISE: Primary | ICD-10-CM

## 2020-01-01 LAB
ALBUMIN SERPL-MCNC: 3.4 G/DL (ref 3.4–5)
ALBUMIN SERPL-MCNC: 3.4 G/DL (ref 3.4–5)
ALBUMIN SERPL-MCNC: 3.5 G/DL (ref 3.4–5)
ALBUMIN SERPL-MCNC: 3.7 G/DL (ref 3.4–5)
ALP SERPL-CCNC: 67 U/L (ref 40–150)
ALP SERPL-CCNC: 72 U/L (ref 40–150)
ALP SERPL-CCNC: 73 U/L (ref 40–150)
ALP SERPL-CCNC: 79 U/L (ref 40–150)
ALP SERPL-CCNC: 82 U/L (ref 40–150)
ALP SERPL-CCNC: 85 U/L (ref 40–150)
ALT SERPL W P-5'-P-CCNC: 24 U/L (ref 0–70)
ALT SERPL W P-5'-P-CCNC: 26 U/L (ref 0–70)
ALT SERPL W P-5'-P-CCNC: 29 U/L (ref 0–70)
ALT SERPL W P-5'-P-CCNC: 30 U/L (ref 0–70)
ALT SERPL W P-5'-P-CCNC: 30 U/L (ref 0–70)
ALT SERPL W P-5'-P-CCNC: 43 U/L (ref 0–70)
ANION GAP SERPL CALCULATED.3IONS-SCNC: 5 MMOL/L (ref 3–14)
ANION GAP SERPL CALCULATED.3IONS-SCNC: 6 MMOL/L (ref 3–14)
ANION GAP SERPL CALCULATED.3IONS-SCNC: 7 MMOL/L (ref 3–14)
AST SERPL W P-5'-P-CCNC: 29 U/L (ref 0–45)
AST SERPL W P-5'-P-CCNC: 31 U/L (ref 0–45)
AST SERPL W P-5'-P-CCNC: 34 U/L (ref 0–45)
AST SERPL W P-5'-P-CCNC: 37 U/L (ref 0–45)
AST SERPL W P-5'-P-CCNC: 43 U/L (ref 0–45)
AST SERPL W P-5'-P-CCNC: 52 U/L (ref 0–45)
BASOPHILS # BLD AUTO: 0 10E9/L (ref 0–0.2)
BASOPHILS # BLD AUTO: 0 10E9/L (ref 0–0.2)
BASOPHILS # BLD AUTO: 0.1 10E9/L (ref 0–0.2)
BASOPHILS # BLD AUTO: 0.1 10E9/L (ref 0–0.2)
BASOPHILS NFR BLD AUTO: 0.4 %
BASOPHILS NFR BLD AUTO: 0.5 %
BASOPHILS NFR BLD AUTO: 0.6 %
BASOPHILS NFR BLD AUTO: 0.8 %
BILIRUB SERPL-MCNC: 0.2 MG/DL (ref 0.2–1.3)
BILIRUB SERPL-MCNC: 0.2 MG/DL (ref 0.2–1.3)
BILIRUB SERPL-MCNC: 0.3 MG/DL (ref 0.2–1.3)
BILIRUB SERPL-MCNC: 0.3 MG/DL (ref 0.2–1.3)
BILIRUB SERPL-MCNC: 0.6 MG/DL (ref 0.2–1.3)
BILIRUB SERPL-MCNC: 0.6 MG/DL (ref 0.2–1.3)
BUN SERPL-MCNC: 10 MG/DL (ref 7–30)
BUN SERPL-MCNC: 14 MG/DL (ref 7–30)
BUN SERPL-MCNC: 17 MG/DL (ref 7–30)
BUN SERPL-MCNC: 9 MG/DL (ref 7–30)
CALCIUM SERPL-MCNC: 8 MG/DL (ref 8.5–10.1)
CALCIUM SERPL-MCNC: 8.3 MG/DL (ref 8.5–10.1)
CALCIUM SERPL-MCNC: 8.4 MG/DL (ref 8.5–10.1)
CALCIUM SERPL-MCNC: 8.8 MG/DL (ref 8.5–10.1)
CALCIUM SERPL-MCNC: 8.8 MG/DL (ref 8.5–10.1)
CALCIUM SERPL-MCNC: 9.1 MG/DL (ref 8.5–10.1)
CHLORIDE SERPL-SCNC: 101 MMOL/L (ref 94–109)
CHLORIDE SERPL-SCNC: 102 MMOL/L (ref 94–109)
CHLORIDE SERPL-SCNC: 102 MMOL/L (ref 94–109)
CHLORIDE SERPL-SCNC: 94 MMOL/L (ref 94–109)
CHLORIDE SERPL-SCNC: 95 MMOL/L (ref 94–109)
CHLORIDE SERPL-SCNC: 96 MMOL/L (ref 94–109)
CO2 SERPL-SCNC: 26 MMOL/L (ref 20–32)
CO2 SERPL-SCNC: 28 MMOL/L (ref 20–32)
CO2 SERPL-SCNC: 28 MMOL/L (ref 20–32)
CO2 SERPL-SCNC: 29 MMOL/L (ref 20–32)
CO2 SERPL-SCNC: 30 MMOL/L (ref 20–32)
CO2 SERPL-SCNC: 31 MMOL/L (ref 20–32)
CREAT SERPL-MCNC: 0.7 MG/DL (ref 0.66–1.25)
CREAT SERPL-MCNC: 0.76 MG/DL (ref 0.66–1.25)
CREAT SERPL-MCNC: 0.79 MG/DL (ref 0.66–1.25)
CREAT SERPL-MCNC: 0.82 MG/DL (ref 0.66–1.25)
CREAT SERPL-MCNC: 0.82 MG/DL (ref 0.66–1.25)
CREAT SERPL-MCNC: 0.85 MG/DL (ref 0.66–1.25)
DIFFERENTIAL METHOD BLD: ABNORMAL
EOSINOPHIL # BLD AUTO: 0.3 10E9/L (ref 0–0.7)
EOSINOPHIL # BLD AUTO: 0.5 10E9/L (ref 0–0.7)
EOSINOPHIL NFR BLD AUTO: 4.5 %
EOSINOPHIL NFR BLD AUTO: 5.3 %
EOSINOPHIL NFR BLD AUTO: 5.6 %
EOSINOPHIL NFR BLD AUTO: 5.8 %
ERYTHROCYTE [DISTWIDTH] IN BLOOD BY AUTOMATED COUNT: 13.6 % (ref 10–15)
ERYTHROCYTE [DISTWIDTH] IN BLOOD BY AUTOMATED COUNT: 14.3 % (ref 10–15)
ERYTHROCYTE [DISTWIDTH] IN BLOOD BY AUTOMATED COUNT: 15.1 % (ref 10–15)
ERYTHROCYTE [DISTWIDTH] IN BLOOD BY AUTOMATED COUNT: 15.6 % (ref 10–15)
GFR SERPL CREATININE-BSD FRML MDRD: >90 ML/MIN/{1.73_M2}
GLUCOSE SERPL-MCNC: 114 MG/DL (ref 70–99)
GLUCOSE SERPL-MCNC: 77 MG/DL (ref 70–99)
GLUCOSE SERPL-MCNC: 77 MG/DL (ref 70–99)
GLUCOSE SERPL-MCNC: 84 MG/DL (ref 70–99)
GLUCOSE SERPL-MCNC: 86 MG/DL (ref 70–99)
GLUCOSE SERPL-MCNC: 90 MG/DL (ref 70–99)
HCT VFR BLD AUTO: 32.2 % (ref 40–53)
HCT VFR BLD AUTO: 32.4 % (ref 40–53)
HCT VFR BLD AUTO: 35.8 % (ref 40–53)
HCT VFR BLD AUTO: 38.5 % (ref 40–53)
HGB BLD-MCNC: 10.8 G/DL (ref 13.3–17.7)
HGB BLD-MCNC: 10.8 G/DL (ref 13.3–17.7)
HGB BLD-MCNC: 12.1 G/DL (ref 13.3–17.7)
HGB BLD-MCNC: 12.8 G/DL (ref 13.3–17.7)
IMM GRANULOCYTES # BLD: 0 10E9/L (ref 0–0.4)
IMM GRANULOCYTES NFR BLD: 0.2 %
IMM GRANULOCYTES NFR BLD: 0.2 %
IMM GRANULOCYTES NFR BLD: 0.3 %
IMM GRANULOCYTES NFR BLD: 0.5 %
LYMPHOCYTES # BLD AUTO: 0.4 10E9/L (ref 0.8–5.3)
LYMPHOCYTES # BLD AUTO: 0.4 10E9/L (ref 0.8–5.3)
LYMPHOCYTES # BLD AUTO: 0.6 10E9/L (ref 0.8–5.3)
LYMPHOCYTES # BLD AUTO: 0.6 10E9/L (ref 0.8–5.3)
LYMPHOCYTES NFR BLD AUTO: 10.6 %
LYMPHOCYTES NFR BLD AUTO: 10.8 %
LYMPHOCYTES NFR BLD AUTO: 4.8 %
LYMPHOCYTES NFR BLD AUTO: 7.4 %
MCH RBC QN AUTO: 31.6 PG (ref 26.5–33)
MCH RBC QN AUTO: 31.7 PG (ref 26.5–33)
MCH RBC QN AUTO: 31.8 PG (ref 26.5–33)
MCH RBC QN AUTO: 32.4 PG (ref 26.5–33)
MCHC RBC AUTO-ENTMCNC: 33.2 G/DL (ref 31.5–36.5)
MCHC RBC AUTO-ENTMCNC: 33.3 G/DL (ref 31.5–36.5)
MCHC RBC AUTO-ENTMCNC: 33.5 G/DL (ref 31.5–36.5)
MCHC RBC AUTO-ENTMCNC: 33.8 G/DL (ref 31.5–36.5)
MCV RBC AUTO: 94 FL (ref 78–100)
MCV RBC AUTO: 95 FL (ref 78–100)
MCV RBC AUTO: 96 FL (ref 78–100)
MCV RBC AUTO: 96 FL (ref 78–100)
MONOCYTES # BLD AUTO: 0.6 10E9/L (ref 0–1.3)
MONOCYTES # BLD AUTO: 0.7 10E9/L (ref 0–1.3)
MONOCYTES # BLD AUTO: 0.8 10E9/L (ref 0–1.3)
MONOCYTES # BLD AUTO: 1.1 10E9/L (ref 0–1.3)
MONOCYTES NFR BLD AUTO: 11.4 %
MONOCYTES NFR BLD AUTO: 12.1 %
MONOCYTES NFR BLD AUTO: 12.6 %
MONOCYTES NFR BLD AUTO: 13.4 %
NEUTROPHILS # BLD AUTO: 3.8 10E9/L (ref 1.6–8.3)
NEUTROPHILS # BLD AUTO: 4.1 10E9/L (ref 1.6–8.3)
NEUTROPHILS # BLD AUTO: 4.2 10E9/L (ref 1.6–8.3)
NEUTROPHILS # BLD AUTO: 6.7 10E9/L (ref 1.6–8.3)
NEUTROPHILS NFR BLD AUTO: 70.3 %
NEUTROPHILS NFR BLD AUTO: 70.4 %
NEUTROPHILS NFR BLD AUTO: 75 %
NEUTROPHILS NFR BLD AUTO: 76.5 %
NRBC # BLD AUTO: 0 10*3/UL
NRBC BLD AUTO-RTO: 0 /100
PLATELET # BLD AUTO: 166 10E9/L (ref 150–450)
PLATELET # BLD AUTO: 194 10E9/L (ref 150–450)
PLATELET # BLD AUTO: 210 10E9/L (ref 150–450)
PLATELET # BLD AUTO: 291 10E9/L (ref 150–450)
POTASSIUM SERPL-SCNC: 3.4 MMOL/L (ref 3.4–5.3)
POTASSIUM SERPL-SCNC: 3.7 MMOL/L (ref 3.4–5.3)
POTASSIUM SERPL-SCNC: 3.8 MMOL/L (ref 3.4–5.3)
POTASSIUM SERPL-SCNC: 3.9 MMOL/L (ref 3.4–5.3)
POTASSIUM SERPL-SCNC: 3.9 MMOL/L (ref 3.4–5.3)
POTASSIUM SERPL-SCNC: 4.1 MMOL/L (ref 3.4–5.3)
PROT SERPL-MCNC: 8 G/DL (ref 6.8–8.8)
PROT SERPL-MCNC: 8.2 G/DL (ref 6.8–8.8)
PROT SERPL-MCNC: 8.2 G/DL (ref 6.8–8.8)
PROT SERPL-MCNC: 8.4 G/DL (ref 6.8–8.8)
PROT SERPL-MCNC: 8.5 G/DL (ref 6.8–8.8)
PROT SERPL-MCNC: 8.8 G/DL (ref 6.8–8.8)
RBC # BLD AUTO: 3.41 10E12/L (ref 4.4–5.9)
RBC # BLD AUTO: 3.42 10E12/L (ref 4.4–5.9)
RBC # BLD AUTO: 3.73 10E12/L (ref 4.4–5.9)
RBC # BLD AUTO: 4.03 10E12/L (ref 4.4–5.9)
SODIUM SERPL-SCNC: 129 MMOL/L (ref 133–144)
SODIUM SERPL-SCNC: 130 MMOL/L (ref 133–144)
SODIUM SERPL-SCNC: 132 MMOL/L (ref 133–144)
SODIUM SERPL-SCNC: 134 MMOL/L (ref 133–144)
SODIUM SERPL-SCNC: 134 MMOL/L (ref 133–144)
SODIUM SERPL-SCNC: 135 MMOL/L (ref 133–144)
T4 FREE SERPL-MCNC: 1.16 NG/DL (ref 0.76–1.46)
TSH SERPL DL<=0.005 MIU/L-ACNC: 1.05 MU/L (ref 0.4–4)
TSH SERPL DL<=0.005 MIU/L-ACNC: 1.37 MU/L (ref 0.4–4)
TSH SERPL DL<=0.005 MIU/L-ACNC: 2.16 MU/L (ref 0.4–4)
TSH SERPL DL<=0.005 MIU/L-ACNC: 4.47 MU/L (ref 0.4–4)
WBC # BLD AUTO: 5.5 10E9/L (ref 4–11)
WBC # BLD AUTO: 5.5 10E9/L (ref 4–11)
WBC # BLD AUTO: 6 10E9/L (ref 4–11)
WBC # BLD AUTO: 8.8 10E9/L (ref 4–11)

## 2020-01-01 PROCEDURE — 25800030 ZZH RX IP 258 OP 636: Mod: ZF | Performed by: INTERNAL MEDICINE

## 2020-01-01 PROCEDURE — 25000128 H RX IP 250 OP 636: Mod: ZF | Performed by: INTERNAL MEDICINE

## 2020-01-01 PROCEDURE — 96413 CHEMO IV INFUSION 1 HR: CPT

## 2020-01-01 PROCEDURE — 84443 ASSAY THYROID STIM HORMONE: CPT | Mod: GZ | Performed by: INTERNAL MEDICINE

## 2020-01-01 PROCEDURE — 99213 OFFICE O/P EST LOW 20 MIN: CPT | Performed by: OTOLARYNGOLOGY

## 2020-01-01 PROCEDURE — 99214 OFFICE O/P EST MOD 30 MIN: CPT | Performed by: PHYSICIAN ASSISTANT

## 2020-01-01 PROCEDURE — 80053 COMPREHEN METABOLIC PANEL: CPT | Performed by: PHYSICIAN ASSISTANT

## 2020-01-01 PROCEDURE — 999N001193 HC VIDEO/TELEPHONE VISIT; NO CHARGE

## 2020-01-01 PROCEDURE — 99213 OFFICE O/P EST LOW 20 MIN: CPT | Performed by: RADIOLOGY

## 2020-01-01 PROCEDURE — 85025 COMPLETE CBC W/AUTO DIFF WBC: CPT | Performed by: INTERNAL MEDICINE

## 2020-01-01 PROCEDURE — 99443 ZZC PHYSICIAN TELEPHONE EVALUATION 21-30 MIN: CPT | Mod: ZP | Performed by: PHYSICIAN ASSISTANT

## 2020-01-01 PROCEDURE — 84443 ASSAY THYROID STIM HORMONE: CPT | Performed by: INTERNAL MEDICINE

## 2020-01-01 PROCEDURE — 99215 OFFICE O/P EST HI 40 MIN: CPT | Mod: 95 | Performed by: INTERNAL MEDICINE

## 2020-01-01 PROCEDURE — 40001009 ZZH VIDEO/TELEPHONE VISIT; NO CHARGE

## 2020-01-01 PROCEDURE — 80053 COMPREHEN METABOLIC PANEL: CPT | Performed by: INTERNAL MEDICINE

## 2020-01-01 PROCEDURE — G0463 HOSPITAL OUTPT CLINIC VISIT: HCPCS | Mod: ZF

## 2020-01-01 PROCEDURE — 258N000003 HC RX IP 258 OP 636: Performed by: INTERNAL MEDICINE

## 2020-01-01 PROCEDURE — 99214 OFFICE O/P EST MOD 30 MIN: CPT | Mod: 95 | Performed by: INTERNAL MEDICINE

## 2020-01-01 PROCEDURE — 84443 ASSAY THYROID STIM HORMONE: CPT | Performed by: PHYSICIAN ASSISTANT

## 2020-01-01 PROCEDURE — G0008 ADMIN INFLUENZA VIRUS VAC: HCPCS | Performed by: OTOLARYNGOLOGY

## 2020-01-01 PROCEDURE — 90686 IIV4 VACC NO PRSV 0.5 ML IM: CPT | Performed by: OTOLARYNGOLOGY

## 2020-01-01 PROCEDURE — 85025 COMPLETE CBC W/AUTO DIFF WBC: CPT | Performed by: PHYSICIAN ASSISTANT

## 2020-01-01 PROCEDURE — G0463 HOSPITAL OUTPT CLINIC VISIT: HCPCS

## 2020-01-01 PROCEDURE — 250N000011 HC RX IP 250 OP 636: Performed by: INTERNAL MEDICINE

## 2020-01-01 PROCEDURE — 99214 OFFICE O/P EST MOD 30 MIN: CPT | Mod: ZP | Performed by: INTERNAL MEDICINE

## 2020-01-01 PROCEDURE — 92526 ORAL FUNCTION THERAPY: CPT | Mod: GN | Performed by: SPEECH-LANGUAGE PATHOLOGIST

## 2020-01-01 PROCEDURE — 99215 OFFICE O/P EST HI 40 MIN: CPT | Mod: ZP | Performed by: INTERNAL MEDICINE

## 2020-01-01 PROCEDURE — 36591 DRAW BLOOD OFF VENOUS DEVICE: CPT

## 2020-01-01 PROCEDURE — 99207 PR NO BILLABLE SERVICE THIS VISIT: CPT

## 2020-01-01 PROCEDURE — G0463 HOSPITAL OUTPT CLINIC VISIT: HCPCS | Performed by: NURSE PRACTITIONER

## 2020-01-01 PROCEDURE — 84439 ASSAY OF FREE THYROXINE: CPT | Performed by: INTERNAL MEDICINE

## 2020-01-01 RX ORDER — HEPARIN SODIUM (PORCINE) LOCK FLUSH IV SOLN 100 UNIT/ML 100 UNIT/ML
5 SOLUTION INTRAVENOUS
Status: CANCELLED | OUTPATIENT
Start: 2020-01-01

## 2020-01-01 RX ORDER — SODIUM CHLORIDE 9 MG/ML
1000 INJECTION, SOLUTION INTRAVENOUS CONTINUOUS PRN
Status: CANCELLED
Start: 2020-01-01

## 2020-01-01 RX ORDER — NALOXONE HYDROCHLORIDE 0.4 MG/ML
.1-.4 INJECTION, SOLUTION INTRAMUSCULAR; INTRAVENOUS; SUBCUTANEOUS
Status: CANCELLED | OUTPATIENT
Start: 2021-01-01

## 2020-01-01 RX ORDER — METHYLPREDNISOLONE SODIUM SUCCINATE 125 MG/2ML
125 INJECTION, POWDER, LYOPHILIZED, FOR SOLUTION INTRAMUSCULAR; INTRAVENOUS
Status: CANCELLED
Start: 2020-01-01

## 2020-01-01 RX ORDER — ALBUTEROL SULFATE 0.83 MG/ML
2.5 SOLUTION RESPIRATORY (INHALATION)
Status: CANCELLED | OUTPATIENT
Start: 2020-01-01

## 2020-01-01 RX ORDER — NALOXONE HYDROCHLORIDE 0.4 MG/ML
.1-.4 INJECTION, SOLUTION INTRAMUSCULAR; INTRAVENOUS; SUBCUTANEOUS
Status: CANCELLED | OUTPATIENT
Start: 2020-01-01

## 2020-01-01 RX ORDER — HEPARIN SODIUM (PORCINE) LOCK FLUSH IV SOLN 100 UNIT/ML 100 UNIT/ML
5 SOLUTION INTRAVENOUS
Status: DISCONTINUED | OUTPATIENT
Start: 2020-01-01 | End: 2020-01-01 | Stop reason: HOSPADM

## 2020-01-01 RX ORDER — LORAZEPAM 2 MG/ML
0.5 INJECTION INTRAMUSCULAR EVERY 4 HOURS PRN
Status: CANCELLED
Start: 2020-01-01

## 2020-01-01 RX ORDER — METHYLPREDNISOLONE SODIUM SUCCINATE 125 MG/2ML
125 INJECTION, POWDER, LYOPHILIZED, FOR SOLUTION INTRAMUSCULAR; INTRAVENOUS
Status: CANCELLED
Start: 2021-01-01

## 2020-01-01 RX ORDER — DIPHENHYDRAMINE HYDROCHLORIDE 50 MG/ML
50 INJECTION INTRAMUSCULAR; INTRAVENOUS
Status: CANCELLED
Start: 2020-01-01

## 2020-01-01 RX ORDER — MEPERIDINE HYDROCHLORIDE 25 MG/ML
25 INJECTION INTRAMUSCULAR; INTRAVENOUS; SUBCUTANEOUS EVERY 30 MIN PRN
Status: CANCELLED | OUTPATIENT
Start: 2020-01-01

## 2020-01-01 RX ORDER — ALBUTEROL SULFATE 0.83 MG/ML
2.5 SOLUTION RESPIRATORY (INHALATION)
Status: CANCELLED | OUTPATIENT
Start: 2021-01-01

## 2020-01-01 RX ORDER — EPINEPHRINE 1 MG/ML
0.3 INJECTION, SOLUTION INTRAMUSCULAR; SUBCUTANEOUS EVERY 5 MIN PRN
Status: CANCELLED | OUTPATIENT
Start: 2020-01-01

## 2020-01-01 RX ORDER — PROCHLORPERAZINE MALEATE 10 MG
10 TABLET ORAL EVERY 6 HOURS PRN
Qty: 45 TABLET | Refills: 11 | Status: SHIPPED | OUTPATIENT
Start: 2020-01-01 | End: 2020-01-01

## 2020-01-01 RX ORDER — HEPARIN SODIUM (PORCINE) LOCK FLUSH IV SOLN 100 UNIT/ML 100 UNIT/ML
5 SOLUTION INTRAVENOUS ONCE
Status: COMPLETED | OUTPATIENT
Start: 2020-01-01 | End: 2020-01-01

## 2020-01-01 RX ORDER — HYDROCORTISONE 2.5 %
CREAM (GRAM) TOPICAL 2 TIMES DAILY
Qty: 20 G | Refills: 0 | Status: SHIPPED | OUTPATIENT
Start: 2020-01-01 | End: 2020-01-01

## 2020-01-01 RX ORDER — ALBUTEROL SULFATE 90 UG/1
1-2 AEROSOL, METERED RESPIRATORY (INHALATION)
Status: CANCELLED
Start: 2021-01-01

## 2020-01-01 RX ORDER — HEPARIN SODIUM (PORCINE) LOCK FLUSH IV SOLN 100 UNIT/ML 100 UNIT/ML
5 SOLUTION INTRAVENOUS
Status: COMPLETED | OUTPATIENT
Start: 2020-01-01 | End: 2020-01-01

## 2020-01-01 RX ORDER — CEVIMELINE HYDROCHLORIDE 30 MG/1
30 CAPSULE ORAL 3 TIMES DAILY
Qty: 90 CAPSULE | Refills: 11 | Status: SHIPPED | OUTPATIENT
Start: 2020-01-01 | End: 2020-01-01

## 2020-01-01 RX ORDER — SODIUM CHLORIDE 9 MG/ML
1000 INJECTION, SOLUTION INTRAVENOUS CONTINUOUS PRN
Status: CANCELLED
Start: 2021-01-01

## 2020-01-01 RX ORDER — HEPARIN SODIUM,PORCINE 10 UNIT/ML
5 VIAL (ML) INTRAVENOUS
Status: CANCELLED | OUTPATIENT
Start: 2020-01-01

## 2020-01-01 RX ORDER — IOPAMIDOL 755 MG/ML
99 INJECTION, SOLUTION INTRAVASCULAR ONCE
Status: COMPLETED | OUTPATIENT
Start: 2020-01-01 | End: 2020-01-01

## 2020-01-01 RX ORDER — ALBUTEROL SULFATE 90 UG/1
1-2 AEROSOL, METERED RESPIRATORY (INHALATION)
Status: CANCELLED
Start: 2020-01-01

## 2020-01-01 RX ORDER — HEPARIN SODIUM (PORCINE) LOCK FLUSH IV SOLN 100 UNIT/ML 100 UNIT/ML
5 SOLUTION INTRAVENOUS EVERY 8 HOURS PRN
Status: DISCONTINUED | OUTPATIENT
Start: 2020-01-01 | End: 2020-01-01 | Stop reason: HOSPADM

## 2020-01-01 RX ORDER — EPINEPHRINE 0.3 MG/.3ML
0.3 INJECTION SUBCUTANEOUS EVERY 5 MIN PRN
Status: CANCELLED | OUTPATIENT
Start: 2020-01-01

## 2020-01-01 RX ORDER — EPINEPHRINE 1 MG/ML
0.3 INJECTION, SOLUTION INTRAMUSCULAR; SUBCUTANEOUS EVERY 5 MIN PRN
Status: CANCELLED | OUTPATIENT
Start: 2021-01-01

## 2020-01-01 RX ORDER — HEPARIN SODIUM,PORCINE 10 UNIT/ML
5 VIAL (ML) INTRAVENOUS
Status: CANCELLED | OUTPATIENT
Start: 2021-01-01

## 2020-01-01 RX ORDER — OXYCODONE HYDROCHLORIDE 10 MG/1
TABLET ORAL
COMMUNITY
Start: 2020-01-01

## 2020-01-01 RX ORDER — MEPERIDINE HYDROCHLORIDE 25 MG/ML
25 INJECTION INTRAMUSCULAR; INTRAVENOUS; SUBCUTANEOUS EVERY 30 MIN PRN
Status: CANCELLED | OUTPATIENT
Start: 2021-01-01

## 2020-01-01 RX ORDER — NALOXONE HYDROCHLORIDE 4 MG/.1ML
SPRAY NASAL
COMMUNITY
Start: 2020-01-01

## 2020-01-01 RX ORDER — HYDROCORTISONE 2.5 %
CREAM (GRAM) TOPICAL 2 TIMES DAILY
Qty: 453.6 G | Refills: 1 | Status: SHIPPED | OUTPATIENT
Start: 2020-01-01

## 2020-01-01 RX ORDER — HEPARIN SODIUM (PORCINE) LOCK FLUSH IV SOLN 100 UNIT/ML 100 UNIT/ML
5 SOLUTION INTRAVENOUS DAILY PRN
Status: DISCONTINUED | OUTPATIENT
Start: 2020-01-01 | End: 2020-01-01 | Stop reason: HOSPADM

## 2020-01-01 RX ORDER — HEPARIN SODIUM (PORCINE) LOCK FLUSH IV SOLN 100 UNIT/ML 100 UNIT/ML
5 SOLUTION INTRAVENOUS
Status: CANCELLED | OUTPATIENT
Start: 2021-01-01

## 2020-01-01 RX ORDER — LORAZEPAM 2 MG/ML
0.5 INJECTION INTRAMUSCULAR EVERY 4 HOURS PRN
Status: CANCELLED
Start: 2021-01-01

## 2020-01-01 RX ORDER — DIPHENHYDRAMINE HYDROCHLORIDE 50 MG/ML
50 INJECTION INTRAMUSCULAR; INTRAVENOUS
Status: CANCELLED
Start: 2021-01-01

## 2020-01-01 RX ORDER — CEVIMELINE HYDROCHLORIDE 30 MG/1
30 CAPSULE ORAL 3 TIMES DAILY
Qty: 90 CAPSULE | Refills: 11 | Status: SHIPPED | OUTPATIENT
Start: 2020-01-01 | End: 2021-01-01

## 2020-01-01 RX ORDER — HEPARIN SODIUM (PORCINE) LOCK FLUSH IV SOLN 100 UNIT/ML 100 UNIT/ML
500 SOLUTION INTRAVENOUS ONCE
Status: COMPLETED | OUTPATIENT
Start: 2020-01-01 | End: 2020-01-01

## 2020-01-01 RX ORDER — IOPAMIDOL 755 MG/ML
97 INJECTION, SOLUTION INTRAVASCULAR ONCE
Status: COMPLETED | OUTPATIENT
Start: 2020-01-01 | End: 2020-01-01

## 2020-01-01 RX ADMIN — Medication 5 ML: at 10:22

## 2020-01-01 RX ADMIN — SODIUM CHLORIDE 480 MG: 9 INJECTION, SOLUTION INTRAVENOUS at 15:36

## 2020-01-01 RX ADMIN — Medication 5 ML: at 10:20

## 2020-01-01 RX ADMIN — Medication 5 ML: at 12:44

## 2020-01-01 RX ADMIN — SODIUM CHLORIDE 480 MG: 9 INJECTION, SOLUTION INTRAVENOUS at 11:57

## 2020-01-01 RX ADMIN — SODIUM CHLORIDE 250 ML: 9 INJECTION, SOLUTION INTRAVENOUS at 12:14

## 2020-01-01 RX ADMIN — HEPARIN SODIUM (PORCINE) LOCK FLUSH IV SOLN 100 UNIT/ML 500 UNITS: 100 SOLUTION at 15:39

## 2020-01-01 RX ADMIN — SODIUM CHLORIDE 480 MG: 9 INJECTION, SOLUTION INTRAVENOUS at 12:12

## 2020-01-01 RX ADMIN — Medication 5 ML: at 12:34

## 2020-01-01 RX ADMIN — IOPAMIDOL 97 ML: 755 INJECTION, SOLUTION INTRAVASCULAR at 15:31

## 2020-01-01 RX ADMIN — SODIUM CHLORIDE 250 ML: 9 INJECTION, SOLUTION INTRAVENOUS at 12:12

## 2020-01-01 RX ADMIN — Medication 5 ML: at 08:43

## 2020-01-01 RX ADMIN — Medication 5 ML: at 10:45

## 2020-01-01 RX ADMIN — HEPARIN SODIUM (PORCINE) LOCK FLUSH IV SOLN 100 UNIT/ML 5 ML: 100 SOLUTION at 11:59

## 2020-01-01 RX ADMIN — SODIUM CHLORIDE 250 ML: 9 INJECTION, SOLUTION INTRAVENOUS at 11:57

## 2020-01-01 RX ADMIN — IOPAMIDOL 99 ML: 755 INJECTION, SOLUTION INTRAVASCULAR at 10:59

## 2020-01-01 RX ADMIN — SODIUM CHLORIDE 480 MG: 9 INJECTION, SOLUTION INTRAVENOUS at 12:14

## 2020-01-01 RX ADMIN — Medication 5 ML: at 16:08

## 2020-01-01 RX ADMIN — SODIUM CHLORIDE 250 ML: 9 INJECTION, SOLUTION INTRAVENOUS at 15:38

## 2020-01-01 RX ADMIN — Medication 5 ML: at 14:06

## 2020-01-01 RX ADMIN — Medication 5 ML: at 12:18

## 2020-01-01 RX ADMIN — SODIUM CHLORIDE 480 MG: 9 INJECTION, SOLUTION INTRAVENOUS at 09:46

## 2020-01-01 RX ADMIN — Medication 5 ML: at 08:34

## 2020-01-01 ASSESSMENT — PAIN SCALES - GENERAL
PAINLEVEL: NO PAIN (0)

## 2020-01-01 ASSESSMENT — MIFFLIN-ST. JEOR
SCORE: 1556.97
SCORE: 1555.61

## 2020-01-20 NOTE — PROGRESS NOTES
Department of Radiation Oncology  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 He underwent a maxillectomy and orbital exenteration on 2019 with pathology demonstrating a 4.4 cm moderately differentiated squamous cell carcinoma with negative lymphovascular space invasion and positive perineural invasion. There was a positive surgical margin at the pterygopalatine fossa, specifically from the vidian nerve taken at its exit from the vidian canal. Additional resection into the canal was not possible. Mr. Rubio received adjuvant chemoradiotherapy as described above, receiving a total dose of 66 Gy in 33 fractions which he completed on 2019 with concurrent high-dose cisplatin.    Mr. Rubio returns to radiation oncology clinic today for a routine post-treatment disease surveillance visit. On examination, he reports that he is doing well and has no pressing concerns or complaints. He continues to have chronic pain involving his back and lower extremities which he rates as a 3/10 in severity. He is having moderate fatigue with his energy level slightly recovered following his recent radiotherapy treatment course. He also describes ongoing tinnitus for which he underwent an audiology evaluation  earlier today that demonstrated bilateral sensorineural hearing loss. He otherwise denies any nausea/vomiting, dyspnea, chest pain, odynophagia, dysphagia or abdominal pain with his remaining ROS negative.    PHYSICAL EXAM:  Weight: 74.7 kg  BP: 148/8464  Pulse: 64    General: 59-year-old gentleman seated comfortably in the examination chair in no acute distress  HEENT: NC/AT.  Left eye with normal extraocular movement.  No scleral injection.  Right orbital exenteration site is covered with a healthy-appearing free flap with moderate resolving dermatitis involving the free flap and right lateral face.  Mildly dry mucous membranes.  No visible oral cavity mucositis.  No evidence of thrush.  Neck: No palpable cervical adenopathy.    Pulmonary: No wheezing, stridor or respiratory distress  Skin: Moderate hyperpigmentation within the right lateral face and neck with resolving areas of desquamation scattered throughout the right lateral face as described above.    LABS AND IMAGIN2020 labs:  Electrolytes: Sodium 131 otherwise WNL  WBC: 3.6  Hemoglobin: 9.6  Platelets: 242  TSH: 1.74    IMPRESSION:   Mr. 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 He is making a steady recovery from his acute post-treatment radiation-induced toxicities.    PLAN:   1. Follow-up in radiology clinic in 6 weeks with PET/CT  2. Continue post-treatment skin and oral cares    Eric Santillan MD/PhD    Dept of Radiation Oncology  [facility]

## 2020-01-22 ENCOUNTER — ONCOLOGY VISIT (OUTPATIENT)
Dept: ONCOLOGY | Facility: CLINIC | Age: 60
End: 2020-01-22
Attending: PHYSICIAN ASSISTANT
Payer: MEDICARE

## 2020-01-22 ENCOUNTER — OFFICE VISIT (OUTPATIENT)
Dept: AUDIOLOGY | Facility: CLINIC | Age: 60
End: 2020-01-22
Attending: PHYSICIAN ASSISTANT
Payer: MEDICARE

## 2020-01-22 ENCOUNTER — OFFICE VISIT (OUTPATIENT)
Dept: RADIATION ONCOLOGY | Facility: CLINIC | Age: 60
End: 2020-01-22
Attending: RADIOLOGY
Payer: MEDICARE

## 2020-01-22 ENCOUNTER — THERAPY VISIT (OUTPATIENT)
Dept: SPEECH THERAPY | Facility: CLINIC | Age: 60
End: 2020-01-22
Payer: MEDICARE

## 2020-01-22 VITALS
DIASTOLIC BLOOD PRESSURE: 86 MMHG | WEIGHT: 164.7 LBS | RESPIRATION RATE: 16 BRPM | HEART RATE: 72 BPM | BODY MASS INDEX: 22.34 KG/M2 | TEMPERATURE: 97.5 F | SYSTOLIC BLOOD PRESSURE: 132 MMHG | OXYGEN SATURATION: 100 %

## 2020-01-22 VITALS
BODY MASS INDEX: 22.34 KG/M2 | WEIGHT: 164.7 LBS | DIASTOLIC BLOOD PRESSURE: 84 MMHG | SYSTOLIC BLOOD PRESSURE: 148 MMHG | HEART RATE: 64 BPM

## 2020-01-22 DIAGNOSIS — C31.0 MAXILLARY SINUS CANCER (H): Primary | ICD-10-CM

## 2020-01-22 DIAGNOSIS — R53.83 OTHER FATIGUE: ICD-10-CM

## 2020-01-22 DIAGNOSIS — R13.12 OROPHARYNGEAL DYSPHAGIA: ICD-10-CM

## 2020-01-22 DIAGNOSIS — Z98.890 S/P FLAP GRAFT: ICD-10-CM

## 2020-01-22 DIAGNOSIS — H90.3 SENSORINEURAL HEARING LOSS, BILATERAL: Primary | ICD-10-CM

## 2020-01-22 DIAGNOSIS — C31.0 MAXILLARY SINUS CANCER (H): ICD-10-CM

## 2020-01-22 DIAGNOSIS — C31.0 SQUAMOUS CELL CARCINOMA OF MAXILLARY SINUS (H): Primary | ICD-10-CM

## 2020-01-22 LAB
ALBUMIN SERPL-MCNC: 3.5 G/DL (ref 3.4–5)
ALP SERPL-CCNC: 53 U/L (ref 40–150)
ALT SERPL W P-5'-P-CCNC: 23 U/L (ref 0–70)
ANION GAP SERPL CALCULATED.3IONS-SCNC: 2 MMOL/L (ref 3–14)
AST SERPL W P-5'-P-CCNC: 25 U/L (ref 0–45)
BASOPHILS # BLD AUTO: 0 10E9/L (ref 0–0.2)
BASOPHILS NFR BLD AUTO: 0.6 %
BILIRUB SERPL-MCNC: 0.4 MG/DL (ref 0.2–1.3)
BUN SERPL-MCNC: 18 MG/DL (ref 7–30)
CALCIUM SERPL-MCNC: 8.5 MG/DL (ref 8.5–10.1)
CHLORIDE SERPL-SCNC: 101 MMOL/L (ref 94–109)
CO2 SERPL-SCNC: 27 MMOL/L (ref 20–32)
CREAT SERPL-MCNC: 0.84 MG/DL (ref 0.66–1.25)
DIFFERENTIAL METHOD BLD: ABNORMAL
EOSINOPHIL # BLD AUTO: 0 10E9/L (ref 0–0.7)
EOSINOPHIL NFR BLD AUTO: 0.6 %
ERYTHROCYTE [DISTWIDTH] IN BLOOD BY AUTOMATED COUNT: 22.2 % (ref 10–15)
GFR SERPL CREATININE-BSD FRML MDRD: >90 ML/MIN/{1.73_M2}
GLUCOSE SERPL-MCNC: 91 MG/DL (ref 70–99)
HCT VFR BLD AUTO: 27.8 % (ref 40–53)
HGB BLD-MCNC: 9.6 G/DL (ref 13.3–17.7)
IMM GRANULOCYTES # BLD: 0 10E9/L (ref 0–0.4)
IMM GRANULOCYTES NFR BLD: 0.3 %
LYMPHOCYTES # BLD AUTO: 0.5 10E9/L (ref 0.8–5.3)
LYMPHOCYTES NFR BLD AUTO: 15 %
MAGNESIUM SERPL-MCNC: 2 MG/DL (ref 1.6–2.3)
MCH RBC QN AUTO: 30.1 PG (ref 26.5–33)
MCHC RBC AUTO-ENTMCNC: 34.5 G/DL (ref 31.5–36.5)
MCV RBC AUTO: 87 FL (ref 78–100)
MONOCYTES # BLD AUTO: 0.8 10E9/L (ref 0–1.3)
MONOCYTES NFR BLD AUTO: 21.4 %
NEUTROPHILS # BLD AUTO: 2.2 10E9/L (ref 1.6–8.3)
NEUTROPHILS NFR BLD AUTO: 62.1 %
NRBC # BLD AUTO: 0 10*3/UL
NRBC BLD AUTO-RTO: 0 /100
PLATELET # BLD AUTO: 242 10E9/L (ref 150–450)
POTASSIUM SERPL-SCNC: 4.8 MMOL/L (ref 3.4–5.3)
PROT SERPL-MCNC: 7.3 G/DL (ref 6.8–8.8)
RBC # BLD AUTO: 3.19 10E12/L (ref 4.4–5.9)
SODIUM SERPL-SCNC: 131 MMOL/L (ref 133–144)
TSH SERPL DL<=0.005 MIU/L-ACNC: 1.74 MU/L (ref 0.4–4)
WBC # BLD AUTO: 3.6 10E9/L (ref 4–11)

## 2020-01-22 PROCEDURE — 25000128 H RX IP 250 OP 636: Mod: ZF | Performed by: PHYSICIAN ASSISTANT

## 2020-01-22 PROCEDURE — 80053 COMPREHEN METABOLIC PANEL: CPT | Performed by: PHYSICIAN ASSISTANT

## 2020-01-22 PROCEDURE — 83735 ASSAY OF MAGNESIUM: CPT | Performed by: PHYSICIAN ASSISTANT

## 2020-01-22 PROCEDURE — 84443 ASSAY THYROID STIM HORMONE: CPT | Performed by: PHYSICIAN ASSISTANT

## 2020-01-22 PROCEDURE — G0463 HOSPITAL OUTPT CLINIC VISIT: HCPCS

## 2020-01-22 PROCEDURE — G0463 HOSPITAL OUTPT CLINIC VISIT: HCPCS | Performed by: RADIOLOGY

## 2020-01-22 PROCEDURE — 85025 COMPLETE CBC W/AUTO DIFF WBC: CPT | Performed by: PHYSICIAN ASSISTANT

## 2020-01-22 PROCEDURE — 99214 OFFICE O/P EST MOD 30 MIN: CPT | Mod: ZP | Performed by: PHYSICIAN ASSISTANT

## 2020-01-22 RX ORDER — AMOXICILLIN 250 MG
1 CAPSULE ORAL 2 TIMES DAILY PRN
Qty: 90 TABLET | Refills: 3 | Status: SHIPPED | OUTPATIENT
Start: 2020-01-22

## 2020-01-22 RX ORDER — HEPARIN SODIUM (PORCINE) LOCK FLUSH IV SOLN 100 UNIT/ML 100 UNIT/ML
5 SOLUTION INTRAVENOUS EVERY 8 HOURS
Status: DISCONTINUED | OUTPATIENT
Start: 2020-01-22 | End: 2020-01-22 | Stop reason: HOSPADM

## 2020-01-22 RX ORDER — OXYCODONE HYDROCHLORIDE 10 MG/1
TABLET ORAL
COMMUNITY
Start: 2020-01-09 | End: 2020-01-01

## 2020-01-22 RX ORDER — LORAZEPAM 0.5 MG/1
TABLET ORAL
COMMUNITY
Start: 2019-12-13 | End: 2020-01-22

## 2020-01-22 RX ORDER — LORAZEPAM 0.5 MG/1
0.5 TABLET ORAL EVERY 6 HOURS PRN
Qty: 20 TABLET | Refills: 0 | Status: SHIPPED | OUTPATIENT
Start: 2020-01-22 | End: 2020-02-05

## 2020-01-22 RX ADMIN — HEPARIN 5 ML: 100 SYRINGE at 14:33

## 2020-01-22 ASSESSMENT — PAIN SCALES - GENERAL: PAINLEVEL: NO PAIN (0)

## 2020-01-22 NOTE — PROGRESS NOTES
[facility]ER CLINIC  PROGRESS NOTE  Jan 22, 2020    CANCER TYPE: Maxillary sinus squamous cell cancer  STAGE: Kyle ([id number])    TREATMENT SUMMARY:  - 8/19/19: MRI face and orbit demonstrated a maxillary sinus mass with extension to the orbit with involvement of the inferior rectus muscle. 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  - 9/24/19: Total maxillectomy with orbital exenteration performed by Dr. Roberts, followed by reconstruction with a latissimus free flap with Dr. Pulliam.   - Surgical pathology showed a 4.4 cm moderately differentiated squamous cell carcinoma, (-) LVSI, (+)PNI. There was a positive margin at the pterygopalatine fossa, specifically the vidian nerve taken at its exit from the vidian canal, and additional resection into the canal was not possible.    CURRENT INTERVENTIONS:  Concurrent chemoradiation with high dose cisplatin day 1, 11/1/19, Day 22 11/20/19, Day 43 12/13/19     HISTORY OF PRESENT ILLNESS   Eduardo Rubio is 59 year old  who has been diagnosed with locally advanced right maxillary sinus cancer.    Eduardo presented to an outside ED in 08/18/2019 with complaints of right facial pressure, numbness, right-sided visual change and nasal drainage for several days' duration. Imaging workup included an MRI which demonstrated a maxillary sinus mass with extension to the orbit with involvement of the inferior rectus muscle. 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 Mr. Rubio was referred to [facility]. He had staging PET/CT on 9/9/2019 which revealed a FDG-avid maxillary mass at 4.0 x 3.9 x 4.2 cm. There was tumor extension into the right orbital cavity superiorly, the right nasal cavity medially, the retromaxillary fat region posterolaterally, the right pterygopalatine fossa posteriorly, and the premaxillary fat anteriorly. In the orbital cavity  there was abutment of the inferior rectus muscle. There was no evidence of lymphadenopathy or systemic disease. His case was reviewed at tumor conference with recommendation for surgery with total maxillectomy and orbital exenteration with free flap reconstruction.     Mr. Rubio underwent a total maxillectomy with orbital exenteration on 9/24/2019 with Dr. Roberts, followed by reconstruction with a latissimus free flap with Dr. Pulliam. Surgical pathology showed a 4.4 cm moderately differentiated squamous cell carcinoma, (-) LVSI, (+)PNI. There was a positive margin at the pterygopalatine fossa, specifically the vidian nerve taken at its exit from the vidian canal, and additional resection into the canal was not possible. Mr. Rubio's case was discussed in the [facility]'s multidisciplinary head and neck tumor board, with the consensus recommendation to proceed with adjuvant chemoradiation given the positive margin status. Patient received day 1 cisplatin on 11/1/19 and day 22 on 11/20/19 and Day 43 on 12/13/19.     INTERIM HISTORY:  Eduardo presents today for close follow up. He continues to have pain along his right cheek but its getting better.  Prior to cancer, he was taking 10 mg TID for his chronic back/feet pain, this was escalated during cancer therapy and now he has been able to get back to 10 mg 3-4 x day, so near his baseline.  He gets his oxycodone through his pain clinc. His mouth is healing, he is eating normal food 2-3 x a day and drinking gatorade.  He is using stools softeners.  He is intermittently taking lorazepam for nausea or sleep.   He reports getting in at least 64 ounces of fluid per day.  He had a hearing test today.  He still feels tired, but is doing OK.  He is smoking 1/2 ppd.      PAST MEDICAL HISTORY     Past Medical History:   Diagnosis Date     Gastric ulcer      Low back pain      Maxillary sinus cancer (H)      Toe amputation status     all ten toes          PHYSICAL EXAM   General: The patient is a pleasant male in no acute distress.  /86 (BP Location: Right arm, Patient Position: Sitting, Cuff Size: Adult Regular)   Pulse 72   Temp 97.5  F (36.4  C) (Oral)   Resp 16   Wt 74.7 kg (164 lb 11.2 oz)   SpO2 100%   BMI 22.34 kg/m    Wt Readings from Last 10 Encounters:   01/22/20 74.7 kg (164 lb 11.2 oz)   01/22/20 74.7 kg (164 lb 11.2 oz)   12/27/19 73.7 kg (162 lb 8 oz)   12/20/19 72.7 kg (160 lb 3.2 oz)   12/13/19 71.7 kg (158 lb)   12/11/19 73 kg (161 lb)   12/05/19 73 kg (161 lb)   12/02/19 73.1 kg (161 lb 3.2 oz)   11/29/19 73.4 kg (161 lb 14.4 oz)   11/21/19 74.4 kg (164 lb)   HEENT: Right face is bulky from free flap. Radiation dermatitis is healed.  Hyperpigmentation of the skin noted. EOMI, PERRL. Left sclerae are anicteric. Oral mucosa is pink and moist   Lymph: Neck is supple with no lymphadenopathy in the cervical or supraclavicular areas.   Heart: Regular rate and rhythm.   Lungs: Clear to auscultation bilaterally.   Abdomen: Bowel sounds present, soft, nontender with no palpable hepatosplenomegaly or masses.   Extremities: No lower extremity edema noted bilaterally.   Neuro: Cranial nerves II through XII are grossly intact.  Skin: still has a healing scab on his nose, othewise skin is looking well healed.      LABORATORY AND IMAGING STUDIES      12/13/2019 07:32 12/20/2019 08:53 1/22/2020 14:14   Sodium 132 (L) 132 (L) 131 (L)   Potassium 3.7 3.0 (L) 4.8   Chloride 98 96 101   Carbon Dioxide 30 32 27   Urea Nitrogen 10 17 18   Creatinine 0.72 0.94 0.84   GFR Estimate >90 88 >90   GFR Estimate If Black >90 >90 >90   Calcium 8.7 7.5 (L) 8.5   Anion Gap 4 5 2 (L)   Magnesium 1.8 1.2 (L) 2.0   Albumin 3.3 (L) 3.1 (L) 3.5   Protein Total 7.4 6.7 (L) 7.3   Bilirubin Total 0.3 0.2 0.4   Alkaline Phosphatase 48 57 53   ALT 25 40 23   AST 20 37 25   TSH   1.74   Glucose 87 81 91   WBC 2.1 (L) 2.8 (L) 3.6 (L)   Hemoglobin 10.1 (L) 9.7 (L) 9.6 (L)    Hematocrit 30.3 (L) 28.5 (L) 27.8 (L)   Platelet Count 153 130 (L) 242   RBC Count 3.51 (L) 3.38 (L) 3.19 (L)   MCV 86 84 87        ASSESSMENT AND PLAN   1. Stage IV [id number] right maxillary sinus squamous cell cancer   2. No medical comorbidities  3. Nicotine abuse - chronic smoker   4. Poor social support; lives alone with a dog    Locally advanced maxillary sinus squamous cell cancer that extended in to the right orbit.   -s/p total right maxilectomy with orbital exenteration   -surgical margin were positive making it a high risk disease for recurrence.    -completed concurrent chemoradiation therapy  - PET/CT in mid-March    FEN  -weight stable today, taking in normal solid food 2-3 x day  -martinez labs today to recheck lytes-mildly low Na, but stable  -encouraged to taper off of the ativan for sleep and prn nausea.  Given small amount today and no refills     ENT  -mucositis is healed, no oral cavity pain  -audiogram today for tinnitus     Pain  -he has been seen in a pain clinic for at least the last year, taking 10 mg 3-4 times a day.      Smoking  Discussed continuation of smoking doubles his risk of recurrence. Suggested stopping now.  He will try, has patches at home.     Gianna Ruggiero PA-C  [facility]  [address] [place] [address] [phone]

## 2020-01-22 NOTE — PROGRESS NOTES
AUDIOLOGY REPORT    SUBJECTIVE: Eduardo Rubio is a 59 year old male who was seen in the Audiology Clinic at [facility] for audiologic evaluation, referred by Gianna Ruggiero PA-C. The patient reports bilateral hearing loss and bilateral tinnitus. He denies bilateral pain, bilateral drainage, dizziness, or a history of ear surgeries. The patient notes difficulty with communication in a variety of listening situations.     OBJECTIVE:  Abuse Screening:  Do you feel unsafe at home or work/school? No  Do you feel threatened by someone? No  Does anyone try to keep you from having contact with others, or doing things outside of your home? No  Physical signs of abuse present? No     Fall Risk Screening:  Have you fallen two or more times in the past year? No  Have you fallen and had an injury in the past year? No    Otoscopic exam indicated ears are clear of cerumen bilaterally     Pure Tone Thresholds assessed using conventional audiometry with good reliability from 250-8000 Hz bilaterally using insert earphones and circumaural headphones     RIGHT:  Normal hearing sloping to severe sensorineural hearing loss    LEFT:    Normal hearing sloping to moderately severe sensorineural hearing loss    Tympanogram:    RIGHT: Normal eardrum mobility    LEFT:   Normal eardrum mobility    Reflexes: Could not test, as unable to maintain a seal    Speech Reception Threshold:    RIGHT: 25 dB HL    LEFT:   20 dB HL    Word Recognition Score:     RIGHT: 96% at 80 dB HL using NU-6 recorded word list    LEFT:   92% at 80 dB HL using NU-6 recorded word list    ASSESSMENT: Bilateral sensorineural hearing loss. Today s results were discussed with the patient in detail.     PLAN: The patient was counseled regarding hearing loss and impact on communication. He is a good candidate for amplification and may consider a trial of hearing aids pending medical clearance. Repeat audiologic evaluation  is recommended if changes are noted. Please call this clinic with questions regarding these results or recommendations.      Jeimy Bender, CCC-A  Licensed Audiologist  [place] #[id number]      CC: Gianna Ruggiero PA-C

## 2020-01-22 NOTE — NURSING NOTE
Oncology Rooming Note    January 22, 2020 2:05 PM   Eduardo Rubio is a 59 year old male who presents for:    Chief Complaint   Patient presents with     Oncology Clinic Visit     Patient with Squamous cell carcinoma of maxillary sinus here for provider visit     Initial Vitals: /86 (BP Location: Right arm, Patient Position: Sitting, Cuff Size: Adult Regular)   Pulse 72   Temp 97.5  F (36.4  C) (Oral)   Resp 16   Wt 74.7 kg (164 lb 11.2 oz)   SpO2 100%   BMI 22.34 kg/m   Estimated body mass index is 22.34 kg/m  as calculated from the following:    Height as of 10/31/19: 1.829 m (6').    Weight as of this encounter: 74.7 kg (164 lb 11.2 oz). Body surface area is 1.95 meters squared.  No Pain (0) Comment: Data Unavailable   No LMP for male patient.  Allergies reviewed: Yes  Medications reviewed: Yes    Medications: MEDICATION REFILLS NEEDED TODAY. Provider was notified.  Pharmacy name entered into [facility]:    [id number] - [facility] [address] AT [address] [id number] - [facility] [address] [place] [address] [place] AT [facility]    Clinical concerns:     Reina Meek CMA

## 2020-01-22 NOTE — LETTER
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 He underwent a maxillectomy and orbital exenteration on 2019 with pathology demonstrating a 4.4 cm moderately differentiated squamous cell carcinoma with negative lymphovascular space invasion and positive perineural invasion. There was a positive surgical margin at the pterygopalatine fossa, specifically from the vidian nerve taken at its exit from the vidian canal. Additional resection into the canal was not possible. Mr. Rubio received adjuvant chemoradiotherapy as described above, receiving a total dose of 66 Gy in 33 fractions which he completed on 2019 with concurrent high-dose cisplatin.    Mr. Rubio returns to radiation oncology clinic today for a routine post-treatment disease surveillance visit. On examination, he reports that he is doing well and has no pressing concerns or complaints. He continues to have chronic pain involving his back and lower extremities which he rates as a 3/10 in severity. He is having moderate fatigue with his energy level slightly recovered following his recent radiotherapy treatment  course. He also describes ongoing tinnitus for which he underwent an audiology evaluation earlier today that demonstrated bilateral sensorineural hearing loss. He otherwise denies any nausea/vomiting, dyspnea, chest pain, odynophagia, dysphagia or abdominal pain with his remaining ROS negative.    PHYSICAL EXAM:  Weight: 74.7 kg  BP: 148/8464  Pulse: 64    General: 59-year-old gentleman seated comfortably in the examination chair in no acute distress  HEENT: NC/AT.  Left eye with normal extraocular movement.  No scleral injection.  Right orbital exenteration site is covered with a healthy-appearing free flap with moderate resolving dermatitis involving the free flap and right lateral face.  Mildly dry mucous membranes.  No visible oral cavity mucositis.  No evidence of thrush.  Neck: No palpable cervical adenopathy.    Pulmonary: No wheezing, stridor or respiratory distress  Skin: Moderate hyperpigmentation within the right lateral face and neck with resolving areas of desquamation scattered throughout the right lateral face as described above.    LABS AND IMAGIN2020 labs:  Electrolytes: Sodium 131 otherwise WNL  WBC: 3.6  Hemoglobin: 9.6  Platelets: 242  TSH: 1.74    IMPRESSION:   Mr. 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 He is making a steady recovery from his acute post-treatment radiation-induced toxicities.    PLAN:   1. Follow-up in radiology clinic in 6 weeks with PET/CT  2. Continue post-treatment skin and oral cares    Eric Santillan MD/PhD    Dept of Radiation Oncology  [facility]     Eric Santillan MD

## 2020-01-23 ENCOUNTER — TELEPHONE (OUTPATIENT)
Dept: OTOLARYNGOLOGY | Facility: CLINIC | Age: 60
End: 2020-01-23

## 2020-02-04 ENCOUNTER — DOCUMENTATION ONLY (OUTPATIENT)
Dept: CARE COORDINATION | Facility: CLINIC | Age: 60
End: 2020-02-04

## 2020-02-05 ENCOUNTER — OFFICE VISIT (OUTPATIENT)
Dept: OTOLARYNGOLOGY | Facility: CLINIC | Age: 60
End: 2020-02-05
Payer: MEDICARE

## 2020-02-05 VITALS
HEART RATE: 73 BPM | SYSTOLIC BLOOD PRESSURE: 147 MMHG | BODY MASS INDEX: 22.35 KG/M2 | WEIGHT: 165 LBS | HEIGHT: 72 IN | DIASTOLIC BLOOD PRESSURE: 97 MMHG | TEMPERATURE: 98.1 F

## 2020-02-05 DIAGNOSIS — C31.0 MAXILLARY SINUS CANCER (H): ICD-10-CM

## 2020-02-05 RX ORDER — LORAZEPAM 0.5 MG/1
0.5 TABLET ORAL EVERY 6 HOURS PRN
Qty: 20 TABLET | Refills: 0 | Status: SHIPPED | OUTPATIENT
Start: 2020-02-05 | End: 2020-03-17

## 2020-02-05 ASSESSMENT — MIFFLIN-ST. JEOR: SCORE: 1601.44

## 2020-02-05 ASSESSMENT — PAIN SCALES - GENERAL: PAINLEVEL: MILD PAIN (3)

## 2020-02-05 NOTE — PROGRESS NOTES
PRIOR ONCOLOGIC HISTORY:  Mr. Rubio is status post a right maxillectomy and orbital exenteration for T4a squamous cell carcinoma of the right maxillary sinus.  The patient received postoperative radiation therapy which he completed in December of 2019.  He is currently scheduled to get his PET scan in a few weeks.      INTERVAL HISTORY:  The patient states he is doing well.  He notes that the swelling in the flap has shrunk significantly.  He is eating and drinking well.  He has a little bit of soreness and a little bit of lymphedema.      PHYSICAL EXAMINATION:  He is well appearing, in no distress.  Examination of the orbit reveals the flap has shrunk significantly.  It was now in line with the rest of his facial contour.  Oral exam reveals the flap is healthy in appearance, and there are no malignant lesions in the oral cavity.  The neck and parotid are negative.      IMPRESSION:  Excellent response.      PLAN:   1.  He will see Dr. Pulliam in a few weeks with a PET scan at that time.   2.  I will see him on an alternating basis following that.

## 2020-02-05 NOTE — NURSING NOTE
Chief Complaint   Patient presents with     RECHECK     follow up after radiation      Blood pressure (!) 147/97, pulse 73, temperature 98.1  F (36.7  C), height 1.829 m (6'), weight 74.8 kg (165 lb).    Juanjose Carrasco LPN

## 2020-02-05 NOTE — LETTER
2/5/2020       RE: Eduardo Rubio  [address] [facility] [address]     Dear Colleague,    Thank you for referring your patient, Eduardo Rubio, to the [facility]. Please see a copy of my visit note below.    PRIOR ONCOLOGIC HISTORY:  Mr. Rubio is status post a right maxillectomy and orbital exenteration for T4a squamous cell carcinoma of the right maxillary sinus.  The patient received postoperative radiation therapy which he completed in December of 2019.  He is currently scheduled to get his PET scan in a few weeks.      INTERVAL HISTORY:  The patient states he is doing well.  He notes that the swelling in the flap has shrunk significantly.  He is eating and drinking well.  He has a little bit of soreness and a little bit of lymphedema.      PHYSICAL EXAMINATION:  He is well appearing, in no distress.  Examination of the orbit reveals the flap has shrunk significantly.  It was now in line with the rest of his facial contour.  Oral exam reveals the flap is healthy in appearance, and there are no malignant lesions in the oral cavity.  The neck and parotid are negative.      IMPRESSION:  Excellent response.      PLAN:   1.  He will see Dr. Pulliam in a few weeks with a PET scan at that time.   2.  I will see him on an alternating basis following that.       Again, thank you for allowing me to participate in the care of your patient.      Sincerely,    Jose Roberts MD

## 2020-02-06 NOTE — PATIENT INSTRUCTIONS
1. Please follow-up in clinic with Dr. Pulliam same day as your PET scan.   2. Please call the ENT clinic with any questions,concerns, new or worsening symptoms.    -Clinic number is [phone]   - Svitlana's direct line (Dr. Roberts's nurse) [phone]

## 2020-03-13 ENCOUNTER — HOSPITAL ENCOUNTER (OUTPATIENT)
Dept: PET IMAGING | Facility: CLINIC | Age: 60
Discharge: HOME | End: 2020-03-13
Attending: PHYSICIAN ASSISTANT | Admitting: PHYSICIAN ASSISTANT
Payer: MEDICARE

## 2020-03-13 ENCOUNTER — OFFICE VISIT (OUTPATIENT)
Dept: OTOLARYNGOLOGY | Facility: CLINIC | Age: 60
End: 2020-03-13
Payer: MEDICARE

## 2020-03-13 ENCOUNTER — HOSPITAL ENCOUNTER (OUTPATIENT)
Dept: PET IMAGING | Facility: CLINIC | Age: 60
Setting detail: NUCLEAR MEDICINE
Discharge: HOME | End: 2020-03-13
Attending: PHYSICIAN ASSISTANT | Admitting: PHYSICIAN ASSISTANT
Payer: MEDICARE

## 2020-03-13 VITALS — HEIGHT: 72 IN | BODY MASS INDEX: 20.98 KG/M2 | WEIGHT: 154.9 LBS

## 2020-03-13 DIAGNOSIS — C31.0 SQUAMOUS CELL CARCINOMA OF MAXILLARY SINUS (H): ICD-10-CM

## 2020-03-13 DIAGNOSIS — C31.0 MAXILLARY SINUS CANCER (H): Primary | ICD-10-CM

## 2020-03-13 PROCEDURE — A9552 F18 FDG: HCPCS | Performed by: PHYSICIAN ASSISTANT

## 2020-03-13 PROCEDURE — 25000128 H RX IP 250 OP 636: Performed by: PHYSICIAN ASSISTANT

## 2020-03-13 PROCEDURE — 34300033 ZZH RX 343: Performed by: PHYSICIAN ASSISTANT

## 2020-03-13 PROCEDURE — 74177 CT ABD & PELVIS W/CONTRAST: CPT

## 2020-03-13 PROCEDURE — 70491 CT SOFT TISSUE NECK W/DYE: CPT

## 2020-03-13 RX ORDER — IOPAMIDOL 755 MG/ML
20-135 INJECTION, SOLUTION INTRAVASCULAR ONCE
Status: COMPLETED | OUTPATIENT
Start: 2020-03-13 | End: 2020-03-13

## 2020-03-13 RX ORDER — HEPARIN SODIUM (PORCINE) LOCK FLUSH IV SOLN 100 UNIT/ML 100 UNIT/ML
500 SOLUTION INTRAVENOUS ONCE
Status: COMPLETED | OUTPATIENT
Start: 2020-03-13 | End: 2020-03-13

## 2020-03-13 RX ADMIN — Medication 60 UNITS: at 10:15

## 2020-03-13 RX ADMIN — IOPAMIDOL 93 ML: 755 INJECTION, SOLUTION INTRAVENOUS at 10:14

## 2020-03-13 RX ADMIN — FLUDEOXYGLUCOSE F-18 10.22 MCI.: 500 INJECTION, SOLUTION INTRAVENOUS at 09:15

## 2020-03-13 ASSESSMENT — PAIN SCALES - GENERAL: PAINLEVEL: NO PAIN (0)

## 2020-03-13 ASSESSMENT — MIFFLIN-ST. JEOR: SCORE: 1555.62

## 2020-03-13 NOTE — LETTER
3/13/2020       RE: Eduardo Rubio  [address] [facility] [address]     Dear Colleague,    Thank you for referring your patient, Eduardo Rubio, to the [facility] at [facility]. Please see a copy of my visit note below.    Dear Dr. Roberts:    I had the pleasure of seeing Eduardo Rubio in follow-up today at the [facility].     History of Present Illness:   Eduardo Rubio is a 59 year old man with a T4N0 SCC of the maxillary sinus. The patient has a history of facial pain and numbness along with vision changes that resulted in a visit to the ER on 8/18/2019. An MRI was obtained which demonstrated a maxillary sinus mass with extension to the orbit with involvement of the inferior rectus muscle. He had a biopsy performed locally which was consistent with SCC. He saw Dr Wick on 8/29/2019. He was referred to ophthalmology for evaluation of his vision changes. He had a PET scan which showed the tumor in the maxillary sinus with bony erosion, extension to orbit, small lung nodule, no ramona disease. His case was reviewed at tumor conference with recommendation for surgery with total maxillectomy and orbital exenteration with free flap reconstruction. He was taken to the OR on 9/24/2019. He had reconstruction with a latissimus free flap. His postoperative course was uncomplicated. His final pathology demonstrated a 4.4 cm SCC with involvement of the zygomatic and nasal bone, PNI, no LVSI, positive margin at the pterygopalatine fossa with positive vidian nerve. He was recommended for postoperative radiation with consideration of weekly cisplatin.     Teaching statement:  He comes in today for follow-up. He was last seen in my clinic in October 2019 for postoperative visit.  He had postoperative chemoradiation from 10/28/2019 to 12/11/2019 under the care of Dr. Santillan and Dr. Rivera.  He received a total of 6600 cGy and  received high-dose cisplatin every 3 weeks.  He had a 4-day treatment break due to machine downtime and then with transportation issues.  He comes in today with his 3-month posttreatment PET scan.  He says that he is having some soreness and some discomfort.  This is not constant in nature.  He denies any hemoptysis, shortness of breath, cough.  He is feeling fatigued.  He says that he is lost about 10 pounds despite eating 3 meals a day.  He says he is trying to eat everything.  He had no problems with coughing on liquids or choking.  He does feel like his cheek is more swollen.  He does have some numbness of his cheek.  He is inquiring when he can have his cheek revised.      MEDICATIONS:     Current Outpatient Medications   Medication Sig Dispense Refill     acetaminophen (TYLENOL) 325 MG tablet Take 325-650 mg by mouth every 6 hours as needed for mild pain       cyclobenzaprine (FLEXERIL) 10 MG tablet TK 1 T PO HS PRN FOR MUSCLE SPASM RELIEF  0     LORazepam (ATIVAN) 0.5 MG tablet Take 1 tablet (0.5 mg) by mouth every 6 hours as needed for nausea 20 tablet 0     oxyCODONE IR (ROXICODONE) 10 MG tablet        senna-docusate (SENOKOT-S/PERICOLACE) 8.6-50 MG tablet 1 tablet by Per Feeding Tube route 2 times daily as needed for constipation 90 tablet 3       ALLERGIES:  No Known Allergies    HABITS/SOCIAL HISTORY:   Smoker 1/2 ppd  No chewing tobacco use  Lives with girlfriend or with friends  No alcohol use  Not currently employed    Social History     Socioeconomic History     Marital status:      Spouse name: Not on file     Number of children: Not on file     Years of education: Not on file     Highest education level: Not on file   Occupational History     Not on file   Social Needs     Financial resource strain: Not on file     Food insecurity     Worry: Not on file     Inability: Not on file     Transportation needs     Medical: Not on file     Non-medical: Not on file   Tobacco Use     Smoking status:  Current Every Day Smoker     Packs/day: 0.50     Years: 15.00     Pack years: 7.50     Types: Cigarettes     Start date: 2004     Smokeless tobacco: Never Used   Substance and Sexual Activity     Alcohol use: Not Currently     Drug use: Not Currently     Sexual activity: Not on file   Lifestyle     Physical activity     Days per week: Not on file     Minutes per session: Not on file     Stress: Not on file   Relationships     Social connections     Talks on phone: Not on file     Gets together: Not on file     Attends [employer] service: Not on file     Active member of club or organization: Not on file     Attends meetings of clubs or organizations: Not on file     Relationship status: Not on file     Intimate partner violence     Fear of current or ex partner: Not on file     Emotionally abused: Not on file     Physically abused: Not on file     Forced sexual activity: Not on file   Other Topics Concern     Not on file   Social History Narrative     Not on file       PAST MEDICAL HISTORY:   Past Medical History:   Diagnosis Date     Gastric ulcer      Low back pain      Maxillary sinus cancer (H)      Toe amputation status     all ten toes        PAST SURGICAL HISTORY:   Past Surgical History:   Procedure Laterality Date     ABDOMEN SURGERY      [facility], surgery related to gastric ulcer     AS AMPUTATION TOE,I-P JT Bilateral     all ten toes     EXENTERATION ORBIT Right 9/24/2019    Procedure: Right Orbital Exenteration;  Surgeon: Jose Roberts MD;  Location: [facility]     EXPLORE NECK Right 9/24/2019    Procedure: Right Neck Exploration;  Surgeon: Jose Roberts MD;  Location: [facility] OR     GRAFT FREE VASCULARIZED LATISSIMUS DORSI Right 9/24/2019    Procedure: Right Latissmus Free Flap Reconstruction;  Surgeon: Teresa Pulliam MD;  Location: [facility] OR     INSERT PORT VASCULAR ACCESS Right 10/31/2019    Procedure: place venous access chest port;  Surgeon: Natasha Mishra PA-C;  [facility]     IR CHEST PORT  PLACEMENT > 5 YRS OF AGE  10/31/2019     OSTEOTOMY MAXILLA N/A 9/24/2019    Procedure: Right Radicall Maxillectomy, Nasogastric Tube Placement;  Surgeon: Jose Roberts MD;  Location:  OR       FAMILY HISTORY:    Family History   Problem Relation Age of Onset     Breast Cancer Mother      Glaucoma No family hx of      Macular Degeneration No family hx of        REVIEW OF SYSTEMS:  12 point ROS was negative other than the symptoms noted above in the HPI.  Patient Supplied Answers to Review of Systems  [facility] ROS 3/13/2020   Constitutional Weight loss, Unexplained fatigue, Problems with sleep   Neurology -   Psychology Frequently feeling anxious   Eyes -   Ears, Nose, Throat -   Musculoskeletal -   Endocrine -         PHYSICAL EXAMINATION:   Ht 1.829 m (6')   Wt 70.3 kg (154 lb 14.4 oz)   BMI 21.01 kg/m     Appearance:   normal; NAD, age-appropriate appearance, well-developed, normal habitus   Communication:   normal; communicates verbally, normal voice quality   Head/Face:   inspection -  Healthy appearing free flap along right orbital exenteration, radiation changes to the skin paddle, lymphedema of the right cheek   Palpation - no palpable masses in the orbital exenteration site or right maxilla   Salivary glands -  Normal size, no tenderness, swelling, or palpable masses   Facial strength -  Decreased right cheek movement related to the maxillectomy   Skin:  normal, no rash   Ocular Motility:  normal occular movements on left  Orbital exenteration on left   Ears:  auricle (AD) -  normal  EAC (AD) -  normal  TM (AD) -  effusion  auricle (AS) -  normal  EAC (AS) -  normal  TM (AS) -  Normal, no effusion  Normal clinical speech reception   Nose:  Ext. inspection -  Normal   Oral Cavity:  lips -  Normal mucosa, oral competence, and stoma size  Edentulous  Healthy appearing free flap skin paddle along right hard palate, no palpable masses, no mucosal lesions on native palate  Tongue protrudes midline    Oropharynx:  mucosa -  Normal, no lesions   Neck: No visible mass or asymmetry   Radiation changes to the neck  Normal range of motion   Lymphatic:  no abnormal nodes   Cardiovascular:  warm, pink, well-perfused extremities without swelling, tenderness, or edema   Respiratory:  Normal respiratory effort, no stridor   Neuro/Psych.:  mood/affect -  Tearful, unable to sit still in chair  mental status -  normal       RESULTS REVIEWED:   I independently reviewed the PET scan images.  There is a good treatment response in the maxilla without obvious recurrence.  There are multiple concerning lung nodules present bilaterally concerning for metastatic disease      IMPRESSION AND PLAN:   Eduardo Rubio is a 59 year old man with a T4N0 SCC of the right maxilla. He underwent surgical resection with maxillectomy and orbital exenteration with reconstruction using a latissimus free flap.  He had postoperative chemoradiation completed in December 2018.    He had his 3-month posttreatment PET scan today.  Unfortunately this shows likely metastatic disease in both lungs.  I discussed these findings with the patient today.  Final read is still pending on the imaging.  I would plan on him being reviewed at lung nodule conference.  We discussed that he is probably going to need chemotherapy given the findings.  He will likely need a biopsy, and may need PDL1 testing.    The patient was understandably upset with the news today.  He was unable to sit through the duration of the visit and left quite abruptly.  He did state that he does not want this information communicated to anybody in his family.  We will contact him with the recommendations from lung nodule conference and the final read of the scan.    He was not up for scheduling any sort of follow-up or seeing our SLP team today.      Thank you very much for the opportunity to participate in the care of your patient.      Teresa Pulliam MD, M.D.  Otolaryngology- Head & Neck  Surgery      This note was dictated with voice recognition software and then edited. Please excuse any unintentional errors.           CC:  Jose Roberts MD  Otolaryngology/Head & Neck Surgery  [facility] [address]      Colt Puentes MD  Otolaryngology/Head & Neck Surgery  [facility] 396

## 2020-03-13 NOTE — NURSING NOTE
Chief Complaint   Patient presents with     Follow Up     Patient is here with follow up on a same day PET scan. Patient was last seen in clinic on 2/5/20. Patient denies pain and has no other concerns.        Ht 6' (182.9 cm)   Wt 154 lb 14.4 oz (70.3 kg)   BMI 21.01 kg/m      Cailin Mace LPN

## 2020-03-15 NOTE — PROGRESS NOTES
Dear Dr. Roberts:    I had the pleasure of seeing Eduardo Rubio in follow-up today at the [facility].     History of Present Illness:   Eduardo Rubio is a 59 year old man with a T4N0 SCC of the maxillary sinus. The patient has a history of facial pain and numbness along with vision changes that resulted in a visit to the ER on 8/18/2019. An MRI was obtained which demonstrated a maxillary sinus mass with extension to the orbit with involvement of the inferior rectus muscle. He had a biopsy performed locally which was consistent with SCC. He saw Dr Wick on 8/29/2019. He was referred to ophthalmology for evaluation of his vision changes. He had a PET scan which showed the tumor in the maxillary sinus with bony erosion, extension to orbit, small lung nodule, no ramona disease. His case was reviewed at tumor conference with recommendation for surgery with total maxillectomy and orbital exenteration with free flap reconstruction. He was taken to the OR on 9/24/2019. He had reconstruction with a latissimus free flap. His postoperative course was uncomplicated. His final pathology demonstrated a 4.4 cm SCC with involvement of the zygomatic and nasal bone, PNI, no LVSI, positive margin at the pterygopalatine fossa with positive vidian nerve. He was recommended for postoperative radiation with consideration of weekly cisplatin.     Teaching statement:  He comes in today for follow-up. He was last seen in my clinic in October 2019 for postoperative visit.  He had postoperative chemoradiation from 10/28/2019 to 12/11/2019 under the care of Dr. Santillan and Dr. Rivera.  He received a total of 6600 cGy and received high-dose cisplatin every 3 weeks.  He had a 4-day treatment break due to machine downtime and then with transportation issues.  He comes in today with his 3-month posttreatment PET scan.  He says that he is having some soreness and some discomfort.  This is not constant in nature.   He denies any hemoptysis, shortness of breath, cough.  He is feeling fatigued.  He says that he is lost about 10 pounds despite eating 3 meals a day.  He says he is trying to eat everything.  He had no problems with coughing on liquids or choking.  He does feel like his cheek is more swollen.  He does have some numbness of his cheek.  He is inquiring when he can have his cheek revised.      MEDICATIONS:     Current Outpatient Medications   Medication Sig Dispense Refill     acetaminophen (TYLENOL) 325 MG tablet Take 325-650 mg by mouth every 6 hours as needed for mild pain       cyclobenzaprine (FLEXERIL) 10 MG tablet TK 1 T PO HS PRN FOR MUSCLE SPASM RELIEF  0     LORazepam (ATIVAN) 0.5 MG tablet Take 1 tablet (0.5 mg) by mouth every 6 hours as needed for nausea 20 tablet 0     oxyCODONE IR (ROXICODONE) 10 MG tablet        senna-docusate (SENOKOT-S/PERICOLACE) 8.6-50 MG tablet 1 tablet by Per Feeding Tube route 2 times daily as needed for constipation 90 tablet 3       ALLERGIES:  No Known Allergies    HABITS/SOCIAL HISTORY:   Smoker 1/2 ppd  No chewing tobacco use  Lives with girlfriend or with friends  No alcohol use  Not currently employed    Social History     Socioeconomic History     Marital status:      Spouse name: Not on file     Number of children: Not on file     Years of education: Not on file     Highest education level: Not on file   Occupational History     Not on file   Social Needs     Financial resource strain: Not on file     Food insecurity     Worry: Not on file     Inability: Not on file     Transportation needs     Medical: Not on file     Non-medical: Not on file   Tobacco Use     Smoking status: Current Every Day Smoker     Packs/day: 0.50     Years: 15.00     Pack years: 7.50     Types: Cigarettes     Start date: 2004     Smokeless tobacco: Never Used   Substance and Sexual Activity     Alcohol use: Not Currently     Drug use: Not Currently     Sexual activity: Not on file    Lifestyle     Physical activity     Days per week: Not on file     Minutes per session: Not on file     Stress: Not on file   Relationships     Social connections     Talks on phone: Not on file     Gets together: Not on file     Attends Restoration[employer] service: Not on file     Active member of club or organization: Not on file     Attends meetings of clubs or organizations: Not on file     Relationship status: Not on file     Intimate partner violence     Fear of current or ex partner: Not on file     Emotionally abused: Not on file     Physically abused: Not on file     Forced sexual activity: Not on file   Other Topics Concern     Not on file   Social History Narrative     Not on file       PAST MEDICAL HISTORY:   Past Medical History:   Diagnosis Date     Gastric ulcer      Low back pain      Maxillary sinus cancer (H)      Toe amputation status     all ten toes        PAST SURGICAL HISTORY:   Past Surgical History:   Procedure Laterality Date     ABDOMEN SURGERY      [facility], surgery related to gastric ulcer     AS AMPUTATION TOE,I-P JT Bilateral     all ten toes     EXENTERATION ORBIT Right 9/24/2019    Procedure: Right Orbital Exenteration;  Surgeon: Jose Roberts MD;  Location: [facility] OR     EXPLORE NECK Right 9/24/2019    Procedure: Right Neck Exploration;  Surgeon: Jose Roberts MD;  Location: [facility] OR     GRAFT FREE VASCULARIZED LATISSIMUS DORSI Right 9/24/2019    Procedure: Right Latissmus Free Flap Reconstruction;  Surgeon: Teresa Pulliam MD;  Location: [facility] OR     INSERT PORT VASCULAR ACCESS Right 10/31/2019    Procedure: place venous access chest port;  Surgeon: Natasha Mishra PA-C;  [facility]     IR CHEST PORT PLACEMENT > 5 YRS OF AGE  10/31/2019     OSTEOTOMY MAXILLA N/A 9/24/2019    Procedure: Right Radicall Maxillectomy, Nasogastric Tube Placement;  Surgeon: Jose Roberts MD;  Location: [facility] OR       FAMILY HISTORY:    Family History   Problem Relation Age of Onset     Breast Cancer  Mother      Glaucoma No family hx of      Macular Degeneration No family hx of        REVIEW OF SYSTEMS:  12 point ROS was negative other than the symptoms noted above in the HPI.  Patient Supplied Answers to Review of Systems  [facility] ROS 3/13/2020   Constitutional Weight loss, Unexplained fatigue, Problems with sleep   Neurology -   Psychology Frequently feeling anxious   Eyes -   Ears, Nose, Throat -   Musculoskeletal -   Endocrine -         PHYSICAL EXAMINATION:   Ht 1.829 m (6')   Wt 70.3 kg (154 lb 14.4 oz)   BMI 21.01 kg/m     Appearance:   normal; NAD, age-appropriate appearance, well-developed, normal habitus   Communication:   normal; communicates verbally, normal voice quality   Head/Face:   inspection -  Healthy appearing free flap along right orbital exenteration, radiation changes to the skin paddle, lymphedema of the right cheek   Palpation - no palpable masses in the orbital exenteration site or right maxilla   Salivary glands -  Normal size, no tenderness, swelling, or palpable masses   Facial strength -  Decreased right cheek movement related to the maxillectomy   Skin:  normal, no rash   Ocular Motility:  normal occular movements on left  Orbital exenteration on left   Ears:  auricle (AD) -  normal  EAC (AD) -  normal  TM (AD) -  effusion  auricle (AS) -  normal  EAC (AS) -  normal  TM (AS) -  Normal, no effusion  Normal clinical speech reception   Nose:  Ext. inspection -  Normal   Oral Cavity:  lips -  Normal mucosa, oral competence, and stoma size  Edentulous  Healthy appearing free flap skin paddle along right hard palate, no palpable masses, no mucosal lesions on native palate  Tongue protrudes midline   Oropharynx:  mucosa -  Normal, no lesions   Neck: No visible mass or asymmetry   Radiation changes to the neck  Normal range of motion   Lymphatic:  no abnormal nodes   Cardiovascular:  warm, pink, well-perfused extremities without swelling, tenderness, or edema   Respiratory:  Normal  respiratory effort, no stridor   Neuro/Psych.:  mood/affect -  Tearful, unable to sit still in chair  mental status -  normal       RESULTS REVIEWED:   I independently reviewed the PET scan images.  There is a good treatment response in the maxilla without obvious recurrence.  There are multiple concerning lung nodules present bilaterally concerning for metastatic disease      IMPRESSION AND PLAN:   Eduardo Rubio is a 59 year old man with a T4N0 SCC of the right maxilla. He underwent surgical resection with maxillectomy and orbital exenteration with reconstruction using a latissimus free flap.  He had postoperative chemoradiation completed in December 2018.    He had his 3-month posttreatment PET scan today.  Unfortunately this shows likely metastatic disease in both lungs.  I discussed these findings with the patient today.  Final read is still pending on the imaging.  I would plan on him being reviewed at lung nodule conference.  We discussed that he is probably going to need chemotherapy given the findings.  He will likely need a biopsy, and may need PDL1 testing.    The patient was understandably upset with the news today.  He was unable to sit through the duration of the visit and left quite abruptly.  He did state that he does not want this information communicated to anybody in his family.  We will contact him with the recommendations from lung nodule conference and the final read of the scan.    He was not up for scheduling any sort of follow-up or seeing our SLP team today.      Thank you very much for the opportunity to participate in the care of your patient.      Teresa Pulliam MD, M.D.  Otolaryngology- Head & Neck Surgery      This note was dictated with voice recognition software and then edited. Please excuse any unintentional errors.           CC:  Jose Roberts MD  Otolaryngology/Head & Neck Surgery  [facility] [address]      Colt Puentes MD  Otolaryngology/Head & Neck Surgery  [facility] [address]

## 2020-03-17 ENCOUNTER — PATIENT OUTREACH (OUTPATIENT)
Dept: ONCOLOGY | Facility: CLINIC | Age: 60
End: 2020-03-17

## 2020-03-17 ENCOUNTER — APPOINTMENT (OUTPATIENT)
Dept: LAB | Facility: CLINIC | Age: 60
End: 2020-03-17
Attending: INTERNAL MEDICINE
Payer: MEDICARE

## 2020-03-17 ENCOUNTER — ONCOLOGY VISIT (OUTPATIENT)
Dept: ONCOLOGY | Facility: CLINIC | Age: 60
End: 2020-03-17
Attending: INTERNAL MEDICINE
Payer: MEDICARE

## 2020-03-17 VITALS
WEIGHT: 156.7 LBS | RESPIRATION RATE: 14 BRPM | SYSTOLIC BLOOD PRESSURE: 116 MMHG | BODY MASS INDEX: 21.25 KG/M2 | HEART RATE: 71 BPM | DIASTOLIC BLOOD PRESSURE: 57 MMHG | TEMPERATURE: 97.7 F | OXYGEN SATURATION: 98 %

## 2020-03-17 DIAGNOSIS — C31.0 MAXILLARY SINUS CANCER (H): Primary | ICD-10-CM

## 2020-03-17 DIAGNOSIS — C78.01 MALIGNANT NEOPLASM METASTATIC TO BOTH LUNGS (H): ICD-10-CM

## 2020-03-17 DIAGNOSIS — C78.02 MALIGNANT NEOPLASM METASTATIC TO BOTH LUNGS (H): ICD-10-CM

## 2020-03-17 LAB
ALBUMIN SERPL-MCNC: 3.4 G/DL (ref 3.4–5)
ALP SERPL-CCNC: 72 U/L (ref 40–150)
ALT SERPL W P-5'-P-CCNC: 22 U/L (ref 0–70)
ANION GAP SERPL CALCULATED.3IONS-SCNC: 5 MMOL/L (ref 3–14)
AST SERPL W P-5'-P-CCNC: 24 U/L (ref 0–45)
BASOPHILS # BLD AUTO: 0 10E9/L (ref 0–0.2)
BASOPHILS NFR BLD AUTO: 0.2 %
BILIRUB SERPL-MCNC: 0.3 MG/DL (ref 0.2–1.3)
BUN SERPL-MCNC: 11 MG/DL (ref 7–30)
CALCIUM SERPL-MCNC: 8.9 MG/DL (ref 8.5–10.1)
CHLORIDE SERPL-SCNC: 98 MMOL/L (ref 94–109)
CO2 SERPL-SCNC: 28 MMOL/L (ref 20–32)
CREAT SERPL-MCNC: 0.82 MG/DL (ref 0.66–1.25)
DIFFERENTIAL METHOD BLD: ABNORMAL
EOSINOPHIL # BLD AUTO: 0.1 10E9/L (ref 0–0.7)
EOSINOPHIL NFR BLD AUTO: 1.5 %
ERYTHROCYTE [DISTWIDTH] IN BLOOD BY AUTOMATED COUNT: 12.9 % (ref 10–15)
GFR SERPL CREATININE-BSD FRML MDRD: >90 ML/MIN/{1.73_M2}
GLUCOSE SERPL-MCNC: 100 MG/DL (ref 70–99)
HCT VFR BLD AUTO: 30.5 % (ref 40–53)
HGB BLD-MCNC: 10.4 G/DL (ref 13.3–17.7)
IMM GRANULOCYTES # BLD: 0 10E9/L (ref 0–0.4)
IMM GRANULOCYTES NFR BLD: 0.2 %
LYMPHOCYTES # BLD AUTO: 0.4 10E9/L (ref 0.8–5.3)
LYMPHOCYTES NFR BLD AUTO: 8.4 %
MAGNESIUM SERPL-MCNC: 1.9 MG/DL (ref 1.6–2.3)
MCH RBC QN AUTO: 33.2 PG (ref 26.5–33)
MCHC RBC AUTO-ENTMCNC: 34.1 G/DL (ref 31.5–36.5)
MCV RBC AUTO: 97 FL (ref 78–100)
MONOCYTES # BLD AUTO: 0.7 10E9/L (ref 0–1.3)
MONOCYTES NFR BLD AUTO: 15.4 %
NEUTROPHILS # BLD AUTO: 3.5 10E9/L (ref 1.6–8.3)
NEUTROPHILS NFR BLD AUTO: 74.3 %
NRBC # BLD AUTO: 0 10*3/UL
NRBC BLD AUTO-RTO: 0 /100
PLATELET # BLD AUTO: 213 10E9/L (ref 150–450)
POTASSIUM SERPL-SCNC: 4.2 MMOL/L (ref 3.4–5.3)
PROT SERPL-MCNC: 7.8 G/DL (ref 6.8–8.8)
RBC # BLD AUTO: 3.13 10E12/L (ref 4.4–5.9)
SODIUM SERPL-SCNC: 132 MMOL/L (ref 133–144)
WBC # BLD AUTO: 4.7 10E9/L (ref 4–11)

## 2020-03-17 PROCEDURE — 83735 ASSAY OF MAGNESIUM: CPT | Performed by: PHYSICIAN ASSISTANT

## 2020-03-17 PROCEDURE — G0463 HOSPITAL OUTPT CLINIC VISIT: HCPCS | Mod: ZF

## 2020-03-17 PROCEDURE — 36591 DRAW BLOOD OFF VENOUS DEVICE: CPT

## 2020-03-17 PROCEDURE — 80053 COMPREHEN METABOLIC PANEL: CPT | Performed by: PHYSICIAN ASSISTANT

## 2020-03-17 PROCEDURE — 25000128 H RX IP 250 OP 636: Mod: ZF | Performed by: INTERNAL MEDICINE

## 2020-03-17 PROCEDURE — 85025 COMPLETE CBC W/AUTO DIFF WBC: CPT | Performed by: PHYSICIAN ASSISTANT

## 2020-03-17 PROCEDURE — 99215 OFFICE O/P EST HI 40 MIN: CPT | Mod: ZP | Performed by: INTERNAL MEDICINE

## 2020-03-17 RX ORDER — LORAZEPAM 2 MG/ML
0.5 INJECTION INTRAMUSCULAR EVERY 4 HOURS PRN
Status: CANCELLED
Start: 2020-04-23

## 2020-03-17 RX ORDER — LORAZEPAM 2 MG/ML
0.5 INJECTION INTRAMUSCULAR EVERY 4 HOURS PRN
Status: CANCELLED
Start: 2020-01-01

## 2020-03-17 RX ORDER — ALBUTEROL SULFATE 0.83 MG/ML
2.5 SOLUTION RESPIRATORY (INHALATION)
Status: CANCELLED | OUTPATIENT
Start: 2020-04-23

## 2020-03-17 RX ORDER — EPINEPHRINE 0.3 MG/.3ML
0.3 INJECTION SUBCUTANEOUS EVERY 5 MIN PRN
Status: CANCELLED | OUTPATIENT
Start: 2020-04-23

## 2020-03-17 RX ORDER — EPINEPHRINE 1 MG/ML
0.3 INJECTION, SOLUTION INTRAMUSCULAR; SUBCUTANEOUS EVERY 5 MIN PRN
Status: CANCELLED | OUTPATIENT
Start: 2020-04-23

## 2020-03-17 RX ORDER — MEPERIDINE HYDROCHLORIDE 25 MG/ML
25 INJECTION INTRAMUSCULAR; INTRAVENOUS; SUBCUTANEOUS EVERY 30 MIN PRN
Status: CANCELLED | OUTPATIENT
Start: 2020-03-17

## 2020-03-17 RX ORDER — HEPARIN SODIUM (PORCINE) LOCK FLUSH IV SOLN 100 UNIT/ML 100 UNIT/ML
5 SOLUTION INTRAVENOUS
Status: CANCELLED | OUTPATIENT
Start: 2020-03-17

## 2020-03-17 RX ORDER — ALBUTEROL SULFATE 90 UG/1
1-2 AEROSOL, METERED RESPIRATORY (INHALATION)
Status: CANCELLED
Start: 2020-01-01

## 2020-03-17 RX ORDER — HEPARIN SODIUM,PORCINE 10 UNIT/ML
5 VIAL (ML) INTRAVENOUS
Status: CANCELLED | OUTPATIENT
Start: 2020-03-17

## 2020-03-17 RX ORDER — EPINEPHRINE 1 MG/ML
0.3 INJECTION, SOLUTION INTRAMUSCULAR; SUBCUTANEOUS EVERY 5 MIN PRN
Status: CANCELLED | OUTPATIENT
Start: 2020-01-01

## 2020-03-17 RX ORDER — DIPHENHYDRAMINE HYDROCHLORIDE 50 MG/ML
50 INJECTION INTRAMUSCULAR; INTRAVENOUS
Status: CANCELLED
Start: 2020-04-23

## 2020-03-17 RX ORDER — DIPHENHYDRAMINE HYDROCHLORIDE 50 MG/ML
50 INJECTION INTRAMUSCULAR; INTRAVENOUS
Status: CANCELLED
Start: 2020-01-01

## 2020-03-17 RX ORDER — NALOXONE HYDROCHLORIDE 0.4 MG/ML
.1-.4 INJECTION, SOLUTION INTRAMUSCULAR; INTRAVENOUS; SUBCUTANEOUS
Status: CANCELLED | OUTPATIENT
Start: 2020-03-17

## 2020-03-17 RX ORDER — NALOXONE HYDROCHLORIDE 0.4 MG/ML
.1-.4 INJECTION, SOLUTION INTRAMUSCULAR; INTRAVENOUS; SUBCUTANEOUS
Status: CANCELLED | OUTPATIENT
Start: 2020-04-23

## 2020-03-17 RX ORDER — LORAZEPAM 2 MG/ML
0.5 INJECTION INTRAMUSCULAR EVERY 4 HOURS PRN
Status: CANCELLED
Start: 2020-03-17

## 2020-03-17 RX ORDER — LORAZEPAM 0.5 MG/1
0.5 TABLET ORAL EVERY 4 HOURS PRN
Qty: 45 TABLET | Refills: 2 | Status: SHIPPED | OUTPATIENT
Start: 2020-03-17 | End: 2020-01-01

## 2020-03-17 RX ORDER — HEPARIN SODIUM,PORCINE 10 UNIT/ML
5 VIAL (ML) INTRAVENOUS
Status: CANCELLED | OUTPATIENT
Start: 2020-01-01

## 2020-03-17 RX ORDER — MEPERIDINE HYDROCHLORIDE 25 MG/ML
25 INJECTION INTRAMUSCULAR; INTRAVENOUS; SUBCUTANEOUS EVERY 30 MIN PRN
Status: CANCELLED | OUTPATIENT
Start: 2020-01-01

## 2020-03-17 RX ORDER — ALBUTEROL SULFATE 0.83 MG/ML
2.5 SOLUTION RESPIRATORY (INHALATION)
Status: CANCELLED | OUTPATIENT
Start: 2020-03-17

## 2020-03-17 RX ORDER — HEPARIN SODIUM,PORCINE 10 UNIT/ML
5 VIAL (ML) INTRAVENOUS
Status: CANCELLED | OUTPATIENT
Start: 2020-04-23

## 2020-03-17 RX ORDER — SODIUM CHLORIDE 9 MG/ML
1000 INJECTION, SOLUTION INTRAVENOUS CONTINUOUS PRN
Status: CANCELLED
Start: 2020-01-01

## 2020-03-17 RX ORDER — ALBUTEROL SULFATE 0.83 MG/ML
2.5 SOLUTION RESPIRATORY (INHALATION)
Status: CANCELLED | OUTPATIENT
Start: 2020-01-01

## 2020-03-17 RX ORDER — METHYLPREDNISOLONE SODIUM SUCCINATE 125 MG/2ML
125 INJECTION, POWDER, LYOPHILIZED, FOR SOLUTION INTRAMUSCULAR; INTRAVENOUS
Status: CANCELLED
Start: 2020-01-01

## 2020-03-17 RX ORDER — ALBUTEROL SULFATE 90 UG/1
1-2 AEROSOL, METERED RESPIRATORY (INHALATION)
Status: CANCELLED
Start: 2020-04-23

## 2020-03-17 RX ORDER — HEPARIN SODIUM (PORCINE) LOCK FLUSH IV SOLN 100 UNIT/ML 100 UNIT/ML
5 SOLUTION INTRAVENOUS
Status: CANCELLED | OUTPATIENT
Start: 2020-04-23

## 2020-03-17 RX ORDER — SODIUM CHLORIDE 9 MG/ML
1000 INJECTION, SOLUTION INTRAVENOUS CONTINUOUS PRN
Status: CANCELLED
Start: 2020-03-17

## 2020-03-17 RX ORDER — PROCHLORPERAZINE MALEATE 10 MG
10 TABLET ORAL EVERY 6 HOURS PRN
Qty: 30 TABLET | Refills: 2 | Status: SHIPPED | OUTPATIENT
Start: 2020-03-17 | End: 2020-01-01

## 2020-03-17 RX ORDER — METHYLPREDNISOLONE SODIUM SUCCINATE 125 MG/2ML
125 INJECTION, POWDER, LYOPHILIZED, FOR SOLUTION INTRAMUSCULAR; INTRAVENOUS
Status: CANCELLED
Start: 2020-04-23

## 2020-03-17 RX ORDER — EPINEPHRINE 1 MG/ML
0.3 INJECTION, SOLUTION INTRAMUSCULAR; SUBCUTANEOUS EVERY 5 MIN PRN
Status: CANCELLED | OUTPATIENT
Start: 2020-03-17

## 2020-03-17 RX ORDER — ALBUTEROL SULFATE 90 UG/1
1-2 AEROSOL, METERED RESPIRATORY (INHALATION)
Status: CANCELLED
Start: 2020-03-17

## 2020-03-17 RX ORDER — EPINEPHRINE 0.3 MG/.3ML
0.3 INJECTION SUBCUTANEOUS EVERY 5 MIN PRN
Status: CANCELLED | OUTPATIENT
Start: 2020-01-01

## 2020-03-17 RX ORDER — METHYLPREDNISOLONE SODIUM SUCCINATE 125 MG/2ML
125 INJECTION, POWDER, LYOPHILIZED, FOR SOLUTION INTRAMUSCULAR; INTRAVENOUS
Status: CANCELLED
Start: 2020-03-17

## 2020-03-17 RX ORDER — SODIUM CHLORIDE 9 MG/ML
1000 INJECTION, SOLUTION INTRAVENOUS CONTINUOUS PRN
Status: CANCELLED
Start: 2020-04-23

## 2020-03-17 RX ORDER — NALOXONE HYDROCHLORIDE 0.4 MG/ML
.1-.4 INJECTION, SOLUTION INTRAMUSCULAR; INTRAVENOUS; SUBCUTANEOUS
Status: CANCELLED | OUTPATIENT
Start: 2020-01-01

## 2020-03-17 RX ORDER — DIPHENHYDRAMINE HYDROCHLORIDE 50 MG/ML
50 INJECTION INTRAMUSCULAR; INTRAVENOUS
Status: CANCELLED
Start: 2020-03-17

## 2020-03-17 RX ORDER — HEPARIN SODIUM (PORCINE) LOCK FLUSH IV SOLN 100 UNIT/ML 100 UNIT/ML
5 SOLUTION INTRAVENOUS DAILY PRN
Status: DISCONTINUED | OUTPATIENT
Start: 2020-03-17 | End: 2020-03-22 | Stop reason: HOSPADM

## 2020-03-17 RX ORDER — MEPERIDINE HYDROCHLORIDE 25 MG/ML
25 INJECTION INTRAMUSCULAR; INTRAVENOUS; SUBCUTANEOUS EVERY 30 MIN PRN
Status: CANCELLED | OUTPATIENT
Start: 2020-04-23

## 2020-03-17 RX ORDER — HEPARIN SODIUM (PORCINE) LOCK FLUSH IV SOLN 100 UNIT/ML 100 UNIT/ML
5 SOLUTION INTRAVENOUS
Status: CANCELLED | OUTPATIENT
Start: 2020-01-01

## 2020-03-17 RX ORDER — EPINEPHRINE 0.3 MG/.3ML
0.3 INJECTION SUBCUTANEOUS EVERY 5 MIN PRN
Status: CANCELLED | OUTPATIENT
Start: 2020-03-17

## 2020-03-17 RX ADMIN — Medication 5 ML: at 12:46

## 2020-03-17 ASSESSMENT — PAIN SCALES - GENERAL: PAINLEVEL: NO PAIN (0)

## 2020-03-17 NOTE — PROGRESS NOTES
Oncology RN Care Coordination Note:     Writer met with patient briefly to provide [facility] [employer] paperwork for Nivolumab.     Patient didn't want to get the specifics, he asked Dr. Ferraro if we even needed to meet or not.  Writer provided the information and patient opted to wait in the lobby to hear if insurance has approved his treatment today.    Cailin Diaz, RN BSN   [facility]  Nurse Coordinator

## 2020-03-17 NOTE — PROGRESS NOTES
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  - 9/24/19: Total maxillectomy with orbital exenteration performed by Dr. Roberts, followed by reconstruction with a latissimus free flap with Dr. Pulliam.   - Surgical pathology showed a 4.4 cm moderately differentiated squamous cell carcinoma, (-) LVSI, (+)PNI. There was a positive margin at the pterygopalatine fossa, specifically the vidian nerve taken at its exit from the vidian canal, and additional resection into the canal was not possible.    CURRENT INTERVENTIONS:  Concurrent chemoradiation with high dose cisplatin day 1, 11/1/19, Day 22 11/20/19, Day 43 12/13/19     HISTORY OF PRESENT ILLNESS   Eduardo Rubio is 59 year old  who has been diagnosed with locally advanced right maxillary sinus cancer.    Eduardo presented to an outside ED in 08/18/2019 with complaints of right facial pressure, numbness, right-sided visual change and nasal drainage for several days' duration. Imaging workup included an MRI which demonstrated a maxillary sinus mass with extension to the orbit with involvement of the inferior rectus muscle. 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 Mr. Rubio was referred to [facility]. He had staging PET/CT on 9/9/2019 which revealed a FDG-avid maxillary mass at 4.0 x 3.9 x 4.2 cm. There was tumor extension into the right orbital cavity superiorly, the right nasal cavity medially, the retromaxillary fat region posterolaterally, the right pterygopalatine fossa posteriorly, and the premaxillary fat anteriorly. In the orbital cavity  there was abutment of the inferior rectus muscle. There was no evidence of lymphadenopathy or systemic disease. His case was reviewed at tumor conference with recommendation for surgery with total maxillectomy and orbital exenteration with free flap reconstruction.     Mr. Rubio underwent a total maxillectomy with orbital exenteration on 9/24/2019 with Dr. Roberts, followed by reconstruction with a latissimus free flap with Dr. Pulliam. Surgical pathology showed a 4.4 cm moderately differentiated squamous cell carcinoma, (-) LVSI, (+)PNI. There was a positive margin at the pterygopalatine fossa, specifically the vidian nerve taken at its exit from the vidian canal, and additional resection into the canal was not possible. Mr. Rubio's case was discussed in the [facility]'s multidisciplinary head and neck tumor board, with the consensus recommendation to proceed with adjuvant chemoradiation given the positive margin status. Patient received day 1 cisplatin on 11/1/19 and day 22 on 11/20/19 and Day 43 on 12/13/19.     INTERIM HISTORY:  Eduardo presents today for follow up.     His He continues to have pain along his right cheek but its getting better.  Prior to cancer, he was taking 10 mg TID for his chronic back/feet pain, this was escalated during cancer therapy and now he has been able to get back to 10 mg 3-4 x day, so near his baseline.  He gets his oxycodone through his pain clinc. His mouth is healing, he is eating normal food 2-3 x a day and drinking gatorade.  He is using stools softeners.  He is intermittently taking lorazepam for nausea or sleep.   He reports getting in at least 64 ounces of fluid per day.  He had a hearing test today.  He still feels tired, but is doing OK.  He is smoking 1/2 ppd.      PAST MEDICAL HISTORY     Past Medical History:   Diagnosis Date     Gastric ulcer      Low back pain      Maxillary sinus cancer (H)      Toe amputation status     all ten toes          PHYSICAL EXAM   General: The patient is a pleasant male in no acute distress.  /57   Pulse 71   Temp 97.7  F (36.5  C) (Oral)   Resp 14   Wt 71.1 kg (156 lb 11.2 oz)   SpO2 98%   BMI 21.25 kg/m    Wt Readings from Last 10 Encounters:   03/17/20 71.1 kg (156 lb 11.2 oz)   03/13/20 70.3 kg (154 lb 14.4 oz)   02/05/20 74.8 kg (165 lb)   01/22/20 74.7 kg (164 lb 11.2 oz)   01/22/20 74.7 kg (164 lb 11.2 oz)   12/27/19 73.7 kg (162 lb 8 oz)   12/20/19 72.7 kg (160 lb 3.2 oz)   12/13/19 71.7 kg (158 lb)   12/11/19 73 kg (161 lb)   12/05/19 73 kg (161 lb)   HEENT: Right face is bulky from free flap. Radiation dermatitis is healed.  Hyperpigmentation of the skin noted. EOMI, PERRL. Left sclerae are anicteric. Oral mucosa is pink and moist   Lymph: Neck is supple with no lymphadenopathy in the cervical or supraclavicular areas.   Heart: Regular rate and rhythm.   Lungs: Clear to auscultation bilaterally.   Abdomen: Bowel sounds present, soft, nontender with no palpable hepatosplenomegaly or masses.   Extremities: No lower extremity edema noted bilaterally.   Neuro: Cranial nerves II through XII are grossly intact.  Skin: still has a healing scab on his nose, othewise skin is looking well healed.      LABORATORY AND IMAGING STUDIES     Recent Labs   Lab Test 03/17/20  1250 01/22/20  1414 12/20/19  0853 12/13/19  0732 11/29/19  1309   * 131* 132* 132* 133   POTASSIUM 4.2 4.8 3.0* 3.7 3.2*   CHLORIDE 98 101 96 98 97   CO2 28 27 32 30 33*   ANIONGAP 5 2* 5 4 3   BUN 11 18 17 10 16   CR 0.82 0.84 0.94 0.72 0.91   * 91 81 87 92   MIKEY 8.9 8.5 7.5* 8.7 8.6     Recent Labs   Lab Test 03/17/20  1250 01/22/20  1414 12/20/19  0853 12/13/19  0732 11/29/19  1309  09/30/19  0638 09/29/19  0710 09/28/19  0716 09/27/19  0455 09/26/19  0424   MAG 1.9 2.0 1.2* 1.8 1.7   < > 2.1 2.3 2.3 2.0 2.2   PHOS  --   --   --   --   --   --  3.6 3.8 5.1* 4.0 3.7    < > = values in this interval not displayed.     Recent Labs   Lab  Test 03/17/20  1250 01/22/20  1414 12/20/19  0853 12/13/19  0732 11/29/19  1309   WBC 4.7 3.6* 2.8* 2.1* 2.4*   HGB 10.4* 9.6* 9.7* 10.1* 10.3*    242 130* 153 119*   MCV 97 87 84 86 87   NEUTROPHIL 74.3 62.1 67.4 60.8 58.8     Recent Labs   Lab Test 03/17/20  1250 01/22/20  1414 12/20/19  0853   BILITOTAL 0.3 0.4 0.2   ALKPHOS 72 53 57   ALT 22 23 40   AST 24 25 37   ALBUMIN 3.4 3.5 3.1*     TSH   Date Value Ref Range Status   01/22/2020 1.74 0.40 - 4.00 mU/L Final   09/25/2019 0.34 (L) 0.40 - 4.00 mU/L Final     No results for input(s): CEA in the last 54553 hours.  Results for orders placed or performed during the hospital encounter of 03/13/20   CT Soft Tissue Neck w Contrast    Narrative    PET CT fusion examination 3/13/2020 11:00 AM  1. Neck CT with contrast  2. PET study of the neck  3. PET CT fusion study of the neck    History:  Squamous cell carcinoma of the right maxillary sinus status  post surgery and chemoradiation therapy    Comparison: 9/9/2019.    Technique: Please refer to the accompanying whole body PET-CT for  report of the dose and whole body PET-CT findings.  Regarding the neck, axial images were obtained after nonionic  intravenous contrast administration, with sagittal and coronal  reconstructions performed. Neck CT images were reviewed in bone, soft  tissue, and lung windows, with review of the fused PET-CT images as  well in multiple planes.    Findings:  Postsurgical changes of right maxillary osteotomy and right orbital  exenteration with flap reconstruction. Small focus of residual uptake  anterior to the right anterior mandible with SUV max of 4.93. No  associated CT abnormality.     No other foci of residual uptake in the surgical bed. Left  submandibular gland is slightly more metabolically active than the  contralateral, still within physiologic limits. The remainder of the  salivary glands are normal.     Postradiation soft tissue edema.     No suspicious lymphadenopathy.      Main neck vessels are patent.     No suspicious focal brain lesions. Numerous hypermetabolic lesions at  the lung apices, consistent with progressive lung metastatic disease.      Impression    Impression: In this patient with history of squamous cell carcinoma of  the right maxillary sinus, status post surgery and chemoradiation  therapy:  1. Postsurgical changes of right maxillary osteotomy and orbital  exenteration. Focus of increased uptake anterior right mandible,  without associated CT abnormality. Favored to be postsurgical in the  absence of CT of normal with CT low FDG uptake. Attention on follow  up.  2. No cervical lymphadenopathy.  3. Partially visualized lung apices demonstrate numerous new bilateral  lung metastatic disease. Please refer to the whole body PET CT  performed as a separate report, for the findings of the remainder of  the body.    I have personally reviewed the examination and initial interpretation  and I agree with the findings.    SARAH MICHELLE MD     Combined Report of:    PET and CT on  3/13/2020 10:58 AM :     1. PET of the neck, chest, abdomen, and pelvis.  2. PET CT Fusion for Attenuation Correction and Anatomical  Localization:    3. Diagnostic CT scan of the chest, abdomen, and pelvis with  intravenous contrast for interpretation.  3. CT of the chest, abdomen and pelvis obtained for diagnostic  interpretation.  4. 3D MIP and PET-CT fused images were processed on an independent  workstation and archived to PACS and reviewed by a radiologist.     Technique:     1. PET: The patient received 10.22 mCi of F-18-FDG; the serum glucose  was 102 prior to administration, body weight was 69.1 kg. Images were  evaluated in the axial, sagittal, and coronal planes as well as the  rotational whole body MIP. Images were acquired from the Vertex to the  Feet.     UPTAKE WAS MEASURED AT 63 MINUTES.      BACKGROUND:  Liver SUV max= 2.88,   Aorta Blood SUV Max: 2.55.      2. CT: Volumetric  acquisition for clinical interpretation of the  chest, abdomen, and pelvis acquired at 3 mm sections . The chest,  abdomen, and pelvis were evaluated at 5 mm sections in bone, soft  tissue, and lung windows.       The patient received 93 cc. Of Isovue 370 intravenously for the  examination.    --     3. 3D MIP and PET-CT fused images were processed on an independent  workstation and archived to PACS and reviewed by a radiologist.     INDICATION: Head/neck CA, h/o chemoradiation therapy.  First restaging  12 weeks after therapy.; Squamous cell carcinoma of maxillary sinus  (H)     ADDITIONAL INFORMATION OBTAINED FROM EMR: pT4a N0 M0 squamous cell  carcinoma of the right maxillary sinus. Underwent a?maxillectomy and  orbital exenteration on 9/24/2019. Adjuvant chemoradiotherapy as  described above, receiving a total dose of 66 Gy in 33 fractions which  he completed on 12/11/2019 with concurrent high-dose cisplatin.?     COMPARISON: 9/9/2020     FINDINGS:      HEAD/NECK:   See dedicated neuroradiology report for the results of the high  resolution PET CT of the neck.      CHEST:  Innumerable new lesions in the bilateral lungs, some which are  cavitary, the largest in the subpleural region on the left measuring  2.4 cm with SUV max of 7.35. No suspicious mediastinal or hilar  lymphadenopathy. There are calcified lymph nodes in the in the right  mediastinum, likely related to granulomatous disease. Right chest port  with distal tip at the cavoatrial junction. Heart is within normal  limits.     ABDOMEN AND PELVIS:  Liver, gallbladder, spleen, kidneys and adrenal glands are normal.  Circumferential urinary bladder thickening, similar to prior. The  prostate is enlarged with coarse calcifications. No evidence for  abdominal lymphadenopathy. Patent main vessels. No suspicious focal  bowel uptake.     LOWER EXTREMITIES:   No abnormal masses or hypermetabolic lesions.     BONES:   There are no suspicious lytic or blastic  osseous lesions.  There is no  abnormal FDG uptake in the skeleton.                                                                      IMPRESSION: In this patient with history of squamous cell carcinoma of  the right maxillary sinus, status post surgery and chemoradiation  therapy, there has been disease progression:  1. Innumerable new diffuse hypermetabolic lung lesions representing  metastatic disease.  2. No metastatic disease below the diaphragm.   3. See dedicated neuroradiology report for the results of the high  resolution PET CT of the neck.         I have personally reviewed the examination and initial interpretation  and I agree with the findings.     JO ANN PLASCENCIA MD        ASSESSMENT AND PLAN   1. 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 No medical comorbidities  3. Nicotine abuse - chronic smoker   4. Poor social support; lives alone with a dog    Locally advanced maxillary sinus squamous cell cancer that extended in to the right orbit with new metastasis to bilateral lungs    -s/p total right maxilectomy with orbital exenteration   -surgical margin were positive making it a high risk disease for recurrence.    -completed concurrent chemoradiation therapy in Dec 2019  - PET/CT on 3/13/20  I have reviewed the actual images from his restaging scans done last week.  There are numerous new lesions in the lung that are quite consistent with metastatic disease.  I offered to show him the scans but he refused to see them.  He wondered if he had 1-2 or exact number of lesions.  He does have numerous lung metastases.      He took the message quite well.  He had already been disclosed about the diagnosis by Dr. Pulliam.  He wanted to know how to explain this to his family members.  He inquired about his prognosis.  I explained to him that recurrent metastatic head and neck cancer usually carries a grim long-term prognosis.  However, in a small fraction of patients, I have seen  excellent responses to immunotherapy and occasionally even to chemotherapy.  Majority of patients, however, progress and eventually die within the first year.      I would recommend immunotherapy with nivolumab for him as he has progressed within 3 months of completing concurrent chemoradiation therapy with cisplatin.  As a radiation sensitizer, I will add a full dose of 100 mg/m2.      I extensively reviewed immunotherapy with a PD-1 inhibitor - nivolumab. I explained him the concept of checkpoint inhibitor with a physiological role to sustain an effective immune response. PD-L1 is expressed on healthy, normal tissue in the area of trauma which binds to PD1 on the T cells to inhibit it. This ensures that the immune response is limited to dead tissue and the infecting agents and does not extend over to the normal tissue. The tumor uses some of these checks and balances to its advantage expressing PDL1 and successfully evades the immune system. Nivolumab would permit anti-tumor response if there are anti-tumor white cells present. However this places patient at risk for some autoimmunity which when it involves skin give rash and is called dermatitis, with gut it presents with diarrhea and is called colitis, and with lung it gives shortness of breath and is called pneumonitis. Similarly, depending on the affected tissue, we might have hepatitis, nephritis, thyroiditis, hypophysitis and so on.     Nivolumab is administered intravenously every 4 weeks as an outpatient. For the most part, only a few percentage of patients has substantial side effects; for example, pneumonitis or hepatitis. In these cases, we identify and act aggressively. We can use oral steroids to suppress the autoimmune response. The antitumor response is known to persist even beyond that. We would continue to treat as long as there are no unacceptable side effects and tumor is relatively stable without significant disease progression up to a year. If  there is no evidence of disease beyond that, I would hold therapy.      I would try to get him started on therapy today if I can get his nivolumab approved. I will see him in 3 months with labs and restaging CT scans. He does not need to see a provider prior to next visit in 4 weeks. I stressed about precautions due to the ongoing COVID 19 pandemic.     Insomnia: He has been taking lorazepam at bed time for help with sleeping. It has been harder in the recent past.     Over 45 min of direct face to face time spent with patient with more than 50% time spent in counseling and coordinating care.      Javier Ferraro MD  [facility]  [address], [place] [id number] [phone]

## 2020-03-17 NOTE — PATIENT INSTRUCTIONS
Colorectal Cancer Screening: During our visit today, we discussed that it is recommended you receive colorectal cancer screening. Please call or make an appointment with your primary care provider to discuss this. You may also call the [facility] scheduling line ([phone]) to set up a colonoscopy appointment.

## 2020-03-17 NOTE — NURSING NOTE
Chief Complaint   Patient presents with     Port Draw     Labs drawn via port by RN in lab. VS taken.      Labs drawn via Port accessed using 20g gripper needle. Line flushed and Heparin locked. Vital signs taken. Checked into next appointment.       Domi Banda RN

## 2020-03-17 NOTE — LETTER
3/17/2020       RE: Eduardo Rubio  [address] [facility] [address]     Dear Colleague,    Thank you for referring your patient, Eduardo Rubio, to the [facility]. Please see a copy of my visit note below.    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  - 9/24/19: Total maxillectomy with orbital exenteration performed by Dr. Roberts, followed by reconstruction with a latissimus free flap with Dr. Pulliam.   - Surgical pathology showed a 4.4 cm moderately differentiated squamous cell carcinoma, (-) LVSI, (+)PNI. There was a positive margin at the pterygopalatine fossa, specifically the vidian nerve taken at its exit from the vidian canal, and additional resection into the canal was not possible.    CURRENT INTERVENTIONS:  Concurrent chemoradiation with high dose cisplatin day 1, 11/1/19, Day 22 11/20/19, Day 43 12/13/19     HISTORY OF PRESENT ILLNESS   Eduardo Rubio is 59 year old  who has been diagnosed with locally advanced right maxillary sinus cancer.    Eduardo presented to an outside ED in 08/18/2019 with complaints of right facial pressure, numbness, right-sided visual change and nasal drainage for several days' duration. Imaging workup included an MRI which demonstrated a maxillary sinus mass with extension to the orbit with involvement of the inferior rectus muscle. 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 Mr. Rubio was referred to [facility]. He had staging PET/CT on 9/9/2019 which revealed a FDG-avid maxillary mass at 4.0 x 3.9 x 4.2 cm. There was  tumor extension into the right orbital cavity superiorly, the right nasal cavity medially, the retromaxillary fat region posterolaterally, the right pterygopalatine fossa posteriorly, and the premaxillary fat anteriorly. In the orbital cavity there was abutment of the inferior rectus muscle. There was no evidence of lymphadenopathy or systemic disease. His case was reviewed at tumor conference with recommendation for surgery with total maxillectomy and orbital exenteration with free flap reconstruction.     Mr. Rubio underwent a total maxillectomy with orbital exenteration on 9/24/2019 with Dr. Roberts, followed by reconstruction with a latissimus free flap with Dr. Pulliam. Surgical pathology showed a 4.4 cm moderately differentiated squamous cell carcinoma, (-) LVSI, (+)PNI. There was a positive margin at the pterygopalatine fossa, specifically the vidian nerve taken at its exit from the vidian canal, and additional resection into the canal was not possible. Mr. Rubio's case was discussed in the [facility]'s multidisciplinary head and neck tumor board, with the consensus recommendation to proceed with adjuvant chemoradiation given the positive margin status. Patient received day 1 cisplatin on 11/1/19 and day 22 on 11/20/19 and Day 43 on 12/13/19.     INTERIM HISTORY:  Eduardo presents today for follow up.     His He continues to have pain along his right cheek but its getting better.  Prior to cancer, he was taking 10 mg TID for his chronic back/feet pain, this was escalated during cancer therapy and now he has been able to get back to 10 mg 3-4 x day, so near his baseline.  He gets his oxycodone through his pain clinc. His mouth is healing, he is eating normal food 2-3 x a day and drinking gatorade.  He is using stools softeners.  He is intermittently taking lorazepam for nausea or sleep.   He reports getting in at least 64 ounces of fluid per day.  He had a hearing test today.  He still feels  tired, but is doing OK.  He is smoking 1/2 ppd.      PAST MEDICAL HISTORY     Past Medical History:   Diagnosis Date     Gastric ulcer      Low back pain      Maxillary sinus cancer (H)      Toe amputation status     all ten toes         PHYSICAL EXAM   General: The patient is a pleasant male in no acute distress.  /57   Pulse 71   Temp 97.7  F (36.5  C) (Oral)   Resp 14   Wt 71.1 kg (156 lb 11.2 oz)   SpO2 98%   BMI 21.25 kg/m    Wt Readings from Last 10 Encounters:   03/17/20 71.1 kg (156 lb 11.2 oz)   03/13/20 70.3 kg (154 lb 14.4 oz)   02/05/20 74.8 kg (165 lb)   01/22/20 74.7 kg (164 lb 11.2 oz)   01/22/20 74.7 kg (164 lb 11.2 oz)   12/27/19 73.7 kg (162 lb 8 oz)   12/20/19 72.7 kg (160 lb 3.2 oz)   12/13/19 71.7 kg (158 lb)   12/11/19 73 kg (161 lb)   12/05/19 73 kg (161 lb)   HEENT: Right face is bulky from free flap. Radiation dermatitis is healed.  Hyperpigmentation of the skin noted. EOMI, PERRL. Left sclerae are anicteric. Oral mucosa is pink and moist   Lymph: Neck is supple with no lymphadenopathy in the cervical or supraclavicular areas.   Heart: Regular rate and rhythm.   Lungs: Clear to auscultation bilaterally.   Abdomen: Bowel sounds present, soft, nontender with no palpable hepatosplenomegaly or masses.   Extremities: No lower extremity edema noted bilaterally.   Neuro: Cranial nerves II through XII are grossly intact.  Skin: still has a healing scab on his nose, othewise skin is looking well healed.      LABORATORY AND IMAGING STUDIES     Recent Labs   Lab Test 03/17/20  1250 01/22/20  1414 12/20/19  0853 12/13/19  0732 11/29/19  1309   * 131* 132* 132* 133   POTASSIUM 4.2 4.8 3.0* 3.7 3.2*   CHLORIDE 98 101 96 98 97   CO2 28 27 32 30 33*   ANIONGAP 5 2* 5 4 3   BUN 11 18 17 10 16   CR 0.82 0.84 0.94 0.72 0.91   * 91 81 87 92   MIKEY 8.9 8.5 7.5* 8.7 8.6     Recent Labs   Lab Test 03/17/20  1250 01/22/20  1414 12/20/19  0853 12/13/19  0732 11/29/19  1309  09/30/19  0638  09/29/19  0710 09/28/19  0716 09/27/19  0455 09/26/19  0424   MAG 1.9 2.0 1.2* 1.8 1.7   < > 2.1 2.3 2.3 2.0 2.2   PHOS  --   --   --   --   --   --  3.6 3.8 5.1* 4.0 3.7    < > = values in this interval not displayed.     Recent Labs   Lab Test 03/17/20  1250 01/22/20  1414 12/20/19  0853 12/13/19  0732 11/29/19  1309   WBC 4.7 3.6* 2.8* 2.1* 2.4*   HGB 10.4* 9.6* 9.7* 10.1* 10.3*    242 130* 153 119*   MCV 97 87 84 86 87   NEUTROPHIL 74.3 62.1 67.4 60.8 58.8     Recent Labs   Lab Test 03/17/20  1250 01/22/20  1414 12/20/19  0853   BILITOTAL 0.3 0.4 0.2   ALKPHOS 72 53 57   ALT 22 23 40   AST 24 25 37   ALBUMIN 3.4 3.5 3.1*     TSH   Date Value Ref Range Status   01/22/2020 1.74 0.40 - 4.00 mU/L Final   09/25/2019 0.34 (L) 0.40 - 4.00 mU/L Final     No results for input(s): CEA in the last [id number] hours.  Results for orders placed or performed during the hospital encounter of 03/13/20   CT Soft Tissue Neck w Contrast    Narrative    PET CT fusion examination 3/13/2020 11:00 AM  1. Neck CT with contrast  2. PET study of the neck  3. PET CT fusion study of the neck    History:  Squamous cell carcinoma of the right maxillary sinus status  post surgery and chemoradiation therapy    Comparison: 9/9/2019.    Technique: Please refer to the accompanying whole body PET-CT for  report of the dose and whole body PET-CT findings.  Regarding the neck, axial images were obtained after nonionic  intravenous contrast administration, with sagittal and coronal  reconstructions performed. Neck CT images were reviewed in bone, soft  tissue, and lung windows, with review of the fused PET-CT images as  well in multiple planes.    Findings:  Postsurgical changes of right maxillary osteotomy and right orbital  exenteration with flap reconstruction. Small focus of residual uptake  anterior to the right anterior mandible with SUV max of 4.93. No  associated CT abnormality.     No other foci of residual uptake in the surgical bed.  Left  submandibular gland is slightly more metabolically active than the  contralateral, still within physiologic limits. The remainder of the  salivary glands are normal.     Postradiation soft tissue edema.     No suspicious lymphadenopathy.     Main neck vessels are patent.     No suspicious focal brain lesions. Numerous hypermetabolic lesions at  the lung apices, consistent with progressive lung metastatic disease.      Impression    Impression: In this patient with history of squamous cell carcinoma of  the right maxillary sinus, status post surgery and chemoradiation  therapy:  1. Postsurgical changes of right maxillary osteotomy and orbital  exenteration. Focus of increased uptake anterior right mandible,  without associated CT abnormality. Favored to be postsurgical in the  absence of CT of normal with CT low FDG uptake. Attention on follow  up.  2. No cervical lymphadenopathy.  3. Partially visualized lung apices demonstrate numerous new bilateral  lung metastatic disease. Please refer to the whole body PET CT  performed as a separate report, for the findings of the remainder of  the body.    I have personally reviewed the examination and initial interpretation  and I agree with the findings.    SARAH MICHELLE MD     Combined Report of:    PET and CT on  3/13/2020 10:58 AM :     1. PET of the neck, chest, abdomen, and pelvis.  2. PET CT Fusion for Attenuation Correction and Anatomical  Localization:    3. Diagnostic CT scan of the chest, abdomen, and pelvis with  intravenous contrast for interpretation.  3. CT of the chest, abdomen and pelvis obtained for diagnostic  interpretation.  4. 3D MIP and PET-CT fused images were processed on an independent  workstation and archived to PACS and reviewed by a radiologist.     Technique:     1. PET: The patient received 10.22 mCi of F-18-FDG; the serum glucose  was 102 prior to administration, body weight was 69.1 kg. Images were  evaluated in the axial, sagittal,  and coronal planes as well as the  rotational whole body MIP. Images were acquired from the Vertex to the  Feet.     UPTAKE WAS MEASURED AT 63 MINUTES.      BACKGROUND:  Liver SUV max= 2.88,   Aorta Blood SUV Max: 2.55.      2. CT: Volumetric acquisition for clinical interpretation of the  chest, abdomen, and pelvis acquired at 3 mm sections . The chest,  abdomen, and pelvis were evaluated at 5 mm sections in bone, soft  tissue, and lung windows.       The patient received 93 cc. Of Isovue 370 intravenously for the  examination.    --     3. 3D MIP and PET-CT fused images were processed on an independent  workstation and archived to PACS and reviewed by a radiologist.     INDICATION: Head/neck CA, h/o chemoradiation therapy.  First restaging  12 weeks after therapy.; Squamous cell carcinoma of maxillary sinus  (H)     ADDITIONAL INFORMATION OBTAINED FROM EMR: pT4a N0 M0 squamous cell  carcinoma of the right maxillary sinus. Underwent a?maxillectomy and  orbital exenteration on 9/24/2019. Adjuvant chemoradiotherapy as  described above, receiving a total dose of 66 Gy in 33 fractions which  he completed on 12/11/2019 with concurrent high-dose cisplatin.?     COMPARISON: 9/9/2020     FINDINGS:      HEAD/NECK:   See dedicated neuroradiology report for the results of the high  resolution PET CT of the neck.      CHEST:  Innumerable new lesions in the bilateral lungs, some which are  cavitary, the largest in the subpleural region on the left measuring  2.4 cm with SUV max of 7.35. No suspicious mediastinal or hilar  lymphadenopathy. There are calcified lymph nodes in the in the right  mediastinum, likely related to granulomatous disease. Right chest port  with distal tip at the cavoatrial junction. Heart is within normal  limits.     ABDOMEN AND PELVIS:  Liver, gallbladder, spleen, kidneys and adrenal glands are normal.  Circumferential urinary bladder thickening, similar to prior. The  prostate is enlarged with coarse  calcifications. No evidence for  abdominal lymphadenopathy. Patent main vessels. No suspicious focal  bowel uptake.     LOWER EXTREMITIES:   No abnormal masses or hypermetabolic lesions.     BONES:   There are no suspicious lytic or blastic osseous lesions.  There is no  abnormal FDG uptake in the skeleton.                                                                      IMPRESSION: In this patient with history of squamous cell carcinoma of  the right maxillary sinus, status post surgery and chemoradiation  therapy, there has been disease progression:  1. Innumerable new diffuse hypermetabolic lung lesions representing  metastatic disease.  2. No metastatic disease below the diaphragm.   3. See dedicated neuroradiology report for the results of the high  resolution PET CT of the neck.         I have personally reviewed the examination and initial interpretation  and I agree with the findings.     JO ANN PLASCENCIA MD        ASSESSMENT AND PLAN   1. 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 No medical comorbidities  3. Nicotine abuse - chronic smoker   4. Poor social support; lives alone with a dog    Locally advanced maxillary sinus squamous cell cancer that extended in to the right orbit with new metastasis to bilateral lungs    -s/p total right maxilectomy with orbital exenteration   -surgical margin were positive making it a high risk disease for recurrence.    -completed concurrent chemoradiation therapy in Dec 2019  - PET/CT on 3/13/20  I have reviewed the actual images from his restaging scans done last week.  There are numerous new lesions in the lung that are quite consistent with metastatic disease.  I offered to show him the scans but he refused to see them.  He wondered if he had 1-2 or exact number of lesions.  He does have numerous lung metastases.      He took the message quite well.  He had already been disclosed about the diagnosis by Dr. Pulliam.  He wanted to know how to  explain this to his family members.  He inquired about his prognosis.  I explained to him that recurrent metastatic head and neck cancer usually carries a grim long-term prognosis.  However, in a small fraction of patients, I have seen excellent responses to immunotherapy and occasionally even to chemotherapy.  Majority of patients, however, progress and eventually die within the first year.      I would recommend immunotherapy with nivolumab for him as he has progressed within 3 months of completing concurrent chemoradiation therapy with cisplatin.  As a radiation sensitizer, I will add a full dose of 100 mg/m2.      I extensively reviewed immunotherapy with a PD-1 inhibitor - nivolumab. I explained him the concept of checkpoint inhibitor with a physiological role to sustain an effective immune response. PD-L1 is expressed on healthy, normal tissue in the area of trauma which binds to PD1 on the T cells to inhibit it. This ensures that the immune response is limited to dead tissue and the infecting agents and does not extend over to the normal tissue. The tumor uses some of these checks and balances to its advantage expressing PDL1 and successfully evades the immune system. Nivolumab would permit anti-tumor response if there are anti-tumor white cells present. However this places patient at risk for some autoimmunity which when it involves skin give rash and is called dermatitis, with gut it presents with diarrhea and is called colitis, and with lung it gives shortness of breath and is called pneumonitis. Similarly, depending on the affected tissue, we might have hepatitis, nephritis, thyroiditis, hypophysitis and so on.     Nivolumab is administered intravenously every 4 weeks as an outpatient. For the most part, only a few percentage of patients has substantial side effects; for example, pneumonitis or hepatitis. In these cases, we identify and act aggressively. We can use oral steroids to suppress the autoimmune  response. The antitumor response is known to persist even beyond that. We would continue to treat as long as there are no unacceptable side effects and tumor is relatively stable without significant disease progression up to a year. If there is no evidence of disease beyond that, I would hold therapy.      I would try to get him started on therapy today if I can get his nivolumab approved. I will see him in 3 months with labs and restaging CT scans. He does not need to see a provider prior to next visit in 4 weeks. I stressed about precautions due to the ongoing COVID 19 pandemic.     Insomnia: He has been taking lorazepam at bed time for help with sleeping. It has been harder in the recent past.     Over 45 min of direct face to face time spent with patient with more than 50% time spent in counseling and coordinating care.      Javier Ferraro MD  [facility] [address] [id number] [phone]      Again, thank you for allowing me to participate in the care of your patient.      Sincerely,    Javier Ferraro MD

## 2020-03-17 NOTE — NURSING NOTE
Oncology Rooming Note    March 17, 2020 1:16 PM   Eduardo Rubio is a 59 year old male who presents for:    Chief Complaint   Patient presents with     Port Draw     Labs drawn via port by RN in lab. VS taken.      Oncology Clinic Visit     Return; Sinus Ca     Initial Vitals: /57   Pulse 71   Temp 97.7  F (36.5  C) (Oral)   Resp 14   Wt 71.1 kg (156 lb 11.2 oz)   SpO2 98%   BMI 21.25 kg/m   Estimated body mass index is 21.25 kg/m  as calculated from the following:    Height as of 3/13/20: 1.829 m (6').    Weight as of this encounter: 71.1 kg (156 lb 11.2 oz). Body surface area is 1.9 meters squared.  No Pain (0) Comment: Data Unavailable   No LMP for male patient.  Allergies reviewed: Yes  Medications reviewed: Yes    Medications: Medication refills not needed today.  Pharmacy name entered into [employer]: [facility] [place], [place] - [address] [facility] [id number]    Clinical concerns: PET scan and lab results; Refill on Ativan       Ashlee Millard, [employer]

## 2020-03-18 ENCOUNTER — PATIENT OUTREACH (OUTPATIENT)
Dept: OTOLARYNGOLOGY | Facility: CLINIC | Age: 60
End: 2020-03-18

## 2020-03-26 ENCOUNTER — INFUSION THERAPY VISIT (OUTPATIENT)
Dept: ONCOLOGY | Facility: CLINIC | Age: 60
End: 2020-03-26
Attending: INTERNAL MEDICINE
Payer: MEDICARE

## 2020-03-26 ENCOUNTER — APPOINTMENT (OUTPATIENT)
Dept: LAB | Facility: CLINIC | Age: 60
End: 2020-03-26
Attending: INTERNAL MEDICINE
Payer: MEDICARE

## 2020-03-26 VITALS
OXYGEN SATURATION: 99 % | HEART RATE: 75 BPM | TEMPERATURE: 98.1 F | RESPIRATION RATE: 18 BRPM | WEIGHT: 153.7 LBS | DIASTOLIC BLOOD PRESSURE: 82 MMHG | BODY MASS INDEX: 20.85 KG/M2 | SYSTOLIC BLOOD PRESSURE: 133 MMHG

## 2020-03-26 DIAGNOSIS — C78.01 MALIGNANT NEOPLASM METASTATIC TO BOTH LUNGS (H): Primary | ICD-10-CM

## 2020-03-26 DIAGNOSIS — C31.0 MAXILLARY SINUS CANCER (H): ICD-10-CM

## 2020-03-26 DIAGNOSIS — C78.02 MALIGNANT NEOPLASM METASTATIC TO BOTH LUNGS (H): Primary | ICD-10-CM

## 2020-03-26 LAB
ALBUMIN SERPL-MCNC: 3.5 G/DL (ref 3.4–5)
ALP SERPL-CCNC: 62 U/L (ref 40–150)
ALT SERPL W P-5'-P-CCNC: 23 U/L (ref 0–70)
ANION GAP SERPL CALCULATED.3IONS-SCNC: 4 MMOL/L (ref 3–14)
AST SERPL W P-5'-P-CCNC: 26 U/L (ref 0–45)
BASOPHILS # BLD AUTO: 0 10E9/L (ref 0–0.2)
BASOPHILS NFR BLD AUTO: 0.6 %
BILIRUB SERPL-MCNC: 0.3 MG/DL (ref 0.2–1.3)
BUN SERPL-MCNC: 18 MG/DL (ref 7–30)
CALCIUM SERPL-MCNC: 8.6 MG/DL (ref 8.5–10.1)
CHLORIDE SERPL-SCNC: 101 MMOL/L (ref 94–109)
CO2 SERPL-SCNC: 26 MMOL/L (ref 20–32)
CREAT SERPL-MCNC: 0.94 MG/DL (ref 0.66–1.25)
DIFFERENTIAL METHOD BLD: ABNORMAL
EOSINOPHIL # BLD AUTO: 0.1 10E9/L (ref 0–0.7)
EOSINOPHIL NFR BLD AUTO: 1.8 %
ERYTHROCYTE [DISTWIDTH] IN BLOOD BY AUTOMATED COUNT: 12.5 % (ref 10–15)
GFR SERPL CREATININE-BSD FRML MDRD: 88 ML/MIN/{1.73_M2}
GLUCOSE SERPL-MCNC: 84 MG/DL (ref 70–99)
HCT VFR BLD AUTO: 32.2 % (ref 40–53)
HGB BLD-MCNC: 10.8 G/DL (ref 13.3–17.7)
IMM GRANULOCYTES # BLD: 0 10E9/L (ref 0–0.4)
IMM GRANULOCYTES NFR BLD: 0.2 %
LYMPHOCYTES # BLD AUTO: 0.5 10E9/L (ref 0.8–5.3)
LYMPHOCYTES NFR BLD AUTO: 11 %
MAGNESIUM SERPL-MCNC: 2.1 MG/DL (ref 1.6–2.3)
MCH RBC QN AUTO: 32.8 PG (ref 26.5–33)
MCHC RBC AUTO-ENTMCNC: 33.5 G/DL (ref 31.5–36.5)
MCV RBC AUTO: 98 FL (ref 78–100)
MONOCYTES # BLD AUTO: 0.8 10E9/L (ref 0–1.3)
MONOCYTES NFR BLD AUTO: 15.5 %
NEUTROPHILS # BLD AUTO: 3.5 10E9/L (ref 1.6–8.3)
NEUTROPHILS NFR BLD AUTO: 70.9 %
NRBC # BLD AUTO: 0 10*3/UL
NRBC BLD AUTO-RTO: 0 /100
PLATELET # BLD AUTO: 212 10E9/L (ref 150–450)
POTASSIUM SERPL-SCNC: 4.2 MMOL/L (ref 3.4–5.3)
PROT SERPL-MCNC: 7.9 G/DL (ref 6.8–8.8)
RBC # BLD AUTO: 3.29 10E12/L (ref 4.4–5.9)
SODIUM SERPL-SCNC: 131 MMOL/L (ref 133–144)
TSH SERPL DL<=0.005 MIU/L-ACNC: 2.04 MU/L (ref 0.4–4)
WBC # BLD AUTO: 4.9 10E9/L (ref 4–11)

## 2020-03-26 PROCEDURE — 85025 COMPLETE CBC W/AUTO DIFF WBC: CPT | Performed by: INTERNAL MEDICINE

## 2020-03-26 PROCEDURE — 80053 COMPREHEN METABOLIC PANEL: CPT | Performed by: INTERNAL MEDICINE

## 2020-03-26 PROCEDURE — 84443 ASSAY THYROID STIM HORMONE: CPT | Performed by: INTERNAL MEDICINE

## 2020-03-26 PROCEDURE — 96413 CHEMO IV INFUSION 1 HR: CPT

## 2020-03-26 PROCEDURE — 25000128 H RX IP 250 OP 636: Mod: ZF | Performed by: INTERNAL MEDICINE

## 2020-03-26 PROCEDURE — 83735 ASSAY OF MAGNESIUM: CPT | Performed by: INTERNAL MEDICINE

## 2020-03-26 PROCEDURE — 25800030 ZZH RX IP 258 OP 636: Mod: ZF | Performed by: INTERNAL MEDICINE

## 2020-03-26 RX ORDER — HEPARIN SODIUM (PORCINE) LOCK FLUSH IV SOLN 100 UNIT/ML 100 UNIT/ML
5 SOLUTION INTRAVENOUS
Status: DISCONTINUED | OUTPATIENT
Start: 2020-03-26 | End: 2020-03-26 | Stop reason: HOSPADM

## 2020-03-26 RX ORDER — HEPARIN SODIUM (PORCINE) LOCK FLUSH IV SOLN 100 UNIT/ML 100 UNIT/ML
5 SOLUTION INTRAVENOUS EVERY 8 HOURS
Status: DISCONTINUED | OUTPATIENT
Start: 2020-03-26 | End: 2020-03-26 | Stop reason: HOSPADM

## 2020-03-26 RX ADMIN — Medication 5 ML: at 10:47

## 2020-03-26 RX ADMIN — Medication 5 ML: at 08:55

## 2020-03-26 RX ADMIN — SODIUM CHLORIDE 250 ML: 9 INJECTION, SOLUTION INTRAVENOUS at 10:17

## 2020-03-26 RX ADMIN — SODIUM CHLORIDE 480 MG: 9 INJECTION, SOLUTION INTRAVENOUS at 10:17

## 2020-03-26 ASSESSMENT — PAIN SCALES - GENERAL: PAINLEVEL: NO PAIN (0)

## 2020-03-26 NOTE — PATIENT INSTRUCTIONS
Contact Numbers    [facility] [place]for Scheduling/Triage/After Hours Nurse Line): [phone]    Please call the [facility] nurse triage or the after hours nurse line if you experience a temperature greater than or equal to 100.5, shaking chills, have uncontrolled nausea, vomiting and/or diarrhea, dizziness, lightheadedness, shortness of breath, chest pain, bleeding, unexplained bruising, or if you have any other new/concerning symptoms, questions or concerns.     If you are having any concerning symptoms or wish to speak to a provider before your next infusion visit, please call your care coordinator or triage to notify them so we can adequately serve you.     If you need a refill on a narcotic prescription or other medication, please call triage before your infusion appointment.       March 2020 Sunday Monday Tuesday Wednesday Thursday Friday Saturday   1     2     3     4     5     6     7       8     9     10     11     12     13    CT SOFT TISSUE NECK W   9:00 AM   (30 min.)   UUCT3   [facility] PET CT    PE EYE/TH ONCOLOGY   9:00 AM   (120 min.)   PETTIME   [facility] PET CT    P RETURN  12:45 PM   (20 min.)   Teresa Pulliam MD   [facility] Nose [facility] Thro[facility] 14       15     16     17    [facility] DRAW  12:30 PM   (15 min.)   [facility]   [facility] Lab Draw    [facility] RETURN  12:45 PM   (30 min.)   Javier Ferraro MD   [facility] 18     19     20     21       22     23     24     25     26    [facility] DRAW   8:30 AM   (15 min.)    [facility] DRAW   [facility] Lab Draw    [facility] ONC INFUSION 60   9:00 AM   (60 min.)    ONCOLOGY INFUSION   [facility] 27     28       29     30     31                                     April 2020 Sunday Monday Tuesday Wednesday Thursday Friday Saturday                  1     2     3     4       5     6     7     8     9     10     11       12     13     14     15     16     17      18       19     20     21     22     23    [facility] LAB DRAW   9:00 AM   (15 min.)   [facility] LAB DRAW   [facility] Lab Draw    [facility] ONC INFUSION 60   9:30 AM   (60 min.)    ONCOLOGY INFUSION   [facility] 24     25       26     27     28     29     30                               Lab Results:  Recent Results (from the past 12 hour(s))   Comprehensive metabolic panel    Collection Time: 03/26/20  8:56 AM   Result Value Ref Range    Sodium 131 (L) 133 - 144 mmol/L    Potassium 4.2 3.4 - 5.3 mmol/L    Chloride 101 94 - 109 mmol/L    Carbon Dioxide 26 20 - 32 mmol/L    Anion Gap 4 3 - 14 mmol/L    Glucose 84 70 - 99 mg/dL    Urea Nitrogen 18 7 - 30 mg/dL    Creatinine 0.94 0.66 - 1.25 mg/dL    GFR Estimate 88 >60 mL/min/[1.73_m2]    GFR Estimate If Black >90 >60 mL/min/[1.73_m2]    Calcium 8.6 8.5 - 10.1 mg/dL    Bilirubin Total 0.3 0.2 - 1.3 mg/dL    Albumin 3.5 3.4 - 5.0 g/dL    Protein Total 7.9 6.8 - 8.8 g/dL    Alkaline Phosphatase 62 40 - 150 U/L    ALT 23 0 - 70 U/L    AST 26 0 - 45 U/L   TSH with free T4 reflex    Collection Time: 03/26/20  8:56 AM   Result Value Ref Range    TSH 2.04 0.40 - 4.00 mU/L   CBC with platelets differential    Collection Time: 03/26/20  8:56 AM   Result Value Ref Range    WBC 4.9 4.0 - 11.0 10e9/L    RBC Count 3.29 (L) 4.4 - 5.9 10e12/L    Hemoglobin 10.8 (L) 13.3 - 17.7 g/dL    Hematocrit 32.2 (L) 40.0 - 53.0 %    MCV 98 78 - 100 fl    MCH 32.8 26.5 - 33.0 pg    MCHC 33.5 31.5 - 36.5 g/dL    RDW 12.5 10.0 - 15.0 %    Platelet Count 212 150 - 450 10e9/L    Diff Method Automated Method     % Neutrophils 70.9 %    % Lymphocytes 11.0 %    % Monocytes 15.5 %    % Eosinophils 1.8 %    % Basophils 0.6 %    % Immature Granulocytes 0.2 %    Nucleated RBCs 0 0 /100    Absolute Neutrophil 3.5 1.6 - 8.3 10e9/L    Absolute Lymphocytes 0.5 (L) 0.8 - 5.3 10e9/L    Absolute Monocytes 0.8 0.0 - 1.3 10e9/L    Absolute Eosinophils 0.1 0.0 - 0.7 10e9/L    Absolute  Basophils 0.0 0.0 - 0.2 10e9/L    Abs Immature Granulocytes 0.0 0 - 0.4 10e9/L    Absolute Nucleated RBC 0.0    Magnesium    Collection Time: 03/26/20  8:56 AM   Result Value Ref Range    Magnesium 2.1 1.6 - 2.3 mg/dL

## 2020-03-26 NOTE — PROGRESS NOTES
Infusion Nursing Note:  Eduardo Rubio presents today for Cycle 1 Nivolumab.    Patient seen by provider today: No    Note: Pt reports feeling well and offers no concerns at this time. Offered to review common potential side effects and general information, but pt did not want too much information. Aware he will get Nivolumab monthly and gave pt the number to call with any concerns.    Intravenous Access:  Peripheral IV placed.    Treatment Conditions:  Lab Results   Component Value Date    HGB 10.8 03/26/2020     Lab Results   Component Value Date    WBC 4.9 03/26/2020      Lab Results   Component Value Date    ANEU 3.5 03/26/2020     Lab Results   Component Value Date     03/26/2020      Lab Results   Component Value Date     03/26/2020                   Lab Results   Component Value Date    POTASSIUM 4.2 03/26/2020           Lab Results   Component Value Date    MAG 2.1 03/26/2020            Lab Results   Component Value Date    CR 0.94 03/26/2020                   Lab Results   Component Value Date    MIKEY 8.6 03/26/2020                Lab Results   Component Value Date    BILITOTAL 0.3 03/26/2020           Lab Results   Component Value Date    ALBUMIN 3.5 03/26/2020                    Lab Results   Component Value Date    ALT 23 03/26/2020           Lab Results   Component Value Date    AST 26 03/26/2020     Results reviewed, labs MET treatment parameters, ok to proceed with treatment.    Post Infusion Assessment:  Patient tolerated infusion without incident.  Blood return noted pre and post infusion.  Site patent and intact, free from redness, edema or discomfort.  No evidence of extravasations. Access discontinued per protocol.     Discharge Plan:   Patient left infusion room before Ativan could be filled. RN tried calling patient with no answer. Pt was aware it was ready for him in [facility] for pickup.  Copy of AVS reviewed with patient and/or family.  Patient will return 4/23 for next  appointment.  Patient discharged in stable condition accompanied by: self.  Departure Mode: Ambulatory.    Nelsy Poole RN

## 2020-04-14 ENCOUNTER — TELEPHONE (OUTPATIENT)
Dept: OTOLARYNGOLOGY | Facility: CLINIC | Age: 60
End: 2020-04-14

## 2020-04-14 NOTE — TELEPHONE ENCOUNTER
Attempted to contact patient to schedule follow up with Dr. Pulliam in May, as advised by Svitlana CAPPS RN.    Patient's VM has not been set up yet; unable to leave message.

## 2020-04-23 ENCOUNTER — INFUSION THERAPY VISIT (OUTPATIENT)
Dept: ONCOLOGY | Facility: CLINIC | Age: 60
End: 2020-04-23
Attending: INTERNAL MEDICINE
Payer: MEDICARE

## 2020-04-23 VITALS
WEIGHT: 157.9 LBS | TEMPERATURE: 97.5 F | SYSTOLIC BLOOD PRESSURE: 132 MMHG | DIASTOLIC BLOOD PRESSURE: 87 MMHG | OXYGEN SATURATION: 97 % | BODY MASS INDEX: 21.42 KG/M2 | RESPIRATION RATE: 16 BRPM | HEART RATE: 89 BPM

## 2020-04-23 DIAGNOSIS — C31.0 MAXILLARY SINUS CANCER (H): ICD-10-CM

## 2020-04-23 DIAGNOSIS — C78.01 MALIGNANT NEOPLASM METASTATIC TO BOTH LUNGS (H): Primary | ICD-10-CM

## 2020-04-23 DIAGNOSIS — C78.02 MALIGNANT NEOPLASM METASTATIC TO BOTH LUNGS (H): Primary | ICD-10-CM

## 2020-04-23 LAB
ALBUMIN SERPL-MCNC: 3.3 G/DL (ref 3.4–5)
ALP SERPL-CCNC: 89 U/L (ref 40–150)
ALT SERPL W P-5'-P-CCNC: 34 U/L (ref 0–70)
ANION GAP SERPL CALCULATED.3IONS-SCNC: 5 MMOL/L (ref 3–14)
AST SERPL W P-5'-P-CCNC: 42 U/L (ref 0–45)
BASOPHILS # BLD AUTO: 0 10E9/L (ref 0–0.2)
BASOPHILS NFR BLD AUTO: 0.3 %
BILIRUB SERPL-MCNC: 0.3 MG/DL (ref 0.2–1.3)
BUN SERPL-MCNC: 12 MG/DL (ref 7–30)
CALCIUM SERPL-MCNC: 8.5 MG/DL (ref 8.5–10.1)
CHLORIDE SERPL-SCNC: 101 MMOL/L (ref 94–109)
CO2 SERPL-SCNC: 28 MMOL/L (ref 20–32)
CREAT SERPL-MCNC: 0.79 MG/DL (ref 0.66–1.25)
DIFFERENTIAL METHOD BLD: ABNORMAL
EOSINOPHIL # BLD AUTO: 0.3 10E9/L (ref 0–0.7)
EOSINOPHIL NFR BLD AUTO: 3.7 %
ERYTHROCYTE [DISTWIDTH] IN BLOOD BY AUTOMATED COUNT: 13 % (ref 10–15)
GFR SERPL CREATININE-BSD FRML MDRD: >90 ML/MIN/{1.73_M2}
GLUCOSE SERPL-MCNC: 114 MG/DL (ref 70–99)
HCT VFR BLD AUTO: 33.1 % (ref 40–53)
HGB BLD-MCNC: 11.3 G/DL (ref 13.3–17.7)
IMM GRANULOCYTES # BLD: 0 10E9/L (ref 0–0.4)
IMM GRANULOCYTES NFR BLD: 0.4 %
LYMPHOCYTES # BLD AUTO: 0.5 10E9/L (ref 0.8–5.3)
LYMPHOCYTES NFR BLD AUTO: 7.3 %
MAGNESIUM SERPL-MCNC: 1.7 MG/DL (ref 1.6–2.3)
MCH RBC QN AUTO: 32.3 PG (ref 26.5–33)
MCHC RBC AUTO-ENTMCNC: 34.1 G/DL (ref 31.5–36.5)
MCV RBC AUTO: 95 FL (ref 78–100)
MONOCYTES # BLD AUTO: 0.8 10E9/L (ref 0–1.3)
MONOCYTES NFR BLD AUTO: 11.2 %
NEUTROPHILS # BLD AUTO: 5.3 10E9/L (ref 1.6–8.3)
NEUTROPHILS NFR BLD AUTO: 77.1 %
NRBC # BLD AUTO: 0 10*3/UL
NRBC BLD AUTO-RTO: 0 /100
PLATELET # BLD AUTO: 195 10E9/L (ref 150–450)
POTASSIUM SERPL-SCNC: 3.8 MMOL/L (ref 3.4–5.3)
PROT SERPL-MCNC: 8.4 G/DL (ref 6.8–8.8)
RBC # BLD AUTO: 3.5 10E12/L (ref 4.4–5.9)
SODIUM SERPL-SCNC: 134 MMOL/L (ref 133–144)
TSH SERPL DL<=0.005 MIU/L-ACNC: 0.79 MU/L (ref 0.4–4)
WBC # BLD AUTO: 6.9 10E9/L (ref 4–11)

## 2020-04-23 PROCEDURE — 80053 COMPREHEN METABOLIC PANEL: CPT | Performed by: INTERNAL MEDICINE

## 2020-04-23 PROCEDURE — 96413 CHEMO IV INFUSION 1 HR: CPT

## 2020-04-23 PROCEDURE — 25000128 H RX IP 250 OP 636: Mod: ZF | Performed by: INTERNAL MEDICINE

## 2020-04-23 PROCEDURE — 84443 ASSAY THYROID STIM HORMONE: CPT | Performed by: INTERNAL MEDICINE

## 2020-04-23 PROCEDURE — 25800030 ZZH RX IP 258 OP 636: Mod: ZF | Performed by: INTERNAL MEDICINE

## 2020-04-23 PROCEDURE — 83735 ASSAY OF MAGNESIUM: CPT | Performed by: INTERNAL MEDICINE

## 2020-04-23 PROCEDURE — 85025 COMPLETE CBC W/AUTO DIFF WBC: CPT | Performed by: INTERNAL MEDICINE

## 2020-04-23 RX ORDER — HEPARIN SODIUM (PORCINE) LOCK FLUSH IV SOLN 100 UNIT/ML 100 UNIT/ML
5 SOLUTION INTRAVENOUS EVERY 8 HOURS
Status: DISCONTINUED | OUTPATIENT
Start: 2020-04-23 | End: 2020-04-23 | Stop reason: HOSPADM

## 2020-04-23 RX ORDER — HEPARIN SODIUM (PORCINE) LOCK FLUSH IV SOLN 100 UNIT/ML 100 UNIT/ML
5 SOLUTION INTRAVENOUS
Status: DISCONTINUED | OUTPATIENT
Start: 2020-04-23 | End: 2020-04-23 | Stop reason: HOSPADM

## 2020-04-23 RX ADMIN — Medication 5 ML: at 10:24

## 2020-04-23 RX ADMIN — SODIUM CHLORIDE 480 MG: 9 INJECTION, SOLUTION INTRAVENOUS at 10:21

## 2020-04-23 RX ADMIN — Medication 5 ML: at 09:16

## 2020-04-23 ASSESSMENT — PAIN SCALES - GENERAL: PAINLEVEL: MILD PAIN (3)

## 2020-04-23 NOTE — PROGRESS NOTES
Chief Complaint   Patient presents with     Blood Draw     port accessed, labs drawn, vs taken     Port accessed and labs drawn by RN. Positive blood return noted. Port flushed with NS and Heparin. Patient tolerated well. Vital signs taken. Patient checked in to next appointment.    Renetta Coyle RN

## 2020-04-23 NOTE — PROGRESS NOTES
Infusion Nursing Note:  Eduardo Rubio presents today for C2 Nivolumab.    Patient seen by provider today: No   present during visit today: Not Applicable.    Note: Pt arrived to clinic feeling relatively well.  No s/s of infection and denies bleeding issues.  Feels OK for treatment.    Intravenous Access:  Implanted Port.    Treatment Conditions:  Lab Results   Component Value Date    HGB 11.3 04/23/2020     Lab Results   Component Value Date    WBC 6.9 04/23/2020      Lab Results   Component Value Date    ANEU 5.3 04/23/2020     Lab Results   Component Value Date     04/23/2020      Lab Results   Component Value Date     04/23/2020                   Lab Results   Component Value Date    POTASSIUM 3.8 04/23/2020           Lab Results   Component Value Date    MAG 1.7 04/23/2020            Lab Results   Component Value Date    CR 0.79 04/23/2020                   Lab Results   Component Value Date    MIKEY 8.5 04/23/2020                Lab Results   Component Value Date    BILITOTAL 0.3 04/23/2020           Lab Results   Component Value Date    ALBUMIN 3.3 04/23/2020                    Lab Results   Component Value Date    ALT 34 04/23/2020           Lab Results   Component Value Date    AST 42 04/23/2020       Results reviewed, labs MET treatment parameters, ok to proceed with treatment.  without incident.    Post Infusion Assessment:  Patient tolerated infusion   Blood return noted pre and post infusion.  Site patent and intact, free from redness, edema or discomfort.  No evidence of extravasations.  Access discontinued per protocol.       Discharge Plan:   Prescription refills given for Ativan.  Discharge instructions reviewed with: Patient.  Patient and/or family verbalized understanding of discharge instructions and all questions answered.  Copy of AVS reviewed with patient and/or family. Pt does not have any follow provider appts or infusion appts.  IB sent to Dr Ferraro and J[facility]on[facility] to  schedule.  Pt aware to contact scheduling if he does not receive updated schedule in the mail.  Patient discharged in stable condition accompanied by: self.  Departure Mode: Ambulatory.    Kim Galan RN

## 2020-04-24 DIAGNOSIS — C31.0 MAXILLARY SINUS CANCER (H): Primary | ICD-10-CM

## 2020-04-24 DIAGNOSIS — C78.01 MALIGNANT NEOPLASM METASTATIC TO BOTH LUNGS (H): ICD-10-CM

## 2020-04-24 DIAGNOSIS — C78.02 MALIGNANT NEOPLASM METASTATIC TO BOTH LUNGS (H): ICD-10-CM

## 2020-05-06 DIAGNOSIS — C78.02 MALIGNANT NEOPLASM METASTATIC TO BOTH LUNGS (H): ICD-10-CM

## 2020-05-06 DIAGNOSIS — C31.0 MAXILLARY SINUS CANCER (H): ICD-10-CM

## 2020-05-06 DIAGNOSIS — C78.01 MALIGNANT NEOPLASM METASTATIC TO BOTH LUNGS (H): ICD-10-CM

## 2020-05-06 RX ORDER — LORAZEPAM 0.5 MG/1
TABLET ORAL
Qty: 45 TABLET | Refills: 2 | OUTPATIENT
Start: 2020-05-06

## 2020-05-21 NOTE — PROGRESS NOTES
Infusion Nursing Note:  Eduardo Rubio presents today for Cycle 3 Day 1 Nivolumab.    Patient seen by provider today: No   present during visit today: Not Applicable.    Note: Patient reports continued fatigue.  He otherwise offers no new concerns at this time.    Intravenous Access:  Implanted Port.    Treatment Conditions:  Lab Results   Component Value Date    HGB 10.8 05/21/2020     Lab Results   Component Value Date    WBC 5.5 05/21/2020      Lab Results   Component Value Date    ANEU 3.8 05/21/2020     Lab Results   Component Value Date     05/21/2020      Lab Results   Component Value Date     05/21/2020                   Lab Results   Component Value Date    POTASSIUM 3.8 05/21/2020           Lab Results   Component Value Date    MAG 1.7 04/23/2020            Lab Results   Component Value Date    CR 0.76 05/21/2020                   Lab Results   Component Value Date    MIKEY 8.8 05/21/2020                Lab Results   Component Value Date    BILITOTAL 0.3 05/21/2020           Lab Results   Component Value Date    ALBUMIN 3.5 05/21/2020                    Lab Results   Component Value Date    ALT 26 05/21/2020           Lab Results   Component Value Date    AST 43 05/21/2020       Results reviewed, labs MET treatment parameters, ok to proceed with treatment.      Post Infusion Assessment:  Patient tolerated infusion without incident.  Blood return noted pre and post infusion.  Site patent and intact, free from redness, edema or discomfort.  No evidence of extravasations.  Access discontinued per protocol.       Discharge Plan:   Patient declined prescription refills.  Discharge instructions reviewed with: Patient.  Patient and/or family verbalized understanding of discharge instructions and all questions answered.  AVS to patient via [employer].  Patient will return 6/18/2020 for next appointment.   Patient discharged in stable condition accompanied by: self.  Departure Mode:  Ambulatory.  Face to Face time: 0.    ZHANG CAMEJO RN

## 2020-05-22 NOTE — LETTER
5/22/2020       RE: Eduardo Rubio  [address]  [facility] [address]     Dear Colleague,    Thank you for referring your patient, Eduardo Rubio, to the [facility] at [facility]. Please see a copy of my visit note below.    Dear Dr. Roberts:    I had the pleasure of seeing Eduardo Rubio in follow-up today at the [facility].     History of Present Illness:   Eduardo Rubio is a 59 year old man with a T4N0 SCC of the maxillary sinus. The patient has a history of facial pain and numbness along with vision changes that resulted in a visit to the ER on 8/18/2019. An MRI was obtained which demonstrated a maxillary sinus mass with extension to the orbit with involvement of the inferior rectus muscle. He had a biopsy performed locally which was consistent with SCC. He saw Dr Wick on 8/29/2019. He was referred to ophthalmology for evaluation of his vision changes. He had a PET scan which showed the tumor in the maxillary sinus with bony erosion, extension to orbit, small lung nodule, no ramona disease. His case was reviewed at tumor conference with recommendation for surgery with total maxillectomy and orbital exenteration with free flap reconstruction. He was taken to the OR on 9/24/2019. He had reconstruction with a latissimus free flap. His postoperative course was uncomplicated. His final pathology demonstrated a 4.4 cm SCC with involvement of the zygomatic and nasal bone, PNI, no LVSI, positive margin at the pterygopalatine fossa with positive vidian nerve. He was recommended for postoperative radiation with consideration of weekly cisplatin. He had postoperative chemoradiation from 10/28/2019 to 12/11/2019 under the care of Dr. Santillan and Dr. Rivera.  He received a total of 6600 cGy and received high-dose cisplatin every 3 weeks.  He had a 4-day treatment break due to machine downtime and then with transportation  issues.      Interval history:  He comes in today for follow-up. He was last seen in my clinic in March 2020. He had his 3 month post treatment PET scan which unfortunately showed multiple pulmonary nodules concerning for metastatic disease. He was started on nivolumab in March 2020. He has restaging imaging in June 2020. His TSH was normal in May 2020.  He has had 3 cycles of nivolumab so far.  He is not having any issues with the immunotherapy.  He does feel like his energy is low when he gets tired easily.  He is eating and drinking without difficulty.  He denies any coughing or choking episodes.  He says his weight is going up, up to 160 pounds today.  He denies any masses on his face or in his neck.  He has no ear pain.  He has persistent tinnitus.  He continues to have nasal suction on the right side.  He denies any hemoptysis or epistaxis.  He denies any changes in his vision on the left.  He continues to have dry mouth.        MEDICATIONS:     Current Outpatient Medications   Medication Sig Dispense Refill     acetaminophen (TYLENOL) 325 MG tablet Take 325-650 mg by mouth every 6 hours as needed for mild pain       cevimeline (EVOXAC) 30 MG capsule Take 1 capsule (30 mg) by mouth 3 times daily 90 capsule 11     cyclobenzaprine (FLEXERIL) 10 MG tablet TK 1 T PO HS PRN FOR MUSCLE SPASM RELIEF  0     LORazepam (ATIVAN) 0.5 MG tablet Take 1 tablet (0.5 mg) by mouth every 4 hours as needed (Anxiety, Nausea/Vomiting or Sleep) 45 tablet 2     LORazepam (ATIVAN) 0.5 MG tablet Take 1 tablet (0.5 mg) by mouth every 4 hours as needed (Anxiety, Nausea/Vomiting or Sleep) 45 tablet 2     oxyCODONE IR (ROXICODONE) 10 MG tablet        prochlorperazine (COMPAZINE) 10 MG tablet Take 1 tablet (10 mg) by mouth every 6 hours as needed (Nausea/Vomiting) 30 tablet 2     senna-docusate (SENOKOT-S/PERICOLACE) 8.6-50 MG tablet 1 tablet by Per Feeding Tube route 2 times daily as needed for constipation 90 tablet 3       ALLERGIES:  No  Known Allergies    HABITS/SOCIAL HISTORY:   Smoker 1/2 ppd  No chewing tobacco use  Lives with girlfriend or with friends  No alcohol use  Not currently employed    Social History     Socioeconomic History     Marital status:      Spouse name: Not on file     Number of children: Not on file     Years of education: Not on file     Highest education level: Not on file   Occupational History     Not on file   Social Needs     Financial resource strain: Not on file     Food insecurity     Worry: Not on file     Inability: Not on file     Transportation needs     Medical: Not on file     Non-medical: Not on file   Tobacco Use     Smoking status: Current Every Day Smoker     Packs/day: 0.50     Years: 15.00     Pack years: 7.50     Types: Cigarettes     Start date: 2004     Smokeless tobacco: Never Used   Substance and Sexual Activity     Alcohol use: Not Currently     Drug use: Not Currently     Sexual activity: Not on file   Lifestyle     Physical activity     Days per week: Not on file     Minutes per session: Not on file     Stress: Not on file   Relationships     Social connections     Talks on phone: Not on file     Gets together: Not on file     Attends [employer] service: Not on file     Active member of club or organization: Not on file     Attends meetings of clubs or organizations: Not on file     Relationship status: Not on file     Intimate partner violence     Fear of current or ex partner: Not on file     Emotionally abused: Not on file     Physically abused: Not on file     Forced sexual activity: Not on file   Other Topics Concern     Not on file   Social History Narrative     Not on file       PAST MEDICAL HISTORY:   Past Medical History:   Diagnosis Date     Gastric ulcer      Low back pain      Maxillary sinus cancer (H)      Toe amputation status     all ten toes        PAST SURGICAL HISTORY:   Past Surgical History:   Procedure Laterality Date     ABDOMEN SURGERY      [facility], surgery related to  gastric ulcer     AS AMPUTATION TOE,I-P JT Bilateral     all ten toes     EXENTERATION ORBIT Right 9/24/2019    Procedure: Right Orbital Exenteration;  Surgeon: Jose Roberts MD;  Location: [facility] OR     EXPLORE NECK Right 9/24/2019    Procedure: Right Neck Exploration;  Surgeon: Jose Roberts MD;  Location: [facility] OR     GRAFT FREE VASCULARIZED LATISSIMUS DORSI Right 9/24/2019    Procedure: Right Latissmus Free Flap Reconstruction;  Surgeon: Teresa Pulliam MD;  Location: [facility] OR     INSERT PORT VASCULAR ACCESS Right 10/31/2019    Procedure: place venous access chest port;  Surgeon: Natasha Mishra PA-C;  Location: [facility] OR     IR CHEST PORT PLACEMENT > 5 YRS OF AGE  10/31/2019     OSTEOTOMY MAXILLA N/A 9/24/2019    Procedure: Right Radicall Maxillectomy, Nasogastric Tube Placement;  Surgeon: Jose Roberts MD;  Location: [facility] OR       FAMILY HISTORY:    Family History   Problem Relation Age of Onset     Breast Cancer Mother      Glaucoma No family hx of      Macular Degeneration No family hx of        REVIEW OF SYSTEMS:  12 point ROS was negative other than the symptoms noted above in the HPI.  Patient Supplied Answers to Review of Systems   ENT ROS 5/22/2020   Constitutional -   Neurology -   Psychology -   Eyes -   Ears, Nose, Throat Ringing/noise in ears   Musculoskeletal -   Endocrine -         PHYSICAL EXAMINATION:   /84   Pulse 71   Temp 98.7  F (37.1  C)   Resp 13   Wt 72.6 kg (160 lb)   BMI 21.70 kg/m     Appearance:   normal; NAD, age-appropriate appearance, well-developed, normal habitus   Communication:   normal; communicates verbally, normal voice quality   Head/Face:   inspection -  Healthy appearing free flap along right orbital exenteration, radiation changes to the skin paddle, lymphedema of the right cheek has improved, soft tissue prominence of the flap has decreased significantly   Palpation - no palpable masses in the orbital exenteration site or right maxilla   Salivary  glands -  Normal size, no tenderness, swelling, or palpable masses   Facial strength -  Decreased right cheek movement related to the maxillectomy   Skin:  normal, no rash   Ocular Motility:  normal occular movements on left  Orbital exenteration on left   Ears:  auricle (AD) -  normal  EAC (AD) -  normal  TM (AD) -  Effusion, retracted  auricle (AS) -  normal  EAC (AS) -  normal  TM (AS) -  retracted  Normal clinical speech reception   Nose:  Ext. inspection -  Normal   Oral Cavity:  lips -  Normal mucosa, oral competence, and stoma size  Edentulous  Healthy appearing free flap skin paddle along right hard palate, no palpable masses, no mucosal lesions on native palate, decreased tissue bulk intraorally  Tongue protrudes midline   Oropharynx:  mucosa -  Normal, no lesions   Neck: No visible mass or asymmetry   Radiation changes to the neck  Normal range of motion   Lymphatic:  no abnormal nodes   Cardiovascular:  warm, pink, well-perfused extremities without swelling, tenderness, or edema   Respiratory:  Normal respiratory effort, no stridor   Neuro/Psych.:  mood/affect - normal  mental status -  Normal  Cranial nerves - right V1 and V2 numbness, normal on left       RESULTS REVIEWED:   I reviewed the notes from medical oncology which are summarized above    TSH normal 5/2020.    IMPRESSION AND PLAN:   Eduardo Rubio is a 59 year old man with a T4N0 SCC of the right maxilla. He underwent surgical resection with maxillectomy and orbital exenteration with reconstruction using a latissimus free flap.  He had postoperative chemoradiation completed in December 2018. His 3 month post treatment PET scan demonstrated metastatic disease in the lung and he was started on nivolumab in March 2020.     His TSH was normal yesterday.    He has repeat imaging per medical oncology, scheduled for June 2020.    I have written him a prescription for cevimeline to try for the dry mouth.    He was seen by speech pathology today.    I  will see him back in 2 months.    Thank you very much for the opportunity to participate in the care of your patient.      Teresa Pulliam MD, M.D.  Otolaryngology- Head & Neck Surgery      This note was dictated with voice recognition software and then edited. Please excuse any unintentional errors.           CC:  Jose Roberts MD  Otolaryngology/Head & Neck Surgery  [facility] [address]      Colt Puentes MD  Otolaryngology/Head & Neck Surgery  [facility] 396

## 2020-05-22 NOTE — NURSING NOTE
Chief Complaint   Patient presents with     RECHECK     follow up      Blood pressure 133/84, pulse 71, temperature 98.7  F (37.1  C), resp. rate 13, weight 72.6 kg (160 lb).    Juanjose Carrasco LPN

## 2020-05-22 NOTE — TELEPHONE ENCOUNTER
[facility] Prior Authorization Team Request    Medication: cevimeline hcl 30 mg capsules  Dosing: take one capsule by mouth three times a day  Qty: 90  Day Supply: 30  NDC (required for Medicaid members):      Insurance   BIN: [id number]  PCN: [id number]  Grp: [id number]  ID: [id number]    CoverMyMeds Key (if applicable):     Additional documentation:       Filling Pharmacy: [facility]  Phone Number: [phone]  Contact:    Pharmacy NPI (required for Medicaid members):    Patient

## 2020-05-22 NOTE — PROGRESS NOTES
Dear Dr. Roberts:    I had the pleasure of seeing Eduardo Rubio in follow-up today at the [facility].     History of Present Illness:   Eduardo Rubio is a 59 year old man with a T4N0 SCC of the maxillary sinus. The patient has a history of facial pain and numbness along with vision changes that resulted in a visit to the ER on 8/18/2019. An MRI was obtained which demonstrated a maxillary sinus mass with extension to the orbit with involvement of the inferior rectus muscle. He had a biopsy performed locally which was consistent with SCC. He saw Dr Wick on 8/29/2019. He was referred to ophthalmology for evaluation of his vision changes. He had a PET scan which showed the tumor in the maxillary sinus with bony erosion, extension to orbit, small lung nodule, no ramona disease. His case was reviewed at tumor conference with recommendation for surgery with total maxillectomy and orbital exenteration with free flap reconstruction. He was taken to the OR on 9/24/2019. He had reconstruction with a latissimus free flap. His postoperative course was uncomplicated. His final pathology demonstrated a 4.4 cm SCC with involvement of the zygomatic and nasal bone, PNI, no LVSI, positive margin at the pterygopalatine fossa with positive vidian nerve. He was recommended for postoperative radiation with consideration of weekly cisplatin. He had postoperative chemoradiation from 10/28/2019 to 12/11/2019 under the care of Dr. Santillan and Dr. Rivera.  He received a total of 6600 cGy and received high-dose cisplatin every 3 weeks.  He had a 4-day treatment break due to machine downtime and then with transportation issues.      Interval history:  He comes in today for follow-up. He was last seen in my clinic in March 2020. He had his 3 month post treatment PET scan which unfortunately showed multiple pulmonary nodules concerning for metastatic disease. He was started on nivolumab in March 2020. He has  restaging imaging in June 2020. His TSH was normal in May 2020.  He has had 3 cycles of nivolumab so far.  He is not having any issues with the immunotherapy.  He does feel like his energy is low when he gets tired easily.  He is eating and drinking without difficulty.  He denies any coughing or choking episodes.  He says his weight is going up, up to 160 pounds today.  He denies any masses on his face or in his neck.  He has no ear pain.  He has persistent tinnitus.  He continues to have nasal suction on the right side.  He denies any hemoptysis or epistaxis.  He denies any changes in his vision on the left.  He continues to have dry mouth.        MEDICATIONS:     Current Outpatient Medications   Medication Sig Dispense Refill     acetaminophen (TYLENOL) 325 MG tablet Take 325-650 mg by mouth every 6 hours as needed for mild pain       cevimeline (EVOXAC) 30 MG capsule Take 1 capsule (30 mg) by mouth 3 times daily 90 capsule 11     cyclobenzaprine (FLEXERIL) 10 MG tablet TK 1 T PO HS PRN FOR MUSCLE SPASM RELIEF  0     LORazepam (ATIVAN) 0.5 MG tablet Take 1 tablet (0.5 mg) by mouth every 4 hours as needed (Anxiety, Nausea/Vomiting or Sleep) 45 tablet 2     LORazepam (ATIVAN) 0.5 MG tablet Take 1 tablet (0.5 mg) by mouth every 4 hours as needed (Anxiety, Nausea/Vomiting or Sleep) 45 tablet 2     oxyCODONE IR (ROXICODONE) 10 MG tablet        prochlorperazine (COMPAZINE) 10 MG tablet Take 1 tablet (10 mg) by mouth every 6 hours as needed (Nausea/Vomiting) 30 tablet 2     senna-docusate (SENOKOT-S/PERICOLACE) 8.6-50 MG tablet 1 tablet by Per Feeding Tube route 2 times daily as needed for constipation 90 tablet 3       ALLERGIES:  No Known Allergies    HABITS/SOCIAL HISTORY:   Smoker 1/2 ppd  No chewing tobacco use  Lives with girlfriend or with friends  No alcohol use  Not currently employed    Social History     Socioeconomic History     Marital status:      Spouse name: Not on file     Number of children: Not  on file     Years of education: Not on file     Highest education level: Not on file   Occupational History     Not on file   Social Needs     Financial resource strain: Not on file     Food insecurity     Worry: Not on file     Inability: Not on file     Transportation needs     Medical: Not on file     Non-medical: Not on file   Tobacco Use     Smoking status: Current Every Day Smoker     Packs/day: 0.50     Years: 15.00     Pack years: 7.50     Types: Cigarettes     Start date: 2004     Smokeless tobacco: Never Used   Substance and Sexual Activity     Alcohol use: Not Currently     Drug use: Not Currently     Sexual activity: Not on file   Lifestyle     Physical activity     Days per week: Not on file     Minutes per session: Not on file     Stress: Not on file   Relationships     Social connections     Talks on phone: Not on file     Gets together: Not on file     Attends Jew service: Not on file     Active member of club or organization: Not on file     Attends meetings of clubs or organizations: Not on file     Relationship status: Not on file     Intimate partner violence     Fear of current or ex partner: Not on file     Emotionally abused: Not on file     Physically abused: Not on file     Forced sexual activity: Not on file   Other Topics Concern     Not on file   Social History Narrative     Not on file       PAST MEDICAL HISTORY:   Past Medical History:   Diagnosis Date     Gastric ulcer      Low back pain      Maxillary sinus cancer (H)      Toe amputation status     all ten toes        PAST SURGICAL HISTORY:   Past Surgical History:   Procedure Laterality Date     ABDOMEN SURGERY      [facility], surgery related to gastric ulcer     AS AMPUTATION TOE,I-P JT Bilateral     all ten toes     EXENTERATION ORBIT Right 9/24/2019    Procedure: Right Orbital Exenteration;  Surgeon: Jose Roberts MD;  Location: [facility] OR     EXPLORE NECK Right 9/24/2019    Procedure: Right Neck Exploration;  Surgeon:  Jose Roberts MD;  Location: [facility] OR     GRAFT FREE VASCULARIZED LATISSIMUS DORSI Right 9/24/2019    Procedure: Right Latissmus Free Flap Reconstruction;  Surgeon: Teresa Pulliam MD;  Location: [facility] OR     INSERT PORT VASCULAR ACCESS Right 10/31/2019    Procedure: place venous access chest port;  Surgeon: Natasha Mishra PA-C;  Location: [facility] OR     IR CHEST PORT PLACEMENT > 5 YRS OF AGE  10/31/2019     OSTEOTOMY MAXILLA N/A 9/24/2019    Procedure: Right Radicall Maxillectomy, Nasogastric Tube Placement;  Surgeon: Jose Roberts MD;  Location: [facility] OR       FAMILY HISTORY:    Family History   Problem Relation Age of Onset     Breast Cancer Mother      Glaucoma No family hx of      Macular Degeneration No family hx of        REVIEW OF SYSTEMS:  12 point ROS was negative other than the symptoms noted above in the HPI.  Patient Supplied Answers to Review of Systems   ENT ROS 5/22/2020   Constitutional -   Neurology -   Psychology -   Eyes -   Ears, Nose, Throat Ringing/noise in ears   Musculoskeletal -   Endocrine -         PHYSICAL EXAMINATION:   /84   Pulse 71   Temp 98.7  F (37.1  C)   Resp 13   Wt 72.6 kg (160 lb)   BMI 21.70 kg/m     Appearance:   normal; NAD, age-appropriate appearance, well-developed, normal habitus   Communication:   normal; communicates verbally, normal voice quality   Head/Face:   inspection -  Healthy appearing free flap along right orbital exenteration, radiation changes to the skin paddle, lymphedema of the right cheek has improved, soft tissue prominence of the flap has decreased significantly   Palpation - no palpable masses in the orbital exenteration site or right maxilla   Salivary glands -  Normal size, no tenderness, swelling, or palpable masses   Facial strength -  Decreased right cheek movement related to the maxillectomy   Skin:  normal, no rash   Ocular Motility:  normal occular movements on left  Orbital exenteration on left   Ears:  auricle (AD) -   normal  EAC (AD) -  normal  TM (AD) -  Effusion, retracted  auricle (AS) -  normal  EAC (AS) -  normal  TM (AS) -  retracted  Normal clinical speech reception   Nose:  Ext. inspection -  Normal   Oral Cavity:  lips -  Normal mucosa, oral competence, and stoma size  Edentulous  Healthy appearing free flap skin paddle along right hard palate, no palpable masses, no mucosal lesions on native palate, decreased tissue bulk intraorally  Tongue protrudes midline   Oropharynx:  mucosa -  Normal, no lesions   Neck: No visible mass or asymmetry   Radiation changes to the neck  Normal range of motion   Lymphatic:  no abnormal nodes   Cardiovascular:  warm, pink, well-perfused extremities without swelling, tenderness, or edema   Respiratory:  Normal respiratory effort, no stridor   Neuro/Psych.:  mood/affect - normal  mental status -  Normal  Cranial nerves - right V1 and V2 numbness, normal on left       RESULTS REVIEWED:   I reviewed the notes from medical oncology which are summarized above    TSH normal 5/2020.    IMPRESSION AND PLAN:   Eduardo Rubio is a 59 year old man with a T4N0 SCC of the right maxilla. He underwent surgical resection with maxillectomy and orbital exenteration with reconstruction using a latissimus free flap.  He had postoperative chemoradiation completed in December 2018. His 3 month post treatment PET scan demonstrated metastatic disease in the lung and he was started on nivolumab in March 2020.     His TSH was normal yesterday.    He has repeat imaging per medical oncology, scheduled for June 2020.    I have written him a prescription for cevimeline to try for the dry mouth.    He was seen by speech pathology today.    I will see him back in 2 months.    Thank you very much for the opportunity to participate in the care of your patient.      Teresa Pulliam MD, M.D.  Otolaryngology- Head & Neck Surgery      This note was dictated with voice recognition software and then edited. Please excuse any  unintentional errors.           CC:  Jose Roberts MD  Otolaryngology/Head & Neck Surgery  [facility] [address]      Colt Puentes MD  Otolaryngology/Head & Neck Surgery  [facility]

## 2020-05-26 NOTE — TELEPHONE ENCOUNTER
PRIOR AUTHORIZATION DENIED    Medication: cevimeline hcl 30 mg capsules    Denial Date: 5/26/2020    Denial Rational: Will need a Letter of Medical Necessity stating the reason for dry mouth           Appeal Information:

## 2020-05-26 NOTE — TELEPHONE ENCOUNTER
Central Prior Authorization Team   Phone: [phone]    PA Initiation    Medication: cevimeline hcl 30 mg capsules  Insurance Company: [employer] - Phone [phone] Fax [phone]  Pharmacy Filling the Rx: [facility] [place], [place] - [address] [place] [address] [id number]  Filling Pharmacy Phone: [phone]  Filling Pharmacy Fax: [phone]  Start Date: 5/26/2020

## 2020-06-11 NOTE — PROGRESS NOTES
[facility]  PROGRESS NOTE  Jun 11, 2020    CANCER TYPE: Maxillary sinus squamous cell cancer  STAGE: Kyle ([id number])    TREATMENT SUMMARY:  - 8/19/19: MRI face and orbit demonstrated a maxillary sinus mass with extension to the orbit with involvement of the inferior rectus muscle. 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  - 9/24/19: Total maxillectomy with orbital exenteration performed by Dr. Roberts, followed by reconstruction with a latissimus free flap with Dr. Pulliam.   - Surgical pathology showed a 4.4 cm moderately differentiated squamous cell carcinoma, (-) LVSI, (+)PNI. There was a positive margin at the pterygopalatine fossa, specifically the vidian nerve taken at its exit from the vidian canal, and additional resection into the canal was not possible.  -s/p total right maxilectomy with orbital exenteration   -surgical margin were positive making it a high risk disease for recurrence.    He completed concurrent chemoradiation with high dose cisplatin day 1, 11/1/19, Day 22 11/20/19, Day 43 12/13/19  - PET/CT on 3/13/20 revealed numerous lung nodules consistent with metastasis and he has been started on nivolumab.     CURRENT INTERVENTIONS:  Nivolumab     HISTORY OF PRESENT ILLNESS   Eduardo Rubio is 59 year old  who has been diagnosed with locally advanced right maxillary sinus cancer.    Eduardo presented to an outside ED in 08/18/2019 with complaints of right facial pressure, numbness, right-sided visual change and nasal drainage for several days' duration. Imaging workup included an MRI which demonstrated a maxillary sinus mass with extension to the orbit with involvement of the inferior rectus muscle. 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 Mr. Rubio was referred to [facility]. He had staging PET/CT on 9/9/2019 which revealed a FDG-avid maxillary mass  at 4.0 x 3.9 x 4.2 cm. There was tumor extension into the right orbital cavity superiorly, the right nasal cavity medially, the retromaxillary fat region posterolaterally, the right pterygopalatine fossa posteriorly, and the premaxillary fat anteriorly. In the orbital cavity there was abutment of the inferior rectus muscle. There was no evidence of lymphadenopathy or systemic disease. His case was reviewed at tumor conference with recommendation for surgery with total maxillectomy and orbital exenteration with free flap reconstruction.     Mr. Rubio underwent a total maxillectomy with orbital exenteration on 9/24/2019 with Dr. Roberts, followed by reconstruction with a latissimus free flap with Dr. Pulliam. Surgical pathology showed a 4.4 cm moderately differentiated squamous cell carcinoma, (-) LVSI, (+)PNI. There was a positive margin at the pterygopalatine fossa, specifically the vidian nerve taken at its exit from the vidian canal, and additional resection into the canal was not possible. Mr. Rubio's case was discussed in the [facility]'s multidisciplinary head and neck tumor board, with the consensus recommendation to proceed with adjuvant chemoradiation given the positive margin status. Patient received day 1 cisplatin on 11/1/19 and day 22 on 11/20/19 and Day 43 on 12/13/19.     INTERIM HISTORY:  Eduardo presents today for follow up.     Eduardo is being followed over video at this telemedicine visit. He has been started on nivolumab and had 3 infusions so far. He denies any problems or side effects other than fatigue.     He does not have any side effects that would suggest autoimmune adverse events from nivolumab.      PAST MEDICAL HISTORY     Past Medical History:   Diagnosis Date     Gastric ulcer      Low back pain      Maxillary sinus cancer (H)      Toe amputation status     all ten toes         PHYSICAL EXAM   General: The patient is a pleasant male in no acute distress.  There were  no vitals taken for this visit.  Wt Readings from Last 10 Encounters:   05/22/20 72.6 kg (160 lb)   05/21/20 72.1 kg (159 lb)   04/23/20 71.6 kg (157 lb 14.4 oz)   03/26/20 69.7 kg (153 lb 11.2 oz)   03/17/20 71.1 kg (156 lb 11.2 oz)   03/13/20 70.3 kg (154 lb 14.4 oz)   02/05/20 74.8 kg (165 lb)   01/22/20 74.7 kg (164 lb 11.2 oz)   01/22/20 74.7 kg (164 lb 11.2 oz)   12/27/19 73.7 kg (162 lb 8 oz)   Middle aged male in no acute distress  Has an eye patch in the right eye  He as speech affected from previous maxillectomy surgery  Breathing comfortably, no tachypnea  Speech and hearing normal  Pleasant mood and congruent affect  Moving all extremities, no focal neurologic deficits apparent     LABORATORY AND IMAGING STUDIES     Recent Labs   Lab Test 06/10/20  1005 05/21/20  1409 04/23/20  0918 03/26/20  0856 03/17/20  1250   * 135 134 131* 132*   POTASSIUM 3.9 3.8 3.8 4.2 4.2   CHLORIDE 96 102 101 101 98   CO2 31 28 28 26 28   ANIONGAP 5 5 5 4 5   BUN 17 9 12 18 11   CR 0.82 0.76 0.79 0.94 0.82   GLC 84 77 114* 84 100*   MIKEY 9.1 8.8 8.5 8.6 8.9     Recent Labs   Lab Test 04/23/20  0918 03/26/20  0856 03/17/20  1250 01/22/20  1414 12/20/19  0853  09/30/19  0638 09/29/19  0710 09/28/19  0716 09/27/19  0455 09/26/19  0424   MAG 1.7 2.1 1.9 2.0 1.2*   < > 2.1 2.3 2.3 2.0 2.2   PHOS  --   --   --   --   --   --  3.6 3.8 5.1* 4.0 3.7    < > = values in this interval not displayed.     Recent Labs   Lab Test 06/10/20  1005 05/21/20  1409 04/23/20  0918 03/26/20  0856 03/17/20  1250   WBC 5.5 5.5 6.9 4.9 4.7   HGB 12.8* 10.8* 11.3* 10.8* 10.4*    194 195 212 213   MCV 96 94 95 98 97   NEUTROPHIL 75.0 70.3 77.1 70.9 74.3     Recent Labs   Lab Test 06/10/20  1005 05/21/20  1409 04/23/20  0918   BILITOTAL 0.6 0.3 0.3   ALKPHOS 72 82 89   ALT 30 26 34   AST 34 43 42   ALBUMIN 3.7 3.5 3.3*     TSH   Date Value Ref Range Status   05/21/2020 1.05 0.40 - 4.00 mU/L Final   04/23/2020 0.79 0.40 - 4.00 mU/L Final    03/26/2020 2.04 0.40 - 4.00 mU/L Final     No results for input(s): CEA in the last 64690 hours.  Results for orders placed or performed in visit on 06/10/20   CT Chest/Abdomen/Pelvis w Contrast    Narrative    EXAMINATION: CT CHEST/ABDOMEN/PELVIS W CONTRAST, 6/10/2020 11:14 AM    TECHNIQUE:  Helical CT images from the lung apices through the  symphysis pubis were obtained with contrast.  Coronal and sagittal  reformatted images were generated at a workstation for further  assessment.    CONTRAST:  99 ml Isovue 370.    COMPARISON: PET/CT 3/13/2020    HISTORY: Response to treatment - maxillary sinus cancer widely  metastatic to lungs; Maxillary sinus cancer (H); Malignant neoplasm  metastatic to both lungs (H); Malignant neoplasm metastatic to both  lungs (H)    FINDINGS:    Lines and tubes: Right chest wall Port-A-Cath with tip at the superior  cavoatrial junction.    Mediastinum/Neck Base: No thyroid nodules. Central tracheobronchial  tree is patent. Heart size is normal. No pericardial effusion.  Normal  thoracic vasculature. Coronary artery calcification. Pretracheal,  pericarinal and right hilar ramona calcification.     Lungs: Multiple pulmonary nodules, randomly distributed. There has  been decrease in size of the pulmonary nodules:  Subpleural left upper lobe measures 8 mm x 13 mm compared to 19 x 21  mm when measured in a similar manner (series 7, image 113).  Peripheral right lower lobe nodule measuring 1.3 x 1 cm compared to  1.8 x 1.5 cm and measured in a similar manner (series 7, image 262).  Unchanged basal subpleural reticular opacities.  No consolidation. No pleural effusion or pneumothorax.    Abdomen and pelvis:  Liver: No suspicious liver lesions. Portal veins appear patent. Few  punctate too small to characterize hepatic hypodensities, unchanged.  Gallbladder: No gallstones. No evidence of acute cholecystitis.  Spleen: Normal size. Multiple punctate splenic calcifications likely  representing  splenic granuloma.  Pancreas: No suspicious pancreatic lesions. The pancreatic duct is  mildly prominent but can be followed to the ampulla, unchanged from  PET/CT.  Adrenal glands: No adrenal nodules.   Kidneys: No hydronephrosis or obstructing renal stones.    Bladder / Pelvic organs: Enlarged prostate measuring 5.5 cm. Prostate  calcification. Circumferential urinary bladder wall thickening,  possibly secondary to underdistention and/or obstructive change.  Bowel: No bowel wall thickening. No abnormal bowel loop dilatation.  Redundant sigmoid colon  Lymph nodes: No retroperitoneal, mesenteric, or pelvic  lymphadenopathy. Unchanged subcentimeter right external iliac lymph  node with fatty hilum (series 4, image 549).  Peritoneum / Retroperitoneum: No free fluid or air within the abdomen.  Vessels: No infrarenal aortic aneurysm. Scattered atherosclerotic  calcification of the abdominal aorta.    Bones and soft tissues: Degenerative changes of the spine. Scoliosis  with convexity to the right. Degenerative changes of the shoulder  joints.      Impression    IMPRESSION: In this patient with history of squamous cell carcinoma of  the maxillary sinus, there has been response to treatment:  1. Decrease in size of multiple, randomly distributed pulmonary  nodules.  2. No new pulmonary nodule.  3. No metastatic disease in the abdomen or pelvis.    I have personally reviewed the examination and initial interpretation  and I agree with the findings.    SOPHIE BAZAN MD            ASSESSMENT AND PLAN   1. Stage IV [id number] right maxillary sinus squamous cell cancer   2. No medical comorbidities  3. Nicotine abuse - chronic smoker   4. Poor social support; lives alone with a dog    Locally advanced maxillary sinus squamous cell cancer that extended in to the right orbit with new metastasis to bilateral lungs  -s/p total right maxilectomy with orbital exenteration   -surgical margin were positive making it a high risk  disease for recurrence.    -completed concurrent chemoradiation therapy in Dec 2019  - PET/CT on 3/13/20 revealed extensive lung metastasis.     I have started him on nivolumab. He is tolerating this well. He has fatigue from therapy. He does not have any other complains.     I have reviewed actual images from his restaging scans. He has treatment response with significant improvement in the size of his pulmonary nodules. This is great news. He wondered if he is done with therapy. I explained him that we would like to continue as long as he is tolerating an responding to therapy. I have stopped therapy after complete response and over 1-2 yrs of treatment but we have a long ways to go for that. We have a good start for now.     I have reviewed his labs and his labs are all stable. He has mild hyponatremia. His hemoglobin has been steadily improving. He still has residual mild anemia, likely from his previous chemoradiation.      Over 25 min of  spent with patient with more than 50% time spent in counseling and coordinating care.      Javier Ferraro MD

## 2020-06-11 NOTE — LETTER
"    6/11/2020         RE: Eduardo Rubio  [address]  [facility] [address]        Dear Colleague,    Thank you for referring your patient, Eduardo Rubio, to the [facility]. Please see a copy of my visit note below.    Eduardo Rubio is a 59 year old male who is being evaluated via a billable video visit.      The patient has been notified of following:     \"This video visit will be conducted via a call between you and your physician/provider. We have found that certain health care needs can be provided without the need for an in-person physical exam.  This service lets us provide the care you need with a video conversation.  If a prescription is necessary we can send it directly to your pharmacy.  If lab work is needed we can place an order for that and you can then stop by our lab to have the test done at a later time.    Video visits are billed at different rates depending on your insurance coverage.  Please reach out to your insurance provider with any questions.    If during the course of the call the physician/provider feels a video visit is not appropriate, you will not be charged for this service.\"    Patient has given verbal consent for Video visit? Yes    How would you like to obtain your AVS? MyChart  Patient would like the video invitation sent by: Send text to [phone]  Will anyone else be joining your video visit? No        Video-Visit Details    Type of service:  Video Visit  DOXIMITY    Video Start Time:   Video End Time:     Originating Location (pt. 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  - 9/24/19: Total maxillectomy with orbital exenteration performed by Dr. Roberts, followed by reconstruction with a latissimus free flap with Dr. Pulliam.   - Surgical pathology showed a 4.4 cm moderately differentiated squamous cell carcinoma, (-) LVSI, (+)PNI. There was a positive margin at the pterygopalatine fossa, specifically the vidian nerve taken at its exit from the vidian canal, and additional resection into the canal was not possible.  -s/p total right maxilectomy with orbital exenteration   -surgical margin were positive making it a high risk disease for recurrence.    He completed concurrent chemoradiation with high dose cisplatin day 1, 11/1/19, Day 22 11/20/19, Day 43 12/13/19  - PET/CT on 3/13/20 revealed numerous lung nodules consistent with metastasis and he has been started on nivolumab.     CURRENT INTERVENTIONS:  Nivolumab     HISTORY OF PRESENT ILLNESS   Eduardo Rubio is 59 year old  who has been diagnosed with locally advanced right maxillary sinus cancer.    Eduardo presented to an outside ED in 08/18/2019 with complaints of right facial pressure, numbness, right-sided visual change and nasal drainage for several days' duration. Imaging workup included an MRI which demonstrated a maxillary sinus mass with extension to the orbit with involvement of the inferior rectus muscle. 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 Mr. Rubio was referred to [facility]. He had staging PET/CT on 9/9/2019 which revealed a FDG-avid maxillary mass at 4.0 x 3.9 x 4.2 cm. There was tumor extension into the right orbital cavity superiorly, the right nasal cavity medially, the retromaxillary fat region posterolaterally, the right pterygopalatine fossa posteriorly, and  the premaxillary fat anteriorly. In the orbital cavity there was abutment of the inferior rectus muscle. There was no evidence of lymphadenopathy or systemic disease. His case was reviewed at tumor conference with recommendation for surgery with total maxillectomy and orbital exenteration with free flap reconstruction.     Mr. Rubio underwent a total maxillectomy with orbital exenteration on 9/24/2019 with Dr. Roberts, followed by reconstruction with a latissimus free flap with Dr. Pulliam. Surgical pathology showed a 4.4 cm moderately differentiated squamous cell carcinoma, (-) LVSI, (+)PNI. There was a positive margin at the pterygopalatine fossa, specifically the vidian nerve taken at its exit from the vidian canal, and additional resection into the canal was not possible. Mr. Rubio's case was discussed in the [facility]'s multidisciplinary head and neck tumor board, with the consensus recommendation to proceed with adjuvant chemoradiation given the positive margin status. Patient received day 1 cisplatin on 11/1/19 and day 22 on 11/20/19 and Day 43 on 12/13/19.     INTERIM HISTORY:  Eduardo presents today for follow up.     Eduardo is being followed over video at this telemedicine visit. He has been started on nivolumab and had 3 infusions so far. He denies any problems or side effects other than fatigue.     He does not have any side effects that would suggest autoimmune adverse events from nivolumab.      PAST MEDICAL HISTORY     Past Medical History:   Diagnosis Date     Gastric ulcer      Low back pain      Maxillary sinus cancer (H)      Toe amputation status     all ten toes         PHYSICAL EXAM   General: The patient is a pleasant male in no acute distress.  There were no vitals taken for this visit.  Wt Readings from Last 10 Encounters:   05/22/20 72.6 kg (160 lb)   05/21/20 72.1 kg (159 lb)   04/23/20 71.6 kg (157 lb 14.4 oz)   03/26/20 69.7 kg (153 lb 11.2 oz)   03/17/20 71.1 kg (156  lb 11.2 oz)   03/13/20 70.3 kg (154 lb 14.4 oz)   02/05/20 74.8 kg (165 lb)   01/22/20 74.7 kg (164 lb 11.2 oz)   01/22/20 74.7 kg (164 lb 11.2 oz)   12/27/19 73.7 kg (162 lb 8 oz)   Middle aged male in no acute distress  Has an eye patch in the right eye  He as speech affected from previous maxillectomy surgery  Breathing comfortably, no tachypnea  Speech and hearing normal  Pleasant mood and congruent affect  Moving all extremities, no focal neurologic deficits apparent     LABORATORY AND IMAGING STUDIES     Recent Labs   Lab Test 06/10/20  1005 05/21/20  1409 04/23/20  0918 03/26/20  0856 03/17/20  1250   * 135 134 131* 132*   POTASSIUM 3.9 3.8 3.8 4.2 4.2   CHLORIDE 96 102 101 101 98   CO2 31 28 28 26 28   ANIONGAP 5 5 5 4 5   BUN 17 9 12 18 11   CR 0.82 0.76 0.79 0.94 0.82   GLC 84 77 114* 84 100*   MIKEY 9.1 8.8 8.5 8.6 8.9     Recent Labs   Lab Test 04/23/20  0918 03/26/20  0856 03/17/20  1250 01/22/20  1414 12/20/19  0853  09/30/19  0638 09/29/19  0710 09/28/19  0716 09/27/19  0455 09/26/19  0424   MAG 1.7 2.1 1.9 2.0 1.2*   < > 2.1 2.3 2.3 2.0 2.2   PHOS  --   --   --   --   --   --  3.6 3.8 5.1* 4.0 3.7    < > = values in this interval not displayed.     Recent Labs   Lab Test 06/10/20  1005 05/21/20  1409 04/23/20  0918 03/26/20  0856 03/17/20  1250   WBC 5.5 5.5 6.9 4.9 4.7   HGB 12.8* 10.8* 11.3* 10.8* 10.4*    194 195 212 213   MCV 96 94 95 98 97   NEUTROPHIL 75.0 70.3 77.1 70.9 74.3     Recent Labs   Lab Test 06/10/20  1005 05/21/20  1409 04/23/20  0918   BILITOTAL 0.6 0.3 0.3   ALKPHOS 72 82 89   ALT 30 26 34   AST 34 43 42   ALBUMIN 3.7 3.5 3.3*     TSH   Date Value Ref Range Status   05/21/2020 1.05 0.40 - 4.00 mU/L Final   04/23/2020 0.79 0.40 - 4.00 mU/L Final   03/26/2020 2.04 0.40 - 4.00 mU/L Final     No results for input(s): CEA in the last 29562 hours.  Results for orders placed or performed in visit on 06/10/20   CT Chest/Abdomen/Pelvis w Contrast    Narrative    EXAMINATION: CT  CHEST/ABDOMEN/PELVIS W CONTRAST, 6/10/2020 11:14 AM    TECHNIQUE:  Helical CT images from the lung apices through the  symphysis pubis were obtained with contrast.  Coronal and sagittal  reformatted images were generated at a workstation for further  assessment.    CONTRAST:  99 ml Isovue 370.    COMPARISON: PET/CT 3/13/2020    HISTORY: Response to treatment - maxillary sinus cancer widely  metastatic to lungs; Maxillary sinus cancer (H); Malignant neoplasm  metastatic to both lungs (H); Malignant neoplasm metastatic to both  lungs (H)    FINDINGS:    Lines and tubes: Right chest wall Port-A-Cath with tip at the superior  cavoatrial junction.    Mediastinum/Neck Base: No thyroid nodules. Central tracheobronchial  tree is patent. Heart size is normal. No pericardial effusion.  Normal  thoracic vasculature. Coronary artery calcification. Pretracheal,  pericarinal and right hilar ramona calcification.     Lungs: Multiple pulmonary nodules, randomly distributed. There has  been decrease in size of the pulmonary nodules:  Subpleural left upper lobe measures 8 mm x 13 mm compared to 19 x 21  mm when measured in a similar manner (series 7, image 113).  Peripheral right lower lobe nodule measuring 1.3 x 1 cm compared to  1.8 x 1.5 cm and measured in a similar manner (series 7, image 262).  Unchanged basal subpleural reticular opacities.  No consolidation. No pleural effusion or pneumothorax.    Abdomen and pelvis:  Liver: No suspicious liver lesions. Portal veins appear patent. Few  punctate too small to characterize hepatic hypodensities, unchanged.  Gallbladder: No gallstones. No evidence of acute cholecystitis.  Spleen: Normal size. Multiple punctate splenic calcifications likely  representing splenic granuloma.  Pancreas: No suspicious pancreatic lesions. The pancreatic duct is  mildly prominent but can be followed to the ampulla, unchanged from  PET/CT.  Adrenal glands: No adrenal nodules.   Kidneys: No hydronephrosis  or obstructing renal stones.    Bladder / Pelvic organs: Enlarged prostate measuring 5.5 cm. Prostate  calcification. Circumferential urinary bladder wall thickening,  possibly secondary to underdistention and/or obstructive change.  Bowel: No bowel wall thickening. No abnormal bowel loop dilatation.  Redundant sigmoid colon  Lymph nodes: No retroperitoneal, mesenteric, or pelvic  lymphadenopathy. Unchanged subcentimeter right external iliac lymph  node with fatty hilum (series 4, image 549).  Peritoneum / Retroperitoneum: No free fluid or air within the abdomen.  Vessels: No infrarenal aortic aneurysm. Scattered atherosclerotic  calcification of the abdominal aorta.    Bones and soft tissues: Degenerative changes of the spine. Scoliosis  with convexity to the right. Degenerative changes of the shoulder  joints.      Impression    IMPRESSION: In this patient with history of squamous cell carcinoma of  the maxillary sinus, there has been response to treatment:  1. Decrease in size of multiple, randomly distributed pulmonary  nodules.  2. No new pulmonary nodule.  3. No metastatic disease in the abdomen or pelvis.    I have personally reviewed the examination and initial interpretation  and I agree with the findings.    SOPHIE BAZAN MD            ASSESSMENT AND PLAN   1. 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 No medical comorbidities  3. Nicotine abuse - chronic smoker   4. Poor social support; lives alone with a dog    Locally advanced maxillary sinus squamous cell cancer that extended in to the right orbit with new metastasis to bilateral lungs  -s/p total right maxilectomy with orbital exenteration   -surgical margin were positive making it a high risk disease for recurrence.    -completed concurrent chemoradiation therapy in Dec 2019  - PET/CT on 3/13/20 revealed extensive lung metastasis.     I have started him on nivolumab. He is tolerating this well. He has fatigue from therapy.  He does not have any other complains.     I have reviewed actual images from his restaging scans. He has treatment response with significant improvement in the size of his pulmonary nodules. This is great news. He wondered if he is done with therapy. I explained him that we would like to continue as long as he is tolerating an responding to therapy. I have stopped therapy after complete response and over 1-2 yrs of treatment but we have a long ways to go for that. We have a good start for now.     I have reviewed his labs and his labs are all stable. He has mild hyponatremia. His hemoglobin has been steadily improving. He still has residual mild anemia, likely from his previous chemoradiation.      Over 25 min of  spent with patient with more than 50% time spent in counseling and coordinating care.      Javier Ferraro MD         Again, thank you for allowing me to participate in the care of your patient.        Sincerely,        Javier Ferraro MD

## 2020-06-11 NOTE — PROGRESS NOTES
"Eduardo Rubio is a 59 year old male who is being evaluated via a billable video visit.      The patient has been notified of following:     \"This video visit will be conducted via a call between you and your physician/provider. We have found that certain health care needs can be provided without the need for an in-person physical exam.  This service lets us provide the care you need with a video conversation.  If a prescription is necessary we can send it directly to your pharmacy.  If lab work is needed we can place an order for that and you can then stop by our lab to have the test done at a later time.    Video visits are billed at different rates depending on your insurance coverage.  Please reach out to your insurance provider with any questions.    If during the course of the call the physician/provider feels a video visit is not appropriate, you will not be charged for this service.\"    Patient has given verbal consent for Video visit? Yes    How would you like to obtain your AVS? MyChart  Patient would like the video invitation sent by: Send text to [phone]  Will anyone else be joining your video visit? No        Video-Visit Details    Type of service:  Video Visit  DOXIMITY    Video Start Time:   Video End Time:     Originating Location (pt. Location): Home    Distant Location (provider location):  [facility]     Platform used for Video Visit: [employer] TEXT CELL [phone]    REFILLS ON GILES Felder CMA      "

## 2020-06-12 NOTE — TELEPHONE ENCOUNTER
TOBACCO TREATMENT  VISIT      Subjective:      Patient is a 59 year old White male that was contacted to participate in the Tobacco Treatment Program for Nicotine Dependence on 6/12/2020 by the Tobacco Treatment Specialist. Patient  reports that he has been smoking cigarettes. He started smoking about 16 years ago. He has a 7.50 pack-year smoking history. He has never used smokeless tobacco.     Pt reports he is still smoking at this time and continues to wear nicotine patches. He states having 3 patches left and plans to call quitline for additional patches. He continues smoking 5 CPD and working on his quit attempt. He reports he received good news at his last visit and has led to increased motivation to quit smoking for cancer outcomes. He would like to do what he can now at this time to reduce impact of tobacco use.     His biggest challenge at this time is access to patches. They are not covered by insurance and has relied on quitline for additional cessation medication support.        Information:      Agreed to Participate in Program?: Yes    Referral Type: Proactive outreach         Gender: Male    Race: White    Cancer type: Other (please comment)          Smoking Status: Every day smoker     Has attempted to quit in the last 30 days?: Yes     Previous Cessation Medication Used : 21mg nicotine patch     Tobacco Product Used : Cigarettes           Amount of Use (CPD) : 5     Time to First Use : <30min     Time of Last Cigarette: smoked cigarette today (at least one puff)     Readiness (0-10) : 8     Importance (0-10 scale): 10    Confidence (0-10): 7    Barriers to quit: Limited stress coping tools    Visit Type: Phone    Active in Cancer Treatment?: Yes    Medication Treatment Plan: Patient will contact PCP for cessation medication orders       Summary of visit:      Eduardo Rubio is still smoking/using tobacco: Yes  These MI interventions were used: Expressed Empathy/Understanding, Supported Autonomy,  Collaboration, Evocation, Permission to raise concern or advise, Open-ended questions and Change talk (evoked)  We discussed: Benefits of quitting  Ways to cope with cravings and manage triggers  Hints for managing nicotine withdrawal  Ways to prepare for a quit date  Rewards for quitting  Total abstinence  Patient is ready to quit smoking or continue to stay 100% smoke-free.  Advice to quit and impact of smoking provided to patient: setting quit date, provided resources for cessation medication        Plan:      1. Patient will continue daily use of nicotine patches to continue efforts to quit smoking.   2. Patient will call quitline for additional patches and set quit date.        Follow-up arranged? Yes  Amount of time spent counselinmins     PADMINI GarciaS  Tobacco Treatment Specialist

## 2020-06-18 NOTE — PATIENT INSTRUCTIONS
Call the triage nurse if the hydrocortisone cream does not help your itching on your lower back.      [facility] Main Line: [phone]    Call triage nurse with chills and/or temperature greater than or equal to 100.4, uncontrolled nausea/vomiting, diarrhea, constipation, dizziness, shortness of breath, chest pain, bleeding, unexplained bruising, or any new/concerning symptoms, questions/concerns.   If you are having any concerning symptoms or wish to speak to a provider before your next infusion visit, please call your care coordinator or triage to notify them so we can adequately serve you.   Triage Nurse Line: [phone]    If after hours, weekends, or holidays [phone]

## 2020-06-18 NOTE — PROGRESS NOTES
Infusion Nursing Note:  Eduardo Rubio presents today for Cycle 4 Day 1 Nivolumab.    Patient seen by provider today: No   present during visit today: Not Applicable.    Note: Reports itching on low back.  Said he's had for 1 month.  He may have a rash on low back as it's hard to tell as he's been scratching and also using a back scratcher.  His skin does look dry.    GRIS Dean RN / Dr. Ferraro @ [phone]  Eduardo to try OTC hydrocortisone cream.  May proceed with today's Nivolumab.  Eduardo to call triage should the hydrocortisone cream not help, as perhaps he will need to be seen in person prior to next infusion to evaluate this.  Will monitor thyroid function (today TSH 4.47 w/T4 1.16)    Intravenous Access:  Implanted Port.    Treatment Conditions:  Lab Results   Component Value Date    HGB 10.8 06/18/2020     Lab Results   Component Value Date    WBC 6.0 06/18/2020      Lab Results   Component Value Date    ANEU 4.2 06/18/2020     Lab Results   Component Value Date     06/18/2020      Lab Results   Component Value Date     06/18/2020                   Lab Results   Component Value Date    POTASSIUM 3.7 06/18/2020           Lab Results   Component Value Date    MAG 1.7 04/23/2020            Lab Results   Component Value Date    CR 0.82 06/18/2020                   Lab Results   Component Value Date    MIKEY 8.0 06/18/2020                Lab Results   Component Value Date    BILITOTAL 0.2 06/18/2020           Lab Results   Component Value Date    ALBUMIN 3.5 06/18/2020                    Lab Results   Component Value Date    ALT 24 06/18/2020           Lab Results   Component Value Date    AST 31 06/18/2020     TSH   Date Value Ref Range Status   06/18/2020 4.47 (H) 0.40 - 4.00 mU/L Final       Results reviewed, labs MET treatment parameters, ok to proceed with treatment.      Post Infusion Assessment:  Patient tolerated infusion without incident.  Blood return noted pre and post infusion.  Site  patent and intact, free from redness, edema or discomfort.  No evidence of extravasations.  Access discontinued per protocol.       Discharge Plan:   Patient declined prescription refills.  Copy of AVS reviewed with patient and/or family.  Patient will return 7/15/2020 PA virtual visit, 7/16 labs/infusion,  for next appointment.  Patient discharged in stable condition accompanied by: self.  Departure Mode: Ambulatory.  Face to Face time: 0.    Carlota Dean RN

## 2020-06-18 NOTE — NURSING NOTE
Chief Complaint   Patient presents with     Port Draw     Labs drawn via port by RN in lab. Line flushed and hep locked. VS Taken.     Lucrecia Booker RN

## 2020-07-13 NOTE — LETTER
2020         RE: Eduardo Rbuio  [address]  [facility] [address]        Dear Colleague,    Thank you for referring your patient, Eduardo Rubio, to the RADIATION ONCOLOGY CLINIC. Please see a copy of my visit note below.         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 He underwent a maxillectomy and orbital exenteration on 2019 with pathology demonstrating a 4.4 cm moderately differentiated squamous cell carcinoma with negative lymphovascular space invasion and positive perineural invasion. There was a positive surgical margin at the pterygopalatine fossa, specifically from the vidian nerve taken at its exit from the vidian canal. Additional resection into the canal was not possible. Mr. Rubio received adjuvant chemoradiotherapy as described above, receiving a total dose of 66 Gy in 33 fractions which he completed on 2019 with concurrent high-dose cisplatin.    He had a post treatment PET scan on 3/13/20 which showed lungs and he has started Nivolumib. He is tolerating that well with only some fatigue and decreased appetite.  CT on 6/10 showed decreased size of lung mets.    Mr. Rubio returns to radiation oncology clinic today for a  routine post-treatment disease surveillance visit. On examination, he reports that he is doing well and has no pressing concerns or complaints. He does have fatigue, but may be r/t nivolumab.  He is not doing jaw exercises routinely.  He does state his skin is dry. Xerostomia.  He also has nasal congestion on the right side.  He also has lymphedema on the right face which comes and goes. He does do some lymphedema massage.  He is able to eat all foods and his taste is back, but he lacks appetite.  He does have tinnitus.    PHYSICAL EXAM:  /78   Wt 69.4 kg (153 lb)   BMI 20.75 kg/m        General: 59-year-old gentleman seated comfortably in the examination chair in no acute distress  HEENT: NC/AT.  Left eye with normal extraocular movement.  No scleral injection.  Right orbital exenteration site is covered with a healthy-appearing free flap.  Mildly dry mucous membranes.  No visible oral cavity mucositis.  No evidence of thrush.  He does have trismus.  Neck: No palpable cervical adenopathy.    Pulmonary: No wheezing, stridor or respiratory distress  Skin: Mild dryness in treatment area.    LABS AND IMAGIN2020 labs:  TSH: 1.74    CT CAP 6/10/20:  IMPRESSION: In this patient with history of squamous cell carcinoma of  the maxillary sinus, there has been response to treatment:  1. Decrease in size of multiple, randomly distributed pulmonary   nodules.  2. No new pulmonary nodule.  3. No metastatic disease in the abdomen or pelvis.      IMPRESSION:   Mr. 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     PLAN:   1. Follow-up in radiology clinic in 3 months  2. Continue post-treatment skin and oral cares.  Stressed working on jaw stretching exercises and he states he will work on this.  Continue lymphedema.  Discussed trying nasal saline for his nasal congestion to see if that  helps.  Push calories.  3. Continue close follow up with med onc and ENT.    Courtney Barreto NP  Dept of Radiation Oncology  [facility]     FOLLOW-UP VISIT    Patient Name: Eduardo Rubio      : 1960     Age: 59 year old        ______________________________________________________________________________     Chief Complaint   Patient presents with     Cancer     Pt is here for a follow up:Rt maxillary sinus 6000 cGy completed 19     /78   Wt 69.4 kg (153 lb)   BMI 20.75 kg/m     Pain  Denies    Labs  Other Labs: No    Imaging  CT: 06/10/20      Dental:   Most Recent Dental Visit: No    Speech/Swallowing:   Most Recent evaluation or testing: No  Swallowing Restrictions: No difficulties with swallowing    Trismus/Jaw Exercises: Yes    Nutrition:    Weight:   Wt Readings from Last 3 Encounters:   20 69.4 kg (153 lb)   20 71.2 kg (157 lb)   20 72.6 kg (160 lb)         Oral Symptoms:   Xerostomia:1- Symptomatic without significant dietary alteration; unstimulated saliva flow >0.2 ml/min  Dysphagia: 0-None  Mucositis Oral Symptoms: 0-None  Mucositis: 0- None  Esophagitis:0- None    Other Appointments:     MD Name: Dr Pulliam Appointment Date: 20   MD Name: Appointment Date:   MD Name: Appointment Date:   Other Appointment Notes:     Residual Radiation side effect: Fatigue     Additional Instructions:     Nurse face-to-face time: Level 3:  10 min face to face time          Again, thank you for allowing me to participate in the care of your patient.        Sincerely,        ABNER Mcintosh CNP

## 2020-07-13 NOTE — PROGRESS NOTES
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 He underwent a maxillectomy and orbital exenteration on 2019 with pathology demonstrating a 4.4 cm moderately differentiated squamous cell carcinoma with negative lymphovascular space invasion and positive perineural invasion. There was a positive surgical margin at the pterygopalatine fossa, specifically from the vidian nerve taken at its exit from the vidian canal. Additional resection into the canal was not possible. Mr. Rubio received adjuvant chemoradiotherapy as described above, receiving a total dose of 66 Gy in 33 fractions which he completed on 2019 with concurrent high-dose cisplatin.    He had a post treatment PET scan on 3/13/20 which showed lungs and he has started Nivolumib. He is tolerating that well with only some fatigue and decreased appetite.  CT on 6/10 showed decreased size of lung mets.    Mr. Rubio returns to radiation oncology clinic today for a routine post-treatment disease surveillance visit. On examination, he reports that he is doing well and has no pressing concerns or complaints. He does have fatigue, but may be r/t nivolumab.  He is not doing jaw exercises routinely.  He does  state his skin is dry. Xerostomia.  He also has nasal congestion on the right side.  He also has lymphedema on the right face which comes and goes. He does do some lymphedema massage.  He is able to eat all foods and his taste is back, but he lacks appetite.  He does have tinnitus.    PHYSICAL EXAM:  /78   Wt 69.4 kg (153 lb)   BMI 20.75 kg/m        General: 59-year-old gentleman seated comfortably in the examination chair in no acute distress  HEENT: NC/AT.  Left eye with normal extraocular movement.  No scleral injection.  Right orbital exenteration site is covered with a healthy-appearing free flap.  Mildly dry mucous membranes.  No visible oral cavity mucositis.  No evidence of thrush.  He does have trismus.  Neck: No palpable cervical adenopathy.    Pulmonary: No wheezing, stridor or respiratory distress  Skin: Mild dryness in treatment area.    LABS AND IMAGIN2020 labs:  TSH: 1.74    CT CAP 6/10/20:  IMPRESSION: In this patient with history of squamous cell carcinoma of  the maxillary sinus, there has been response to treatment:  1. Decrease in size of multiple, randomly distributed pulmonary   nodules.  2. No new pulmonary nodule.  3. No metastatic disease in the abdomen or pelvis.      IMPRESSION:   Mr. 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     PLAN:   1. Follow-up in radiology clinic in 3 months  2. Continue post-treatment skin and oral cares.  Stressed working on jaw stretching exercises and he states he will work on this.  Continue lymphedema.  Discussed trying nasal saline for his nasal congestion to see if that helps.  Push calories.  3. Continue close follow up with med onc and ENT.    Courtney Barreto NP  Dept of Radiation Oncology  [facility]     FOLLOW-UP VISIT    Patient Name: Eduardo Rubio      : 1960     Age: 59 year old         ______________________________________________________________________________     Chief Complaint   Patient presents with     Cancer     Pt is here for a follow up:Rt maxillary sinus 6000 cGy completed 12/11/19     /78   Wt 69.4 kg (153 lb)   BMI 20.75 kg/m     Pain  Denies    Labs  Other Labs: No    Imaging  CT: 06/10/20      Dental:   Most Recent Dental Visit: No    Speech/Swallowing:   Most Recent evaluation or testing: No  Swallowing Restrictions: No difficulties with swallowing    Trismus/Jaw Exercises: Yes    Nutrition:    Weight:   Wt Readings from Last 3 Encounters:   07/13/20 69.4 kg (153 lb)   06/18/20 71.2 kg (157 lb)   05/22/20 72.6 kg (160 lb)         Oral Symptoms:   Xerostomia:1- Symptomatic without significant dietary alteration; unstimulated saliva flow >0.2 ml/min  Dysphagia: 0-None  Mucositis Oral Symptoms: 0-None  Mucositis: 0- None  Esophagitis:0- None    Other Appointments:     MD Name: Dr Pulliam Appointment Date: 07/20/20   MD Name: Appointment Date:   MD Name: Appointment Date:   Other Appointment Notes:     Residual Radiation side effect: Fatigue     Additional Instructions:     Nurse face-to-face time: Level 3:  10 min face to face time

## 2020-07-15 NOTE — PROGRESS NOTES
"Eduardo Rubio is a 59 year old male who is being evaluated via a billable telephone visit.      The patient has been notified of following:     \"This telephone visit will be conducted via a call between you and your physician/provider. We have found that certain health care needs can be provided without the need for a physical exam.  This service lets us provide the care you need with a short phone conversation.  If a prescription is necessary we can send it directly to your pharmacy.  If lab work is needed we can place an order for that and you can then stop by our lab to have the test done at a later time.    Telephone visits are billed at different rates depending on your insurance coverage. During this emergency period, for some insurers they may be billed the same as an in-person visit.  Please reach out to your insurance provider with any questions.    If during the course of the call the physician/provider feels a telephone visit is not appropriate, you will not be charged for this service.\"    Patient has given verbal consent for Telephone visit?  Yes    What phone number would you like to be contacted at? [phone]    How would you like to obtain your AVS? MyChart     No current vitals.    Ashlee Millard, [facility]      Phone call duration: 23 minutes    Gianna Ruggiero PA-C      "

## 2020-07-15 NOTE — LETTER
7/15/2020         RE: Eduardo Rubio  [address]  [facility] [address]        Dear Colleague,    Thank you for referring your patient, Eduardo Rubio, to the [facility]. Please see a copy of my visit note below.    Oncology/Hematology Visit Note  Jul 15, 2020    Reason for Visit: Follow up of     History of Present Illness:   Eduardo presented to an outside ED in 08/18/2019 with complaints of right facial pressure, numbness, right-sided visual change and nasal drainage for several days' duration. Imaging workup included an MRI which demonstrated a maxillary sinus mass with extension to the orbit with involvement of the inferior rectus muscle. 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 Mr. Rubio was referred to [facility]. He had staging PET/CT on 9/9/2019 which revealed a FDG-avid maxillary mass at 4.0 x 3.9 x 4.2 cm. There was tumor extension into the right orbital cavity superiorly, the right nasal cavity medially, the retromaxillary fat region posterolaterally, the right pterygopalatine fossa posteriorly, and the premaxillary fat anteriorly. In the orbital cavity there was abutment of the inferior rectus muscle. There was no evidence of lymphadenopathy or systemic disease. His case was reviewed at tumor conference with recommendation for surgery with total maxillectomy and orbital exenteration with free flap reconstruction.     Mr. Rubio underwent a total maxillectomy with orbital exenteration on 9/24/2019 with Dr. Roberts, followed by reconstruction with a latissimus free flap with Dr. Pulliam. Surgical pathology showed a 4.4 cm moderately differentiated squamous cell carcinoma, (-) LVSI, (+)PNI. There was a positive margin at the pterygopalatine fossa, specifically the vidian nerve taken at its exit from the vidian canal, and additional resection into the canal was not possible. Mr. Rubio's case was discussed in the [facility]  [place]'s multidisciplinary head and neck tumor board, with the consensus recommendation to proceed with adjuvant chemoradiation given the positive margin status. Patient received day 1 cisplatin on 11/1/19 and day 22 on 11/20/19 and Day 43 on 12/13/19.      TREATMENT SUMMARY:  - 8/19/19: MRI face and orbit demonstrated a maxillary sinus mass with extension to the orbit with involvement of the inferior rectus muscle. 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  - 9/24/19: Total maxillectomy with orbital exenteration performed by Dr. Roberts, followed by reconstruction with a latissimus free flap with Dr. Pulliam.   - Surgical pathology showed a 4.4 cm moderately differentiated squamous cell carcinoma, (-) LVSI, (+)PNI. There was a positive margin at the pterygopalatine fossa, specifically the vidian nerve taken at its exit from the vidian canal, and additional resection into the canal was not possible.  -s/p total right maxilectomy with orbital exenteration   -surgical margin were positive making it a high risk disease for recurrence.    He completed concurrent chemoradiation with high dose cisplatin day 1, 11/1/19, Day 22 11/20/19, Day 43 12/13/19  - PET/CT on 3/13/20 revealed numerous lung nodules consistent with metastasis and he has been started on nivolumab. Improvement in disease noted on CT 6/2020     CURRENT INTERVENTIONS:  Nivolumab    Interval History:  Eduardo was met with today via telephone.   - He is noting fatigue after infusions that seems to get worse a week or two after his infusions. He is still able to get up and accomplish ADLs and go for walks with his dog. This is associated with dyspnea on exertion, but none at rest. No chest pain or cough or fever.   - He is noting an itchy spot on his back that has been there for the past month. He has not looked at his back to assess for rash. He also mentioned that he previously had psoriasis on  his scalp- this seems to have returned.    - He has been trying to eat and drink well. Notes continuous dry mouth. Has been using biotene.     Review of Systems:  Patient denies  chills,  headaches, dizziness,  abdominal pain, nausea, vomiting, changes to bowel, swelling of extremities, bleeding issues    Current Outpatient Medications   Medication Sig Dispense Refill     acetaminophen (TYLENOL) 325 MG tablet Take 325-650 mg by mouth every 6 hours as needed for mild pain       cevimeline (EVOXAC) 30 MG capsule Take 1 capsule (30 mg) by mouth 3 times daily 90 capsule 11     oxyCODONE IR (ROXICODONE) 10 MG tablet        senna-docusate (SENOKOT-S/PERICOLACE) 8.6-50 MG tablet 1 tablet by Per Feeding Tube route 2 times daily as needed for constipation 90 tablet 3     cyclobenzaprine (FLEXERIL) 10 MG tablet TK 1 T PO HS PRN FOR MUSCLE SPASM RELIEF  0     LORazepam (ATIVAN) 0.5 MG tablet Take 1 tablet (0.5 mg) by mouth every 4 hours as needed (Anxiety, Nausea/Vomiting or Sleep) (Patient not taking: Reported on 7/15/2020) 45 tablet 2     prochlorperazine (COMPAZINE) 10 MG tablet Take 1 tablet (10 mg) by mouth every 6 hours as needed (Nausea/Vomiting) (Patient not taking: Reported on 7/15/2020) 30 tablet 2       Physical Examination:  There were no vitals taken for this visit.  Wt Readings from Last 10 Encounters:   07/13/20 69.4 kg (153 lb)   06/18/20 71.2 kg (157 lb)   05/22/20 72.6 kg (160 lb)   05/21/20 72.1 kg (159 lb)   04/23/20 71.6 kg (157 lb 14.4 oz)   03/26/20 69.7 kg (153 lb 11.2 oz)   03/17/20 71.1 kg (156 lb 11.2 oz)   03/13/20 70.3 kg (154 lb 14.4 oz)   02/05/20 74.8 kg (165 lb)   01/22/20 74.7 kg (164 lb 11.2 oz)     Phone visit done today: Voice quality sounds strong, easy to follow thought processes, does not sounds to be in acute distress    Laboratory Data:  No labs were drawn today      Assessment and Plan:  1.  Metastatic sinus squamous cell cancer  - Previously Locally advanced maxillary sinus squamous  cell cancer that extended in to the right orbit- s.p total right maxilectomy with orbital exenteration with positive margins. Complete concurrent HD cisplatin with RT. PET 3/2020 with evidence of lung mets.   - Currently on nivolumab, improvement on CT scans following 3 cycles.  He tolerating fairly well aside from fatigue and some itching.     2. Anemia, stable - likely residual from chemoRT. Will continue to monitor.     3. Fatigue- immunotherapy contributing. Will continue to follow TSH and vitals closely.    4. Itching on back and scalp, report of a history of psoriasis. Will trial topical hydrocortisone 2.5% bid. Aware to let us know if they worsen or spread.     5. Nicotine abuse, chronic smoker. Was last trying nicotine patches per notes. Not addressed at today's visit.     6. Poor social support; lives alone with a dog    7. Dry mouth- using biotene, will also trial Evoxac as previously prescribed.    8. Right soft tissue enlargement near right pectoralis muscle incidentally noted on scan review from June scans. Will have nurse assess site and let us know if     Jesi Perdue PA-C  [facility]  [address] [place] [id number]  [phone]      Eduardo Rubio is a 59 year old male who is being evaluated via a billable telephone visit.              Phone call duration: 23 minutes    Gianna Ruggiero PA-C

## 2020-07-15 NOTE — PROGRESS NOTES
Oncology/Hematology Visit Note  Jul 15, 2020    Reason for Visit: Follow up of     History of Present Illness:   Eduardo presented to an outside ED in 08/18/2019 with complaints of right facial pressure, numbness, right-sided visual change and nasal drainage for several days' duration. Imaging workup included an MRI which demonstrated a maxillary sinus mass with extension to the orbit with involvement of the inferior rectus muscle. 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 Mr. Rubio was referred to [facility]. He had staging PET/CT on 9/9/2019 which revealed a FDG-avid maxillary mass at 4.0 x 3.9 x 4.2 cm. There was tumor extension into the right orbital cavity superiorly, the right nasal cavity medially, the retromaxillary fat region posterolaterally, the right pterygopalatine fossa posteriorly, and the premaxillary fat anteriorly. In the orbital cavity there was abutment of the inferior rectus muscle. There was no evidence of lymphadenopathy or systemic disease. His case was reviewed at tumor conference with recommendation for surgery with total maxillectomy and orbital exenteration with free flap reconstruction.     Mr. Rubio underwent a total maxillectomy with orbital exenteration on 9/24/2019 with Dr. Roberts, followed by reconstruction with a latissimus free flap with Dr. Pulliam. Surgical pathology showed a 4.4 cm moderately differentiated squamous cell carcinoma, (-) LVSI, (+)PNI. There was a positive margin at the pterygopalatine fossa, specifically the vidian nerve taken at its exit from the vidian canal, and additional resection into the canal was not possible. Mr. Rubio's case was discussed in the [facility]'s multidisciplinary head and neck tumor board, with the consensus recommendation to proceed with adjuvant chemoradiation given the positive margin status. Patient received day 1 cisplatin on 11/1/19 and day 22 on 11/20/19 and Day 43 on  12/13/19.      TREATMENT SUMMARY:  - 8/19/19: MRI face and orbit demonstrated a maxillary sinus mass with extension to the orbit with involvement of the inferior rectus muscle. 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  - 9/24/19: Total maxillectomy with orbital exenteration performed by Dr. Roberts, followed by reconstruction with a latissimus free flap with Dr. Pulliam.   - Surgical pathology showed a 4.4 cm moderately differentiated squamous cell carcinoma, (-) LVSI, (+)PNI. There was a positive margin at the pterygopalatine fossa, specifically the vidian nerve taken at its exit from the vidian canal, and additional resection into the canal was not possible.  -s/p total right maxilectomy with orbital exenteration   -surgical margin were positive making it a high risk disease for recurrence.    He completed concurrent chemoradiation with high dose cisplatin day 1, 11/1/19, Day 22 11/20/19, Day 43 12/13/19  - PET/CT on 3/13/20 revealed numerous lung nodules consistent with metastasis and he has been started on nivolumab. Improvement in disease noted on CT 6/2020     CURRENT INTERVENTIONS:  Nivolumab    Interval History:  Eduardo was met with today via telephone.   - He is noting fatigue after infusions that seems to get worse a week or two after his infusions. He is still able to get up and accomplish ADLs and go for walks with his dog. This is associated with dyspnea on exertion, but none at rest. No chest pain or cough or fever.   - He is noting an itchy spot on his back that has been there for the past month. He has not looked at his back to assess for rash. He also mentioned that he previously had psoriasis on his scalp- this seems to have returned.    - He has been trying to eat and drink well. Notes continuous dry mouth. Has been using biotene.     Review of Systems:  Patient denies  chills,  headaches, dizziness,  abdominal pain, nausea, vomiting,  changes to bowel, swelling of extremities, bleeding issues    Current Outpatient Medications   Medication Sig Dispense Refill     acetaminophen (TYLENOL) 325 MG tablet Take 325-650 mg by mouth every 6 hours as needed for mild pain       cevimeline (EVOXAC) 30 MG capsule Take 1 capsule (30 mg) by mouth 3 times daily 90 capsule 11     oxyCODONE IR (ROXICODONE) 10 MG tablet        senna-docusate (SENOKOT-S/PERICOLACE) 8.6-50 MG tablet 1 tablet by Per Feeding Tube route 2 times daily as needed for constipation 90 tablet 3     cyclobenzaprine (FLEXERIL) 10 MG tablet TK 1 T PO HS PRN FOR MUSCLE SPASM RELIEF  0     LORazepam (ATIVAN) 0.5 MG tablet Take 1 tablet (0.5 mg) by mouth every 4 hours as needed (Anxiety, Nausea/Vomiting or Sleep) (Patient not taking: Reported on 7/15/2020) 45 tablet 2     prochlorperazine (COMPAZINE) 10 MG tablet Take 1 tablet (10 mg) by mouth every 6 hours as needed (Nausea/Vomiting) (Patient not taking: Reported on 7/15/2020) 30 tablet 2       Physical Examination:  There were no vitals taken for this visit.  Wt Readings from Last 10 Encounters:   07/13/20 69.4 kg (153 lb)   06/18/20 71.2 kg (157 lb)   05/22/20 72.6 kg (160 lb)   05/21/20 72.1 kg (159 lb)   04/23/20 71.6 kg (157 lb 14.4 oz)   03/26/20 69.7 kg (153 lb 11.2 oz)   03/17/20 71.1 kg (156 lb 11.2 oz)   03/13/20 70.3 kg (154 lb 14.4 oz)   02/05/20 74.8 kg (165 lb)   01/22/20 74.7 kg (164 lb 11.2 oz)     Phone visit done today: Voice quality sounds strong, easy to follow thought processes, does not sounds to be in acute distress    Laboratory Data:  No labs were drawn today      Assessment and Plan:  1.  Metastatic sinus squamous cell cancer  - Previously Locally advanced maxillary sinus squamous cell cancer that extended in to the right orbit- s.p total right maxilectomy with orbital exenteration with positive margins. Complete concurrent HD cisplatin with RT. PET 3/2020 with evidence of lung mets.   - Currently on nivolumab,  improvement on CT scans following 3 cycles.  He tolerating fairly well aside from fatigue and some itching.     2. Anemia, stable - likely residual from chemoRT. Will continue to monitor.     3. Fatigue- immunotherapy contributing. Will continue to follow TSH and vitals closely.    4. Itching on back and scalp, report of a history of psoriasis. Will trial topical hydrocortisone 2.5% bid. Aware to let us know if they worsen or spread.     5. Nicotine abuse, chronic smoker. Was last trying nicotine patches per notes. Not addressed at today's visit.     6. Poor social support; lives alone with a dog    7. Dry mouth- using biotene, will also trial Evoxac as previously prescribed.    8. Right soft tissue enlargement near right pectoralis muscle incidentally noted on scan review from June scans. Will have nurse assess site and let us know if     Jesi Perdue PA-C  [facility]  [address] [place] [address] [phone]

## 2020-07-15 NOTE — PROGRESS NOTES
Oncology RN Care Coordination Note:     Patient's niece Stormy left a voicemail calling to check in and see how Eduardo has been doing.     I called her back to discuss further, however, she didn't answer.  I left a detailed message stating I had sent a message to Saba Ruggiero PA-C and Jesi Perdue PA-C who saw Eduardo virtually today for a follow up appointment requesting more information about his status.  But from the progress notes it appears he is stable, fatigued approx. 1-2 weeks after his treatment, but he is doing ok.  I let her know via the voicemail that I'd call back with more information.    Cailin Diaz, RN BSN   [facility]  Nurse Coordinator

## 2020-07-16 NOTE — PROGRESS NOTES
Infusion Nursing Note:  Eduardo Rubio presents today for C5 Nivolumab.    Patient seen by provider today: No   present during visit today: Not Applicable.    Note: PT feeling well, no new issues.  Denies any s/s of infection.  Pt has lower back itching, picked up Rx creams today.  Will contact Triage if he does not relief.  Lower back looks dry but no rash seen.    Intravenous Access:  Implanted Port.    Treatment Conditions:  Lab Results   Component Value Date     07/16/2020                   Lab Results   Component Value Date    POTASSIUM 3.9 07/16/2020           Lab Results   Component Value Date    MAG 1.7 04/23/2020            Lab Results   Component Value Date    CR 0.79 07/16/2020                   Lab Results   Component Value Date    MIKEY 8.3 07/16/2020                Lab Results   Component Value Date    BILITOTAL 0.2 07/16/2020           Lab Results   Component Value Date    ALBUMIN 3.4 07/16/2020                    Lab Results   Component Value Date    ALT 30 07/16/2020           Lab Results   Component Value Date    AST 29 07/16/2020       Results reviewed, labs MET treatment parameters, ok to proceed with treatment.      Post Infusion Assessment:  Patient tolerated infusion without incident.  Blood return noted pre and post infusion.  Site patent and intact, free from redness, edema or discomfort.  No evidence of extravasations.  Access discontinued per protocol.       Discharge Plan:   Prescription refills given for Hydrocortisone and Evoxac.  Discharge instructions reviewed with: Patient.  Patient and/or family verbalized understanding of discharge instructions and all questions answered.  AVS to patient via [employer].  Patient will return 8/13 for next appointment.   Patient discharged in stable condition accompanied by: self.  Departure Mode: Ambulatory.    Kim Galan RN

## 2020-07-16 NOTE — NURSING NOTE
Chief Complaint   Patient presents with     Port Draw     labs drawn via cvc by RN in lab     /77 (BP Location: Left arm, Patient Position: Chair, Cuff Size: Adult Regular)   Pulse 83   Temp 97.6  F (36.4  C) (Oral)   Resp 18   Wt 70.4 kg (155 lb 1.6 oz)   SpO2 96%   BMI 21.04 kg/m      Port accessed by RN in lab. Labs collected and sent. Pt tolerated well. Line flushed with Normal Saline & Heparin.   Pt checked in for next appointment.    Ana Armenta, RN

## 2020-07-21 NOTE — TELEPHONE ENCOUNTER
Brief follow up with patient regarding quitting smoking. He states things are going well. When he wears the patches they are helping him to not smoke but admits he might still have 1-2 cigarettes even with the patches. He needs to call the quitline for additional patches to continue his cessation efforts. Maintains motivation to 'do his part' in his cancer care. Pt is optimistic he will soon be tobacco free.     Nelsy Collado, [facility]  Tobacco Treatment Specialist  PH: [phone]

## 2020-07-23 NOTE — TELEPHONE ENCOUNTER
Called patient to schedule follow up appointment with Dr. Pulliam, as patient missed 7/22 appointment. Unable to LVM, as patient's voicemail hasn't been set up yet.

## 2020-08-06 NOTE — TELEPHONE ENCOUNTER
"Received call from Pt's niece asking to call and assess Pt for SOB. Verified that there was a consent to communicate on file. Said I would call Pt and assess. Caller, Stormy, asked for a call back.  Pt began to notice ANDERSEN 4 to 5 weeks ago. Pt requires a break after 15 minutes, Pt takes a 5-10 minute break to recover (cool air helps). Pt is not dizzy or seeing stars. Pt also reports feeling fatigue, and that 15 minute tasks take 60 minutes. Asked for \"energy pills\". Pt was highly suspicious of my call, often asking \"what are you getting at\" and such during assessment. He also stated several time that \"this isn't a big deal\". Pt seemed fairly confused and often slurred words. He often confused names and facts,for example referring to Stormy as \"my sister's niece\" and needing to repeat her name several times to understand who called me.  I called Stormy back and explained that I was not being judgmental, but needed to know if it was likely that the Pt had been drinking, because if not he needs to go to ED for mental confusion. Stormy stated it was likely he was drinking. I emphasized that if she determined that he had not been drinking to get him to an ED.    Paged Dr Ferraro  "

## 2020-08-06 NOTE — TELEPHONE ENCOUNTER
Writer placed call to patient to discuss reported symptoms. Per patient, he's fine and doesn't want anything done at this time. He feels comfortable waiting until next Wednesday to discuss ongoing decreased energy and attributes his SOB to the humidity. Patient denies any confusion, difficulty thinking, pain with inspiration/expiration, and no changes to medication regimens. Patient will call back if there are any changes.

## 2020-08-13 NOTE — PROGRESS NOTES
Oncology RN Care Coordination Note:     This writer received a message from infusion stating Eduardo missed his appointment today for treatment.  Nelsy Murrieta RN in infusion attempted to reach him but was unsuccessful.  I attempted to reach him, but was also unsuccessful.      Eduardo has not set up his voicemail, this writer was unable to leave a message for him.    Cailin Diaz RN BSN   [facility]  Nurse Coordinator

## 2020-08-17 NOTE — PROGRESS NOTES
"Oncology RN Care Coordination Note:     This writer received a voicemail from Eduardo this morning, after multiple unsuccessful attempts at trying to reach Eduardo last week, he called this morning to re-schedule his appointments from last week.  The clinic coordinators advised he should speak with me.      I connected with Saba Ruggiero PA-C to confirm he would need assessment prior to resumption of treatment.  Saba's schedule is booked (overbooked) for this week, I called Eduardo to let him know that he would need to see an MARY KAY prior to his next infusion.  I let him know to expect a call from Darlin tomorrow and to keep his phone by him since he doesn't have his voicemail set up.     I inquired about why he missed his appointments last week, he said he wasn't feeling well.  I asked what was wrong.  He stated he was nauseated, his stomach hurt and he just didn't feel well, \"I didn't leave my bed for a few days.\"    Cailin Diaz RN BSN   [facility]  Nurse Coordinator        "

## 2020-08-18 NOTE — PROGRESS NOTES
Oncology/Hematology Visit Note  Aug 18, 2020    Reason for Visit: Follow up of Maxillary sinus cancer    History of Present Illness:   Eduardo presented to an outside ED in 08/18/2019 with complaints of right facial pressure, numbness, right-sided visual change and nasal drainage for several days' duration. Imaging workup included an MRI which demonstrated a maxillary sinus mass with extension to the orbit with involvement of the inferior rectus muscle. 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 Mr. Rubio was referred to [facility]. He had staging PET/CT on 9/9/2019 which revealed a FDG-avid maxillary mass at 4.0 x 3.9 x 4.2 cm. There was tumor extension into the right orbital cavity superiorly, the right nasal cavity medially, the retromaxillary fat region posterolaterally, the right pterygopalatine fossa posteriorly, and the premaxillary fat anteriorly. In the orbital cavity there was abutment of the inferior rectus muscle. There was no evidence of lymphadenopathy or systemic disease. His case was reviewed at tumor conference with recommendation for surgery with total maxillectomy and orbital exenteration with free flap reconstruction.     Mr. Rubio underwent a total maxillectomy with orbital exenteration on 9/24/2019 with Dr. Roberts, followed by reconstruction with a latissimus free flap with Dr. Pulliam. Surgical pathology showed a 4.4 cm moderately differentiated squamous cell carcinoma, (-) LVSI, (+)PNI. There was a positive margin at the pterygopalatine fossa, specifically the vidian nerve taken at its exit from the vidian canal, and additional resection into the canal was not possible. Mr. Rubio's case was discussed in the [facility]'s multidisciplinary head and neck tumor board, with the consensus recommendation to proceed with adjuvant chemoradiation given the positive margin status. Patient received day 1 cisplatin on 11/1/19 and day 22 on  11/20/19 and Day 43 on 12/13/19.      TREATMENT SUMMARY:  - 8/19/19: MRI face and orbit demonstrated a maxillary sinus mass with extension to the orbit with involvement of the inferior rectus muscle. 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  - 9/24/19: Total maxillectomy with orbital exenteration performed by Dr. Roberts, followed by reconstruction with a latissimus free flap with Dr. Pulliam.   - Surgical pathology showed a 4.4 cm moderately differentiated squamous cell carcinoma, (-) LVSI, (+)PNI. There was a positive margin at the pterygopalatine fossa, specifically the vidian nerve taken at its exit from the vidian canal, and additional resection into the canal was not possible.  -s/p total right maxilectomy with orbital exenteration   -surgical margin were positive making it a high risk disease for recurrence.    He completed concurrent chemoradiation with high dose cisplatin day 1, 11/1/19, Day 22 11/20/19, Day 43 12/13/19  - PET/CT on 3/13/20 revealed numerous lung nodules consistent with metastasis and he has been started on nivolumab. 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     He has been on nivolumab and had been doing well. He notes that he is doing well. We were concerned about his health as he complained of nausea and vomiting for 4 days last week. He also had shortness of breath and spent the entire time in bed. He feels that he had an anxiety attack and he is doing a lot better now. He denies any complains.     He has been trying to eat and drink well. Notes continuous dry mouth. Has been using biotene.     Review of Systems:  Patient denies  chills,  headaches, dizziness,  abdominal pain, nausea, vomiting, changes to bowel, swelling of extremities, bleeding issues  Current Outpatient Medications    Medication Sig     acetaminophen (TYLENOL) 325 MG tablet Take 325-650 mg by mouth every 6 hours as needed for mild pain     cevimeline (EVOXAC) 30 MG capsule Take 1 capsule (30 mg) by mouth 3 times daily     cyclobenzaprine (FLEXERIL) 10 MG tablet TK 1 T PO HS PRN FOR MUSCLE SPASM RELIEF     hydrocortisone 2.5 % cream Apply topically 2 times daily Apply to itchy spot on scalp and back twice a day as needed     oxyCODONE IR (ROXICODONE) 10 MG tablet      prochlorperazine (COMPAZINE) 10 MG tablet Take 1 tablet (10 mg) by mouth every 6 hours as needed (Nausea/Vomiting)     senna-docusate (SENOKOT-S/PERICOLACE) 8.6-50 MG tablet 1 tablet by Per Feeding Tube route 2 times daily as needed for constipation     LORazepam (ATIVAN) 0.5 MG tablet Take 1 tablet (0.5 mg) by mouth every 4 hours as needed (Anxiety, Nausea/Vomiting or Sleep) (Patient not taking: Reported on 7/15/2020)     NARCAN 4 MG/0.1ML nasal spray WAGNER INTO ONE NOSTRIL. MAY REPEAT IN ALTERNATE NOSTRIL WITHIN 2 MINUTES IF NO OR MINIMAL RESPONSE     No current facility-administered medications for this visit.         Physical Examination:  /66 (BP Location: Right arm, Patient Position: Sitting, Cuff Size: Adult Regular)   Pulse 94   Temp 98  F (36.7  C) (Tympanic)   Resp 16   Wt 69.2 kg (152 lb 9.6 oz)   SpO2 98%   BMI 20.70 kg/m    Wt Readings from Last 10 Encounters:   08/18/20 69.2 kg (152 lb 9.6 oz)   07/16/20 70.4 kg (155 lb 1.6 oz)   07/13/20 69.4 kg (153 lb)   06/18/20 71.2 kg (157 lb)   05/22/20 72.6 kg (160 lb)   05/21/20 72.1 kg (159 lb)   04/23/20 71.6 kg (157 lb 14.4 oz)   03/26/20 69.7 kg (153 lb 11.2 oz)   03/17/20 71.1 kg (156 lb 11.2 oz)   03/13/20 70.3 kg (154 lb 14.4 oz)     Phone visit done today: Voice quality sounds strong, easy to follow thought processes, does not sounds to be in acute distress    Laboratory Data:  Recent Labs   Lab Test 07/16/20  1050 06/18/20  0838 06/10/20  1005 05/21/20  1409 04/23/20  0918    134 132*  135 134   POTASSIUM 3.9 3.7 3.9 3.8 3.8   CHLORIDE 102 101 96 102 101   CO2 26 29 31 28 28   ANIONGAP 6 5 5 5 5   BUN 9 10 17 9 12   CR 0.79 0.82 0.82 0.76 0.79   GLC 77 90 84 77 114*   MIKEY 8.3* 8.0* 9.1 8.8 8.5     Recent Labs   Lab Test 04/23/20  0918 03/26/20  0856 03/17/20  1250 01/22/20  1414 12/20/19  0853  09/30/19  0638 09/29/19  0710 09/28/19  0716 09/27/19  0455 09/26/19  0424   MAG 1.7 2.1 1.9 2.0 1.2*   < > 2.1 2.3 2.3 2.0 2.2   PHOS  --   --   --   --   --   --  3.6 3.8 5.1* 4.0 3.7    < > = values in this interval not displayed.     Recent Labs   Lab Test 06/18/20  0838 06/10/20  1005 05/21/20  1409 04/23/20  0918 03/26/20  0856   WBC 6.0 5.5 5.5 6.9 4.9   HGB 10.8* 12.8* 10.8* 11.3* 10.8*    166 194 195 212   MCV 95 96 94 95 98   NEUTROPHIL 70.4 75.0 70.3 77.1 70.9     Recent Labs   Lab Test 07/16/20  1050 06/18/20  0838 06/10/20  1005   BILITOTAL 0.2 0.2 0.6   ALKPHOS 73 67 72   ALT 30 24 30   AST 29 31 34   ALBUMIN 3.4 3.5 3.7     TSH   Date Value Ref Range Status   07/16/2020 2.16 0.40 - 4.00 mU/L Final   06/18/2020 4.47 (H) 0.40 - 4.00 mU/L Final   05/21/2020 1.05 0.40 - 4.00 mU/L Final     No results for input(s): CEA in the last 32062 hours.  Results for orders placed or performed in visit on 06/10/20   CT Chest/Abdomen/Pelvis w Contrast    Narrative    EXAMINATION: CT CHEST/ABDOMEN/PELVIS W CONTRAST, 6/10/2020 11:14 AM    TECHNIQUE:  Helical CT images from the lung apices through the  symphysis pubis were obtained with contrast.  Coronal and sagittal  reformatted images were generated at a workstation for further  assessment.    CONTRAST:  99 ml Isovue 370.    COMPARISON: PET/CT 3/13/2020    HISTORY: Response to treatment - maxillary sinus cancer widely  metastatic to lungs; Maxillary sinus cancer (H); Malignant neoplasm  metastatic to both lungs (H); Malignant neoplasm metastatic to both  lungs (H)    FINDINGS:    Lines and tubes: Right chest wall Port-A-Cath with tip at the  superior  cavoatrial junction.    Mediastinum/Neck Base: No thyroid nodules. Central tracheobronchial  tree is patent. Heart size is normal. No pericardial effusion.  Normal  thoracic vasculature. Coronary artery calcification. Pretracheal,  pericarinal and right hilar ramona calcification.     Lungs: Multiple pulmonary nodules, randomly distributed. There has  been decrease in size of the pulmonary nodules:  Subpleural left upper lobe measures 8 mm x 13 mm compared to 19 x 21  mm when measured in a similar manner (series 7, image 113).  Peripheral right lower lobe nodule measuring 1.3 x 1 cm compared to  1.8 x 1.5 cm and measured in a similar manner (series 7, image 262).  Unchanged basal subpleural reticular opacities.  No consolidation. No pleural effusion or pneumothorax.    Abdomen and pelvis:  Liver: No suspicious liver lesions. Portal veins appear patent. Few  punctate too small to characterize hepatic hypodensities, unchanged.  Gallbladder: No gallstones. No evidence of acute cholecystitis.  Spleen: Normal size. Multiple punctate splenic calcifications likely  representing splenic granuloma.  Pancreas: No suspicious pancreatic lesions. The pancreatic duct is  mildly prominent but can be followed to the ampulla, unchanged from  PET/CT.  Adrenal glands: No adrenal nodules.   Kidneys: No hydronephrosis or obstructing renal stones.    Bladder / Pelvic organs: Enlarged prostate measuring 5.5 cm. Prostate  calcification. Circumferential urinary bladder wall thickening,  possibly secondary to underdistention and/or obstructive change.  Bowel: No bowel wall thickening. No abnormal bowel loop dilatation.  Redundant sigmoid colon  Lymph nodes: No retroperitoneal, mesenteric, or pelvic  lymphadenopathy. Unchanged subcentimeter right external iliac lymph  node with fatty hilum (series 4, image 549).  Peritoneum / Retroperitoneum: No free fluid or air within the abdomen.  Vessels: No infrarenal aortic aneurysm. Scattered  atherosclerotic  calcification of the abdominal aorta.    Bones and soft tissues: Degenerative changes of the spine. Scoliosis  with convexity to the right. Degenerative changes of the shoulder  joints.      Impression    IMPRESSION: In this patient with history of squamous cell carcinoma of  the maxillary sinus, there has been response to treatment:  1. Decrease in size of multiple, randomly distributed pulmonary  nodules.  2. No new pulmonary nodule.  3. No metastatic disease in the abdomen or pelvis.    I have personally reviewed the examination and initial interpretation  and I agree with the findings.    SOPHIE BAZAN MD           Assessment and Plan:  1.  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 He had a follow up PET 3/2020 with evidence of new metastasis to lung for which he has been started on immunotherapy with nivolumab.     - Currently on nivolumab, improvement on CT scans following 3 cycles.  He tolerating fairly well aside from fatigue and some itching.   - He had an episode of anxiety as per him. He felt very poorly. He had nausea/vomiting and shortness of breath. He lay in bed all the time. He notes that he would not like to go through this again. He would like a prescription for this again. I offered him to take lorazepam but he has been advised against it by Dr. Gunderson in pain clinic who manages his pain. Occasional lorazepam could be used for anxiety attack. I did warn him about the concerns for additive sedation. He should follow up with Dr. Gunderson and seek his suggestion on options to treat his anxiety. He notes that he has a follow up visit in 2 weeks.     I would follow him in a month with labs and restaging CT scans prior to visit. I would not  get PET-CT scans for treatment response assessments. He will get another nivolumab infusion today which he is due for.     2. Anemia, stable - likely residual from chemoRT. Will continue to monitor.     3. Fatigue- immunotherapy contributing. Will continue to follow TSH and vitals closely.    4. Itching on back and scalp, report of a history of psoriasis. Topical hydrocortisone 2.5% bid has been working and I have refilled a jar for him. Aware to let us know if they worsen or spread.     5. Nicotine abuse, chronic smoker. Was last trying nicotine patches per notes. Not addressed at today's visit.     6. Poor social support; lives alone with a dog    7. Dry mouth- using biotene, will also trial Evoxac as previously prescribed.   He has run out of script but he has 11 refills on this.     Over 25 min of direct face to face time spent with patient in person with more than 50% time spent in counseling and coordinating care.

## 2020-08-18 NOTE — PROGRESS NOTES
Infusion Nursing Note:  Eduardo Rubio presents today for Cycle 6, day 1 Nivolumab.    Patient seen by provider today: Yes: Dr. Ferraro    Note: Patient presents to clinic today feeling well with no questions.  Pt did not request or require any intervention for pain today.    Intravenous Access:  Implanted Port.    Treatment Conditions:  Lab Results   Component Value Date     08/18/2020                   Lab Results   Component Value Date    POTASSIUM 3.4 08/18/2020           Lab Results   Component Value Date    MAG 1.7 04/23/2020            Lab Results   Component Value Date    CR 0.85 08/18/2020                   Lab Results   Component Value Date    MIKEY 8.4 08/18/2020                Lab Results   Component Value Date    BILITOTAL 0.6 08/18/2020           Lab Results   Component Value Date    ALBUMIN 3.4 08/18/2020                    Lab Results   Component Value Date    ALT 43 08/18/2020           Lab Results   Component Value Date    AST 52 08/18/2020     Results reviewed, labs MET treatment parameters, ok to proceed with treatment.    Post Infusion Assessment:  Patient tolerated infusion without incident.  Blood return noted pre and post infusion.  Site patent and intact, free from redness, edema or discomfort.  No evidence of extravasations.  Access discontinued per protocol.    Discharge Plan:   Patient declined prescription refills.  Discharge instructions reviewed with: Patient.  Patient and/or family verbalized understanding of discharge instructions and all questions answered.  AVS to patient via [employer].  Patient will return 9/10/2020 for next appointment.   Patient discharged in stable condition accompanied by: self.  Departure Mode: Ambulatory.    Princess Garrett RN

## 2020-08-18 NOTE — PATIENT INSTRUCTIONS
Contact Numbers    [facility] [place] [facility]: [phone]    Call with chills and/or temperature greater than or equal to 100.5, uncontrolled nausea/vomiting, diarrhea, constipation, dizziness, shortness of breath, chest pain, bleeding, unexplained bruising, or any new/concerning symptoms, questions/concerns.     If you are having any concerning symptoms or wish to speak to a provider before your next infusion visit, please call your care coordinator or triage to notify them so we can adequately serve you.       After Hours: [phone]    If after hours, weekends, or holidays, call main hospital  and ask for Oncology doctor on call.       August 2020 Sunday Monday Tuesday Wednesday Thursday Friday Saturday                                 1       2     3     4     5     6     7    SWALLOW TREATMENT   4:05 PM   (30 min.)   Nelida Giron SLP   [facility]    [facility] RETURN   4:05 PM   (20 min.)   Eric Santillan MD   [facility]    [facility] RETURN   4:05 PM   (20 min.)   Teresa Pulliam MD   [facility] 8       9     10     11     12    VIDEO VISIT RETURN   1:05 PM   (50 min.)   Gianna Ruggiero PA-C   [facility] 13    [facility] DRAW   9:30 AM   (15 min.)    [facility] DRAW   [facility] Draw    [facility] ONC INFUSION 60  10:00 AM   (60 min.)    ONCOLOGY INFUSION   [facility] 14     15       16     17     18    [facility] DRAW   9:45 AM   (15 min.)    [facility] DRAW   [facility] Draw    [facility] RETURN  10:15 AM   (30 min.)   Javier Ferraro MD   [facility]    [facility] ONC INFUSION 60  11:00 AM   (60 min.)    ONCOLOGY INFUSION   [facility] 19     20     21     22       23     24     25     26     27     28     29       30     31                                          September 2020 Sunday Monday Tuesday Wednesday Thursday Friday Saturday              1     2     3     4     5       6     7     8    [facility] DRAW   8:15 AM   (15 min.)    [facility] DRAW   [facility] Lab Draw    CT SOFT TISSUE NECK W   8:40 AM   (20 min.)   [address] [place] [address] CT    CT CHEST/ABDOMEN/PELVIS W   9:00 AM   (20 min.)   [address]    VIDEO VISIT RETURN   2:15 PM   (30 min.)   Javier Ferraro MD   [facility] 9     10    [facility] ONC INFUSION 60   9:30 AM   (60 min.)   [facility]   [facility] 11     12       13     14     15     16     17     18    [facility] RETURN   3:45 PM   (20 min.)   Eric Santillan MD   [facility]    [facility] RETURN   3:45 PM   (20 min.)   Teresa Pulliam MD   [facility] 19       20     21     22     23     24     25     26       27     28     29     30                                    Lab Results:  Recent Results (from the past 12 hour(s))   Comprehensive metabolic panel    Collection Time: 08/18/20 10:25 AM   Result Value Ref Range    Sodium 129 (L) 133 - 144 mmol/L    Potassium 3.4 3.4 - 5.3 mmol/L    Chloride 94 94 - 109 mmol/L    Carbon Dioxide 30 20 - 32 mmol/L    Anion Gap 5 3 - 14 mmol/L    Glucose 114 (H) 70 - 99 mg/dL    Urea Nitrogen 14 7 - 30 mg/dL    Creatinine 0.85 0.66 - 1.25 mg/dL    GFR Estimate >90 >60 mL/min/[1.73_m2]    GFR Estimate If Black >90 >60 mL/min/[1.73_m2]    Calcium 8.4 (L) 8.5 - 10.1 mg/dL    Bilirubin Total 0.6 0.2 - 1.3 mg/dL    Albumin 3.4 3.4 - 5.0 g/dL    Protein Total 8.0 6.8 - 8.8 g/dL    Alkaline Phosphatase 79 40 - 150 U/L    ALT 43 0 - 70 U/L    AST 52 (H) 0 - 45 U/L

## 2020-08-18 NOTE — LETTER
8/18/2020     RE: Eduardo Rubio  [address]  [facility] [address]    Dear Colleague,    Thank you for referring your patient, Eduardo Rubio, to the [facility]. Please see a copy of my visit note below.    Oncology/Hematology Visit Note  Aug 18, 2020    Reason for Visit: Follow up of Maxillary sinus cancer    History of Present Illness:   Eduardo presented to an outside ED in 08/18/2019 with complaints of right facial pressure, numbness, right-sided visual change and nasal drainage for several days' duration. Imaging workup included an MRI which demonstrated a maxillary sinus mass with extension to the orbit with involvement of the inferior rectus muscle. 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 Mr. Rubio was referred to [facility]. He had staging PET/CT on 9/9/2019 which revealed a FDG-avid maxillary mass at 4.0 x 3.9 x 4.2 cm. There was tumor extension into the right orbital cavity superiorly, the right nasal cavity medially, the retromaxillary fat region posterolaterally, the right pterygopalatine fossa posteriorly, and the premaxillary fat anteriorly. In the orbital cavity there was abutment of the inferior rectus muscle. There was no evidence of lymphadenopathy or systemic disease. His case was reviewed at tumor conference with recommendation for surgery with total maxillectomy and orbital exenteration with free flap reconstruction.     Mr. Rubio underwent a total maxillectomy with orbital exenteration on 9/24/2019 with Dr. Roberts, followed by reconstruction with a latissimus free flap with Dr. Pulliam. Surgical pathology showed a 4.4 cm moderately differentiated squamous cell carcinoma, (-) LVSI, (+)PNI. There was a positive margin at the pterygopalatine fossa, specifically the vidian nerve taken at its exit from the vidian canal, and additional resection into the canal was not possible. Mr. Rubio's case was discussed in the  [facility]'s multidisciplinary head and neck tumor board, with the consensus recommendation to proceed with adjuvant chemoradiation given the positive margin status. Patient received day 1 cisplatin on 11/1/19 and day 22 on 11/20/19 and Day 43 on 12/13/19.      TREATMENT SUMMARY:  - 8/19/19: MRI face and orbit demonstrated a maxillary sinus mass with extension to the orbit with involvement of the inferior rectus muscle. 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  - 9/24/19: Total maxillectomy with orbital exenteration performed by Dr. Roberts, followed by reconstruction with a latissimus free flap with Dr. Pulliam.   - Surgical pathology showed a 4.4 cm moderately differentiated squamous cell carcinoma, (-) LVSI, (+)PNI. There was a positive margin at the pterygopalatine fossa, specifically the vidian nerve taken at its exit from the vidian canal, and additional resection into the canal was not possible.  -s/p total right maxilectomy with orbital exenteration   -surgical margin were positive making it a high risk disease for recurrence.    He completed concurrent chemoradiation with high dose cisplatin day 1, 11/1/19, Day 22 11/20/19, Day 43 12/13/19  - PET/CT on 3/13/20 revealed numerous lung nodules consistent with metastasis and he has been started on nivolumab. 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     He has been on nivolumab and had been doing well. He notes that he is doing well. We were concerned about his health as he complained of nausea and vomiting for 4 days last week. He also had shortness of breath and spent the entire time in bed. He feels that he had an anxiety attack and he is doing a lot better now. He denies any complains.     He has been trying to eat and drink well. Notes continuous  dry mouth. Has been using biotene.     Review of Systems:  Patient denies  chills,  headaches, dizziness,  abdominal pain, nausea, vomiting, changes to bowel, swelling of extremities, bleeding issues  Current Outpatient Medications   Medication Sig     acetaminophen (TYLENOL) 325 MG tablet Take 325-650 mg by mouth every 6 hours as needed for mild pain     cevimeline (EVOXAC) 30 MG capsule Take 1 capsule (30 mg) by mouth 3 times daily     cyclobenzaprine (FLEXERIL) 10 MG tablet TK 1 T PO HS PRN FOR MUSCLE SPASM RELIEF     hydrocortisone 2.5 % cream Apply topically 2 times daily Apply to itchy spot on scalp and back twice a day as needed     oxyCODONE IR (ROXICODONE) 10 MG tablet      prochlorperazine (COMPAZINE) 10 MG tablet Take 1 tablet (10 mg) by mouth every 6 hours as needed (Nausea/Vomiting)     senna-docusate (SENOKOT-S/PERICOLACE) 8.6-50 MG tablet 1 tablet by Per Feeding Tube route 2 times daily as needed for constipation     LORazepam (ATIVAN) 0.5 MG tablet Take 1 tablet (0.5 mg) by mouth every 4 hours as needed (Anxiety, Nausea/Vomiting or Sleep) (Patient not taking: Reported on 7/15/2020)     NARCAN 4 MG/0.1ML nasal spray WAGNER INTO ONE NOSTRIL. MAY REPEAT IN ALTERNATE NOSTRIL WITHIN 2 MINUTES IF NO OR MINIMAL RESPONSE     No current facility-administered medications for this visit.         Physical Examination:  /66 (BP Location: Right arm, Patient Position: Sitting, Cuff Size: Adult Regular)   Pulse 94   Temp 98  F (36.7  C) (Tympanic)   Resp 16   Wt 69.2 kg (152 lb 9.6 oz)   SpO2 98%   BMI 20.70 kg/m    Wt Readings from Last 10 Encounters:   08/18/20 69.2 kg (152 lb 9.6 oz)   07/16/20 70.4 kg (155 lb 1.6 oz)   07/13/20 69.4 kg (153 lb)   06/18/20 71.2 kg (157 lb)   05/22/20 72.6 kg (160 lb)   05/21/20 72.1 kg (159 lb)   04/23/20 71.6 kg (157 lb 14.4 oz)   03/26/20 69.7 kg (153 lb 11.2 oz)   03/17/20 71.1 kg (156 lb 11.2 oz)   03/13/20 70.3 kg (154 lb 14.4 oz)     Phone visit done today: Voice  quality sounds strong, easy to follow thought processes, does not sounds to be in acute distress    Laboratory Data:  Recent Labs   Lab Test 07/16/20  1050 06/18/20  0838 06/10/20  1005 05/21/20  1409 04/23/20  0918    134 132* 135 134   POTASSIUM 3.9 3.7 3.9 3.8 3.8   CHLORIDE 102 101 96 102 101   CO2 26 29 31 28 28   ANIONGAP 6 5 5 5 5   BUN 9 10 17 9 12   CR 0.79 0.82 0.82 0.76 0.79   GLC 77 90 84 77 114*   MIKEY 8.3* 8.0* 9.1 8.8 8.5     Recent Labs   Lab Test 04/23/20  0918 03/26/20  0856 03/17/20  1250 01/22/20  1414 12/20/19  0853  09/30/19  0638 09/29/19  0710 09/28/19  0716 09/27/19  0455 09/26/19  0424   MAG 1.7 2.1 1.9 2.0 1.2*   < > 2.1 2.3 2.3 2.0 2.2   PHOS  --   --   --   --   --   --  3.6 3.8 5.1* 4.0 3.7    < > = values in this interval not displayed.     Recent Labs   Lab Test 06/18/20  0838 06/10/20  1005 05/21/20  1409 04/23/20  0918 03/26/20  0856   WBC 6.0 5.5 5.5 6.9 4.9   HGB 10.8* 12.8* 10.8* 11.3* 10.8*    166 194 195 212   MCV 95 96 94 95 98   NEUTROPHIL 70.4 75.0 70.3 77.1 70.9     Recent Labs   Lab Test 07/16/20  1050 06/18/20  0838 06/10/20  1005   BILITOTAL 0.2 0.2 0.6   ALKPHOS 73 67 72   ALT 30 24 30   AST 29 31 34   ALBUMIN 3.4 3.5 3.7     TSH   Date Value Ref Range Status   07/16/2020 2.16 0.40 - 4.00 mU/L Final   06/18/2020 4.47 (H) 0.40 - 4.00 mU/L Final   05/21/2020 1.05 0.40 - 4.00 mU/L Final     No results for input(s): CEA in the last 99093 hours.  Results for orders placed or performed in visit on 06/10/20   CT Chest/Abdomen/Pelvis w Contrast    Narrative    EXAMINATION: CT CHEST/ABDOMEN/PELVIS W CONTRAST, 6/10/2020 11:14 AM    TECHNIQUE:  Helical CT images from the lung apices through the  symphysis pubis were obtained with contrast.  Coronal and sagittal  reformatted images were generated at a workstation for further  assessment.    CONTRAST:  99 ml Isovue 370.    COMPARISON: PET/CT 3/13/2020    HISTORY: Response to treatment - maxillary sinus cancer  widely  metastatic to lungs; Maxillary sinus cancer (H); Malignant neoplasm  metastatic to both lungs (H); Malignant neoplasm metastatic to both  lungs (H)    FINDINGS:    Lines and tubes: Right chest wall Port-A-Cath with tip at the superior  cavoatrial junction.    Mediastinum/Neck Base: No thyroid nodules. Central tracheobronchial  tree is patent. Heart size is normal. No pericardial effusion.  Normal  thoracic vasculature. Coronary artery calcification. Pretracheal,  pericarinal and right hilar ramona calcification.     Lungs: Multiple pulmonary nodules, randomly distributed. There has  been decrease in size of the pulmonary nodules:  Subpleural left upper lobe measures 8 mm x 13 mm compared to 19 x 21  mm when measured in a similar manner (series 7, image 113).  Peripheral right lower lobe nodule measuring 1.3 x 1 cm compared to  1.8 x 1.5 cm and measured in a similar manner (series 7, image 262).  Unchanged basal subpleural reticular opacities.  No consolidation. No pleural effusion or pneumothorax.    Abdomen and pelvis:  Liver: No suspicious liver lesions. Portal veins appear patent. Few  punctate too small to characterize hepatic hypodensities, unchanged.  Gallbladder: No gallstones. No evidence of acute cholecystitis.  Spleen: Normal size. Multiple punctate splenic calcifications likely  representing splenic granuloma.  Pancreas: No suspicious pancreatic lesions. The pancreatic duct is  mildly prominent but can be followed to the ampulla, unchanged from  PET/CT.  Adrenal glands: No adrenal nodules.   Kidneys: No hydronephrosis or obstructing renal stones.    Bladder / Pelvic organs: Enlarged prostate measuring 5.5 cm. Prostate  calcification. Circumferential urinary bladder wall thickening,  possibly secondary to underdistention and/or obstructive change.  Bowel: No bowel wall thickening. No abnormal bowel loop dilatation.  Redundant sigmoid colon  Lymph nodes: No retroperitoneal, mesenteric, or  pelvic  lymphadenopathy. Unchanged subcentimeter right external iliac lymph  node with fatty hilum (series 4, image 549).  Peritoneum / Retroperitoneum: No free fluid or air within the abdomen.  Vessels: No infrarenal aortic aneurysm. Scattered atherosclerotic  calcification of the abdominal aorta.    Bones and soft tissues: Degenerative changes of the spine. Scoliosis  with convexity to the right. Degenerative changes of the shoulder  joints.      Impression    IMPRESSION: In this patient with history of squamous cell carcinoma of  the maxillary sinus, there has been response to treatment:  1. Decrease in size of multiple, randomly distributed pulmonary  nodules.  2. No new pulmonary nodule.  3. No metastatic disease in the abdomen or pelvis.    I have personally reviewed the examination and initial interpretation  and I agree with the findings.    SOPHIE BAZAN MD           Assessment and Plan:  1.  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 He had a follow up PET 3/2020 with evidence of new metastasis to lung for which he has been started on immunotherapy with nivolumab.     - Currently on nivolumab, improvement on CT scans following 3 cycles.  He tolerating fairly well aside from fatigue and some itching.   - He had an episode of anxiety as per him. He felt very poorly. He had nausea/vomiting and shortness of breath. He lay in bed all the time. He notes that he would not like to go through this again. He would like a prescription for this again. I offered him to take lorazepam but he has been advised against it by Dr. Gunderson in pain clinic who manages his pain. Occasional lorazepam could be used for anxiety attack. I did warn him about the concerns for additive  sedation. He should follow up with Dr. Gundesron and seek his suggestion on options to treat his anxiety. He notes that he has a follow up visit in 2 weeks.     I would follow him in a month with labs and restaging CT scans prior to visit. I would not get PET-CT scans for treatment response assessments. He will get another nivolumab infusion today which he is due for.     2. Anemia, stable - likely residual from chemoRT. Will continue to monitor.     3. Fatigue- immunotherapy contributing. Will continue to follow TSH and vitals closely.    4. Itching on back and scalp, report of a history of psoriasis. Topical hydrocortisone 2.5% bid has been working and I have refilled a jar for him. Aware to let us know if they worsen or spread.     5. Nicotine abuse, chronic smoker. Was last trying nicotine patches per notes. Not addressed at today's visit.     6. Poor social support; lives alone with a dog    7. Dry mouth- using biotene, will also trial Evoxac as previously prescribed.   He has run out of script but he has 11 refills on this.     Over 25 min of direct face to face time spent with patient in person with more than 50% time spent in counseling and coordinating care.      Again, thank you for allowing me to participate in the care of your patient.      Sincerely,      Javier Ferraro MD

## 2020-08-18 NOTE — NURSING NOTE
"Chief Complaint   Patient presents with     Port Draw     labs drawn from port by rn.  vs taken     Port accessed with 20 gauge 3/4\" gripper needle and labs drawn by rn.  Port flushed with NS and heparin.  Pt tolerated well.  VS taken.  Pt checked in for next appt.    Cathy Perdue RN      "

## 2020-08-18 NOTE — NURSING NOTE
Oncology Rooming Note    August 18, 2020 10:41 AM   Eduardo Rubio is a 59 year old male who presents for:    Chief Complaint   Patient presents with     Port Draw     labs drawn from port by rn.  vs taken     Oncology Clinic Visit     Pt is here for a rtn Squamous Cell  Cancer     Initial Vitals: Blood Pressure 111/66 (BP Location: Right arm, Patient Position: Sitting, Cuff Size: Adult Regular)   Pulse 94   Temperature 98  F (36.7  C) (Tympanic)   Respiration 16   Weight 69.2 kg (152 lb 9.6 oz)   Oxygen Saturation 98%   Body Mass Index 20.70 kg/m   Estimated body mass index is 20.7 kg/m  as calculated from the following:    Height as of 3/13/20: 1.829 m (6').    Weight as of this encounter: 69.2 kg (152 lb 9.6 oz). Body surface area is 1.87 meters squared.  No Pain (0) Comment: Data Unavailable   No LMP for male patient.  Allergies reviewed: Yes  Medications reviewed: Yes    Medications: REFILLS NEEDED  Pharmacy name entered into [facility]: [place], [place] - [address] [place] [address] [phone]    Clinical concerns: none       Inessa Reyes MA

## 2020-09-15 NOTE — PROGRESS NOTES
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 Imaging workup included an MRI which demonstrated a maxillary sinus mass with extension to the orbit with involvement of the inferior rectus muscle. 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 Mr. Rubio was referred to [facility]. He had staging PET/CT on 9/9/2019 which revealed a FDG-avid maxillary mass at 4.0 x 3.9 x 4.2 cm. There was tumor extension into the right orbital cavity superiorly, the right nasal cavity medially, the retromaxillary fat region posterolaterally, the right pterygopalatine fossa posteriorly, and the premaxillary fat anteriorly. In the orbital cavity there was abutment of the inferior rectus muscle. There was no evidence of lymphadenopathy or systemic disease. His case was reviewed at tumor conference with recommendation for surgery with total maxillectomy and orbital exenteration with free flap reconstruction.     Mr. Rubio underwent a total maxillectomy with orbital exenteration on 9/24/2019 with Dr. Roberts, followed by reconstruction with a latissimus free flap with Dr. Pulliam. Surgical pathology showed a 4.4 cm moderately differentiated squamous cell carcinoma, (-) LVSI, (+)PNI. There was a positive margin at the pterygopalatine fossa, specifically the vidian nerve taken at its exit from the vidian canal, and additional resection into the canal was not possible. Mr. Rubio's case was discussed in the [facility]'s multidisciplinary head and neck tumor board, with the consensus recommendation to proceed with adjuvant chemoradiation given the positive margin status. Patient received day 1 cisplatin on 11/1/19 and day 22 on  11/20/19 and Day 43 on 12/13/19.      TREATMENT SUMMARY:  - 8/19/19: MRI face and orbit demonstrated a maxillary sinus mass with extension to the orbit with involvement of the inferior rectus muscle. 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  - 9/24/19: Total maxillectomy with orbital exenteration performed by Dr. Roberts, followed by reconstruction with a latissimus free flap with Dr. Pulliam.   - Surgical pathology showed a 4.4 cm moderately differentiated squamous cell carcinoma, (-) LVSI, (+)PNI. There was a positive margin at the pterygopalatine fossa, specifically the vidian nerve taken at its exit from the vidian canal, and additional resection into the canal was not possible.  -s/p total right maxilectomy with orbital exenteration   -surgical margin were positive making it a high risk disease for recurrence.    He completed concurrent chemoradiation with high dose cisplatin day 1, 11/1/19, Day 22 11/20/19, Day 43 12/13/19  - PET/CT on 3/13/20 revealed numerous lung nodules consistent with metastasis and he has been started on nivolumab. 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     He has been on nivolumab and had been doing well. He has fatigue and does not feel that he has energy.   He has been trying to eat and drink well.  He has had some nausea lately off and on.     Review of Systems:  Patient denies  chills,  headaches, dizziness,  abdominal pain, nausea, vomiting, changes to bowel, swelling of extremities, bleeding issues  Current Outpatient Medications   Medication Sig     acetaminophen (TYLENOL) 325 MG tablet Take 325-650 mg by mouth every 6 hours as needed for mild pain     cevimeline (EVOXAC) 30 MG capsule Take 1 capsule (30 mg) by mouth 3 times daily     cyclobenzaprine (FLEXERIL) 10 MG tablet TK 1 T  PO HS PRN FOR MUSCLE SPASM RELIEF     hydrocortisone 2.5 % cream Apply topically 2 times daily Apply to itchy spot on scalp and back twice a day as needed     LORazepam (ATIVAN) 0.5 MG tablet Take 1 tablet (0.5 mg) by mouth every 4 hours as needed (Anxiety, Nausea/Vomiting or Sleep) (Patient not taking: Reported on 7/15/2020)     NARCAN 4 MG/0.1ML nasal spray WAGNER INTO ONE NOSTRIL. MAY REPEAT IN ALTERNATE NOSTRIL WITHIN 2 MINUTES IF NO OR MINIMAL RESPONSE     oxyCODONE IR (ROXICODONE) 10 MG tablet      prochlorperazine (COMPAZINE) 10 MG tablet Take 1 tablet (10 mg) by mouth every 6 hours as needed (Nausea/Vomiting)     senna-docusate (SENOKOT-S/PERICOLACE) 8.6-50 MG tablet 1 tablet by Per Feeding Tube route 2 times daily as needed for constipation     No current facility-administered medications for this visit.         Physical Examination:  /73 (BP Location: Right arm, Patient Position: Sitting, Cuff Size: Adult Regular)   Pulse 78   Temp 97.8  F (36.6  C) (Tympanic)   Resp 16   Wt 70.1 kg (154 lb 9.6 oz)   SpO2 98%   BMI 20.97 kg/m    Wt Readings from Last 10 Encounters:   09/15/20 70.1 kg (154 lb 9.6 oz)   08/18/20 69.2 kg (152 lb 9.6 oz)   07/16/20 70.4 kg (155 lb 1.6 oz)   07/13/20 69.4 kg (153 lb)   06/18/20 71.2 kg (157 lb)   05/22/20 72.6 kg (160 lb)   05/21/20 72.1 kg (159 lb)   04/23/20 71.6 kg (157 lb 14.4 oz)   03/26/20 69.7 kg (153 lb 11.2 oz)   03/17/20 71.1 kg (156 lb 11.2 oz)     Phone visit done today: Voice quality sounds strong, easy to follow thought processes, does not sounds to be in acute distress    Laboratory Data:    Recent Labs   Lab Test 09/15/20  0846 08/18/20  1025 07/16/20  1050 06/18/20  0838 06/10/20  1005    129* 134 134 132*   POTASSIUM 3.5 3.4 3.9 3.7 3.9   CHLORIDE 103 94 102 101 96   CO2 26 30 26 29 31   ANIONGAP 7 5 6 5 5   BUN 10 14 9 10 17   CR 0.78 0.85 0.79 0.82 0.82   GLC 97 114* 77 90 84   MIKEY 8.8 8.4* 8.3* 8.0* 9.1     Recent Labs   Lab Test  04/23/20  0918 03/26/20  0856 03/17/20  1250 01/22/20  1414 12/20/19  0853  09/30/19  0638 09/29/19  0710 09/28/19  0716 09/27/19  0455 09/26/19  0424   MAG 1.7 2.1 1.9 2.0 1.2*   < > 2.1 2.3 2.3 2.0 2.2   PHOS  --   --   --   --   --   --  3.6 3.8 5.1* 4.0 3.7    < > = values in this interval not displayed.     Recent Labs   Lab Test 06/18/20  0838 06/10/20  1005 05/21/20  1409 04/23/20  0918 03/26/20  0856   WBC 6.0 5.5 5.5 6.9 4.9   HGB 10.8* 12.8* 10.8* 11.3* 10.8*    166 194 195 212   MCV 95 96 94 95 98   NEUTROPHIL 70.4 75.0 70.3 77.1 70.9     Recent Labs   Lab Test 09/15/20  0846 08/18/20  1025 07/16/20  1050   BILITOTAL 0.2 0.6 0.2   ALKPHOS 82 79 73   ALT 24 43 30   AST 25 52* 29   ALBUMIN 3.2* 3.4 3.4     TSH   Date Value Ref Range Status   09/15/2020 1.05 0.40 - 4.00 mU/L Final   07/16/2020 2.16 0.40 - 4.00 mU/L Final   06/18/2020 4.47 (H) 0.40 - 4.00 mU/L Final     No results for input(s): CEA in the last 03457 hours.  Results for orders placed or performed in visit on 09/11/20   CT Chest/Abdomen/Pelvis w Contrast    Narrative    Examination: CT CHEST/ABDOMEN/PELVIS W CONTRAST, 9/11/2020 3:47 PM     HISTORY: Follow up maxillary sinus squamous cell cancer on nivolumab;  Malignant neoplasm metastatic to both lungs (H); Malignant neoplasm  metastatic to both lungs (H); Maxillary sinus cancer (H)    COMPARISON: CT CAP, 6/10/2020, PET/CT from 3/13/2020.    Technique: Helical acquisition of CT images from the lung apices to  the pubis after the uncomplicated administration of Isovue 370 97cc.  Coronal, sagittal, and axial MIP images were reconstructed from the  source data.    FINDINGS:    Support devices: None.    CHEST:    Chest wall: Unremarkable.  Lungs: The trachea and central airways are clear.  Chronic basilar  predominant subpleural reticulation. Reticulation and cystic  changes/paraseptal emphysema along the anterior and mediastinal  pleura. No significant groundglass or mosaic attenuation. The  multiple  randomly distributed, spiculated pulmonary nodules with groundglass  halo predominantly demonstrate increased growth. For example:    - Marked growth of the 1.8 x 1.5 cm right upper lobe nodule along the  mediastinal pleura (series 7, image 105), significantly increased from  Kimmie previously measuring 0.4 x 0.8 cm.  - 1.7 x 1.3 cm right lower lobe, superior segment nodule along the  paraspinal pleura (series 7, image 149) previously 1.0 x 0.9 cm.  - 1.6 x 1.6 cm lateral left lower lobe nodule (seen on series 7, image  194), previously 1.0 x 1.0 cm.  - 1.6 x 1.4 cm anterior left upper lobe nodule seen on image 140,  mildly increased.    Some nodules have decreased in size, for example:  - The 1.1 x 0.9 cm anterior left upper lobe nodule seen on image 116,  previously 1.4 x 1.5 cm.    Mediastinum: There is no central pulmonary embolus.  Normal caliber  ascending aorta and main pulmonary artery. Normal variant aortic arch  branching pattern.  The heart is normal in size without significant  pericardial effusion.  Mild to moderate coronary calcifications.  Lymph nodes: Coarsely calcified mediastinal and hilar nodes. No new or  enlarging thoracic lymph node.    ABDOMEN/PELVIS:  Liver: Homogenous appearance without definite masses.   Biliary system: Unremarkable gallbladder.  Stable mild intrahepatic  ductal dilatation. No significant extrahepatic ductal dilatation.   Adrenals: Unremarkable.  Kidneys: Normal in appearance without hydronephrosis or collecting  system stones. No renal masses or cysts.  Pancreas: Unremarkable appearance. Pancreatic duct is prominent,  unchanged.  Spleen: Unremarkable.   Vasculature: Portal, hepatic, and splenic veins are patent. Patent  renal veins.  Abdominal aorta is non-aneurysmal.  Gastrointestinal: Small hiatal hernia, unremarkable stomach. There are  no abnormally dilated or thickened loops of small bowel or colon.   Unremarkable appendix.  Pelvic organs: Unremarkable.    Peritoneum: There is no free air or free fluid.   Lymph nodes: There are no suspicious mesenteric or retroperitoneal  lymph nodes based on size criteria.    SOFT TISSUES: No ventral hernias. Small right fat-containing inguinal  hernia.  BONES: No aggressive appearing osseous abnormalities. Chronic  appearing fractures of the right posterolateral rib cage.Thoracolumbar  spondylosis with lumbar rotoscoliosis.      Impression    IMPRESSION: In this patient with history of metastatic squamous cell  carcinoma of the maxillary sinus treated with Nivolumab:  1. Predominantly increased size of multiple randomly distributed  pulmonary nodules despite a mixed response. Some nodules demonstrate  marked growth, as noted above.  2. Peripheral reticulation suggesting early pulmonary fibrosis. Mild  apically predominant paraseptal emphysema. Together, these findings  raise the question of combined pulmonary fibrosis and emphysema.  3. No evidence of metastatic disease in the abdomen or pelvis.  4. Sequelae of old granulomatous disease.     I have personally reviewed the examination and initial interpretation  and I agree with the findings.    JESSICA MIXON, DO         Assessment and Plan:  1.  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 He had a follow up PET 3/2020 with evidence of new metastasis to lung for which he has been started on immunotherapy with nivolumab.     - Currently on nivolumab, noticed improvement on CT scans following first 3 cycles.    He is being seen with labs and restaging scans. He has been tolerating nivolumab with little difficulty. He is being seen with labs and restaging scans. I have reviewed actual images from his scans. He has a mixed response. There is  continued response in some of the nodules but he does have disease progression in several of the nodules. I think we should change therapy as he is having a clearly progressive disease.     I reviewed our options of subsequent lines of therapy on and off trial. I reviewed options of carboplatin and cetuximab. Carboplatin is administered intravenously every 3 weeks.  It can cause nausea, vomiting, altered taste and appetite.  It can cause cytopenias.  Cetuximab, on the other hand, is administered intravenously weekly.  This is associated with acneform rash over the face, trunk and back.  The patient has also experienced diarrhea.  The biggest challenge is the rash.        I reviewed strategies to deal with the rash, including a face wash, clindamycin cream and minocycline orally if needed.      I reviewed that we have a couple of clinical trials for which he might be a candidate.  There is a clinical trial of immunotherapy with ALT with pembrolizumab, which sounds like a promising option because he had an initial response to immunotherapy.  We would have to ensure that he is eligible for the clinical trial.  The clinical trial nurse here will reach out to him to explain about the study.  The subsequent followup will be established once we have the initial screening done. I have briefly reviewed his case with study PI - Dr. Jeffrey House and he will assist us with getting him screened.     2. Anemia, stable - . Will continue to monitor.     3. Fatigue- immunotherapy contributing. Will continue to follow TSH and vitals closely.    4. Itching on back and scalp, report of a history of psoriasis. Topical hydrocortisone 2.5% bid has been working and I have refilled a jar for him. Aware to let us know if they worsen or spread.     5. Nicotine abuse, chronic smoker. Was last trying nicotine patches per notes. Not addressed at today's visit.     6. Poor social support; lives alone with a dog    7. Dry mouth- using biotene,  will also trial Evoxac as previously prescribed.   He has run out of script but he has 11 refills on this.     Over 45 min of direct face to face time spent with patient in person with more than 50% time spent in counseling and coordinating care.

## 2020-09-15 NOTE — LETTER
9/15/2020         RE: Eduardo Rubio  [address]  [facility] [address]        Dear Colleague,    Thank you for referring your patient, Eduardo Ruboi, to the [facility]. Please see a copy of my visit note below.    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 Imaging workup included an MRI which demonstrated a maxillary sinus mass with extension to the orbit with involvement of the inferior rectus muscle. 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 Mr. Rubio was referred to [facility]. He had staging PET/CT on 9/9/2019 which revealed a FDG-avid maxillary mass at 4.0 x 3.9 x 4.2 cm. There was tumor extension into the right orbital cavity superiorly, the right nasal cavity medially, the retromaxillary fat region posterolaterally, the right pterygopalatine fossa posteriorly, and the premaxillary fat anteriorly. In the orbital cavity there was abutment of the inferior rectus muscle. There was no evidence of lymphadenopathy or systemic disease. His case was reviewed at tumor conference with recommendation for surgery with total maxillectomy and orbital exenteration with free flap reconstruction.     Mr. Rubio underwent a total maxillectomy with orbital exenteration on 9/24/2019 with Dr. Roberts, followed by reconstruction with a latissimus free flap with Dr. Pulliam. Surgical pathology showed a 4.4 cm moderately differentiated squamous cell carcinoma, (-) LVSI, (+)PNI. There was a positive margin at the pterygopalatine fossa, specifically the vidian nerve taken at its exit from the vidian canal, and additional resection into the canal was not possible. Mr. Rubio's case was  discussed in the [facility]'s multidisciplinary head and neck tumor board, with the consensus recommendation to proceed with adjuvant chemoradiation given the positive margin status. Patient received day 1 cisplatin on 11/1/19 and day 22 on 11/20/19 and Day 43 on 12/13/19.      TREATMENT SUMMARY:  - 8/19/19: MRI face and orbit demonstrated a maxillary sinus mass with extension to the orbit with involvement of the inferior rectus muscle. 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  - 9/24/19: Total maxillectomy with orbital exenteration performed by Dr. Roberts, followed by reconstruction with a latissimus free flap with Dr. Pulliam.   - Surgical pathology showed a 4.4 cm moderately differentiated squamous cell carcinoma, (-) LVSI, (+)PNI. There was a positive margin at the pterygopalatine fossa, specifically the vidian nerve taken at its exit from the vidian canal, and additional resection into the canal was not possible.  -s/p total right maxilectomy with orbital exenteration   -surgical margin were positive making it a high risk disease for recurrence.    He completed concurrent chemoradiation with high dose cisplatin day 1, 11/1/19, Day 22 11/20/19, Day 43 12/13/19  - PET/CT on 3/13/20 revealed numerous lung nodules consistent with metastasis and he has been started on nivolumab. 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     He has been on nivolumab and had been doing well. He has fatigue and does not feel that he has energy.   He has been trying to eat and drink well.  He has had some nausea lately off and on.     Review of Systems:  Patient denies  chills,  headaches, dizziness,  abdominal pain, nausea, vomiting, changes to bowel, swelling of extremities, bleeding issues  Current Outpatient Medications    Medication Sig     acetaminophen (TYLENOL) 325 MG tablet Take 325-650 mg by mouth every 6 hours as needed for mild pain     cevimeline (EVOXAC) 30 MG capsule Take 1 capsule (30 mg) by mouth 3 times daily     cyclobenzaprine (FLEXERIL) 10 MG tablet TK 1 T PO HS PRN FOR MUSCLE SPASM RELIEF     hydrocortisone 2.5 % cream Apply topically 2 times daily Apply to itchy spot on scalp and back twice a day as needed     LORazepam (ATIVAN) 0.5 MG tablet Take 1 tablet (0.5 mg) by mouth every 4 hours as needed (Anxiety, Nausea/Vomiting or Sleep) (Patient not taking: Reported on 7/15/2020)     NARCAN 4 MG/0.1ML nasal spray WAGNER INTO ONE NOSTRIL. MAY REPEAT IN ALTERNATE NOSTRIL WITHIN 2 MINUTES IF NO OR MINIMAL RESPONSE     oxyCODONE IR (ROXICODONE) 10 MG tablet      prochlorperazine (COMPAZINE) 10 MG tablet Take 1 tablet (10 mg) by mouth every 6 hours as needed (Nausea/Vomiting)     senna-docusate (SENOKOT-S/PERICOLACE) 8.6-50 MG tablet 1 tablet by Per Feeding Tube route 2 times daily as needed for constipation     No current facility-administered medications for this visit.         Physical Examination:  /73 (BP Location: Right arm, Patient Position: Sitting, Cuff Size: Adult Regular)   Pulse 78   Temp 97.8  F (36.6  C) (Tympanic)   Resp 16   Wt 70.1 kg (154 lb 9.6 oz)   SpO2 98%   BMI 20.97 kg/m    Wt Readings from Last 10 Encounters:   09/15/20 70.1 kg (154 lb 9.6 oz)   08/18/20 69.2 kg (152 lb 9.6 oz)   07/16/20 70.4 kg (155 lb 1.6 oz)   07/13/20 69.4 kg (153 lb)   06/18/20 71.2 kg (157 lb)   05/22/20 72.6 kg (160 lb)   05/21/20 72.1 kg (159 lb)   04/23/20 71.6 kg (157 lb 14.4 oz)   03/26/20 69.7 kg (153 lb 11.2 oz)   03/17/20 71.1 kg (156 lb 11.2 oz)     Phone visit done today: Voice quality sounds strong, easy to follow thought processes, does not sounds to be in acute distress    Laboratory Data:    Recent Labs   Lab Test 09/15/20  0846 08/18/20  1025 07/16/20  1050 06/18/20  0838 06/10/20  1005    129*  134 134 132*   POTASSIUM 3.5 3.4 3.9 3.7 3.9   CHLORIDE 103 94 102 101 96   CO2 26 30 26 29 31   ANIONGAP 7 5 6 5 5   BUN 10 14 9 10 17   CR 0.78 0.85 0.79 0.82 0.82   GLC 97 114* 77 90 84   MIKEY 8.8 8.4* 8.3* 8.0* 9.1     Recent Labs   Lab Test 04/23/20  0918 03/26/20  0856 03/17/20  1250 01/22/20  1414 12/20/19  0853  09/30/19  0638 09/29/19  0710 09/28/19  0716 09/27/19  0455 09/26/19  0424   MAG 1.7 2.1 1.9 2.0 1.2*   < > 2.1 2.3 2.3 2.0 2.2   PHOS  --   --   --   --   --   --  3.6 3.8 5.1* 4.0 3.7    < > = values in this interval not displayed.     Recent Labs   Lab Test 06/18/20  0838 06/10/20  1005 05/21/20  1409 04/23/20  0918 03/26/20  0856   WBC 6.0 5.5 5.5 6.9 4.9   HGB 10.8* 12.8* 10.8* 11.3* 10.8*    166 194 195 212   MCV 95 96 94 95 98   NEUTROPHIL 70.4 75.0 70.3 77.1 70.9     Recent Labs   Lab Test 09/15/20  0846 08/18/20  1025 07/16/20  1050   BILITOTAL 0.2 0.6 0.2   ALKPHOS 82 79 73   ALT 24 43 30   AST 25 52* 29   ALBUMIN 3.2* 3.4 3.4     TSH   Date Value Ref Range Status   09/15/2020 1.05 0.40 - 4.00 mU/L Final   07/16/2020 2.16 0.40 - 4.00 mU/L Final   06/18/2020 4.47 (H) 0.40 - 4.00 mU/L Final     No results for input(s): CEA in the last 03286 hours.  Results for orders placed or performed in visit on 09/11/20   CT Chest/Abdomen/Pelvis w Contrast    Narrative    Examination: CT CHEST/ABDOMEN/PELVIS W CONTRAST, 9/11/2020 3:47 PM     HISTORY: Follow up maxillary sinus squamous cell cancer on nivolumab;  Malignant neoplasm metastatic to both lungs (H); Malignant neoplasm  metastatic to both lungs (H); Maxillary sinus cancer (H)    COMPARISON: CT CAP, 6/10/2020, PET/CT from 3/13/2020.    Technique: Helical acquisition of CT images from the lung apices to  the pubis after the uncomplicated administration of Isovue 370 97cc.  Coronal, sagittal, and axial MIP images were reconstructed from the  source data.    FINDINGS:    Support devices: None.    CHEST:    Chest wall: Unremarkable.  Lungs: The  trachea and central airways are clear.  Chronic basilar  predominant subpleural reticulation. Reticulation and cystic  changes/paraseptal emphysema along the anterior and mediastinal  pleura. No significant groundglass or mosaic attenuation. The multiple  randomly distributed, spiculated pulmonary nodules with groundglass  halo predominantly demonstrate increased growth. For example:    - Marked growth of the 1.8 x 1.5 cm right upper lobe nodule along the  mediastinal pleura (series 7, image 105), significantly increased from  [place] previously measuring 0.4 x 0.8 cm.  - 1.7 x 1.3 cm right lower lobe, superior segment nodule along the  paraspinal pleura (series 7, image 149) previously 1.0 x 0.9 cm.  - 1.6 x 1.6 cm lateral left lower lobe nodule (seen on series 7, image  194), previously 1.0 x 1.0 cm.  - 1.6 x 1.4 cm anterior left upper lobe nodule seen on image 140,  mildly increased.    Some nodules have decreased in size, for example:  - The 1.1 x 0.9 cm anterior left upper lobe nodule seen on image 116,  previously 1.4 x 1.5 cm.    Mediastinum: There is no central pulmonary embolus.  Normal caliber  ascending aorta and main pulmonary artery. Normal variant aortic arch  branching pattern.  The heart is normal in size without significant  pericardial effusion.  Mild to moderate coronary calcifications.  Lymph nodes: Coarsely calcified mediastinal and hilar nodes. No new or  enlarging thoracic lymph node.    ABDOMEN/PELVIS:  Liver: Homogenous appearance without definite masses.   Biliary system: Unremarkable gallbladder.  Stable mild intrahepatic  ductal dilatation. No significant extrahepatic ductal dilatation.   Adrenals: Unremarkable.  Kidneys: Normal in appearance without hydronephrosis or collecting  system stones. No renal masses or cysts.  Pancreas: Unremarkable appearance. Pancreatic duct is prominent,  unchanged.  Spleen: Unremarkable.   Vasculature: Portal, hepatic, and splenic veins are patent.  Patent  renal veins.  Abdominal aorta is non-aneurysmal.  Gastrointestinal: Small hiatal hernia, unremarkable stomach. There are  no abnormally dilated or thickened loops of small bowel or colon.   Unremarkable appendix.  Pelvic organs: Unremarkable.   Peritoneum: There is no free air or free fluid.   Lymph nodes: There are no suspicious mesenteric or retroperitoneal  lymph nodes based on size criteria.    SOFT TISSUES: No ventral hernias. Small right fat-containing inguinal  hernia.  BONES: No aggressive appearing osseous abnormalities. Chronic  appearing fractures of the right posterolateral rib cage.Thoracolumbar  spondylosis with lumbar rotoscoliosis.      Impression    IMPRESSION: In this patient with history of metastatic squamous cell  carcinoma of the maxillary sinus treated with Nivolumab:  1. Predominantly increased size of multiple randomly distributed  pulmonary nodules despite a mixed response. Some nodules demonstrate  marked growth, as noted above.  2. Peripheral reticulation suggesting early pulmonary fibrosis. Mild  apically predominant paraseptal emphysema. Together, these findings  raise the question of combined pulmonary fibrosis and emphysema.  3. No evidence of metastatic disease in the abdomen or pelvis.  4. Sequelae of old granulomatous disease.     I have personally reviewed the examination and initial interpretation  and I agree with the findings.    JESSICA MIXON, DO         Assessment and Plan:  1.  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 He had a follow up PET 3/2020 with evidence of new metastasis to lung for which he has been started on immunotherapy with nivolumab.     - Currently on nivolumab, noticed improvement on CT scans following  first 3 cycles.    He is being seen with labs and restaging scans. He has been tolerating nivolumab with little difficulty. He is being seen with labs and restaging scans. I have reviewed actual images from his scans. He has a mixed response. There is continued response in some of the nodules but he does have disease progression in several of the nodules. I think we should change therapy as he is having a clearly progressive disease.     I reviewed our options of subsequent lines of therapy on and off trial. I reviewed options of carboplatin and cetuximab. Carboplatin is administered intravenously every 3 weeks.  It can cause nausea, vomiting, altered taste and appetite.  It can cause cytopenias.  Cetuximab, on the other hand, is administered intravenously weekly.  This is associated with acneform rash over the face, trunk and back.  The patient has also experienced diarrhea.  The biggest challenge is the rash.        I reviewed strategies to deal with the rash, including a face wash, clindamycin cream and minocycline orally if needed.      I reviewed that we have a couple of clinical trials for which he might be a candidate.  There is a clinical trial of immunotherapy with ALT with pembrolizumab, which sounds like a promising option because he had an initial response to immunotherapy.  We would have to ensure that he is eligible for the clinical trial.  The clinical trial nurse here will reach out to him to explain about the study.  The subsequent followup will be established once we have the initial screening done. I have briefly reviewed his case with study PI - Dr. Jeffrey House and he will assist us with getting him screened.     2. Anemia, stable - . Will continue to monitor.     3. Fatigue- immunotherapy contributing. Will continue to follow TSH and vitals closely.    4. Itching on back and scalp, report of a history of psoriasis. Topical hydrocortisone 2.5% bid has been working and I have refilled a jar for  him. Aware to let us know if they worsen or spread.     5. Nicotine abuse, chronic smoker. Was last trying nicotine patches per notes. Not addressed at today's visit.     6. Poor social support; lives alone with a dog    7. Dry mouth- using biotene, will also trial Evoxac as previously prescribed.   He has run out of script but he has 11 refills on this.     Over 45 min of direct face to face time spent with patient in person with more than 50% time spent in counseling and coordinating care.      Again, thank you for allowing me to participate in the care of your patient.        Sincerely,        Javier Ferraro MD

## 2020-09-15 NOTE — NURSING NOTE
Oncology Rooming Note    September 15, 2020 9:17 AM   Eduardo Rubio is a 59 year old male who presents for:    Chief Complaint   Patient presents with     Port Draw     labs drawn from port by rn.  vs taken     Oncology Clinic Visit     Pt is here for a rtn for Sinus Cancer      Initial Vitals: Blood Pressure 114/73 (BP Location: Right arm, Patient Position: Sitting, Cuff Size: Adult Regular)   Pulse 78   Temperature 97.8  F (36.6  C) (Tympanic)   Respiration 16   Weight 70.1 kg (154 lb 9.6 oz)   Oxygen Saturation 98%   Body Mass Index 20.97 kg/m   Estimated body mass index is 20.97 kg/m  as calculated from the following:    Height as of 3/13/20: 1.829 m (6').    Weight as of this encounter: 70.1 kg (154 lb 9.6 oz). Body surface area is 1.89 meters squared.  No Pain (0) Comment: Data Unavailable   No LMP for male patient.  Allergies reviewed: Yes  Medications reviewed: Yes    Medications: Medication refills not needed today.  Pharmacy name entered into [facility]: [facility] [place], [place] - [address] [facility] [address] [facility] [phone]    Clinical concerns: none       Inessa Reyes MA

## 2020-09-21 NOTE — TELEPHONE ENCOUNTER
Attempted to contact patient with details for 10/16 appointment in [facility] (pt no-shoed 9/18 appointment). Unable to LVM as pt's mailbox has not been set up yet. Will send letter with appointment information.

## 2020-09-21 NOTE — TELEPHONE ENCOUNTER
Phone call to patient with intent of discussing clinical trial [id number] per referral from Dr. Ferraro. No answer, and unable to leave message due to no voicemail. Will re-attempt call to patient tomorrow.

## 2020-09-23 NOTE — TELEPHONE ENCOUNTER
Phone call to patient with intent of discussing clinical trial [id number] per referral from Dr. Ferraro. No answer, and unable to leave message due to no voicemail. I called his relative Stormy Winston to ask she pass a message to him that we are attempting to reach him, and she said she hasn't been able to reach him for 3 days and is going to go check on him. I gave her my office phone number to give to him if she gets in touch. Updated Dr. Ferraro.

## 2020-09-28 NOTE — TELEPHONE ENCOUNTER
Clinical Trial Discussion Visit Note   Subject name: Eduardo Rubio     I spoke via telephone with the patient today to discuss clinical trial [id number]. I will mail the patient a copy of the consent form (he does not use email) and all of his trial-related questions were addressed. We reviewed eligibility information. I also made it clear that his decision to participate or not participate in this trial will not affect their care, treatment, or future relationship with the [facility].    The patient will continue to consider pursuing enrollment in this study but did not consent at this visit.

## 2020-10-06 NOTE — PROGRESS NOTES
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 Imaging workup included an MRI which demonstrated a maxillary sinus mass with extension to the orbit with involvement of the inferior rectus muscle. 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 Mr. Rubio was referred to [facility]. He had staging PET/CT on 9/9/2019 which revealed a FDG-avid maxillary mass at 4.0 x 3.9 x 4.2 cm. There was tumor extension into the right orbital cavity superiorly, the right nasal cavity medially, the retromaxillary fat region posterolaterally, the right pterygopalatine fossa posteriorly, and the premaxillary fat anteriorly. In the orbital cavity there was abutment of the inferior rectus muscle. There was no evidence of lymphadenopathy or systemic disease. His case was reviewed at tumor conference with recommendation for surgery with total maxillectomy and orbital exenteration with free flap reconstruction.     Mr. Rubio underwent a total maxillectomy with orbital exenteration on 9/24/2019 with Dr. oRberts, followed by reconstruction with a latissimus free flap with Dr. Pulliam. Surgical pathology showed a 4.4 cm moderately differentiated squamous cell carcinoma, (-) LVSI, (+)PNI. There was a positive margin at the pterygopalatine fossa, specifically the vidian nerve taken at its exit from the vidian canal, and additional resection into the canal was not possible. Mr. Rubio's case was discussed in the [facility]'s multidisciplinary head and neck tumor board, with the consensus recommendation to proceed with adjuvant chemoradiation given the positive margin status. Patient received day 1 cisplatin on 11/1/19 and day 22 on  11/20/19 and Day 43 on 12/13/19.      TREATMENT SUMMARY:  - 8/19/19: MRI face and orbit demonstrated a maxillary sinus mass with extension to the orbit with involvement of the inferior rectus muscle. 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  - 9/24/19: Total maxillectomy with orbital exenteration performed by Dr. Roberts, followed by reconstruction with a latissimus free flap with Dr. Pulliam.   - Surgical pathology showed a 4.4 cm moderately differentiated squamous cell carcinoma, (-) LVSI, (+)PNI. There was a positive margin at the pterygopalatine fossa, specifically the vidian nerve taken at its exit from the vidian canal, and additional resection into the canal was not possible.  -s/p total right maxilectomy with orbital exenteration   -surgical margin were positive making it a high risk disease for recurrence.    He completed concurrent chemoradiation with high dose cisplatin day 1, 11/1/19, Day 22 11/20/19, Day 43 12/13/19  - PET/CT on 3/13/20 revealed numerous lung nodules consistent with metastasis and he has been started on nivolumab. 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     Eduardo was reached over vide visit. I was waiting in the waiting room for 15 min and then called him. He stated that he would reach out his land lady to help and then I waited again. I spoke to his land lord lady and sent another request to his phone.     He has been on nivolumab and had been doing well. He has fatigue and does not feel that he has energy.   He has been trying to eat and drink well.  He has had some nausea lately off and on.     Review of Systems:  Patient denies  chills,  headaches, dizziness,  abdominal pain, nausea, vomiting, changes to bowel, swelling of extremities, bleeding issues  Current Outpatient Medications    Medication Sig     acetaminophen (TYLENOL) 325 MG tablet Take 325-650 mg by mouth every 6 hours as needed for mild pain     cevimeline (EVOXAC) 30 MG capsule Take 1 capsule (30 mg) by mouth 3 times daily     cyclobenzaprine (FLEXERIL) 10 MG tablet TK 1 T PO HS PRN FOR MUSCLE SPASM RELIEF     hydrocortisone 2.5 % cream Apply topically 2 times daily Apply to itchy spot on scalp and back twice a day as needed     NARCAN 4 MG/0.1ML nasal spray WAGNER INTO ONE NOSTRIL. MAY REPEAT IN ALTERNATE NOSTRIL WITHIN 2 MINUTES IF NO OR MINIMAL RESPONSE     oxyCODONE IR (ROXICODONE) 10 MG tablet      senna-docusate (SENOKOT-S/PERICOLACE) 8.6-50 MG tablet 1 tablet by Per Feeding Tube route 2 times daily as needed for constipation     No current facility-administered medications for this visit.         Physical Examination:  Wt 68 kg (150 lb)   BMI 20.34 kg/m    Wt Readings from Last 10 Encounters:   10/06/20 68 kg (150 lb)   09/15/20 70.1 kg (154 lb 9.6 oz)   08/18/20 69.2 kg (152 lb 9.6 oz)   07/16/20 70.4 kg (155 lb 1.6 oz)   07/13/20 69.4 kg (153 lb)   06/18/20 71.2 kg (157 lb)   05/22/20 72.6 kg (160 lb)   05/21/20 72.1 kg (159 lb)   04/23/20 71.6 kg (157 lb 14.4 oz)   03/26/20 69.7 kg (153 lb 11.2 oz)     Phone visit done today: Voice quality sounds strong, easy to follow thought processes, does not sounds to be in acute distress    Laboratory Data:    Recent Labs   Lab Test 09/15/20  0846 08/18/20  1025 07/16/20  1050 06/18/20  0838 06/10/20  1005    129* 134 134 132*   POTASSIUM 3.5 3.4 3.9 3.7 3.9   CHLORIDE 103 94 102 101 96   CO2 26 30 26 29 31   ANIONGAP 7 5 6 5 5   BUN 10 14 9 10 17   CR 0.78 0.85 0.79 0.82 0.82   GLC 97 114* 77 90 84   MIKEY 8.8 8.4* 8.3* 8.0* 9.1     Recent Labs   Lab Test 04/23/20  0918 03/26/20  0856 03/17/20  1250 01/22/20  1414 12/20/19  0853  09/30/19  0638 09/29/19  0710 09/28/19  0716 09/27/19  0455 09/26/19  0424   MAG 1.7 2.1 1.9 2.0 1.2*   < > 2.1 2.3 2.3 2.0 2.2   PHOS  --   --   --   --    --   --  3.6 3.8 5.1* 4.0 3.7    < > = values in this interval not displayed.     Recent Labs   Lab Test 06/18/20  0838 06/10/20  1005 05/21/20  1409 04/23/20  0918 03/26/20  0856   WBC 6.0 5.5 5.5 6.9 4.9   HGB 10.8* 12.8* 10.8* 11.3* 10.8*    166 194 195 212   MCV 95 96 94 95 98   NEUTROPHIL 70.4 75.0 70.3 77.1 70.9     Recent Labs   Lab Test 09/15/20  0846 08/18/20  1025 07/16/20  1050   BILITOTAL 0.2 0.6 0.2   ALKPHOS 82 79 73   ALT 24 43 30   AST 25 52* 29   ALBUMIN 3.2* 3.4 3.4     TSH   Date Value Ref Range Status   09/15/2020 1.05 0.40 - 4.00 mU/L Final   07/16/2020 2.16 0.40 - 4.00 mU/L Final   06/18/2020 4.47 (H) 0.40 - 4.00 mU/L Final     No results for input(s): CEA in the last 77211 hours.  Results for orders placed or performed in visit on 09/11/20   CT Chest/Abdomen/Pelvis w Contrast    Narrative    Examination: CT CHEST/ABDOMEN/PELVIS W CONTRAST, 9/11/2020 3:47 PM     HISTORY: Follow up maxillary sinus squamous cell cancer on nivolumab;  Malignant neoplasm metastatic to both lungs (H); Malignant neoplasm  metastatic to both lungs (H); Maxillary sinus cancer (H)    COMPARISON: CT CAP, 6/10/2020, PET/CT from 3/13/2020.    Technique: Helical acquisition of CT images from the lung apices to  the pubis after the uncomplicated administration of Isovue 370 97cc.  Coronal, sagittal, and axial MIP images were reconstructed from the  source data.    FINDINGS:    Support devices: None.    CHEST:    Chest wall: Unremarkable.  Lungs: The trachea and central airways are clear.  Chronic basilar  predominant subpleural reticulation. Reticulation and cystic  changes/paraseptal emphysema along the anterior and mediastinal  pleura. No significant groundglass or mosaic attenuation. The multiple  randomly distributed, spiculated pulmonary nodules with groundglass  halo predominantly demonstrate increased growth. For example:    - Marked growth of the 1.8 x 1.5 cm right upper lobe nodule along the  mediastinal  pleura (series 7, image 105), significantly increased from  Kimmie previously measuring 0.4 x 0.8 cm.  - 1.7 x 1.3 cm right lower lobe, superior segment nodule along the  paraspinal pleura (series 7, image 149) previously 1.0 x 0.9 cm.  - 1.6 x 1.6 cm lateral left lower lobe nodule (seen on series 7, image  194), previously 1.0 x 1.0 cm.  - 1.6 x 1.4 cm anterior left upper lobe nodule seen on image 140,  mildly increased.    Some nodules have decreased in size, for example:  - The 1.1 x 0.9 cm anterior left upper lobe nodule seen on image 116,  previously 1.4 x 1.5 cm.    Mediastinum: There is no central pulmonary embolus.  Normal caliber  ascending aorta and main pulmonary artery. Normal variant aortic arch  branching pattern.  The heart is normal in size without significant  pericardial effusion.  Mild to moderate coronary calcifications.  Lymph nodes: Coarsely calcified mediastinal and hilar nodes. No new or  enlarging thoracic lymph node.    ABDOMEN/PELVIS:  Liver: Homogenous appearance without definite masses.   Biliary system: Unremarkable gallbladder.  Stable mild intrahepatic  ductal dilatation. No significant extrahepatic ductal dilatation.   Adrenals: Unremarkable.  Kidneys: Normal in appearance without hydronephrosis or collecting  system stones. No renal masses or cysts.  Pancreas: Unremarkable appearance. Pancreatic duct is prominent,  unchanged.  Spleen: Unremarkable.   Vasculature: Portal, hepatic, and splenic veins are patent. Patent  renal veins.  Abdominal aorta is non-aneurysmal.  Gastrointestinal: Small hiatal hernia, unremarkable stomach. There are  no abnormally dilated or thickened loops of small bowel or colon.   Unremarkable appendix.  Pelvic organs: Unremarkable.   Peritoneum: There is no free air or free fluid.   Lymph nodes: There are no suspicious mesenteric or retroperitoneal  lymph nodes based on size criteria.    SOFT TISSUES: No ventral hernias. Small right fat-containing  inguinal  hernia.  BONES: No aggressive appearing osseous abnormalities. Chronic  appearing fractures of the right posterolateral rib cage.Thoracolumbar  spondylosis with lumbar rotoscoliosis.      Impression    IMPRESSION: In this patient with history of metastatic squamous cell  carcinoma of the maxillary sinus treated with Nivolumab:  1. Predominantly increased size of multiple randomly distributed  pulmonary nodules despite a mixed response. Some nodules demonstrate  marked growth, as noted above.  2. Peripheral reticulation suggesting early pulmonary fibrosis. Mild  apically predominant paraseptal emphysema. Together, these findings  raise the question of combined pulmonary fibrosis and emphysema.  3. No evidence of metastatic disease in the abdomen or pelvis.  4. Sequelae of old granulomatous disease.     I have personally reviewed the examination and initial interpretation  and I agree with the findings.    JESSICA MIXON, DO         Assessment and Plan:  1.  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 He had a follow up PET 3/2020 with evidence of new metastasis to lung for which he has been started on immunotherapy with nivolumab.     Eduardo had improvement on CT scans following first 3 cycles on nivolumab and has progressed since then. We had reviewed our options including carboplatin with cetuximab vs clinical trial of pembrolizumab with another immunotherapy agent.      I reviewed options of carboplatin and cetuximab. Carboplatin is administered intravenously every 3 weeks.  It can cause nausea, vomiting, altered taste and appetite.  It can cause cytopenias.  Cetuximab, on the other hand, is administered intravenously weekly.  This is associated with acneform rash  over the face, trunk and back.  The patient has also experienced diarrhea.  The biggest challenge is the rash.          The clinical trial of immunotherapy with ALT and pembrolizumab, which sounds like a promising option because he had an initial response to immunotherapy, should be well tolerated and we would still have the option of standard of care cetuximab/carboplatin if he was to progress on this regimen.  We would have to ensure that he is eligible for the clinical trial.  It is possible that he might be outside the window of enrollment and I will check if we could give him another dose of nivolumab and repeat scan in 4 weeks. If he is again progressing it will confirm disease progression on current immunotherapy and will address the issue of being off therapy for a long time. I will have Nelsy Wilson - research nurse give him a call and I will call him if there are still issues. He is willing to proceed with the trial and would like to hear the exact dates which we will try to have scheduled.     2. Anemia, stable - . Will continue to monitor.     3. Fatigue- immunotherapy contributing. Will continue to follow TSH and vitals closely.    4. Itching on back and scalp, report of a history of psoriasis. Topical hydrocortisone 2.5% bid has been working and I have refilled a jar for him. Aware to let us know if they worsen or spread.     5. Nicotine abuse, chronic smoker. Was last trying nicotine patches per notes. Not addressed at today's visit.     6. Poor social support; lives alone with a dog    7. Dry mouth- using biotene, will also trial Evoxac as previously prescribed.   He has run out of script but he has 11 refills on this.     Video-Visit Details    Type of service:  Video Visit  Originating Location (pt. Location): Home  Distant Location (provider location):  [facility]   Platform used for Video Visit: Renny    Over 45 min spent with patient over video with more than 50%  time spent in counseling and coordinating care.      Javier Ferraro    Hematologist and Medical Oncologist  [facility]

## 2020-10-06 NOTE — LETTER
"    10/6/2020         RE: Eduardo Rubio  [address]  [facility] [address]        Dear Colleague,    Thank you for referring your patient, Eduardo Rubio, to the [facility]. Please see a copy of my visit note below.    Eduardo Rubio is a 60 year old male who is being evaluated via a billable video visit.      The patient has been notified of following:     \"This video visit will be conducted via a call between you and your physician/provider. We have found that certain health care needs can be provided without the need for an in-person physical exam.  This service lets us provide the care you need with a video conversation.  If a prescription is necessary we can send it directly to your pharmacy.  If lab work is needed we can place an order for that and you can then stop by our lab to have the test done at a later time.    Video visits are billed at different rates depending on your insurance coverage.  Please reach out to your insurance provider with any questions.    If during the course of the call the physician/provider feels a video visit is not appropriate, you will not be charged for this service.\"    Patient has given verbal consent for Video visit? Yes  How would you like to obtain your AVS? MyChart  If you are dropped from the video visit, the video invite should be resent to: Text to cell phone: [phone]  Will anyone else be joining your video visit? Yes: Brother Dhaval . How would they like to receive their invitation? 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 Imaging workup included an MRI which demonstrated a maxillary sinus mass with " extension to the orbit with involvement of the inferior rectus muscle. 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 Mr. Rubio was referred to [facility]. He had staging PET/CT on 9/9/2019 which revealed a FDG-avid maxillary mass at 4.0 x 3.9 x 4.2 cm. There was tumor extension into the right orbital cavity superiorly, the right nasal cavity medially, the retromaxillary fat region posterolaterally, the right pterygopalatine fossa posteriorly, and the premaxillary fat anteriorly. In the orbital cavity there was abutment of the inferior rectus muscle. There was no evidence of lymphadenopathy or systemic disease. His case was reviewed at tumor conference with recommendation for surgery with total maxillectomy and orbital exenteration with free flap reconstruction.     Mr. Rubio underwent a total maxillectomy with orbital exenteration on 9/24/2019 with Dr. Roberts, followed by reconstruction with a latissimus free flap with Dr. Pulliam. Surgical pathology showed a 4.4 cm moderately differentiated squamous cell carcinoma, (-) LVSI, (+)PNI. There was a positive margin at the pterygopalatine fossa, specifically the vidian nerve taken at its exit from the vidian canal, and additional resection into the canal was not possible. Mr. Rubio's case was discussed in the [facility]'s multidisciplinary head and neck tumor board, with the consensus recommendation to proceed with adjuvant chemoradiation given the positive margin status. Patient received day 1 cisplatin on 11/1/19 and day 22 on 11/20/19 and Day 43 on 12/13/19.      TREATMENT SUMMARY:  - 8/19/19: MRI face and orbit demonstrated a maxillary sinus mass with extension to the orbit with involvement of the inferior rectus muscle. 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  - 9/24/19: Total maxillectomy with orbital exenteration performed by Dr. Roberts,  followed by reconstruction with a latissimus free flap with Dr. Pulliam.   - Surgical pathology showed a 4.4 cm moderately differentiated squamous cell carcinoma, (-) LVSI, (+)PNI. There was a positive margin at the pterygopalatine fossa, specifically the vidian nerve taken at its exit from the vidian canal, and additional resection into the canal was not possible.  -s/p total right maxilectomy with orbital exenteration   -surgical margin were positive making it a high risk disease for recurrence.    He completed concurrent chemoradiation with high dose cisplatin day 1, 11/1/19, Day 22 11/20/19, Day 43 12/13/19  - PET/CT on 3/13/20 revealed numerous lung nodules consistent with metastasis and he has been started on nivolumab. 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     Eduardo was reached over vide visit. I was waiting in the waiting room for 15 min and then called him. He stated that he would reach out his land lady to help and then I waited again. I spoke to his land lord lady and sent another request to his phone.     He has been on nivolumab and had been doing well. He has fatigue and does not feel that he has energy.   He has been trying to eat and drink well.  He has had some nausea lately off and on.     Review of Systems:  Patient denies  chills,  headaches, dizziness,  abdominal pain, nausea, vomiting, changes to bowel, swelling of extremities, bleeding issues  Current Outpatient Medications   Medication Sig     acetaminophen (TYLENOL) 325 MG tablet Take 325-650 mg by mouth every 6 hours as needed for mild pain     cevimeline (EVOXAC) 30 MG capsule Take 1 capsule (30 mg) by mouth 3 times daily     cyclobenzaprine (FLEXERIL) 10 MG tablet TK 1 T PO HS PRN FOR MUSCLE SPASM RELIEF     hydrocortisone 2.5 % cream Apply topically 2 times daily Apply to itchy spot on scalp and back  twice a day as needed     NARCAN 4 MG/0.1ML nasal spray WAGNER INTO ONE NOSTRIL. MAY REPEAT IN ALTERNATE NOSTRIL WITHIN 2 MINUTES IF NO OR MINIMAL RESPONSE     oxyCODONE IR (ROXICODONE) 10 MG tablet      senna-docusate (SENOKOT-S/PERICOLACE) 8.6-50 MG tablet 1 tablet by Per Feeding Tube route 2 times daily as needed for constipation     No current facility-administered medications for this visit.         Physical Examination:  Wt 68 kg (150 lb)   BMI 20.34 kg/m    Wt Readings from Last 10 Encounters:   10/06/20 68 kg (150 lb)   09/15/20 70.1 kg (154 lb 9.6 oz)   08/18/20 69.2 kg (152 lb 9.6 oz)   07/16/20 70.4 kg (155 lb 1.6 oz)   07/13/20 69.4 kg (153 lb)   06/18/20 71.2 kg (157 lb)   05/22/20 72.6 kg (160 lb)   05/21/20 72.1 kg (159 lb)   04/23/20 71.6 kg (157 lb 14.4 oz)   03/26/20 69.7 kg (153 lb 11.2 oz)     Phone visit done today: Voice quality sounds strong, easy to follow thought processes, does not sounds to be in acute distress    Laboratory Data:    Recent Labs   Lab Test 09/15/20  0846 08/18/20  1025 07/16/20  1050 06/18/20  0838 06/10/20  1005    129* 134 134 132*   POTASSIUM 3.5 3.4 3.9 3.7 3.9   CHLORIDE 103 94 102 101 96   CO2 26 30 26 29 31   ANIONGAP 7 5 6 5 5   BUN 10 14 9 10 17   CR 0.78 0.85 0.79 0.82 0.82   GLC 97 114* 77 90 84   MIKEY 8.8 8.4* 8.3* 8.0* 9.1     Recent Labs   Lab Test 04/23/20  0918 03/26/20  0856 03/17/20  1250 01/22/20  1414 12/20/19  0853  09/30/19  0638 09/29/19  0710 09/28/19  0716 09/27/19  0455 09/26/19  0424   MAG 1.7 2.1 1.9 2.0 1.2*   < > 2.1 2.3 2.3 2.0 2.2   PHOS  --   --   --   --   --   --  3.6 3.8 5.1* 4.0 3.7    < > = values in this interval not displayed.     Recent Labs   Lab Test 06/18/20  0838 06/10/20  1005 05/21/20  1409 04/23/20  0918 03/26/20  0856   WBC 6.0 5.5 5.5 6.9 4.9   HGB 10.8* 12.8* 10.8* 11.3* 10.8*    166 194 195 212   MCV 95 96 94 95 98   NEUTROPHIL 70.4 75.0 70.3 77.1 70.9     Recent Labs   Lab Test 09/15/20  0846 08/18/20  1025  07/16/20  1050   BILITOTAL 0.2 0.6 0.2   ALKPHOS 82 79 73   ALT 24 43 30   AST 25 52* 29   ALBUMIN 3.2* 3.4 3.4     TSH   Date Value Ref Range Status   09/15/2020 1.05 0.40 - 4.00 mU/L Final   07/16/2020 2.16 0.40 - 4.00 mU/L Final   06/18/2020 4.47 (H) 0.40 - 4.00 mU/L Final     No results for input(s): CEA in the last 94041 hours.  Results for orders placed or performed in visit on 09/11/20   CT Chest/Abdomen/Pelvis w Contrast    Narrative    Examination: CT CHEST/ABDOMEN/PELVIS W CONTRAST, 9/11/2020 3:47 PM     HISTORY: Follow up maxillary sinus squamous cell cancer on nivolumab;  Malignant neoplasm metastatic to both lungs (H); Malignant neoplasm  metastatic to both lungs (H); Maxillary sinus cancer (H)    COMPARISON: CT CAP, 6/10/2020, PET/CT from 3/13/2020.    Technique: Helical acquisition of CT images from the lung apices to  the pubis after the uncomplicated administration of Isovue 370 97cc.  Coronal, sagittal, and axial MIP images were reconstructed from the  source data.    FINDINGS:    Support devices: None.    CHEST:    Chest wall: Unremarkable.  Lungs: The trachea and central airways are clear.  Chronic basilar  predominant subpleural reticulation. Reticulation and cystic  changes/paraseptal emphysema along the anterior and mediastinal  pleura. No significant groundglass or mosaic attenuation. The multiple  randomly distributed, spiculated pulmonary nodules with groundglass  halo predominantly demonstrate increased growth. For example:    - Marked growth of the 1.8 x 1.5 cm right upper lobe nodule along the  mediastinal pleura (series 7, image 105), significantly increased from  June previously measuring 0.4 x 0.8 cm.  - 1.7 x 1.3 cm right lower lobe, superior segment nodule along the  paraspinal pleura (series 7, image 149) previously 1.0 x 0.9 cm.  - 1.6 x 1.6 cm lateral left lower lobe nodule (seen on series 7, image  194), previously 1.0 x 1.0 cm.  - 1.6 x 1.4 cm anterior left upper lobe nodule  seen on image 140,  mildly increased.    Some nodules have decreased in size, for example:  - The 1.1 x 0.9 cm anterior left upper lobe nodule seen on image 116,  previously 1.4 x 1.5 cm.    Mediastinum: There is no central pulmonary embolus.  Normal caliber  ascending aorta and main pulmonary artery. Normal variant aortic arch  branching pattern.  The heart is normal in size without significant  pericardial effusion.  Mild to moderate coronary calcifications.  Lymph nodes: Coarsely calcified mediastinal and hilar nodes. No new or  enlarging thoracic lymph node.    ABDOMEN/PELVIS:  Liver: Homogenous appearance without definite masses.   Biliary system: Unremarkable gallbladder.  Stable mild intrahepatic  ductal dilatation. No significant extrahepatic ductal dilatation.   Adrenals: Unremarkable.  Kidneys: Normal in appearance without hydronephrosis or collecting  system stones. No renal masses or cysts.  Pancreas: Unremarkable appearance. Pancreatic duct is prominent,  unchanged.  Spleen: Unremarkable.   Vasculature: Portal, hepatic, and splenic veins are patent. Patent  renal veins.  Abdominal aorta is non-aneurysmal.  Gastrointestinal: Small hiatal hernia, unremarkable stomach. There are  no abnormally dilated or thickened loops of small bowel or colon.   Unremarkable appendix.  Pelvic organs: Unremarkable.   Peritoneum: There is no free air or free fluid.   Lymph nodes: There are no suspicious mesenteric or retroperitoneal  lymph nodes based on size criteria.    SOFT TISSUES: No ventral hernias. Small right fat-containing inguinal  hernia.  BONES: No aggressive appearing osseous abnormalities. Chronic  appearing fractures of the right posterolateral rib cage.Thoracolumbar  spondylosis with lumbar rotoscoliosis.      Impression    IMPRESSION: In this patient with history of metastatic squamous cell  carcinoma of the maxillary sinus treated with Nivolumab:  1. Predominantly increased size of multiple randomly  distributed  pulmonary nodules despite a mixed response. Some nodules demonstrate  marked growth, as noted above.  2. Peripheral reticulation suggesting early pulmonary fibrosis. Mild  apically predominant paraseptal emphysema. Together, these findings  raise the question of combined pulmonary fibrosis and emphysema.  3. No evidence of metastatic disease in the abdomen or pelvis.  4. Sequelae of old granulomatous disease.     I have personally reviewed the examination and initial interpretation  and I agree with the findings.    JESSICA MIXON, DO         Assessment and Plan:  1.  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 He had a follow up PET 3/2020 with evidence of new metastasis to lung for which he has been started on immunotherapy with nivolumab.     Eduardo had improvement on CT scans following first 3 cycles on nivolumab and has progressed since then. We had reviewed our options including carboplatin with cetuximab vs clinical trial of pembrolizumab with another immunotherapy agent.      I reviewed options of carboplatin and cetuximab. Carboplatin is administered intravenously every 3 weeks.  It can cause nausea, vomiting, altered taste and appetite.  It can cause cytopenias.  Cetuximab, on the other hand, is administered intravenously weekly.  This is associated with acneform rash over the face, trunk and back.  The patient has also experienced diarrhea.  The biggest challenge is the rash.          The clinical trial of immunotherapy with ALT and pembrolizumab, which sounds like a promising option because he had an initial response to immunotherapy, should be well tolerated and we would still have the option of standard of care cetuximab/carboplatin if he was to  progress on this regimen.  We would have to ensure that he is eligible for the clinical trial.  It is possible that he might be outside the window of enrollment and I will check if we could give him another dose of nivolumab and repeat scan in 4 weeks. If he is again progressing it will confirm disease progression on current immunotherapy and will address the issue of being off therapy for a long time. I will have Nelsy Wilson - research nurse give him a call and I will call him if there are still issues. He is willing to proceed with the trial and would like to hear the exact dates which we will try to have scheduled.     2. Anemia, stable - . Will continue to monitor.     3. Fatigue- immunotherapy contributing. Will continue to follow TSH and vitals closely.    4. Itching on back and scalp, report of a history of psoriasis. Topical hydrocortisone 2.5% bid has been working and I have refilled a jar for him. Aware to let us know if they worsen or spread.     5. Nicotine abuse, chronic smoker. Was last trying nicotine patches per notes. Not addressed at today's visit.     6. Poor social support; lives alone with a dog    7. Dry mouth- using biotene, will also trial Evoxac as previously prescribed.   He has run out of script but he has 11 refills on this.     Video-Visit Details    Type of service:  Video Visit  Originating Location (pt. Location): Home  Distant Location (provider location):  [facility]   Platform used for Video Visit: [employer]    Over 45 min spent with patient over video with more than 50% time spent in counseling and coordinating care.      Javier Ferraro    Hematologist and Medical Oncologist  [facility]

## 2020-10-06 NOTE — TELEPHONE ENCOUNTER
Eduardo's sister calls and says that she wants to know how she gets information about her brother's health. Sister says that her brother will not tell anyone about his health. RN then told caller that because of HIPPA, no information can be given out unless pt. Consents to this. Caller says that her brother sees Dr. Cortes at the [facility] And wants the # to that clinic. RN then gave caller the phone # to the [facility] main #, for further assistance. COVID 19 Nurse Triage Plan/Patient Instructions    Please be aware that novel coronavirus (COVID-19) may be circulating in the community. If you develop symptoms such as fever, cough, or SOB or if you have concerns about the presence of another infection including coronavirus (COVID-19), please contact your health care provider or visit [email or web address].     Disposition/Instructions    Virtual Visit with provider recommended. Reference Visit Selection Guide.    Thank you for taking steps to prevent the spread of this virus.  o Limit your contact with others.  o Wear a simple mask to cover your cough.  o Wash your hands well and often.    Resources    [facility]: About COVID-19: [email or web address]    CDC: What to Do If You're Sick: www.cdc.gov/coronavirus/2019-ncov/about/steps-when-sick.html    CDC: Ending Home Isolation: www.cdc.gov/coronavirus/2019-ncov/hcp/disposition-in-home-patients.html     CDC: Caring for Someone: www.cdc.gov/coronavirus/2019-ncov/if-you-are-sick/care-for-someone.html     [facility]: Interim Guidance for Hospital Discharge to Home: www.health.[facility].[place]/diseases/coronavirus/hcp/hospdischarge.pdf    [facility] clinical trials (COVID-19 research studies): clinicalaffairs.[facility] [place]/umn-clinical-trials     Below are the COVID-19 hotlines at the [place] Department of [facility] ([facility]). Interpreters are available.   o For health questions: Call 951-661-2994 or 1-256.153.6368 (7 a.m. to 7 p.m.)  o For questions about schools and  childcare: Call 739-101-7428 or 1-740.234.2584 (7 a.m. to 7 p.m.)                     Reason for Disposition    [1] Caller requesting NON-URGENT health information AND [2] PCP's office is the best resource    Additional Information    Negative: [1] Caller is not with the adult (patient) AND [2] reporting urgent symptoms    Negative: Lab result questions    Negative: Medication questions    Negative: Caller can't be reached by phone    Negative: Caller has already spoken to PCP or another triager    Negative: RN needs further essential information from caller in order to complete triage    Negative: Requesting regular office appointment    Protocols used: INFORMATION ONLY CALL-A-

## 2020-10-06 NOTE — PROGRESS NOTES
"Eduardo Rubio is a 60 year old male who is being evaluated via a billable video visit.      The patient has been notified of following:     \"This video visit will be conducted via a call between you and your physician/provider. We have found that certain health care needs can be provided without the need for an in-person physical exam.  This service lets us provide the care you need with a video conversation.  If a prescription is necessary we can send it directly to your pharmacy.  If lab work is needed we can place an order for that and you can then stop by our lab to have the test done at a later time.    Video visits are billed at different rates depending on your insurance coverage.  Please reach out to your insurance provider with any questions.    If during the course of the call the physician/provider feels a video visit is not appropriate, you will not be charged for this service.\"    Patient has given verbal consent for Video visit? Yes  How would you like to obtain your AVS? MyChart  If you are dropped from the video visit, the video invite should be resent to: Text to cell phone: [phone]  Will anyone else be joining your video visit? Yes: Brother Dhaval . How would they like to receive their invitation? Text to cell phone: [phone]        TONY Hernandez        "

## 2020-10-08 NOTE — PROGRESS NOTES
Oncology RN Care Coordination Note:     This writer received a call from Eduardo stating he would like to add his sister and brother to his consent to communicate.  Upon calling him back he provided verbal consent to communicate with his brother Dhaval Rubio and his sister Precious Morrow.  Precious had called yesterday morning and left a voicemail for this writer requesting updates on Eduardo's health, however, she wasn't on his consent to communicate form.  This writer had communicated via in-basket message with a clinical trials nurse who was going to be talking with Eduardo soon to see if she could ask him if it would be ok to communicate with her.  But, Eduardo called before I heard back from the clinical trials nurse providing consent.  I informed Eduardo I would have a new consent to communicate form ready form him when he comes to the clinic for his next appointment this way he can update it with his siblings.      Cailin Diaz, RN BSN   [facility]  Nurse Coordinator

## 2020-10-13 NOTE — PROGRESS NOTES
"Oncology RN Care Coordination Note:     Patient's sister, Precious, left a voicemail stating \"I have one more question after our conversation yesterday, I don't understand why he would do a clinical trial if he was on palliative care?\"      This writer called Precious back to discuss her question.  She again started asking about the clinical trial and what it would do for Eduardo, but, I gently reminded her that I am not familiar with the clinical trials and would reserve that discussion for their team.  However, to clear up the confusion regarding hospice and palliative care.  Precious said \"yes, why would he do a clinical trial if he only has 6 months to live?\"  I explained, hospice care falls under the umbrella of palliative care, however they are two different types of care.  Eduardo is receiving palliative care from our team, which is an extension of the oncology umbrella to help alleviate some of the side effects from his cancer and chemotherapy, we discussed pain, coping, planning, etc. Precious verbalized understanding of this explanation and denies any other questions at this time.     Cailin Diaz, RN BSN   [facility]  Nurse Coordinator        "

## 2020-10-16 NOTE — PROGRESS NOTES
Dear Dr. Roberts:    I had the pleasure of seeing Eduardo Rubio in follow-up today at the [facility].     History of Present Illness:   Eduardo Rubio is a 60 year old man with a T4N0 SCC of the maxillary sinus. The patient has a history of facial pain and numbness along with vision changes that resulted in a visit to the ER on 8/18/2019. An MRI was obtained which demonstrated a maxillary sinus mass with extension to the orbit with involvement of the inferior rectus muscle. He had a biopsy performed locally which was consistent with SCC. He saw Dr Wick on 8/29/2019. He was referred to ophthalmology for evaluation of his vision changes. He had a PET scan which showed the tumor in the maxillary sinus with bony erosion, extension to orbit, small lung nodule, no ramona disease. His case was reviewed at tumor conference with recommendation for surgery with total maxillectomy and orbital exenteration with free flap reconstruction. He was taken to the OR on 9/24/2019. He had reconstruction with a latissimus free flap. His postoperative course was uncomplicated. His final pathology demonstrated a 4.4 cm SCC with involvement of the zygomatic and nasal bone, PNI, no LVSI, positive margin at the pterygopalatine fossa with positive vidian nerve. He was recommended for postoperative radiation with consideration of weekly cisplatin. He had postoperative chemoradiation from 10/28/2019 to 12/11/2019 under the care of Dr. Santillan and Dr. Rivera.  He received a total of 6600 cGy and received high-dose cisplatin every 3 weeks.  He had a 4-day treatment break due to machine downtime and then with transportation issues.  He had his 3 month post treatment PET scan in March 2020 which showed multiple pulmonary nodules concerning for metastatic disease. He was started on nivolumab in March 2020.     Interval history:  He comes in today for follow-up. He was last seen in clinic in May 2020. He had some  improvement on his restaging imaging after 3 cycles of immunotherapy in June 2020 (decreased size of multiple nodules). His TSH was normal in September 2020. He saw medical oncology in September 2020 which showed disease progression with some lung nodules decreasing in size but others increasing. Dr Ferraro is having him evaluated for possible inclusion in a clinical trial with Dr House. He missed multiple follow-up visits in clinic. He says that things are overall ok. He feels like he is eating without issues.  He does have dry mouth.  He has decreased hearing on the right side.  He denies any neck masses.  He continues to have concerns about his appearance of his flap.  He does continue to smoke.  He and his brother have questions today about the planned clinical trial discussed by Dr. Ferraro.         MEDICATIONS:     Current Outpatient Medications   Medication Sig Dispense Refill     acetaminophen (TYLENOL) 325 MG tablet Take 325-650 mg by mouth every 6 hours as needed for mild pain       cevimeline (EVOXAC) 30 MG capsule Take 1 capsule (30 mg) by mouth 3 times daily 90 capsule 11     cyclobenzaprine (FLEXERIL) 10 MG tablet TK 1 T PO HS PRN FOR MUSCLE SPASM RELIEF  0     hydrocortisone 2.5 % cream Apply topically 2 times daily Apply to itchy spot on scalp and back twice a day as needed 453.6 g 1     NARCAN 4 MG/0.1ML nasal spray WAGNER INTO ONE NOSTRIL. MAY REPEAT IN ALTERNATE NOSTRIL WITHIN 2 MINUTES IF NO OR MINIMAL RESPONSE       oxyCODONE IR (ROXICODONE) 10 MG tablet        senna-docusate (SENOKOT-S/PERICOLACE) 8.6-50 MG tablet 1 tablet by Per Feeding Tube route 2 times daily as needed for constipation 90 tablet 3       ALLERGIES:  No Known Allergies    HABITS/SOCIAL HISTORY:   Smoker 1/2 ppd  No chewing tobacco use  Lives alone  No alcohol use  Not currently employed    Social History     Socioeconomic History     Marital status: Single     Spouse name: Not on file     Number of children: Not on file     Years of  education: Not on file     Highest education level: Not on file   Occupational History     Not on file   Social Needs     Financial resource strain: Not on file     Food insecurity     Worry: Not on file     Inability: Not on file     Transportation needs     Medical: Not on file     Non-medical: Not on file   Tobacco Use     Smoking status: Light Tobacco Smoker     Packs/day: 0.50     Years: 15.00     Pack years: 7.50     Types: Cigarettes     Start date: 2004     Smokeless tobacco: Never Used   Substance and Sexual Activity     Alcohol use: Not Currently     Drug use: Not Currently     Sexual activity: Not Currently   Lifestyle     Physical activity     Days per week: Not on file     Minutes per session: Not on file     Stress: Not on file   Relationships     Social connections     Talks on phone: Not on file     Gets together: Not on file     Attends Orthodoxy service: Not on file     Active member of club or organization: Not on file     Attends meetings of clubs or organizations: Not on file     Relationship status: Not on file     Intimate partner violence     Fear of current or ex partner: Not on file     Emotionally abused: Not on file     Physically abused: Not on file     Forced sexual activity: Not on file   Other Topics Concern     Not on file   Social History Narrative     Not on file       PAST MEDICAL HISTORY:   Past Medical History:   Diagnosis Date     Gastric ulcer      Low back pain      Maxillary sinus cancer (H)      Toe amputation status     all ten toes        PAST SURGICAL HISTORY:   Past Surgical History:   Procedure Laterality Date     ABDOMEN SURGERY      [facility], surgery related to gastric ulcer     AS AMPUTATION TOE,I-P JT Bilateral     all ten toes     EXENTERATION ORBIT Right 9/24/2019    Procedure: Right Orbital Exenteration;  Surgeon: Jose Roberts MD;  Location: [facility] OR     EXPLORE NECK Right 9/24/2019    Procedure: Right Neck Exploration;  Surgeon: Jose Roberts MD;   Location: [facility] OR     GRAFT FREE VASCULARIZED LATISSIMUS DORSI Right 9/24/2019    Procedure: Right Latissmus Free Flap Reconstruction;  Surgeon: Teresa Pulliam MD;  Location: [facility] OR     INSERT PORT VASCULAR ACCESS Right 10/31/2019    Procedure: place venous access chest port;  Surgeon: Natasha Mishra PA-C;  Location: [facility] OR     IR CHEST PORT PLACEMENT > 5 YRS OF AGE  10/31/2019     OSTEOTOMY MAXILLA N/A 9/24/2019    Procedure: Right Radicall Maxillectomy, Nasogastric Tube Placement;  Surgeon: Jose Roberts MD;  Location: [facility] OR       FAMILY HISTORY:    Family History   Problem Relation Age of Onset     Breast Cancer Mother      Glaucoma No family hx of      Macular Degeneration No family hx of        REVIEW OF SYSTEMS:  12 point ROS was negative other than the symptoms noted above in the HPI.  Patient Supplied Answers to Review of Systems   ENT ROS 5/22/2020   Constitutional -   Neurology -   Psychology -   Eyes -   Ears, Nose, Throat Ringing/noise in ears   Musculoskeletal -   Endocrine -         PHYSICAL EXAMINATION:   Pulse 94   Temp 97.6  F (36.4  C)   Ht 1.829 m (6')   Wt 70.8 kg (156 lb)   SpO2 96%   BMI 21.16 kg/m     Appearance:   normal; NAD, age-appropriate appearance, well-developed, normal habitus   Communication:   normal; communicates verbally, normal voice quality   Head/Face:   inspection -  Healthy appearing free flap along right orbital exenteration, radiation changes to the skin paddle, soft tissue prominence of the flap has continued to decrease significantly, continued improvement in the right cheek swelling   Palpation - no palpable masses in the orbital exenteration site or right maxilla   Salivary glands -  Normal size, no tenderness, swelling, or palpable masses   Facial strength -  Decreased right cheek movement related to the maxillectomy   Skin:  normal, no rash   Ocular Motility:  normal occular movements on left  Orbital exenteration on right   Ears:  auricle (AD) -   normal  EAC (AD) -  normal  TM (AD) -  Effusion, retracted  auricle (AS) -  normal  EAC (AS) -  normal  TM (AS) -  retracted  Normal clinical speech reception   Nose:  Ext. inspection -  Normal   Oral Cavity:  lips -  Normal mucosa, oral competence, and stoma size  Edentulous  Healthy appearing free flap skin paddle along right hard palate, no palpable masses, no mucosal lesions on native palate, decreased tissue bulk intraorally  Tongue protrudes midline   Oropharynx:  mucosa -  Normal, no lesions   Neck: No visible mass or asymmetry   Radiation changes to the neck   Lymphatic:  no abnormal nodes   Cardiovascular:  warm, pink, well-perfused extremities without swelling, tenderness, or edema   Respiratory:  Normal respiratory effort, no stridor   Neuro/Psych.:  mood/affect - normal  mental status -  Normal       RESULTS REVIEWED:   I reviewed the notes from medical oncology which are summarized above        IMPRESSION AND PLAN:   Eduardo Rubio is a 60 year old man with a T4N0 SCC of the right maxilla. He underwent surgical resection with maxillectomy and orbital exenteration with reconstruction using a latissimus free flap.  He had postoperative chemoradiation completed in December 2018. His 3 month post treatment PET scan demonstrated metastatic disease in the lung and he was started on nivolumab in March 2020. His restaging imaging in September 2020 demonstrated disease progression and he is being screened for a clinical trial.     His TSH was normal in September 2020.  It should be rechecked again in March 2021.    He is having issues with hearing loss which is likely related to both sensorineural components from the chemoradiation as well as a conductive component from the serous effusion on the right.  We will order an audiogram and see if he qualifies for hearing aid.    His mother accompanied him to his visit today.  He had multiple questions about the clinical trial plans.  Dr. Santillan and I attempted to  answer these the best of our ability.  I think he would benefit from another visit with medical oncology to answer the questions that we were unable to.  I have sent a message to Dr Ferraro and his nurse to see if this can be scheduled.    I will see him back in 2-3 months.    Thank you very much for the opportunity to participate in the care of your patient.      Teresa Pulliam MD, M.D.  Otolaryngology- Head & Neck Surgery      This note was dictated with voice recognition software and then edited. Please excuse any unintentional errors.           CC:  Jose Roberts MD  Otolaryngology/Head & Neck Surgery  [facility]      Colt Puentes MD  Otolaryngology/Head & Neck Surgery  [facility]

## 2020-10-16 NOTE — LETTER
10/16/2020       RE: Eduardo Rubio  [address]  [facility] [address]     Dear Colleague,    Thank you for referring your patient, Eduardo Rubio, to the [facility] EAR NOSE AND THROAT CLINIC [place] at [facility]. Please see a copy of my visit note below.    Dear Dr. Roberts:    I had the pleasure of seeing Eduardo Rubio in follow-up today at the [facility].     History of Present Illness:   Eduardo Rubio is a 60 year old man with a T4N0 SCC of the maxillary sinus. The patient has a history of facial pain and numbness along with vision changes that resulted in a visit to the ER on 8/18/2019. An MRI was obtained which demonstrated a maxillary sinus mass with extension to the orbit with involvement of the inferior rectus muscle. He had a biopsy performed locally which was consistent with SCC. He saw Dr Wick on 8/29/2019. He was referred to ophthalmology for evaluation of his vision changes. He had a PET scan which showed the tumor in the maxillary sinus with bony erosion, extension to orbit, small lung nodule, no ramona disease. His case was reviewed at tumor conference with recommendation for surgery with total maxillectomy and orbital exenteration with free flap reconstruction. He was taken to the OR on 9/24/2019. He had reconstruction with a latissimus free flap. His postoperative course was uncomplicated. His final pathology demonstrated a 4.4 cm SCC with involvement of the zygomatic and nasal bone, PNI, no LVSI, positive margin at the pterygopalatine fossa with positive vidian nerve. He was recommended for postoperative radiation with consideration of weekly cisplatin. He had postoperative chemoradiation from 10/28/2019 to 12/11/2019 under the care of Dr. Santillan and Dr. Rivera.  He received a total of 6600 cGy and received high-dose cisplatin every 3 weeks.  He had a 4-day treatment break due to machine downtime and  then with transportation issues.  He had his 3 month post treatment PET scan in March 2020 which showed multiple pulmonary nodules concerning for metastatic disease. He was started on nivolumab in March 2020.     Interval history:  He comes in today for follow-up. He was last seen in clinic in May 2020. He had some improvement on his restaging imaging after 3 cycles of immunotherapy in June 2020 (decreased size of multiple nodules). His TSH was normal in September 2020. He saw medical oncology in September 2020 which showed disease progression with some lung nodules decreasing in size but others increasing. Dr Ferraro is having him evaluated for possible inclusion in a clinical trial with Dr House. He missed multiple follow-up visits in clinic. He says that things are overall ok. He feels like he is eating without issues.  He does have dry mouth.  He has decreased hearing on the right side.  He denies any neck masses.  He continues to have concerns about his appearance of his flap.  He does continue to smoke.  He and his brother have questions today about the planned clinical trial discussed by Dr. Ferraro.         MEDICATIONS:     Current Outpatient Medications   Medication Sig Dispense Refill     acetaminophen (TYLENOL) 325 MG tablet Take 325-650 mg by mouth every 6 hours as needed for mild pain       cevimeline (EVOXAC) 30 MG capsule Take 1 capsule (30 mg) by mouth 3 times daily 90 capsule 11     cyclobenzaprine (FLEXERIL) 10 MG tablet TK 1 T PO HS PRN FOR MUSCLE SPASM RELIEF  0     hydrocortisone 2.5 % cream Apply topically 2 times daily Apply to itchy spot on scalp and back twice a day as needed 453.6 g 1     NARCAN 4 MG/0.1ML nasal spray WAGNER INTO ONE NOSTRIL. MAY REPEAT IN ALTERNATE NOSTRIL WITHIN 2 MINUTES IF NO OR MINIMAL RESPONSE       oxyCODONE IR (ROXICODONE) 10 MG tablet        senna-docusate (SENOKOT-S/PERICOLACE) 8.6-50 MG tablet 1 tablet by Per Feeding Tube route 2 times daily as needed for constipation 90  tablet 3       ALLERGIES:  No Known Allergies    HABITS/SOCIAL HISTORY:   Smoker 1/2 ppd  No chewing tobacco use  Lives alone  No alcohol use  Not currently employed    Social History     Socioeconomic History     Marital status: Single     Spouse name: Not on file     Number of children: Not on file     Years of education: Not on file     Highest education level: Not on file   Occupational History     Not on file   Social Needs     Financial resource strain: Not on file     Food insecurity     Worry: Not on file     Inability: Not on file     Transportation needs     Medical: Not on file     Non-medical: Not on file   Tobacco Use     Smoking status: Light Tobacco Smoker     Packs/day: 0.50     Years: 15.00     Pack years: 7.50     Types: Cigarettes     Start date: 2004     Smokeless tobacco: Never Used   Substance and Sexual Activity     Alcohol use: Not Currently     Drug use: Not Currently     Sexual activity: Not Currently   Lifestyle     Physical activity     Days per week: Not on file     Minutes per session: Not on file     Stress: Not on file   Relationships     Social connections     Talks on phone: Not on file     Gets together: Not on file     Attends Orthodoxy service: Not on file     Active member of club or organization: Not on file     Attends meetings of clubs or organizations: Not on file     Relationship status: Not on file     Intimate partner violence     Fear of current or ex partner: Not on file     Emotionally abused: Not on file     Physically abused: Not on file     Forced sexual activity: Not on file   Other Topics Concern     Not on file   Social History Narrative     Not on file       PAST MEDICAL HISTORY:   Past Medical History:   Diagnosis Date     Gastric ulcer      Low back pain      Maxillary sinus cancer (H)      Toe amputation status     all ten toes        PAST SURGICAL HISTORY:   Past Surgical History:   Procedure Laterality Date     ABDOMEN SURGERY      [facility], surgery related to  gastric ulcer     AS AMPUTATION TOE,I-P JT Bilateral     all ten toes     EXENTERATION ORBIT Right 9/24/2019    Procedure: Right Orbital Exenteration;  Surgeon: Jose Roberts MD;  Location: [facility] OR     EXPLORE NECK Right 9/24/2019    Procedure: Right Neck Exploration;  Surgeon: Jose Roberts MD;  Location: [facility] OR     GRAFT FREE VASCULARIZED LATISSIMUS DORSI Right 9/24/2019    Procedure: Right Latissmus Free Flap Reconstruction;  Surgeon: Teresa Pulliam MD;  Location: [facility] OR     INSERT PORT VASCULAR ACCESS Right 10/31/2019    Procedure: place venous access chest port;  Surgeon: Natasha Mishra PA-C;  Location: [facility] OR     IR CHEST PORT PLACEMENT > 5 YRS OF AGE  10/31/2019     OSTEOTOMY MAXILLA N/A 9/24/2019    Procedure: Right Radicall Maxillectomy, Nasogastric Tube Placement;  Surgeon: Jose Roberts MD;  Location: [facility] OR       FAMILY HISTORY:    Family History   Problem Relation Age of Onset     Breast Cancer Mother      Glaucoma No family hx of      Macular Degeneration No family hx of        REVIEW OF SYSTEMS:  12 point ROS was negative other than the symptoms noted above in the HPI.  Patient Supplied Answers to Review of Systems   ENT ROS 5/22/2020   Constitutional -   Neurology -   Psychology -   Eyes -   Ears, Nose, Throat Ringing/noise in ears   Musculoskeletal -   Endocrine -         PHYSICAL EXAMINATION:   Pulse 94   Temp 97.6  F (36.4  C)   Ht 1.829 m (6')   Wt 70.8 kg (156 lb)   SpO2 96%   BMI 21.16 kg/m     Appearance:   normal; NAD, age-appropriate appearance, well-developed, normal habitus   Communication:   normal; communicates verbally, normal voice quality   Head/Face:   inspection -  Healthy appearing free flap along right orbital exenteration, radiation changes to the skin paddle, soft tissue prominence of the flap has continued to decrease significantly, continued improvement in the right cheek swelling   Palpation - no palpable masses in the orbital exenteration site or  right maxilla   Salivary glands -  Normal size, no tenderness, swelling, or palpable masses   Facial strength -  Decreased right cheek movement related to the maxillectomy   Skin:  normal, no rash   Ocular Motility:  normal occular movements on left  Orbital exenteration on right   Ears:  auricle (AD) -  normal  EAC (AD) -  normal  TM (AD) -  Effusion, retracted  auricle (AS) -  normal  EAC (AS) -  normal  TM (AS) -  retracted  Normal clinical speech reception   Nose:  Ext. inspection -  Normal   Oral Cavity:  lips -  Normal mucosa, oral competence, and stoma size  Edentulous  Healthy appearing free flap skin paddle along right hard palate, no palpable masses, no mucosal lesions on native palate, decreased tissue bulk intraorally  Tongue protrudes midline   Oropharynx:  mucosa -  Normal, no lesions   Neck: No visible mass or asymmetry   Radiation changes to the neck   Lymphatic:  no abnormal nodes   Cardiovascular:  warm, pink, well-perfused extremities without swelling, tenderness, or edema   Respiratory:  Normal respiratory effort, no stridor   Neuro/Psych.:  mood/affect - normal  mental status -  Normal       RESULTS REVIEWED:   I reviewed the notes from medical oncology which are summarized above        IMPRESSION AND PLAN:   Eduardo Rubio is a 60 year old man with a T4N0 SCC of the right maxilla. He underwent surgical resection with maxillectomy and orbital exenteration with reconstruction using a latissimus free flap.  He had postoperative chemoradiation completed in December 2018. His 3 month post treatment PET scan demonstrated metastatic disease in the lung and he was started on nivolumab in March 2020. His restaging imaging in September 2020 demonstrated disease progression and he is being screened for a clinical trial.     His TSH was normal in September 2020.  It should be rechecked again in March 2021.    He is having issues with hearing loss which is likely related to both sensorineural components  from the chemoradiation as well as a conductive component from the serous effusion on the right.  We will order an audiogram and see if he qualifies for hearing aid.    His mother accompanied him to his visit today.  He had multiple questions about the clinical trial plans.  Dr. Santillan and I attempted to answer these the best of our ability.  I think he would benefit from another visit with medical oncology to answer the questions that we were unable to.  I have sent a message to Dr Ferraro and his nurse to see if this can be scheduled.    I will see him back in 2-3 months.    Thank you very much for the opportunity to participate in the care of your patient.      Teresa Pulliam MD, M.D.  Otolaryngology- Head & Neck Surgery      This note was dictated with voice recognition software and then edited. Please excuse any unintentional errors.         CC:  Jose Roberts MD  Otolaryngology/Head & Neck Surgery  [facility]      Colt Puentes MD  Otolaryngology/Head & Neck Surgery  [facility]

## 2020-10-16 NOTE — NURSING NOTE
Chief Complaint   Patient presents with     RECHECK     Follow up       Pulse 94, temperature 97.6  F (36.4  C), height 1.829 m (6'), weight 70.8 kg (156 lb), SpO2 96 %.    Paula De Souza, EMT

## 2020-10-16 NOTE — LETTER
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 He underwent a maxillectomy and orbital exenteration on 2019 with pathology demonstrating a 4.4 cm moderately differentiated squamous cell carcinoma with negative lymphovascular space invasion and positive perineural invasion. There was a positive surgical margin at the pterygopalatine fossa, specifically from the vidian nerve taken at its exit from the vidian canal. Additional resection into the canal was not possible. Mr. Rubio received adjuvant chemoradiotherapy as described above, receiving a total dose of 66 Gy in 33 fractions which he completed on 2019 with concurrent high-dose cisplatin. His 3 month post-treatment PET/CT on 3/13/2020 demonstrated numerous pulmonary metastases and he was started on nivolumab.    Mr. Rubio returns to ENT multiD clinic today for a routine post-treatment disease surveillance visit. His ROS are positive for persistent mild to moderate xerostomia however he continues to eat a regular diet without difficulty. He does have subjective hearing loss on the right but specifically denies any  fevers/chills, headaches, vision changes, odynophagia, dysphagia, dyspnea, nausea/vomiting, chest pain or abdominal pain. He recently underwent a repeat CT neck and chest on 2020. These studies demonstrated no evidence of locoregionally recurrent disease although he was noted to have progression of several of his pulmonary metastases. His medical oncologist, Dr. Ferraro, is considering starting chemotherapy vs clinical trial options.     PHYSICAL EXAM:  Weight: 70.8 kg  Pulse: 94  Temp: 36.4 C     General: 60 year old gentleman seated comfortably in an examination chair in [facility]  HEENT: Healthy-appearing free flap over the right orbit with overlying skin changes with hyperpigmentation and scattered telangiectasias. Left eye with normal EOM. No rhinorrhea or epistaxis. Normal-appearing EACs bilaterally. Slight retraction of the bilateral TMs. Mildly dry mucus membranes. No visible oral cavity lesions.  Neck: No palpable cervical adenopathy bilaterally  Pulmonary: No wheezing, stridor or respiratory distress  Skin: Skin changes as described above. Otherwise normal color and turgor.    LABS AND IMAGIN/15/2020 labs:  TSH: 1.05    2020 CT neck:   Post-treatment changes with no evidence of locoregional disease recurrence    2020 CT CAP:   Progression of pulmonary metastases. No evidence of metastatic disease to the abdomen or pelvis.    IMPRESSION:   Mr. 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 He developed metastatic disease discovered on his 3-month post-treatment PET/CT in 3/2020. His recent imaging demonstrates disease progression on single agent nivolumab.    PLAN:   1. Follow-up with radiation oncology on an as-needed basis  2. Continue follow-up with medical oncology for ongoing disease management.    Eric Santillan MD/PhD    Dept of Radiation  Oncology  [facility]       Eric Santillan MD

## 2020-10-20 NOTE — PROGRESS NOTES
Outpatient Speech Language Pathology Discharge Note     Patient: Eduardo Rubio  : 1960    Beginning/End Dates of Reporting Period:  10/14/2019 to 10/20/2020    Referring Provider: Dr. Teresa Pulliam    Therapy Diagnosis: Oropharyngeal dysphagia    Client Self Report: Eduardo Rubio is a 59 year old male with a T4N0 SCC of the maxillary sinus s/p total maxillectomy and orbital exenteration with latissimus free flap reconstruction on 19.  He started post-operative chemoXRT on 10/28/19.  He was seen for dysphagia treatment today in conjunction with ENT clinic visit.     Goals:  Goal Identifier Diet   Goal Description 1.  Patient will tolerate soft moist solid foods and thin liquids with independent use of swallowing strategies and no overt signs or symptoms of aspiration and at least 90% of trials.   Target Date 10/16/20   Date Met  10/16/20   Progress:     Goal Identifier Exercises   Goal Description 2.  Pt will improve ROM and strength of tongue, BOT, pharynx and jaw by completing 10 repetitions per exercise at least 3 times daily with minimal written or verbal cues.    Target Date 10/16/20   Date Met  10/16/20   Progress:     Progress Toward Goals:   Progress this reporting period: Pt eating and drinking a variety fo foods and liquids. He will likely not complete exercises even with continued training and recommendations. He has met his goals as much as he is willing and would benefit from repeat swallowing evaluation again in the future if there are changes in his ability to swallow or open his mouth.      Plan:  Discharge from therapy.    Discharge:    Reason for Discharge: No further expectation of progress.    Discharge Plan: Patient to continue home program. Re-establish care if there are significant changes in function.         [facility]                                                                                   OUTPATIENT SPEECH LANGUAGE PATHOLOGY    PLAN OF  TREATMENT FOR OUTPATIENT REHABILITATION   Patient's Last Name, First Name, ARIELAHeladioEduardo Mendosa YOB: 1960   Provider's Name   [facility]   Medical Record No.  [id number]     Onset Date:  9/24/19 Start of Care Date:  10/4/2019     Medical Diagnosis:   oropharyngeal dysphagia      SLP Treatment Diagnosis: Mild oropharyngeal dysphagia  Plan of Treatment  Frequency/Duration:  2-4 x/month  /   6 months    Certification date from  7/4/20   To     10/20/20       See note for plan of treatment details and functional goals     Nelida Giron, SLP                         I CERTIFY THE NEED FOR THESE SERVICES FURNISHED UNDER        THIS PLAN OF TREATMENT AND WHILE UNDER MY CARE     (Physician attestation of this document indicates review and certification of the therapy plan).              Referring Provider:  Dr. Jose Roberts    Initial Assessment  See Epic Evaluation-

## 2020-10-23 NOTE — PROGRESS NOTES
Oncology/Hematology Visit Note  Oct 23, 2020    Reason for Visit: Follow up of Maxillary sinus cancer    History of Present Illness:   Eduardo presented to an outside ED in 08/18/2019 with complaints of right facial pressure, numbness, right-sided visual change and nasal drainage for several days' duration. Imaging workup included an MRI which demonstrated a maxillary sinus mass with extension to the orbit with involvement of the inferior rectus muscle. 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 Mr. Rubio was referred to [facility]. He had staging PET/CT on 9/9/2019 which revealed a FDG-avid maxillary mass at 4.0 x 3.9 x 4.2 cm. There was tumor extension into the right orbital cavity superiorly, the right nasal cavity medially, the retromaxillary fat region posterolaterally, the right pterygopalatine fossa posteriorly, and the premaxillary fat anteriorly. In the orbital cavity there was abutment of the inferior rectus muscle. There was no evidence of lymphadenopathy or systemic disease. His case was reviewed at tumor conference with recommendation for surgery with total maxillectomy and orbital exenteration with free flap reconstruction.     Mr. Rubio underwent a total maxillectomy with orbital exenteration on 9/24/2019 with Dr. Roberts, followed by reconstruction with a latissimus free flap with Dr. Pulliam. Surgical pathology showed a 4.4 cm moderately differentiated squamous cell carcinoma, (-) LVSI, (+)PNI. There was a positive margin at the pterygopalatine fossa, specifically the vidian nerve taken at its exit from the vidian canal, and additional resection into the canal was not possible. Mr. Rubio's case was discussed in the [facility]'s multidisciplinary head and neck tumor board, with the consensus recommendation to proceed with adjuvant chemoradiation given the positive margin status. Patient received day 1 cisplatin on 11/1/19 and day 22 on  11/20/19 and Day 43 on 12/13/19.      TREATMENT SUMMARY:  - 8/19/19: MRI face and orbit demonstrated a maxillary sinus mass with extension to the orbit with involvement of the inferior rectus muscle. 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  - 9/24/19: Total maxillectomy with orbital exenteration performed by Dr. Roberts, followed by reconstruction with a latissimus free flap with Dr. Pulliam.   - Surgical pathology showed a 4.4 cm moderately differentiated squamous cell carcinoma, (-) LVSI, (+)PNI. There was a positive margin at the pterygopalatine fossa, specifically the vidian nerve taken at its exit from the vidian canal, and additional resection into the canal was not possible.  -s/p total right maxilectomy with orbital exenteration   -surgical margin were positive making it a high risk disease for recurrence.    He completed concurrent chemoradiation with high dose cisplatin day 1, 11/1/19, Day 22 11/20/19, Day 43 12/13/19  - PET/CT on 3/13/20 revealed numerous lung nodules consistent with metastasis and he has been started on nivolumab. Improvement in disease noted on CT 6/2020 and September scan showed worsening disease.      CURRENT INTERVENTIONS:  Nivolumab- recent    Interval History:  Eduardo is followed for recurrent metastatic maxillary squamous cell cancer.     He admits in the last month he has been more fatigued, but he stopped the Ativan a couple months ago, his pain doctor didn't approve so he stopped this.  Since then, he sleeping has been off- he is up at night for hours awake, and then tired during the day.      His appetite is less, but still eating, just smaller meals 5 x daily. Weight is stable.       Using oxycodone ~2 x daily- chronic back and feet pain.      Otherwise ROS is negative.     Current Outpatient Medications   Medication Sig     acetaminophen (TYLENOL) 325 MG tablet Take 325-650 mg by mouth every 6 hours as needed for  mild pain     cevimeline (EVOXAC) 30 MG capsule Take 1 capsule (30 mg) by mouth 3 times daily     cyclobenzaprine (FLEXERIL) 10 MG tablet TK 1 T PO HS PRN FOR MUSCLE SPASM RELIEF     hydrocortisone 2.5 % cream Apply topically 2 times daily Apply to itchy spot on scalp and back twice a day as needed     NARCAN 4 MG/0.1ML nasal spray WAGNER INTO ONE NOSTRIL. MAY REPEAT IN ALTERNATE NOSTRIL WITHIN 2 MINUTES IF NO OR MINIMAL RESPONSE     oxyCODONE IR (ROXICODONE) 10 MG tablet Takes 1/2 pill (5 mg) 2-3 x daily. --[place]     senna-docusate (SENOKOT-S/PERICOLACE) 8.6-50 MG tablet 1 tablet by Per Feeding Tube route 2 times daily as needed for constipation     No current facility-administered medications for this visit.         Physical Examination:  /80 (BP Location: Left arm, Patient Position: Sitting, Cuff Size: Adult Regular)   Pulse 89   Temp 98.1  F (36.7  C)   Resp 18   Ht 1.829 m (6')   Wt 70.9 kg (156 lb 4.8 oz)   SpO2 96%   BMI 21.20 kg/m    Wt Readings from Last 10 Encounters:   10/23/20 70.9 kg (156 lb 4.8 oz)   10/16/20 70.8 kg (156 lb)   10/06/20 68 kg (150 lb)   09/15/20 70.1 kg (154 lb 9.6 oz)   08/18/20 69.2 kg (152 lb 9.6 oz)   07/16/20 70.4 kg (155 lb 1.6 oz)   07/13/20 69.4 kg (153 lb)   06/18/20 71.2 kg (157 lb)   05/22/20 72.6 kg (160 lb)   05/21/20 72.1 kg (159 lb)     Phone visit done today: Voice quality sounds strong, easy to follow thought processes, does not sounds to be in acute distress    Laboratory Data:     6/10/2020 10:05 6/18/2020 08:38 10/23/2020 10:31   WBC 5.5 6.0 8.8   Hemoglobin 12.8 (L) 10.8 (L) 12.1 (L)   Hematocrit 38.5 (L) 32.4 (L) 35.8 (L)   Platelet Count 166 210 291   RBC Count 4.03 (L) 3.42 (L) 3.73 (L)   MCV 96 95 96   MCH 31.8 31.6 32.4   MCHC 33.2 33.3 33.8   RDW 15.1 (H) 15.6 (H) 13.6   Diff Method Automated Method Automated Method Automated Method   % Neutrophils 75.0 70.4 76.5   % Lymphocytes 7.4 10.6 4.8   % Monocytes 11.4 13.4 12.1   % Eosinophils  5.6 4.5 5.8   % Basophils 0.4 0.8 0.6   % Immature Granulocytes 0.2 0.3 0.2   Nucleated RBCs 0 0 0   Absolute Neutrophil 4.1 4.2 6.7      10/23/2020 10:31   Sodium 130 (L)   Potassium 4.1   Chloride 95   Carbon Dioxide 28   Urea Nitrogen 9   Creatinine 0.70   GFR Estimate >90   GFR Estimate If Black >90   Calcium 8.8   Anion Gap 7   Albumin 3.5   Protein Total 8.5   Bilirubin Total 0.3   Alkaline Phosphatase 85   ALT 29   AST 37   TSH 1.37   Glucose 86     Assessment and Plan:  1.  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 He had a follow up PET 3/2020 with evidence of new metastasis to lung for which he has been started on immunotherapy with nivolumab.     Eduardo had improvement on CT scans following first 3 cycles on nivolumab and has progressed since then. We had reviewed our options including carboplatin with cetuximab vs clinical trial of pembrolizumab with another immunotherapy agent.      I reviewed options of carboplatin and cetuximab. Carboplatin is administered intravenously every 3 weeks.  It can cause nausea, vomiting, altered taste and appetite.  It can cause cytopenias.  Cetuximab, on the other hand, is administered intravenously weekly.  This is associated with acneform rash over the face, trunk and back.  The patient has also experienced diarrhea.  The biggest challenge is the rash.          The clinical trial of immunotherapy with ALT and pembrolizumab, which sounds like a promising option because he had an initial response to immunotherapy, should be well tolerated and we would still have the option of standard of care cetuximab/carboplatin if he was to progress on this regimen. Unfortunately, there was some delay and he missed the 6 week window to enroll  in.  We had a discussion today about him missing the window and he is still interested in pursuing the clinical trial thus we will give him Opdivo today and then plan on getting restaging scans sometime in the next month at that time he will be in the appropriate window and will be able to enroll and start the trial.  Eduardo was able to have his brother Dhaval on the phone with us today Dhaval had many questions regarding which therapy would be best for Eduardo we discussed the pros and cons of each.  We discussed given that he had initially responded to immunotherapy, doing an immunotherapy trial could be very promising for him.  He will be followed very closely on trial.  If he progresses we would move to the next standard of care, which would be carboplatin and Erbitux.      2. Anemia, stable - . Improved    3. Fatigue- immunotherapy contributing. TSH stable. Discussed getting his sleep wake cycle back on.     4. Itching on back and scalp, report of a history of psoriasis, continue Topical hydrocortisone 2.5% bid.    5. Nicotine abuse, chronic smoker. Was last trying nicotine patches per notes. Not addressed at today's visit.     6. Poor social support; lives alone with a dog.  Brother Dhaval, very involved now.     Saba Ruggiero PA-C

## 2020-10-23 NOTE — NURSING NOTE
Chief Complaint   Patient presents with     Blood Draw     No lab orders; Vitals      /80 (BP Location: Left arm, Patient Position: Sitting, Cuff Size: Adult Regular)   Pulse 89   Temp 98.1  F (36.7  C)   Resp 18   Wt 70.9 kg (156 lb 4.8 oz)   SpO2 96%   BMI 21.20 kg/m       No lab orders. Vitals sign taken. Checked in for next appointment.     Natasha Gay RN on 10/23/2020 at 8:33 AM

## 2020-10-23 NOTE — PROGRESS NOTES
Infusion Nursing Note:  Eduardo Rubio presents today for Cycle 1 Day 1 Nivolumab.    Patient seen by provider today: Yes: ADRIEN Ingram   present during visit today: Not Applicable.    Note:    Per TORB ADRIEN Tom/Henrietta Kearney, RN @1589 10/23/20:  - Patient outside window of enrollment so will continue on standard Nivolumab for now  - Will place lab orders. Okay to proceed once Nivo orders signed by Dr. Ferraro/labs WNL    Patient aware of plan and has no further questions or concerns after MARY KAY visit.     Intravenous Access:  Implanted Port. Accessed and labs drawn during visit.    Treatment Conditions:  Lab Results   Component Value Date    HGB 12.1 10/23/2020     Lab Results   Component Value Date    WBC 8.8 10/23/2020      Lab Results   Component Value Date    ANEU 6.7 10/23/2020     Lab Results   Component Value Date     10/23/2020      Lab Results   Component Value Date     10/23/2020                   Lab Results   Component Value Date    POTASSIUM 4.1 10/23/2020           Lab Results   Component Value Date    MAG 1.7 04/23/2020            Lab Results   Component Value Date    CR 0.70 10/23/2020                   Lab Results   Component Value Date    MIKEY 8.8 10/23/2020                Lab Results   Component Value Date    BILITOTAL 0.3 10/23/2020           Lab Results   Component Value Date    ALBUMIN 3.5 10/23/2020                    Lab Results   Component Value Date    ALT 29 10/23/2020           Lab Results   Component Value Date    AST 37 10/23/2020       Results reviewed, labs MET treatment parameters, ok to proceed with treatment.  ADRIEN Walter aware of sodium level. No intervention needed per page response ADRIEN Ingram/Henrietta Kearney RN @[id number] 10/23/20.    Post Infusion Assessment:  Patient tolerated infusion without incident.  Blood return noted pre and post infusion.  Site patent and intact, free from redness, edema or discomfort.  No evidence of  extravasations.  Access discontinued per protocol.       Discharge Plan:   Patient declined prescription refills.  Discharge instructions reviewed with: Patient.  Patient and/or family verbalized understanding of discharge instructions and all questions answered.  AVS to patient via Reviews[employer]T.  Patient will return ~11/20 for next appointment.   Patient discharged in stable condition accompanied by: self.  Departure Mode: Ambulatory.    Henrietta Kearney RN

## 2020-10-27 NOTE — PROGRESS NOTES
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 He underwent a maxillectomy and orbital exenteration on 2019 with pathology demonstrating a 4.4 cm moderately differentiated squamous cell carcinoma with negative lymphovascular space invasion and positive perineural invasion. There was a positive surgical margin at the pterygopalatine fossa, specifically from the vidian nerve taken at its exit from the vidian canal. Additional resection into the canal was not possible. Mr. Rubio received adjuvant chemoradiotherapy as described above, receiving a total dose of 66 Gy in 33 fractions which he completed on 2019 with concurrent high-dose cisplatin. His 3 month post-treatment PET/CT on 3/13/2020 demonstrated numerous pulmonary metastases and he was started on nivolumab.    Mr. Rubio returns to ENT multiD clinic today for a routine post-treatment disease surveillance visit. His ROS are positive for persistent mild to moderate xerostomia however he continues to eat a regular diet without difficulty. He does have subjective hearing loss on the right but specifically denies any fevers/chills, headaches, vision changes, odynophagia, dysphagia, dyspnea,  nausea/vomiting, chest pain or abdominal pain. He recently underwent a repeat CT neck and chest on 2020. These studies demonstrated no evidence of locoregionally recurrent disease although he was noted to have progression of several of his pulmonary metastases. His medical oncologist, Dr. Ferraro, is considering starting chemotherapy vs clinical trial options.     PHYSICAL EXAM:  Weight: 70.8 kg  Pulse: 94  Temp: 36.4 C     General: 60 year old gentleman seated comfortably in an examination chair in [facility]  HEENT: Healthy-appearing free flap over the right orbit with overlying skin changes with hyperpigmentation and scattered telangiectasias. Left eye with normal EOM. No rhinorrhea or epistaxis. Normal-appearing EACs bilaterally. Slight retraction of the bilateral TMs. Mildly dry mucus membranes. No visible oral cavity lesions.  Neck: No palpable cervical adenopathy bilaterally  Pulmonary: No wheezing, stridor or respiratory distress  Skin: Skin changes as described above. Otherwise normal color and turgor.    LABS AND IMAGIN/15/2020 labs:  TSH: 1.05    2020 CT neck:   Post-treatment changes with no evidence of locoregional disease recurrence    2020 CT CAP:   Progression of pulmonary metastases. No evidence of metastatic disease to the abdomen or pelvis.    IMPRESSION:   Mr. 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 He developed metastatic disease discovered on his 3-month post-treatment PET/CT in 3/2020. His recent imaging demonstrates disease progression on single agent nivolumab.    PLAN:   1. Follow-up with radiation oncology on an as-needed basis  2. Continue follow-up with medical oncology for ongoing disease management.    Eric Santillan MD/PhD    Dept of Radiation Oncology  [facility]

## 2020-12-13 PROBLEM — C31.0 MAXILLARY SINUS CANCER (H): Status: ACTIVE | Noted: 2019-08-22

## 2020-12-29 NOTE — PROGRESS NOTES
Oncology RN Care Coordination Note:     Incoming Call:  Patient left a voicemail for this writer stating he hasn't heard back about the plan for his treatment.      Writer has sent multiple messages to the clinical trial team requesting they reach out to him as he was supposed to start a clinical trial after receiving nivolumab in October.      This call has been routed to their team.    Cailin Diaz RN BSN   [facility]  Nurse Coordinator

## 2021-01-01 ENCOUNTER — VIRTUAL VISIT (OUTPATIENT)
Dept: ONCOLOGY | Facility: CLINIC | Age: 61
End: 2021-01-01
Attending: INTERNAL MEDICINE
Payer: MEDICARE

## 2021-01-01 ENCOUNTER — INFUSION THERAPY VISIT (OUTPATIENT)
Dept: ONCOLOGY | Facility: CLINIC | Age: 61
End: 2021-01-01
Attending: PHYSICIAN ASSISTANT
Payer: MEDICARE

## 2021-01-01 ENCOUNTER — PATIENT OUTREACH (OUTPATIENT)
Dept: ONCOLOGY | Facility: CLINIC | Age: 61
End: 2021-01-01

## 2021-01-01 ENCOUNTER — APPOINTMENT (OUTPATIENT)
Dept: LAB | Facility: CLINIC | Age: 61
End: 2021-01-01
Attending: PHYSICIAN ASSISTANT
Payer: MEDICARE

## 2021-01-01 ENCOUNTER — VIRTUAL VISIT (OUTPATIENT)
Dept: ONCOLOGY | Facility: CLINIC | Age: 61
End: 2021-01-01
Attending: DIETITIAN, REGISTERED
Payer: MEDICARE

## 2021-01-01 ENCOUNTER — APPOINTMENT (OUTPATIENT)
Dept: LAB | Facility: CLINIC | Age: 61
End: 2021-01-01
Attending: INTERNAL MEDICINE
Payer: MEDICARE

## 2021-01-01 ENCOUNTER — OFFICE VISIT (OUTPATIENT)
Dept: OTOLARYNGOLOGY | Facility: CLINIC | Age: 61
End: 2021-01-01
Payer: MEDICARE

## 2021-01-01 ENCOUNTER — APPOINTMENT (OUTPATIENT)
Dept: PHYSICAL THERAPY | Facility: CLINIC | Age: 61
DRG: 194 | End: 2021-01-01
Payer: MEDICARE

## 2021-01-01 ENCOUNTER — PATIENT OUTREACH (OUTPATIENT)
Dept: CARE COORDINATION | Facility: CLINIC | Age: 61
End: 2021-01-01

## 2021-01-01 ENCOUNTER — APPOINTMENT (OUTPATIENT)
Dept: GENERAL RADIOLOGY | Facility: CLINIC | Age: 61
DRG: 194 | End: 2021-01-01
Attending: EMERGENCY MEDICINE
Payer: MEDICARE

## 2021-01-01 ENCOUNTER — APPOINTMENT (OUTPATIENT)
Dept: OCCUPATIONAL THERAPY | Facility: CLINIC | Age: 61
DRG: 194 | End: 2021-01-01
Attending: INTERNAL MEDICINE
Payer: MEDICARE

## 2021-01-01 ENCOUNTER — THERAPY VISIT (OUTPATIENT)
Dept: PHYSICAL THERAPY | Facility: CLINIC | Age: 61
End: 2021-01-01
Payer: MEDICARE

## 2021-01-01 ENCOUNTER — TELEPHONE (OUTPATIENT)
Dept: ONCOLOGY | Facility: CLINIC | Age: 61
End: 2021-01-01

## 2021-01-01 ENCOUNTER — OFFICE VISIT (OUTPATIENT)
Dept: AUDIOLOGY | Facility: CLINIC | Age: 61
End: 2021-01-01
Payer: MEDICARE

## 2021-01-01 ENCOUNTER — APPOINTMENT (OUTPATIENT)
Dept: CARDIOLOGY | Facility: CLINIC | Age: 61
DRG: 194 | End: 2021-01-01
Attending: STUDENT IN AN ORGANIZED HEALTH CARE EDUCATION/TRAINING PROGRAM
Payer: MEDICARE

## 2021-01-01 ENCOUNTER — APPOINTMENT (OUTPATIENT)
Dept: OCCUPATIONAL THERAPY | Facility: CLINIC | Age: 61
DRG: 194 | End: 2021-01-01
Payer: MEDICARE

## 2021-01-01 ENCOUNTER — ANCILLARY PROCEDURE (OUTPATIENT)
Dept: CT IMAGING | Facility: CLINIC | Age: 61
End: 2021-01-01
Attending: PHYSICIAN ASSISTANT
Payer: MEDICARE

## 2021-01-01 ENCOUNTER — VIRTUAL VISIT (OUTPATIENT)
Dept: GASTROENTEROLOGY | Facility: CLINIC | Age: 61
End: 2021-01-01
Attending: PHYSICIAN ASSISTANT
Payer: MEDICARE

## 2021-01-01 ENCOUNTER — PRE VISIT (OUTPATIENT)
Dept: GASTROENTEROLOGY | Facility: CLINIC | Age: 61
End: 2021-01-01

## 2021-01-01 ENCOUNTER — APPOINTMENT (OUTPATIENT)
Dept: CT IMAGING | Facility: CLINIC | Age: 61
DRG: 194 | End: 2021-01-01
Attending: EMERGENCY MEDICINE
Payer: MEDICARE

## 2021-01-01 ENCOUNTER — APPOINTMENT (OUTPATIENT)
Dept: PHYSICAL THERAPY | Facility: CLINIC | Age: 61
DRG: 194 | End: 2021-01-01
Attending: INTERNAL MEDICINE
Payer: MEDICARE

## 2021-01-01 ENCOUNTER — HOSPITAL ENCOUNTER (INPATIENT)
Facility: CLINIC | Age: 61
LOS: 5 days | Discharge: HOME HEALTH | DRG: 194 | End: 2021-05-06
Attending: EMERGENCY MEDICINE | Admitting: INTERNAL MEDICINE
Payer: MEDICARE

## 2021-01-01 ENCOUNTER — TELEPHONE (OUTPATIENT)
Dept: PHYSICAL THERAPY | Facility: CLINIC | Age: 61
End: 2021-01-01

## 2021-01-01 ENCOUNTER — RESEARCH ENCOUNTER (OUTPATIENT)
Dept: ONCOLOGY | Facility: CLINIC | Age: 61
End: 2021-01-01

## 2021-01-01 VITALS
SYSTOLIC BLOOD PRESSURE: 123 MMHG | BODY MASS INDEX: 21.02 KG/M2 | HEART RATE: 93 BPM | OXYGEN SATURATION: 97 % | RESPIRATION RATE: 16 BRPM | TEMPERATURE: 97.7 F | DIASTOLIC BLOOD PRESSURE: 77 MMHG | WEIGHT: 155 LBS

## 2021-01-01 VITALS — DIASTOLIC BLOOD PRESSURE: 86 MMHG | HEART RATE: 82 BPM | SYSTOLIC BLOOD PRESSURE: 136 MMHG | OXYGEN SATURATION: 97 %

## 2021-01-01 VITALS
DIASTOLIC BLOOD PRESSURE: 74 MMHG | HEART RATE: 93 BPM | OXYGEN SATURATION: 97 % | RESPIRATION RATE: 16 BRPM | TEMPERATURE: 98.3 F | SYSTOLIC BLOOD PRESSURE: 112 MMHG | WEIGHT: 154.7 LBS | BODY MASS INDEX: 20.98 KG/M2

## 2021-01-01 VITALS
WEIGHT: 154.4 LBS | HEART RATE: 96 BPM | RESPIRATION RATE: 16 BRPM | DIASTOLIC BLOOD PRESSURE: 76 MMHG | SYSTOLIC BLOOD PRESSURE: 115 MMHG | OXYGEN SATURATION: 96 % | TEMPERATURE: 98.1 F | BODY MASS INDEX: 20.94 KG/M2

## 2021-01-01 VITALS
WEIGHT: 149 LBS | HEART RATE: 91 BPM | SYSTOLIC BLOOD PRESSURE: 109 MMHG | TEMPERATURE: 98.2 F | BODY MASS INDEX: 20.21 KG/M2 | RESPIRATION RATE: 16 BRPM | OXYGEN SATURATION: 98 % | DIASTOLIC BLOOD PRESSURE: 74 MMHG

## 2021-01-01 VITALS
OXYGEN SATURATION: 97 % | BODY MASS INDEX: 20.22 KG/M2 | SYSTOLIC BLOOD PRESSURE: 105 MMHG | WEIGHT: 149.1 LBS | TEMPERATURE: 97.9 F | HEART RATE: 91 BPM | DIASTOLIC BLOOD PRESSURE: 69 MMHG | RESPIRATION RATE: 16 BRPM

## 2021-01-01 VITALS
OXYGEN SATURATION: 96 % | SYSTOLIC BLOOD PRESSURE: 145 MMHG | WEIGHT: 154.7 LBS | DIASTOLIC BLOOD PRESSURE: 92 MMHG | HEIGHT: 72 IN | RESPIRATION RATE: 16 BRPM | HEART RATE: 77 BPM | BODY MASS INDEX: 20.95 KG/M2 | TEMPERATURE: 95.7 F

## 2021-01-01 VITALS
TEMPERATURE: 96.8 F | SYSTOLIC BLOOD PRESSURE: 122 MMHG | DIASTOLIC BLOOD PRESSURE: 81 MMHG | HEART RATE: 81 BPM | WEIGHT: 154.2 LBS | BODY MASS INDEX: 20.91 KG/M2 | RESPIRATION RATE: 16 BRPM | OXYGEN SATURATION: 94 %

## 2021-01-01 VITALS
DIASTOLIC BLOOD PRESSURE: 84 MMHG | HEART RATE: 84 BPM | RESPIRATION RATE: 16 BRPM | OXYGEN SATURATION: 99 % | WEIGHT: 154.3 LBS | SYSTOLIC BLOOD PRESSURE: 130 MMHG | BODY MASS INDEX: 20.93 KG/M2 | TEMPERATURE: 98.8 F

## 2021-01-01 VITALS
RESPIRATION RATE: 14 BRPM | TEMPERATURE: 97.6 F | HEART RATE: 87 BPM | WEIGHT: 144.1 LBS | SYSTOLIC BLOOD PRESSURE: 118 MMHG | DIASTOLIC BLOOD PRESSURE: 71 MMHG | OXYGEN SATURATION: 97 % | BODY MASS INDEX: 19.54 KG/M2

## 2021-01-01 VITALS
RESPIRATION RATE: 18 BRPM | TEMPERATURE: 96.7 F | BODY MASS INDEX: 20.06 KG/M2 | WEIGHT: 148.1 LBS | HEIGHT: 72 IN | OXYGEN SATURATION: 95 % | DIASTOLIC BLOOD PRESSURE: 75 MMHG | SYSTOLIC BLOOD PRESSURE: 158 MMHG | HEART RATE: 61 BPM

## 2021-01-01 VITALS
HEART RATE: 89 BPM | WEIGHT: 146.7 LBS | TEMPERATURE: 98.1 F | SYSTOLIC BLOOD PRESSURE: 109 MMHG | RESPIRATION RATE: 16 BRPM | OXYGEN SATURATION: 100 % | BODY MASS INDEX: 19.9 KG/M2 | DIASTOLIC BLOOD PRESSURE: 72 MMHG

## 2021-01-01 VITALS
DIASTOLIC BLOOD PRESSURE: 46 MMHG | TEMPERATURE: 98.2 F | BODY MASS INDEX: 19.88 KG/M2 | RESPIRATION RATE: 16 BRPM | OXYGEN SATURATION: 97 % | SYSTOLIC BLOOD PRESSURE: 96 MMHG | HEART RATE: 96 BPM | WEIGHT: 146.6 LBS

## 2021-01-01 VITALS — OXYGEN SATURATION: 100 % | TEMPERATURE: 98.2 F | HEART RATE: 84 BPM

## 2021-01-01 DIAGNOSIS — C78.02 MALIGNANT NEOPLASM METASTATIC TO BOTH LUNGS (H): Primary | ICD-10-CM

## 2021-01-01 DIAGNOSIS — B19.20 HEPATITIS C VIRUS INFECTION WITHOUT HEPATIC COMA, UNSPECIFIED CHRONICITY: Primary | ICD-10-CM

## 2021-01-01 DIAGNOSIS — C78.01 MALIGNANT NEOPLASM METASTATIC TO BOTH LUNGS (H): Primary | ICD-10-CM

## 2021-01-01 DIAGNOSIS — C31.0 MAXILLARY SINUS CANCER (H): ICD-10-CM

## 2021-01-01 DIAGNOSIS — Z11.59 ENCOUNTER FOR SCREENING FOR OTHER VIRAL DISEASES: ICD-10-CM

## 2021-01-01 DIAGNOSIS — R20.0 FINGER NUMBNESS: ICD-10-CM

## 2021-01-01 DIAGNOSIS — C78.02 MALIGNANT NEOPLASM METASTATIC TO BOTH LUNGS (H): ICD-10-CM

## 2021-01-01 DIAGNOSIS — R63.0 POOR APPETITE: ICD-10-CM

## 2021-01-01 DIAGNOSIS — B18.2 CHRONIC HEPATITIS C WITHOUT HEPATIC COMA (H): ICD-10-CM

## 2021-01-01 DIAGNOSIS — K11.7 XEROSTOMIA: ICD-10-CM

## 2021-01-01 DIAGNOSIS — C44.92 SCC (SQUAMOUS CELL CARCINOMA): ICD-10-CM

## 2021-01-01 DIAGNOSIS — Z71.6 ENCOUNTER FOR TOBACCO USE CESSATION COUNSELING: ICD-10-CM

## 2021-01-01 DIAGNOSIS — Z98.890 S/P FLAP GRAFT: ICD-10-CM

## 2021-01-01 DIAGNOSIS — M62.81 GENERALIZED MUSCLE WEAKNESS: Primary | ICD-10-CM

## 2021-01-01 DIAGNOSIS — R07.89 CHEST DISCOMFORT: ICD-10-CM

## 2021-01-01 DIAGNOSIS — J18.9 PNEUMONIA DUE TO INFECTIOUS ORGANISM, UNSPECIFIED LATERALITY, UNSPECIFIED PART OF LUNG: ICD-10-CM

## 2021-01-01 DIAGNOSIS — C44.92 SQUAMOUS CELL CARCINOMA OF SKIN, UNSPECIFIED: ICD-10-CM

## 2021-01-01 DIAGNOSIS — R09.02 HYPOXEMIA: ICD-10-CM

## 2021-01-01 DIAGNOSIS — Z95.828 PORT-A-CATH IN PLACE: Primary | ICD-10-CM

## 2021-01-01 DIAGNOSIS — R06.02 SHORTNESS OF BREATH: ICD-10-CM

## 2021-01-01 DIAGNOSIS — R53.83 OTHER FATIGUE: ICD-10-CM

## 2021-01-01 DIAGNOSIS — Z00.6 EXAMINATION OF PARTICIPANT OR CONTROL IN CLINICAL RESEARCH: ICD-10-CM

## 2021-01-01 DIAGNOSIS — Z86.19 HISTORY OF POSITIVE HEPATITIS C: ICD-10-CM

## 2021-01-01 DIAGNOSIS — C78.01 MALIGNANT NEOPLASM METASTATIC TO BOTH LUNGS (H): ICD-10-CM

## 2021-01-01 DIAGNOSIS — J18.9 PNEUMONIA OF RIGHT LOWER LOBE DUE TO INFECTIOUS ORGANISM: ICD-10-CM

## 2021-01-01 DIAGNOSIS — Z00.6 EXAMINATION OF PARTICIPANT IN CLINICAL TRIAL: ICD-10-CM

## 2021-01-01 DIAGNOSIS — E83.42 HYPOMAGNESEMIA: ICD-10-CM

## 2021-01-01 DIAGNOSIS — C31.0 MAXILLARY SINUS CANCER (H): Primary | ICD-10-CM

## 2021-01-01 DIAGNOSIS — E53.8 FOLIC ACID DEFICIENCY: ICD-10-CM

## 2021-01-01 DIAGNOSIS — H90.3 SENSORINEURAL HEARING LOSS, BILATERAL: Primary | ICD-10-CM

## 2021-01-01 DIAGNOSIS — B19.20 HEPATITIS C VIRUS INFECTION, UNSPECIFIED CHRONICITY: ICD-10-CM

## 2021-01-01 DIAGNOSIS — B19.20 HEPATITIS C VIRUS INFECTION, UNSPECIFIED CHRONICITY: Primary | ICD-10-CM

## 2021-01-01 DIAGNOSIS — Z20.822 COVID-19 RULED OUT BY LABORATORY TESTING: ICD-10-CM

## 2021-01-01 DIAGNOSIS — R53.81 OTHER MALAISE: ICD-10-CM

## 2021-01-01 DIAGNOSIS — M62.81 GENERALIZED MUSCLE WEAKNESS: ICD-10-CM

## 2021-01-01 DIAGNOSIS — R63.0 ANOREXIA: Primary | ICD-10-CM

## 2021-01-01 DIAGNOSIS — Z00.6 EVALUATED FOR CLINICAL TRIAL ENROLLMENT: ICD-10-CM

## 2021-01-01 LAB
1,3 BETA GLUCAN SER-MCNC: <31 PG/ML
ALBUMIN SERPL-MCNC: 2.3 G/DL (ref 3.4–5)
ALBUMIN SERPL-MCNC: 2.3 G/DL (ref 3.4–5)
ALBUMIN SERPL-MCNC: 2.4 G/DL (ref 3.4–5)
ALBUMIN SERPL-MCNC: 2.8 G/DL (ref 3.4–5)
ALBUMIN SERPL-MCNC: 2.9 G/DL (ref 3.4–5)
ALBUMIN SERPL-MCNC: 2.9 G/DL (ref 3.4–5)
ALBUMIN SERPL-MCNC: 3 G/DL (ref 3.4–5)
ALBUMIN SERPL-MCNC: 3.1 G/DL (ref 3.4–5)
ALBUMIN SERPL-MCNC: 3.1 G/DL (ref 3.4–5)
ALBUMIN UR-MCNC: NEGATIVE MG/DL
ALP SERPL-CCNC: 101 U/L (ref 40–150)
ALP SERPL-CCNC: 117 U/L (ref 40–150)
ALP SERPL-CCNC: 67 U/L (ref 40–150)
ALP SERPL-CCNC: 69 U/L (ref 40–150)
ALP SERPL-CCNC: 69 U/L (ref 40–150)
ALP SERPL-CCNC: 71 U/L (ref 40–150)
ALP SERPL-CCNC: 75 U/L (ref 40–150)
ALP SERPL-CCNC: 78 U/L (ref 40–150)
ALP SERPL-CCNC: 78 U/L (ref 40–150)
ALP SERPL-CCNC: 80 U/L (ref 40–150)
ALP SERPL-CCNC: 84 U/L (ref 40–150)
ALP SERPL-CCNC: 86 U/L (ref 40–150)
ALP SERPL-CCNC: 90 U/L (ref 40–150)
ALT SERPL W P-5'-P-CCNC: 14 U/L (ref 0–70)
ALT SERPL W P-5'-P-CCNC: 16 U/L (ref 0–70)
ALT SERPL W P-5'-P-CCNC: 18 U/L (ref 0–70)
ALT SERPL W P-5'-P-CCNC: 18 U/L (ref 0–70)
ALT SERPL W P-5'-P-CCNC: 19 U/L (ref 0–70)
ALT SERPL W P-5'-P-CCNC: 20 U/L (ref 0–70)
ALT SERPL W P-5'-P-CCNC: 21 U/L (ref 0–70)
ALT SERPL W P-5'-P-CCNC: 22 U/L (ref 0–70)
ALT SERPL W P-5'-P-CCNC: 26 U/L (ref 0–70)
ALT SERPL W P-5'-P-CCNC: 28 U/L (ref 0–70)
ALT SERPL W P-5'-P-CCNC: 30 U/L (ref 0–70)
ALT SERPL W P-5'-P-CCNC: 32 U/L (ref 0–70)
ALT SERPL W P-5'-P-CCNC: 39 U/L (ref 0–70)
ANION GAP SERPL CALCULATED.3IONS-SCNC: 3 MMOL/L (ref 3–14)
ANION GAP SERPL CALCULATED.3IONS-SCNC: 3 MMOL/L (ref 3–14)
ANION GAP SERPL CALCULATED.3IONS-SCNC: 4 MMOL/L (ref 3–14)
ANION GAP SERPL CALCULATED.3IONS-SCNC: 5 MMOL/L (ref 3–14)
ANION GAP SERPL CALCULATED.3IONS-SCNC: 6 MMOL/L (ref 3–14)
ANION GAP SERPL CALCULATED.3IONS-SCNC: 7 MMOL/L (ref 3–14)
ANION GAP SERPL CALCULATED.3IONS-SCNC: 8 MMOL/L (ref 3–14)
APPEARANCE UR: CLEAR
AST SERPL W P-5'-P-CCNC: 15 U/L (ref 0–45)
AST SERPL W P-5'-P-CCNC: 19 U/L (ref 0–45)
AST SERPL W P-5'-P-CCNC: 20 U/L (ref 0–45)
AST SERPL W P-5'-P-CCNC: 26 U/L (ref 0–45)
AST SERPL W P-5'-P-CCNC: 27 U/L (ref 0–45)
AST SERPL W P-5'-P-CCNC: 28 U/L (ref 0–45)
AST SERPL W P-5'-P-CCNC: 29 U/L (ref 0–45)
AST SERPL W P-5'-P-CCNC: 29 U/L (ref 0–45)
AST SERPL W P-5'-P-CCNC: 30 U/L (ref 0–45)
AST SERPL W P-5'-P-CCNC: 32 U/L (ref 0–45)
AST SERPL W P-5'-P-CCNC: 34 U/L (ref 0–45)
AST SERPL W P-5'-P-CCNC: 58 U/L (ref 0–45)
AST SERPL W P-5'-P-CCNC: 75 U/L (ref 0–45)
B-D GLUCAN INTERPRETATION (1,3): NEGATIVE
BACTERIA SPEC CULT: ABNORMAL
BACTERIA SPEC CULT: NO GROWTH
BASOPHILS # BLD AUTO: 0 10E9/L (ref 0–0.2)
BASOPHILS # BLD AUTO: 0.1 10E9/L (ref 0–0.2)
BASOPHILS NFR BLD AUTO: 0.1 %
BASOPHILS NFR BLD AUTO: 0.3 %
BASOPHILS NFR BLD AUTO: 0.3 %
BASOPHILS NFR BLD AUTO: 0.4 %
BASOPHILS NFR BLD AUTO: 0.4 %
BASOPHILS NFR BLD AUTO: 0.5 %
BASOPHILS NFR BLD AUTO: 0.5 %
BILIRUB SERPL-MCNC: 0.2 MG/DL (ref 0.2–1.3)
BILIRUB SERPL-MCNC: 0.3 MG/DL (ref 0.2–1.3)
BILIRUB SERPL-MCNC: 0.4 MG/DL (ref 0.2–1.3)
BILIRUB SERPL-MCNC: 0.5 MG/DL (ref 0.2–1.3)
BILIRUB SERPL-MCNC: 0.5 MG/DL (ref 0.2–1.3)
BILIRUB SERPL-MCNC: 0.6 MG/DL (ref 0.2–1.3)
BILIRUB UR QL STRIP: NEGATIVE
BUN SERPL-MCNC: 10 MG/DL (ref 7–30)
BUN SERPL-MCNC: 10 MG/DL (ref 7–30)
BUN SERPL-MCNC: 11 MG/DL (ref 7–30)
BUN SERPL-MCNC: 12 MG/DL (ref 7–30)
BUN SERPL-MCNC: 12 MG/DL (ref 7–30)
BUN SERPL-MCNC: 13 MG/DL (ref 7–30)
BUN SERPL-MCNC: 14 MG/DL (ref 7–30)
BUN SERPL-MCNC: 35 MG/DL (ref 7–30)
BUN SERPL-MCNC: 6 MG/DL (ref 7–30)
BUN SERPL-MCNC: 7 MG/DL (ref 7–30)
BUN SERPL-MCNC: 9 MG/DL (ref 7–30)
CALCIUM SERPL-MCNC: 8.2 MG/DL (ref 8.5–10.1)
CALCIUM SERPL-MCNC: 8.4 MG/DL (ref 8.5–10.1)
CALCIUM SERPL-MCNC: 8.4 MG/DL (ref 8.5–10.1)
CALCIUM SERPL-MCNC: 8.5 MG/DL (ref 8.5–10.1)
CALCIUM SERPL-MCNC: 8.6 MG/DL (ref 8.5–10.1)
CALCIUM SERPL-MCNC: 8.8 MG/DL (ref 8.5–10.1)
CALCIUM SERPL-MCNC: 8.8 MG/DL (ref 8.5–10.1)
CALCIUM SERPL-MCNC: 8.9 MG/DL (ref 8.5–10.1)
CALCIUM SERPL-MCNC: 9.2 MG/DL (ref 8.5–10.1)
CHLORIDE SERPL-SCNC: 100 MMOL/L (ref 94–109)
CHLORIDE SERPL-SCNC: 100 MMOL/L (ref 94–109)
CHLORIDE SERPL-SCNC: 90 MMOL/L (ref 94–109)
CHLORIDE SERPL-SCNC: 92 MMOL/L (ref 94–109)
CHLORIDE SERPL-SCNC: 93 MMOL/L (ref 94–109)
CHLORIDE SERPL-SCNC: 94 MMOL/L (ref 94–109)
CHLORIDE SERPL-SCNC: 94 MMOL/L (ref 94–109)
CHLORIDE SERPL-SCNC: 95 MMOL/L (ref 94–109)
CHLORIDE SERPL-SCNC: 95 MMOL/L (ref 94–109)
CHLORIDE SERPL-SCNC: 96 MMOL/L (ref 94–109)
CHLORIDE SERPL-SCNC: 97 MMOL/L (ref 94–109)
CHLORIDE SERPL-SCNC: 98 MMOL/L (ref 94–109)
CK SERPL-CCNC: 70 U/L (ref 30–300)
CO2 SERPL-SCNC: 28 MMOL/L (ref 20–32)
CO2 SERPL-SCNC: 29 MMOL/L (ref 20–32)
CO2 SERPL-SCNC: 30 MMOL/L (ref 20–32)
CO2 SERPL-SCNC: 30 MMOL/L (ref 20–32)
CO2 SERPL-SCNC: 31 MMOL/L (ref 20–32)
CO2 SERPL-SCNC: 32 MMOL/L (ref 20–32)
CO2 SERPL-SCNC: 34 MMOL/L (ref 20–32)
CO2 SERPL-SCNC: 35 MMOL/L (ref 20–32)
COLOR UR AUTO: YELLOW
CREAT BLD-MCNC: 0.6 MG/DL (ref 0.66–1.25)
CREAT SERPL-MCNC: 0.58 MG/DL (ref 0.66–1.25)
CREAT SERPL-MCNC: 0.59 MG/DL (ref 0.66–1.25)
CREAT SERPL-MCNC: 0.6 MG/DL (ref 0.66–1.25)
CREAT SERPL-MCNC: 0.61 MG/DL (ref 0.66–1.25)
CREAT SERPL-MCNC: 0.61 MG/DL (ref 0.66–1.25)
CREAT SERPL-MCNC: 0.65 MG/DL (ref 0.66–1.25)
CREAT SERPL-MCNC: 0.67 MG/DL (ref 0.66–1.25)
CREAT SERPL-MCNC: 0.68 MG/DL (ref 0.66–1.25)
CREAT SERPL-MCNC: 0.72 MG/DL (ref 0.66–1.25)
CREAT SERPL-MCNC: 0.73 MG/DL (ref 0.66–1.25)
CREAT SERPL-MCNC: 0.75 MG/DL (ref 0.66–1.25)
CREAT SERPL-MCNC: 0.78 MG/DL (ref 0.66–1.25)
CREAT SERPL-MCNC: 0.78 MG/DL (ref 0.66–1.25)
CREAT SERPL-MCNC: 1.02 MG/DL (ref 0.66–1.25)
D DIMER PPP FEU-MCNC: 3.6 UG/ML FEU (ref 0–0.5)
DIFFERENTIAL METHOD BLD: ABNORMAL
EOSINOPHIL # BLD AUTO: 0 10E9/L (ref 0–0.7)
EOSINOPHIL # BLD AUTO: 0.1 10E9/L (ref 0–0.7)
EOSINOPHIL # BLD AUTO: 0.2 10E9/L (ref 0–0.7)
EOSINOPHIL # BLD AUTO: 0.3 10E9/L (ref 0–0.7)
EOSINOPHIL NFR BLD AUTO: 0.1 %
EOSINOPHIL NFR BLD AUTO: 0.1 %
EOSINOPHIL NFR BLD AUTO: 0.4 %
EOSINOPHIL NFR BLD AUTO: 0.7 %
EOSINOPHIL NFR BLD AUTO: 0.8 %
EOSINOPHIL NFR BLD AUTO: 0.9 %
EOSINOPHIL NFR BLD AUTO: 1.4 %
EOSINOPHIL NFR BLD AUTO: 1.5 %
EOSINOPHIL NFR BLD AUTO: 1.7 %
EOSINOPHIL NFR BLD AUTO: 2.5 %
EOSINOPHIL NFR BLD AUTO: 2.6 %
ERYTHROCYTE [DISTWIDTH] IN BLOOD BY AUTOMATED COUNT: 13.5 % (ref 10–15)
ERYTHROCYTE [DISTWIDTH] IN BLOOD BY AUTOMATED COUNT: 14.1 % (ref 10–15)
ERYTHROCYTE [DISTWIDTH] IN BLOOD BY AUTOMATED COUNT: 14.2 % (ref 10–15)
ERYTHROCYTE [DISTWIDTH] IN BLOOD BY AUTOMATED COUNT: 14.5 % (ref 10–15)
ERYTHROCYTE [DISTWIDTH] IN BLOOD BY AUTOMATED COUNT: 14.8 % (ref 10–15)
ERYTHROCYTE [DISTWIDTH] IN BLOOD BY AUTOMATED COUNT: 16.9 % (ref 10–15)
ERYTHROCYTE [DISTWIDTH] IN BLOOD BY AUTOMATED COUNT: 20.4 % (ref 10–15)
ERYTHROCYTE [DISTWIDTH] IN BLOOD BY AUTOMATED COUNT: 20.8 % (ref 10–15)
ERYTHROCYTE [DISTWIDTH] IN BLOOD BY AUTOMATED COUNT: 20.9 % (ref 10–15)
ERYTHROCYTE [DISTWIDTH] IN BLOOD BY AUTOMATED COUNT: 21 % (ref 10–15)
ERYTHROCYTE [DISTWIDTH] IN BLOOD BY AUTOMATED COUNT: 21.2 % (ref 10–15)
ERYTHROCYTE [DISTWIDTH] IN BLOOD BY AUTOMATED COUNT: 21.3 % (ref 10–15)
FLUAV RNA RESP QL NAA+PROBE: NEGATIVE
FLUBV RNA RESP QL NAA+PROBE: NEGATIVE
FOLATE SERPL-MCNC: 5.2 NG/ML
GFR SERPL CREATININE-BSD FRML MDRD: 79 ML/MIN/{1.73_M2}
GFR SERPL CREATININE-BSD FRML MDRD: >90 ML/MIN/{1.73_M2}
GLUCOSE BLDC GLUCOMTR-MCNC: 94 MG/DL (ref 70–99)
GLUCOSE SERPL-MCNC: 100 MG/DL (ref 70–99)
GLUCOSE SERPL-MCNC: 102 MG/DL (ref 70–99)
GLUCOSE SERPL-MCNC: 68 MG/DL (ref 70–99)
GLUCOSE SERPL-MCNC: 71 MG/DL (ref 70–99)
GLUCOSE SERPL-MCNC: 83 MG/DL (ref 70–99)
GLUCOSE SERPL-MCNC: 86 MG/DL (ref 70–99)
GLUCOSE SERPL-MCNC: 88 MG/DL (ref 70–99)
GLUCOSE SERPL-MCNC: 89 MG/DL (ref 70–99)
GLUCOSE SERPL-MCNC: 89 MG/DL (ref 70–99)
GLUCOSE SERPL-MCNC: 90 MG/DL (ref 70–99)
GLUCOSE SERPL-MCNC: 92 MG/DL (ref 70–99)
GLUCOSE SERPL-MCNC: 95 MG/DL (ref 70–99)
GLUCOSE SERPL-MCNC: 96 MG/DL (ref 70–99)
GLUCOSE UR STRIP-MCNC: NEGATIVE MG/DL
HBV CORE AB SERPL QL IA: NONREACTIVE
HBV SURFACE AB SERPL IA-ACNC: 2.43 M[IU]/ML
HBV SURFACE AG SERPL QL IA: NONREACTIVE
HCT VFR BLD AUTO: 23.9 % (ref 40–53)
HCT VFR BLD AUTO: 25.7 % (ref 40–53)
HCT VFR BLD AUTO: 25.9 % (ref 40–53)
HCT VFR BLD AUTO: 25.9 % (ref 40–53)
HCT VFR BLD AUTO: 26.1 % (ref 40–53)
HCT VFR BLD AUTO: 26.1 % (ref 40–53)
HCT VFR BLD AUTO: 26.4 % (ref 40–53)
HCT VFR BLD AUTO: 27.1 % (ref 40–53)
HCT VFR BLD AUTO: 30.1 % (ref 40–53)
HCT VFR BLD AUTO: 32.9 % (ref 40–53)
HCT VFR BLD AUTO: 33 % (ref 40–53)
HCT VFR BLD AUTO: 34.5 % (ref 40–53)
HCV AB SERPL QL IA: REACTIVE
HCV GENTYP SERPL NAA+PROBE: NORMAL
HCV RNA SERPL NAA+PROBE-ACNC: ABNORMAL [IU]/ML
HCV RNA SERPL NAA+PROBE-LOG IU: 5.9 LOG IU/ML
HGB BLD-MCNC: 10.2 G/DL (ref 13.3–17.7)
HGB BLD-MCNC: 11.1 G/DL (ref 13.3–17.7)
HGB BLD-MCNC: 11.2 G/DL (ref 13.3–17.7)
HGB BLD-MCNC: 11.4 G/DL (ref 13.3–17.7)
HGB BLD-MCNC: 8 G/DL (ref 13.3–17.7)
HGB BLD-MCNC: 8.5 G/DL (ref 13.3–17.7)
HGB BLD-MCNC: 8.5 G/DL (ref 13.3–17.7)
HGB BLD-MCNC: 8.7 G/DL (ref 13.3–17.7)
HGB BLD-MCNC: 8.8 G/DL (ref 13.3–17.7)
HGB BLD-MCNC: 8.9 G/DL (ref 13.3–17.7)
HGB BLD-MCNC: 9 G/DL (ref 13.3–17.7)
HGB BLD-MCNC: 9.2 G/DL (ref 13.3–17.7)
HGB UR QL STRIP: NEGATIVE
IMM GRANULOCYTES # BLD: 0 10E9/L (ref 0–0.4)
IMM GRANULOCYTES # BLD: 0.1 10E9/L (ref 0–0.4)
IMM GRANULOCYTES NFR BLD: 0.2 %
IMM GRANULOCYTES NFR BLD: 0.3 %
IMM GRANULOCYTES NFR BLD: 0.3 %
IMM GRANULOCYTES NFR BLD: 0.4 %
IMM GRANULOCYTES NFR BLD: 0.5 %
IMM GRANULOCYTES NFR BLD: 0.7 %
INTERPRETATION ECG - MUSE: NORMAL
INTERPRETATION ECG - MUSE: NORMAL
KETONES UR STRIP-MCNC: NEGATIVE MG/DL
LABORATORY COMMENT REPORT: NORMAL
LEUKOCYTE ESTERASE UR QL STRIP: NEGATIVE
LYMPHOCYTES # BLD AUTO: 0.2 10E9/L (ref 0.8–5.3)
LYMPHOCYTES # BLD AUTO: 0.2 10E9/L (ref 0.8–5.3)
LYMPHOCYTES # BLD AUTO: 0.3 10E9/L (ref 0.8–5.3)
LYMPHOCYTES # BLD AUTO: 0.4 10E9/L (ref 0.8–5.3)
LYMPHOCYTES # BLD AUTO: 0.5 10E9/L (ref 0.8–5.3)
LYMPHOCYTES # BLD AUTO: 0.6 10E9/L (ref 0.8–5.3)
LYMPHOCYTES NFR BLD AUTO: 16 %
LYMPHOCYTES NFR BLD AUTO: 2 %
LYMPHOCYTES NFR BLD AUTO: 3.7 %
LYMPHOCYTES NFR BLD AUTO: 3.7 %
LYMPHOCYTES NFR BLD AUTO: 3.8 %
LYMPHOCYTES NFR BLD AUTO: 3.9 %
LYMPHOCYTES NFR BLD AUTO: 3.9 %
LYMPHOCYTES NFR BLD AUTO: 4.2 %
LYMPHOCYTES NFR BLD AUTO: 4.8 %
LYMPHOCYTES NFR BLD AUTO: 6.5 %
LYMPHOCYTES NFR BLD AUTO: 9.9 %
MAGNESIUM SERPL-MCNC: 1.3 MG/DL (ref 1.6–2.3)
MAGNESIUM SERPL-MCNC: 1.4 MG/DL (ref 1.6–2.3)
MAGNESIUM SERPL-MCNC: 1.5 MG/DL (ref 1.6–2.3)
MAGNESIUM SERPL-MCNC: 1.5 MG/DL (ref 1.6–2.3)
MAGNESIUM SERPL-MCNC: 1.6 MG/DL (ref 1.6–2.3)
MAGNESIUM SERPL-MCNC: 1.8 MG/DL (ref 1.6–2.3)
MAGNESIUM SERPL-MCNC: 1.8 MG/DL (ref 1.6–2.3)
MAGNESIUM SERPL-MCNC: 2 MG/DL (ref 1.6–2.3)
MCH RBC QN AUTO: 30.5 PG (ref 26.5–33)
MCH RBC QN AUTO: 30.9 PG (ref 26.5–33)
MCH RBC QN AUTO: 30.9 PG (ref 26.5–33)
MCH RBC QN AUTO: 31.1 PG (ref 26.5–33)
MCH RBC QN AUTO: 31.8 PG (ref 26.5–33)
MCH RBC QN AUTO: 31.9 PG (ref 26.5–33)
MCH RBC QN AUTO: 32.6 PG (ref 26.5–33)
MCH RBC QN AUTO: 32.8 PG (ref 26.5–33)
MCH RBC QN AUTO: 32.9 PG (ref 26.5–33)
MCH RBC QN AUTO: 33.1 PG (ref 26.5–33)
MCH RBC QN AUTO: 34.2 PG (ref 26.5–33)
MCH RBC QN AUTO: 34.2 PG (ref 26.5–33)
MCHC RBC AUTO-ENTMCNC: 32.8 G/DL (ref 31.5–36.5)
MCHC RBC AUTO-ENTMCNC: 33 G/DL (ref 31.5–36.5)
MCHC RBC AUTO-ENTMCNC: 33.1 G/DL (ref 31.5–36.5)
MCHC RBC AUTO-ENTMCNC: 33.3 G/DL (ref 31.5–36.5)
MCHC RBC AUTO-ENTMCNC: 33.5 G/DL (ref 31.5–36.5)
MCHC RBC AUTO-ENTMCNC: 33.6 G/DL (ref 31.5–36.5)
MCHC RBC AUTO-ENTMCNC: 33.7 G/DL (ref 31.5–36.5)
MCHC RBC AUTO-ENTMCNC: 33.9 G/DL (ref 31.5–36.5)
MCHC RBC AUTO-ENTMCNC: 33.9 G/DL (ref 31.5–36.5)
MCHC RBC AUTO-ENTMCNC: 34 G/DL (ref 31.5–36.5)
MCHC RBC AUTO-ENTMCNC: 34 G/DL (ref 31.5–36.5)
MCHC RBC AUTO-ENTMCNC: 34.5 G/DL (ref 31.5–36.5)
MCV RBC AUTO: 101 FL (ref 78–100)
MCV RBC AUTO: 102 FL (ref 78–100)
MCV RBC AUTO: 90 FL (ref 78–100)
MCV RBC AUTO: 92 FL (ref 78–100)
MCV RBC AUTO: 94 FL (ref 78–100)
MCV RBC AUTO: 94 FL (ref 78–100)
MCV RBC AUTO: 98 FL (ref 78–100)
MCV RBC AUTO: 99 FL (ref 78–100)
MONOCYTES # BLD AUTO: 0.2 10E9/L (ref 0–1.3)
MONOCYTES # BLD AUTO: 0.3 10E9/L (ref 0–1.3)
MONOCYTES # BLD AUTO: 0.4 10E9/L (ref 0–1.3)
MONOCYTES # BLD AUTO: 0.4 10E9/L (ref 0–1.3)
MONOCYTES # BLD AUTO: 0.5 10E9/L (ref 0–1.3)
MONOCYTES # BLD AUTO: 0.5 10E9/L (ref 0–1.3)
MONOCYTES # BLD AUTO: 0.6 10E9/L (ref 0–1.3)
MONOCYTES # BLD AUTO: 0.7 10E9/L (ref 0–1.3)
MONOCYTES # BLD AUTO: 0.7 10E9/L (ref 0–1.3)
MONOCYTES # BLD AUTO: 0.9 10E9/L (ref 0–1.3)
MONOCYTES # BLD AUTO: 1.1 10E9/L (ref 0–1.3)
MONOCYTES NFR BLD AUTO: 10.3 %
MONOCYTES NFR BLD AUTO: 11.4 %
MONOCYTES NFR BLD AUTO: 11.5 %
MONOCYTES NFR BLD AUTO: 17.1 %
MONOCYTES NFR BLD AUTO: 3 %
MONOCYTES NFR BLD AUTO: 4.1 %
MONOCYTES NFR BLD AUTO: 4.7 %
MONOCYTES NFR BLD AUTO: 5.2 %
MONOCYTES NFR BLD AUTO: 6.1 %
MONOCYTES NFR BLD AUTO: 7.6 %
MONOCYTES NFR BLD AUTO: 9.9 %
MUCOUS THREADS #/AREA URNS LPF: PRESENT /LPF
NEUTROPHILS # BLD AUTO: 10.5 10E9/L (ref 1.6–8.3)
NEUTROPHILS # BLD AUTO: 2.5 10E9/L (ref 1.6–8.3)
NEUTROPHILS # BLD AUTO: 2.5 10E9/L (ref 1.6–8.3)
NEUTROPHILS # BLD AUTO: 4.1 10E9/L (ref 1.6–8.3)
NEUTROPHILS # BLD AUTO: 4.9 10E9/L (ref 1.6–8.3)
NEUTROPHILS # BLD AUTO: 6.1 10E9/L (ref 1.6–8.3)
NEUTROPHILS # BLD AUTO: 6.6 10E9/L (ref 1.6–8.3)
NEUTROPHILS # BLD AUTO: 8 10E9/L (ref 1.6–8.3)
NEUTROPHILS # BLD AUTO: 8 10E9/L (ref 1.6–8.3)
NEUTROPHILS # BLD AUTO: 8.7 10E9/L (ref 1.6–8.3)
NEUTROPHILS # BLD AUTO: 9.5 10E9/L (ref 1.6–8.3)
NEUTROPHILS NFR BLD AUTO: 63.3 %
NEUTROPHILS NFR BLD AUTO: 77.1 %
NEUTROPHILS NFR BLD AUTO: 81.8 %
NEUTROPHILS NFR BLD AUTO: 83.4 %
NEUTROPHILS NFR BLD AUTO: 84.7 %
NEUTROPHILS NFR BLD AUTO: 85.2 %
NEUTROPHILS NFR BLD AUTO: 85.8 %
NEUTROPHILS NFR BLD AUTO: 90.5 %
NEUTROPHILS NFR BLD AUTO: 91 %
NEUTROPHILS NFR BLD AUTO: 91.3 %
NEUTROPHILS NFR BLD AUTO: 92.3 %
NITRATE UR QL: NEGATIVE
NRBC # BLD AUTO: 0 10*3/UL
NRBC BLD AUTO-RTO: 0 /100
NT-PROBNP SERPL-MCNC: 930 PG/ML (ref 0–900)
PH UR STRIP: 6 PH (ref 5–7)
PHOSPHATE SERPL-MCNC: 3.3 MG/DL (ref 2.5–4.5)
PLATELET # BLD AUTO: 104 10E9/L (ref 150–450)
PLATELET # BLD AUTO: 106 10E9/L (ref 150–450)
PLATELET # BLD AUTO: 311 10E9/L (ref 150–450)
PLATELET # BLD AUTO: 331 10E9/L (ref 150–450)
PLATELET # BLD AUTO: 362 10E9/L (ref 150–450)
PLATELET # BLD AUTO: 368 10E9/L (ref 150–450)
PLATELET # BLD AUTO: 68 10E9/L (ref 150–450)
PLATELET # BLD AUTO: 72 10E9/L (ref 150–450)
PLATELET # BLD AUTO: 74 10E9/L (ref 150–450)
PLATELET # BLD AUTO: 85 10E9/L (ref 150–450)
PLATELET # BLD AUTO: 96 10E9/L (ref 150–450)
PLATELET # BLD AUTO: 99 10E9/L (ref 150–450)
POTASSIUM SERPL-SCNC: 2.9 MMOL/L (ref 3.4–5.3)
POTASSIUM SERPL-SCNC: 2.9 MMOL/L (ref 3.4–5.3)
POTASSIUM SERPL-SCNC: 3 MMOL/L (ref 3.4–5.3)
POTASSIUM SERPL-SCNC: 3.2 MMOL/L (ref 3.4–5.3)
POTASSIUM SERPL-SCNC: 3.2 MMOL/L (ref 3.4–5.3)
POTASSIUM SERPL-SCNC: 3.3 MMOL/L (ref 3.4–5.3)
POTASSIUM SERPL-SCNC: 3.4 MMOL/L (ref 3.4–5.3)
POTASSIUM SERPL-SCNC: 3.5 MMOL/L (ref 3.4–5.3)
POTASSIUM SERPL-SCNC: 3.5 MMOL/L (ref 3.4–5.3)
POTASSIUM SERPL-SCNC: 3.7 MMOL/L (ref 3.4–5.3)
POTASSIUM SERPL-SCNC: 3.8 MMOL/L (ref 3.4–5.3)
POTASSIUM SERPL-SCNC: 4 MMOL/L (ref 3.4–5.3)
POTASSIUM SERPL-SCNC: 4 MMOL/L (ref 3.4–5.3)
POTASSIUM SERPL-SCNC: 4.2 MMOL/L (ref 3.4–5.3)
PROCALCITONIN SERPL-MCNC: <0.05 NG/ML
PROT SERPL-MCNC: 6.3 G/DL (ref 6.8–8.8)
PROT SERPL-MCNC: 6.4 G/DL (ref 6.8–8.8)
PROT SERPL-MCNC: 6.5 G/DL (ref 6.8–8.8)
PROT SERPL-MCNC: 6.6 G/DL (ref 6.8–8.8)
PROT SERPL-MCNC: 6.6 G/DL (ref 6.8–8.8)
PROT SERPL-MCNC: 6.8 G/DL (ref 6.8–8.8)
PROT SERPL-MCNC: 7 G/DL (ref 6.8–8.8)
PROT SERPL-MCNC: 7 G/DL (ref 6.8–8.8)
PROT SERPL-MCNC: 7.2 G/DL (ref 6.8–8.8)
PROT SERPL-MCNC: 8.1 G/DL (ref 6.8–8.8)
PROT SERPL-MCNC: 8.2 G/DL (ref 6.8–8.8)
PROT SERPL-MCNC: 8.3 G/DL (ref 6.8–8.8)
PROT SERPL-MCNC: 8.4 G/DL (ref 6.8–8.8)
RBC # BLD AUTO: 2.43 10E12/L (ref 4.4–5.9)
RBC # BLD AUTO: 2.57 10E12/L (ref 4.4–5.9)
RBC # BLD AUTO: 2.59 10E12/L (ref 4.4–5.9)
RBC # BLD AUTO: 2.6 10E12/L (ref 4.4–5.9)
RBC # BLD AUTO: 2.61 10E12/L (ref 4.4–5.9)
RBC # BLD AUTO: 2.63 10E12/L (ref 4.4–5.9)
RBC # BLD AUTO: 2.83 10E12/L (ref 4.4–5.9)
RBC # BLD AUTO: 2.88 10E12/L (ref 4.4–5.9)
RBC # BLD AUTO: 3.28 10E12/L (ref 4.4–5.9)
RBC # BLD AUTO: 3.59 10E12/L (ref 4.4–5.9)
RBC # BLD AUTO: 3.67 10E12/L (ref 4.4–5.9)
RBC # BLD AUTO: 3.69 10E12/L (ref 4.4–5.9)
RBC #/AREA URNS AUTO: 1 /HPF (ref 0–2)
RSV RNA SPEC QL NAA+PROBE: NEGATIVE
SARS-COV-2 RNA RESP QL NAA+PROBE: NEGATIVE
SODIUM SERPL-SCNC: 129 MMOL/L (ref 133–144)
SODIUM SERPL-SCNC: 129 MMOL/L (ref 133–144)
SODIUM SERPL-SCNC: 130 MMOL/L (ref 133–144)
SODIUM SERPL-SCNC: 130 MMOL/L (ref 133–144)
SODIUM SERPL-SCNC: 131 MMOL/L (ref 133–144)
SODIUM SERPL-SCNC: 132 MMOL/L (ref 133–144)
SODIUM SERPL-SCNC: 133 MMOL/L (ref 133–144)
SODIUM SERPL-SCNC: 134 MMOL/L (ref 133–144)
SOURCE: ABNORMAL
SP GR UR STRIP: 1.02 (ref 1–1.03)
SPECIMEN SOURCE: ABNORMAL
SPECIMEN SOURCE: NORMAL
T4 FREE SERPL-MCNC: 1.14 NG/DL (ref 0.76–1.46)
TROPONIN I SERPL-MCNC: <0.015 UG/L (ref 0–0.04)
TROPONIN I SERPL-MCNC: <0.015 UG/L (ref 0–0.04)
TSH SERPL DL<=0.005 MIU/L-ACNC: 2.64 MU/L (ref 0.4–4)
TSH SERPL DL<=0.005 MIU/L-ACNC: 3.51 MU/L (ref 0.4–4)
UROBILINOGEN UR STRIP-MCNC: 0 MG/DL (ref 0–2)
VIT B12 SERPL-MCNC: 487 PG/ML (ref 193–986)
WBC # BLD AUTO: 10.3 10E9/L (ref 4–11)
WBC # BLD AUTO: 12.3 10E9/L (ref 4–11)
WBC # BLD AUTO: 3.2 10E9/L (ref 4–11)
WBC # BLD AUTO: 3.9 10E9/L (ref 4–11)
WBC # BLD AUTO: 4.8 10E9/L (ref 4–11)
WBC # BLD AUTO: 5.9 10E9/L (ref 4–11)
WBC # BLD AUTO: 7.2 10E9/L (ref 4–11)
WBC # BLD AUTO: 7.3 10E9/L (ref 4–11)
WBC # BLD AUTO: 7.3 10E9/L (ref 4–11)
WBC # BLD AUTO: 8.8 10E9/L (ref 4–11)
WBC # BLD AUTO: 9.6 10E9/L (ref 4–11)
WBC # BLD AUTO: 9.7 10E9/L (ref 4–11)
WBC #/AREA URNS AUTO: 1 /HPF (ref 0–5)

## 2021-01-01 PROCEDURE — 120N000002 HC R&B MED SURG/OB UMMC

## 2021-01-01 PROCEDURE — 83735 ASSAY OF MAGNESIUM: CPT | Performed by: PHYSICIAN ASSISTANT

## 2021-01-01 PROCEDURE — 85027 COMPLETE CBC AUTOMATED: CPT | Performed by: INTERNAL MEDICINE

## 2021-01-01 PROCEDURE — 74177 CT ABD & PELVIS W/CONTRAST: CPT | Mod: MG | Performed by: RADIOLOGY

## 2021-01-01 PROCEDURE — 99406 BEHAV CHNG SMOKING 3-10 MIN: CPT

## 2021-01-01 PROCEDURE — 250N000012 HC RX MED GY IP 250 OP 636 PS 637: Performed by: STUDENT IN AN ORGANIZED HEALTH CARE EDUCATION/TRAINING PROGRAM

## 2021-01-01 PROCEDURE — 84132 ASSAY OF SERUM POTASSIUM: CPT | Performed by: STUDENT IN AN ORGANIZED HEALTH CARE EDUCATION/TRAINING PROGRAM

## 2021-01-01 PROCEDURE — 258N000003 HC RX IP 258 OP 636: Performed by: INTERNAL MEDICINE

## 2021-01-01 PROCEDURE — 71260 CT THORAX DX C+: CPT | Mod: MG | Performed by: RADIOLOGY

## 2021-01-01 PROCEDURE — 258N000003 HC RX IP 258 OP 636: Performed by: PHYSICIAN ASSISTANT

## 2021-01-01 PROCEDURE — 250N000013 HC RX MED GY IP 250 OP 250 PS 637: Performed by: EMERGENCY MEDICINE

## 2021-01-01 PROCEDURE — 97161 PT EVAL LOW COMPLEX 20 MIN: CPT | Mod: GP | Performed by: PHYSICAL THERAPIST

## 2021-01-01 PROCEDURE — 93005 ELECTROCARDIOGRAM TRACING: CPT | Performed by: EMERGENCY MEDICINE

## 2021-01-01 PROCEDURE — 83735 ASSAY OF MAGNESIUM: CPT | Mod: GT | Performed by: PHYSICIAN ASSISTANT

## 2021-01-01 PROCEDURE — 36592 COLLECT BLOOD FROM PICC: CPT | Performed by: INTERNAL MEDICINE

## 2021-01-01 PROCEDURE — 84484 ASSAY OF TROPONIN QUANT: CPT | Performed by: EMERGENCY MEDICINE

## 2021-01-01 PROCEDURE — 96367 TX/PROPH/DG ADDL SEQ IV INF: CPT

## 2021-01-01 PROCEDURE — 99222 1ST HOSP IP/OBS MODERATE 55: CPT | Mod: GC | Performed by: INTERNAL MEDICINE

## 2021-01-01 PROCEDURE — 99214 OFFICE O/P EST MOD 30 MIN: CPT | Mod: 95 | Performed by: INTERNAL MEDICINE

## 2021-01-01 PROCEDURE — 999N001193 HC VIDEO/TELEPHONE VISIT; NO CHARGE

## 2021-01-01 PROCEDURE — 97802 MEDICAL NUTRITION INDIV IN: CPT | Mod: 95,GZ | Performed by: DIETITIAN, REGISTERED

## 2021-01-01 PROCEDURE — 93306 TTE W/DOPPLER COMPLETE: CPT

## 2021-01-01 PROCEDURE — 96413 CHEMO IV INFUSION 1 HR: CPT

## 2021-01-01 PROCEDURE — 36592 COLLECT BLOOD FROM PICC: CPT | Performed by: STUDENT IN AN ORGANIZED HEALTH CARE EDUCATION/TRAINING PROGRAM

## 2021-01-01 PROCEDURE — 83735 ASSAY OF MAGNESIUM: CPT | Performed by: INTERNAL MEDICINE

## 2021-01-01 PROCEDURE — 97110 THERAPEUTIC EXERCISES: CPT | Mod: GP | Performed by: PHYSICAL THERAPIST

## 2021-01-01 PROCEDURE — 85025 COMPLETE CBC W/AUTO DIFF WBC: CPT | Performed by: PHYSICIAN ASSISTANT

## 2021-01-01 PROCEDURE — 250N000011 HC RX IP 250 OP 636: Performed by: INTERNAL MEDICINE

## 2021-01-01 PROCEDURE — 93010 ELECTROCARDIOGRAM REPORT: CPT | Performed by: EMERGENCY MEDICINE

## 2021-01-01 PROCEDURE — 97116 GAIT TRAINING THERAPY: CPT | Mod: GP

## 2021-01-01 PROCEDURE — 97165 OT EVAL LOW COMPLEX 30 MIN: CPT | Mod: GO

## 2021-01-01 PROCEDURE — 97535 SELF CARE MNGMENT TRAINING: CPT | Mod: GO

## 2021-01-01 PROCEDURE — 82565 ASSAY OF CREATININE: CPT

## 2021-01-01 PROCEDURE — 80053 COMPREHEN METABOLIC PANEL: CPT | Performed by: PHYSICIAN ASSISTANT

## 2021-01-01 PROCEDURE — 97530 THERAPEUTIC ACTIVITIES: CPT | Mod: GP

## 2021-01-01 PROCEDURE — 87522 HEPATITIS C REVRS TRNSCRPJ: CPT | Mod: GT | Performed by: PHYSICIAN ASSISTANT

## 2021-01-01 PROCEDURE — 250N000011 HC RX IP 250 OP 636: Performed by: PHYSICIAN ASSISTANT

## 2021-01-01 PROCEDURE — 250N000013 HC RX MED GY IP 250 OP 250 PS 637: Performed by: STUDENT IN AN ORGANIZED HEALTH CARE EDUCATION/TRAINING PROGRAM

## 2021-01-01 PROCEDURE — 96367 TX/PROPH/DG ADDL SEQ IV INF: CPT | Performed by: EMERGENCY MEDICINE

## 2021-01-01 PROCEDURE — 250N000011 HC RX IP 250 OP 636: Performed by: STUDENT IN AN ORGANIZED HEALTH CARE EDUCATION/TRAINING PROGRAM

## 2021-01-01 PROCEDURE — 99285 EMERGENCY DEPT VISIT HI MDM: CPT | Mod: 25 | Performed by: EMERGENCY MEDICINE

## 2021-01-01 PROCEDURE — 99233 SBSQ HOSP IP/OBS HIGH 50: CPT | Performed by: STUDENT IN AN ORGANIZED HEALTH CARE EDUCATION/TRAINING PROGRAM

## 2021-01-01 PROCEDURE — 99442 PR PHYSICIAN TELEPHONE EVALUATION 11-20 MIN: CPT | Mod: 95 | Performed by: PHYSICIAN ASSISTANT

## 2021-01-01 PROCEDURE — 250N000011 HC RX IP 250 OP 636: Performed by: EMERGENCY MEDICINE

## 2021-01-01 PROCEDURE — 87636 SARSCOV2 & INF A&B AMP PRB: CPT | Performed by: EMERGENCY MEDICINE

## 2021-01-01 PROCEDURE — G1004 CDSM NDSC: HCPCS | Mod: GC | Performed by: RADIOLOGY

## 2021-01-01 PROCEDURE — 85379 FIBRIN DEGRADATION QUANT: CPT | Performed by: EMERGENCY MEDICINE

## 2021-01-01 PROCEDURE — 250N000011 HC RX IP 250 OP 636: Mod: GT | Performed by: PHYSICIAN ASSISTANT

## 2021-01-01 PROCEDURE — 71275 CT ANGIOGRAPHY CHEST: CPT | Mod: 26 | Performed by: RADIOLOGY

## 2021-01-01 PROCEDURE — 250N000013 HC RX MED GY IP 250 OP 250 PS 637: Performed by: PHYSICIAN ASSISTANT

## 2021-01-01 PROCEDURE — 82550 ASSAY OF CK (CPK): CPT | Performed by: STUDENT IN AN ORGANIZED HEALTH CARE EDUCATION/TRAINING PROGRAM

## 2021-01-01 PROCEDURE — 87800 DETECT AGNT MULT DNA DIREC: CPT | Performed by: EMERGENCY MEDICINE

## 2021-01-01 PROCEDURE — 96375 TX/PRO/DX INJ NEW DRUG ADDON: CPT

## 2021-01-01 PROCEDURE — 87340 HEPATITIS B SURFACE AG IA: CPT | Performed by: INTERNAL MEDICINE

## 2021-01-01 PROCEDURE — 84443 ASSAY THYROID STIM HORMONE: CPT | Performed by: INTERNAL MEDICINE

## 2021-01-01 PROCEDURE — 96417 CHEMO IV INFUS EACH ADDL SEQ: CPT

## 2021-01-01 PROCEDURE — 250N000013 HC RX MED GY IP 250 OP 250 PS 637: Performed by: INTERNAL MEDICINE

## 2021-01-01 PROCEDURE — 84439 ASSAY OF FREE THYROXINE: CPT | Performed by: INTERNAL MEDICINE

## 2021-01-01 PROCEDURE — 96361 HYDRATE IV INFUSION ADD-ON: CPT

## 2021-01-01 PROCEDURE — 83735 ASSAY OF MAGNESIUM: CPT | Performed by: STUDENT IN AN ORGANIZED HEALTH CARE EDUCATION/TRAINING PROGRAM

## 2021-01-01 PROCEDURE — 87449 NOS EACH ORGANISM AG IA: CPT | Performed by: STUDENT IN AN ORGANIZED HEALTH CARE EDUCATION/TRAINING PROGRAM

## 2021-01-01 PROCEDURE — 85025 COMPLETE CBC W/AUTO DIFF WBC: CPT | Performed by: STUDENT IN AN ORGANIZED HEALTH CARE EDUCATION/TRAINING PROGRAM

## 2021-01-01 PROCEDURE — 87040 BLOOD CULTURE FOR BACTERIA: CPT | Performed by: INTERNAL MEDICINE

## 2021-01-01 PROCEDURE — 85025 COMPLETE CBC W/AUTO DIFF WBC: CPT | Performed by: EMERGENCY MEDICINE

## 2021-01-01 PROCEDURE — 92557 COMPREHENSIVE HEARING TEST: CPT | Performed by: AUDIOLOGIST

## 2021-01-01 PROCEDURE — 80053 COMPREHEN METABOLIC PANEL: CPT | Performed by: STUDENT IN AN ORGANIZED HEALTH CARE EDUCATION/TRAINING PROGRAM

## 2021-01-01 PROCEDURE — 71045 X-RAY EXAM CHEST 1 VIEW: CPT

## 2021-01-01 PROCEDURE — 93010 ELECTROCARDIOGRAM REPORT: CPT | Performed by: INTERNAL MEDICINE

## 2021-01-01 PROCEDURE — 83880 ASSAY OF NATRIURETIC PEPTIDE: CPT | Performed by: EMERGENCY MEDICINE

## 2021-01-01 PROCEDURE — 99442 PR PHYSICIAN TELEPHONE EVALUATION 11-20 MIN: CPT | Performed by: PHYSICIAN ASSISTANT

## 2021-01-01 PROCEDURE — 80053 COMPREHEN METABOLIC PANEL: CPT | Performed by: EMERGENCY MEDICINE

## 2021-01-01 PROCEDURE — 80051 ELECTROLYTE PANEL: CPT | Performed by: INTERNAL MEDICINE

## 2021-01-01 PROCEDURE — C9803 HOPD COVID-19 SPEC COLLECT: HCPCS | Performed by: EMERGENCY MEDICINE

## 2021-01-01 PROCEDURE — 97535 SELF CARE MNGMENT TRAINING: CPT | Mod: GO | Performed by: OCCUPATIONAL THERAPIST

## 2021-01-01 PROCEDURE — G0463 HOSPITAL OUTPT CLINIC VISIT: HCPCS | Mod: 25

## 2021-01-01 PROCEDURE — 84100 ASSAY OF PHOSPHORUS: CPT | Performed by: INTERNAL MEDICINE

## 2021-01-01 PROCEDURE — 97530 THERAPEUTIC ACTIVITIES: CPT | Mod: GO | Performed by: OCCUPATIONAL THERAPIST

## 2021-01-01 PROCEDURE — 99207 PR NO BILLABLE SERVICE THIS VISIT: CPT

## 2021-01-01 PROCEDURE — 99239 HOSP IP/OBS DSCHRG MGMT >30: CPT | Performed by: STUDENT IN AN ORGANIZED HEALTH CARE EDUCATION/TRAINING PROGRAM

## 2021-01-01 PROCEDURE — 80053 COMPREHEN METABOLIC PANEL: CPT | Mod: GT | Performed by: PHYSICIAN ASSISTANT

## 2021-01-01 PROCEDURE — 97161 PT EVAL LOW COMPLEX 20 MIN: CPT | Mod: GP

## 2021-01-01 PROCEDURE — 80053 COMPREHEN METABOLIC PANEL: CPT | Performed by: INTERNAL MEDICINE

## 2021-01-01 PROCEDURE — 86803 HEPATITIS C AB TEST: CPT | Performed by: INTERNAL MEDICINE

## 2021-01-01 PROCEDURE — 96365 THER/PROPH/DIAG IV INF INIT: CPT | Performed by: EMERGENCY MEDICINE

## 2021-01-01 PROCEDURE — 71260 CT THORAX DX C+: CPT | Mod: MG | Performed by: NEUROLOGICAL SURGERY

## 2021-01-01 PROCEDURE — 82746 ASSAY OF FOLIC ACID SERUM: CPT | Mod: GT | Performed by: PHYSICIAN ASSISTANT

## 2021-01-01 PROCEDURE — 80051 ELECTROLYTE PANEL: CPT | Performed by: PHYSICIAN ASSISTANT

## 2021-01-01 PROCEDURE — 93306 TTE W/DOPPLER COMPLETE: CPT | Mod: 26 | Performed by: INTERNAL MEDICINE

## 2021-01-01 PROCEDURE — 99214 OFFICE O/P EST MOD 30 MIN: CPT | Performed by: OTOLARYNGOLOGY

## 2021-01-01 PROCEDURE — 250N000013 HC RX MED GY IP 250 OP 250 PS 637: Mod: GY | Performed by: PHYSICIAN ASSISTANT

## 2021-01-01 PROCEDURE — 97116 GAIT TRAINING THERAPY: CPT | Mod: GP | Performed by: PHYSICAL THERAPIST

## 2021-01-01 PROCEDURE — 86704 HEP B CORE ANTIBODY TOTAL: CPT | Performed by: PHYSICIAN ASSISTANT

## 2021-01-01 PROCEDURE — 81001 URINALYSIS AUTO W/SCOPE: CPT | Performed by: INTERNAL MEDICINE

## 2021-01-01 PROCEDURE — 97530 THERAPEUTIC ACTIVITIES: CPT | Mod: GO

## 2021-01-01 PROCEDURE — 71045 X-RAY EXAM CHEST 1 VIEW: CPT | Mod: 26 | Performed by: RADIOLOGY

## 2021-01-01 PROCEDURE — 92550 TYMPANOMETRY & REFLEX THRESH: CPT | Performed by: AUDIOLOGIST

## 2021-01-01 PROCEDURE — 36591 DRAW BLOOD OFF VENOUS DEVICE: CPT

## 2021-01-01 PROCEDURE — G1004 CDSM NDSC: HCPCS | Performed by: NEUROLOGICAL SURGERY

## 2021-01-01 PROCEDURE — 86706 HEP B SURFACE ANTIBODY: CPT | Performed by: INTERNAL MEDICINE

## 2021-01-01 PROCEDURE — 999N001017 HC STATISTIC GLUCOSE BY METER IP

## 2021-01-01 PROCEDURE — 99215 OFFICE O/P EST HI 40 MIN: CPT | Mod: 95 | Performed by: PHYSICIAN ASSISTANT

## 2021-01-01 PROCEDURE — 82607 VITAMIN B-12: CPT | Performed by: INTERNAL MEDICINE

## 2021-01-01 PROCEDURE — 87077 CULTURE AEROBIC IDENTIFY: CPT | Performed by: EMERGENCY MEDICINE

## 2021-01-01 PROCEDURE — G0463 HOSPITAL OUTPT CLINIC VISIT: HCPCS

## 2021-01-01 PROCEDURE — 84484 ASSAY OF TROPONIN QUANT: CPT | Mod: GT | Performed by: PHYSICIAN ASSISTANT

## 2021-01-01 PROCEDURE — 84145 PROCALCITONIN (PCT): CPT | Performed by: STUDENT IN AN ORGANIZED HEALTH CARE EDUCATION/TRAINING PROGRAM

## 2021-01-01 PROCEDURE — 71275 CT ANGIOGRAPHY CHEST: CPT | Mod: MG | Performed by: RADIOLOGY

## 2021-01-01 PROCEDURE — 87186 SC STD MICRODIL/AGAR DIL: CPT | Performed by: EMERGENCY MEDICINE

## 2021-01-01 PROCEDURE — 85025 COMPLETE CBC W/AUTO DIFF WBC: CPT | Performed by: INTERNAL MEDICINE

## 2021-01-01 PROCEDURE — 99213 OFFICE O/P EST LOW 20 MIN: CPT | Performed by: RADIOLOGY

## 2021-01-01 PROCEDURE — 93005 ELECTROCARDIOGRAM TRACING: CPT

## 2021-01-01 PROCEDURE — 99223 1ST HOSP IP/OBS HIGH 75: CPT | Mod: AI | Performed by: INTERNAL MEDICINE

## 2021-01-01 PROCEDURE — 99232 SBSQ HOSP IP/OBS MODERATE 35: CPT | Performed by: INTERNAL MEDICINE

## 2021-01-01 PROCEDURE — 87040 BLOOD CULTURE FOR BACTERIA: CPT | Performed by: EMERGENCY MEDICINE

## 2021-01-01 PROCEDURE — 99204 OFFICE O/P NEW MOD 45 MIN: CPT | Mod: 95 | Performed by: STUDENT IN AN ORGANIZED HEALTH CARE EDUCATION/TRAINING PROGRAM

## 2021-01-01 PROCEDURE — 74177 CT ABD & PELVIS W/CONTRAST: CPT | Mod: MG | Performed by: NEUROLOGICAL SURGERY

## 2021-01-01 PROCEDURE — 258N000003 HC RX IP 258 OP 636: Performed by: EMERGENCY MEDICINE

## 2021-01-01 PROCEDURE — 99214 OFFICE O/P EST MOD 30 MIN: CPT | Performed by: INTERNAL MEDICINE

## 2021-01-01 PROCEDURE — 99443 PR PHYSICIAN TELEPHONE EVALUATION 21-30 MIN: CPT | Mod: 95 | Performed by: PHYSICIAN ASSISTANT

## 2021-01-01 PROCEDURE — 71275 CT ANGIOGRAPHY CHEST: CPT | Mod: ME

## 2021-01-01 PROCEDURE — 97530 THERAPEUTIC ACTIVITIES: CPT | Mod: GP | Performed by: PHYSICAL THERAPIST

## 2021-01-01 PROCEDURE — 999N000127 HC STATISTIC PERIPHERAL IV START W US GUIDANCE

## 2021-01-01 PROCEDURE — 87902 NFCT AGT GNTYP ALYS HEP C: CPT | Performed by: PHYSICIAN ASSISTANT

## 2021-01-01 RX ORDER — NALOXONE HYDROCHLORIDE 0.4 MG/ML
.1-.4 INJECTION, SOLUTION INTRAMUSCULAR; INTRAVENOUS; SUBCUTANEOUS
Status: CANCELLED | OUTPATIENT
Start: 2021-01-01

## 2021-01-01 RX ORDER — HEPARIN SODIUM (PORCINE) LOCK FLUSH IV SOLN 100 UNIT/ML 100 UNIT/ML
5 SOLUTION INTRAVENOUS
Status: CANCELLED | OUTPATIENT
Start: 2021-01-01

## 2021-01-01 RX ORDER — HEPARIN SODIUM,PORCINE 10 UNIT/ML
5 VIAL (ML) INTRAVENOUS
Status: CANCELLED | OUTPATIENT
Start: 2021-01-01

## 2021-01-01 RX ORDER — HEPARIN SODIUM (PORCINE) LOCK FLUSH IV SOLN 100 UNIT/ML 100 UNIT/ML
500 SOLUTION INTRAVENOUS ONCE
Status: COMPLETED | OUTPATIENT
Start: 2021-01-01 | End: 2021-01-01

## 2021-01-01 RX ORDER — LORAZEPAM 2 MG/ML
0.5 INJECTION INTRAMUSCULAR EVERY 4 HOURS PRN
Status: CANCELLED
Start: 2021-01-01

## 2021-01-01 RX ORDER — CEFTRIAXONE 1 G/1
1 INJECTION, POWDER, FOR SOLUTION INTRAMUSCULAR; INTRAVENOUS EVERY 24 HOURS
Status: DISCONTINUED | OUTPATIENT
Start: 2021-01-01 | End: 2021-01-01

## 2021-01-01 RX ORDER — SODIUM CHLORIDE 9 MG/ML
1000 INJECTION, SOLUTION INTRAVENOUS CONTINUOUS PRN
Status: CANCELLED
Start: 2021-01-01

## 2021-01-01 RX ORDER — NICOTINE 21 MG/24HR
1 PATCH, TRANSDERMAL 24 HOURS TRANSDERMAL EVERY 24 HOURS
Qty: 30 PATCH | Refills: 1 | Status: SHIPPED | OUTPATIENT
Start: 2021-01-01

## 2021-01-01 RX ORDER — MAGNESIUM OXIDE 400 MG/1
400 TABLET ORAL 2 TIMES DAILY
Qty: 4 TABLET | Refills: 0 | Status: SHIPPED | OUTPATIENT
Start: 2021-01-01 | End: 2021-01-01

## 2021-01-01 RX ORDER — HEPARIN SODIUM (PORCINE) LOCK FLUSH IV SOLN 100 UNIT/ML 100 UNIT/ML
5 SOLUTION INTRAVENOUS
Status: DISCONTINUED | OUTPATIENT
Start: 2021-01-01 | End: 2021-01-01 | Stop reason: HOSPADM

## 2021-01-01 RX ORDER — EPINEPHRINE 1 MG/ML
0.3 INJECTION, SOLUTION INTRAMUSCULAR; SUBCUTANEOUS EVERY 5 MIN PRN
Status: CANCELLED | OUTPATIENT
Start: 2021-01-01

## 2021-01-01 RX ORDER — POTASSIUM CHLORIDE 1500 MG/1
40 TABLET, EXTENDED RELEASE ORAL ONCE
Status: COMPLETED | OUTPATIENT
Start: 2021-01-01 | End: 2021-01-01

## 2021-01-01 RX ORDER — CEFUROXIME AXETIL 500 MG/1
500 TABLET ORAL 2 TIMES DAILY
Qty: 4 TABLET | Refills: 0 | Status: SHIPPED | OUTPATIENT
Start: 2021-01-01 | End: 2021-01-01

## 2021-01-01 RX ORDER — HEPARIN SODIUM,PORCINE 10 UNIT/ML
5-10 VIAL (ML) INTRAVENOUS EVERY 24 HOURS
Status: DISCONTINUED | OUTPATIENT
Start: 2021-01-01 | End: 2021-01-01 | Stop reason: HOSPADM

## 2021-01-01 RX ORDER — NALOXONE HYDROCHLORIDE 0.4 MG/ML
.1-.4 INJECTION, SOLUTION INTRAMUSCULAR; INTRAVENOUS; SUBCUTANEOUS
Status: CANCELLED | OUTPATIENT
Start: 2021-05-13

## 2021-01-01 RX ORDER — HEPARIN SODIUM (PORCINE) LOCK FLUSH IV SOLN 100 UNIT/ML 100 UNIT/ML
5 SOLUTION INTRAVENOUS EVERY 10 MIN PRN
Status: CANCELLED | OUTPATIENT
Start: 2021-05-20

## 2021-01-01 RX ORDER — EPINEPHRINE 1 MG/ML
0.3 INJECTION, SOLUTION, CONCENTRATE INTRAVENOUS EVERY 5 MIN PRN
Status: CANCELLED | OUTPATIENT
Start: 2021-01-01

## 2021-01-01 RX ORDER — HEPARIN SODIUM (PORCINE) LOCK FLUSH IV SOLN 100 UNIT/ML 100 UNIT/ML
5 SOLUTION INTRAVENOUS EVERY 10 MIN PRN
Status: CANCELLED | OUTPATIENT
Start: 2021-01-01

## 2021-01-01 RX ORDER — MEPERIDINE HYDROCHLORIDE 25 MG/ML
25 INJECTION INTRAMUSCULAR; INTRAVENOUS; SUBCUTANEOUS EVERY 30 MIN PRN
Status: CANCELLED | OUTPATIENT
Start: 2021-01-01

## 2021-01-01 RX ORDER — ALBUTEROL SULFATE 0.83 MG/ML
2.5 SOLUTION RESPIRATORY (INHALATION)
Status: CANCELLED | OUTPATIENT
Start: 2021-01-01

## 2021-01-01 RX ORDER — MAGNESIUM SULFATE HEPTAHYDRATE 40 MG/ML
2 INJECTION, SOLUTION INTRAVENOUS ONCE
Status: COMPLETED | OUTPATIENT
Start: 2021-01-01 | End: 2021-01-01

## 2021-01-01 RX ORDER — DEXAMETHASONE SODIUM PHOSPHATE 4 MG/ML
6 INJECTION, SOLUTION INTRA-ARTICULAR; INTRALESIONAL; INTRAMUSCULAR; INTRAVENOUS; SOFT TISSUE ONCE
Status: CANCELLED
Start: 2021-01-01 | End: 2021-01-01

## 2021-01-01 RX ORDER — POTASSIUM CHLORIDE 750 MG/1
20 TABLET, EXTENDED RELEASE ORAL ONCE
Status: COMPLETED | OUTPATIENT
Start: 2021-01-01 | End: 2021-01-01

## 2021-01-01 RX ORDER — METHYLPREDNISOLONE SODIUM SUCCINATE 125 MG/2ML
125 INJECTION, POWDER, LYOPHILIZED, FOR SOLUTION INTRAMUSCULAR; INTRAVENOUS
Status: CANCELLED
Start: 2021-01-01

## 2021-01-01 RX ORDER — EPINEPHRINE 1 MG/ML
0.3 INJECTION, SOLUTION INTRAMUSCULAR; SUBCUTANEOUS EVERY 5 MIN PRN
Status: CANCELLED | OUTPATIENT
Start: 2021-05-20

## 2021-01-01 RX ORDER — DIPHENHYDRAMINE HYDROCHLORIDE 50 MG/ML
50 INJECTION INTRAMUSCULAR; INTRAVENOUS
Status: CANCELLED
Start: 2021-01-01

## 2021-01-01 RX ORDER — AMOXICILLIN 250 MG
1 CAPSULE ORAL 2 TIMES DAILY PRN
Qty: 180 TABLET | Refills: 3 | Status: SHIPPED | OUTPATIENT
Start: 2021-01-01

## 2021-01-01 RX ORDER — AMOXICILLIN 250 MG
CAPSULE ORAL
Qty: 90 TABLET | Refills: 3 | OUTPATIENT
Start: 2021-01-01

## 2021-01-01 RX ORDER — HEPARIN SODIUM (PORCINE) LOCK FLUSH IV SOLN 100 UNIT/ML 100 UNIT/ML
5 SOLUTION INTRAVENOUS EVERY 8 HOURS PRN
Status: DISCONTINUED | OUTPATIENT
Start: 2021-01-01 | End: 2021-01-01 | Stop reason: HOSPADM

## 2021-01-01 RX ORDER — SODIUM CHLORIDE 9 MG/ML
1000 INJECTION, SOLUTION INTRAVENOUS CONTINUOUS PRN
Status: CANCELLED
Start: 2021-05-13

## 2021-01-01 RX ORDER — ALBUTEROL SULFATE 90 UG/1
1-2 AEROSOL, METERED RESPIRATORY (INHALATION)
Status: CANCELLED
Start: 2021-01-01

## 2021-01-01 RX ORDER — PROCHLORPERAZINE MALEATE 10 MG
10 TABLET ORAL EVERY 6 HOURS PRN
Qty: 30 TABLET | Refills: 5 | Status: SHIPPED | OUTPATIENT
Start: 2021-01-01 | End: 2021-01-01

## 2021-01-01 RX ORDER — BISACODYL 10 MG
10 SUPPOSITORY, RECTAL RECTAL DAILY PRN
Status: DISCONTINUED | OUTPATIENT
Start: 2021-01-01 | End: 2021-01-01 | Stop reason: HOSPADM

## 2021-01-01 RX ORDER — MAGNESIUM OXIDE 400 MG/1
400 TABLET ORAL 2 TIMES DAILY
Qty: 60 TABLET | Refills: 3 | Status: SHIPPED | OUTPATIENT
Start: 2021-01-01

## 2021-01-01 RX ORDER — POTASSIUM CHLORIDE 20MEQ/15ML
40 LIQUID (ML) ORAL ONCE
Status: COMPLETED | OUTPATIENT
Start: 2021-01-01 | End: 2021-01-01

## 2021-01-01 RX ORDER — ALBUTEROL SULFATE 90 UG/1
1-2 AEROSOL, METERED RESPIRATORY (INHALATION)
Status: CANCELLED
Start: 2021-05-20

## 2021-01-01 RX ORDER — ACETAMINOPHEN 325 MG/1
325-650 TABLET ORAL EVERY 6 HOURS PRN
Qty: 90 TABLET | Refills: 0 | Status: SHIPPED | OUTPATIENT
Start: 2021-01-01 | End: 2021-01-01

## 2021-01-01 RX ORDER — FOLIC ACID 1 MG/1
1 TABLET ORAL DAILY
Qty: 30 TABLET | Refills: 0 | Status: SHIPPED | OUTPATIENT
Start: 2021-01-01 | End: 2021-01-01

## 2021-01-01 RX ORDER — ALBUTEROL SULFATE 0.83 MG/ML
2.5 SOLUTION RESPIRATORY (INHALATION)
Status: CANCELLED | OUTPATIENT
Start: 2021-05-20

## 2021-01-01 RX ORDER — LIDOCAINE 40 MG/G
CREAM TOPICAL
Status: DISCONTINUED | OUTPATIENT
Start: 2021-01-01 | End: 2021-01-01 | Stop reason: HOSPADM

## 2021-01-01 RX ORDER — CEFTRIAXONE 2 G/1
2 INJECTION, POWDER, FOR SOLUTION INTRAMUSCULAR; INTRAVENOUS EVERY 24 HOURS
Status: DISCONTINUED | OUTPATIENT
Start: 2021-01-01 | End: 2021-01-01 | Stop reason: HOSPADM

## 2021-01-01 RX ORDER — HEPARIN SODIUM,PORCINE 10 UNIT/ML
5 VIAL (ML) INTRAVENOUS
Status: CANCELLED | OUTPATIENT
Start: 2021-05-13

## 2021-01-01 RX ORDER — ACETAMINOPHEN 325 MG/1
325-650 TABLET ORAL EVERY 6 HOURS PRN
Status: DISCONTINUED | OUTPATIENT
Start: 2021-01-01 | End: 2021-01-01 | Stop reason: HOSPADM

## 2021-01-01 RX ORDER — IOPAMIDOL 755 MG/ML
95 INJECTION, SOLUTION INTRAVASCULAR ONCE
Status: COMPLETED | OUTPATIENT
Start: 2021-01-01 | End: 2021-01-01

## 2021-01-01 RX ORDER — HEPARIN SODIUM (PORCINE) LOCK FLUSH IV SOLN 100 UNIT/ML 100 UNIT/ML
5 SOLUTION INTRAVENOUS EVERY 10 MIN PRN
Status: DISCONTINUED | OUTPATIENT
Start: 2021-01-01 | End: 2021-01-01 | Stop reason: HOSPADM

## 2021-01-01 RX ORDER — POLYETHYLENE GLYCOL 3350 17 G/17G
17 POWDER, FOR SOLUTION ORAL DAILY
Status: DISCONTINUED | OUTPATIENT
Start: 2021-01-01 | End: 2021-01-01 | Stop reason: HOSPADM

## 2021-01-01 RX ORDER — LORAZEPAM 2 MG/ML
0.5 INJECTION INTRAMUSCULAR EVERY 4 HOURS PRN
Status: CANCELLED
Start: 2021-05-20

## 2021-01-01 RX ORDER — ONDANSETRON 8 MG/1
8 TABLET, FILM COATED ORAL EVERY 8 HOURS PRN
Qty: 10 TABLET | Refills: 5 | Status: SHIPPED | OUTPATIENT
Start: 2021-01-01 | End: 2021-01-01

## 2021-01-01 RX ORDER — MEPERIDINE HYDROCHLORIDE 25 MG/ML
25 INJECTION INTRAMUSCULAR; INTRAVENOUS; SUBCUTANEOUS EVERY 30 MIN PRN
Status: CANCELLED | OUTPATIENT
Start: 2021-05-13

## 2021-01-01 RX ORDER — ACETAMINOPHEN 325 MG/1
325-650 TABLET ORAL EVERY 6 HOURS PRN
Qty: 90 TABLET | Refills: 0 | Status: SHIPPED | OUTPATIENT
Start: 2021-01-01

## 2021-01-01 RX ORDER — HEPARIN SODIUM (PORCINE) LOCK FLUSH IV SOLN 100 UNIT/ML 100 UNIT/ML
5 SOLUTION INTRAVENOUS ONCE
Status: COMPLETED | OUTPATIENT
Start: 2021-01-01 | End: 2021-01-01

## 2021-01-01 RX ORDER — AZITHROMYCIN 250 MG/1
250 TABLET, FILM COATED ORAL DAILY
Status: DISCONTINUED | OUTPATIENT
Start: 2021-01-01 | End: 2021-01-01

## 2021-01-01 RX ORDER — POTASSIUM CHLORIDE 1500 MG/1
40 TABLET, EXTENDED RELEASE ORAL
Status: COMPLETED | OUTPATIENT
Start: 2021-01-01 | End: 2021-01-01

## 2021-01-01 RX ORDER — DIPHENHYDRAMINE HYDROCHLORIDE 50 MG/ML
50 INJECTION INTRAMUSCULAR; INTRAVENOUS
Status: COMPLETED | OUTPATIENT
Start: 2021-01-01 | End: 2021-01-01

## 2021-01-01 RX ORDER — METHYLPREDNISOLONE SODIUM SUCCINATE 125 MG/2ML
125 INJECTION, POWDER, LYOPHILIZED, FOR SOLUTION INTRAMUSCULAR; INTRAVENOUS
Status: CANCELLED
Start: 2021-05-20

## 2021-01-01 RX ORDER — POLYETHYLENE GLYCOL 3350 17 G/17G
17 POWDER, FOR SOLUTION ORAL DAILY
Qty: 510 G | Refills: 0 | Status: SHIPPED | OUTPATIENT
Start: 2021-01-01

## 2021-01-01 RX ORDER — CYCLOBENZAPRINE HCL 5 MG
10 TABLET ORAL EVERY 8 HOURS PRN
Status: DISCONTINUED | OUTPATIENT
Start: 2021-01-01 | End: 2021-01-01 | Stop reason: HOSPADM

## 2021-01-01 RX ORDER — PREDNISONE 20 MG/1
20 TABLET ORAL DAILY
Status: DISCONTINUED | OUTPATIENT
Start: 2021-01-01 | End: 2021-01-01

## 2021-01-01 RX ORDER — METHYLPREDNISOLONE SODIUM SUCCINATE 125 MG/2ML
125 INJECTION, POWDER, LYOPHILIZED, FOR SOLUTION INTRAMUSCULAR; INTRAVENOUS
Status: CANCELLED
Start: 2021-05-13

## 2021-01-01 RX ORDER — EPINEPHRINE 1 MG/ML
0.3 INJECTION, SOLUTION INTRAMUSCULAR; SUBCUTANEOUS EVERY 5 MIN PRN
Status: CANCELLED | OUTPATIENT
Start: 2021-05-13

## 2021-01-01 RX ORDER — HEPARIN SODIUM,PORCINE 10 UNIT/ML
5-10 VIAL (ML) INTRAVENOUS
Status: DISCONTINUED | OUTPATIENT
Start: 2021-01-01 | End: 2021-01-01 | Stop reason: HOSPADM

## 2021-01-01 RX ORDER — HEPARIN SODIUM,PORCINE 10 UNIT/ML
5 VIAL (ML) INTRAVENOUS
Status: CANCELLED | OUTPATIENT
Start: 2021-05-20

## 2021-01-01 RX ORDER — CEVIMELINE HYDROCHLORIDE 30 MG/1
30 CAPSULE ORAL 3 TIMES DAILY
Qty: 90 CAPSULE | Refills: 11 | Status: SHIPPED | OUTPATIENT
Start: 2021-01-01

## 2021-01-01 RX ORDER — DRONABINOL 2.5 MG/1
2.5 CAPSULE ORAL
Qty: 60 CAPSULE | Refills: 0 | Status: SHIPPED | OUTPATIENT
Start: 2021-01-01 | End: 2021-01-01

## 2021-01-01 RX ORDER — POTASSIUM CHLORIDE 750 MG/1
40 TABLET, EXTENDED RELEASE ORAL ONCE
Status: COMPLETED | OUTPATIENT
Start: 2021-01-01 | End: 2021-01-01

## 2021-01-01 RX ORDER — AMOXICILLIN 250 MG
1 CAPSULE ORAL 2 TIMES DAILY PRN
Status: DISCONTINUED | OUTPATIENT
Start: 2021-01-01 | End: 2021-01-01 | Stop reason: HOSPADM

## 2021-01-01 RX ORDER — LORAZEPAM 2 MG/ML
0.5 INJECTION INTRAMUSCULAR EVERY 4 HOURS PRN
Status: CANCELLED
Start: 2021-05-13

## 2021-01-01 RX ORDER — IOPAMIDOL 755 MG/ML
92 INJECTION, SOLUTION INTRAVASCULAR ONCE
Status: COMPLETED | OUTPATIENT
Start: 2021-01-01 | End: 2021-01-01

## 2021-01-01 RX ORDER — IOPAMIDOL 755 MG/ML
55 INJECTION, SOLUTION INTRAVASCULAR ONCE
Status: COMPLETED | OUTPATIENT
Start: 2021-01-01 | End: 2021-01-01

## 2021-01-01 RX ORDER — SODIUM CHLORIDE 9 MG/ML
1000 INJECTION, SOLUTION INTRAVENOUS CONTINUOUS PRN
Status: DISCONTINUED | OUTPATIENT
Start: 2021-01-01 | End: 2021-01-01 | Stop reason: HOSPADM

## 2021-01-01 RX ORDER — ALBUTEROL SULFATE 0.83 MG/ML
2.5 SOLUTION RESPIRATORY (INHALATION)
Status: CANCELLED | OUTPATIENT
Start: 2021-05-13

## 2021-01-01 RX ORDER — MAGNESIUM OXIDE 400 MG/1
400 TABLET ORAL 2 TIMES DAILY
Status: DISCONTINUED | OUTPATIENT
Start: 2021-01-01 | End: 2021-01-01 | Stop reason: HOSPADM

## 2021-01-01 RX ORDER — DEXAMETHASONE 2 MG/1
TABLET ORAL
Qty: 9 TABLET | Refills: 0 | Status: SHIPPED | OUTPATIENT
Start: 2021-01-01 | End: 2021-01-01

## 2021-01-01 RX ORDER — NALOXONE HYDROCHLORIDE 0.4 MG/ML
.1-.4 INJECTION, SOLUTION INTRAMUSCULAR; INTRAVENOUS; SUBCUTANEOUS
Status: CANCELLED | OUTPATIENT
Start: 2021-05-20

## 2021-01-01 RX ORDER — MEPERIDINE HYDROCHLORIDE 25 MG/ML
25 INJECTION INTRAMUSCULAR; INTRAVENOUS; SUBCUTANEOUS EVERY 30 MIN PRN
Status: CANCELLED | OUTPATIENT
Start: 2021-05-20

## 2021-01-01 RX ORDER — HEPARIN SODIUM (PORCINE) LOCK FLUSH IV SOLN 100 UNIT/ML 100 UNIT/ML
5 SOLUTION INTRAVENOUS EVERY 10 MIN PRN
Status: CANCELLED | OUTPATIENT
Start: 2021-05-13

## 2021-01-01 RX ORDER — CEVIMELINE HYDROCHLORIDE 30 MG/1
30 CAPSULE ORAL 3 TIMES DAILY
Status: DISCONTINUED | OUTPATIENT
Start: 2021-01-01 | End: 2021-01-01 | Stop reason: HOSPADM

## 2021-01-01 RX ORDER — DIPHENHYDRAMINE HYDROCHLORIDE 50 MG/ML
50 INJECTION INTRAMUSCULAR; INTRAVENOUS
Status: CANCELLED
Start: 2021-05-13

## 2021-01-01 RX ORDER — CEFTRIAXONE 2 G/1
2 INJECTION, POWDER, FOR SOLUTION INTRAMUSCULAR; INTRAVENOUS ONCE
Status: COMPLETED | OUTPATIENT
Start: 2021-01-01 | End: 2021-01-01

## 2021-01-01 RX ORDER — LORAZEPAM 0.5 MG/1
0.5 TABLET ORAL EVERY 4 HOURS PRN
Qty: 30 TABLET | Refills: 5 | Status: SHIPPED | OUTPATIENT
Start: 2021-01-01 | End: 2021-01-01

## 2021-01-01 RX ORDER — METHYLPREDNISOLONE SODIUM SUCCINATE 125 MG/2ML
125 INJECTION, POWDER, LYOPHILIZED, FOR SOLUTION INTRAMUSCULAR; INTRAVENOUS
Status: DISCONTINUED | OUTPATIENT
Start: 2021-01-01 | End: 2021-01-01 | Stop reason: HOSPADM

## 2021-01-01 RX ORDER — DIPHENHYDRAMINE HYDROCHLORIDE 50 MG/ML
50 INJECTION INTRAMUSCULAR; INTRAVENOUS
Status: CANCELLED
Start: 2021-05-20

## 2021-01-01 RX ORDER — IOPAMIDOL 755 MG/ML
54 INJECTION, SOLUTION INTRAVASCULAR ONCE
Status: COMPLETED | OUTPATIENT
Start: 2021-01-01 | End: 2021-01-01

## 2021-01-01 RX ORDER — ALBUTEROL SULFATE 90 UG/1
1-2 AEROSOL, METERED RESPIRATORY (INHALATION)
Status: CANCELLED
Start: 2021-05-13

## 2021-01-01 RX ORDER — SODIUM CHLORIDE 9 MG/ML
1000 INJECTION, SOLUTION INTRAVENOUS CONTINUOUS PRN
Status: CANCELLED
Start: 2021-05-20

## 2021-01-01 RX ORDER — HEPARIN SODIUM (PORCINE) LOCK FLUSH IV SOLN 100 UNIT/ML 100 UNIT/ML
5 SOLUTION INTRAVENOUS
Status: COMPLETED | OUTPATIENT
Start: 2021-01-01 | End: 2021-01-01

## 2021-01-01 RX ORDER — EPINEPHRINE 1 MG/ML
0.3 INJECTION, SOLUTION INTRAMUSCULAR; SUBCUTANEOUS EVERY 5 MIN PRN
Status: DISCONTINUED | OUTPATIENT
Start: 2021-01-01 | End: 2021-01-01 | Stop reason: HOSPADM

## 2021-01-01 RX ORDER — POTASSIUM CHLORIDE 1.5 G/1.58G
60 POWDER, FOR SOLUTION ORAL ONCE
Status: COMPLETED | OUTPATIENT
Start: 2021-01-01 | End: 2021-01-01

## 2021-01-01 RX ORDER — AZITHROMYCIN 250 MG/1
250 TABLET, FILM COATED ORAL DAILY
Status: COMPLETED | OUTPATIENT
Start: 2021-01-01 | End: 2021-01-01

## 2021-01-01 RX ORDER — OXYCODONE HYDROCHLORIDE 5 MG/1
5 TABLET ORAL EVERY 6 HOURS PRN
Status: DISCONTINUED | OUTPATIENT
Start: 2021-01-01 | End: 2021-01-01 | Stop reason: HOSPADM

## 2021-01-01 RX ORDER — CYCLOBENZAPRINE HCL 5 MG
10 TABLET ORAL
Status: DISCONTINUED | OUTPATIENT
Start: 2021-01-01 | End: 2021-01-01

## 2021-01-01 RX ADMIN — POLYETHYLENE GLYCOL 3350 17 G: 17 POWDER, FOR SOLUTION ORAL at 12:19

## 2021-01-01 RX ADMIN — CEVIMELINE HYDROCHLORIDE 30 MG: 30 CAPSULE ORAL at 08:16

## 2021-01-01 RX ADMIN — DIPHENHYDRAMINE HYDROCHLORIDE 50 MG: 50 INJECTION, SOLUTION INTRAMUSCULAR; INTRAVENOUS at 10:11

## 2021-01-01 RX ADMIN — DEXAMETHASONE SODIUM PHOSPHATE: 10 INJECTION, SOLUTION INTRAMUSCULAR; INTRAVENOUS at 13:58

## 2021-01-01 RX ADMIN — DEXAMETHASONE SODIUM PHOSPHATE: 10 INJECTION, SOLUTION INTRAMUSCULAR; INTRAVENOUS at 13:53

## 2021-01-01 RX ADMIN — SODIUM CHLORIDE 1000 ML: 9 INJECTION, SOLUTION INTRAVENOUS at 10:06

## 2021-01-01 RX ADMIN — CYCLOBENZAPRINE 10 MG: 5 TABLET, FILM COATED ORAL at 06:28

## 2021-01-01 RX ADMIN — CETUXIMAB 500 MG: 2 SOLUTION INTRAVENOUS at 15:16

## 2021-01-01 RX ADMIN — AZITHROMYCIN MONOHYDRATE 500 MG: 500 INJECTION, POWDER, LYOPHILIZED, FOR SOLUTION INTRAVENOUS at 18:52

## 2021-01-01 RX ADMIN — AZITHROMYCIN MONOHYDRATE 250 MG: 250 TABLET ORAL at 08:16

## 2021-01-01 RX ADMIN — CEVIMELINE HYDROCHLORIDE 30 MG: 30 CAPSULE ORAL at 22:30

## 2021-01-01 RX ADMIN — CETUXIMAB 500 MG: 2 SOLUTION INTRAVENOUS at 10:57

## 2021-01-01 RX ADMIN — SODIUM CHLORIDE 250 ML: 9 INJECTION, SOLUTION INTRAVENOUS at 13:56

## 2021-01-01 RX ADMIN — Medication 5 ML: at 13:26

## 2021-01-01 RX ADMIN — MAGNESIUM SULFATE HEPTAHYDRATE 2 G: 40 INJECTION, SOLUTION INTRAVENOUS at 14:05

## 2021-01-01 RX ADMIN — OXYCODONE HYDROCHLORIDE 5 MG: 5 TABLET ORAL at 22:34

## 2021-01-01 RX ADMIN — POTASSIUM CHLORIDE 40 MEQ: 40 SOLUTION ORAL at 18:24

## 2021-01-01 RX ADMIN — DIPHENHYDRAMINE HYDROCHLORIDE 50 MG: 50 INJECTION, SOLUTION INTRAMUSCULAR; INTRAVENOUS at 12:39

## 2021-01-01 RX ADMIN — OXYCODONE HYDROCHLORIDE 5 MG: 5 TABLET ORAL at 00:44

## 2021-01-01 RX ADMIN — DEXAMETHASONE SODIUM PHOSPHATE: 10 INJECTION, SOLUTION INTRAMUSCULAR; INTRAVENOUS at 14:47

## 2021-01-01 RX ADMIN — ACETAMINOPHEN 650 MG: 325 TABLET, FILM COATED ORAL at 08:16

## 2021-01-01 RX ADMIN — MAGNESIUM OXIDE 400 MG: 400 TABLET ORAL at 19:47

## 2021-01-01 RX ADMIN — OXYCODONE HYDROCHLORIDE 5 MG: 5 TABLET ORAL at 18:48

## 2021-01-01 RX ADMIN — IOPAMIDOL 55 ML: 755 INJECTION, SOLUTION INTRAVENOUS at 17:39

## 2021-01-01 RX ADMIN — MAGNESIUM OXIDE 400 MG: 400 TABLET ORAL at 08:16

## 2021-01-01 RX ADMIN — CARBOPLATIN 600 MG: 10 INJECTION, SOLUTION INTRAVENOUS at 12:27

## 2021-01-01 RX ADMIN — Medication 5 ML: at 12:13

## 2021-01-01 RX ADMIN — CETUXIMAB 500 MG: 2 SOLUTION INTRAVENOUS at 14:42

## 2021-01-01 RX ADMIN — OXYCODONE HYDROCHLORIDE 5 MG: 5 TABLET ORAL at 07:39

## 2021-01-01 RX ADMIN — CEFTRIAXONE SODIUM 2 G: 2 INJECTION, POWDER, FOR SOLUTION INTRAMUSCULAR; INTRAVENOUS at 18:26

## 2021-01-01 RX ADMIN — CEVIMELINE HYDROCHLORIDE 30 MG: 30 CAPSULE ORAL at 03:56

## 2021-01-01 RX ADMIN — CEFTRIAXONE SODIUM 2 G: 2 INJECTION, POWDER, FOR SOLUTION INTRAMUSCULAR; INTRAVENOUS at 15:01

## 2021-01-01 RX ADMIN — MAGNESIUM OXIDE 400 MG: 400 TABLET ORAL at 22:34

## 2021-01-01 RX ADMIN — Medication 5 ML: at 16:36

## 2021-01-01 RX ADMIN — DIPHENHYDRAMINE HYDROCHLORIDE 50 MG: 50 INJECTION, SOLUTION INTRAMUSCULAR; INTRAVENOUS at 14:29

## 2021-01-01 RX ADMIN — OXYCODONE HYDROCHLORIDE 5 MG: 5 TABLET ORAL at 08:16

## 2021-01-01 RX ADMIN — Medication 5 ML: at 17:00

## 2021-01-01 RX ADMIN — CARBOPLATIN 600 MG: 10 INJECTION, SOLUTION INTRAVENOUS at 15:02

## 2021-01-01 RX ADMIN — DOCUSATE SODIUM 50 MG AND SENNOSIDES 8.6 MG 1 TABLET: 8.6; 5 TABLET, FILM COATED ORAL at 08:17

## 2021-01-01 RX ADMIN — POTASSIUM CHLORIDE 40 MEQ: 1500 TABLET, EXTENDED RELEASE ORAL at 11:29

## 2021-01-01 RX ADMIN — Medication 5 ML: at 08:43

## 2021-01-01 RX ADMIN — SODIUM CHLORIDE 500 ML: 9 INJECTION, SOLUTION INTRAVENOUS at 14:13

## 2021-01-01 RX ADMIN — DEXAMETHASONE SODIUM PHOSPHATE: 10 INJECTION, SOLUTION INTRAMUSCULAR; INTRAVENOUS at 11:47

## 2021-01-01 RX ADMIN — CETUXIMAB 500 MG: 2 SOLUTION INTRAVENOUS at 13:03

## 2021-01-01 RX ADMIN — Medication 5 ML: at 13:05

## 2021-01-01 RX ADMIN — PREDNISONE 20 MG: 20 TABLET ORAL at 12:07

## 2021-01-01 RX ADMIN — HEPARIN SODIUM (PORCINE) LOCK FLUSH IV SOLN 100 UNIT/ML 500 UNITS: 100 SOLUTION at 11:33

## 2021-01-01 RX ADMIN — OXYCODONE HYDROCHLORIDE 5 MG: 5 TABLET ORAL at 16:20

## 2021-01-01 RX ADMIN — DEXAMETHASONE SODIUM PHOSPHATE 6 MG: 10 INJECTION, SOLUTION INTRAMUSCULAR; INTRAVENOUS at 10:37

## 2021-01-01 RX ADMIN — ACETAMINOPHEN 650 MG: 325 TABLET, FILM COATED ORAL at 13:23

## 2021-01-01 RX ADMIN — Medication 5 ML: at 12:22

## 2021-01-01 RX ADMIN — OXYCODONE HYDROCHLORIDE 5 MG: 5 TABLET ORAL at 20:42

## 2021-01-01 RX ADMIN — MAGNESIUM OXIDE 400 MG: 400 TABLET ORAL at 08:26

## 2021-01-01 RX ADMIN — Medication 5 ML: at 12:04

## 2021-01-01 RX ADMIN — DOCUSATE SODIUM 50 MG AND SENNOSIDES 8.6 MG 1 TABLET: 8.6; 5 TABLET, FILM COATED ORAL at 09:38

## 2021-01-01 RX ADMIN — OXYCODONE HYDROCHLORIDE 5 MG: 5 TABLET ORAL at 08:14

## 2021-01-01 RX ADMIN — Medication 5 ML: at 06:29

## 2021-01-01 RX ADMIN — ACETAMINOPHEN 650 MG: 325 TABLET, FILM COATED ORAL at 14:45

## 2021-01-01 RX ADMIN — Medication 5 ML: at 16:03

## 2021-01-01 RX ADMIN — Medication 5 ML: at 14:52

## 2021-01-01 RX ADMIN — ACETAMINOPHEN 650 MG: 325 TABLET, FILM COATED ORAL at 18:47

## 2021-01-01 RX ADMIN — CEVIMELINE HYDROCHLORIDE 30 MG: 30 CAPSULE ORAL at 19:48

## 2021-01-01 RX ADMIN — Medication 5 ML: at 08:54

## 2021-01-01 RX ADMIN — CYCLOBENZAPRINE 10 MG: 5 TABLET, FILM COATED ORAL at 20:42

## 2021-01-01 RX ADMIN — CETUXIMAB 500 MG: 2 SOLUTION INTRAVENOUS at 09:28

## 2021-01-01 RX ADMIN — ACETAMINOPHEN 650 MG: 325 TABLET, FILM COATED ORAL at 06:28

## 2021-01-01 RX ADMIN — OXYCODONE HYDROCHLORIDE 5 MG: 5 TABLET ORAL at 14:45

## 2021-01-01 RX ADMIN — Medication 5 ML: at 13:22

## 2021-01-01 RX ADMIN — OXYCODONE HYDROCHLORIDE 5 MG: 5 TABLET ORAL at 16:33

## 2021-01-01 RX ADMIN — Medication 5 ML: at 09:42

## 2021-01-01 RX ADMIN — CETUXIMAB 500 MG: 2 SOLUTION INTRAVENOUS at 14:44

## 2021-01-01 RX ADMIN — CEFTRIAXONE SODIUM 2 G: 2 INJECTION, POWDER, FOR SOLUTION INTRAMUSCULAR; INTRAVENOUS at 14:24

## 2021-01-01 RX ADMIN — DEXAMETHASONE SODIUM PHOSPHATE: 10 INJECTION, SOLUTION INTRAMUSCULAR; INTRAVENOUS at 14:30

## 2021-01-01 RX ADMIN — IOPAMIDOL 54 ML: 755 INJECTION, SOLUTION INTRAVASCULAR at 11:16

## 2021-01-01 RX ADMIN — CETUXIMAB 500 MG: 2 SOLUTION INTRAVENOUS at 15:39

## 2021-01-01 RX ADMIN — Medication 5 ML: at 07:06

## 2021-01-01 RX ADMIN — BISACODYL 10 MG: 10 SUPPOSITORY RECTAL at 16:39

## 2021-01-01 RX ADMIN — Medication 5 ML: at 17:41

## 2021-01-01 RX ADMIN — SODIUM CHLORIDE 480 MG: 9 INJECTION, SOLUTION INTRAVENOUS at 13:33

## 2021-01-01 RX ADMIN — POLYETHYLENE GLYCOL 3350 17 G: 17 POWDER, FOR SOLUTION ORAL at 10:29

## 2021-01-01 RX ADMIN — DIPHENHYDRAMINE HYDROCHLORIDE 50 MG: 50 INJECTION, SOLUTION INTRAMUSCULAR; INTRAVENOUS at 09:09

## 2021-01-01 RX ADMIN — MAGNESIUM OXIDE 400 MG: 400 TABLET ORAL at 20:42

## 2021-01-01 RX ADMIN — ACETAMINOPHEN 325 MG: 325 TABLET, FILM COATED ORAL at 12:07

## 2021-01-01 RX ADMIN — IOPAMIDOL 95 ML: 755 INJECTION, SOLUTION INTRAVASCULAR at 11:18

## 2021-01-01 RX ADMIN — CEVIMELINE HYDROCHLORIDE 30 MG: 30 CAPSULE ORAL at 15:01

## 2021-01-01 RX ADMIN — POTASSIUM CHLORIDE 40 MEQ: 750 TABLET, EXTENDED RELEASE ORAL at 08:26

## 2021-01-01 RX ADMIN — Medication 5 ML: at 10:51

## 2021-01-01 RX ADMIN — Medication 5 ML: at 13:51

## 2021-01-01 RX ADMIN — DEXAMETHASONE SODIUM PHOSPHATE: 10 INJECTION, SOLUTION INTRAMUSCULAR; INTRAVENOUS at 12:53

## 2021-01-01 RX ADMIN — CEVIMELINE HYDROCHLORIDE 30 MG: 30 CAPSULE ORAL at 14:40

## 2021-01-01 RX ADMIN — SODIUM CHLORIDE 250 ML: 9 INJECTION, SOLUTION INTRAVENOUS at 08:53

## 2021-01-01 RX ADMIN — Medication 5 ML: at 17:25

## 2021-01-01 RX ADMIN — POTASSIUM CHLORIDE 40 MEQ: 1500 TABLET, EXTENDED RELEASE ORAL at 14:40

## 2021-01-01 RX ADMIN — MAGNESIUM SULFATE IN WATER 2 G: 40 INJECTION, SOLUTION INTRAVENOUS at 14:43

## 2021-01-01 RX ADMIN — CEFTRIAXONE SODIUM 2 G: 2 INJECTION, POWDER, FOR SOLUTION INTRAMUSCULAR; INTRAVENOUS at 14:38

## 2021-01-01 RX ADMIN — DIPHENHYDRAMINE HYDROCHLORIDE 50 MG: 50 INJECTION, SOLUTION INTRAMUSCULAR; INTRAVENOUS at 14:42

## 2021-01-01 RX ADMIN — CEVIMELINE HYDROCHLORIDE 30 MG: 30 CAPSULE ORAL at 13:26

## 2021-01-01 RX ADMIN — OXYCODONE HYDROCHLORIDE 5 MG: 5 TABLET ORAL at 03:14

## 2021-01-01 RX ADMIN — Medication 5 ML: at 07:52

## 2021-01-01 RX ADMIN — OXYCODONE HYDROCHLORIDE 5 MG: 5 TABLET ORAL at 06:28

## 2021-01-01 RX ADMIN — AZITHROMYCIN MONOHYDRATE 250 MG: 250 TABLET ORAL at 12:07

## 2021-01-01 RX ADMIN — POTASSIUM CHLORIDE 20 MEQ: 750 TABLET, EXTENDED RELEASE ORAL at 09:22

## 2021-01-01 RX ADMIN — SODIUM CHLORIDE 1000 ML: 9 INJECTION, SOLUTION INTRAVENOUS at 09:37

## 2021-01-01 RX ADMIN — CYCLOBENZAPRINE 10 MG: 5 TABLET, FILM COATED ORAL at 16:20

## 2021-01-01 RX ADMIN — OXYCODONE HYDROCHLORIDE 5 MG: 5 TABLET ORAL at 13:24

## 2021-01-01 RX ADMIN — Medication 5 ML: at 16:17

## 2021-01-01 RX ADMIN — CEVIMELINE HYDROCHLORIDE 30 MG: 30 CAPSULE ORAL at 18:48

## 2021-01-01 RX ADMIN — SODIUM CHLORIDE 250 ML: 9 INJECTION, SOLUTION INTRAVENOUS at 13:53

## 2021-01-01 RX ADMIN — CEVIMELINE HYDROCHLORIDE 30 MG: 30 CAPSULE ORAL at 06:28

## 2021-01-01 RX ADMIN — CEVIMELINE HYDROCHLORIDE 30 MG: 30 CAPSULE ORAL at 13:06

## 2021-01-01 RX ADMIN — POTASSIUM CHLORIDE 40 MEQ: 1500 TABLET, EXTENDED RELEASE ORAL at 11:58

## 2021-01-01 RX ADMIN — CYCLOBENZAPRINE 10 MG: 5 TABLET, FILM COATED ORAL at 15:12

## 2021-01-01 RX ADMIN — CARBOPLATIN 600 MG: 10 INJECTION, SOLUTION INTRAVENOUS at 17:08

## 2021-01-01 RX ADMIN — ACETAMINOPHEN 650 MG: 325 TABLET, FILM COATED ORAL at 00:41

## 2021-01-01 RX ADMIN — MAGNESIUM OXIDE 400 MG: 400 TABLET ORAL at 07:36

## 2021-01-01 RX ADMIN — MAGNESIUM OXIDE 400 MG: 400 TABLET ORAL at 10:29

## 2021-01-01 RX ADMIN — CEVIMELINE HYDROCHLORIDE 30 MG: 30 CAPSULE ORAL at 22:34

## 2021-01-01 RX ADMIN — Medication 5 ML: at 12:19

## 2021-01-01 RX ADMIN — DIPHENHYDRAMINE HYDROCHLORIDE 50 MG: 50 INJECTION, SOLUTION INTRAMUSCULAR; INTRAVENOUS at 14:20

## 2021-01-01 RX ADMIN — CEFTRIAXONE SODIUM 2 G: 2 INJECTION, POWDER, FOR SOLUTION INTRAMUSCULAR; INTRAVENOUS at 16:21

## 2021-01-01 RX ADMIN — SODIUM CHLORIDE 250 ML: 9 INJECTION, SOLUTION INTRAVENOUS at 11:34

## 2021-01-01 RX ADMIN — IOPAMIDOL 92 ML: 755 INJECTION, SOLUTION INTRAVASCULAR at 11:51

## 2021-01-01 RX ADMIN — Medication 5 ML: at 12:29

## 2021-01-01 RX ADMIN — ACETAMINOPHEN 650 MG: 325 TABLET, FILM COATED ORAL at 15:12

## 2021-01-01 RX ADMIN — SODIUM CHLORIDE 250 ML: 9 INJECTION, SOLUTION INTRAVENOUS at 14:30

## 2021-01-01 RX ADMIN — CETUXIMAB 800 MG: 2 SOLUTION INTRAVENOUS at 13:28

## 2021-01-01 RX ADMIN — PREDNISONE 20 MG: 20 TABLET ORAL at 07:36

## 2021-01-01 RX ADMIN — ACETAMINOPHEN 650 MG: 325 TABLET, FILM COATED ORAL at 22:34

## 2021-01-01 RX ADMIN — DIPHENHYDRAMINE HYDROCHLORIDE 50 MG: 50 INJECTION INTRAMUSCULAR; INTRAVENOUS at 13:48

## 2021-01-01 RX ADMIN — POTASSIUM CHLORIDE 40 MEQ: 1500 TABLET, EXTENDED RELEASE ORAL at 14:08

## 2021-01-01 RX ADMIN — SODIUM CHLORIDE 250 ML: 9 INJECTION, SOLUTION INTRAVENOUS at 12:55

## 2021-01-01 RX ADMIN — POTASSIUM CHLORIDE 60 MEQ: 1.5 POWDER, FOR SOLUTION ORAL at 19:39

## 2021-01-01 RX ADMIN — AZITHROMYCIN MONOHYDRATE 250 MG: 250 TABLET ORAL at 07:36

## 2021-01-01 RX ADMIN — Medication 5 ML: at 11:02

## 2021-01-01 RX ADMIN — MAGNESIUM OXIDE 400 MG: 400 TABLET ORAL at 20:03

## 2021-01-01 RX ADMIN — MAGNESIUM OXIDE 400 MG: 400 TABLET ORAL at 08:13

## 2021-01-01 RX ADMIN — SODIUM CHLORIDE 250 ML: 9 INJECTION, SOLUTION INTRAVENOUS at 13:33

## 2021-01-01 RX ADMIN — DEXAMETHASONE SODIUM PHOSPHATE: 10 INJECTION, SOLUTION INTRAMUSCULAR; INTRAVENOUS at 08:53

## 2021-01-01 RX ADMIN — CEVIMELINE HYDROCHLORIDE 30 MG: 30 CAPSULE ORAL at 07:36

## 2021-01-01 RX ADMIN — Medication 5 ML: at 15:05

## 2021-01-01 RX ADMIN — PREDNISONE 20 MG: 20 TABLET ORAL at 08:16

## 2021-01-01 RX ADMIN — CEVIMELINE HYDROCHLORIDE 30 MG: 30 CAPSULE ORAL at 08:26

## 2021-01-01 RX ADMIN — CEVIMELINE HYDROCHLORIDE 30 MG: 30 CAPSULE ORAL at 20:42

## 2021-01-01 RX ADMIN — ACETAMINOPHEN 650 MG: 325 TABLET, FILM COATED ORAL at 20:42

## 2021-01-01 RX ADMIN — DIPHENHYDRAMINE HYDROCHLORIDE 50 MG: 50 INJECTION, SOLUTION INTRAMUSCULAR; INTRAVENOUS at 14:14

## 2021-01-01 RX ADMIN — AZITHROMYCIN MONOHYDRATE 250 MG: 250 TABLET ORAL at 08:12

## 2021-01-01 RX ADMIN — OXYCODONE HYDROCHLORIDE 5 MG: 5 TABLET ORAL at 09:22

## 2021-01-01 RX ADMIN — Medication 5 ML: at 14:15

## 2021-01-01 RX ADMIN — METHYLPREDNISOLONE SODIUM SUCCINATE 125 MG: 125 INJECTION, POWDER, FOR SOLUTION INTRAMUSCULAR; INTRAVENOUS at 13:49

## 2021-01-01 RX ADMIN — POTASSIUM CHLORIDE 40 MEQ: 1500 TABLET, EXTENDED RELEASE ORAL at 08:53

## 2021-01-01 ASSESSMENT — ENCOUNTER SYMPTOMS
COLOR CHANGE: 0
BRUISES/BLEEDS EASILY: 0
POLYDIPSIA: 0
COUGH: 0
HEADACHES: 0
FEVER: 0
DIFFICULTY URINATING: 0
FATIGUE: 1
ABDOMINAL PAIN: 0
NECK STIFFNESS: 0
SHORTNESS OF BREATH: 1
EYE REDNESS: 0
CONFUSION: 0
ARTHRALGIAS: 0
CHILLS: 0
ADENOPATHY: 0
VOMITING: 0
NAUSEA: 0

## 2021-01-01 ASSESSMENT — PAIN SCALES - GENERAL
PAINLEVEL: NO PAIN (0)
PAINLEVEL: MILD PAIN (2)
PAINLEVEL: MODERATE PAIN (4)
PAINLEVEL: MILD PAIN (2)
PAINLEVEL: NO PAIN (0)

## 2021-01-01 ASSESSMENT — ACTIVITIES OF DAILY LIVING (ADL)
ADLS_ACUITY_SCORE: 14
ADLS_ACUITY_SCORE: 17
ADLS_ACUITY_SCORE: 17
ADLS_ACUITY_SCORE: 14
ADLS_ACUITY_SCORE: 14
ADLS_ACUITY_SCORE: 11
ADLS_ACUITY_SCORE: 14
ADLS_ACUITY_SCORE: 16
ADLS_ACUITY_SCORE: 16
ADLS_ACUITY_SCORE: 14
ADLS_ACUITY_SCORE: 15
ADLS_ACUITY_SCORE: 15
ADLS_ACUITY_SCORE: 14
PREVIOUS_RESPONSIBILITIES: MEAL PREP;HOUSEKEEPING;LAUNDRY;MEDICATION MANAGEMENT;FINANCES
ADLS_ACUITY_SCORE: 14
ADLS_ACUITY_SCORE: 14
ADLS_ACUITY_SCORE: 16
ADLS_ACUITY_SCORE: 14
ADLS_ACUITY_SCORE: 17
ADLS_ACUITY_SCORE: 16
ADLS_ACUITY_SCORE: 15
ADLS_ACUITY_SCORE: 11
ADLS_ACUITY_SCORE: 14

## 2021-01-01 ASSESSMENT — 6 MINUTE WALK TEST (6MWT)
TOTAL DISTANCE WALKED (METERS): 243
TOTAL DISTANCE WALKED (FT): 796

## 2021-01-01 ASSESSMENT — MIFFLIN-ST. JEOR
SCORE: 1524.77
SCORE: 1519.78
SCORE: 1549.71
SCORE: 1528.4

## 2021-01-08 NOTE — PROGRESS NOTES
Eduardo is a 60 year old who is being evaluated via a billable video visit.      How would you like to obtain your AVS? MyChart  If the video visit is dropped, the invitation should be resent by: Text to cell phone: [phone]  Will anyone else be joining your video visit? TONY Newton    Oncology/Hematology Visit Note  Jan 8, 2021       Reason for Visit: Follow up of Maxillary sinus cancer     History of Present Illness:   Eduardo presented to an outside ED in 08/18/2019 with complaints of right facial pressure, numbness, right-sided visual change and nasal drainage for several days' duration. Imaging workup included an MRI which demonstrated a maxillary sinus mass with extension to the orbit with involvement of the inferior rectus muscle. 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 Mr. Rubio was referred to [facility]. He had staging PET/CT on 9/9/2019 which revealed a FDG-avid maxillary mass at 4.0 x 3.9 x 4.2 cm. There was tumor extension into the right orbital cavity superiorly, the right nasal cavity medially, the retromaxillary fat region posterolaterally, the right pterygopalatine fossa posteriorly, and the premaxillary fat anteriorly. In the orbital cavity there was abutment of the inferior rectus muscle. There was no evidence of lymphadenopathy or systemic disease. His case was reviewed at tumor conference with recommendation for surgery with total maxillectomy and orbital exenteration with free flap reconstruction.     Mr. Rubio underwent a total maxillectomy with orbital exenteration on 9/24/2019 with Dr. Roberts, followed by reconstruction with a latissimus free flap with Dr. Pulliam. Surgical pathology showed a 4.4 cm moderately differentiated squamous cell carcinoma, (-) LVSI, (+)PNI. There was a positive margin at the pterygopalatine fossa, specifically the vidian nerve taken at its exit from the vidian canal, and additional resection into  the canal was not possible. Mr. Rubio's case was discussed in the [facility]'s multidisciplinary head and neck tumor board, with the consensus recommendation to proceed with adjuvant chemoradiation given the positive margin status. Patient received day 1 cisplatin on 11/1/19 and day 22 on 11/20/19 and Day 43 on 12/13/19.      TREATMENT SUMMARY:  - 8/19/19: MRI face and orbit demonstrated a maxillary sinus mass with extension to the orbit with involvement of the inferior rectus muscle. 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  - 9/24/19: Total maxillectomy with orbital exenteration performed by Dr. Roberts, followed by reconstruction with a latissimus free flap with Dr. Pulliam.   - Surgical pathology showed a 4.4 cm moderately differentiated squamous cell carcinoma, (-) LVSI, (+)PNI. There was a positive margin at the pterygopalatine fossa, specifically the vidian nerve taken at its exit from the vidian canal, and additional resection into the canal was not possible.  -s/p total right maxilectomy with orbital exenteration   -surgical margin were positive making it a high risk disease for recurrence.    He completed concurrent chemoradiation with high dose cisplatin day 1, 11/1/19, Day 22 11/20/19, Day 43 12/13/19  - PET/CT on 3/13/20 revealed numerous lung nodules consistent with metastasis and he has been started on nivolumab. Improvement in disease noted on CT 6/2020 and September scan showed worsening disease.      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      He admits in the last month he has been more fatigued.  He sleeps about 6 hours at night and then takes a ~3 hour nap in the afternoon.       His appetite is less, but still eating, just smaller meals 3 x daily. Family is bringing food over to his house daily.  Drinking 1 protein  shake a day. Weight is stable was 151 at a friends house. Drinks 1-2L daily.  BM daily with softener and no  issues.  Mood is stable.      Using oxycodone ~3 x daily- chronic back and feet pain.  Pain MD is Dr Meek in [facility]- sees him monthly.       Otherwise ROS is negative.           Current Outpatient Medications   Medication Sig     acetaminophen (TYLENOL) 325 MG tablet Take 325-650 mg by mouth every 6 hours as needed for mild pain     cevimeline (EVOXAC) 30 MG capsule Take 1 capsule (30 mg) by mouth 3 times daily     cyclobenzaprine (FLEXERIL) 10 MG tablet TK 1 T PO HS PRN FOR MUSCLE SPASM RELIEF     hydrocortisone 2.5 % cream Apply topically 2 times daily Apply to itchy spot on scalp and back twice a day as needed     NARCAN 4 MG/0.1ML nasal spray WAGNER INTO ONE NOSTRIL. MAY REPEAT IN ALTERNATE NOSTRIL WITHIN 2 MINUTES IF NO OR MINIMAL RESPONSE     oxyCODONE IR (ROXICODONE) 10 MG tablet Takes 1/2 pill (5 mg) 2-3 x daily. --[place] [facility]     senna-docusate (SENOKOT-S/PERICOLACE) 8.6-50 MG tablet 1 tablet by Per Feeding Tube route 2 times daily as needed for constipation      No current facility-administered medications for this visit.          Physical Examination:  Voice is clear and strong. No audible stridor, wheezing, or respiratory distress. The remainder of PE was deferred due to PHE.        Laboratory Data:   no recent labs    Assessment and Plan:  1.  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 He had a follow up PET 3/2020 with evidence of new metastasis to lung for which he has been started on immunotherapy with nivolumab.      Eduardo had improvement on CT scans following first 3 cycles on nivolumab and has progressed since then. We had reviewed our  options including carboplatin with cetuximab vs clinical trial of pembrolizumab with another immunotherapy agent.       I reviewed options of carboplatin and cetuximab. Carboplatin is administered intravenously every 3 weeks.  It can cause nausea, vomiting, altered taste and appetite.  It can cause cytopenias.  Cetuximab, on the other hand, is administered intravenously weekly.  This is associated with acneform rash over the face, trunk and back.  The patient has also experienced diarrhea.  The biggest challenge is the rash.         We also reviewed the clinical trial of immunotherapy with ALT-803 and pembrolizumab, which sounds like a promising option because he had an initial response to immunotherapy, should be well tolerated and we would still have the option of standard of care cetuximab/carboplatin if he was to progress on this regimen. Unfortunately, there was some delay and he missed the 6 week window to enroll in.  We had a discussion today about him missing the window and he is still interested in pursuing the clinical trial thus we will give him Opdivo in the next week and then plan on getting restaging scans sometime in the next month at that time he will be in the appropriate window and will be able to enroll and start the trial.         2. Anemia, stable - . Improved     3. Fatigue- immunotherapy contributing. TSH stable. Discussed getting his sleep wake cycle back on.      4. Itching on back and scalp, report of a history of psoriasis, continue Topical hydrocortisone 2.5% bid.     5. Nicotine abuse, chronic smoker. Was last trying nicotine patches per notes. Not addressed at today's visit.      6. Poor social support; lives alone with a dog. Siblings very involved, bringing food over daily, etc.       Patient could not get video working, converted to phone call: 18 min    Saba Ruggiero PA-C

## 2021-01-15 NOTE — NURSING NOTE
"Chief Complaint   Patient presents with     Port Draw     port accessed and labs drawn by rn in lab.  vital signs taken.     Port accessed by RN in lab with 20g 3/4\" gripper needle, labs drawn, port flushed with saline and heparin, vitals checked, pt checked in for next appointment.    Patty Schmidt RN      "

## 2021-01-15 NOTE — PROGRESS NOTES
AUDIOLOGY REPORT    SUMMARY: Audiology visit completed. See audiogram for results.      RECOMMENDATIONS: Follow-up with ENT.    Guille Browning. CCC-A  Licensed Audiologist   [place] #[id number]

## 2021-01-15 NOTE — PATIENT INSTRUCTIONS
Contact Numbers  [facility] Nurse Triage: [phone]    Please call the [facility] Triage line if you experience a temperature greater than or equal to 100.5, shaking chills, have uncontrolled nausea, vomiting and/or diarrhea, dizziness, shortness of breath, chest pain, bleeding, unexplained bruising, or if you have any other new/concerning symptoms, questions or concerns.     If you are having any concerning symptoms or wish to speak to a provider before your next infusion visit, please call your care coordinator or triage to notify them so we can adequately serve you.     If you need a refill on a narcotic prescription or other medication, please call triage before your infusion appointment.       January 2021 Sunday Monday Tuesday Wednesday Thursday Friday Saturday                            1     2       3     4     5     6     7    CT CHEST/ABDOMEN/PELVIS W   4:05 PM   (20 min.)   [facility]   [facility] [place]    LAB CENTRAL   5:00 PM   (15 min.)   [facility] LAB DRAW   [facility] 8    VIDEO VISIT RETURN   7:35 AM   (50 min.)   Gianna Ruggiero PA-C   [facility] 9       10     11     12     13     14     15    LAB PERIPHERAL  11:45 AM   (15 min.)   [facility] LAB DRAW   [facility] ONC INFUSION 60  12:30 PM   (60 min.)    ONCOLOGY INFUSION   [facility]    WALK-IN FROM ENT   2:00 PM   (30 min.)   Elly Pearson AuD   [facility] Audiology [facility] RETURN   2:45 PM   (20 min.)   Eric Santillan MD   [facility] RETURN   2:45 PM   (20 min.)   Teresa Pulliam MD   [facility] Th[facility]at [facility] 16       17     18     19     20     21     22     23       24     25     26     27     28     29     30       31                                                February 2021 Sunday Monday Tuesday Wednesday Thursday Friday Saturday        1     2     3    CT CHEST/ABDOMEN/PELVIS W  12:00 PM   (20 min.)   [facility]   [facility] [place] 4     5     6       7     8     9    VIDEO VISIT RETURN   2:15 PM   (30 min.)   Javier Ferraro MD   [facility] 10     11     12     13       14     15     16     17     18     19     20       21     22     23     24     25     26     27       28                                                   Lab Results:  Recent Results (from the past 12 hour(s))   Comprehensive metabolic panel    Collection Time: 01/15/21 12:10 PM   Result Value Ref Range    Sodium 129 (L) 133 - 144 mmol/L    Potassium 4.0 3.4 - 5.3 mmol/L    Chloride 94 94 - 109 mmol/L    Carbon Dioxide 32 20 - 32 mmol/L    Anion Gap 3 3 - 14 mmol/L    Glucose 95 70 - 99 mg/dL    Urea Nitrogen 10 7 - 30 mg/dL    Creatinine 0.72 0.66 - 1.25 mg/dL    GFR Estimate >90 >60 mL/min/[1.73_m2]    GFR Estimate If Black >90 >60 mL/min/[1.73_m2]    Calcium 8.9 8.5 - 10.1 mg/dL    Bilirubin Total 0.5 0.2 - 1.3 mg/dL    Albumin 3.1 (L) 3.4 - 5.0 g/dL    Protein Total 8.4 6.8 - 8.8 g/dL    Alkaline Phosphatase 86 40 - 150 U/L    ALT 16 0 - 70 U/L    AST 20 0 - 45 U/L   TSH with free T4 reflex    Collection Time: 01/15/21 12:10 PM   Result Value Ref Range    TSH 3.51 0.40 - 4.00 mU/L

## 2021-01-15 NOTE — PROGRESS NOTES
Dear Dr. Roberts:    I had the pleasure of seeing Eduardo Rubio in follow-up today at the [facility].     History of Present Illness:   Eduardo Rubio is a 60 year old man with a T4N0 SCC of the maxillary sinus. The patient has a history of facial pain and numbness along with vision changes that resulted in a visit to the ER on 8/18/2019. An MRI was obtained which demonstrated a maxillary sinus mass with extension to the orbit with involvement of the inferior rectus muscle. He had a biopsy performed locally which was consistent with SCC. He saw Dr Wick on 8/29/2019. He was referred to ophthalmology for evaluation of his vision changes. He had a PET scan which showed the tumor in the maxillary sinus with bony erosion, extension to orbit, small lung nodule, no ramona disease. His case was reviewed at tumor conference with recommendation for surgery with total maxillectomy and orbital exenteration with free flap reconstruction. He was taken to the OR on 9/24/2019. He had reconstruction with a latissimus free flap. His postoperative course was uncomplicated. His final pathology demonstrated a 4.4 cm SCC with involvement of the zygomatic and nasal bone, PNI, no LVSI, positive margin at the pterygopalatine fossa with positive vidian nerve. He was recommended for postoperative radiation with consideration of weekly cisplatin. He had postoperative chemoradiation from 10/28/2019 to 12/11/2019 under the care of Dr. Santillan and Dr. Rivera.  He received a total of 6600 cGy and received high-dose cisplatin every 3 weeks.  He had a 4-day treatment break due to machine downtime and then with transportation issues.  He had his 3 month post treatment PET scan in March 2020 which showed multiple pulmonary nodules concerning for metastatic disease. He was started on nivolumab in March 2020.     Interval history:  He comes in today for follow-up. He was last seen in clinic in October 2020. He saw  medical oncology for a virtual visit in January 2021. He elected to proceed with the ALT-803 trial but unfortunately missed the 6 week from his last immunotherapy treatment so received an infusion of opdivo today. He is supposed to get restaging imaging in February and then will be able to enroll in the trial. He says that he is eating without difficulty with the exception of having a poor appetite. He is taking medication to help with his dry mouth and then supplements with biotene. His weight is stable at about 149 lbs. He had an audiogram today which showed bilateral high frequency SNHL. He continues to smoke 1/2 ppd. He is having some discomfort along the right cheek/jaw area.         MEDICATIONS:     Current Outpatient Medications   Medication Sig Dispense Refill     acetaminophen (TYLENOL) 325 MG tablet Take 325-650 mg by mouth every 6 hours as needed for mild pain       cevimeline (EVOXAC) 30 MG capsule Take 1 capsule (30 mg) by mouth 3 times daily 90 capsule 11     cyclobenzaprine (FLEXERIL) 10 MG tablet TK 1 T PO HS PRN FOR MUSCLE SPASM RELIEF  0     hydrocortisone 2.5 % cream Apply topically 2 times daily Apply to itchy spot on scalp and back twice a day as needed 453.6 g 1     NARCAN 4 MG/0.1ML nasal spray WAGNER INTO ONE NOSTRIL. MAY REPEAT IN ALTERNATE NOSTRIL WITHIN 2 MINUTES IF NO OR MINIMAL RESPONSE       oxyCODONE IR (ROXICODONE) 10 MG tablet Takes 1/2 pill (5 mg) 2-3 x daily. --[place] [facility]       senna-docusate (SENOKOT-S/PERICOLACE) 8.6-50 MG tablet 1 tablet by Per Feeding Tube route 2 times daily as needed for constipation 90 tablet 3       ALLERGIES:  No Known Allergies    HABITS/SOCIAL HISTORY:   Smoker 1/2 ppd  No chewing tobacco use  Lives alone  No alcohol use  Not currently employed    Social History     Socioeconomic History     Marital status: Single     Spouse name: Not on file     Number of children: Not on file     Years of education: Not on file     Highest education level: Not on  file   Occupational History     Not on file   Social Needs     Financial resource strain: Not on file     Food insecurity     Worry: Not on file     Inability: Not on file     Transportation needs     Medical: Not on file     Non-medical: Not on file   Tobacco Use     Smoking status: Light Tobacco Smoker     Packs/day: 0.50     Years: 15.00     Pack years: 7.50     Types: Cigarettes     Start date: 2004     Smokeless tobacco: Never Used   Substance and Sexual Activity     Alcohol use: Not Currently     Drug use: Not Currently     Sexual activity: Not Currently   Lifestyle     Physical activity     Days per week: Not on file     Minutes per session: Not on file     Stress: Not on file   Relationships     Social connections     Talks on phone: Not on file     Gets together: Not on file     Attends Mandaeism service: Not on file     Active member of club or organization: Not on file     Attends meetings of clubs or organizations: Not on file     Relationship status: Not on file     Intimate partner violence     Fear of current or ex partner: Not on file     Emotionally abused: Not on file     Physically abused: Not on file     Forced sexual activity: Not on file   Other Topics Concern     Not on file   Social History Narrative     Not on file       PAST MEDICAL HISTORY:   Past Medical History:   Diagnosis Date     Gastric ulcer      Low back pain      Maxillary sinus cancer (H)      Toe amputation status     all ten toes        PAST SURGICAL HISTORY:   Past Surgical History:   Procedure Laterality Date     ABDOMEN SURGERY      [facility], surgery related to gastric ulcer     AS AMPUTATION TOE,I-P JT Bilateral     all ten toes     EXENTERATION ORBIT Right 9/24/2019    Procedure: Right Orbital Exenteration;  Surgeon: Jose Roberts MD;  Location: [facility] OR     EXPLORE NECK Right 9/24/2019    Procedure: Right Neck Exploration;  Surgeon: Jose Roberts MD;  Location: [facility] OR     GRAFT FREE VASCULARIZED LATISSIMUS DORSI Right  9/24/2019    Procedure: Right Latissmus Free Flap Reconstruction;  Surgeon: Teresa Pulliam MD;  Location: [facility] OR     INSERT PORT VASCULAR ACCESS Right 10/31/2019    Procedure: place venous access chest port;  Surgeon: Natasha Mishra PA-C;  Location: [facility] OR     IR CHEST PORT PLACEMENT > 5 YRS OF AGE  10/31/2019     OSTEOTOMY MAXILLA N/A 9/24/2019    Procedure: Right Radicall Maxillectomy, Nasogastric Tube Placement;  Surgeon: Jose Roberts MD;  Location: [facility] OR       FAMILY HISTORY:    Family History   Problem Relation Age of Onset     Breast Cancer Mother      Glaucoma No family hx of      Macular Degeneration No family hx of        REVIEW OF SYSTEMS:  12 point ROS was negative other than the symptoms noted above in the HPI.  Patient Supplied Answers to Review of Systems   ENT ROS 5/22/2020   Constitutional -   Neurology -   Psychology -   Eyes -   Ears, Nose, Throat Ringing/noise in ears   Musculoskeletal -   Endocrine -         PHYSICAL EXAMINATION:   Pulse 84   Temp 98.2  F (36.8  C) (Temporal)   SpO2 100%    Appearance:   normal; NAD, age-appropriate appearance, well-developed, normal habitus   Communication:   normal; communicates verbally, normal voice quality   Head/Face:   inspection -  Healthy appearing free flap along right orbital exenteration, radiation changes to the skin paddle, soft tissue prominence of the flap has continued to decrease, no swelling   Palpation - no palpable masses in the orbital exenteration site or right maxilla   Salivary glands -  Normal size, no tenderness, swelling, or palpable masses   Facial strength -  Decreased right cheek movement related to the maxillectomy   Skin:  normal, no rash   Eyes:  Orbital exenteration on right   Ears:  auricle (AD) -  normal  EAC (AD) -  normal  TM (AD) -  Effusion, retracted  auricle (AS) -  normal  EAC (AS) -  normal  TM (AS) -  retracted  Normal clinical speech reception   Nose:  Ext. inspection -  Normal   Oral Cavity:  lips -   Normal mucosa, oral competence, and stoma size  Edentulous  About a 4-5 mm area of exposed bone along the right mandible body alveolus  Healthy appearing free flap skin paddle along right hard palate, no palpable masses, no mucosal lesions on native palate, decreased tissue bulk intraorally  Tongue protrudes midline   Oropharynx:  mucosa -  Normal, no lesions   Neck: No visible mass or asymmetry   Radiation changes to the neck   Lymphatic:  no abnormal nodes   Cardiovascular:  warm, pink, well-perfused extremities without swelling, tenderness, or edema   Respiratory:  Normal respiratory effort, no stridor   Neuro/Psych.:  mood/affect - normal  mental status -  Normal       RESULTS REVIEWED:   I reviewed the notes from medical oncology which are summarized above    TSH normal today    I independently reviewed the audiogram which showed bilateral high frequency SNHL      IMPRESSION AND PLAN:   Eduardo Rubio is a 60 year old man with a T4N0 SCC of the right maxilla. He underwent surgical resection with maxillectomy and orbital exenteration with reconstruction using a latissimus free flap.  He had postoperative chemoradiation completed in December 2018. His 3 month post treatment PET scan demonstrated metastatic disease in the lung and he was started on nivolumab in March 2020. His restaging imaging in September 2020 demonstrated disease progression and he tentatively going to start in a clinical trial.    His TSH was normal today.    He has been experiencing issues with hearing loss and had an audiogram today which was reviewed. We discussed hearing aids. The patient is interested and will pursue this here vs elsewhere.     We discussed that with his continued smoking he continues to not only impact his current treatment but places him at risk for additional malignancies and was recommended to quit smoking.    He does have exposed bone along the right mandible. He was recommended to use salt and soda rinses to help  keep the area clean. I do not want to start pentoxifylline and vitamin E for this given his lung metastasis.    Further imaging will be deferred to medical oncology.    On discussion with Dr Santillan today, the patient does not require further follow-up with radiation oncology.    I will see him back in 6 months.    Thank you very much for the opportunity to participate in the care of your patient.      Teresa Pulliam MD, M.D.  Otolaryngology- Head & Neck Surgery      This note was dictated with voice recognition software and then edited. Please excuse any unintentional errors.           CC:  Jose Roberts MD  Otolaryngology/Head & Neck Surgery  [facility] 396      Colt Puentes MD  Otolaryngology/Head & Neck Surgery  [facility] 396

## 2021-01-15 NOTE — LETTER
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 He underwent a maxillectomy and orbital exenteration on 2019 with pathology demonstrating a 4.4 cm moderately differentiated squamous cell carcinoma with negative lymphovascular space invasion and positive perineural invasion. There was a positive surgical margin at the pterygopalatine fossa, specifically from the vidian nerve taken at its exit from the vidian canal. Additional resection into the canal was not possible. Mr. Rubio received adjuvant chemoradiotherapy as described above, receiving a total dose of 66 Gy in 33 fractions which he completed on 2019 with concurrent high-dose cisplatin. He was found to have numerous pulmonary metastases on his 3-month post-treatment PET/CT performed on 3/13/2020.    Mr. Rubio returns to ENT multiD clinic today for a routine post-treatment disease surveillance visit. He reports that he is doing okay and has no pressing concerns or complaints. He is eating a regular diet with the exception of avoidance of some very dry foods due to his treatment-induced xerostomia. He denies any  odynophagia or dysphagia and his weight has been stable. He is continuing to receive cares by our medical oncology colleagues and is currently under consideration for enrollment on ALT-803.    PHYSICAL EXAM:  Weight: 67.6 kg  Pulse: 84  T: 36.8  C    General: 60 year old gentleman seated comfortably in an examination chair in [facility]  HEENT: No noticeable change within the healthy-appearing free flap covering the right orbit. There are some cutaneous changes surrounding the free flap from his prior radiation therapy with scattered telangiectasias but with no desquamation or ulceration. The left eye has normal extraocular movements without tearing or scleral injection. No rhinorrhea or epistaxis. Normal-appearing EACs bilaterally with a mild amount of dried cerumen. Slight retraction of the bilateral TMs. Mildly dry mucous membranes. No oral cavity lesions.  Neck: No palpable cervical adenopathy  Pulmonary: No wheeze, stridor or respiratory distress  Skin: Posttreatment changes as described above otherwise normal color and turgor.    LABS AND IMAGIN/15/2021 TSH: 3.51    IMPRESSION:   Mr. 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 He developed metastatic disease discovered on his 3-month post-treatment PET/CT in 3/2020.     PLAN:   1. Follow-up with Dr. Pulliam as scheduled  2. Continue ongoing follow-up with medical oncology for systemic disease management  3. Follow-up with radiation oncology on an as-needed basis    Eric Santillan MD/PhD    Dept of Radiation Oncology  [facility]       Eric Santillan MD       Mucosal Advancement Flap Text: Given the location of the defect, shape of the defect and the proximity to free margins a mucosal advancement flap was deemed most appropriate. Incisions were made with a 15 blade scalpel in the appropriate fashion along the cutaneous vermilion border and the mucosal lip. The remaining actinically damaged mucosal tissue was excised.  The mucosal advancement flap was then elevated to the gingival sulcus with care taken to preserve the neurovascular structures and advanced into the primary defect. Care was taken to ensure that precise realignment of the vermilion border was achieved.

## 2021-01-15 NOTE — LETTER
1/15/2021       RE: Eduardo Rubio  [address]  [facility] [address]     Dear Colleague,    Thank you for referring your patient, Eduardo Rubio, to the [facility] EAR NOSE AND THROAT CLINIC [place] at [facility]. Please see a copy of my visit note below.    Dear Dr. Roberts:    I had the pleasure of seeing Eduardo Rubio in follow-up today at the [facility].     History of Present Illness:   Eduardo Rubio is a 60 year old man with a T4N0 SCC of the maxillary sinus. The patient has a history of facial pain and numbness along with vision changes that resulted in a visit to the ER on 8/18/2019. An MRI was obtained which demonstrated a maxillary sinus mass with extension to the orbit with involvement of the inferior rectus muscle. He had a biopsy performed locally which was consistent with SCC. He saw Dr Wick on 8/29/2019. He was referred to ophthalmology for evaluation of his vision changes. He had a PET scan which showed the tumor in the maxillary sinus with bony erosion, extension to orbit, small lung nodule, no ramona disease. His case was reviewed at tumor conference with recommendation for surgery with total maxillectomy and orbital exenteration with free flap reconstruction. He was taken to the OR on 9/24/2019. He had reconstruction with a latissimus free flap. His postoperative course was uncomplicated. His final pathology demonstrated a 4.4 cm SCC with involvement of the zygomatic and nasal bone, PNI, no LVSI, positive margin at the pterygopalatine fossa with positive vidian nerve. He was recommended for postoperative radiation with consideration of weekly cisplatin. He had postoperative chemoradiation from 10/28/2019 to 12/11/2019 under the care of Dr. Santilaln and Dr. Rivera.  He received a total of 6600 cGy and received high-dose cisplatin every 3 weeks.  He had a 4-day treatment break due to machine downtime  and then with transportation issues.  He had his 3 month post treatment PET scan in March 2020 which showed multiple pulmonary nodules concerning for metastatic disease. He was started on nivolumab in March 2020.     Interval history:  He comes in today for follow-up. He was last seen in clinic in October 2020. He saw medical oncology for a virtual visit in January 2021. He elected to proceed with the ALT-803 trial but unfortunately missed the 6 week from his last immunotherapy treatment so received an infusion of opdivo today. He is supposed to get restaging imaging in February and then will be able to enroll in the trial. He says that he is eating without difficulty with the exception of having a poor appetite. He is taking medication to help with his dry mouth and then supplements with biotene. His weight is stable at about 149 lbs. He had an audiogram today which showed bilateral high frequency SNHL. He continues to smoke 1/2 ppd. He is having some discomfort along the right cheek/jaw area.         MEDICATIONS:     Current Outpatient Medications   Medication Sig Dispense Refill     acetaminophen (TYLENOL) 325 MG tablet Take 325-650 mg by mouth every 6 hours as needed for mild pain       cevimeline (EVOXAC) 30 MG capsule Take 1 capsule (30 mg) by mouth 3 times daily 90 capsule 11     cyclobenzaprine (FLEXERIL) 10 MG tablet TK 1 T PO HS PRN FOR MUSCLE SPASM RELIEF  0     hydrocortisone 2.5 % cream Apply topically 2 times daily Apply to itchy spot on scalp and back twice a day as needed 453.6 g 1     NARCAN 4 MG/0.1ML nasal spray WAGNER INTO ONE NOSTRIL. MAY REPEAT IN ALTERNATE NOSTRIL WITHIN 2 MINUTES IF NO OR MINIMAL RESPONSE       oxyCODONE IR (ROXICODONE) 10 MG tablet Takes 1/2 pill (5 mg) 2-3 x daily. --[facility]       senna-docusate (SENOKOT-S/PERICOLACE) 8.6-50 MG tablet 1 tablet by Per Feeding Tube route 2 times daily as needed for constipation 90 tablet 3       ALLERGIES:  No Known  Allergies    HABITS/SOCIAL HISTORY:   Smoker 1/2 ppd  No chewing tobacco use  Lives alone  No alcohol use  Not currently employed    Social History     Socioeconomic History     Marital status: Single     Spouse name: Not on file     Number of children: Not on file     Years of education: Not on file     Highest education level: Not on file   Occupational History     Not on file   Social Needs     Financial resource strain: Not on file     Food insecurity     Worry: Not on file     Inability: Not on file     Transportation needs     Medical: Not on file     Non-medical: Not on file   Tobacco Use     Smoking status: Light Tobacco Smoker     Packs/day: 0.50     Years: 15.00     Pack years: 7.50     Types: Cigarettes     Start date: 2004     Smokeless tobacco: Never Used   Substance and Sexual Activity     Alcohol use: Not Currently     Drug use: Not Currently     Sexual activity: Not Currently   Lifestyle     Physical activity     Days per week: Not on file     Minutes per session: Not on file     Stress: Not on file   Relationships     Social connections     Talks on phone: Not on file     Gets together: Not on file     Attends Hindu service: Not on file     Active member of club or organization: Not on file     Attends meetings of clubs or organizations: Not on file     Relationship status: Not on file     Intimate partner violence     Fear of current or ex partner: Not on file     Emotionally abused: Not on file     Physically abused: Not on file     Forced sexual activity: Not on file   Other Topics Concern     Not on file   Social History Narrative     Not on file       PAST MEDICAL HISTORY:   Past Medical History:   Diagnosis Date     Gastric ulcer      Low back pain      Maxillary sinus cancer (H)      Toe amputation status     all ten toes        PAST SURGICAL HISTORY:   Past Surgical History:   Procedure Laterality Date     [place]      [facility] [place] to [place]     AS AMPUTATION  TOE,I-P JT Bilateral     all ten toes     EXENTERATION ORBIT Right 9/24/2019    Procedure: Right Orbital Exenteration;  Surgeon: Jsoe Roberts MD;  Location: [facility] OR     EXPLORE NECK Right 9/24/2019    Procedure: Right Neck Exploration;  Surgeon: Jose Roberts MD;  Location: [facility] OR     GRAFT FREE VASCULARIZED LATISSIMUS DORSI Right 9/24/2019    Procedure: Right Latissmus Free Flap Reconstruction;  Surgeon: Teresa Pulliam MD;  Location: [facility] OR     INSERT PORT VASCULAR ACCESS Right 10/31/2019    Procedure: place venous access chest port;  Surgeon: Natasha Mishra PA-C;  Location: [facility] OR     IR CHEST PORT PLACEMENT > 5 YRS OF AGE  10/31/2019     OSTEOTOMY MAXILLA N/A 9/24/2019    Procedure: Right Radicall Maxillectomy, Nasogastric Tube Placement;  Surgeon: Jose Roberts MD;  Location: [facility] OR       FAMILY HISTORY:    Family History   Problem Relation Age of Onset     Breast Cancer Mother      Glaucoma No family hx of      Macular Degeneration No family hx of        REVIEW OF SYSTEMS:  12 point ROS was negative other than the symptoms noted above in the HPI.  Patient Supplied Answers to Review of Systems   ENT ROS 5/22/2020   Constitutional -   Neurology -   Psychology -   Eyes -   Ears, Nose, Throat Ringing/noise in ears   Musculoskeletal -   Endocrine -         PHYSICAL EXAMINATION:   Pulse 84   Temp 98.2  F (36.8  C) (Temporal)   SpO2 100%    Appearance:   normal; NAD, age-appropriate appearance, well-developed, normal habitus   Communication:   normal; communicates verbally, normal voice quality   Head/Face:   inspection -  Healthy appearing free flap along right orbital exenteration, radiation changes to the skin paddle, soft tissue prominence of the flap has continued to decrease, no swelling   Palpation - no palpable masses in the orbital exenteration site or right maxilla   Salivary glands -  Normal size, no tenderness, swelling, or palpable masses   Facial strength -  Decreased right cheek  movement related to the maxillectomy   Skin:  normal, no rash   Eyes:  Orbital exenteration on right   Ears:  auricle (AD) -  normal  EAC (AD) -  normal  TM (AD) -  Effusion, retracted  auricle (AS) -  normal  EAC (AS) -  normal  TM (AS) -  retracted  Normal clinical speech reception   Nose:  Ext. inspection -  Normal   Oral Cavity:  lips -  Normal mucosa, oral competence, and stoma size  Edentulous  About a 4-5 mm area of exposed bone along the right mandible body alveolus  Healthy appearing free flap skin paddle along right hard palate, no palpable masses, no mucosal lesions on native palate, decreased tissue bulk intraorally  Tongue protrudes midline   Oropharynx:  mucosa -  Normal, no lesions   Neck: No visible mass or asymmetry   Radiation changes to the neck   Lymphatic:  no abnormal nodes   Cardiovascular:  warm, pink, well-perfused extremities without swelling, tenderness, or edema   Respiratory:  Normal respiratory effort, no stridor   Neuro/Psych.:  mood/affect - normal  mental status -  Normal       RESULTS REVIEWED:   I reviewed the notes from medical oncology which are summarized above    TSH normal today    I independently reviewed the audiogram which showed bilateral high frequency SNHL      IMPRESSION AND PLAN:   Eduardo Rubio is a 60 year old man with a T4N0 SCC of the right maxilla. He underwent surgical resection with maxillectomy and orbital exenteration with reconstruction using a latissimus free flap.  He had postoperative chemoradiation completed in December 2018. His 3 month post treatment PET scan demonstrated metastatic disease in the lung and he was started on nivolumab in March 2020. His restaging imaging in September 2020 demonstrated disease progression and he tentatively going to start in a clinical trial.    His TSH was normal today.    He has been experiencing issues with hearing loss and had an audiogram today which was reviewed. We discussed hearing aids. The patient is  interested and will pursue this here vs elsewhere.     We discussed that with his continued smoking he continues to not only impact his current treatment but places him at risk for additional malignancies and was recommended to quit smoking.    He does have exposed bone along the right mandible. He was recommended to use salt and soda rinses to help keep the area clean. I do not want to start pentoxifylline and vitamin E for this given his lung metastasis.    Further imaging will be deferred to medical oncology.    On discussion with Dr Santillan today, the patient does not require further follow-up with radiation oncology.    I will see him back in 6 months.    Thank you very much for the opportunity to participate in the care of your patient.      Teresa Pulliam MD, M.D.  Otolaryngology- Head & Neck Surgery      This note was dictated with voice recognition software and then edited. Please excuse any unintentional errors.       CC:  Jose Roberts MD  Otolaryngology/Head & Neck Surgery  [facility] 396      Colt Puentes MD  Otolaryngology/Head & Neck Surgery  [facility] 396

## 2021-01-15 NOTE — PROGRESS NOTES
"Infusion Nursing Note:  Eduardo Rubio presents today for D1 C2 Nivolumab.    Patient seen by provider today: No    Intravenous Access:  Implanted Port.    Treatment Conditions:  Lab Results   Component Value Date     01/15/2021                   Lab Results   Component Value Date    POTASSIUM 4.0 01/15/2021           Lab Results   Component Value Date    MAG 1.7 04/23/2020            Lab Results   Component Value Date    CR 0.72 01/15/2021                   Lab Results   Component Value Date    MIKEY 8.9 01/15/2021                Lab Results   Component Value Date    BILITOTAL 0.5 01/15/2021           Lab Results   Component Value Date    ALBUMIN 3.1 01/15/2021                    Lab Results   Component Value Date    ALT 16 01/15/2021           Lab Results   Component Value Date    AST 20 01/15/2021       Results reviewed, labs MET treatment parameters, ok to proceed with treatment.    Note: Patient reports hearing issue that has improved somewhat. Has an appt with ENT today. Continues to have dry mouth that is improved with medication. Reports 2 month history of intermittent shortness of breath that is relieved in \"a couple of minutes\" with rest. Denied cough or chest pain. Continues to have fatigue with a decrease in activity level. Denied any new issues or concerns since his phone visit with ADRIEN Ingram on 1/7/2021. MARY KAY messaged about today's sodium level.    TORB: ADRIEN Ingram/Malina Ragsdale RN, 1/15/21 @ 1403: Give 500ml NS bolus for Na+ l29.    Post Infusion Assessment:  Patient tolerated infusion without incident.  Blood return noted pre and post infusion.  Site patent and intact, free from redness, edema or discomfort.  No evidence of extravasations.  Access discontinued per protocol.    Discharge Plan:   Patient declined prescription refills.  Discharge instructions reviewed with: Patient.  Patient and/or family verbalized understanding of discharge instructions and all questions " answered.  Copy of AVS reviewed with patient and/or family.  Patient will return 2/3/21-CT scan and 2/9/21-Dr Ferraro for next appointment.  Patient discharged in stable condition accompanied by: self.  Departure Mode: Ambulatory.    Malina Ragsdale RN

## 2021-01-15 NOTE — LETTER
1/15/2021       RE: Eduardo Rubio  [address]  [facility] [address]     Dear Colleague,    Thank you for referring your patient, Eduardo Rubio, to the [facility] EAR NOSE AND THROAT CLINIC [place] at [facility]. Please see a copy of my visit note below.       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 He underwent a maxillectomy and orbital exenteration on 2019 with pathology demonstrating a 4.4 cm moderately differentiated squamous cell carcinoma with negative lymphovascular space invasion and positive perineural invasion. There was a positive surgical margin at the pterygopalatine fossa, specifically from the vidian nerve taken at its exit from the vidian canal. Additional resection into the canal was not possible. Mr. Rubio received adjuvant chemoradiotherapy as described above, receiving a total dose of 66 Gy in 33 fractions which he completed on 2019 with concurrent high-dose cisplatin. He was found to have numerous pulmonary metastases on his 3-month post-treatment PET/CT performed on 3/13/2020.    Mr. Rubio returns to ENT multiD clinic today for a routine post-treatment disease  surveillance visit. He reports that he is doing okay and has no pressing concerns or complaints. He is eating a regular diet with the exception of avoidance of some very dry foods due to his treatment-induced xerostomia. He denies any odynophagia or dysphagia and his weight has been stable. He is continuing to receive cares by our medical oncology colleagues and is currently under consideration for enrollment on ALT-803.    PHYSICAL EXAM:  Weight: 67.6 kg  Pulse: 84  T: 36.8  C    General: 60 year old gentleman seated comfortably in an examination chair in [facility]  HEENT: No noticeable change within the healthy-appearing free flap covering the right orbit. There are some cutaneous changes surrounding the free flap from his prior radiation therapy with scattered telangiectasias but with no desquamation or ulceration. The left eye has normal extraocular movements without tearing or scleral injection. No rhinorrhea or epistaxis. Normal-appearing EACs bilaterally with a mild amount of dried cerumen. Slight retraction of the bilateral TMs. Mildly dry mucous membranes. No oral cavity lesions.  Neck: No palpable cervical adenopathy  Pulmonary: No wheeze, stridor or respiratory distress  Skin: Posttreatment changes as described above otherwise normal color and turgor.    LABS AND IMAGIN/15/2021 TSH: 3.51    IMPRESSION:   Mr. 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 He developed metastatic disease discovered on his 3-month post-treatment PET/CT in 3/2020.     PLAN:   1. Follow-up with Dr. Pulliam as scheduled  2. Continue ongoing follow-up with medical oncology for systemic disease management  3. Follow-up with radiation oncology on an as-needed basis    Eric Santillan MD/PhD    Dept of Radiation Oncology  [facility]

## 2021-01-19 NOTE — PROGRESS NOTES
Department of Radiation Oncology  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 He underwent a maxillectomy and orbital exenteration on 2019 with pathology demonstrating a 4.4 cm moderately differentiated squamous cell carcinoma with negative lymphovascular space invasion and positive perineural invasion. There was a positive surgical margin at the pterygopalatine fossa, specifically from the vidian nerve taken at its exit from the vidian canal. Additional resection into the canal was not possible. Mr. Rubio received adjuvant chemoradiotherapy as described above, receiving a total dose of 66 Gy in 33 fractions which he completed on 2019 with concurrent high-dose cisplatin. He was found to have numerous pulmonary metastases on his 3-month post-treatment PET/CT performed on 3/13/2020.    Mr. Rubio returns to ENT multiD clinic today for a routine post-treatment disease surveillance visit. He reports that he is doing okay and has no pressing concerns or complaints. He is eating a regular diet with the exception of avoidance of some very dry foods due to his treatment-induced xerostomia. He denies any odynophagia or dysphagia and his weight has been stable. He is continuing to receive cares  by our medical oncology colleagues and is currently under consideration for enrollment on ALT-803.    PHYSICAL EXAM:  Weight: 67.6 kg  Pulse: 84  T: 36.8  C    General: 60 year old gentleman seated comfortably in an examination chair in [facility]  HEENT: No noticeable change within the healthy-appearing free flap covering the right orbit. There are some cutaneous changes surrounding the free flap from his prior radiation therapy with scattered telangiectasias but with no desquamation or ulceration. The left eye has normal extraocular movements without tearing or scleral injection. No rhinorrhea or epistaxis. Normal-appearing EACs bilaterally with a mild amount of dried cerumen. Slight retraction of the bilateral TMs. Mildly dry mucous membranes. No oral cavity lesions.  Neck: No palpable cervical adenopathy  Pulmonary: No wheeze, stridor or respiratory distress  Skin: Posttreatment changes as described above otherwise normal color and turgor.    LABS AND IMAGIN/15/2021 TSH: 3.51    IMPRESSION:   Mr. 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 He developed metastatic disease discovered on his 3-month post-treatment PET/CT in 3/2020.     PLAN:   1. Follow-up with Dr. Pulliam as scheduled  2. Continue ongoing follow-up with medical oncology for systemic disease management  3. Follow-up with radiation oncology on an as-needed basis    Eric Santillan MD/PhD    Dept of Radiation Oncology  [facility]

## 2021-02-09 NOTE — PROGRESS NOTES
Oncology RN Care Coordination Note:     Incoming Call:  This writer received a voicemail from this patient stating he doesn't have any upcoming appointments.      Outgoing Call:  This writer called Eduardo back letting him know he has an appointment with Dr. Ferraro today to discuss his plan going forward.  I sent a message to the  team as well since he is likely signing onto a clinical trial.      Cailin Diaz RN BSN   [facility]  Nurse Coordinator

## 2021-02-09 NOTE — PROGRESS NOTES
Eduardo is a 60 year old who is being evaluated via a billable video visit.      How would you like to obtain your AVS? MyChart  If the video visit is dropped, the invitation should be resent by: Send to e-mail at: [email or web address]  Will anyone else be joining your video visit? No      Vitals - Patient Reported  Weight (Patient Reported): 68.5 kg (151 lb)  Height (Patient Reported): 182.9 cm (6')  BMI (Based on Pt Reported Ht/Wt): 20.48  Pain Score: No Pain (0)      I have reviewed and updated patient's allergy and medication list.    Concerns: NONE  Refills: NONE      Kelly Coleman CMA

## 2021-02-09 NOTE — LETTER
2/9/2021         RE: Eduardo Rubio  [address]  [facility] [address]        Dear Colleague,    Thank you for referring your patient, Eduardo Rubio, to the [facility]. Please see a copy of my visit note below.    Eduardo is a 60 year old who is being evaluated via a billable video visit.      How would you like to obtain your AVS? MyChart  If the video visit is dropped, the invitation should be resent by: Send to e-mail at: [email or web address]  Will anyone else be joining your video visit? No      Vitals - Patient Reported  Weight (Patient Reported): 68.5 kg (151 lb)  Height (Patient Reported): 182.9 cm (6')  BMI (Based on Pt Reported Ht/Wt): 20.48  Pain Score: No Pain (0)      I have reviewed and updated patient's allergy and medication list.    Concerns: NONE  Refills: NONE      Kelly Coleman [facility]        Oncology/Hematology Visit Note  Feb 9, 2021       Reason for Visit: Follow up of Maxillary sinus cancer     History of Present Illness:   Eduardo presented to an outside ED in 08/18/2019 with complaints of right facial pressure, numbness, right-sided visual change and nasal drainage for several days' duration. Imaging workup included an MRI which demonstrated a maxillary sinus mass with extension to the orbit with involvement of the inferior rectus muscle. 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 Mr. Rubio was referred to [facility]. He had staging PET/CT on 9/9/2019 which revealed a FDG-avid maxillary mass at 4.0 x 3.9 x 4.2 cm. There was tumor extension into the right orbital cavity superiorly, the right nasal cavity medially, the retromaxillary fat region posterolaterally, the right pterygopalatine fossa posteriorly, and the premaxillary fat anteriorly. In the orbital cavity there was abutment of the inferior rectus muscle. There was no evidence of lymphadenopathy or systemic disease. His case was reviewed at tumor  conference with recommendation for surgery with total maxillectomy and orbital exenteration with free flap reconstruction.     Mr. Rubio underwent a total maxillectomy with orbital exenteration on 9/24/2019 with Dr. Roberts, followed by reconstruction with a latissimus free flap with Dr. Pulliam. Surgical pathology showed a 4.4 cm moderately differentiated squamous cell carcinoma, (-) LVSI, (+)PNI. There was a positive margin at the pterygopalatine fossa, specifically the vidian nerve taken at its exit from the vidian canal, and additional resection into the canal was not possible. Mr. Rubio's case was discussed in the [facility]'s multidisciplinary head and neck tumor board, with the consensus recommendation to proceed with adjuvant chemoradiation given the positive margin status. Patient received day 1 cisplatin on 11/1/19 and day 22 on 11/20/19 and Day 43 on 12/13/19.      TREATMENT SUMMARY:  - 8/19/19: MRI face and orbit demonstrated a maxillary sinus mass with extension to the orbit with involvement of the inferior rectus muscle. 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  - 9/24/19: Total maxillectomy with orbital exenteration performed by Dr. Roberts, followed by reconstruction with a latissimus free flap with Dr. Pulliam.   - Surgical pathology showed a 4.4 cm moderately differentiated squamous cell carcinoma, (-) LVSI, (+)PNI. There was a positive margin at the pterygopalatine fossa, specifically the vidian nerve taken at its exit from the vidian canal, and additional resection into the canal was not possible.  -s/p total right maxilectomy with orbital exenteration   -surgical margin were positive making it a high risk disease for recurrence.    He completed concurrent chemoradiation with high dose cisplatin day 1, 11/1/19, Day 22 11/20/19, Day 43 12/13/19  - PET/CT on 3/13/20 revealed numerous lung nodules consistent with  metastasis and he has been started on nivolumab. Improvement in disease noted on CT 6/2020 and September scan showed worsening disease.      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      He is a little shortness of breath.     He admits in the last month he has been more fatigued.       His appetite is less, but still eating, just smaller meals 3 x daily.      Using oxycodone ~3 x daily- chronic back and feet pain.  Pain MD is Dr Meek in [facility]- sees him monthly.       Otherwise ROS is negative.      Current Outpatient Medications   Medication Sig     acetaminophen (TYLENOL) 325 MG tablet Take 325-650 mg by mouth every 6 hours as needed for mild pain     cevimeline (EVOXAC) 30 MG capsule Take 1 capsule (30 mg) by mouth 3 times daily     cyclobenzaprine (FLEXERIL) 10 MG tablet TK 1 T PO HS PRN FOR MUSCLE SPASM RELIEF     hydrocortisone 2.5 % cream Apply topically 2 times daily Apply to itchy spot on scalp and back twice a day as needed     NARCAN 4 MG/0.1ML nasal spray WAGNER INTO ONE NOSTRIL. MAY REPEAT IN ALTERNATE NOSTRIL WITHIN 2 MINUTES IF NO OR MINIMAL RESPONSE     oxyCODONE IR (ROXICODONE) 10 MG tablet Takes 1/2 pill (5 mg) 2-3 x daily. --[place] [facility]     senna-docusate (SENOKOT-S/PERICOLACE) 8.6-50 MG tablet 1 tablet by Per Feeding Tube route 2 times daily as needed for constipation     No current facility-administered medications for this visit.        Physical Examination:  Voice is clear and strong. No audible stridor, wheezing, or respiratory distress. The remainder of PE was deferred due to PHE.        Laboratory Data:     Recent Labs   Lab Test 01/15/21  1210 10/23/20  1031 09/15/20  0846 08/18/20  1025 07/16/20  1050   * 130* 136 129* 134   POTASSIUM 4.0 4.1 3.5 3.4 3.9   CHLORIDE 94 95 103 94 102   CO2 32 28 26 30 26   ANIONGAP 3 7 7 5 6   BUN 10 9 10 14 9   CR 0.72 0.70 0.78 0.85 0.79    GLC 95 86 97 114* 77   MIKEY 8.9 8.8 8.8 8.4* 8.3*     Recent Labs   Lab Test 04/23/20  0918 03/26/20  0856 03/17/20  1250 01/22/20  1414 12/20/19  0853 09/30/19  0638 09/30/19  0638 09/29/19  0710 09/28/19  0716 09/27/19  0455 09/26/19  0424   MAG 1.7 2.1 1.9 2.0 1.2*   < > 2.1 2.3 2.3 2.0 2.2   PHOS  --   --   --   --   --   --  3.6 3.8 5.1* 4.0 3.7    < > = values in this interval not displayed.     Recent Labs   Lab Test 10/23/20  1031 06/18/20  0838 06/10/20  1005 05/21/20  1409 04/23/20  0918   WBC 8.8 6.0 5.5 5.5 6.9   HGB 12.1* 10.8* 12.8* 10.8* 11.3*    210 166 194 195   MCV 96 95 96 94 95   NEUTROPHIL 76.5 70.4 75.0 70.3 77.1     Recent Labs   Lab Test 01/15/21  1210 10/23/20  1031 09/15/20  0846   BILITOTAL 0.5 0.3 0.2   ALKPHOS 86 85 82   ALT 16 29 24   AST 20 37 25   ALBUMIN 3.1* 3.5 3.2*     TSH   Date Value Ref Range Status   01/15/2021 3.51 0.40 - 4.00 mU/L Final   10/23/2020 1.37 0.40 - 4.00 mU/L Final   09/15/2020 1.05 0.40 - 4.00 mU/L Final     No results for input(s): CEA in the last 41908 hours.  Results for orders placed or performed in visit on 02/03/21   CT Chest/Abdomen/Pelvis w Contrast    Narrative    EXAMINATION: CT CHEST/ABDOMEN/PELVIS W CONTRAST, 2/3/2021 12:00 PM    TECHNIQUE:  Helical CT images from the lung apices through the  symphysis pubis were obtained with contrast.  Coronal and sagittal  reformatted images were generated at a workstation for further  assessment.    CONTRAST:  92 ml Isovue 370.    COMPARISON: CT chest abdomen pelvis 9/1/2020, PET/CT exams dated  3/13/2020, and 9/9/2019    HISTORY: metastatic head/neck CA, has been on immunotherapy,;  Malignant neoplasm metastatic to both lungs (H); Malignant neoplasm  metastatic to both lungs (H)    Additional information obtained from EPIC: 60-year-old man with a  history of right maxillary squamous cell carcinoma status post  surgical resection and chemoradiation. Metastatic disease of the lung  identified on PET/CT  dated 3/13/2020. Patient was started on nivolumab  in March 2020    FINDINGS:    Lines and tubes: Right chest wall port catheter with the tip near the  superior cavoatrial junction    Lungs: Metastatic disease with multiple numerous masses in the left  and right lungs. Relative to prior exam there has been significant  enlargement of multiple nodules. For example, there is a mass in the  medial right upper lobe which measures 4.2 x 2.9 cm, compared to 1.9 x  1.3 cm on prior exam, see series 6 image 154. There is a mass in the  left upper lobe measuring 2.5 x 2.2 cm, compared to 1.1 x 1.0 cm on  prior exam, see series 6 image 104. There are multiple new and  enlarging nodules in the right lower lobe, the largest measuring 3.7 x  2.6 cm, see series 6 image 228. Additional scattered small nodules  which are new from prior exam. For example there are several satellite  nodules surrounding the nodule in the left lower lobe, the largest  measuring 6 mm in diameter, see series 6 image 189. New nodules in the  anterior left upper lobe measuring up to 8 mm, see series 6 images 88  through 103. No pleural effusion or pneumothorax.    Heart and mediastinum: Central tracheobronchial tree is patent. Heart  size is normal. No pericardial effusion.  Thoracic vasculature: Major vascular structures of the thorax are  patent and normal in caliber.  Lymph nodes: New thoracic lymphadenopathy with multiple enlarged  partially necrotic lymph nodes. For example there is a subcarinal node  measuring 2.5 x 1.5 cm, see series 3 image 178. There is a 2.0 x 1.3  cm node adjacent to the main pulmonary artery, see series 3 image 165  there is an enlarged and centrally necrotic lymph node in the upper  left mediastinum measuring 2.1 x 2.4 cm, see series 3 image 66.  Thyroid: No suspicious thyroid nodules.    Liver: No suspicious hepatic mass. There are a few subcentimeter  hypodensities which are too small to fully characterize, for  example  there is a 4 mm lesion in segment 7, see series 3 image 275. The  portal veins appear patent.  Gallbladder: No gallstones. No evidence of acute cholecystitis. There  is mild dilatation of the common bile duct measuring up to 10 mm, see  series 4 image 42.  Spleen: Normal in size. No suspicious splenic lesions. Splenic  granulomas and a small accessory splenule noted.  Pancreas: No suspicious pancreatic lesions. The pancreatic duct is not  dilated.  Adrenal glands: There is a 1.7 x 2.2 cm nodule in the right adrenal  gland, which is new from prior exam, see series 3 image 330. Mild  nodular thickening of the left adrenal gland  Kidneys: No stones, hydronephrosis or suspicious renal mass. No  ureteral dilatation.  Bladder / Pelvic organs: Prostate is enlarged measuring 5.4 x 4.4 cm  with multiple large calcifications. Bladder is incompletely distended.  Bladder wall is moderately thickened in appearance.  Bowel: Small sliding-type hiatal hernia. No abnormally distended small  bowel. Moderate colonic stool burden. Gaseous distention of segments  of the transverse and sigmoid colon measuring up to 7 cm. No definite  evidence of obstruction. No pneumatosis or portal venous gas.  Lymph nodes: No abdominal or pelvic lymphadenopathy..  Peritoneum / Retroperitoneum: No free air or abnormal fluid  collections within the abdomen.  Abdominal vasculature: Major vascular structures of the abdomen are  patent normal in caliber. Mild calcification of the infrarenal aorta  and iliac branches.    Bones and soft tissues: Degenerative changes of the spine and hips and  shoulders. S-shaped rotoscoliotic curvature of the thoracolumbar  spine. Chronic posterior rib fractures noted on the right, including,  a nonunion fracture of the seventh rib.       Impression    IMPRESSION:   In this patient with a history of metastatic squamous cell carcinoma  currently on nivolumab:  1. Metastatic disease of the lung with multiple new and  enlarging  pulmonary nodules. Findings favored to represent progression over  pseudoprogression.  2. New mediastinal lymphadenopathy, likely metastatic.  3. Right adrenal gland nodule, new from prior, likely metastatic.  4. Gaseous distention of the transverse and sigmoid colon without  definite obstruction. Findings may represent adynamic ileus.    I have personally reviewed the examination and initial interpretation  and I agree with the findings.    SOPHIE BAZAN MD                Assessment and Plan:  1.  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 He had a follow up PET 3/2020 with evidence of new metastasis to lung for which he has been started on immunotherapy with nivolumab.      Eduardo had improvement on CT scans following first 3 cycles on nivolumab and has progressed since then. We had reviewed our options including carboplatin with cetuximab vs clinical trial of pembrolizumab with another immunotherapy agent.      He again had difficulty connecting over a video visit and we had to resort to telephone visit.  I could not share the images of his CT scan.  He has marked disease progression which is quite concerning.  I have pasted representative images above.  Most of his lesions in the lung have grown several fold.    I again reviewed our options of clinical trial with pembrolizumab along with ALT-803 versus standard of care chemotherapy with carboplatin and cetuximab.     I reviewed options of carboplatin and cetuximab. Carboplatin is administered intravenously every 3 weeks.  It can cause nausea, vomiting, altered taste and appetite.  It can cause cytopenias.  Cetuximab, on the other hand, is administered intravenously weekly.  This is associated with  acneform rash over the face, trunk and back.  The patient has also experienced diarrhea.  The biggest challenge is the rash.         We also reviewed the clinical trial of immunotherapy with ALT-803 and pembrolizumab, which sounds like a promising option because he had an initial response to immunotherapy, should be well tolerated and we would still have the option of standard of care cetuximab/carboplatin if he was to progress on this regimen. Unfortunately, there was some delay and he missed the 6 week window to enroll in.  We had a discussion today about him missing the window and he is still interested in pursuing the clinical trial thus we will give him Opdivo in the next week and then plan on getting restaging scans sometime in the next month at that time he will be in the appropriate window and will be able to enroll and start the trial.      He is still looking forward to enrolling on the clinical trial.  I will reach out to the clinical trial team to get him started as soon as possible.     2. Anemia, No CBC drawn prior to this visit.      3. Fatigue- immunotherapy contributing. TSH stable. Discussed getting his sleep wake cycle back on.      4. Itching on back and scalp, report of a history of psoriasis, continue Topical hydrocortisone 2.5% bid.     5. Nicotine abuse, chronic smoker. Was last trying nicotine patches per notes. Not addressed at today's visit.      6. Poor social support; lives alone with a dog. Siblings very involved, bringing food over daily, etc.        Video-Visit Details    Type of service:  Video Visit  Originating Location (pt. Location): Home  Distant Location (provider location): [facility]     Platform used for Video Visit: [employer]/ Telephone -patient had difficulty connecting over video and we had to resort to telephone eventually.    35 minutes spent on the date of the encounter doing chart review, history and exam, documentation and further activities  as noted above      Javier Ferraro    Hematologist and Medical Oncologist  [facility]

## 2021-02-09 NOTE — PROGRESS NOTES
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 Imaging workup included an MRI which demonstrated a maxillary sinus mass with extension to the orbit with involvement of the inferior rectus muscle. 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 Mr. Rubio was referred to [facility]. He had staging PET/CT on 9/9/2019 which revealed a FDG-avid maxillary mass at 4.0 x 3.9 x 4.2 cm. There was tumor extension into the right orbital cavity superiorly, the right nasal cavity medially, the retromaxillary fat region posterolaterally, the right pterygopalatine fossa posteriorly, and the premaxillary fat anteriorly. In the orbital cavity there was abutment of the inferior rectus muscle. There was no evidence of lymphadenopathy or systemic disease. His case was reviewed at tumor conference with recommendation for surgery with total maxillectomy and orbital exenteration with free flap reconstruction.     Mr. Rubio underwent a total maxillectomy with orbital exenteration on 9/24/2019 with Dr. Roberts, followed by reconstruction with a latissimus free flap with Dr. Pulliam. Surgical pathology showed a 4.4 cm moderately differentiated squamous cell carcinoma, (-) LVSI, (+)PNI. There was a positive margin at the pterygopalatine fossa, specifically the vidian nerve taken at its exit from the vidian canal, and additional resection into the canal was not possible. Mr. Rubio's case was discussed in the [facility]'s multidisciplinary head and neck tumor board, with the consensus recommendation to proceed with adjuvant chemoradiation given the positive margin status. Patient received day 1 cisplatin on 11/1/19 and day 22 on  11/20/19 and Day 43 on 12/13/19.      TREATMENT SUMMARY:  - 8/19/19: MRI face and orbit demonstrated a maxillary sinus mass with extension to the orbit with involvement of the inferior rectus muscle. 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  - 9/24/19: Total maxillectomy with orbital exenteration performed by Dr. Roberts, followed by reconstruction with a latissimus free flap with Dr. Pulliam.   - Surgical pathology showed a 4.4 cm moderately differentiated squamous cell carcinoma, (-) LVSI, (+)PNI. There was a positive margin at the pterygopalatine fossa, specifically the vidian nerve taken at its exit from the vidian canal, and additional resection into the canal was not possible.  -s/p total right maxilectomy with orbital exenteration   -surgical margin were positive making it a high risk disease for recurrence.    He completed concurrent chemoradiation with high dose cisplatin day 1, 11/1/19, Day 22 11/20/19, Day 43 12/13/19  - PET/CT on 3/13/20 revealed numerous lung nodules consistent with metastasis and he has been started on nivolumab. Improvement in disease noted on CT 6/2020 and September scan showed worsening disease.      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      He is a little shortness of breath.     He admits in the last month he has been more fatigued.       His appetite is less, but still eating, just smaller meals 3 x daily.      Using oxycodone ~3 x daily- chronic back and feet pain.  Pain MD is Dr Meek in [facility]- sees him monthly.       Otherwise ROS is negative.      Current Outpatient Medications   Medication Sig     acetaminophen (TYLENOL) 325 MG tablet Take 325-650 mg by mouth every 6 hours as needed for mild pain     cevimeline (EVOXAC) 30 MG capsule Take 1 capsule (30 mg) by mouth 3 times daily     cyclobenzaprine  (FLEXERIL) 10 MG tablet TK 1 T PO HS PRN FOR MUSCLE SPASM RELIEF     hydrocortisone 2.5 % cream Apply topically 2 times daily Apply to itchy spot on scalp and back twice a day as needed     NARCAN 4 MG/0.1ML nasal spray WAGNER INTO ONE NOSTRIL. MAY REPEAT IN ALTERNATE NOSTRIL WITHIN 2 MINUTES IF NO OR MINIMAL RESPONSE     oxyCODONE IR (ROXICODONE) 10 MG tablet Takes 1/2 pill (5 mg) 2-3 x daily. --[facility]     senna-docusate (SENOKOT-S/PERICOLACE) 8.6-50 MG tablet 1 tablet by Per Feeding Tube route 2 times daily as needed for constipation     No current facility-administered medications for this visit.        Physical Examination:  Voice is clear and strong. No audible stridor, wheezing, or respiratory distress. The remainder of PE was deferred due to PHE.        Laboratory Data:     Recent Labs   Lab Test 01/15/21  1210 10/23/20  1031 09/15/20  0846 08/18/20  1025 07/16/20  1050   * 130* 136 129* 134   POTASSIUM 4.0 4.1 3.5 3.4 3.9   CHLORIDE 94 95 103 94 102   CO2 32 28 26 30 26   ANIONGAP 3 7 7 5 6   BUN 10 9 10 14 9   CR 0.72 0.70 0.78 0.85 0.79   GLC 95 86 97 114* 77   MIKEY 8.9 8.8 8.8 8.4* 8.3*     Recent Labs   Lab Test 04/23/20  0918 03/26/20  0856 03/17/20  1250 01/22/20  1414 12/20/19  0853 09/30/19  0638 09/30/19  0638 09/29/19  0710 09/28/19  0716 09/27/19  0455 09/26/19  0424   MAG 1.7 2.1 1.9 2.0 1.2*   < > 2.1 2.3 2.3 2.0 2.2   PHOS  --   --   --   --   --   --  3.6 3.8 5.1* 4.0 3.7    < > = values in this interval not displayed.     Recent Labs   Lab Test 10/23/20  1031 06/18/20  0838 06/10/20  1005 05/21/20  1409 04/23/20  0918   WBC 8.8 6.0 5.5 5.5 6.9   HGB 12.1* 10.8* 12.8* 10.8* 11.3*    210 166 194 195   MCV 96 95 96 94 95   NEUTROPHIL 76.5 70.4 75.0 70.3 77.1     Recent Labs   Lab Test 01/15/21  1210 10/23/20  1031 09/15/20  0846   BILITOTAL 0.5 0.3 0.2   ALKPHOS 86 85 82   ALT 16 29 24   AST 20 37 25   ALBUMIN 3.1* 3.5 3.2*     TSH   Date Value Ref Range Status   01/15/2021  3.51 0.40 - 4.00 mU/L Final   10/23/2020 1.37 0.40 - 4.00 mU/L Final   09/15/2020 1.05 0.40 - 4.00 mU/L Final     No results for input(s): CEA in the last 21879 hours.  Results for orders placed or performed in visit on 02/03/21   CT Chest/Abdomen/Pelvis w Contrast    Narrative    EXAMINATION: CT CHEST/ABDOMEN/PELVIS W CONTRAST, 2/3/2021 12:00 PM    TECHNIQUE:  Helical CT images from the lung apices through the  symphysis pubis were obtained with contrast.  Coronal and sagittal  reformatted images were generated at a workstation for further  assessment.    CONTRAST:  92 ml Isovue 370.    COMPARISON: CT chest abdomen pelvis 9/1/2020, PET/CT exams dated  3/13/2020, and 9/9/2019    HISTORY: metastatic head/neck CA, has been on immunotherapy,;  Malignant neoplasm metastatic to both lungs (H); Malignant neoplasm  metastatic to both lungs (H)    Additional information obtained from EPIC: 60-year-old man with a  history of right maxillary squamous cell carcinoma status post  surgical resection and chemoradiation. Metastatic disease of the lung  identified on PET/CT dated 3/13/2020. Patient was started on nivolumab  in March 2020    FINDINGS:    Lines and tubes: Right chest wall port catheter with the tip near the  superior cavoatrial junction    Lungs: Metastatic disease with multiple numerous masses in the left  and right lungs. Relative to prior exam there has been significant  enlargement of multiple nodules. For example, there is a mass in the  medial right upper lobe which measures 4.2 x 2.9 cm, compared to 1.9 x  1.3 cm on prior exam, see series 6 image 154. There is a mass in the  left upper lobe measuring 2.5 x 2.2 cm, compared to 1.1 x 1.0 cm on  prior exam, see series 6 image 104. There are multiple new and  enlarging nodules in the right lower lobe, the largest measuring 3.7 x  2.6 cm, see series 6 image 228. Additional scattered small nodules  which are new from prior exam. For example there are several  satellite  nodules surrounding the nodule in the left lower lobe, the largest  measuring 6 mm in diameter, see series 6 image 189. New nodules in the  anterior left upper lobe measuring up to 8 mm, see series 6 images 88  through 103. No pleural effusion or pneumothorax.    Heart and mediastinum: Central tracheobronchial tree is patent. Heart  size is normal. No pericardial effusion.  Thoracic vasculature: Major vascular structures of the thorax are  patent and normal in caliber.  Lymph nodes: New thoracic lymphadenopathy with multiple enlarged  partially necrotic lymph nodes. For example there is a subcarinal node  measuring 2.5 x 1.5 cm, see series 3 image 178. There is a 2.0 x 1.3  cm node adjacent to the main pulmonary artery, see series 3 image 165  there is an enlarged and centrally necrotic lymph node in the upper  left mediastinum measuring 2.1 x 2.4 cm, see series 3 image 66.  Thyroid: No suspicious thyroid nodules.    Liver: No suspicious hepatic mass. There are a few subcentimeter  hypodensities which are too small to fully characterize, for example  there is a 4 mm lesion in segment 7, see series 3 image 275. The  portal veins appear patent.  Gallbladder: No gallstones. No evidence of acute cholecystitis. There  is mild dilatation of the common bile duct measuring up to 10 mm, see  series 4 image 42.  Spleen: Normal in size. No suspicious splenic lesions. Splenic  granulomas and a small accessory splenule noted.  Pancreas: No suspicious pancreatic lesions. The pancreatic duct is not  dilated.  Adrenal glands: There is a 1.7 x 2.2 cm nodule in the right adrenal  gland, which is new from prior exam, see series 3 image 330. Mild  nodular thickening of the left adrenal gland  Kidneys: No stones, hydronephrosis or suspicious renal mass. No  ureteral dilatation.  Bladder / Pelvic organs: Prostate is enlarged measuring 5.4 x 4.4 cm  with multiple large calcifications. Bladder is incompletely distended.  Bladder  wall is moderately thickened in appearance.  Bowel: Small sliding-type hiatal hernia. No abnormally distended small  bowel. Moderate colonic stool burden. Gaseous distention of segments  of the transverse and sigmoid colon measuring up to 7 cm. No definite  evidence of obstruction. No pneumatosis or portal venous gas.  Lymph nodes: No abdominal or pelvic lymphadenopathy..  Peritoneum / Retroperitoneum: No free air or abnormal fluid  collections within the abdomen.  Abdominal vasculature: Major vascular structures of the abdomen are  patent normal in caliber. Mild calcification of the infrarenal aorta  and iliac branches.    Bones and soft tissues: Degenerative changes of the spine and hips and  shoulders. S-shaped rotoscoliotic curvature of the thoracolumbar  spine. Chronic posterior rib fractures noted on the right, including,  a nonunion fracture of the seventh rib.       Impression    IMPRESSION:   In this patient with a history of metastatic squamous cell carcinoma  currently on nivolumab:  1. Metastatic disease of the lung with multiple new and enlarging  pulmonary nodules. Findings favored to represent progression over  pseudoprogression.  2. New mediastinal lymphadenopathy, likely metastatic.  3. Right adrenal gland nodule, new from prior, likely metastatic.  4. Gaseous distention of the transverse and sigmoid colon without  definite obstruction. Findings may represent adynamic ileus.    I have personally reviewed the examination and initial interpretation  and I agree with the findings.    SOPHIE BAZAN MD                Assessment and Plan:  1.  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 He had a follow up PET 3/2020 with evidence of new metastasis to lung for  which he has been started on immunotherapy with nivolumab.      Eduardo had improvement on CT scans following first 3 cycles on nivolumab and has progressed since then. We had reviewed our options including carboplatin with cetuximab vs clinical trial of pembrolizumab with another immunotherapy agent.      He again had difficulty connecting over a video visit and we had to resort to telephone visit.  I could not share the images of his CT scan.  He has marked disease progression which is quite concerning.  I have pasted representative images above.  Most of his lesions in the lung have grown several fold.    I again reviewed our options of clinical trial with pembrolizumab along with ALT-803 versus standard of care chemotherapy with carboplatin and cetuximab.     I reviewed options of carboplatin and cetuximab. Carboplatin is administered intravenously every 3 weeks.  It can cause nausea, vomiting, altered taste and appetite.  It can cause cytopenias.  Cetuximab, on the other hand, is administered intravenously weekly.  This is associated with acneform rash over the face, trunk and back.  The patient has also experienced diarrhea.  The biggest challenge is the rash.         We also reviewed the clinical trial of immunotherapy with ALT-803 and pembrolizumab, which sounds like a promising option because he had an initial response to immunotherapy, should be well tolerated and we would still have the option of standard of care cetuximab/carboplatin if he was to progress on this regimen. Unfortunately, there was some delay and he missed the 6 week window to enroll in.  We had a discussion today about him missing the window and he is still interested in pursuing the clinical trial thus we will give him Opdivo in the next week and then plan on getting restaging scans sometime in the next month at that time he will be in the appropriate window and will be able to enroll and start the trial.      He is still looking forward  to enrolling on the clinical trial.  I will reach out to the clinical trial team to get him started as soon as possible.     2. Anemia, No CBC drawn prior to this visit.      3. Fatigue- immunotherapy contributing. TSH stable. Discussed getting his sleep wake cycle back on.      4. Itching on back and scalp, report of a history of psoriasis, continue Topical hydrocortisone 2.5% bid.     5. Nicotine abuse, chronic smoker. Was last trying nicotine patches per notes. Not addressed at today's visit.      6. Poor social support; lives alone with a dog. Siblings very involved, bringing food over daily, etc.        Video-Visit Details    Type of service:  Video Visit  Originating Location (pt. Location): Home  Distant Location (provider location): [facility] [place]     Platform used for Video Visit: Med Access/ Telephone -patient had difficulty connecting over video and we had to resort to telephone eventually.    35 minutes spent on the date of the encounter doing chart review, history and exam, documentation and further activities as noted above      Javier Ferraro    Hematologist and Medical Oncologist  [facility]

## 2021-02-12 NOTE — PROGRESS NOTES
REASON FOR VISIT:    CANCER STAGE: Cancer Staging  Maxillary sinus cancer (H)  Staging form: Nasal Cavity and Paranasal Sinus - Maxillary Sinus, AJCC 8th Edition  - Pathologic stage from 10/11/2019: Stage MARY (pT4a, pN0, cM0) - Signed by Eric Santillan MD on 10/11/2019        HISTORY OF PRESENT ILLNESS:  Eduardo presented to an outside ED in 08/18/2019 with complaints of right facial pressure, numbness, right-sided visual change and nasal drainage for several days' duration. Imaging workup included an MRI which demonstrated a maxillary sinus mass with extension to the orbit with involvement of the inferior rectus muscle. 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 Mr. Rubio was referred to [facility]. He had staging PET/CT on 9/9/2019 which revealed a FDG-avid maxillary mass at 4.0 x 3.9 x 4.2 cm. There was tumor extension into the right orbital cavity superiorly, the right nasal cavity medially, the retromaxillary fat region posterolaterally, the right pterygopalatine fossa posteriorly, and the premaxillary fat anteriorly. In the orbital cavity there was abutment of the inferior rectus muscle. There was no evidence of lymphadenopathy or systemic disease. His case was reviewed at tumor conference with recommendation for surgery with total maxillectomy and orbital exenteration with free flap reconstruction.     Mr. Rubio underwent a total maxillectomy with orbital exenteration on 9/24/2019 with Dr. Roberts, followed by reconstruction with a latissimus free flap with Dr. Pulliam. Surgical pathology showed a 4.4 cm moderately differentiated squamous cell carcinoma, (-) LVSI, (+)PNI. There was a positive margin at the pterygopalatine fossa, specifically the vidian nerve taken at its exit from the vidian canal, and additional resection into the canal was not possible. Mr. Rubio's case was discussed in the [facility]'s multidisciplinary head and neck  tumor board, with the consensus recommendation to proceed with adjuvant chemoradiation given the positive margin status. Patient received day 1 cisplatin on 11/1/19 and day 22 on 11/20/19 and Day 43 on 12/13/19.      TREATMENT SUMMARY:  - 8/19/19: MRI face and orbit demonstrated a maxillary sinus mass with extension to the orbit with involvement of the inferior rectus muscle. 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  - 9/24/19: Total maxillectomy with orbital exenteration performed by Dr. Roberts, followed by reconstruction with a latissimus free flap with Dr. Pulliam.   - Surgical pathology showed a 4.4 cm moderately differentiated squamous cell carcinoma, (-) LVSI, (+)PNI. There was a positive margin at the pterygopalatine fossa, specifically the vidian nerve taken at its exit from the vidian canal, and additional resection into the canal was not possible.  -s/p total right maxilectomy with orbital exenteration   -surgical margin were positive making it a high risk disease for recurrence.    He completed concurrent chemoradiation with high dose cisplatin day 1, 11/1/19, Day 22 11/20/19, Day 43 12/13/19  - PET/CT on 3/13/20 revealed numerous lung nodules consistent with metastasis and he has been started on nivolumab. Improvement in disease noted on CT 6/2020 and September scan showed worsening disease.      CURRENT INTERVENTIONS:  Nivolumab- recent    I saw Eduardo Rubio for a screening visit. He signed consent for QUILT 3.055 study of N-803 + Nivolumab clinical trial.      He is feeling well. He has minimal complaints today. He says that he has some mild dyspnea on exertion, but otherwise is feeling pretty well. He remains active most of the time and is able to do most of what he wants to do. He continues to walk his dog every day without difficulty even in this current cold stretch. He denies n/v/d, abd pain. He does have some pain in his back that  he uses oxycodone for.  He says this controls his pain well.  He otherwise has no complaints.     Review Of Systems  10-point review of systems were negative except as noted in HPI.        EXAM:  Blood pressure (!) 145/92, pulse 77, temperature 95.7  F (35.4  C), temperature source Tympanic, resp. rate 16, height 1.829 m (6'), weight 70.2 kg (154 lb 11.2 oz), SpO2 96 %.  GEN: alert and oriented x 3, nad  HEENT: patch on R eye, L eye movements are intact and L pupil reactive, eomi, sclera anicteric, oral mucosa moist without thrush  NECK: supple, no palpable LAD  HT: reg rate and rhythm, no murmurs  LUNGS: clear to auscultation bilaterally  ABD: soft, nt, nd, +bs x 4  EXT: no clubbing, cyanosis, or edema  NEURO: CN 2-12 intact, MS 5/5 b/l    Current Outpatient Medications   Medication Sig Dispense Refill     acetaminophen (TYLENOL) 325 MG tablet Take 325-650 mg by mouth every 6 hours as needed for mild pain       cevimeline (EVOXAC) 30 MG capsule Take 1 capsule (30 mg) by mouth 3 times daily 90 capsule 11     cyclobenzaprine (FLEXERIL) 10 MG tablet TK 1 T PO HS PRN FOR MUSCLE SPASM RELIEF  0     hydrocortisone 2.5 % cream Apply topically 2 times daily Apply to itchy spot on scalp and back twice a day as needed 453.6 g 1     NARCAN 4 MG/0.1ML nasal spray WAGNER INTO ONE NOSTRIL. MAY REPEAT IN ALTERNATE NOSTRIL WITHIN 2 MINUTES IF NO OR MINIMAL RESPONSE       oxyCODONE IR (ROXICODONE) 10 MG tablet Takes 1/2 pill (5 mg) 2-3 x daily. --[place] [facility]       senna-docusate (SENOKOT-S/PERICOLACE) 8.6-50 MG tablet 1 tablet by Per Feeding Tube route 2 times daily as needed for constipation 90 tablet 3           Recent Labs   Lab Test 10/23/20  1031   WBC 8.8   HGB 12.1*        @labrcent[na,potassium,chloride,co2,bun,cr@  Recent Labs   Lab Test 01/15/21  1210   PROTTOTAL 8.4   ALBUMIN 3.1*   BILITOTAL 0.5   AST 20   ALT 16   ALKPHOS 86         Recent Results (from the past 744 hour(s))   CT Chest/Abdomen/Pelvis w  Contrast    Narrative    EXAMINATION: CT CHEST/ABDOMEN/PELVIS W CONTRAST, 2/3/2021 12:00 PM    TECHNIQUE:  Helical CT images from the lung apices through the  symphysis pubis were obtained with contrast.  Coronal and sagittal  reformatted images were generated at a workstation for further  assessment.    CONTRAST:  92 ml Isovue 370.    COMPARISON: CT chest abdomen pelvis 9/1/2020, PET/CT exams dated  3/13/2020, and 9/9/2019    HISTORY: metastatic head/neck CA, has been on immunotherapy,;  Malignant neoplasm metastatic to both lungs (H); Malignant neoplasm  metastatic to both lungs (H)    Additional information obtained from EPIC: 60-year-old man with a  history of right maxillary squamous cell carcinoma status post  surgical resection and chemoradiation. Metastatic disease of the lung  identified on PET/CT dated 3/13/2020. Patient was started on nivolumab  in March 2020    FINDINGS:    Lines and tubes: Right chest wall port catheter with the tip near the  superior cavoatrial junction    Lungs: Metastatic disease with multiple numerous masses in the left  and right lungs. Relative to prior exam there has been significant  enlargement of multiple nodules. For example, there is a mass in the  medial right upper lobe which measures 4.2 x 2.9 cm, compared to 1.9 x  1.3 cm on prior exam, see series 6 image 154. There is a mass in the  left upper lobe measuring 2.5 x 2.2 cm, compared to 1.1 x 1.0 cm on  prior exam, see series 6 image 104. There are multiple new and  enlarging nodules in the right lower lobe, the largest measuring 3.7 x  2.6 cm, see series 6 image 228. Additional scattered small nodules  which are new from prior exam. For example there are several satellite  nodules surrounding the nodule in the left lower lobe, the largest  measuring 6 mm in diameter, see series 6 image 189. New nodules in the  anterior left upper lobe measuring up to 8 mm, see series 6 images 88  through 103. No pleural effusion or  pneumothorax.    Heart and mediastinum: Central tracheobronchial tree is patent. Heart  size is normal. No pericardial effusion.  Thoracic vasculature: Major vascular structures of the thorax are  patent and normal in caliber.  Lymph nodes: New thoracic lymphadenopathy with multiple enlarged  partially necrotic lymph nodes. For example there is a subcarinal node  measuring 2.5 x 1.5 cm, see series 3 image 178. There is a 2.0 x 1.3  cm node adjacent to the main pulmonary artery, see series 3 image 165  there is an enlarged and centrally necrotic lymph node in the upper  left mediastinum measuring 2.1 x 2.4 cm, see series 3 image 66.  Thyroid: No suspicious thyroid nodules.    Liver: No suspicious hepatic mass. There are a few subcentimeter  hypodensities which are too small to fully characterize, for example  there is a 4 mm lesion in segment 7, see series 3 image 275. The  portal veins appear patent.  Gallbladder: No gallstones. No evidence of acute cholecystitis. There  is mild dilatation of the common bile duct measuring up to 10 mm, see  series 4 image 42.  Spleen: Normal in size. No suspicious splenic lesions. Splenic  granulomas and a small accessory splenule noted.  Pancreas: No suspicious pancreatic lesions. The pancreatic duct is not  dilated.  Adrenal glands: There is a 1.7 x 2.2 cm nodule in the right adrenal  gland, which is new from prior exam, see series 3 image 330. Mild  nodular thickening of the left adrenal gland  Kidneys: No stones, hydronephrosis or suspicious renal mass. No  ureteral dilatation.  Bladder / Pelvic organs: Prostate is enlarged measuring 5.4 x 4.4 cm  with multiple large calcifications. Bladder is incompletely distended.  Bladder wall is moderately thickened in appearance.  Bowel: Small sliding-type hiatal hernia. No abnormally distended small  bowel. Moderate colonic stool burden. Gaseous distention of segments  of the transverse and sigmoid colon measuring up to 7 cm. No  definite  evidence of obstruction. No pneumatosis or portal venous gas.  Lymph nodes: No abdominal or pelvic lymphadenopathy..  Peritoneum / Retroperitoneum: No free air or abnormal fluid  collections within the abdomen.  Abdominal vasculature: Major vascular structures of the abdomen are  patent normal in caliber. Mild calcification of the infrarenal aorta  and iliac branches.    Bones and soft tissues: Degenerative changes of the spine and hips and  shoulders. S-shaped rotoscoliotic curvature of the thoracolumbar  spine. Chronic posterior rib fractures noted on the right, including,  a nonunion fracture of the seventh rib.       Impression    IMPRESSION:   In this patient with a history of metastatic squamous cell carcinoma  currently on nivolumab:  1. Metastatic disease of the lung with multiple new and enlarging  pulmonary nodules. Findings favored to represent progression over  pseudoprogression.  2. New mediastinal lymphadenopathy, likely metastatic.  3. Right adrenal gland nodule, new from prior, likely metastatic.  4. Gaseous distention of the transverse and sigmoid colon without  definite obstruction. Findings may represent adynamic ileus.    I have personally reviewed the examination and initial interpretation  and I agree with the findings.    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  He signed informed consent with Mike Campa.  We briefly reviewed anticipated toxicity of N-803 injecdions - namely rash and fever and constitutional symptoms. He can use NSAID to tamp down the fever when it occurs.  Typically this improves with subsequent doses.  We discussed the rash - while it may be large area of rash, it typically is not painful.  If it is, we can use topical steroids.     Pt was given the opportunity to ask questions and they were answered to his satisfaction.     Assuming  he remains eligible after screening labs this afternoon, we can really start anytime afterwards.  We will work on scheduling the first day and this could be as soon as next week assuming scheduling works out.       I spent 30 minutes with the patient, reviewing images and records, and in documentation.     [facility]   [facility]  Division of Hematology, Oncology, and Transplantation

## 2021-02-12 NOTE — NURSING NOTE
"Chief Complaint   Patient presents with     Port Draw     Labs drawn via port by RN.     Port accessed with 20g 3/4\" gripper needle and labs drawn by rn.  Port flushed with NS and heparin.  Pt tolerated well. De-accessed.    Urine sample collected.    Cirilo Chowdhury RN  "

## 2021-02-12 NOTE — NURSING NOTE
[id number]: Informed Consent Note     The consent form, including purpose, risks and benefits, was reviewed with Eduardo Rubio, and all questions were answered before he signed the consent form. The patient understands that the study involves an active treatment phase as well as a post-treatment follow up phase.     Present during the discussion was Eduardo. A copy of the signed form was provided to the patient. No procedures specific to this study were performed prior to the patient signing the consent form.    Consent Version Date: 11/10/2020  Consent obtained by: Mike Cisneros    Date: 2/12/2021  HIPAA authorization signed?: yes  HIPAA authorization version date: 03/18/2019  Form 503.03.01 (Version 2)     Effective date: 01AUG2018     Next Review Date: 01AUG2020    [employer]PR [employer] released a Research Participant Information sheet on 7/16/2020 requiring researchers to provide patients with this information sheet within 24-hours of the first planned in-person research activity after this date.     Research participant Eduardo Rubio was provided the information on the Research Participant Information Sheet by Mike Campa RN on February 12, 2021. This document contains information regarding the risks of participating in a research study during the COVID-19 pandemic. Receipt of the information sheet was confirmed by study personnel prior to the visit.    *Note that the OVPR and HRPP only require this information sheet be provided to the participant once as the information it contains is the same regardless of what study(s) the patient is participating in.     The Patient Information Sheet can be found at this link: [email or web address]      Race and ethnicity identification note     I discussed with Eduardo Rubio that the [employer] (NCI) has asked that information on race and ethnicity be obtained from NCI-sponsored studies' participants whenever possible and that the purpose for  this request is to assist the NCI in evaluating whether persons of all races and ethnicity are given the opportunity to participate in new cancer treatment options. It was explained that the information will be kept in a confidential file in the [facility] Clinical Trials Office and will be sent to the NCI in a way in which he cannot be identified. It was also explained that providing this information is voluntary.    Eduardo Rubio provided the following responses:    Ethnicity: Non-  Race: White   Country of birth: [place]  Country of residence: [place]    Mike Cisneros RN     Pager: [phone]    Form 505.00.01 (Version 3)     Effective date: 19JUN2020     Next Review Date: 19JUN2022

## 2021-02-12 NOTE — LETTER
2/12/2021         RE: Eduardo Rubio  [address] [place]  [facility] [address]        Dear Colleague,    Thank you for referring your patient, Eduardo Rubio, to the [facility]. Please see a copy of my visit note below.    REASON FOR VISIT:    CANCER STAGE: Cancer Staging  Maxillary sinus cancer (H)  Staging form: Nasal Cavity and Paranasal Sinus - Maxillary Sinus, AJCC 8th Edition  - Pathologic stage from 10/11/2019: Stage MARY (pT4a, pN0, cM0) - Signed by Eric Santillan MD on 10/11/2019        HISTORY OF PRESENT ILLNESS:  Eduardo presented to an outside ED in 08/18/2019 with complaints of right facial pressure, numbness, right-sided visual change and nasal drainage for several days' duration. Imaging workup included an MRI which demonstrated a maxillary sinus mass with extension to the orbit with involvement of the inferior rectus muscle. 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 Mr. Rubio was referred to [facility]. He had staging PET/CT on 9/9/2019 which revealed a FDG-avid maxillary mass at 4.0 x 3.9 x 4.2 cm. There was tumor extension into the right orbital cavity superiorly, the right nasal cavity medially, the retromaxillary fat region posterolaterally, the right pterygopalatine fossa posteriorly, and the premaxillary fat anteriorly. In the orbital cavity there was abutment of the inferior rectus muscle. There was no evidence of lymphadenopathy or systemic disease. His case was reviewed at tumor conference with recommendation for surgery with total maxillectomy and orbital exenteration with free flap reconstruction.     Mr. Rubio underwent a total maxillectomy with orbital exenteration on 9/24/2019 with Dr. Roberts, followed by reconstruction with a latissimus free flap with Dr. Pulliam. Surgical pathology showed a 4.4 cm moderately differentiated squamous cell carcinoma, (-) LVSI, (+)PNI. There was a positive  margin at the pterygopalatine fossa, specifically the vidian nerve taken at its exit from the vidian canal, and additional resection into the canal was not possible. Mr. Rubio's case was discussed in the [facility]'s multidisciplinary head and neck tumor board, with the consensus recommendation to proceed with adjuvant chemoradiation given the positive margin status. Patient received day 1 cisplatin on 11/1/19 and day 22 on 11/20/19 and Day 43 on 12/13/19.      TREATMENT SUMMARY:  - 8/19/19: MRI face and orbit demonstrated a maxillary sinus mass with extension to the orbit with involvement of the inferior rectus muscle. 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  - 9/24/19: Total maxillectomy with orbital exenteration performed by Dr. Roberts, followed by reconstruction with a latissimus free flap with Dr. Pulliam.   - Surgical pathology showed a 4.4 cm moderately differentiated squamous cell carcinoma, (-) LVSI, (+)PNI. There was a positive margin at the pterygopalatine fossa, specifically the vidian nerve taken at its exit from the vidian canal, and additional resection into the canal was not possible.  -s/p total right maxilectomy with orbital exenteration   -surgical margin were positive making it a high risk disease for recurrence.    He completed concurrent chemoradiation with high dose cisplatin day 1, 11/1/19, Day 22 11/20/19, Day 43 12/13/19  - PET/CT on 3/13/20 revealed numerous lung nodules consistent with metastasis and he has been started on nivolumab. Improvement in disease noted on CT 6/2020 and September scan showed worsening disease.      CURRENT INTERVENTIONS:  Nivolumab- recent    I saw Eduardo Rubio for a screening visit. He signed consent for QUILT 3.055 study of N-803 + Nivolumab clinical trial.      He is feeling well. He has minimal complaints today. He says that he has some mild dyspnea on exertion, but otherwise  is feeling pretty well. He remains active most of the time and is able to do most of what he wants to do. He continues to walk his dog every day without difficulty even in this current cold stretch. He denies n/v/d, abd pain. He does have some pain in his back that he uses oxycodone for.  He says this controls his pain well.  He otherwise has no complaints.     Review Of Systems  10-point review of systems were negative except as noted in HPI.        EXAM:  Blood pressure (!) 145/92, pulse 77, temperature 95.7  F (35.4  C), temperature source Tympanic, resp. rate 16, height 1.829 m (6'), weight 70.2 kg (154 lb 11.2 oz), SpO2 96 %.  GEN: alert and oriented x 3, nad  HEENT: patch on R eye, L eye movements are intact and L pupil reactive, eomi, sclera anicteric, oral mucosa moist without thrush  NECK: supple, no palpable LAD  HT: reg rate and rhythm, no murmurs  LUNGS: clear to auscultation bilaterally  ABD: soft, nt, nd, +bs x 4  EXT: no clubbing, cyanosis, or edema  NEURO: CN 2-12 intact, MS 5/5 b/l    Current Outpatient Medications   Medication Sig Dispense Refill     acetaminophen (TYLENOL) 325 MG tablet Take 325-650 mg by mouth every 6 hours as needed for mild pain       cevimeline (EVOXAC) 30 MG capsule Take 1 capsule (30 mg) by mouth 3 times daily 90 capsule 11     cyclobenzaprine (FLEXERIL) 10 MG tablet TK 1 T PO HS PRN FOR MUSCLE SPASM RELIEF  0     hydrocortisone 2.5 % cream Apply topically 2 times daily Apply to itchy spot on scalp and back twice a day as needed 453.6 g 1     NARCAN 4 MG/0.1ML nasal spray WAGNER INTO ONE NOSTRIL. MAY REPEAT IN ALTERNATE NOSTRIL WITHIN 2 MINUTES IF NO OR MINIMAL RESPONSE       oxyCODONE IR (ROXICODONE) 10 MG tablet Takes 1/2 pill (5 mg) 2-3 x daily. --[facility]       senna-docusate (SENOKOT-S/PERICOLACE) 8.6-50 MG tablet 1 tablet by Per Feeding Tube route 2 times daily as needed for constipation 90 tablet 3           Recent Labs   Lab Test 10/23/20  1031   WBC 8.8   HGB  12.1*        @labrcent[na,potassium,chloride,co2,bun,cr@  Recent Labs   Lab Test 01/15/21  1210   PROTTOTAL 8.4   ALBUMIN 3.1*   BILITOTAL 0.5   AST 20   ALT 16   ALKPHOS 86         Recent Results (from the past 744 hour(s))   CT Chest/Abdomen/Pelvis w Contrast    Narrative    EXAMINATION: CT CHEST/ABDOMEN/PELVIS W CONTRAST, 2/3/2021 12:00 PM    TECHNIQUE:  Helical CT images from the lung apices through the  symphysis pubis were obtained with contrast.  Coronal and sagittal  reformatted images were generated at a workstation for further  assessment.    CONTRAST:  92 ml Isovue 370.    COMPARISON: CT chest abdomen pelvis 9/1/2020, PET/CT exams dated  3/13/2020, and 9/9/2019    HISTORY: metastatic head/neck CA, has been on immunotherapy,;  Malignant neoplasm metastatic to both lungs (H); Malignant neoplasm  metastatic to both lungs (H)    Additional information obtained from EPIC: 60-year-old man with a  history of right maxillary squamous cell carcinoma status post  surgical resection and chemoradiation. Metastatic disease of the lung  identified on PET/CT dated 3/13/2020. Patient was started on nivolumab  in March 2020    FINDINGS:    Lines and tubes: Right chest wall port catheter with the tip near the  superior cavoatrial junction    Lungs: Metastatic disease with multiple numerous masses in the left  and right lungs. Relative to prior exam there has been significant  enlargement of multiple nodules. For example, there is a mass in the  medial right upper lobe which measures 4.2 x 2.9 cm, compared to 1.9 x  1.3 cm on prior exam, see series 6 image 154. There is a mass in the  left upper lobe measuring 2.5 x 2.2 cm, compared to 1.1 x 1.0 cm on  prior exam, see series 6 image 104. There are multiple new and  enlarging nodules in the right lower lobe, the largest measuring 3.7 x  2.6 cm, see series 6 image 228. Additional scattered small nodules  which are new from prior exam. For example there are several  satellite  nodules surrounding the nodule in the left lower lobe, the largest  measuring 6 mm in diameter, see series 6 image 189. New nodules in the  anterior left upper lobe measuring up to 8 mm, see series 6 images 88  through 103. No pleural effusion or pneumothorax.    Heart and mediastinum: Central tracheobronchial tree is patent. Heart  size is normal. No pericardial effusion.  Thoracic vasculature: Major vascular structures of the thorax are  patent and normal in caliber.  Lymph nodes: New thoracic lymphadenopathy with multiple enlarged  partially necrotic lymph nodes. For example there is a subcarinal node  measuring 2.5 x 1.5 cm, see series 3 image 178. There is a 2.0 x 1.3  cm node adjacent to the main pulmonary artery, see series 3 image 165  there is an enlarged and centrally necrotic lymph node in the upper  left mediastinum measuring 2.1 x 2.4 cm, see series 3 image 66.  Thyroid: No suspicious thyroid nodules.    Liver: No suspicious hepatic mass. There are a few subcentimeter  hypodensities which are too small to fully characterize, for example  there is a 4 mm lesion in segment 7, see series 3 image 275. The  portal veins appear patent.  Gallbladder: No gallstones. No evidence of acute cholecystitis. There  is mild dilatation of the common bile duct measuring up to 10 mm, see  series 4 image 42.  Spleen: Normal in size. No suspicious splenic lesions. Splenic  granulomas and a small accessory splenule noted.  Pancreas: No suspicious pancreatic lesions. The pancreatic duct is not  dilated.  Adrenal glands: There is a 1.7 x 2.2 cm nodule in the right adrenal  gland, which is new from prior exam, see series 3 image 330. Mild  nodular thickening of the left adrenal gland  Kidneys: No stones, hydronephrosis or suspicious renal mass. No  ureteral dilatation.  Bladder / Pelvic organs: Prostate is enlarged measuring 5.4 x 4.4 cm  with multiple large calcifications. Bladder is incompletely distended.  Bladder  wall is moderately thickened in appearance.  Bowel: Small sliding-type hiatal hernia. No abnormally distended small  bowel. Moderate colonic stool burden. Gaseous distention of segments  of the transverse and sigmoid colon measuring up to 7 cm. No definite  evidence of obstruction. No pneumatosis or portal venous gas.  Lymph nodes: No abdominal or pelvic lymphadenopathy..  Peritoneum / Retroperitoneum: No free air or abnormal fluid  collections within the abdomen.  Abdominal vasculature: Major vascular structures of the abdomen are  patent normal in caliber. Mild calcification of the infrarenal aorta  and iliac branches.    Bones and soft tissues: Degenerative changes of the spine and hips and  shoulders. S-shaped rotoscoliotic curvature of the thoracolumbar  spine. Chronic posterior rib fractures noted on the right, including,  a nonunion fracture of the seventh rib.       Impression    IMPRESSION:   In this patient with a history of metastatic squamous cell carcinoma  currently on nivolumab:  1. Metastatic disease of the lung with multiple new and enlarging  pulmonary nodules. Findings favored to represent progression over  pseudoprogression.  2. New mediastinal lymphadenopathy, likely metastatic.  3. Right adrenal gland nodule, new from prior, likely metastatic.  4. Gaseous distention of the transverse and sigmoid colon without  definite obstruction. Findings may represent adynamic ileus.    I have personally reviewed the examination and initial interpretation  and I agree with the findings.    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  He signed informed consent with Mike Campa.  We briefly reviewed anticipated toxicity of N-803 injecdions - namely rash and fever and constitutional symptoms. He can use NSAID to tamp down the fever when it occurs.  Typically this improves with  subsequent doses.  We discussed the rash - while it may be large area of rash, it typically is not painful.  If it is, we can use topical steroids.     Pt was given the opportunity to ask questions and they were answered to his satisfaction.     Assuming he remains eligible after screening labs this afternoon, we can really start anytime afterwards.  We will work on scheduling the first day and this could be as soon as next week assuming scheduling works out.       I spent 30 minutes with the patient, reviewing images and records, and in documentation.     [facility]   [facility]  Division of Hematology, Oncology, and Transplantation

## 2021-02-12 NOTE — NURSING NOTE
Oncology Rooming Note    February 12, 2021 2:09 PM   Eduardo Rubio is a 60 year old male who presents for:    Chief Complaint   Patient presents with     Oncology Clinic Visit     Squamous cell carcinoma      Initial Vitals: BP (!) 145/92   Pulse 77   Temp 95.7  F (35.4  C) (Tympanic)   Resp 16   Ht 1.829 m (6')   Wt 70.2 kg (154 lb 11.2 oz)   SpO2 96%   BMI 20.98 kg/m   Estimated body mass index is 20.98 kg/m  as calculated from the following:    Height as of this encounter: 1.829 m (6').    Weight as of this encounter: 70.2 kg (154 lb 11.2 oz). Body surface area is 1.89 meters squared.  No Pain (0) Comment: Data Unavailable   No LMP for male patient.  Allergies reviewed: Yes  Medications reviewed: Yes    Medications: Medication refills not needed today.  Pharmacy name entered into Rank By Search: [facility] [place], [place] - [address] [place] [address] [phone]    Clinical concerns: NO NEW NEEDS TODAY.    Marisa Martin, CMA

## 2021-02-19 NOTE — PROGRESS NOTES
Eduardo is a 60 year old who is being evaluated via a billable telephone visit.      What phone number would you like to be contacted at? [phone]  How would you like to obtain your AVS? 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 Imaging workup included an MRI which demonstrated a maxillary sinus mass with extension to the orbit with involvement of the inferior rectus muscle. 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 Mr. Rubio was referred to [facility]. He had staging PET/CT on 9/9/2019 which revealed a FDG-avid maxillary mass at 4.0 x 3.9 x 4.2 cm. There was tumor extension into the right orbital cavity superiorly, the right nasal cavity medially, the retromaxillary fat region posterolaterally, the right pterygopalatine fossa posteriorly, and the premaxillary fat anteriorly. In the orbital cavity there was abutment of the inferior rectus muscle. There was no evidence of lymphadenopathy or systemic disease. His case was reviewed at tumor conference with recommendation for surgery with total maxillectomy and orbital exenteration with free flap reconstruction.     Mr. Rubio underwent a total maxillectomy with orbital exenteration on 9/24/2019 with Dr. Roberts, followed by reconstruction with a latissimus free flap with Dr. Pulliam. Surgical pathology showed a 4.4 cm moderately differentiated squamous cell carcinoma, (-) LVSI, (+)PNI. There was a positive margin at the pterygopalatine  fossa, specifically the vidian nerve taken at its exit from the vidian canal, and additional resection into the canal was not possible. Mr. Rubio's case was discussed in the [facility]'s multidisciplinary head and neck tumor board, with the consensus recommendation to proceed with adjuvant chemoradiation given the positive margin status. Patient received day 1 cisplatin on 11/1/19 and day 22 on 11/20/19 and Day 43 on 12/13/19.      TREATMENT SUMMARY:  - 8/19/19: MRI face and orbit demonstrated a maxillary sinus mass with extension to the orbit with involvement of the inferior rectus muscle. 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  - 9/24/19: Total maxillectomy with orbital exenteration performed by Dr. Roberts, followed by reconstruction with a latissimus free flap with Dr. Pulliam.   - Surgical pathology showed a 4.4 cm moderately differentiated squamous cell carcinoma, (-) LVSI, (+)PNI. There was a positive margin at the pterygopalatine fossa, specifically the vidian nerve taken at its exit from the vidian canal, and additional resection into the canal was not possible.  -s/p total right maxilectomy with orbital exenteration   -surgical margin were positive making it a high risk disease for recurrence.    He completed concurrent chemoradiation with high dose cisplatin day 1, 11/1/19, Day 22 11/20/19, Day 43 12/13/19  - PET/CT on 3/13/20 revealed numerous lung nodules consistent with metastasis and he has been started on nivolumab. Improvement in disease noted on CT 6/2020 and September scan showed worsening disease.      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      He is a little shortness of breath.  He is still safely living independently- doing all his ADLs.   Family members call or stop by daily, bring food for him.       He admits  in the last month he has been more fatigued.  Still has an erratic sleep schedule, sleeps 6-7 hours at night and then a ~2 hour nap in afternoon.     His appetite is less, but still eating, just smaller meals 3 x daily.  Trying to take in supplements.      Using oxycodone ~3 x daily- chronic back and feet pain.  Pain MD is Dr Meek in [facility]- sees him monthly.       Otherwise ROS is negative.           Current Outpatient Medications   Medication Sig     acetaminophen (TYLENOL) 325 MG tablet Take 325-650 mg by mouth every 6 hours as needed for mild pain     cevimeline (EVOXAC) 30 MG capsule Take 1 capsule (30 mg) by mouth 3 times daily     cyclobenzaprine (FLEXERIL) 10 MG tablet TK 1 T PO HS PRN FOR MUSCLE SPASM RELIEF     hydrocortisone 2.5 % cream Apply topically 2 times daily Apply to itchy spot on scalp and back twice a day as needed     NARCAN 4 MG/0.1ML nasal spray WAGNER INTO ONE NOSTRIL. MAY REPEAT IN ALTERNATE NOSTRIL WITHIN 2 MINUTES IF NO OR MINIMAL RESPONSE     oxyCODONE IR (ROXICODONE) 10 MG tablet Takes 1/2 pill (5 mg) 2-3 x daily. --[facility]     senna-docusate (SENOKOT-S/PERICOLACE) 8.6-50 MG tablet 1 tablet by Per Feeding Tube route 2 times daily as needed for constipation      No current facility-administered medications for this visit.        Physical Examination:  Voice is clear and strong. No audible stridor, wheezing, or respiratory distress. The remainder of PE was deferred due to PHE.         Laboratory Data:     No labs today      Results for orders placed or performed in visit on 02/03/21   CT Chest/Abdomen/Pelvis w Contrast     Narrative     EXAMINATION: CT CHEST/ABDOMEN/PELVIS W CONTRAST, 2/3/2021 12:00 PM     TECHNIQUE:  Helical CT images from the lung apices through the  symphysis pubis were obtained with contrast.  Coronal and sagittal  reformatted images were generated at a workstation for further  assessment.     CONTRAST:  92 ml Isovue 370.     COMPARISON: CT chest  abdomen pelvis 9/1/2020, PET/CT exams dated  3/13/2020, and 9/9/2019     HISTORY: metastatic head/neck CA, has been on immunotherapy,;  Malignant neoplasm metastatic to both lungs (H); Malignant neoplasm  metastatic to both lungs (H)     Additional information obtained from EPIC: 60-year-old man with a  history of right maxillary squamous cell carcinoma status post  surgical resection and chemoradiation. Metastatic disease of the lung  identified on PET/CT dated 3/13/2020. Patient was started on nivolumab  in March 2020     FINDINGS:     Lines and tubes: Right chest wall port catheter with the tip near the  superior cavoatrial junction     Lungs: Metastatic disease with multiple numerous masses in the left  and right lungs. Relative to prior exam there has been significant  enlargement of multiple nodules. For example, there is a mass in the  medial right upper lobe which measures 4.2 x 2.9 cm, compared to 1.9 x  1.3 cm on prior exam, see series 6 image 154. There is a mass in the  left upper lobe measuring 2.5 x 2.2 cm, compared to 1.1 x 1.0 cm on  prior exam, see series 6 image 104. There are multiple new and  enlarging nodules in the right lower lobe, the largest measuring 3.7 x  2.6 cm, see series 6 image 228. Additional scattered small nodules  which are new from prior exam. For example there are several satellite  nodules surrounding the nodule in the left lower lobe, the largest  measuring 6 mm in diameter, see series 6 image 189. New nodules in the  anterior left upper lobe measuring up to 8 mm, see series 6 images 88  through 103. No pleural effusion or pneumothorax.     Heart and mediastinum: Central tracheobronchial tree is patent. Heart  size is normal. No pericardial effusion.  Thoracic vasculature: Major vascular structures of the thorax are  patent and normal in caliber.  Lymph nodes: New thoracic lymphadenopathy with multiple enlarged  partially necrotic lymph nodes. For example there is a subcarinal  node  measuring 2.5 x 1.5 cm, see series 3 image 178. There is a 2.0 x 1.3  cm node adjacent to the main pulmonary artery, see series 3 image 165  there is an enlarged and centrally necrotic lymph node in the upper  left mediastinum measuring 2.1 x 2.4 cm, see series 3 image 66.  Thyroid: No suspicious thyroid nodules.     Liver: No suspicious hepatic mass. There are a few subcentimeter  hypodensities which are too small to fully characterize, for example  there is a 4 mm lesion in segment 7, see series 3 image 275. The  portal veins appear patent.  Gallbladder: No gallstones. No evidence of acute cholecystitis. There  is mild dilatation of the common bile duct measuring up to 10 mm, see  series 4 image 42.  Spleen: Normal in size. No suspicious splenic lesions. Splenic  granulomas and a small accessory splenule noted.  Pancreas: No suspicious pancreatic lesions. The pancreatic duct is not  dilated.  Adrenal glands: There is a 1.7 x 2.2 cm nodule in the right adrenal  gland, which is new from prior exam, see series 3 image 330. Mild  nodular thickening of the left adrenal gland  Kidneys: No stones, hydronephrosis or suspicious renal mass. No  ureteral dilatation.  Bladder / Pelvic organs: Prostate is enlarged measuring 5.4 x 4.4 cm  with multiple large calcifications. Bladder is incompletely distended.  Bladder wall is moderately thickened in appearance.  Bowel: Small sliding-type hiatal hernia. No abnormally distended small  bowel. Moderate colonic stool burden. Gaseous distention of segments  of the transverse and sigmoid colon measuring up to 7 cm. No definite  evidence of obstruction. No pneumatosis or portal venous gas.  Lymph nodes: No abdominal or pelvic lymphadenopathy..  Peritoneum / Retroperitoneum: No free air or abnormal fluid  collections within the abdomen.  Abdominal vasculature: Major vascular structures of the abdomen are  patent normal in caliber. Mild calcification of the infrarenal aorta  and  iliac branches.     Bones and soft tissues: Degenerative changes of the spine and hips and  shoulders. S-shaped rotoscoliotic curvature of the thoracolumbar  spine. Chronic posterior rib fractures noted on the right, including,  a nonunion fracture of the seventh rib.         Impression     IMPRESSION:   In this patient with a history of metastatic squamous cell carcinoma  currently on nivolumab:  1. Metastatic disease of the lung with multiple new and enlarging  pulmonary nodules. Findings favored to represent progression over  pseudoprogression.  2. New mediastinal lymphadenopathy, likely metastatic.  3. Right adrenal gland nodule, new from prior, likely metastatic.  4. Gaseous distention of the transverse and sigmoid colon without  definite obstruction. Findings may represent adynamic ileus.     I have personally reviewed the examination and initial interpretation  and I agree with the findings.     SOPHIE BAZAN MD                 Assessment and Plan:  1.  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 He had a follow up PET 3/2020 with evidence of new metastasis to lung for which he has been started on immunotherapy with nivolumab.      Eduardo had improvement on CT scans following first 3 cycles on nivolumab and has progressed since then. We had reviewed our options including carboplatin with cetuximab vs clinical trial of pembrolizumab with another immunotherapy agent.  He has signed consent for study.  We reviewed the clinical trial of immunotherapy with ALT-803 and pembrolizumab, which sounds like a promising option because he had an initial response to immunotherapy, should be well tolerated and we would still have the option of standard of care  cetuximab/carboplatin if he was to progress on this regimen.  I spoke with Mike Campa, study RN and we will likely be starting next week.     2. Fatigue- disease related.  TSH stable. Discussed getting his sleep wake cycle back on.      3.  Nicotine abuse, chronic smoker. Was last trying nicotine patches per notes. Not addressed at today's visit.      4. Poor social support; lives alone with a dog. Siblings very involved, bringing food over daily, etc.      5. 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  Will reach out to study to ensure this is OK per study.  Was + back in 2006.  Likely indicated old infection.  Will draw RNA quant.    Phone call duration: 15 minutes    Saba Ruggiero PA-C    AMEND: received notification from study RN that  unfortunately his positive hep C antibody disqualifies him for the trial.  I discussed with Dr. Ferraro we will move forward with standard of care using carboplatin and cetuximab.  I called Eduardo with this news on 2-24 to let him know unfortunately we have a change in plan.  He was okay given we have had previously discussed this as a good option for him to.  We briefly discussed side effects including nausea vomiting fatigue myelosuppression from the carboplatin.  We discussed low magnesium rash and diarrhea with the Erbitux.  Both can cause an allergic reaction he will be watched closely.  He will talk to ADRIEN Byrd in the morning for further teaching before getting started.  We will plan to do 2 full cycles and then get another CT of the chest.

## 2021-02-23 NOTE — PROGRESS NOTES
Eduardo is a 60 year old who is being evaluated via a billable video visit.      How would you like to obtain your AVS? Mail a copy  If the video visit is dropped, the invitation should be resent by: Text to cell phone: [phone]  Will anyone else be joining your video visit? No      Unable to connect via video.  Converted to telephone visit.      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 Imaging workup included an MRI which demonstrated a maxillary sinus mass with extension to the orbit with involvement of the inferior rectus muscle. 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 Mr. Rubio was referred to [facility]. He had staging PET/CT on 9/9/2019 which revealed a FDG-avid maxillary mass at 4.0 x 3.9 x 4.2 cm. There was tumor extension into the right orbital cavity superiorly, the right nasal cavity medially, the retromaxillary fat region posterolaterally, the right pterygopalatine fossa posteriorly, and the premaxillary fat anteriorly. In the orbital cavity there was abutment of the inferior rectus muscle. There was no evidence of lymphadenopathy or systemic disease. His case was reviewed at tumor conference with recommendation for surgery with total maxillectomy and orbital exenteration with free flap reconstruction.     Mr. Rubio underwent a total maxillectomy with orbital exenteration on 9/24/2019 with Dr. Roberts, followed by reconstruction with a latissimus free flap with Dr. Pulliam. Surgical pathology showed a 4.4 cm moderately differentiated squamous cell carcinoma, (-) LVSI, (+)PNI. There was a positive margin at the pterygopalatine fossa,  specifically the vidian nerve taken at its exit from the vidian canal, and additional resection into the canal was not possible. Mr. Rubio's case was discussed in the [facility]'s multidisciplinary head and neck tumor board, with the consensus recommendation to proceed with adjuvant chemoradiation given the positive margin status. Patient received day 1 cisplatin on 11/1/19 and day 22 on 11/20/19 and Day 43 on 12/13/19.      TREATMENT SUMMARY:  - 8/19/19: MRI face and orbit demonstrated a maxillary sinus mass with extension to the orbit with involvement of the inferior rectus muscle. 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  - 9/24/19: Total maxillectomy with orbital exenteration performed by Dr. Roberts, followed by reconstruction with a latissimus free flap with Dr. Pulliam.   - Surgical pathology showed a 4.4 cm moderately differentiated squamous cell carcinoma, (-) LVSI, (+)PNI. There was a positive margin at the pterygopalatine fossa, specifically the vidian nerve taken at its exit from the vidian canal, and additional resection into the canal was not possible.  -s/p total right maxilectomy with orbital exenteration   -surgical margin were positive making it a high risk disease for recurrence.    He completed concurrent chemoradiation with high dose cisplatin day 1, 11/1/19, Day 22 11/20/19, Day 43 12/13/19  - PET/CT on 3/13/20 revealed numerous lung nodules consistent with metastasis and he has been started on nivolumab. Improvement in disease noted on CT 6/2020 and September scan showed worsening disease.      CURRENT INTERVENTIONS:  Nivolumab- progressed  Carboplatin and Erbitux starts today February 25, 2021     Interval History:  Eduardo Rubio was met with for follow up over telephone.  - Noting fatigue- present for the past few months. Staying about the same. Napping daily or goes to bed early if not napping.   - Appetite  is decreased and food does not taste good. He is losing weight. Interested in appetite stimulant.   - Has some numbness in hands and feet that comes and goes.  States he has had this for a long time.  It is not interfering with function.  He is not sure when this started.  -He feels that his shortness of breath on exertion is stable to slightly worse.  Denies any new cough.  He does admit to some generalized chest pain.  States that he can push on his chest and reproduce his pain sometimes.  Other times he cannot.  He reports that when he takes a deep breath, he feels like he cannot take a deep breath.  Sometimes taking deep breaths causes pain.  Overall he is still able to walk and move around with minimal difficulty.  Cannot walk for long distances.  Does need to take breaks.  Denies any fevers or chills.  No loss of taste or smell.  Denies any new leg pain or leg swelling.  - Bowels are moving with senna twice a day .  -No headaches, dizziness, mouth irritation, rash.  Pain is controlled on oxycodone.  He is requesting some Tylenol to use in between oxycodone doses.  He had inquired about ibuprofen but is aware of the recommendation to avoid it.  -He admits to only drinking about 2 cups of water in the past 24 hours as he did not have access to water at home.  He plans to pick some water up.     Otherwise ROS is negative.           Current Outpatient Medications   Medication Sig     acetaminophen (TYLENOL) 325 MG tablet Take 325-650 mg by mouth every 6 hours as needed for mild pain     cevimeline (EVOXAC) 30 MG capsule Take 1 capsule (30 mg) by mouth 3 times daily     cyclobenzaprine (FLEXERIL) 10 MG tablet TK 1 T PO HS PRN FOR MUSCLE SPASM RELIEF     hydrocortisone 2.5 % cream Apply topically 2 times daily Apply to itchy spot on scalp and back twice a day as needed     NARCAN 4 MG/0.1ML nasal spray WAGNER INTO ONE NOSTRIL. MAY REPEAT IN ALTERNATE NOSTRIL WITHIN 2 MINUTES IF NO OR MINIMAL RESPONSE     oxyCODONE IR  (ROXICODONE) 10 MG tablet Takes 1/2 pill (5 mg) 2-3 x daily. --[facility]     senna-docusate (SENOKOT-S/PERICOLACE) 8.6-50 MG tablet 1 tablet by Per Feeding Tube route 2 times daily as needed for constipation      No current facility-administered medications for this visit.        Physical Examination:  Voice is clear and strong. No audible stridor, wheezing, or respiratory distress. The remainder of PE was deferred due to PHE.     Brief physical exam was done back in infusion.  Heart is regular without any murmurs or gallops or rubs.  Rate is normal.  No lower extremity edema.  Lungs are clear without any crackles or wheezing.        Laboratory Data:  Results for CLYDE WEST TRACY (MRN [id number]) as of 2/25/2021 12:35   Ref. Range 2/25/2021 08:55 2/25/2021 08:55   Sodium Latest Ref Range: 133 - 144 mmol/L  129 (L)   Potassium Latest Ref Range: 3.4 - 5.3 mmol/L  4.2   Chloride Latest Ref Range: 94 - 109 mmol/L  94   Carbon Dioxide Latest Ref Range: 20 - 32 mmol/L  29   Urea Nitrogen Latest Ref Range: 7 - 30 mg/dL  14   Creatinine Latest Ref Range: 0.66 - 1.25 mg/dL 0.78 0.78   GFR Estimate Latest Ref Range: >60 mL/min/1.73_m2 >90 >90   GFR Estimate If Black Latest Ref Range: >60 mL/min/1.73_m2 >90 >90   Calcium Latest Ref Range: 8.5 - 10.1 mg/dL  9.2   Anion Gap Latest Ref Range: 3 - 14 mmol/L  6   Magnesium Latest Ref Range: 1.6 - 2.3 mg/dL  2.0   Albumin Latest Ref Range: 3.4 - 5.0 g/dL  2.8 (L)   Protein Total Latest Ref Range: 6.8 - 8.8 g/dL  8.3   Bilirubin Total Latest Ref Range: 0.2 - 1.3 mg/dL  0.3   Alkaline Phosphatase Latest Ref Range: 40 - 150 U/L  90   ALT Latest Ref Range: 0 - 70 U/L  18   AST Latest Ref Range: 0 - 45 U/L  30   Troponin I ES Latest Ref Range: 0.000 - 0.045 ug/L  <0.015   Vitamin B12 Latest Ref Range: 193 - 986 pg/mL 487    Glucose Latest Ref Range: 70 - 99 mg/dL  96   WBC Latest Ref Range: 4.0 - 11.0 10e9/L 9.7    Hemoglobin Latest Ref Range: 13.3 - 17.7 g/dL 11.4 (L)     Hematocrit Latest Ref Range: 40.0 - 53.0 % 34.5 (L)    Platelet Count Latest Ref Range: 150 - 450 10e9/L 331    RBC Count Latest Ref Range: 4.4 - 5.9 10e12/L 3.69 (L)    MCV Latest Ref Range: 78 - 100 fl 94    MCH Latest Ref Range: 26.5 - 33.0 pg 30.9    MCHC Latest Ref Range: 31.5 - 36.5 g/dL 33.0    RDW Latest Ref Range: 10.0 - 15.0 % 14.1    Diff Method Unknown Automated Method    % Neutrophils Latest Units: % 81.8    % Lymphocytes Latest Units: % 4.2    % Monocytes Latest Units: % 11.4    % Eosinophils Latest Units: % 1.7    % Basophils Latest Units: % 0.4    % Immature Granulocytes Latest Units: % 0.5    Nucleated RBCs Latest Ref Range: 0 /100 0    Absolute Neutrophil Latest Ref Range: 1.6 - 8.3 10e9/L 8.0    Absolute Lymphocytes Latest Ref Range: 0.8 - 5.3 10e9/L 0.4 (L)    Absolute Monocytes Latest Ref Range: 0.0 - 1.3 10e9/L 1.1    Absolute Eosinophils Latest Ref Range: 0.0 - 0.7 10e9/L 0.2    Absolute Basophils Latest Ref Range: 0.0 - 0.2 10e9/L 0.0    Abs Immature Granulocytes Latest Ref Range: 0 - 0.4 10e9/L 0.1    Absolute Nucleated RBC Unknown 0.0        Preliminary Result   Exam Information    Exam Date Exam Time Accession # Performing Department Results    2/25/21 11:29 AM [id number] [facility] CT Clinic [place]    PACS Images     Show images for CT Chest Pulmonary Embolism w Contrast   Study Result                                                                     IMPRESSION:   In this patient with a history of metastatic squamous cell carcinoma:     1. No evidence for acute pulmonary embolism. No right heart strain.  2. Redemonstration of numerous probable metastatic pulmonary  nodules/masses throughout the lungs some of which now demonstrate  internal cavitation, findings may alternatively represent necrotizing  pneumonia however this is favored to be less likely.  3. No significant change in probable metastatic mediastinal  lymphadenopathy with encasement of  "the left subclavian and left common  carotid artery by the left paratracheal lymph node.  4. Redemonstration of gaseous distention of the transverse colon  without pneumatosis or portal venous gas partially visualized upper  abdomen. Consider dedicated abdominal imaging for further evaluation  if clinically indicated.      Order-Level Documents:    There are no order-level documents      Assessment and Plan:  1.  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 He had a follow up PET 3/2020 with evidence of new metastasis to lung for which he has been started on immunotherapy with nivolumab.      Eduardo had improvement on CT scans following first 3 cycles on nivolumab and has progressed since then.   -Plan to start Carbo/Erbitux today February 25, 2021.  See below regarding work-up of chest discomfort and shortness of breath.  He was counseled on potential adverse effects from these infusions including but not limited to neutropenia, thrombocytopenia, anemia, liver and organ dysfunction, rash, cardiac issues, diarrhea, neuropathy.  - We will plan to do 2 full cycles and then get another CT.  -I feel he would benefit from close follow-up.    2. Fatigue- disease related.  TSH stable. Will refer to cancer rehab.    3. Reported chest pain, shortness of breath on exertion- states it is intermittent and has been present \"for a few weeks.\" States he can reproduce it on exam.  CT pulmonary embolism study today without acute findings per preliminary read from radiologist.  He has had no fevers to suggest infection at this time.  Troponin was unremarkable and EKG was also unremarkable.  I suspect the symptoms are related to his underlying malignancy growth.  He is aware to monitor these and " seek medical attention if they worsen.     4.  Nicotine abuse, chronic smoker. Was last trying nicotine patches per notes. Not addressed at today's visit.      4. Poor social support; lives alone with a dog. Siblings very involved, bringing food over daily, etc.      5. 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  + back in 2006.      6. Poor appetite, weight loss. Will trial low dose marinol. Refer to nutritionist. Advised small, frequent meals and supplemental drinks.     7. Reported baseline, intermittent numbness in fingertips and feet. Unclear etiology. Will check B12/folate levels today. Monitor on carboplatin therapy.     8. Constipation. Controlled with senna. Aware to monitor for diarrhea given initiation of erbitux.     9.  Mild hypotension on presentation.  Suspect related to dehydration as he has not been drinking much for fluids.  Improved with IV fluids.  Aware to try to increase fluid intake especially as he is starting a new regimen for him.    Eduardo Rubio's questions were answered/addressed and [unfilled] is aware to contact the clinic if any of the aforementioned symptoms worsen/change or if new concerning symptoms arise.     40 minutes spent on the date of the encounter doing chart review, review of test results, interpretation of tests and documentation, in addition to 20 minutes spent on the phone with the patient.       Jesi Perdue PA-C

## 2021-02-25 NOTE — TELEPHONE ENCOUNTER
Vivian Lindsey CPhT  [facility]  Oncology Pharmacy Liaison  [email or web address]  Phone: [phone]  Fax: [phone]

## 2021-02-25 NOTE — TELEPHONE ENCOUNTER
PA Initiation    Medication: Dronabinol - Submitted  Insurance Company: [employer] - Phone [phone] Fax [phone]  Pharmacy Filling the Rx:    Filling Pharmacy Phone:    Filling Pharmacy Fax:    Start Date: 2/25/2021        Vivian Lindsey CPhT  [facility]  Oncology Pharmacy Liaison  [email or web address]  Phone: [phone]  Fax: [phone]

## 2021-02-25 NOTE — PATIENT INSTRUCTIONS
Contact Numbers  [facility]: [phone]    After Hours:  [phone]  Triage: [phone]    Please call the [facility] Triage line if you experience a temperature greater than or equal to 100.5, shaking chills, have uncontrolled nausea, vomiting and/or diarrhea, dizziness, shortness of breath, chest pain, bleeding, unexplained bruising, or if you have any other new/concerning symptoms, questions or concerns.     If it is after hours, weekends, or holidays, please call the main hospital  at  [phone] and ask to speak to the Oncology doctor on call.     If you are having any concerning symptoms or wish to speak to a provider before your next infusion visit, please call your care coordinator or triage to notify them so we can adequately serve you.     If you need a refill on a narcotic prescription or other medication, please call triage before your infusion appointment.         February 2021 Sunday Monday Tuesday Wednesday Thursday Friday Saturday        1     2     3    CT CHEST/ABDOMEN/PELVIS W  12:00 PM   (20 min.)   [email or web address]   [facility] [place] 4     5     6       7     8     9    VIDEO VISIT RETURN   2:15 PM   (30 min.)   Javier Ferraro MD   [facility] 10     11     12    RESEARCH COORD 15 W/CONSULT RM  12:45 PM   (60 min.)   Coordinator, [facility]   [facility]    UMP RETURN   1:45 PM   (30 min.)   Jeffrey House DO   [facility]    LAB PERIPHERAL   2:00 PM   (15 min.)   [facility] LAB DRAW   [facility] 13       14     15     16     17     18     19    TELEPHONE VISIT RETURN   2:10 PM   (50 min.)   Gianna Ruggiero PA-C   [facility] 20       21     22     23     24     25    LAB CENTRAL   8:15 AM   (15 min.)   [facility] LAB DRAW   [facility]    VIDEO  VISIT RETURN   8:35 AM   (50 min.)   Jesi Perdue PA-C   [facility] ONC INFUSION 180   9:30 AM   (180 min.)   [facility]   [facility]    CT CHEST PULMONARY EMBOLISM W  12:00 PM   (20 min.)   [email or web address]   [facility] CT [facility] [place] 26     27       28 March 2021 Sunday Monday Tuesday Wednesday Thursday Friday Saturday        1     2     3    VIDEO VISIT RETURN   8:25 AM   (50 min.)   Gianna Ruggiero PA-C   [facility] 4    LAB CENTRAL   9:15 AM   (15 min.)   [facility] DRAW   [facility] ONC INFUSION 240  10:00 AM   (240 min.)    ONCOLOGY INFUSION   [facility] 5     6       7     8     9     10     11    LAB CENTRAL   7:45 AM   (15 min.)    [facility] DRAW   [facility] ONC INFUSION 240   8:30 AM   (240 min.)   [facility] ONCOL[facility]Y INFUSION   [facility] 12     13       14     15    VIDEO VISIT NEW   8:15 AM   (60 min.)   [address]   [facility] 16     17     18    VIDEO VISIT RETURN   9:25 AM   (50 min.)   Jesi Perdue PA-C   [facility]    LAB CENTRAL   1:30 PM   (15 min.)    [facility] DRAW   [facility] ONC INFUSION 180   2:00 PM   (180 min.)    ONCOLOGY INFUSION   [facility] 19     20       21     22     23     24     25     26     27       28     29     30     31                                    Lab Results:  Recent Results (from the past 12 hour(s))   CBC with platelets differential    Collection Time: 02/25/21  8:55 AM   Result Value Ref Range    WBC 9.7 4.0 - 11.0 10e9/L    RBC Count 3.69 (L) 4.4 - 5.9 10e12/L    Hemoglobin 11.4 (L) 13.3 - 17.7 g/dL     Hematocrit 34.5 (L) 40.0 - 53.0 %    MCV 94 78 - 100 fl    MCH 30.9 26.5 - 33.0 pg    MCHC 33.0 31.5 - 36.5 g/dL    RDW 14.1 10.0 - 15.0 %    Platelet Count 331 150 - 450 10e9/L    Diff Method Automated Method     % Neutrophils 81.8 %    % Lymphocytes 4.2 %    % Monocytes 11.4 %    % Eosinophils 1.7 %    % Basophils 0.4 %    % Immature Granulocytes 0.5 %    Nucleated RBCs 0 0 /100    Absolute Neutrophil 8.0 1.6 - 8.3 10e9/L    Absolute Lymphocytes 0.4 (L) 0.8 - 5.3 10e9/L    Absolute Monocytes 1.1 0.0 - 1.3 10e9/L    Absolute Eosinophils 0.2 0.0 - 0.7 10e9/L    Absolute Basophils 0.0 0.0 - 0.2 10e9/L    Abs Immature Granulocytes 0.1 0 - 0.4 10e9/L    Absolute Nucleated RBC 0.0    Creatinine    Collection Time: 02/25/21  8:55 AM   Result Value Ref Range    Creatinine 0.78 0.66 - 1.25 mg/dL    GFR Estimate >90 >60 mL/min/[1.73_m2]    GFR Estimate If Black >90 >60 mL/min/[1.73_m2]   Troponin I    Collection Time: 02/25/21  8:55 AM   Result Value Ref Range    Troponin I ES <0.015 0.000 - 0.045 ug/L   Comprehensive metabolic panel    Collection Time: 02/25/21  8:55 AM   Result Value Ref Range    Sodium 129 (L) 133 - 144 mmol/L    Potassium 4.2 3.4 - 5.3 mmol/L    Chloride 94 94 - 109 mmol/L    Carbon Dioxide 29 20 - 32 mmol/L    Anion Gap 6 3 - 14 mmol/L    Glucose 96 70 - 99 mg/dL    Urea Nitrogen 14 7 - 30 mg/dL    Creatinine 0.78 0.66 - 1.25 mg/dL    GFR Estimate >90 >60 mL/min/[1.73_m2]    GFR Estimate If Black >90 >60 mL/min/[1.73_m2]    Calcium 9.2 8.5 - 10.1 mg/dL    Bilirubin Total 0.3 0.2 - 1.3 mg/dL    Albumin 2.8 (L) 3.4 - 5.0 g/dL    Protein Total 8.3 6.8 - 8.8 g/dL    Alkaline Phosphatase 90 40 - 150 U/L    ALT 18 0 - 70 U/L    AST 30 0 - 45 U/L   Magnesium    Collection Time: 02/25/21  8:55 AM   Result Value Ref Range    Magnesium 2.0 1.6 - 2.3 mg/dL   Vitamin B12    Collection Time: 02/25/21  8:55 AM   Result Value Ref Range    Vitamin B12 487 193 - 986 pg/mL   Folate    Collection Time: 02/25/21  9:45 AM    Result Value Ref Range    Folate 5.2 (L) >5.4 ng/mL   EKG 12-lead complete w/read - Clinics    Collection Time: 02/25/21  9:59 AM   Result Value Ref Range    Interpretation ECG Click View Image link to view waveform and result

## 2021-02-25 NOTE — NURSING NOTE
Chief Complaint   Patient presents with     Port Draw     Labs drawn via port by RN in lab. VS taken.      Port accessed with 20 g gripper needle and labs drawn by rn.  Port flushed with NS and heparin.  Pt tolerated well.  VS taken; low BPs (see flowsheet - pt denies dizziness); notified provider.      Cathy Perdue RN

## 2021-02-25 NOTE — PROGRESS NOTES
"Infusion Nursing Note:  Eduardo Edwardsquist presents today for C1D1 Erbitux/Carboplatin/IVF  Patient seen by provider today: Yes: Jesi JURADO   present during visit today: Not Applicable.    Note: Patient complaint s of shortness of breath half way while ambulating from the entrance of infusion accompanied by writer. States that it came all of a sudden.  Patient had to stop and bend down. Writer noticed patient little wobbly while ambulating but patient denies. Patient refuse any assitance with ambulation, continued with walking with no issues. Denies chest tightness, light headedness nor heart palpitation. SpO2 93% post ambulation, peaks up quickly at 97%. Denies feeling winded when talking. No new complaints made.    First time getting chemotherapy today.Chemotherapy teaching, side effects, and schedule reviewed with patient. Pt instructed to call triage (or MD on call if after hours/weekends) with chills/temp >=100.5. Pt stated understanding of plan.     Patient states that he had 2 cups of fluids for last 24 hours due to availability. Patient was seen by Jesi JURADO at bedside. Patient refused to use wheelchair or be accompanied to Ct scan despite of explanation of safety. Provider noted.    15 minutes into infusion of IV Erbitux, writer noticed facial flushing. Estimated volume went in 50mls. Patient complaint of abdominal discomfort and states \"feeling funny\". Denies warmth feeling,  nausea/vomiting. Denies chest discomfort, shortness of breath. Patient denies facial flushing, despite of writer's observation.  See VS. IV Erbitux stopped. Provider paged.     Symptoms subsided. IV Erbitux restarted. Instruction given to patient as per provider. Verbalized understanding.     IB sent to Jesi Perdue for future infusion if pre medication is needed.     Intravenous Access:  Implanted Port.    Treatment Conditions:  Lab Results   Component Value Date    HGB 11.4 02/25/2021     Lab Results   Component " Value Date    WBC 9.7 02/25/2021      Lab Results   Component Value Date    ANEU 8.0 02/25/2021     Lab Results   Component Value Date     02/25/2021      Lab Results   Component Value Date     02/25/2021                   Lab Results   Component Value Date    POTASSIUM 4.2 02/25/2021           Lab Results   Component Value Date    MAG 2.0 02/25/2021            Lab Results   Component Value Date    CR 0.78 02/25/2021    CR 0.78 02/25/2021                   Lab Results   Component Value Date    MIKEY 9.2 02/25/2021                Lab Results   Component Value Date    BILITOTAL 0.3 02/25/2021           Lab Results   Component Value Date    ALBUMIN 2.8 02/25/2021                    Lab Results   Component Value Date    ALT 18 02/25/2021           Lab Results   Component Value Date    AST 30 02/25/2021       Results reviewed, labs MET treatment parameters, ok to proceed with treatment.      TORB: 2/25/21/0935H/ Jesi JURADO/Gabby Leone RN/ Do EKG, will add blood test and wait for result.    TORB: 2/25/21/1025H/ Jesi JURADO/Gabby Leone RN/ Symptoms noted. BP noted. Will order Chest Ct scan today. Give IV N/S 1000ml. Will see patient later. Hold treatment today.     TORB: 2/25/21/1215H/ Jesi JURADO/Gabby eLone RN/ Noted scan report. To proceed with treatment as planned.       TORB: 2/25/21/1410H/ Jesi JURADO/Gabby Leone RN/Symptom noted. May restart Erbitux at same rate, when symptoms subsided.       Post Infusion Assessment:  Patient tolerated remainder of infusion without incident.  Patient observed for 60 minutes post Erbitux per protocol.  Blood return noted pre and post infusion.  Site patent and intact, free from redness, edema or discomfort.  No evidence of extravasations.  Access discontinued per protocol.       Discharge Plan:   Prescription refills given for Ativan, Zofran, Compazine and Marinol.  Discharge instructions reviewed with: Patient.  Patient and/or family  verbalized understanding of discharge instructions and all questions answered.  Copy of AVS reviewed with patient and/or family.  Patient will return 3/4/21 for next appointment.  Patient discharged in stable condition accompanied by: self.  Departure Mode: Ambulatory.    DAVID WHITLEY RN

## 2021-02-25 NOTE — LETTER
2/25/2021         RE: Eduardo Rubio  [address]  [facility] [address]        Dear Colleague,    Thank you for referring your patient, Eduardo Rubio, to the [facility]. Please see a copy of my visit note below.    Eduardo is a 60 year old who is being evaluated via a billable video visit.      How would you like to obtain your AVS? Mail a copy  If the video visit is dropped, the invitation should be resent by: Text to cell phone: [phone]  Will anyone else be joining your video visit? No      Unable to connect via video.  Converted to telephone visit.      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 Imaging workup included an MRI which demonstrated a maxillary sinus mass with extension to the orbit with involvement of the inferior rectus muscle. 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 Mr. Rubio was referred to [facility]. He had staging PET/CT on 9/9/2019 which revealed a FDG-avid maxillary mass at 4.0 x 3.9 x 4.2 cm. There was tumor extension into the right orbital cavity superiorly, the right nasal cavity medially, the retromaxillary fat region posterolaterally, the right pterygopalatine fossa posteriorly, and the premaxillary fat anteriorly. In the orbital cavity there was abutment of the inferior rectus muscle. There was no evidence of lymphadenopathy or systemic disease. His case was reviewed at tumor conference with recommendation for surgery with total maxillectomy and orbital exenteration with free flap reconstruction.     Mr. Rubio underwent a total maxillectomy with orbital exenteration on  9/24/2019 with Dr. Roberts, followed by reconstruction with a latissimus free flap with Dr. Pulliam. Surgical pathology showed a 4.4 cm moderately differentiated squamous cell carcinoma, (-) LVSI, (+)PNI. There was a positive margin at the pterygopalatine fossa, specifically the vidian nerve taken at its exit from the vidian canal, and additional resection into the canal was not possible. Mr. Rubio's case was discussed in the [facility] multidisciplinary head and neck tumor board, with the consensus recommendation to proceed with adjuvant chemoradiation given the positive margin status. Patient received day 1 cisplatin on 11/1/19 and day 22 on 11/20/19 and Day 43 on 12/13/19.      TREATMENT SUMMARY:  - 8/19/19: MRI face and orbit demonstrated a maxillary sinus mass with extension to the orbit with involvement of the inferior rectus muscle. 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  - 9/24/19: Total maxillectomy with orbital exenteration performed by Dr. Roberts, followed by reconstruction with a latissimus free flap with Dr. Pulliam.   - Surgical pathology showed a 4.4 cm moderately differentiated squamous cell carcinoma, (-) LVSI, (+)PNI. There was a positive margin at the pterygopalatine fossa, specifically the vidian nerve taken at its exit from the vidian canal, and additional resection into the canal was not possible.  -s/p total right maxilectomy with orbital exenteration   -surgical margin were positive making it a high risk disease for recurrence.    He completed concurrent chemoradiation with high dose cisplatin day 1, 11/1/19, Day 22 11/20/19, Day 43 12/13/19  - PET/CT on 3/13/20 revealed numerous lung nodules consistent with metastasis and he has been started on nivolumab. Improvement in disease noted on CT 6/2020 and September scan showed worsening disease.      CURRENT INTERVENTIONS:  Nivolumab- progressed  Carboplatin and  Erbitux starts today February 25, 2021     Interval History:  Eduardo Rubio was met with for follow up over telephone.  - Noting fatigue- present for the past few months. Staying about the same. Napping daily or goes to bed early if not napping.   - Appetite is decreased and food does not taste good. He is losing weight. Interested in appetite stimulant.   - Has some numbness in hands and feet that comes and goes.  States he has had this for a long time.  It is not interfering with function.  He is not sure when this started.  -He feels that his shortness of breath on exertion is stable to slightly worse.  Denies any new cough.  He does admit to some generalized chest pain.  States that he can push on his chest and reproduce his pain sometimes.  Other times he cannot.  He reports that when he takes a deep breath, he feels like he cannot take a deep breath.  Sometimes taking deep breaths causes pain.  Overall he is still able to walk and move around with minimal difficulty.  Cannot walk for long distances.  Does need to take breaks.  Denies any fevers or chills.  No loss of taste or smell.  Denies any new leg pain or leg swelling.  - Bowels are moving with senna twice a day .  -No headaches, dizziness, mouth irritation, rash.  Pain is controlled on oxycodone.  He is requesting some Tylenol to use in between oxycodone doses.  He had inquired about ibuprofen but is aware of the recommendation to avoid it.  -He admits to only drinking about 2 cups of water in the past 24 hours as he did not have access to water at home.  He plans to pick some water up.     Otherwise ROS is negative.           Current Outpatient Medications   Medication Sig     acetaminophen (TYLENOL) 325 MG tablet Take 325-650 mg by mouth every 6 hours as needed for mild pain     cevimeline (EVOXAC) 30 MG capsule Take 1 capsule (30 mg) by mouth 3 times daily     cyclobenzaprine (FLEXERIL) 10 MG tablet TK 1 T PO HS PRN FOR MUSCLE SPASM RELIEF      hydrocortisone 2.5 % cream Apply topically 2 times daily Apply to itchy spot on scalp and back twice a day as needed     NARCAN 4 MG/0.1ML nasal spray WAGNER INTO ONE NOSTRIL. MAY REPEAT IN ALTERNATE NOSTRIL WITHIN 2 MINUTES IF NO OR MINIMAL RESPONSE     oxyCODONE IR (ROXICODONE) 10 MG tablet Takes 1/2 pill (5 mg) 2-3 x daily. --[place] [facility]     senna-docusate (SENOKOT-S/PERICOLACE) 8.6-50 MG tablet 1 tablet by Per Feeding Tube route 2 times daily as needed for constipation      No current facility-administered medications for this visit.        Physical Examination:  Voice is clear and strong. No audible stridor, wheezing, or respiratory distress. The remainder of PE was deferred due to PHE.     Brief physical exam was done back in infusion.  Heart is regular without any murmurs or gallops or rubs.  Rate is normal.  No lower extremity edema.  Lungs are clear without any crackles or wheezing.        Laboratory Data:  Results for CLYDE WEST (MRN [id number]) as of 2/25/2021 12:35   Ref. Range 2/25/2021 08:55 2/25/2021 08:55   Sodium Latest Ref Range: 133 - 144 mmol/L  129 (L)   Potassium Latest Ref Range: 3.4 - 5.3 mmol/L  4.2   Chloride Latest Ref Range: 94 - 109 mmol/L  94   Carbon Dioxide Latest Ref Range: 20 - 32 mmol/L  29   Urea Nitrogen Latest Ref Range: 7 - 30 mg/dL  14   Creatinine Latest Ref Range: 0.66 - 1.25 mg/dL 0.78 0.78   GFR Estimate Latest Ref Range: >60 mL/min/1.73_m2 >90 >90   GFR Estimate If Black Latest Ref Range: >60 mL/min/1.73_m2 >90 >90   Calcium Latest Ref Range: 8.5 - 10.1 mg/dL  9.2   Anion Gap Latest Ref Range: 3 - 14 mmol/L  6   Magnesium Latest Ref Range: 1.6 - 2.3 mg/dL  2.0   Albumin Latest Ref Range: 3.4 - 5.0 g/dL  2.8 (L)   Protein Total Latest Ref Range: 6.8 - 8.8 g/dL  8.3   Bilirubin Total Latest Ref Range: 0.2 - 1.3 mg/dL  0.3   Alkaline Phosphatase Latest Ref Range: 40 - 150 U/L  90   ALT Latest Ref Range: 0 - 70 U/L  18   AST Latest Ref Range: 0 - 45 U/L  30    Troponin I ES Latest Ref Range: 0.000 - 0.045 ug/L  <0.015   Vitamin B12 Latest Ref Range: 193 - 986 pg/mL 487    Glucose Latest Ref Range: 70 - 99 mg/dL  96   WBC Latest Ref Range: 4.0 - 11.0 10e9/L 9.7    Hemoglobin Latest Ref Range: 13.3 - 17.7 g/dL 11.4 (L)    Hematocrit Latest Ref Range: 40.0 - 53.0 % 34.5 (L)    Platelet Count Latest Ref Range: 150 - 450 10e9/L 331    RBC Count Latest Ref Range: 4.4 - 5.9 10e12/L 3.69 (L)    MCV Latest Ref Range: 78 - 100 fl 94    MCH Latest Ref Range: 26.5 - 33.0 pg 30.9    MCHC Latest Ref Range: 31.5 - 36.5 g/dL 33.0    RDW Latest Ref Range: 10.0 - 15.0 % 14.1    Diff Method Unknown Automated Method    % Neutrophils Latest Units: % 81.8    % Lymphocytes Latest Units: % 4.2    % Monocytes Latest Units: % 11.4    % Eosinophils Latest Units: % 1.7    % Basophils Latest Units: % 0.4    % Immature Granulocytes Latest Units: % 0.5    Nucleated RBCs Latest Ref Range: 0 /100 0    Absolute Neutrophil Latest Ref Range: 1.6 - 8.3 10e9/L 8.0    Absolute Lymphocytes Latest Ref Range: 0.8 - 5.3 10e9/L 0.4 (L)    Absolute Monocytes Latest Ref Range: 0.0 - 1.3 10e9/L 1.1    Absolute Eosinophils Latest Ref Range: 0.0 - 0.7 10e9/L 0.2    Absolute Basophils Latest Ref Range: 0.0 - 0.2 10e9/L 0.0    Abs Immature Granulocytes Latest Ref Range: 0 - 0.4 10e9/L 0.1    Absolute Nucleated RBC Unknown 0.0        Preliminary Result   Exam Information    Exam Date Exam Time Accession # Performing Department Results    2/25/21 11:29 AM [id number] [facility] [place]    PACS Images     Show images for CT Chest Pulmonary Embolism w Contrast   Study Result                                                                     IMPRESSION:   In this patient with a history of metastatic squamous cell carcinoma:     1. No evidence for acute pulmonary embolism. No right heart strain.  2. Redemonstration of numerous probable metastatic pulmonary  nodules/masses  "throughout the lungs some of which now demonstrate  internal cavitation, findings may alternatively represent necrotizing  pneumonia however this is favored to be less likely.  3. No significant change in probable metastatic mediastinal  lymphadenopathy with encasement of the left subclavian and left common  carotid artery by the left paratracheal lymph node.  4. Redemonstration of gaseous distention of the transverse colon  without pneumatosis or portal venous gas partially visualized upper  abdomen. Consider dedicated abdominal imaging for further evaluation  if clinically indicated.      Order-Level Documents:    There are no order-level documents      Assessment and Plan:  1.  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 He had a follow up PET 3/2020 with evidence of new metastasis to lung for which he has been started on immunotherapy with nivolumab.      Eduardo had improvement on CT scans following first 3 cycles on nivolumab and has progressed since then.   -Plan to start Carbo/Erbitux today February 25, 2021.  See below regarding work-up of chest discomfort and shortness of breath.  He was counseled on potential adverse effects from these infusions including but not limited to neutropenia, thrombocytopenia, anemia, liver and organ dysfunction, rash, cardiac issues, diarrhea, neuropathy.  - We will plan to do 2 full cycles and then get another CT.  -I feel he would benefit from close follow-up.    2. Fatigue- disease related.  TSH stable. Will refer to cancer rehab.    3. Reported chest pain, shortness of breath on exertion- states it is intermittent and has been present \"for a few weeks.\" States he can reproduce it on exam.  CT pulmonary embolism study today without " acute findings per preliminary read from radiologist.  He has had no fevers to suggest infection at this time.  Troponin was unremarkable and EKG was also unremarkable.  I suspect the symptoms are related to his underlying malignancy growth.  He is aware to monitor these and seek medical attention if they worsen.     4.  Nicotine abuse, chronic smoker. Was last trying nicotine patches per notes. Not addressed at today's visit.      4. Poor social support; lives alone with a dog. Siblings very involved, bringing food over daily, etc.      5. 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  + back in 2006.      6. Poor appetite, weight loss. Will trial low dose marinol. Refer to nutritionist. Advised small, frequent meals and supplemental drinks.     7. Reported baseline, intermittent numbness in fingertips and feet. Unclear etiology. Will check B12/folate levels today. Monitor on carboplatin therapy.     8. Constipation. Controlled with senna. Aware to monitor for diarrhea given initiation of erbitux.     9.  Mild hypotension on presentation.  Suspect related to dehydration as he has not been drinking much for fluids.  Improved with IV fluids.  Aware to try to increase fluid intake especially as he is starting a new regimen for him.    Eduardo Rubio's questions were answered/addressed and [unfilled] is aware to contact the clinic if any of the aforementioned symptoms worsen/change or if new concerning symptoms arise.     40 minutes spent on the date of the encounter doing chart review, review of test results, interpretation of tests and documentation, in addition to 20 minutes spent on the phone with the patient.       Jesi Perdue PA-C      Again, thank you for allowing me to participate in the care of your patient.        Sincerely,        Jesi Perdue PA-C

## 2021-03-01 NOTE — PROGRESS NOTES
I called Eduardo that his Hep C is positive, initially in 2006, appears still active.  Will refer to hepatology.  Saba Ruggiero PA-C

## 2021-03-02 NOTE — TELEPHONE ENCOUNTER
RECORDS RECEIVED FROM: Internal   Appt Date: 03.05.2021   NOTES STATUS DETAILS   OFFICE NOTE from referring provider Internal 03.01.2021 Consult Gianna Ruggiero PA-C   OFFICE NOTES from other specialists Internal 02.25.2021 Jesi Perdue PA-C   DISCHARGE SUMMARY from hospital N/A    MEDICATION LIST Internal / CE    LIVER BIOSPY (IF APPLICABLE)      PATHOLOGY REPORTS  N/A    IMAGING     ENDOSCOPY (IF AVAILABLE) N/A    COLONOSCOPY (IF AVAILABLE) N/A    ULTRASOUND LIVER N/A    CT OF ABDOMEN N/A    MRI OF LIVER N/A    FIBROSCAN, US ELASTOGRAPHY, FIBROSIS SCAN, MR ELASTOGRAPHY N/A    LABS     HEPATIC PANEL (LIVER PANEL) N/A    BASIC METABOLIC PANEL Internal 09.27.2019   COMPLETE METABOLIC PANEL Internal 02.25.2021   COMPLETE BLOOD COUNT (CBC) Internal 02.25.2021   INTERNATIONAL NORMALIZED RATIO (INR) Internal 10.31.2019   HEPATITIS C ANTIBODY Internal 02.12.2021   HEPATITIS C VIRAL LOAD/PCR N/A    HEPATITIS C GENOTYPE N/A    HEPATITIS B SURFACE ANTIGEN Internal 02.12.2021   HEPATITIS B SURFACE ANTIBODY Internal 02.12.2021   HEPATITIS B DNA QUANT LEVEL N/A    HEPATITIS B CORE ANTIBODY N/A

## 2021-03-02 NOTE — PROGRESS NOTES
Eduardo is a 60 year old who is being evaluated via a billable telephone visit.      What phone number would you like to be contacted at? [phone]  How would you like to obtain your AVS? 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      Patient underwent surgical resection with maxillectomy and orbital exenteration with reconstruction using a latissimus free flap and had postoperative chemoradiation completed in 2018. His 3 month post treatment PET scan demonstrated metastatic disease in the lung and he was started on nivolumab in 2020. His restaging imaging in 2020 demonstrated disease progression and he tentatively going to start in a clinical trial.    Oncology History:  - 19: MRI face and orbit demonstrated a maxillary sinus mass with extension to the orbit with involvement of the inferior rectus muscle. 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  - 19: Total maxillectomy with orbital exenteration performed by Dr. Roberts, followed by reconstruction with a latissimus free flap with Dr. Pulliam.   - Surgical pathology demonstrated a 4.4 cm moderately differentiated squamous cell carcinoma, (-) LVSI, (+)PNI. There was a positive margin at the pterygopalatine fossa, specifically the vidian nerve taken at its exit from the vidian canal, and additional resection into the canal was not possible.  -Surgical  margins were positive making it a high risk disease for recurrence.    - Patient completed concurrent chemoradiation with high dose cisplatin day 1, 11/1/19, Day 22 11/20/19, Day 43 12/13/19  - PET/CT on 3/13/20 revealed numerous lung nodules consistent with metastasis and he has been started on nivolumab. Improvement in disease noted on CT 6/2020 and September scan demonstrated worsening disease. Patient continues on immunotherapy with nivomulab.    Patient follows with Dr. Teresa Pulliam, and was last seen in clinic on 1/15/2021. At that time, he continued with hearing loss (seeing audiology), current smoker and continued to have exposed area of bone on right mandible for which he was doing cares as instructed by ENT. He follows with Dr. Ferraro (Oncology) and continues on immunotherapy with nivolumab. He was last seen in clinic with Dr. Ferraro on 2/9/21, and was still experiencing fatigue. It has also been discovered that patient recently continued to test positive for Hep C, so Hepatology consult is pending for further evaluation/management.        Symptoms,  Patient complains of low energy levels during the day. He states that he has had difficulty doing his everyday tasks because he always feels so fatigued and tired. He denies having any falls at home or out in the community.     He states that his appetite has also been quite poor, but feels like his oral intake was better today than it has previously been. He has not worked with a nutritionist in the past.  In addition, he has not worked with outpatient physical therapy post-operatively or otherwise along his disease course.     He denies numbness or tingling in his hands or feet, and also denies any focal muscle weakness. Patient also denies muscle or joint pain or spasms.    He states that he does not exercise at all, and has been pretty inactive at home.     He complains of occasional shortness of breath, especially when going up/down stairs, but states that this  symptoms has been present for several years and has not worsened or progressed in the last few months. He denies any accompanying chest pain or palpitations.       Therapies/HEP,  Outpatient PT referral was placed on 2/25. Patient indicates that he does not want to participate in outpatient PT at this time. He does not do a regular home exercise routine, and is not that active at home per history obtained during today's visit.      Functionally,   Patient is independent with mobility and ADLs/IADLs.             Past Medical and Surgical History:     Past Medical History:   Diagnosis Date     Gastric ulcer      Low back pain      Maxillary sinus cancer (H)      Toe amputation status     all ten toes     Past Surgical History:   Procedure Laterality Date     [place]      [facility] [place] [facility] [place]     AS AMPUTATION TOE,I-P JT Bilateral     all ten toes     EXENTERATION ORBIT Right 9/24/2019    Procedure: Right Orbital Exenteration;  Surgeon: Jose Roberts MD;  Location: [facility] OR     EXPLORE NECK Right 9/24/2019    Procedure: Right Neck Exploration;  Surgeon: Jose Roberts MD;  Location: [facility] OR     GRAFT FREE VASCULARIZED LATISSIMUS DORSI Right 9/24/2019    Procedure: Right Latissmus Free Flap Reconstruction;  Surgeon: Teresa Pulliam MD;  Location: [facility] OR     INSERT PORT VASCULAR ACCESS Right 10/31/2019    Procedure: place venous access chest port;  Surgeon: Natasha Mishra PA-C;  Location: [facility] OR     IR CHEST PORT PLACEMENT > 5 YRS OF AGE  10/31/2019     OSTEOTOMY MAXILLA N/A 9/24/2019    Procedure: Right Radicall Maxillectomy, Nasogastric Tube Placement;  Surgeon: Jose Roberts MD;  Location: [facility] OR            Social History:     Social History     Tobacco Use     Smoking status: Light Tobacco Smoker     Packs/day: 0.50     Years: 15.00     Pack years: 7.50     Types: Cigarettes     Start date: 2004     Smokeless tobacco: Never Used   Substance Use Topics     Alcohol use: Not  Currently       Marital Status: Patient is single  Living situation: Lives in [place] in a single family home. His room is in the basement level down a flight of stairs  Family support: Patient lives with 3 roommates. He also has family in the area including 1 brother and 5 sisters who are available for support.  Vocational History: Previously a [employer], not working currently.  Tobacco use: current smoker, smokes 5-6 cigarettes per day, states that he has cut down from 1 pack of cigarettes/day for several years  Alcohol use: denies         Functional history:     Eduardo Rubio is independent with all aspects of his life except for driving.    ADLs: Independent  Assistive devices: none  iADLs (medication management and finances): Independent  Hand dominance: Right  Driving: not driving           Family History:     Family History   Problem Relation Age of Onset     Breast Cancer Mother      Glaucoma No family hx of      Macular Degeneration No family hx of             Medications:     Current Outpatient Medications   Medication Sig Dispense Refill     acetaminophen (TYLENOL) 325 MG tablet Take 1-2 tablets (325-650 mg) by mouth every 6 hours as needed for mild pain 90 tablet 0     cevimeline (EVOXAC) 30 MG capsule Take 1 capsule (30 mg) by mouth 3 times daily 90 capsule 11     cyclobenzaprine (FLEXERIL) 10 MG tablet TK 1 T PO HS PRN FOR MUSCLE SPASM RELIEF  0     dronabinol (MARINOL) 2.5 MG capsule Take 1 capsule (2.5 mg) by mouth 2 times daily (before meals) 60 capsule 0     folic acid (FOLVITE) 1 MG tablet Take 1 tablet (1 mg) by mouth daily 30 tablet 0     hydrocortisone 2.5 % cream Apply topically 2 times daily Apply to itchy spot on scalp and back twice a day as needed 453.6 g 1     LORazepam (ATIVAN) 0.5 MG tablet Take 1 tablet (0.5 mg) by mouth every 4 hours as needed (Anxiety, Nausea/Vomiting or Sleep) 30 tablet 5     NARCAN 4 MG/0.1ML nasal spray WAGNER INTO ONE NOSTRIL. MAY REPEAT IN ALTERNATE NOSTRIL  WITHIN 2 MINUTES IF NO OR MINIMAL RESPONSE       ondansetron (ZOFRAN) 8 MG tablet Take 1 tablet (8 mg) by mouth every 8 hours as needed (Nausea/Vomiting) 10 tablet 5     oxyCODONE IR (ROXICODONE) 10 MG tablet Takes 1/2 pill (5 mg) 2-3 x daily. --[facility]       prochlorperazine (COMPAZINE) 10 MG tablet Take 1 tablet (10 mg) by mouth every 6 hours as needed (Nausea/Vomiting) 30 tablet 5     senna-docusate (SENOKOT-S/PERICOLACE) 8.6-50 MG tablet 1 tablet by Per Feeding Tube route 2 times daily as needed for constipation 90 tablet 3            Allergies:     No Known Allergies           ROS:     A focused ROS is negative other than the symptoms noted above in the HPI.      Constitutional: denies any fevers, chills  Eyes: denies changes in visual acuity   Ears, Nose, Throat: denies any difficulty swallowing   Cardiovascular: denies any exertional chest pain or palpitation   Respiratory: denies dyspnea   Gastrointestinal: denies any nausea, vomiting, abdominal pain, diarrhea or constipation   Genitourinary: denies any dysuria, hematuria, frequency or urgency   Musculoskeletal: denies any muscle pain, joint pain, neck pain or back pain   Neurologic: denies any headache, changes in motor or sensory function, no loss of balance or vertigo   Psychiatric: denies mood changes; sleeps OK              Physical Examiniation:     VITAL SIGNS: There were no vitals taken for this visit.  BMI: Estimated body mass index is 19.88 kg/m  as calculated from the following:    Height as of 2/12/21: 1.829 m (6').    Weight as of 2/25/21: 66.5 kg (146 lb 9.6 oz).    Unable to complete a Physical Exam as this is a telephone visit.             Laboratory/Imaging:     CT Chest/Abdomen/Pelvis (2/3/21):  IMPRESSION:   In this patient with a history of metastatic squamous cell carcinoma  currently on nivolumab:  1. Metastatic disease of the lung with multiple new and enlarging  pulmonary nodules. Findings favored to represent progression  over  pseudoprogression.  2. New mediastinal lymphadenopathy, likely metastatic.  3. Right adrenal gland nodule, new from prior, likely metastatic.  4. Gaseous distention of the transverse and sigmoid colon without  definite obstruction. Findings may represent adynamic ileus.    CT Chest Pulmonary Embolism W Contrast (2/23/21):  IMPRESSION:   In this patient with a history of metastatic squamous cell carcinoma:  1. No evidence for acute pulmonary embolism. No right heart strain.  2. Redemonstration of numerous probable metastatic pulmonary  nodules/masses throughout the lungs some of which now demonstrate  internal cavitation, findings may alternatively represent necrotizing  pneumonia however this is favored to be less likely.  3. No significant change in probable metastatic mediastinal  lymphadenopathy with encasement of the left subclavian and left common  carotid artery by the left paratracheal lymph node.  4. Redemonstration of gaseous distention of the transverse colon  without pneumatosis or portal venous gas partially visualized upper  abdomen. Consider dedicated abdominal imaging for further evaluation  if clinically indicated.           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 As discussed at length with the patient, recommendation is to start outpatient PT if possible to help with strengthening in the setting of his ongoing fatigue/low energy. Patient does not want to participate with outpatient PT at this time. A Nutrition referral is also recommended to help with any nutritional supplementation that might be needed/improve oral intake, and patient is agreeable to this- it appears the referral has already been placed on 2/25.  Patient is denying numbness in his fingers/hands or feet during our visit today. It was discussed with him that he had mentioned this before to previous providers, though he cannot remember this. He was notified that if he does have these symptoms, to reach out to our clinic, and we can discuss starting medications that could help with this.       1. Patient education: In depth discussion and education was provided about the assessment and implications of each of the below recommendations for management. Patient indicated readiness to learn, all questions were answered and understanding of material presented was confirmed.  2. Therapy/equipment/braces:  1. Strongly recommend outpatient PT to help with strengthening in the setting of his fatigue and likely deconditioning that is resulting in low energy levels. Patient is declining outpatient PT at this time. He was notified that a referral has already been placed by his team, and if he changes his mind, to contact the clinic and he could be scheduled.   2. Also encouraged implementing a home exercise regimen with aerobic exercise such as walking in his home 3 times per week for 20 minutes to help address his deconditioning and prevent muscle wasting.   3. Medications:  1. Patient is denying neuropathic symptoms during our visit today. It was discussed that if this becomes a bothersome symptom for him, we could discuss starting a medication to help with this.  4. Referral / follow up with other providers:  1. Recommend Outpatient PT- Patient declining at this time.  2. Recommend Nutrition referral (order already placed by primary team) to help with oral intake and nutritional supplementation that might be needed. Patient is in agreement with this.  5. Follow up: 2 months for virtual or telephone visit    Effie Smith MD  Physical Medicine & Rehabilitation

## 2021-03-03 PROBLEM — B18.2 HEPATITIS C, CHRONIC (H): Status: ACTIVE | Noted: 2021-01-01

## 2021-03-03 NOTE — LETTER
3/3/2021         RE: Eduardo Rubio  [address]  [facility] [address]        Dear Colleague,    Thank you for referring your patient, Eduardo Rubio, to the [facility]. Please see a copy of my visit note below.    Eduardo is a 60 year old who is being evaluated via a billable telephone visit.      What phone number would you like to be contacted at? [phone]  How would you like to obtain your AVS? 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      Patient underwent surgical resection with maxillectomy and orbital exenteration with reconstruction using a latissimus free flap and had postoperative chemoradiation completed in 2018. His 3 month post treatment PET scan demonstrated metastatic disease in the lung and he was started on nivolumab in 2020. His restaging imaging in 2020 demonstrated disease progression and he tentatively going to start in a clinical trial.    Oncology History:  - 19: MRI face and orbit demonstrated a maxillary sinus mass with extension to the orbit with involvement of the inferior rectus muscle. 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  - 19: Total maxillectomy with orbital exenteration performed by Dr. Roberts, followed by reconstruction with a latissimus free flap with Dr. Pulliam.   - Surgical pathology demonstrated a 4.4 cm  moderately differentiated squamous cell carcinoma, (-) LVSI, (+)PNI. There was a positive margin at the pterygopalatine fossa, specifically the vidian nerve taken at its exit from the vidian canal, and additional resection into the canal was not possible.  -Surgical margins were positive making it a high risk disease for recurrence.    - Patient completed concurrent chemoradiation with high dose cisplatin day 1, 11/1/19, Day 22 11/20/19, Day 43 12/13/19  - PET/CT on 3/13/20 revealed numerous lung nodules consistent with metastasis and he has been started on nivolumab. Improvement in disease noted on CT 6/2020 and September scan demonstrated worsening disease. Patient continues on immunotherapy with nivomulab.    Patient follows with Dr. Teresa Pulliam, and was last seen in clinic on 1/15/2021. At that time, he continued with hearing loss (seeing audiology), current smoker and continued to have exposed area of bone on right mandible for which he was doing cares as instructed by ENT. He follows with Dr. Ferraro (Oncology) and continues on immunotherapy with nivolumab. He was last seen in clinic with Dr. Ferraro on 2/9/21, and was still experiencing fatigue. It has also been discovered that patient recently continued to test positive for Hep C, so Hepatology consult is pending for further evaluation/management.        Symptoms,  Patient complains of low energy levels during the day. He states that he has had difficulty doing his everyday tasks because he always feels so fatigued and tired. He denies having any falls at home or out in the community.     He states that his appetite has also been quite poor, but feels like his oral intake was better today than it has previously been. He has not worked with a nutritionist in the past.  In addition, he has not worked with outpatient physical therapy post-operatively or otherwise along his disease course.     He denies numbness or tingling in his hands or feet, and also denies any  focal muscle weakness. Patient also denies muscle or joint pain or spasms.    He states that he does not exercise at all, and has been pretty inactive at home.     He complains of occasional shortness of breath, especially when going up/down stairs, but states that this symptoms has been present for several years and has not worsened or progressed in the last few months. He denies any accompanying chest pain or palpitations.       Therapies/HEP,  Outpatient PT referral was placed on 2/25. Patient indicates that he does not want to participate in outpatient PT at this time. He does not do a regular home exercise routine, and is not that active at home per history obtained during today's visit.      Functionally,   Patient is independent with mobility and ADLs/IADLs.             Past Medical and Surgical History:     Past Medical History:   Diagnosis Date     Gastric ulcer      Low back pain      Maxillary sinus cancer (H)      Toe amputation status     all ten toes     Past Surgical History:   Procedure Laterality Date     ABDOMEN SURGERY      [facility] [place], surgery related to gastric ulcer     AS AMPUTATION TOE,I-P JT Bilateral     all ten toes     EXENTERATION ORBIT Right 9/24/2019    Procedure: Right Orbital Exenteration;  Surgeon: Jose Roberts MD;  Location: [facility] OR     EXPLORE NECK Right 9/24/2019    Procedure: Right Neck Exploration;  Surgeon: Jose Roberts MD;  Location: [facility] OR     GRAFT FREE VASCULARIZED LATISSIMUS DORSI Right 9/24/2019    Procedure: Right Latissmus Free Flap Reconstruction;  Surgeon: Teresa Pulliam MD;  Location: [facility] OR     INSERT PORT VASCULAR ACCESS Right 10/31/2019    Procedure: place venous access chest port;  Surgeon: Natasha Mishra PA-C;  Location: [facility] OR     IR CHEST PORT PLACEMENT > 5 YRS OF AGE  10/31/2019     OSTEOTOMY MAXILLA N/A 9/24/2019    Procedure: Right Radicall Maxillectomy, Nasogastric Tube Placement;  Surgeon: Jose Roberts MD;  Location: [facility] OR             Social History:     Social History     Tobacco Use     Smoking status: Light Tobacco Smoker     Packs/day: 0.50     Years: 15.00     Pack years: 7.50     Types: Cigarettes     Start date: 2004     Smokeless tobacco: Never Used   Substance Use Topics     Alcohol use: Not Currently       Marital Status: Patient is single  Living situation: Lives in [place] in a single family home. His room is in the basement level down a flight of stairs  Family support: Patient lives with 3 roommates. He also has family in the area including 1 brother and 5 sisters who are available for support.  Vocational History: Previously a [employer], not working currently.  Tobacco use: current smoker, smokes 5-6 cigarettes per day, states that he has cut down from 1 pack of cigarettes/day for several years  Alcohol use: denies         Functional history:     Eduardo Rubio is independent with all aspects of his life except for driving.    ADLs: Independent  Assistive devices: none  iADLs (medication management and finances): Independent  Hand dominance: Right  Driving: not driving           Family History:     Family History   Problem Relation Age of Onset     Breast Cancer Mother      Glaucoma No family hx of      Macular Degeneration No family hx of             Medications:     Current Outpatient Medications   Medication Sig Dispense Refill     acetaminophen (TYLENOL) 325 MG tablet Take 1-2 tablets (325-650 mg) by mouth every 6 hours as needed for mild pain 90 tablet 0     cevimeline (EVOXAC) 30 MG capsule Take 1 capsule (30 mg) by mouth 3 times daily 90 capsule 11     cyclobenzaprine (FLEXERIL) 10 MG tablet TK 1 T PO HS PRN FOR MUSCLE SPASM RELIEF  0     dronabinol (MARINOL) 2.5 MG capsule Take 1 capsule (2.5 mg) by mouth 2 times daily (before meals) 60 capsule 0     folic acid (FOLVITE) 1 MG tablet Take 1 tablet (1 mg) by mouth daily 30 tablet 0     hydrocortisone 2.5 % cream Apply topically 2 times daily Apply to itchy spot on scalp  and back twice a day as needed 453.6 g 1     LORazepam (ATIVAN) 0.5 MG tablet Take 1 tablet (0.5 mg) by mouth every 4 hours as needed (Anxiety, Nausea/Vomiting or Sleep) 30 tablet 5     NARCAN 4 MG/0.1ML nasal spray WAGNER INTO ONE NOSTRIL. MAY REPEAT IN ALTERNATE NOSTRIL WITHIN 2 MINUTES IF NO OR MINIMAL RESPONSE       ondansetron (ZOFRAN) 8 MG tablet Take 1 tablet (8 mg) by mouth every 8 hours as needed (Nausea/Vomiting) 10 tablet 5     oxyCODONE IR (ROXICODONE) 10 MG tablet Takes 1/2 pill (5 mg) 2-3 x daily. --[place] [facility]       prochlorperazine (COMPAZINE) 10 MG tablet Take 1 tablet (10 mg) by mouth every 6 hours as needed (Nausea/Vomiting) 30 tablet 5     senna-docusate (SENOKOT-S/PERICOLACE) 8.6-50 MG tablet 1 tablet by Per Feeding Tube route 2 times daily as needed for constipation 90 tablet 3            Allergies:     No Known Allergies           ROS:     A focused ROS is negative other than the symptoms noted above in the HPI.      Constitutional: denies any fevers, chills  Eyes: denies changes in visual acuity   Ears, Nose, Throat: denies any difficulty swallowing   Cardiovascular: denies any exertional chest pain or palpitation   Respiratory: denies dyspnea   Gastrointestinal: denies any nausea, vomiting, abdominal pain, diarrhea or constipation   Genitourinary: denies any dysuria, hematuria, frequency or urgency   Musculoskeletal: denies any muscle pain, joint pain, neck pain or back pain   Neurologic: denies any headache, changes in motor or sensory function, no loss of balance or vertigo   Psychiatric: denies mood changes; sleeps OK              Physical Examiniation:     VITAL SIGNS: There were no vitals taken for this visit.  BMI: Estimated body mass index is 19.88 kg/m  as calculated from the following:    Height as of 2/12/21: 1.829 m (6').    Weight as of 2/25/21: 66.5 kg (146 lb 9.6 oz).    Unable to complete a Physical Exam as this is a telephone visit.             Laboratory/Imaging:     CT  Chest/Abdomen/Pelvis (2/3/21):  IMPRESSION:   In this patient with a history of metastatic squamous cell carcinoma  currently on nivolumab:  1. Metastatic disease of the lung with multiple new and enlarging  pulmonary nodules. Findings favored to represent progression over  pseudoprogression.  2. New mediastinal lymphadenopathy, likely metastatic.  3. Right adrenal gland nodule, new from prior, likely metastatic.  4. Gaseous distention of the transverse and sigmoid colon without  definite obstruction. Findings may represent adynamic ileus.    CT Chest Pulmonary Embolism W Contrast (2/23/21):  IMPRESSION:   In this patient with a history of metastatic squamous cell carcinoma:  1. No evidence for acute pulmonary embolism. No right heart strain.  2. Redemonstration of numerous probable metastatic pulmonary  nodules/masses throughout the lungs some of which now demonstrate  internal cavitation, findings may alternatively represent necrotizing  pneumonia however this is favored to be less likely.  3. No significant change in probable metastatic mediastinal  lymphadenopathy with encasement of the left subclavian and left common  carotid artery by the left paratracheal lymph node.  4. Redemonstration of gaseous distention of the transverse colon  without pneumatosis or portal venous gas partially visualized upper  abdomen. Consider dedicated abdominal imaging for further evaluation  if clinically indicated.           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 As discussed at length with the patient, recommendation is to start outpatient PT if possible to help with strengthening in the setting of his ongoing fatigue/low energy. Patient does not want  to participate with outpatient PT at this time. A Nutrition referral is also recommended to help with any nutritional supplementation that might be needed/improve oral intake, and patient is agreeable to this- it appears the referral has already been placed on 2/25. Patient is denying numbness in his fingers/hands or feet during our visit today. It was discussed with him that he had mentioned this before to previous providers, though he cannot remember this. He was notified that if he does have these symptoms, to reach out to our clinic, and we can discuss starting medications that could help with this.       1. Patient education: In depth discussion and education was provided about the assessment and implications of each of the below recommendations for management. Patient indicated readiness to learn, all questions were answered and understanding of material presented was confirmed.  2. Therapy/equipment/braces:  1. Strongly recommend outpatient PT to help with strengthening in the setting of his fatigue and likely deconditioning that is resulting in low energy levels. Patient is declining outpatient PT at this time. He was notified that a referral has already been placed by his team, and if he changes his mind, to contact the clinic and he could be scheduled.   2. Also encouraged implementing a home exercise regimen with aerobic exercise such as walking in his home 3 times per week for 20 minutes to help address his deconditioning and prevent muscle wasting.   3. Medications:  1. Patient is denying neuropathic symptoms during our visit today. It was discussed that if this becomes a bothersome symptom for him, we could discuss starting a medication to help with this.  4. Referral / follow up with other providers:  1. Recommend Outpatient PT- Patient declining at this time.  2. Recommend Nutrition referral (order already placed by primary team) to help with oral intake and nutritional supplementation that might be  needed. Patient is in agreement with this.  5. Follow up: 2 months for virtual or telephone visit    Effie Smith MD  Physical Medicine & Rehabilitation

## 2021-03-03 NOTE — PROGRESS NOTES
Eduardo is a 60 year old who is being evaluated via a billable telephone visit.      What phone number would you like to be contacted at? [phone]  How would you like to obtain your AVS? Mail a copy     TONY Hernandez    Phone call duration: 15 minutes    Oncology/Hematology Visit Note  Mar 3, 2021        Reason for Visit: Follow up of Maxillary sinus cancer     History of Present Illness:   Eduardo presented to an outside ED in 08/18/2019 with complaints of right facial pressure, numbness, right-sided visual change and nasal drainage for several days' duration. Imaging workup included an MRI which demonstrated a maxillary sinus mass with extension to the orbit with involvement of the inferior rectus muscle. 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 Mr. Rubio was referred to [facility]. He had staging PET/CT on 9/9/2019 which revealed a FDG-avid maxillary mass at 4.0 x 3.9 x 4.2 cm. There was tumor extension into the right orbital cavity superiorly, the right nasal cavity medially, the retromaxillary fat region posterolaterally, the right pterygopalatine fossa posteriorly, and the premaxillary fat anteriorly. In the orbital cavity there was abutment of the inferior rectus muscle. There was no evidence of lymphadenopathy or systemic disease. His case was reviewed at tumor conference with recommendation for surgery with total maxillectomy and orbital exenteration with free flap reconstruction.     Mr. Rubio underwent a total maxillectomy with orbital exenteration on 9/24/2019 with Dr. Roberts, followed by reconstruction with a latissimus free flap with Dr. Pulliam. Surgical pathology showed a 4.4 cm moderately differentiated squamous cell carcinoma, (-) LVSI, (+)PNI. There was a positive margin at the pterygopalatine fossa, specifically the vidian nerve taken at its exit from the vidian canal, and additional resection into the canal was not possible. Mr. Rubio's  case was discussed in the [facility]'s multidisciplinary head and neck tumor board, with the consensus recommendation to proceed with adjuvant chemoradiation given the positive margin status. Patient received day 1 cisplatin on 11/1/19 and day 22 on 11/20/19 and Day 43 on 12/13/19.      TREATMENT SUMMARY:  - 8/19/19: MRI face and orbit demonstrated a maxillary sinus mass with extension to the orbit with involvement of the inferior rectus muscle. 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  - 9/24/19: Total maxillectomy with orbital exenteration performed by Dr. Roberts, followed by reconstruction with a latissimus free flap with Dr. Pulliam.   - Surgical pathology showed a 4.4 cm moderately differentiated squamous cell carcinoma, (-) LVSI, (+)PNI. There was a positive margin at the pterygopalatine fossa, specifically the vidian nerve taken at its exit from the vidian canal, and additional resection into the canal was not possible.  -s/p total right maxilectomy with orbital exenteration   -surgical margin were positive making it a high risk disease for recurrence.    He completed concurrent chemoradiation with high dose cisplatin day 1, 11/1/19, Day 22 11/20/19, Day 43 12/13/19  - PET/CT on 3/13/20 revealed numerous lung nodules consistent with metastasis and he has been started on nivolumab. Improvement in disease noted on CT 6/2020 and September scan showed worsening disease.      CURRENT INTERVENTIONS:  Nivolumab- progressed  Carboplatin and Erbitux started February 25, 2021 (q3w Carbo + weekly Erbitux)     Interval History:  Eduardo Rubio was met with for follow up over telephone.  He had some moderate fatigue from chemo last week.  Felt very lethargic and sedentary. Feels he is coming out of it now and back to his moderate baseline fatigue.  He still has ANDERSEN that is moderate.  He is feeling like his breathing is stable at rest but when  he walks around his house he develops shortness of breath.  He does sometimes need to take breaks if he feels his breathing is getting labored.  Last week he had a PE scan and an EKG which showed no new changes.  He does not feel like this is changed since chemo last week.  His appetite has been fairly poor and it did worsen after chemotherapy.  Today he states he is feeling better and is having more of an appetite.  He is going to go grocery shopping and try to get some easy foods for him to eat he would appreciate some assistance with meals from social work if possible.  He did develop some constipation post chemo.    Otherwise ROS is negative.              Current Outpatient Medications   Medication Sig     acetaminophen (TYLENOL) 325 MG tablet Take 325-650 mg by mouth every 6 hours as needed for mild pain     cevimeline (EVOXAC) 30 MG capsule Take 1 capsule (30 mg) by mouth 3 times daily     cyclobenzaprine (FLEXERIL) 10 MG tablet TK 1 T PO HS PRN FOR MUSCLE SPASM RELIEF     hydrocortisone 2.5 % cream Apply topically 2 times daily Apply to itchy spot on scalp and back twice a day as needed     NARCAN 4 MG/0.1ML nasal spray WAGNER INTO ONE NOSTRIL. MAY REPEAT IN ALTERNATE NOSTRIL WITHIN 2 MINUTES IF NO OR MINIMAL RESPONSE     oxyCODONE IR (ROXICODONE) 10 MG tablet Takes 1/2 pill (5 mg) 2-3 x daily. --[place] [facility]     senna-docusate (SENOKOT-S/PERICOLACE) 8.6-50 MG tablet 1 tablet by Per Feeding Tube route 2 times daily as needed for constipation      No current facility-administered medications for this visit.        Physical Examination:  Voice is clear and strong. No audible stridor, wheezing, or respiratory distress. The remainder of PE was deferred due to PHE.           Laboratory Data:      2/25/2021 08:55 2/25/2021 08:55   Sodium   129 (L)   Potassium   4.2   Chloride   94   Carbon Dioxide   29   Urea Nitrogen   14   Creatinine 0.78 0.78   GFR Estimate >90 >90   GFR Estimate If Black >90 >90   Calcium    9.2   Anion Gap   6   Magnesium   2.0   Albumin   2.8 (L)   Protein Total   8.3   Bilirubin Total   0.3   Alkaline Phosphatase   90   ALT   18   AST   30   Troponin I ES   <0.015   Vitamin B12 487     Glucose   96   WBC 9.7     Hemoglobin 11.4 (L)     Hematocrit 34.5 (L)     Platelet Count 331     RBC Count 3.69 (L)     MCV 94     MCH 30.9     MCHC 33.0     RDW 14.1     Diff Method Automated Method     % Neutrophils 81.8     % Lymphocytes 4.2     % Monocytes 11.4     % Eosinophils 1.7     % Basophils 0.4     % Immature Granulocytes 0.5     Nucleated RBCs 0     Absolute Neutrophil 8.0     Absolute Lymphocytes 0.4 (L)     Absolute Monocytes 1.1     Absolute Eosinophils 0.2     Absolute Basophils 0.0     Abs Immature Granulocytes 0.1     Absolute Nucleated RBC 0.0        IMPRESSION:   In this patient with a history of metastatic squamous cell carcinoma:     1. No evidence for acute pulmonary embolism. No right heart strain.  2. Redemonstration of numerous probable metastatic pulmonary  nodules/masses throughout the lungs some of which now demonstrate  internal cavitation, findings may alternatively represent necrotizing  pneumonia however this is favored to be less likely.  3. No significant change in probable metastatic mediastinal  lymphadenopathy with encasement of the left subclavian and left common  carotid artery by the left paratracheal lymph node.  4. Redemonstration of gaseous distention of the transverse colon  without pneumatosis or portal venous gas partially visualized upper  abdomen. Consider dedicated abdominal imaging for further evaluation  if clinically indicated.      Assessment and Plan:  1.  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 He had a follow up PET  3/2020 with evidence of new metastasis to lung for which he has been started on immunotherapy with nivolumab. Eduardo had improvement on CT scans following first 3 cycles on nivolumab and has progressed since then.   We attempted to get him on a clinical trial however his hepatitis C returned positive which disqualified him    Eduardo started Carbo/Erbitux today February 25, 2021.    He developed fatigue and anorexia for the first 3 to 4 days post chemo.  Today he is starting to feel a little bit better.   -Anorexia this has been ongoing.  He states a short burst of Dex has helped in the past and we will try this again.  I will reach out to social work to see if we can get him help with meal delivery since he lives alone.   -Constipation secondary to decreased intake and chemotherapy.  He will start on a stool softener.   -Ongoing moderate fatigue, secondary to metastatic disease and new chemotherapy.  Referral to PT has been made, he meets with PMR physician today as well.  He is on board with all assistance.   -Will see him every couple weeks in oncology clinic and plan for a CT scan after 2 full cycles       2. Ongoing shortness of breath on exertion- CT pulmonary embolism last week was negative. Troponin was unremarkable and EKG was also unremarkable.  We discussed his ongoing decreased stamina, ongoing smoking and worsening lung metastasis being the cause.  His O2 SATs have been above 90 on RA.  We'll continue to monitor.     3.  Nicotine abuse, chronic smoker. He is still smoking ~5-6 cigarettes a day.     4. Poor social support; lives alone with a dog. Siblings very involved, bringing food over daily, etc.      5. 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  + back in 2006.  Quant is +, referral to Hepatology--this week.      6.  Constipation. Controlled with senna. Aware to monitor for diarrhea given initiation of erbitux.      7.  Nutrition: poor, as stated above, will seek help from SW, give adequate IVF  during infusions and try a pulse of dex    Saba Ruggiero PA-C

## 2021-03-03 NOTE — PATIENT INSTRUCTIONS
1. As we discussed, recommendation is for you to start outpatient PT to help with strengthening and aerobic activity for your deconditioning, fatigue and low energy levels.   2. Recommendation is to talk with one of our Nutritionists to help you with your appetite, food choices and any nutritional supplementation you might need.  3. If numbness, tingling start in your hands or feet that is bothersome, please contact the clinic, as Dr. Smith can discuss medication options to help with management.   4. Return to clinic with Dr. Smith for a virtual or telephone visit in 2 months.

## 2021-03-04 NOTE — PATIENT INSTRUCTIONS
Contact Numbers  [facility]: [phone]    After Hours:  [phone]  Triage: [phone]    Please call the [facility] Triage line if you experience a temperature greater than or equal to 100.5, shaking chills, have uncontrolled nausea, vomiting and/or diarrhea, dizziness, shortness of breath, chest pain, bleeding, unexplained bruising, or if you have any other new/concerning symptoms, questions or concerns.     If it is after hours, weekends, or holidays, please call the main hospital  at  [phone] and ask to speak to the Oncology doctor on call.     If you are having any concerning symptoms or wish to speak to a provider before your next infusion visit, please call your care coordinator or triage to notify them so we can adequately serve you.     If you need a refill on a narcotic prescription or other medication, please call triage before your infusion appointment.         March 2021 Sunday Monday Tuesday Wednesday Thursday Friday Saturday        1     2     3    TELEPHONE VISIT RETURN   8:40 AM   (50 min.)   Gianna Ruggiero PA-C   [facility]    TELEPHONE VISIT RETURN  10:00 AM   (60 min.)   Effie Smith MD   [facility] 4    LAB CENTRAL   9:15 AM   (15 min.)   [facility] LAB DRAW   [facility] ONC INFUSION 240  10:00 AM   (240 min.)    ONCOLOGY INFUSION   [facility] 5    TELEPHONE VISIT NEW   9:45 AM   (45 min.)   Abdulaziz Pickens PA-C   [facility] [place] 6       7     8     9     10     11    LAB CENTRAL   7:45 AM   (15 min.)   [facility] LAB DRAW   [facility] ONC INFUSION 240   8:30 AM   (240 min.)    ONCOLOGY INFUSION   [facility] 12     13       14     15    VIDEO VISIT NEW   8:15 AM   (60 min.)   Alyson Morales RD   [facility]    NEURO EVAL  12:15 PM   (60 min.)   Sandra Preciado PT   [facility] 16     17     18    VIDEO VISIT RETURN   9:25 AM   (50 min.)   Jesi Perdue PA-C   [facility]    LAB CENTRAL   1:30 PM   (15 min.)   [facility] LAB DRAW   [facility]    [facility] ONC INFUSION 180   2:00 PM   (180 min.)   [facility] ONCOLOGY INFUSION   [facility] 19     20       21     22     23     24     25     26     27       28     29     30     31 April 2021 Sunday Monday Tuesday Wednesday Thursday Friday Saturday                       1     2     3       4     5     6     7     8     9     10       11     12     13     14     15     16     17       18     19     20     21     22     23     24       25     26     27     28     29     30                          Lab Results:  Recent Results (from the past 12 hour(s))   CBC with platelets differential    Collection Time: 03/04/21  8:48 AM   Result Value Ref Range    WBC 5.9 4.0 - 11.0 10e9/L    RBC Count 3.67 (L) 4.4 - 5.9 10e12/L    Hemoglobin 11.2 (L) 13.3 - 17.7 g/dL    Hematocrit 32.9 (L) 40.0 - 53.0 %    MCV 90 78 - 100 fl    MCH 30.5 26.5 - 33.0 pg    MCHC 34.0 31.5 - 36.5 g/dL    RDW 13.5 10.0 - 15.0 %    Platelet Count 362 150 - 450 10e9/L    Diff Method Automated Method     % Neutrophils 83.4 %    % Lymphocytes 3.9 %    % Monocytes 10.3 %    % Eosinophils 1.4 %    % Basophils 0.3 %    % Immature Granulocytes 0.7 %    Nucleated RBCs 0 0 /100    Absolute Neutrophil 4.9 1.6 - 8.3 10e9/L    Absolute Lymphocytes 0.2 (L) 0.8 - 5.3 10e9/L    Absolute Monocytes 0.6 0.0 - 1.3 10e9/L    Absolute Eosinophils 0.1 0.0 - 0.7 10e9/L    Absolute Basophils 0.0 0.0 - 0.2 10e9/L    Abs Immature Granulocytes 0.0 0 - 0.4 10e9/L    Absolute Nucleated RBC 0.0    Comprehensive metabolic panel    Collection Time: 03/04/21  8:48 AM    Result Value Ref Range    Sodium 132 (L) 133 - 144 mmol/L    Potassium 3.3 (L) 3.4 - 5.3 mmol/L    Chloride 95 94 - 109 mmol/L    Carbon Dioxide 31 20 - 32 mmol/L    Anion Gap 6 3 - 14 mmol/L    Glucose 100 (H) 70 - 99 mg/dL    Urea Nitrogen 35 (H) 7 - 30 mg/dL    Creatinine 1.02 0.66 - 1.25 mg/dL    GFR Estimate 79 >60 mL/min/[1.73_m2]    GFR Estimate If Black >90 >60 mL/min/[1.73_m2]    Calcium 8.8 8.5 - 10.1 mg/dL    Bilirubin Total 0.3 0.2 - 1.3 mg/dL    Albumin 3.1 (L) 3.4 - 5.0 g/dL    Protein Total 8.1 6.8 - 8.8 g/dL    Alkaline Phosphatase 75 40 - 150 U/L    ALT 30 0 - 70 U/L    AST 34 0 - 45 U/L

## 2021-03-04 NOTE — PROGRESS NOTES
Infusion Nursing Note:  Eduardo Rubio presents today for C1 D8 Erbitux.    Patient seen by provider today: No   present during visit today: Not Applicable.    Note: Pt walked back stably to infusion with RN. States he is feeling better and drank 2 bottles of water and 2 cups of juice overnight. VSS in lab. Pt denies any changes or concerns overnight since visit with ADRIEN Ingram.    Intravenous Access:  Implanted Port.    Treatment Conditions:  Lab Results   Component Value Date    HGB 11.2 03/04/2021     Lab Results   Component Value Date    WBC 5.9 03/04/2021      Lab Results   Component Value Date    ANEU 4.9 03/04/2021     Lab Results   Component Value Date     03/04/2021      Lab Results   Component Value Date     03/04/2021                   Lab Results   Component Value Date    POTASSIUM 3.3 03/04/2021           Lab Results   Component Value Date    MAG 2.0 02/25/2021            Lab Results   Component Value Date    CR 1.02 03/04/2021                   Lab Results   Component Value Date    MIKEY 8.8 03/04/2021                Lab Results   Component Value Date    BILITOTAL 0.3 03/04/2021           Lab Results   Component Value Date    ALBUMIN 3.1 03/04/2021                    Lab Results   Component Value Date    ALT 30 03/04/2021           Lab Results   Component Value Date    AST 34 03/04/2021       Results reviewed, labs MET treatment parameters, ok to proceed with treatment.    Okay to replace potassium per protocol per TORB ADRIEN Byrd/Feliberto Giron RN @0956 3/4/21. Potassium replaced orally.      Post Infusion Assessment:  Patient tolerated infusion without incident.  Patient observed for 30 minutes post Erbitux per protocol.  Blood return noted pre and post infusion.  Site patent and intact, free from redness, edema or discomfort.  No evidence of extravasations.  Access discontinued per protocol.       Discharge Plan:   Patient declined prescription  refills.  Discharge instructions reviewed with: Patient.  Patient and/or family verbalized understanding of discharge instructions and all questions answered.  Copy of AVS reviewed with patient and/or family.  Patient will return 3/11 for next appointment.  Patient discharged in stable condition accompanied by: self.  Departure Mode: Ambulatory.    Henrietta Kearney RN

## 2021-03-04 NOTE — NURSING NOTE
Chief Complaint   Patient presents with     Port Draw     Labs drawn from port by RN in lab. Vitals taken. Checked into next appointment.      Port accessed with 20 gauge gripper needle by RN in lab.  Port flushed with saline and heparin.  Vital signs taken.  Pt checked in to next appointment.    Debby Holt RN

## 2021-03-04 NOTE — PROGRESS NOTES
Social Work Follow-Up  [facility]    Data/Intervention:  Patient Name:  Eduardo Rubio  /Age:  1960 (60 year old)    Reason for Follow-Up:  Meal Delivery Resources    Collaborated With:    -pt    Intervention/Education/Resources Provided:  SW received in basket message asking to provide pt with meal delivery resources. SW called pt and introduced self, Sw also introduced [employer] to pt and explained how the program works. Pt was interested in applying and SW filled out application on pt's behalf and faxed it to [employer]. Pt was informed that [employer] will reach out to pt to let them know if they have been approved and what the next steps are. SW asked pt if there are any other needs they would like to address and pt denied at this time. Pt has SW contact information for any other questions or concerns that come up.    Assessment/Plan:  SW to remain available as needed.  Previously provided patient/family with writer's contact information and availability.       JERSON Wilson,LGSW  Hematology/Oncology Social Worker  Phone:[phone] Pager: [phone]
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
## 2021-03-05 NOTE — Clinical Note
Patient needs labs. He gets weekly chemotherapy infusions, can this be arranged to be done at that time.     Thanks, Abdulaziz
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
## 2021-03-12 NOTE — PROGRESS NOTES
Infusion Nursing Note:  Eduardo Rubio presents today for Cycle 1 Day 15 Erbitux.    Patient seen by provider today: No   present during visit today: Not Applicable.    Note: Patient arrives to infusion today feeling well. Denies any SOB, chest pain, fever, chills or cough. Does complain of mild fatigue. Also reports of intermittent constipation, but is using senna as needed and states it has been working well.     Magnesium 1.4 today. Per patient request, oral magnesium sent home with patient per electrolyte replacement protocol.     IV benadryl given with premedications per pharmacy's recommendations as unsure if patient under reported first infusion reaction symptoms.     Intravenous Access:  Implanted Port.    Treatment Conditions:  Lab Results   Component Value Date    HGB 11.2 03/04/2021     Lab Results   Component Value Date    WBC 5.9 03/04/2021      Lab Results   Component Value Date    ANEU 4.9 03/04/2021     Lab Results   Component Value Date     03/04/2021      Lab Results   Component Value Date     03/12/2021                   Lab Results   Component Value Date    POTASSIUM 3.4 03/12/2021           Lab Results   Component Value Date    MAG 1.4 03/12/2021            Lab Results   Component Value Date    CR 1.02 03/04/2021                   Lab Results   Component Value Date    MIKEY 8.8 03/04/2021                Lab Results   Component Value Date    BILITOTAL 0.3 03/04/2021           Lab Results   Component Value Date    ALBUMIN 3.1 03/04/2021                    Lab Results   Component Value Date    ALT 30 03/04/2021           Lab Results   Component Value Date    AST 34 03/04/2021       Post Infusion Assessment:  Patient tolerated infusion without incident.  Patient observed for 30 minutes post Erbitux per protocol.  Blood return noted pre and post infusion.  Site patent and intact, free from redness, edema or discomfort.  No evidence of extravasations.  Access discontinued per  protocol.     Discharge Plan:   Prescription refills given for Senna and Magnesium.  Discharge instructions reviewed with: Patient.  Patient and/or family verbalized understanding of discharge instructions and all questions answered.  Copy of AVS reviewed with patient and/or family.  Patient will return 3/18 for next appointment.  Patient discharged in stable condition accompanied by: self.  Departure Mode: Ambulatory.    Snadra Jesus RN

## 2021-03-12 NOTE — PATIENT INSTRUCTIONS
Contact Numbers  [facility]: [phone] (for symptom and scheduling needs)    Please call the [facility] Triage line if you experience a temperature greater than or equal to 100.4, shaking chills, have uncontrolled nausea, vomiting and/or diarrhea, dizziness, shortness of breath, chest pain, bleeding, unexplained bruising, or if you have any other new/concerning symptoms, questions or concerns.     If you are having any concerning symptoms or wish to speak to a provider before your next infusion visit, please call your care coordinator or triage to notify them so we can adequately serve you.     If you need a refill on a narcotic prescription or other medication, please call triage before your infusion appointment.          March 2021 Sunday Monday Tuesday Wednesday Thursday Friday Saturday        1     2     3    TELEPHONE VISIT RETURN   8:40 AM   (50 min.)   Gianna Ruggiero PA-C   [facility]    TELEPHONE VISIT RETURN  10:00 AM   (60 min.)   Effie Smith MD   [facility] 4    LAB CENTRAL   9:15 AM   (15 min.)   [facility] LAB DRAW   [facility] ONC INFUSION 240  10:00 AM   (240 min.)    ONCOLOGY INFUSION   [facility] 5    TELEPHONE VISIT NEW   9:45 AM   (45 min.)   Abdulaziz Pickens PA-C   [facility] Hepatology [facility] 6       7     8     9     10     11    LAB CENTRAL   7:45 AM   (15 min.)   [facility] LAB DRAW   [facility] ONC INFUSION 240   8:30 AM   (240 min.)    ONCOLOGY INFUSION   [facility] 12    LAB CENTRAL  12:45 PM   (15 min.)   [facility] LAB DRAW   [facility] ONC INFUSION 240   1:30 PM   (240 min.)    ONCOLOGY INFUSION   [facility] 13       14     15    VIDEO VISIT NEW   8:15 AM   (60 min.)   Gena  Alyson Skaggs, RD   [facility]    NEURO EVAL  12:15 PM   (60 min.)   Sandra Preciado PT   [facility] [place] 16    TELEPHONE VISIT RETURN   7:50 AM   (50 min.)   Gianna Ruggiero PA-C   [facility] 17     18    LAB CENTRAL   1:30 PM   (15 min.)    [facility] LAB DRAW   [facility] ONC INFUSION 180   2:00 PM   (180 min.)   [facility] ONCOLOGY INFUSION   [facility] 19     20       21     22     23     24     25     26    LAB CENTRAL   1:00 PM   (15 min.)    [facility] LAB DRAW   [facility] ONC INFUSION 180   1:30 PM   (180 min.)   [facility] ONCOLOGY INFUSION   [facility] 27       28     29     30     31 April 2021 Sunday Monday Tuesday Wednesday Thursday Friday Saturday                       1    LAB CENTRAL   7:30 AM   (15 min.)   [facility] LAB DRAW   [facility]    [facility] ONC INFUSION 180   8:00 AM   (180 min.)   [facility] ONCOLOGY INFUSION   [facility] 2     3       4     5    LAB CENTRAL  10:30 AM   (15 min.)    [facility] LAB DRAW   [facility]    CT CHEST/ABDOMEN/PELVIS W  11:20 AM   (20 min.)   [id number]   [facility] [place] 6    TELEPHONE VISIT RETURN   8:40 AM   (50 min.)   Gianna Ruggiero PA-C   [facility] 7    [facility] ONC INFUSION 180  10:30 AM   (180 min.)   [facility] ONCOLOGY INFUSION   [facility] 8     9     10       11     12     13     14     15     16     17       18     19     20     21     22     23     24       25     26     27     28     29     30                          Lab Results:  Recent Results (from the past 12 hour(s))   Magnesium    Collection Time: 03/12/21 12:35 PM   Result Value Ref Range     Magnesium 1.4 (L) 1.6 - 2.3 mg/dL   Electrolyte panel    Collection Time: 03/12/21 12:35 PM   Result Value Ref Range    Sodium 134 133 - 144 mmol/L    Potassium 3.4 3.4 - 5.3 mmol/L    Chloride 98 94 - 109 mmol/L    Carbon Dioxide 31 20 - 32 mmol/L    Anion Gap 5 3 - 14 mmol/L

## 2021-03-15 NOTE — TELEPHONE ENCOUNTER
LVM letting pt know that provider as of this weekend will be out on a leave of absence, so appt for today (3/15) at 12:30pm has been cancelled.    Left  Rehab number ([phone]) for rescheduling.

## 2021-03-15 NOTE — LETTER
"    3/15/2021         RE: Eduardo Rubio  [address]  [place] [address]        Dear Colleague,    Thank you for referring your patient, Eduardo Rubio, to the [facility]. Please see a copy of my visit note below.    The patient has been notified of the following:      \"We have found that certain health care needs can be provided without the need for a face to face visit.  This service lets us provide the care you need with a phone conversation.       I will have full access to your [place] medical record during this entire phone call.   I will be taking notes for your medical record.      Since this is like an office visit, we will bill your insurance company for this service.       There are potential benefits and risks of telephone visits (e.g. limits to patient confidentiality) that differ from in-person visits.?  Confidentiality still applies for telephone services, and nobody will record the visit.  It is important to be in a quiet, private space that is free of distractions (including cell phone or other devices) during the visit.??      If during the course of the call I believe a telephone visit is not appropriate, you will not be charged for this service\"     Consent has been obtained for this service by care team member: Yes       CLINICAL NUTRITION SERVICES - REASSESSMENT NOTE   EVALUATION OF PREVIOUS PLAN OF CARE:   Referring Physician: ABEBA Byrd  Time spent with patient: 30 minutes.    Current diet: general  Current appetite/intake: fair, improved   PEG Tube: No  Chemotherapy: HD Cisplatin 2019; Carboplatin/Erbitux at present  Radiation: Completed 2019  Surgery: total maxillectomy with orbital exenteration on 9/24/2019 with Dr. Roberts, followed by reconstruction with a latissimus free flap with Dr. Pulliam      Monitoring from previous assessment:   -Food intake - Eduardo tells me that his appetite has improved from 2-3 weeks ago.  He was Rx Marinol, " which he took for 3-4 days. He thinks it worked but stopped taking it as his appetite was improved overall.   He cannot associate appetite changes with chemotherapy.  He denies taste aversions and dysgeusia at this time.   He is pleased with his improved appetite and intake. He has been eating 3 meals/day and snacking as needed.    -Fluid/beverage intake - drinking only 4 cups water and juice/day.    -Liquid meal replacement or supplement - takes one ONS (Atkins or Orgain 3-4 times/week in the form of a shake/smoothie (fruit, vegetables etc).  -Weight trends - down 10 lb x past 6 months (7%)  Wt Readings from Last 12 Encounters:   03/12/21 66.5 kg (146 lb 11.2 oz)   03/04/21 65.4 kg (144 lb 1.6 oz)   02/25/21 66.5 kg (146 lb 9.6 oz)   02/12/21 70.2 kg (154 lb 11.2 oz)   01/15/21 67.6 kg (149 lb 1.6 oz)   10/23/20 70.9 kg (156 lb 4.8 oz)   10/16/20 70.8 kg (156 lb)   10/06/20 68 kg (150 lb)   09/15/20 70.1 kg (154 lb 9.6 oz)   08/18/20 69.2 kg (152 lb 9.6 oz)   07/16/20 70.4 kg (155 lb 1.6 oz)   07/13/20 69.4 kg (153 lb)     Previous Goals:   1.  Aim for 5-6 small frequent meals  2.  Aim for 2500kcal and 110g protein/day  3. Weight maintenance   Evaluation: Not met   Previous Nutrition Diagnosis:   Inadequate oral intake related to odynophagia as evidenced by weight loss, patient report     Evaluation: Completed   NEW FINDINGS:   Weight loss   CURRENT NUTRITION DIAGNOSIS   Inadequate oral intake related to decreased appetite as evidenced by 7% wt loss x past 6 months.    INTERVENTIONS   Recommendations / Nutrition Prescription   1. Take one ONS daily - Ensure Plus, Boost Plus or Ensure Enlive  2. >2200kcal, and 80 g protein/day via small, frequent meals  3. Increase water intake to >8 cups non-caffeine containing beverages daily. Incorporate electrolytes as able  Implementation  General/healthful diet and Medical Food Supplement - reviewed ONS shakes with higher calories per 8 oz volume. Encouraged to take one  shake/day in smoothie.    Reviewed ways to add calories to current food choices.    Reviewed calorie, protein and hydration needs as above.   Encouraged to take Marinol as needed if PO intake/appetites declines.  Goals  1.  Aim for 5-6 small frequent meals  2.  Aim for 2200kcal and 80g protein/day  3. Weight maintenance/weight gain towards IBW        Follow-Up Plans: Pt has RD contact information for questions.  Advised to reach out with further nutrition questions prn     Alyson Skaggs RDN, LD

## 2021-03-15 NOTE — PROGRESS NOTES
"The patient has been notified of the following:      \"We have found that certain health care needs can be provided without the need for a face to face visit.  This service lets us provide the care you need with a phone conversation.       I will have full access to your [facility] medical record during this entire phone call.   I will be taking notes for your medical record.      Since this is like an office visit, we will bill your insurance company for this service.       There are potential benefits and risks of telephone visits (e.g. limits to patient confidentiality) that differ from in-person visits.?  Confidentiality still applies for telephone services, and nobody will record the visit.  It is important to be in a quiet, private space that is free of distractions (including cell phone or other devices) during the visit.??      If during the course of the call I believe a telephone visit is not appropriate, you will not be charged for this service\"     Consent has been obtained for this service by care team member: Yes       CLINICAL NUTRITION SERVICES - REASSESSMENT NOTE   EVALUATION OF PREVIOUS PLAN OF CARE:   Referring Physician: ABEBA Byrd  Time spent with patient: 30 minutes.    Current diet: general  Current appetite/intake: fair, improved   PEG Tube: No  Chemotherapy: HD Cisplatin 2019; Carboplatin/Erbitux at present  Radiation: Completed 2019  Surgery: total maxillectomy with orbital exenteration on 9/24/2019 with Dr. Roberts, followed by reconstruction with a latissimus free flap with Dr. Pulliam      Monitoring from previous assessment:   -Food intake - Eduardo tells me that his appetite has improved from 2-3 weeks ago.  He was Rx Marinol, which he took for 3-4 days. He thinks it worked but stopped taking it as his appetite was improved overall.   He cannot associate appetite changes with chemotherapy.  He denies taste aversions and dysgeusia at this time.   He is pleased with his improved " appetite and intake. He has been eating 3 meals/day and snacking as needed.    -Fluid/beverage intake - drinking only 4 cups water and juice/day.    -Liquid meal replacement or supplement - takes one ONS (Atkins or Orgain 3-4 times/week in the form of a shake/smoothie (fruit, vegetables etc).  -Weight trends - down 10 lb x past 6 months (7%)  Wt Readings from Last 12 Encounters:   03/12/21 66.5 kg (146 lb 11.2 oz)   03/04/21 65.4 kg (144 lb 1.6 oz)   02/25/21 66.5 kg (146 lb 9.6 oz)   02/12/21 70.2 kg (154 lb 11.2 oz)   01/15/21 67.6 kg (149 lb 1.6 oz)   10/23/20 70.9 kg (156 lb 4.8 oz)   10/16/20 70.8 kg (156 lb)   10/06/20 68 kg (150 lb)   09/15/20 70.1 kg (154 lb 9.6 oz)   08/18/20 69.2 kg (152 lb 9.6 oz)   07/16/20 70.4 kg (155 lb 1.6 oz)   07/13/20 69.4 kg (153 lb)     Previous Goals:   1.  Aim for 5-6 small frequent meals  2.  Aim for 2500kcal and 110g protein/day  3. Weight maintenance   Evaluation: Not met   Previous Nutrition Diagnosis:   Inadequate oral intake related to odynophagia as evidenced by weight loss, patient report     Evaluation: Completed   NEW FINDINGS:   Weight loss   CURRENT NUTRITION DIAGNOSIS   Inadequate oral intake related to decreased appetite as evidenced by 7% wt loss x past 6 months.    INTERVENTIONS   Recommendations / Nutrition Prescription   1. Take one ONS daily - Ensure Plus, Boost Plus or Ensure Enlive  2. >2200kcal, and 80 g protein/day via small, frequent meals  3. Increase water intake to >8 cups non-caffeine containing beverages daily. Incorporate electrolytes as able  Implementation  General/healthful diet and Medical Food Supplement - reviewed ONS shakes with higher calories per 8 oz volume. Encouraged to take one shake/day in smoothie.    Reviewed ways to add calories to current food choices.    Reviewed calorie, protein and hydration needs as above.   Encouraged to take Marinol as needed if PO intake/appetites declines.  Goals  1.  Aim for 5-6 small frequent meals  2.   Aim for 2200kcal and 80g protein/day  3. Weight maintenance/weight gain towards IBW        Follow-Up Plans: Pt has RD contact information for questions.  Advised to reach out with further nutrition questions prn     Alyson Skaggs RDN, LD

## 2021-03-16 NOTE — PROGRESS NOTES
Eduardo is a 60 year old who is being evaluated via a billable telephone visit.      What phone number would you like to be contacted at? [phone]  How would you like to obtain your AVS? Mail a copy     TONY Hernandez    Oncology/Hematology Visit Note  Mar 16, 2021       Reason for Visit: Follow up of Maxillary sinus cancer     History of Present Illness:   Eduardo presented to an outside ED in 08/18/2019 with complaints of right facial pressure, numbness, right-sided visual change and nasal drainage for several days' duration. Imaging workup included an MRI which demonstrated a maxillary sinus mass with extension to the orbit with involvement of the inferior rectus muscle. 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 Mr. Rubio was referred to [facility]. He had staging PET/CT on 9/9/2019 which revealed a FDG-avid maxillary mass at 4.0 x 3.9 x 4.2 cm. There was tumor extension into the right orbital cavity superiorly, the right nasal cavity medially, the retromaxillary fat region posterolaterally, the right pterygopalatine fossa posteriorly, and the premaxillary fat anteriorly. In the orbital cavity there was abutment of the inferior rectus muscle. There was no evidence of lymphadenopathy or systemic disease. His case was reviewed at tumor conference with recommendation for surgery with total maxillectomy and orbital exenteration with free flap reconstruction.     Mr. Rubio underwent a total maxillectomy with orbital exenteration on 9/24/2019 with Dr. Roberts, followed by reconstruction with a latissimus free flap with Dr. Pulliam. Surgical pathology showed a 4.4 cm moderately differentiated squamous cell carcinoma, (-) LVSI, (+)PNI. There was a positive margin at the pterygopalatine fossa, specifically the vidian nerve taken at its exit from the vidian canal, and additional resection into the canal was not possible. Mr. Rubio's case was discussed in the  [facility]'s multidisciplinary head and neck tumor board, with the consensus recommendation to proceed with adjuvant chemoradiation given the positive margin status. Patient received day 1 cisplatin on 11/1/19 and day 22 on 11/20/19 and Day 43 on 12/13/19.      TREATMENT SUMMARY:  - 8/19/19: MRI face and orbit demonstrated a maxillary sinus mass with extension to the orbit with involvement of the inferior rectus muscle. 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  - 9/24/19: Total maxillectomy with orbital exenteration performed by Dr. Roberts, followed by reconstruction with a latissimus free flap with Dr. Pulliam.   - Surgical pathology showed a 4.4 cm moderately differentiated squamous cell carcinoma, (-) LVSI, (+)PNI. There was a positive margin at the pterygopalatine fossa, specifically the vidian nerve taken at its exit from the vidian canal, and additional resection into the canal was not possible.  -s/p total right maxilectomy with orbital exenteration   -surgical margin were positive making it a high risk disease for recurrence.    He completed concurrent chemoradiation with high dose cisplatin day 1, 11/1/19, Day 22 11/20/19, Day 43 12/13/19  - PET/CT on 3/13/20 revealed numerous lung nodules consistent with metastasis and he has been started on nivolumab. Improvement in disease noted on CT 6/2020 and September scan showed worsening disease.      CURRENT INTERVENTIONS:  Nivolumab- progressed  Carboplatin and Erbitux started February 25, 2021 (q3w Carbo + weekly Erbitux)     Interval History:  Eduardo Rubio was met with for follow up over telephone.   After the carboplatin and Erbitux he had moderate levels of fatigue, lethargy, and increased sedentary activity.  He had ongoing dyspnea on exertion that was moderate in intensity.  This dyspnea on exertion was actually present prior to starting treatment.  We have been following him  weekly because of this.  Today Eduardo states he is doing pretty well.  He states that dyspnea on exertion is completely gone.  He is walking room to room and up the steps without any problems.  His fatigue is back to baseline.  In fact it is probably a little bit better.  He used to take a nap every afternoon and now he has stopped the naps.  He is sleeping a little bit better at night.  His appetite is improved he is now eating 3 times a day.  I did give him a small amount of dexamethasone the last week which helped.  He is now off and doing pretty well.     No fever sweats chills.  Breathing improved as stated above.  No diarrhea or rash from the Erbitux.  No neuropathy.  Mood is stable.  He spoke with social work, they are working on getting open arms set up for him.  He has spoken with nutrition.     Otherwise ROS is negative.              Current Outpatient Medications   Medication Sig     acetaminophen (TYLENOL) 325 MG tablet Take 325-650 mg by mouth every 6 hours as needed for mild pain     cevimeline (EVOXAC) 30 MG capsule Take 1 capsule (30 mg) by mouth 3 times daily     cyclobenzaprine (FLEXERIL) 10 MG tablet TK 1 T PO HS PRN FOR MUSCLE SPASM RELIEF     hydrocortisone 2.5 % cream Apply topically 2 times daily Apply to itchy spot on scalp and back twice a day as needed     NARCAN 4 MG/0.1ML nasal spray WAGNER INTO ONE NOSTRIL. MAY REPEAT IN ALTERNATE NOSTRIL WITHIN 2 MINUTES IF NO OR MINIMAL RESPONSE     oxyCODONE IR (ROXICODONE) 10 MG tablet Takes 1/2 pill (5 mg) 2-3 x daily. --[facility]     senna-docusate (SENOKOT-S/PERICOLACE) 8.6-50 MG tablet 1 tablet by Per Feeding Tube route 2 times daily as needed for constipation      No current facility-administered medications for this visit.        Physical Examination:  Voice is clear and strong. No audible stridor, wheezing, or respiratory distress. The remainder of PE was deferred due to PHE.            Laboratory Data:  Labs next on 3/18    Assessment  and Plan:  1.  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 He had a follow up PET 3/2020 with evidence of new metastasis to lung for which he has been started on immunotherapy with nivolumab. Eduardo had improvement on CT scans following first 3 cycles on nivolumab and has progressed since then.   We attempted to get him on a clinical trial however his hepatitis C returned positive which disqualified him     Eduardo started Carbo/Erbitux today February 25, 2021.    He developed fatigue and anorexia for the first 3 to 4 days post chemo.  Today he is starting to feel a little bit better.              -Anorexia this has been ongoing.    Improved with the dexamethasone.  He now is eating 3 times daily, weight is up.  He is off Dex now thus we will keep monitoring.  Spoke with nutrition.              -Constipation secondary to decreased intake and chemotherapy.  He will start on a stool softener.  No diarrhea.              -Ongoing moderate fatigue, secondary to metastatic disease and new chemotherapy.  Referral to PT has been made, he meets with PMR physician today as well.  He is on board with all assistance.  This has improved, he is no longer napping in the afternoon              -Will see him every couple weeks in oncology clinic and plan for a CT scan after 2 full cycles        2. Ongoing shortness of breath on exertion- CT pulmonary embolism last week was negative. Troponin was unremarkable and EKG was also unremarkable.  We discussed his ongoing decreased stamina, ongoing smoking and worsening lung metastasis being the cause.  His O2 SATs have been above 90 on RA.    Today he states this has completely resolved.  He was short of breath walking from room to room in his house  and especially up the stairs.  He now states that he has not noticed any of this in the last week.  I suspect this is potentially related to improved disease.     3.  Nicotine abuse, chronic smoker. He is still smoking ~5-6 cigarettes a day.     4. Poor social support; lives alone with a dog. Siblings very involved, bringing food over daily, etc.      5. 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  + back in 2006.  Quant is +, met with hepatology.  At this time we will wait to see if treatment is covered and also wait to see if Eduardo response to treatment.  His stage IV head and neck cancer prognosis is about 2 years.  I spoke with hepatology and we reviewed that his cancer gives him the worse prognosis over the hep C.  However many of our clinical trials require hep C to be treated thus I spoke with some of our other oncologist regarding should we treat this.  Apparently some of the studies would disqualify him regardless of whether it is treated or not.  At this time I think we likely will not move forward with treating the hep C.     6.  Constipation. Controlled with senna. Aware to monitor for diarrhea given initiation of erbitux.      7.  Nutrition: Improving.  Social work has sent in the application for open arms, waiting for approval.         Phone call duration: 15 minutes

## 2021-03-18 NOTE — PATIENT INSTRUCTIONS
Contact Numbers    [facility] Main Line: [phone]  [facility] Triage and after hours / weekends / holidays: [phone]      Please call the triage or after hours line if you experience a temperature greater than or equal to 100.4, shaking chills, have uncontrolled nausea, vomiting and/or diarrhea, dizziness, shortness of breath, chest pain, bleeding, unexplained bruising, or if you have any other new/concerning symptoms, questions or concerns.      If you are having any concerning symptoms or wish to speak to a provider before your next infusion visit, please call your care coordinator or triage to notify them so we can adequately serve you.     If you need a refill on a narcotic prescription or other medication, please call before your infusion appointment.       March 2021 Sunday Monday Tuesday Wednesday Thursday Friday Saturday        1     2     3    TELEPHONE VISIT RETURN   8:40 AM   (50 min.)   Gianna Ruggiero PA-C   [facility]    TELEPHONE VISIT RETURN  10:00 AM   (60 min.)   Effie Smith MD   [facility] 4    LAB CENTRAL   9:15 AM   (15 min.)    [facility] DRAW   [facility] ONC INFUSION 240  10:00 AM   (240 min.)    ONCOLOGY INFUSION   [facility] 5    TELEPHONE VISIT NEW   9:45 AM   (45 min.)   Abdulaziz Pickens PA-C   [facility] [place] 6       7     8     9     10     11    LAB CENTRAL   7:45 AM   (15 min.)   [facility] DRAW   [facility] ONC INFUSION 240   8:30 AM   (240 min.)   [facility] ONCOLOGY INFUSION   [facility] 12    LAB CENTRAL  12:45 PM   (15 min.)    [facility] DRAW   [facility] ONC INFUSION 240   1:30 PM   (240 min.)    ONCOLOGY INFUSION   [facility] 13       14     15    VIDEO VISIT NEW   8:15  AM   (60 min.)   Alyson Morales RD   [facility] 16    TELEPHONE VISIT RETURN   7:50 AM   (50 min.)   Gianna Ruggiero PA-C [facility] 17     18    LAB CENTRAL   1:30 PM   (15 min.)    [facility] DRAW   [facility] ONC INFUSION 180   2:00 PM   (180 min.)   [facility] ONCOLOGY INFUSION   [facility] 19     20       21     22     23     24     25     26    LAB CENTRAL   1:00 PM   (15 min.)    [facility] DRAW   [facility] ONC INFUSION 180   1:30 PM   (180 min.)   [facility] ONCOLOGY INFUSION   [facility] 27       28     29 30 31 April 2021 Sunday Monday Tuesday Wednesday Thursday Friday Saturday                       1    LAB CENTRAL   7:30 AM   (15 min.)    [facility] DRAW   [facility] ONC INFUSION 180   8:00 AM   (180 min.)   [facility] ONCOLOGY INFUSION   [facility] 2     3       4     5    NEURO EVAL   9:15 AM   (60 min.)   Denia Hazel PT   [facility] [place]    LAB CENTRAL  10:30 AM   (15 min.)    [facility] LAB DRAW   [facility]    CT CHEST/ABDOMEN/PELVIS W  11:20 AM   (20 min.)   [id number]   [facility] [place] [facility] [place] 6    TELEPHONE VISIT RETURN   8:40 AM   (50 min.)   Gianna Ruggiero PA-C [facility] 7    [facility] ONC INFUSION 180  10:30 AM   (180 min.)   [facility] ONCOLOGY INFUSION   [facility] 8     9     10       11     12     13     14     15     16     17       18     19     20     21     22     23     24       25     26    TELEPHONE VISIT RETURN  10:20 AM   (50 min.)   Gianna Ruggiero PA-C [facility] 27     28    LAB CENTRAL   6:30 AM   (15  min.)   [facility] LAB DRAW   [facility]    [facility] ONC INFUSION 180   7:00 AM   (180 min.)    ONCOLOGY INFUSION   [facility] 29     30                         Recent Results (from the past 24 hour(s))   CBC with platelets differential    Collection Time: 03/18/21  1:27 PM   Result Value Ref Range    WBC 4.8 4.0 - 11.0 10e9/L    RBC Count 3.28 (L) 4.4 - 5.9 10e12/L    Hemoglobin 10.2 (L) 13.3 - 17.7 g/dL    Hematocrit 30.1 (L) 40.0 - 53.0 %    MCV 92 78 - 100 fl    MCH 31.1 26.5 - 33.0 pg    MCHC 33.9 31.5 - 36.5 g/dL    RDW 14.5 10.0 - 15.0 %    Platelet Count 99 (L) 150 - 450 10e9/L    Diff Method Automated Method     % Neutrophils 85.8 %    % Lymphocytes 6.5 %    % Monocytes 6.1 %    % Eosinophils 0.8 %    % Basophils 0.4 %    % Immature Granulocytes 0.4 %    Nucleated RBCs 0 0 /100    Absolute Neutrophil 4.1 1.6 - 8.3 10e9/L    Absolute Lymphocytes 0.3 (L) 0.8 - 5.3 10e9/L    Absolute Monocytes 0.3 0.0 - 1.3 10e9/L    Absolute Eosinophils 0.0 0.0 - 0.7 10e9/L    Absolute Basophils 0.0 0.0 - 0.2 10e9/L    Abs Immature Granulocytes 0.0 0 - 0.4 10e9/L    Absolute Nucleated RBC 0.0    Comprehensive metabolic panel    Collection Time: 03/18/21  1:27 PM   Result Value Ref Range    Sodium 134 133 - 144 mmol/L    Potassium 3.2 (L) 3.4 - 5.3 mmol/L    Chloride 98 94 - 109 mmol/L    Carbon Dioxide 30 20 - 32 mmol/L    Anion Gap 6 3 - 14 mmol/L    Glucose 102 (H) 70 - 99 mg/dL    Urea Nitrogen 14 7 - 30 mg/dL    Creatinine 0.73 0.66 - 1.25 mg/dL    GFR Estimate >90 >60 mL/min/[1.73_m2]    GFR Estimate If Black >90 >60 mL/min/[1.73_m2]    Calcium 8.2 (L) 8.5 - 10.1 mg/dL    Bilirubin Total 0.2 0.2 - 1.3 mg/dL    Albumin 2.9 (L) 3.4 - 5.0 g/dL    Protein Total 7.0 6.8 - 8.8 g/dL    Alkaline Phosphatase 78 40 - 150 U/L    ALT 28 0 - 70 U/L    AST 19 0 - 45 U/L   Magnesium    Collection Time: 03/18/21  1:27 PM   Result Value Ref Range    Magnesium 1.6 1.6 - 2.3 mg/dL

## 2021-03-18 NOTE — PROGRESS NOTES
Infusion Nursing Note:  Eduardo MOLINA Joanne presents today for Cycle 2 Day 1 Carboplatin/Erbitux. Pt also needed oral potassium replacement today.     Patient seen by provider today: No   present during visit today: Not Applicable.    Note: pt presents to infusion room today feeling well with no new complaints. Pt was seen by ADRIEN Ingram, on 3/16; pt denies any changes since that visit. Platelets down to 99 today, with recent levels in the 300s. There are no parameters for treatment today, but this RN paged Dr. Ferraro due to the drop. Pt denies any bleeding.     TORB 3/18/21 1409 Dr. Ferraro/Nelsy Murrieta, RN  1. Ok to give chemotherapy today with platelets 99.  2. No other new orders.    Pt verbalized understanding of this plan and is agreeable.     IV Benadryl given as premedication for Erbitux today as with previous cycles due to reaction with first dose.       Pain: denies    Intravenous Access:  Implanted Port.    Treatment Conditions:  Lab Results   Component Value Date    HGB 10.2 03/18/2021     Lab Results   Component Value Date    WBC 4.8 03/18/2021      Lab Results   Component Value Date    ANEU 4.1 03/18/2021     Lab Results   Component Value Date    PLT 99 03/18/2021      Lab Results   Component Value Date     03/18/2021                   Lab Results   Component Value Date    POTASSIUM 3.2 03/18/2021           Lab Results   Component Value Date    MAG 1.6 03/18/2021            Lab Results   Component Value Date    CR 0.73 03/18/2021                   Lab Results   Component Value Date    MIKEY 8.2 03/18/2021                Lab Results   Component Value Date    BILITOTAL 0.2 03/18/2021           Lab Results   Component Value Date    ALBUMIN 2.9 03/18/2021                    Lab Results   Component Value Date    ALT 28 03/18/2021           Lab Results   Component Value Date    AST 19 03/18/2021           Post Infusion Assessment:  Patient tolerated infusion without incident.  Patient observed for  30 minutes post Erbitux per protocol.  Blood return noted pre and post infusion.  Site patent and intact, free from redness, edema or discomfort.  No evidence of extravasations.  Access discontinued per protocol.       Discharge Plan:   Patient declined prescription refills.  Copy of AVS reviewed with patient and/or family.  Patient will return 3/26 for next appointment.  Patient discharged in stable condition accompanied by: self.  Departure Mode: Ambulatory.  Face to face time: 3 minutes.    KRIS MORALES RN

## 2021-03-18 NOTE — NURSING NOTE
Chief Complaint   Patient presents with     Blood Draw     Labs drawn via PORT by RN in lab. VS taken.      Hipolito Nichols RN

## 2021-03-26 NOTE — PROGRESS NOTES
Infusion Nursing Note:  Eduardo Rubio presents today for Cycle 2 Day 8 Erbitux and Mg Replacement.    Patient seen by provider today: No   present during visit today: Not Applicable.    Note: Pt arrives to infusion today feeling well. Offers no new complaints or concerns.     Intravenous Access:  Implanted Port.    Treatment Conditions:  Lab Results   Component Value Date     03/26/2021                   Lab Results   Component Value Date    POTASSIUM 4.0 03/26/2021           Lab Results   Component Value Date    MAG 1.4 03/26/2021     Results reviewed ,no treatment parameters, ok to proceed with treatment.    Post Infusion Assessment:  Patient tolerated infusion without incident.  Patient observed for 15 minutes post Erbitux. Pt refused to stay for full 30 minute observation. Instructed pt to call 911 or go to the nearest ER if he should have any symptoms of a reaction. Pt verbalized an understanding.   Site patent and intact, free from redness, edema or discomfort.  No evidence of extravasations.  Access discontinued per protocol.     Discharge Plan:   Patient declined prescription refills.  Copy of AVS reviewed with patient and/or family.  Patient will return 4/1 for next appointment.  Patient discharged in stable condition accompanied by: self.  Departure Mode: Ambulatory.  Face to Face time: 0.    Miracle Renteria RN

## 2021-03-26 NOTE — PATIENT INSTRUCTIONS
[facility] Triage and after hours / weekends / holidays:  [phone]    Please call the triage or after hours line if you experience a temperature greater than or equal to 100.5, shaking chills, have uncontrolled nausea, vomiting and/or diarrhea, dizziness, shortness of breath, chest pain, bleeding, unexplained bruising, or if you have any other new/concerning symptoms, questions or concerns.      If you are having any concerning symptoms or wish to speak to a provider before your next infusion visit, please call your care coordinator or triage to notify them so we can adequately serve you.     If you need a refill on a narcotic prescription or other medication, please call before your infusion appointment.

## 2021-04-05 NOTE — NURSING NOTE
"Chief Complaint   Patient presents with     Port Draw     port accessed and labs drawn by rn in lab.  vital signs taken.     Port accessed by RN in lab with 20g 3/4\" power needle, labs drawn, port flushed with saline and heparin, vitals checked.    Patty Schmidt RN      "

## 2021-04-05 NOTE — DISCHARGE INSTRUCTIONS
1. Walking program: try to do more frequent, short distant walks first. Then progress to longer walks with the goal of 20-30 minutes/day  2. Sit to stand exercises at kitchen chair- try not to use arm rests for push off. If you start getting knee/hip pain, then decrease repetitions. Think about using the left leg more since it is your weaker leg  3. Arm exercises: wall or counter push ups and light free weights (2-5#); think about higher repetitions, lower weight and SLOWLY progress    Stay hydrated- goal 64oz a day  Monitor symptoms. Rest if short of breath or fatigued. Slowly progress    Denia Hazel PT, DPT  Physical Therapist  [facility] - [address]  [address] [place] [address]  [email or web address]  Appointments: [phone]

## 2021-04-06 NOTE — PROGRESS NOTES
Eduardo is a 60 year old who is being evaluated via a billable telephone visit.      What phone number would you like to be contacted at? [phone]  How would you like to obtain your AVS? Kunal     I have reviewed and updated the patient's allergies and medication list.    Concerns: No new concerns.   Refills: None needed.       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 Imaging workup included an MRI which demonstrated a maxillary sinus mass with extension to the orbit with involvement of the inferior rectus muscle. 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 Mr. Rubio was referred to [facility]. He had staging PET/CT on 9/9/2019 which revealed a FDG-avid maxillary mass at 4.0 x 3.9 x 4.2 cm. There was tumor extension into the right orbital cavity superiorly, the right nasal cavity medially, the retromaxillary fat region posterolaterally, the right pterygopalatine fossa posteriorly, and the premaxillary fat anteriorly. In the orbital cavity there was abutment of the inferior rectus muscle. There was no evidence of lymphadenopathy or systemic disease. His case was reviewed at tumor conference with recommendation for surgery with total maxillectomy and orbital exenteration with free flap reconstruction.     Mr. Rubio underwent a total maxillectomy with orbital exenteration on 9/24/2019 with Dr. Roberts, followed by reconstruction with a latissimus free flap with Dr. Pulliam. Surgical pathology  showed a 4.4 cm moderately differentiated squamous cell carcinoma, (-) LVSI, (+)PNI. There was a positive margin at the pterygopalatine fossa, specifically the vidian nerve taken at its exit from the vidian canal, and additional resection into the canal was not possible. Mr. Rubio's case was discussed in the [facility]'s multidisciplinary head and neck tumor board, with the consensus recommendation to proceed with adjuvant chemoradiation given the positive margin status. Patient received day 1 cisplatin on 11/1/19 and day 22 on 11/20/19 and Day 43 on 12/13/19.      TREATMENT SUMMARY:  - 8/19/19: MRI face and orbit demonstrated a maxillary sinus mass with extension to the orbit with involvement of the inferior rectus muscle. 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  - 9/24/19: Total maxillectomy with orbital exenteration performed by Dr. Roberts, followed by reconstruction with a latissimus free flap with Dr. Pulliam.   - Surgical pathology showed a 4.4 cm moderately differentiated squamous cell carcinoma, (-) LVSI, (+)PNI. There was a positive margin at the pterygopalatine fossa, specifically the vidian nerve taken at its exit from the vidian canal, and additional resection into the canal was not possible.  -s/p total right maxilectomy with orbital exenteration   -surgical margin were positive making it a high risk disease for recurrence.    He completed concurrent chemoradiation with high dose cisplatin day 1, 11/1/19, Day 22 11/20/19, Day 43 12/13/19  - PET/CT on 3/13/20 revealed numerous lung nodules consistent with metastasis and he has been started on nivolumab. Improvement in disease noted on CT 6/2020 and September scan showed worsening disease.      CURRENT INTERVENTIONS:  Nivolumab- progressed  Carboplatin and Erbitux started February 25, 2021 (q3w Carbo + weekly Erbitux)     Interval History:  Eduardo Rubio was met with for  follow up over telephone.  He is doing really well on the Carbo/Erbitux.  OVerall- his symptoms at baseline have all improved.  He had moderate ANDERSEN, that is resolved.  He had anorexia that is resolved.  His fatigue is better and family all agrees he is looking better.      His appetite is improved he is now eating 3 times a day.  I did give him a small amount of dexamethasone the last week which helped.  He is now off and doing pretty well. Uses Marinol prn. Weight has gone up 15 pounds in three weeks.     No fever sweats chills.  Breathing improved as stated above.  No diarrhea or rash from the Erbitux, does have some itching.  No neuropathy.  Mood is stable.  He spoke with social work, they are working on getting [facility] set up for him- this starts this next month.  No bleeding.      Otherwise ROS is negative.              Current Outpatient Medications   Medication Sig     acetaminophen (TYLENOL) 325 MG tablet Take 325-650 mg by mouth every 6 hours as needed for mild pain     cevimeline (EVOXAC) 30 MG capsule Take 1 capsule (30 mg) by mouth 3 times daily     cyclobenzaprine (FLEXERIL) 10 MG tablet TK 1 T PO HS PRN FOR MUSCLE SPASM RELIEF     hydrocortisone 2.5 % cream Apply topically 2 times daily Apply to itchy spot on scalp and back twice a day as needed     NARCAN 4 MG/0.1ML nasal spray WAGNER INTO ONE NOSTRIL. MAY REPEAT IN ALTERNATE NOSTRIL WITHIN 2 MINUTES IF NO OR MINIMAL RESPONSE     oxyCODONE IR (ROXICODONE) 10 MG tablet Takes 1/2 pill (5 mg) 2-3 x daily. --[place]     senna-docusate (SENOKOT-S/PERICOLACE) 8.6-50 MG tablet 1 tablet by Per Feeding Tube route 2 times daily as needed for constipation      No current facility-administered medications for this visit.        Physical Examination:  Voice is clear and strong. No audible stridor, wheezing, or respiratory distress. The remainder of PE was deferred due to PHE.            Laboratory Data:     3/4/2021 08:48 3/18/2021 13:27 4/5/2021 10:55    WBC 5.9 4.8 3.2 (L)   Hemoglobin 11.2 (L) 10.2 (L) 9.0 (L)   Hematocrit 32.9 (L) 30.1 (L) 26.1 (L)   Platelet Count 362 99 (L) 96 (L)   RBC Count 3.67 (L) 3.28 (L) 2.83 (L)   MCV 90 92 92   MCH 30.5 31.1 31.8   MCHC 34.0 33.9 34.5   RDW 13.5 14.5 14.8   Diff Method Automated Method Automated Method Automated Method   % Neutrophils 83.4 85.8 77.1   % Lymphocytes 3.9 6.5 9.9   % Monocytes 10.3 6.1 11.5   % Eosinophils 1.4 0.8 0.9   % Basophils 0.3 0.4 0.3   % Immature Granulocytes 0.7 0.4 0.3   Nucleated RBCs 0 0 0   Absolute Neutrophil 4.9 4.1 2.5   Absolute Lymphocytes 0.2 (L) 0.3 (L) 0.3 (L)        4/5/2021 10:55   Sodium 131 (L)   Potassium 3.4   Chloride 96   Carbon Dioxide 30   Urea Nitrogen 9   Creatinine 0.68   GFR Estimate >90   GFR Estimate If Black >90   Calcium 8.4 (L)   Anion Gap 6   Magnesium 1.5 (L)   Albumin 3.0 (L)   Protein Total 7.0   Bilirubin Total 0.3   Alkaline Phosphatase 84   ALT 19   AST 15   Glucose 71     CT CAP IMPRESSION:  1.  In the chest, heterogeneous pulmonary nodules have slightly decreased in size. Bulky mediastinal adenopathy has either stayed the same or slightly decreased in size.  2.  In the abdomen, the right adrenal metastasis has decreased in size.  3.  In the liver there a few,  new, tiny hypodense lesions as well as a slightly larger infiltrative lesion inferiorly as noted above that are suspicious.  Liver MRI can better define these if it would alter treatment.  4.  Other noncritical findings as noted above.     Assessment and Plan:  1.  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 He had a follow up PET 3/2020 with evidence of new metastasis to lung for which he has been started on immunotherapy with nivolumab. Eduardo had  improvement on CT scans following first 3 cycles on nivolumab and has progressed since then.   We attempted to get him on a clinical trial however his hepatitis C returned positive which disqualified him     Eduardo started Carbo/Erbitux today February 25, 2021.    He is tolerating with mild fatigue and some itching of his skin, but his PS has improved to a 1.               -Anorexia --improved, now eating 3 times daily, weight is up.  He is off Dex.  Spoke with nutrition.              -Constipation --improved              -Ongoing moderate fatigue, secondary to metastatic disease and new chemotherapy.  Referral to PT has been made, he meets with PMR physician today as well.  He is on board with all assistance.  This has improved, he is no longer napping in the afternoon.  Sleeping better at night.               -ECOG PS improved from a 2, to a 1.  Great news.     Eduardo and I reviewed his CT CAP today.  Lung nodules and LAD are improving.  Clinically he is improving.  Unsure what some of the hypodensities are in the liver.  We will watch them at this point.  He is getting myelosuppression from the Carbo.  This has not improved back to baseline, Hgb and platelets are trending down each cycle.  I will give him a week off and next week on 4/14 we will start C3.  Discussed hopefully with time, maybe after cycle 4, we can cut out the carbo.          2. Ongoing shortness of breath on exertion- work up negative, likely 2/2 to disease, completely resolved.      3.  Nicotine abuse, chronic smoker. He is still smoking ~5-6 cigarettes a day.     4. Poor social support; lives alone with a dog. Siblings very involved, bringing food over daily, etc.  Set up with [facility] now.      5. 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  + back in 2006.  Quant is +, met with hepatology.  At this time we will wait to see if treatment is covered and also wait to see if Eduardo response to treatment.  His stage IV head and neck cancer  prognosis is about 2 years.  I spoke with hepatology and we reviewed that his cancer gives him the worse prognosis over the hep C.  However many of our clinical trials require hep C to be treated thus I spoke with some of our other oncologist regarding should we treat this.  Apparently some of the studies would disqualify him regardless of whether it is treated or not.  At this time I think we likely will not move forward with treating the hep C.     6.  Constipation. Controlled with senna. Aware to monitor for diarrhea given initiation of erbitux.      7.  Nutrition: Improving. Weight up ~10-12 lbs.    8. April 18- second shot of Kenrick Ruggiero PA-C    5 minutes spent on the date of the encounter doing chart review, lab review, imaging review, in addition to 15 minutes spent on the phone with the patient.

## 2021-04-12 NOTE — PROGRESS NOTES
Rehabilitation Services        OUTPATIENT PHYSICAL THERAPY FUNCTIONAL EVALUATION  PLAN OF TREATMENT FOR OUTPATIENT REHABILITATION  (COMPLETE FOR INITIAL CLAIMS ONLY)  Patient's Last Name, First Name, M.I.  YOB: 1960  Eduardo Rubio     Provider's Name   Denia Hazel PT   Medical Record No.  [id number]     Start of Care Date:  04/05/21   Onset Date:  02/25/21   Type:     _X__PT   ____OT  ____SLP Medical Diagnosis:  Generalized muscle weakness     PT Diagnosis:  deconditioning Visits from SOC:  1                              __________________________________________________________________________________  Plan of Treatment/Functional Goals:  strengthening, stretching           GOALS  HEP  Patient will be independent home exercise program for functional improvement  04/05/21          Therapy Frequency:  other (see comments)(1 session)   Predicted Duration of Therapy Intervention:  1 day    Denia Hazel PT                                    I CERTIFY THE NEED FOR THESE SERVICES FURNISHED UNDER        THIS PLAN OF TREATMENT AND WHILE UNDER MY CARE     (Physician attestation of this document indicates review and certification of the therapy plan).                Certification Date From:  04/05/21   Certification Date To:  04/05/21    Referring Provider:  Jesi Perdue PA-C    Initial Assessment  See Epic Evaluation- Start of Care Date: 04/05/21

## 2021-04-12 NOTE — PROGRESS NOTES
04/05/21 0900   Quick Adds   Quick Adds Certification   Type of Visit Initial OP PT Evaluation   General Information   Start of Care Date 04/05/21   Referring Physician Jesi Perdue PA-C   Orders Evaluate and Treat as Indicated   Order Date 02/25/21   Medical Diagnosis Generalized muscle weakness   Onset of illness/injury or Date of Surgery 02/25/21   Precautions/Limitations immunosuppressed   Surgical/Medical history reviewed Yes   Pertinent history of current problem (include personal factors and/or comorbidities that impact the POC) Patient diagnosed with maxillary sinus cancer in September 2019 and underwent total maxillectomy by Dr. Roberts with latissimus free flap by Dr. Pulliam.  Patient began chemotherapy in November 2019.  Patient had PET scan in March 2020 which demonstrated lung mets; began on chemotherapy. Patient reports worsening strength in arms and lengs. Patient reports walking 1x/day with dog around 1 block; previously 2x/day. Patient currently taking 1 nap/day for 30-60 minutes   Prior level of function comment Independent; reports more active and able to take dog for more walks, was not napping, enjoyed exercise including free weights and push-ups   [facility]   Home/Community Accessibility Comments has stairs; uses HR   Assistive Devices Comments None   Patient/Family Goals Statement None stated   General Information Comments Labs (3/18): WBC 4.8, Hb 10.2, Hct 30.1, platelets 99   Fall Risk Screen   Fall screen completed by PT   Have you fallen 2 or more times in the past year? No   Have you fallen and had an injury in the past year? No   Is patient a fall risk? No   Fall screen comments s/p 6MWT 133/81, , 93%   System Outcome Measures   Outcome Measures Cancer Rehab   FACIT Fatigue Subscale (score out of 52). The higher the score, the better the QOL. 20   Six Minute Walk (meters). An increase of 70 or more meters indicates statistically significant change. 243    Pain   Patient currently in pain Yes   Pain comments Patient reports stiffness in left thigh; improves with walking and as day goes on   Vitals Signs   Heart Rate 97   SpO2 96   Blood Pressure 106/71   Cognitive Status Examination   Orientation orientation to person, place and time   Level of Consciousness alert   Follows Commands and Answers Questions 100% of the time;able to follow multistep instructions   Personal Safety and Judgment intact   Memory intact   Observation   Observation Eye patch over right eye   Posture   Posture Forward head position   Strength   Strength Comments Left hip flexion 3 -/5, right hip flexion 3/5, left knee extension/flexion 4+/5, right knee flexion/extension 5/5, bilateral ankle dorsiflexion 5/5, left ankle plantarflexion 2+/5, right ankle plantarflexion 3/5   Transfer Skills   Transfer Comments Modified independent with sit to stands; increased time due to left leg stiffness   Gait   Gait Comments Patient ambulates without a device modified independent; reduction in frantz, decreased bilateral heel strike and stride length.  Antalgic gait due to left leg stiffness/soreness which decreases, however patient continues to demonstrate right lower extremity rigid swing pattern.  Mild flexed forward posture probable hip flexor tightness   Gait Special Tests   Gait Special Tests SIX MINUTE WALK TEST   Gait Special Tests Six Minute Walk Test   Feet 796 Feet   Sensory Examination   Sensory Perception Comments Patient reports mild numbness/tingling in feet   Planned Therapy Interventions   Planned Therapy Interventions strengthening;stretching   Clinical Impression   Criteria for Skilled Therapeutic Interventions Met yes, treatment indicated   PT Diagnosis deconditioning   Influenced by the following impairments strength, posture, sensation, cardiovascular deconditioning   Functional limitations due to impairments decreased activity participation, cardiovascular deconditioning   Clinical  Presentation Stable/Uncomplicated   Clinical Presentation Rationale personal factors, body systems involved   Clinical Decision Making (Complexity) Low complexity   Therapy Frequency other (see comments)  (1 session)   Predicted Duration of Therapy Intervention (days/wks) 1 day   Risk & Benefits of therapy have been explained Yes   Patient, Family & other staff in agreement with plan of care Yes   Clinical Impression Comments Patient is a 60-year-old male who presents to outpatient physical therapy with reports of progressive muscle weakness following cancer diagnosis and treatment.  Patient demonstrates mild strength deficits along with significant neuropathy deficits which impacts his overall participation in daily activities.  Patient would benefit from 1 treatment session for home exercise program instruction in order for improved functional activity performance.  Patient reports able to perform exercises at home and does not need formal follow-up at this time.   GOALS   PT Eval Goals 1   Goal 1   Goal Identifier HEP   Goal Description Patient will be independent home exercise program for functional improvement   Target Date 04/05/21   Total Evaluation Time   PT Eval, Low Complexity Minutes (11221) 30   Therapy Certification   Certification date from 04/05/21   Certification date to 04/05/21   Medical Diagnosis Generalized muscle weakness   Certification I certify the need for these services furnished under this plan of treatment and while under my care.  (Physician co-signature of this document indicates review and certification of the therapy plan).     Denia Hazel PT, DPT  Physical Therapist  [facility] - [address]  [address] [place] [address]  [email or web address]  Appointments: [phone]

## 2021-04-12 NOTE — PROGRESS NOTES
Outpatient Physical Therapy Discharge Note     Patient: Eduardo Rubio  : 1960    Beginning/End Dates of Reporting Period:  21 to 2021    Referring Provider: Jesi Perdue PA-C    Therapy Diagnosis: Deconditioning     Client Self Report: See eval    Outcome Measures (most recent score):  FACIT Fatigue Subscale (score out of 52). The higher the score, the better the QOL.: 20  Six Minute Walk (meters). An increase of 70 or more meters indicates statistically significant change.: 243    Goals:  Goal Identifier HEP   Goal Description Patient will be independent home exercise program for functional improvement   Target Date 21   Date Met  21   Progress:       Progress Toward Goals:   Patient participated in evaluation same day treatment session for home exercise program instruction.  Patient reports wanting to perform exercises at home and does not require formal follow-up at this time.  PT recommended patient follow-up if functional deficits remain for more formalized program for recovery    Plan:  Discharge from therapy.    Discharge:    Reason for Discharge: Patient has met all goals.    Equipment Issued: None    Discharge Plan: Patient to continue home program.

## 2021-04-14 NOTE — PROGRESS NOTES
Infusion Nursing Note:  Eduardo Rubio presents today for C3D1 Carboplatin/Erbitux.    Patient seen by provider today: No   present during visit today: Not Applicable.    Note: pt assessed upon arrival to infusion.  Denies fever, chills, SOB, or cough.  Pt states he has been feeling well and has had no changes/concerns in his health.  Reviewed today's plan of care with patient.     ADRIEN Combs/Romi Walker RN 4/14/21@ 11:15:  Replace K and Magnesium per protocol; give IV magnesium instead of po replacement today - will send patient home with mag oxide prescription.    Intravenous Access:  Implanted Port.    Treatment Conditions:  Lab Results   Component Value Date    HGB 9.2 04/14/2021     Lab Results   Component Value Date    WBC 3.9 04/14/2021      Lab Results   Component Value Date    ANEU 2.5 04/14/2021     Lab Results   Component Value Date     04/14/2021      Lab Results   Component Value Date     04/14/2021                   Lab Results   Component Value Date    POTASSIUM 2.9 04/14/2021           Lab Results   Component Value Date    MAG 1.4 04/14/2021            Lab Results   Component Value Date    CR 0.75 04/14/2021                   Lab Results   Component Value Date    MIKEY 8.2 04/14/2021                Lab Results   Component Value Date    BILITOTAL 0.3 04/14/2021           Lab Results   Component Value Date    ALBUMIN 2.9 04/14/2021                    Lab Results   Component Value Date    ALT 20 04/14/2021           Lab Results   Component Value Date    AST 26 04/14/2021       Results reviewed, labs MET treatment parameters, ok to proceed with treatment.      Post Infusion Assessment:  Patient tolerated infusion without incident.  Patient observed for 30 minutes post Erbitux infusion.  Blood return noted pre and post infusion.  Site patent and intact, free from redness, edema or discomfort.  No evidence of extravasations.  Access discontinued per protocol.        Discharge Plan:   Patient declined prescription refills. New mag oxide prescription sent home with pt.  Discharge instructions reviewed with: Patient.  Patient and/or family verbalized understanding of discharge instructions and all questions answered.  AVS to patient via [employer].  Patient will return 4/21/21 for next appointment.   Patient discharged in stable condition accompanied by: self.  Departure Mode: Ambulatory.    Romi Walker RN

## 2021-04-14 NOTE — PATIENT INSTRUCTIONS
[facility] Triage and after hours / weekends / holidays:  [phone]    Please call the triage or after hours line if you experience a temperature greater than or equal to 100.5, shaking chills, have uncontrolled nausea, vomiting and/or diarrhea, dizziness, shortness of breath, chest pain, bleeding, unexplained bruising, or if you have any other new/concerning symptoms, questions or concerns.      If you are having any concerning symptoms or wish to speak to a provider before your next infusion visit, please call your care coordinator or triage to notify them so we can adequately serve you.     If you need a refill on a narcotic prescription or other medication, please call before your infusion appointment.                 April 2021 Sunday Monday Tuesday Wednesday Thursday Friday Saturday 1    LAB CENTRAL   7:30 AM   (15 min.)    [facility] DRAW   [facility] ONC INFUSION 180   8:00 AM   (180 min.)    ONCOLOGY INFUSION   [facility] 2     3       4     5    CANCER REHAB EVAL   9:30 AM   (60 min.)   Denia Hazel, PT   [facility] [place]    LAB CENTRAL  10:30 AM   (15 min.)   [facility]   [facility]    CT CHEST/ABDOMEN/PELVIS W  11:20 AM   (20 min.)   [id number]   [facility] [place] 6    TELEPHONE VISIT RETURN   8:40 AM   (50 min.)   Gianna Ruggiero PA-C   [facility] 7     8     9     10       11     12     13     14    LAB CENTRAL  10:30 AM   (15 min.)    [facility] DRAW   [facility] ONC INFUSION 180  11:00 AM   (180 min.)    ONCOLOGY INFUSION   [facility] 15     16     17       18     19     20     21    LAB CENTRAL   6:30 AM   (15 min.)    [facility] DRAW   [facility] ONC INFUSION  180   7:00 AM   (180 min.)   UC ONCOLOGY INFUSION   [facility] 22     23     24       25     26    TELEPHONE VISIT RETURN  10:20 AM   (50 min.)   Gianna Ruggiero PA-C   [facility] 27     28    LAB CENTRAL   6:30 AM   (15 min.)   [facility]   [facility] ONC INFUSION 180   7:00 AM   (180 min.)   UC ONCOLOGY INFUSION   [facility] 29     30                      May 2021      Riccardo Monday Tuesday Wednesday Thursday Friday Saturday                                 1       2     3     4    VIDEO VISIT RETURN   6:45 AM   (50 min.)   Gianna Ruggiero PA-C   [facility] 5    LAB CENTRAL   6:30 AM   (15 min.)   [facility]   [facility] ONC INFUSION 180   7:00 AM   (180 min.)    ONCOLOGY INFUSION   [facility]    TELEPHONE VISIT RETURN  10:00 AM   (30 min.)   Effie Smith MD   [facility] 6     7     8       9     10     11     12    LAB CENTRAL   6:30 AM   (15 min.)   [facility]   [facility] ONC INFUSION 180   7:00 AM   (180 min.)    ONCOLOGY INFUSION   [facility] 13     14     15       16     17     18    VIDEO VISIT RETURN   2:15 PM   (30 min.)   Javier Ferraro MD   [facility] 19    LAB CENTRAL   6:30 AM   (15 min.)   [facility]   [facility] ONC INFUSION 180   7:00 AM   (180 min.)    ONCOLOGY INFUSION   [facility] 20     21     22       23     24     25     26     27     28     29       30     31                                             Recent Results (from the past 24 hour(s))   CBC with platelets differential    Collection Time: 04/14/21  9:46 AM   Result Value  Ref Range    WBC 3.9 (L) 4.0 - 11.0 10e9/L    RBC Count 2.88 (L) 4.4 - 5.9 10e12/L    Hemoglobin 9.2 (L) 13.3 - 17.7 g/dL    Hematocrit 27.1 (L) 40.0 - 53.0 %    MCV 94 78 - 100 fl    MCH 31.9 26.5 - 33.0 pg    MCHC 33.9 31.5 - 36.5 g/dL    RDW 16.9 (H) 10.0 - 15.0 %    Platelet Count 311 150 - 450 10e9/L    Diff Method Automated Method     % Neutrophils 63.3 %    % Lymphocytes 16.0 %    % Monocytes 17.1 %    % Eosinophils 2.6 %    % Basophils 0.5 %    % Immature Granulocytes 0.5 %    Nucleated RBCs 0 0 /100    Absolute Neutrophil 2.5 1.6 - 8.3 10e9/L    Absolute Lymphocytes 0.6 (L) 0.8 - 5.3 10e9/L    Absolute Monocytes 0.7 0.0 - 1.3 10e9/L    Absolute Eosinophils 0.1 0.0 - 0.7 10e9/L    Absolute Basophils 0.0 0.0 - 0.2 10e9/L    Abs Immature Granulocytes 0.0 0 - 0.4 10e9/L    Absolute Nucleated RBC 0.0    Comprehensive metabolic panel    Collection Time: 04/14/21  9:46 AM   Result Value Ref Range    Sodium 133 133 - 144 mmol/L    Potassium 2.9 (L) 3.4 - 5.3 mmol/L    Chloride 96 94 - 109 mmol/L    Carbon Dioxide 31 20 - 32 mmol/L    Anion Gap 6 3 - 14 mmol/L    Glucose 89 70 - 99 mg/dL    Urea Nitrogen 7 7 - 30 mg/dL    Creatinine 0.75 0.66 - 1.25 mg/dL    GFR Estimate >90 >60 mL/min/[1.73_m2]    GFR Estimate If Black >90 >60 mL/min/[1.73_m2]    Calcium 8.2 (L) 8.5 - 10.1 mg/dL    Bilirubin Total 0.3 0.2 - 1.3 mg/dL    Albumin 2.9 (L) 3.4 - 5.0 g/dL    Protein Total 7.2 6.8 - 8.8 g/dL    Alkaline Phosphatase 117 40 - 150 U/L    ALT 20 0 - 70 U/L    AST 26 0 - 45 U/L   Magnesium    Collection Time: 04/14/21  9:46 AM   Result Value Ref Range    Magnesium 1.4 (L) 1.6 - 2.3 mg/dL

## 2021-04-14 NOTE — LETTER
4/14/2021         RE: Eduardo Rubio  [address] [place]  [place] [address]        Dear Colleague,    Thank you for referring your patient, Eduardo Rubio, to the [facility]. Please see a copy of my visit note below.    Infusion Nursing Note:  Eduardo Rubio presents today for C3D1 Carboplatin/Erbitux.    Patient seen by provider today: No   present during visit today: Not Applicable.    Note: pt assessed upon arrival to infusion.  Denies fever, chills, SOB, or cough.  Pt states he has been feeling well and has had no changes/concerns in his health.  Reviewed today's plan of care with patient.     ADRIEN Combs/Romi Walker RN 4/14/21@ 11:15:  Replace K and Magnesium per protocol; give IV magnesium instead of po replacement today - will send patient home with mag oxide prescription.    Intravenous Access:  Implanted Port.    Treatment Conditions:  Lab Results   Component Value Date    HGB 9.2 04/14/2021     Lab Results   Component Value Date    WBC 3.9 04/14/2021      Lab Results   Component Value Date    ANEU 2.5 04/14/2021     Lab Results   Component Value Date     04/14/2021      Lab Results   Component Value Date     04/14/2021                   Lab Results   Component Value Date    POTASSIUM 2.9 04/14/2021           Lab Results   Component Value Date    MAG 1.4 04/14/2021            Lab Results   Component Value Date    CR 0.75 04/14/2021                   Lab Results   Component Value Date    MIKEY 8.2 04/14/2021                Lab Results   Component Value Date    BILITOTAL 0.3 04/14/2021           Lab Results   Component Value Date    ALBUMIN 2.9 04/14/2021                    Lab Results   Component Value Date    ALT 20 04/14/2021           Lab Results   Component Value Date    AST 26 04/14/2021       Results reviewed, labs MET treatment parameters, ok to proceed with treatment.      Post Infusion Assessment:  Patient tolerated  infusion without incident.  Patient observed for 30 minutes post Erbitux infusion.  Blood return noted pre and post infusion.  Site patent and intact, free from redness, edema or discomfort.  No evidence of extravasations.  Access discontinued per protocol.       Discharge Plan:   Patient declined prescription refills. New mag oxide prescription sent home with pt.  Discharge instructions reviewed with: Patient.  Patient and/or family verbalized understanding of discharge instructions and all questions answered.  AVS to patient via [employer].  Patient will return 4/21/21 for next appointment.   Patient discharged in stable condition accompanied by: self.  Departure Mode: Ambulatory.    Romi Walker, JI                          Again, thank you for allowing me to participate in the care of your patient.        Sincerely,        [facility] [place]

## 2021-04-19 NOTE — NURSING NOTE
Chief Complaint   Patient presents with     Port Draw     Labs drawn via port by RN in lab. VS taken.      Port accessed with 20g gripper needle by RN, labs collected, line flushed with saline and heparin.  Vitals taken. Pt checked in for appointment(s).    Megan FINCH RN PHN BSN  BMT/Oncology Lab

## 2021-04-19 NOTE — PROGRESS NOTES
Infusion Nursing Note:  Eduardo Rubio presents today for Cycle 3 Day 8 Erbitux .        Note: Eduardo feels well today.  He has not concerns and wishes to proceed with treatment as planned.  He does note some moderate pain in his R leg and R arm.  Denies swelling in either limb.  Taking tylenol with relief.  He did have some mild pain in R leg and arm when he last had a visit with Saba.  Says he will talk to her about this at their next visit if this continues    Intravenous Access:    Implanted Port.    Treatment Conditions:  Lab Results   Component Value Date     04/19/2021                   Lab Results   Component Value Date    POTASSIUM 3.4 04/19/2021           Lab Results   Component Value Date    MAG 1.8 04/19/2021            Lab Results   Component Value Date    CR 0.75 04/14/2021                   Lab Results   Component Value Date    MIKEY 8.2 04/14/2021                Lab Results   Component Value Date    BILITOTAL 0.3 04/14/2021           Lab Results   Component Value Date    ALBUMIN 2.9 04/14/2021                    Lab Results   Component Value Date    ALT 20 04/14/2021           Lab Results   Component Value Date    AST 26 04/14/2021       Results reviewed, labs MET treatment parameters, ok to proceed with treatment.      Post Infusion Assessment:  Patient tolerated infusion without incident.  Patient refused observation post erbitux today.  Pt instructed to go to the ER immediately with any signs or symptoms of reaction.  Including, cough, swelling in face, lips, tongue, or throat.  Difficulty breathing.  Flushing or itching.  A message was sent to Saba to see if we can delete observation from treatment plan for future cycles.       Discharge Plan:   Patient declined prescription refills.  AVS to patient via [employer].  Patient will return 4/28/21 for cycle 3 day 15   Face to Face time: 0.    Maggie Monte RN

## 2021-04-26 NOTE — PROGRESS NOTES
Eduardo is a 60 year old who is being evaluated via a billable telephone visit.      What phone number would you like to be contacted at? [phone]  How would you like to obtain your AVS? 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 Imaging workup included an MRI which demonstrated a maxillary sinus mass with extension to the orbit with involvement of the inferior rectus muscle. 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 Mr. Rubio was referred to [facility]. He had staging PET/CT on 9/9/2019 which revealed a FDG-avid maxillary mass at 4.0 x 3.9 x 4.2 cm. There was tumor extension into the right orbital cavity superiorly, the right nasal cavity medially, the retromaxillary fat region posterolaterally, the right pterygopalatine fossa posteriorly, and the premaxillary fat anteriorly. In the orbital cavity there was abutment of the inferior rectus muscle. There was no evidence of lymphadenopathy or systemic disease. His case was reviewed at tumor conference with recommendation for surgery with total maxillectomy and orbital exenteration with free flap reconstruction.     Mr. Rubio underwent a total maxillectomy with orbital exenteration on 9/24/2019 with Dr. Roberts, followed by reconstruction with a latissimus free flap with Dr. Pulliam. Surgical pathology showed a 4.4 cm moderately differentiated squamous cell carcinoma, (-) LVSI, (+)PNI. There was a positive margin at the pterygopalatine fossa, specifically the vidian nerve taken at its exit from the vidian canal, and additional resection into the canal was not possible. Mr. Rubio's case was discussed in the [employer]   [employer] multidisciplinary head and neck tumor board, with the consensus recommendation to proceed with adjuvant chemoradiation given the positive margin status. Patient received day 1 cisplatin on 11/1/19 and day 22 on 11/20/19 and Day 43 on 12/13/19.      TREATMENT SUMMARY:  - 8/19/19: MRI face and orbit demonstrated a maxillary sinus mass with extension to the orbit with involvement of the inferior rectus muscle. 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  - 9/24/19: Total maxillectomy with orbital exenteration performed by Dr. Roberts, followed by reconstruction with a latissimus free flap with Dr. Pulliam.   - Surgical pathology showed a 4.4 cm moderately differentiated squamous cell carcinoma, (-) LVSI, (+)PNI. There was a positive margin at the pterygopalatine fossa, specifically the vidian nerve taken at its exit from the vidian canal, and additional resection into the canal was not possible.  -s/p total right maxilectomy with orbital exenteration   -surgical margin were positive making it a high risk disease for recurrence.    He completed concurrent chemoradiation with high dose cisplatin day 1, 11/1/19, Day 22 11/20/19, Day 43 12/13/19  - PET/CT on 3/13/20 revealed numerous lung nodules consistent with metastasis and he has been started on nivolumab. Improvement in disease noted on CT 6/2020 and September scan showed worsening disease.      CURRENT INTERVENTIONS:  Nivolumab- progressed  Carboplatin and Erbitux started February 25, 2021 (q3w Carbo + weekly Erbitux)     Interval History:  Eduardo Rubio was met with for follow up over telephone.  He is doing really well on the Carbo/Erbitux.  OVerall- his symptoms at baseline have all improved.  He had moderate ANDERSEN, that is resolved.  He had anorexia that is resolved.  His fatigue is better and family all agrees he is looking better.       His appetite is improved he is now eating 2-3 times  a day. He has been using marinol prn.  He is getting [facility] once a week with a few meals, which helps.      No fever sweats chills.  Breathing improved as stated above.  No diarrhea or rash from the Erbitux, does have some itching.  No neuropathy.  Mood is stable.  No bleeding.  Still smoking a few cigarettes a day     Otherwise ROS is negative.              Current Outpatient Medications   Medication Sig     acetaminophen (TYLENOL) 325 MG tablet Take 325-650 mg by mouth every 6 hours as needed for mild pain     cevimeline (EVOXAC) 30 MG capsule Take 1 capsule (30 mg) by mouth 3 times daily     cyclobenzaprine (FLEXERIL) 10 MG tablet TK 1 T PO HS PRN FOR MUSCLE SPASM RELIEF     hydrocortisone 2.5 % cream Apply topically 2 times daily Apply to itchy spot on scalp and back twice a day as needed     NARCAN 4 MG/0.1ML nasal spray WAGNER INTO ONE NOSTRIL. MAY REPEAT IN ALTERNATE NOSTRIL WITHIN 2 MINUTES IF NO OR MINIMAL RESPONSE     oxyCODONE IR (ROXICODONE) 10 MG tablet Takes 1/2 pill (5 mg) 2-3 x daily. --[place] [facility]     senna-docusate (SENOKOT-S/PERICOLACE) 8.6-50 MG tablet 1 tablet by Per Feeding Tube route 2 times daily as needed for constipation      No current facility-administered medications for this visit.        Physical Examination:  Voice is clear and strong. No audible stridor, wheezing, or respiratory distress. The remainder of PE was deferred due to PHE.            Laboratory Data:      3/4/2021 08:48 3/18/2021 13:27 4/5/2021 10:55 4/14/2021 09:46   WBC 5.9 4.8 3.2 (L) 3.9 (L)   Hemoglobin 11.2 (L) 10.2 (L) 9.0 (L) 9.2 (L)   Hematocrit 32.9 (L) 30.1 (L) 26.1 (L) 27.1 (L)   Platelet Count 362 99 (L) 96 (L) 311   RBC Count 3.67 (L) 3.28 (L) 2.83 (L) 2.88 (L)   MCV 90 92 92 94   MCH 30.5 31.1 31.8 31.9   MCHC 34.0 33.9 34.5 33.9   RDW 13.5 14.5 14.8 16.9 (H)   Diff Method Automated Method Automated Method Automated Method Automated Method   % Neutrophils 83.4 85.8 77.1 63.3   % Lymphocytes 3.9 6.5 9.9  16.0   % Monocytes 10.3 6.1 11.5 17.1   % Eosinophils 1.4 0.8 0.9 2.6   % Basophils 0.3 0.4 0.3 0.5   % Immature Granulocytes 0.7 0.4 0.3 0.5   Nucleated RBCs 0 0 0 0   Absolute Neutrophil 4.9 4.1 2.5 2.5      4/5/2021 10:55 4/14/2021 09:46 4/19/2021 13:55   Sodium 131 (L) 133 130 (L)   Potassium 3.4 2.9 (L) 3.4   Chloride 96 96 93 (L)   Carbon Dioxide 30 31 29   Urea Nitrogen 9 7    Creatinine 0.68 0.75    GFR Estimate >90 >90    GFR Estimate If Black >90 >90    Calcium 8.4 (L) 8.2 (L)    Anion Gap 6 6 8   Magnesium 1.5 (L) 1.4 (L) 1.8   Albumin 3.0 (L) 2.9 (L)    Protein Total 7.0 7.2    Bilirubin Total 0.3 0.3    Alkaline Phosphatase 84 117    ALT 19 20    AST 15 26         Assessment and Plan:  1.  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 He had a follow up PET 3/2020 with evidence of new metastasis to lung for which he has been started on immunotherapy with nivolumab. Eduardo had improvement on CT scans following first 3 cycles on nivolumab and has progressed since then.   We attempted to get him on a clinical trial however his hepatitis C returned positive which disqualified him     Eduardo started Carbo/Erbitux today February 25, 2021.    He is tolerating with mild fatigue and some itching of his skin, but his PS has improved to a 1. CT CAP in April showed improved/stable disease, some hypodensities in the liver, unknown, we will watch and repeat CT after C4.  Discussed could drop the carbo eventually given fatigue and anemia.               -Anorexia --improved, now eating 3 times daily, weight is up.  Spoke with nutrition. Marinol prn              -Constipation --improved              -Ongoing moderate fatigue, secondary to metastatic disease and new chemotherapy.  Saw  PMR physician, did one x PT, walking dog 1x day. He has improved, he is no longer napping in the afternoon.  Sleeping better at night.               -ECOG PS improved from a 2, to a 1.  Great news. Encouraged him to start walking his dog 2 x daily   -hypomag 2/2 to erbitux, taking oral mag oxide 400 mg BID        2. Ongoing shortness of breath on exertion- work up negative, likely 2/2 to disease, completely resolved.      3.  Nicotine abuse, chronic smoker. He is still smoking ~5-6 cigarettes a day.     4. Poor social support; lives alone with a dog. Siblings very involved, bringing food over daily, etc.  Set up with [employer] now.      5. 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  + back in 2006.  Quant is +, met with hepatology.  At this time we will wait to see if treatment is covered and also wait to see if Eduardo response to treatment.  His stage IV head and neck cancer prognosis is about 2 years.  I spoke with hepatology and we reviewed that his cancer gives him the worse prognosis over the hep C.  However many of our clinical trials require hep C to be treated thus I spoke with some of our other oncologist regarding should we treat this.  Apparently some of the studies would disqualify him regardless of whether it is treated or not.  At this time I think we likely will not move forward with treating the hep C. However, if clinical trial is being considered in the future, treatment takes a couple months.      6.  Constipation. Controlled with senna. Aware to monitor for diarrhea given initiation of erbitux and magnesium.      7.  Nutrition: Improving. Weight up ~10-12 lbs, stable.      8. Completed Moderna vaccination      Saba Ruggiero PA-C    Phone: 14 min          Marisa Martin CMA on 4/26/2021 at 9:36 AM

## 2021-04-27 NOTE — TELEPHONE ENCOUNTER
Last prescribing provider: Jesi Perdue PA-C    Last clinic visit date: 4/26/21    Recommendations for requested medication (if none, N/A): Not discussed    Next clinic visit date: 5/18/21    Any other pertinent information (if none, N/A): N/A

## 2021-05-01 PROBLEM — R09.02 HYPOXEMIA: Status: ACTIVE | Noted: 2021-01-01

## 2021-05-01 PROBLEM — R06.02 SHORTNESS OF BREATH: Status: ACTIVE | Noted: 2021-01-01

## 2021-05-01 PROBLEM — J18.9 PNEUMONIA OF RIGHT LOWER LOBE DUE TO INFECTIOUS ORGANISM: Status: ACTIVE | Noted: 2021-01-01

## 2021-05-01 NOTE — LETTER
Transition Communication Hand-off for Care Transitions to Next Level of Care Provider    Name: Eduardo Rubio  : 1960  MRN #: [id number]  Primary Care Provider: Gianna Ruggiero  Primary Clinic: [address] [place] [address] [address] [id number]     Reason for Hospitalization:    Shortness of breath [R06.02]  Hypoxemia [R09.02]  Pneumonia of right lower lobe due to infectious organism [J18.9]    Admit Date/Time: 2021  3:28 PM  Discharge Date: 2021    Payor Source: Payor: MEDICARE / MEDICAID         Reason for Communication Hand-off Referral: Other Admission Dx: CAP, new home care, new home oxygen    Discharge Plan: declined to dc to TCU. Home (or friends home) with new walker issued from hospital, new home care (RN PT OT), new home supplemental oxygen       Follow-up plan:    Future Appointments   Date Time Provider Department Center   [facility]  [facility]30 [facility]   2021  2:00 PM [facility] ONC INFUSION NURSE [facility]   2021  5:00 PM [id number] [facility]   2021  2:30 PM Javier Ferraro MD [facility]   2021  1:30 PM [facility]   2021  2:00 PM UC ONC INFUSION NURSE [facility]   2021  7:30 AM [facility]   2021  8:00 AM UC ONC INFUSION NURSE [facility]   2021  [facility]:30 [facility]   2021  2:00 PM UC ONC INFUSION NURSE [facility]   2021  1:30 PM [facility] DRAW [facility]   2021  2:00 PM UC ONC INFUSION NURSE [facility]   2021  9:15 AM [facility] DRAW [facility]   2021 10:00 AM Gianna Ruggiero PA-C [facility]   2021 11:00 AM Teresa Pulliam MD [facility]           Referrals     Future Labs/Procedures    Home care nursing referral     Comments:    [facility]  Phone  [phone]  Fax  [phone]     Okay for SN to start week of   RN skilled nursing visit.   RN to assess vital signs and weight, cardiac  status, pain level and activity tolerance, hydration, nutrition and bowel status   RN to complete home safety evaluation.  RN assess new home oxygen use and lung sounds, respiratory status  RN to complete weekly medication management and set-up     Okay for therapies to start week of 5/18  Physical Therapy to evaluate and treat  Occupational Therapy to evaluate and treat    Your provider has ordered home care nursing services.   If you have not been contacted within 2 days of your discharge please call          Supplies     Future Labs/Procedures    Oxygen Adult/Peds     Process Instructions:    By signing this order, the Authorizing Provider is attesting that they have completed a face-to-face evaluation on the patient to determine their need for this equipment in the last 60 days. A new face-to-face evaluation is required each time  A new prescription for one of the specified items is ordered.   If an additional provider completed the evaluation, please indicate their name below.     **As of 2018, an order requisition and face sheet will print for all DME orders. Please give printed order and face sheet to patient if not obtaining product from [employer].     Comments:    [facility]  [address]   [address]  Phone: [phone]  Fax: [phone]     Vendor to supply--new home supplemental oxygen  **Patient to please call [phone] option #3 when you arrive home after discharge, to complete home oxygen delivery setup and delivery**    Oxygen Documentation:   I certify that this patient, Eduardo Rubio has been under my care (or a nurse practitioner or physican's assistant working with me). This is the face-to-face encounter for oxygen medical necessity.      Eduardo Rubio is now in a chronic stable state and continues to require supplemental oxygen. Patient has continued oxygen desaturation due to Pulmonary Neoplasm C34.90    Alternative  treatment(s) tried or considered and deemed clinically infective for treatment of Pulmonary Neoplasm C34.90   include nebulizers, inhalers, steroids and pulmonary toileting.  If portability is ordered, is the patient mobile within the home? yes    **Patients who qualify for home O2 coverage under the CMS guidelines require ABG tests or O2 sat readings obtained closest to, but no earlier than 2 days prior to the discharge, as evidence of the need for home oxygen therapy. Testing must be performed while patient is in the chronic stable state. See notes for O2 sats.**    Walker Order     Process Instructions:    By signing this order, the Authorizing Provider is attesting that they have completed a face-to-face evaluation on the patient to determine their need for this equipment in the last 60 days. A new face-to-face evaluation is required each time  A new prescription for one of the specified items is ordered.   If an additional provider completed the evaluation, please indicate their name below.     **As of 2018, an order requisition and face sheet will print for all DME orders. Please give printed order and face sheet to patient if not obtaining product from [facility] closet.     Comments:    DME Documentation:   Describe the reason for need to support medical necessity: impaired functional mobility.     I, the undersigned, certify that the above prescribed supplies are medically necessary for this patient and is both reasonable and necessary in reference to accepted standards of medical and necessary in reference to accepted standards of medical practice in the treatment of this patient's condition and is not prescribed as a convenience.          Leila Montez RN, BSN, PHN  Care Coordinator Unit [address] [place] [address]   Direct phone: [phone]     AVS/Discharge Summary is the source of truth; this is a helpful guide for improved  communication of patient story

## 2021-05-01 NOTE — ED PROVIDER NOTES
[place] EMERGENCY DEPARTMENT ([facility])  5/01/21  History     Chief Complaint   Patient presents with     Shortness of Breath     Chest Pain     The history is provided by the patient and medical records.     Eduardo Rubio is a 60 year old male with a past medical history significant for maxillary sinus cancer, squamous cell carcinoma, hepatitis C, and gastric ulcer who presents to the Emergency Department for evaluation of chest pain, and shortness of breath.  Patient reports that he has had ongoing myalgias over the past 3 days.  He reports that he is particularly sore over his back, legs, chest, and thighs.  Patient states that these myalgias have made it difficult to walk.  Patient also notes bilateral swelling in the lower extremities.  He reports he is usually able to walk fairly far at baseline.  Patient also reports he has been increasingly short of breath over the last 3 days.  He denies cough, or fevers.  Patient denies use of supplemental O2 at home.  No history of BiPAP, or CPAP.  Patient reports he is on active chemotherapy.  He denies use of any anticoagulation/blood thinning medications.  Denies history of pleural effusion in the past requiring paracentesis.  Patient has history of cardiac arrest, or cardiac stenting.  Patient denies history of COVID-19, and states that he is fully vaccinated with his last shot being administered approximately 2 months ago.    I have reviewed the Medications, Allergies, Past Medical and Surgical History, and Social History in the [employer] system.  PAST MEDICAL HISTORY:   Past Medical History:   Diagnosis Date     Gastric ulcer      Low back pain      Maxillary sinus cancer (H)      Toe amputation status     all ten toes       PAST SURGICAL HISTORY:   Past Surgical History:   Procedure Laterality Date     [facility] to [facility]     AS AMPUTATION TOE,I-P JT Bilateral     all ten toes     EXENTERATION ORBIT Right  9/24/2019    Procedure: Right Orbital Exenteration;  Surgeon: Jose Roberts MD;  Location: [facility] OR     EXPLORE NECK Right 9/24/2019    Procedure: Right Neck Exploration;  Surgeon: Jose Roberts MD;  Location: [facility] OR     GRAFT FREE VASCULARIZED LATISSIMUS DORSI Right 9/24/2019    Procedure: Right Latissmus Free Flap Reconstruction;  Surgeon: Teresa Pulliam MD;  Location: [facility] OR     INSERT PORT VASCULAR ACCESS Right 10/31/2019    Procedure: place venous access chest port;  Surgeon: Natasha Mishra PA-C;  [facility]     IR CHEST PORT PLACEMENT > 5 YRS OF AGE  10/31/2019     OSTEOTOMY MAXILLA N/A 9/24/2019    Procedure: Right Radicall Maxillectomy, Nasogastric Tube Placement;  Surgeon: Jose Roberts MD;  Location: [facility] OR       Past medical history, past surgical history, medications, and allergies were reviewed with the patient. Additional pertinent items: None    FAMILY HISTORY:   Family History   Problem Relation Age of Onset     Breast Cancer Mother      Glaucoma No family hx of      Macular Degeneration No family hx of        SOCIAL HISTORY:   Social History     Tobacco Use     Smoking status: Light Tobacco Smoker     Packs/day: 0.50     Years: 15.00     Pack years: 7.50     Types: Cigarettes     Start date: 2004     Smokeless tobacco: Never Used   Substance Use Topics     Alcohol use: Not Currently     Social history was reviewed with the patient. Additional pertinent items: None      Current Discharge Medication List      CONTINUE these medications which have NOT CHANGED    Details   acetaminophen (TYLENOL) 325 MG tablet Take 1-2 tablets (325-650 mg) by mouth every 6 hours as needed for mild pain  Qty: 90 tablet, Refills: 0    Associated Diagnoses: Maxillary sinus cancer (H)      cevimeline (EVOXAC) 30 MG capsule Take 1 capsule (30 mg) by mouth 3 times daily  Qty: 90 capsule, Refills: 11    Associated Diagnoses: Xerostomia      cyclobenzaprine (FLEXERIL) 10 MG tablet TK 1 T PO HS PRN FOR  MUSCLE SPASM RELIEF  Refills: 0      hydrocortisone 2.5 % cream Apply topically 2 times daily Apply to itchy spot on scalp and back twice a day as needed  Qty: 453.6 g, Refills: 1    Associated Diagnoses: Itching; Maxillary sinus cancer (H); Malignant neoplasm metastatic to both lungs (H)      magnesium oxide (MAG-OX) 400 MG tablet Take 1 tablet (400 mg) by mouth 2 times daily  Qty: 60 tablet, Refills: 3    Associated Diagnoses: Hypomagnesemia      NARCAN 4 MG/0.1ML nasal spray WAGNER INTO ONE NOSTRIL. MAY REPEAT IN ALTERNATE NOSTRIL WITHIN 2 MINUTES IF NO OR MINIMAL RESPONSE      oxyCODONE IR (ROXICODONE) 10 MG tablet Takes 1/2 pill (5 mg) 2-3 x daily. --[facility]  Qty:        !! senna-docusate (SENOKOT-S/PERICOLACE) 8.6-50 MG tablet Take 1 tablet by mouth 2 times daily as needed for constipation Or by feeding tube  Qty: 180 tablet, Refills: 3    Associated Diagnoses: Malignant neoplasm metastatic to both lungs (H); Maxillary sinus cancer (H); S/P flap graft      !! senna-docusate (SENOKOT-S/PERICOLACE) 8.6-50 MG tablet 1 tablet by Per Feeding Tube route 2 times daily as needed for constipation  Qty: 90 tablet, Refills: 3    Associated Diagnoses: S/P flap graft       !! - Potential duplicate medications found. Please discuss with provider.           No Known Allergies     Review of Systems   Constitutional: Positive for fatigue. Negative for chills and fever.   HENT: Negative for congestion.    Eyes: Negative for redness.   Respiratory: Positive for shortness of breath. Negative for cough.    Cardiovascular: Negative for chest pain.   Gastrointestinal: Negative for abdominal pain, nausea and vomiting.   Endocrine: Negative for polydipsia and polyuria.   Genitourinary: Negative for difficulty urinating.   Musculoskeletal: Negative for arthralgias and neck stiffness.   Skin: Negative for color change.   Allergic/Immunologic: Negative for immunocompromised state.   Neurological: Negative for headaches.    Hematological: Negative for adenopathy. Does not bruise/bleed easily.   Psychiatric/Behavioral: Negative for confusion.   All other systems reviewed and are negative.    A complete review of systems was performed with pertinent positives and negatives noted in the HPI, and all other systems negative.    Physical Exam   BP: (!) 144/95  Pulse: 72  Temp: 97.6  F (36.4  C)  Resp: 13  Height: 182.9 cm (6')  Weight: 68 kg (150 lb)  SpO2: 100 %      Physical Exam  Vitals signs and nursing note reviewed.   Constitutional:       General: He is not in acute distress.     Appearance: Normal appearance. He is not ill-appearing, toxic-appearing or diaphoretic.   HENT:      Head: Normocephalic and atraumatic.   Eyes:      General: No scleral icterus.     Extraocular Movements: Extraocular movements intact.      Conjunctiva/sclera: Conjunctivae normal.   Neck:      Musculoskeletal: Normal range of motion and neck supple.   Cardiovascular:      Rate and Rhythm: Normal rate.      Pulses: Normal pulses.      Heart sounds: Normal heart sounds.   Pulmonary:      Effort: Pulmonary effort is normal. No respiratory distress.      Breath sounds: Rales ( posterior, left, lowe lung field) present. No wheezing.   Abdominal:      Palpations: Abdomen is soft.      Tenderness: There is no abdominal tenderness. There is no right CVA tenderness, left CVA tenderness, guarding or rebound.   Musculoskeletal: Normal range of motion.         General: No tenderness.      Right lower leg: No edema.      Left lower leg: No edema.   Skin:     General: Skin is warm.      Findings: No rash.      Comments: Port in right upper chest wall without erythema, induration, fluctuance, or tenderness surrounding the site.   Neurological:      General: No focal deficit present.      Mental Status: He is alert and oriented to person, place, and time.      Cranial Nerves: No cranial nerve deficit.      Motor: No weakness.      Coordination: Coordination normal.    Psychiatric:         Mood and Affect: Mood normal.         Behavior: Behavior normal.         Thought Content: Thought content normal.         Judgment: Judgment normal.         ED Course   4:30 PM  The patient was seen and examined by Jose Raul Petersen MD in Room ED21.        Procedures             EKG Interpretation:      Interpreted by Jose Raul Petersen MD  Time reviewed: 15:39   Symptoms at time of EKG: shortness of breath  Rhythm: normal sinus   Rate: normal  Axis: normal  Ectopy: none  Conduction: Incomplete RBBB  ST Segments/ T Waves: No ST-T wave changes  Q Waves: none  Comparison to prior: Slightly different than old EKG done in February 25, 2021    Clinical Impression: NSR with no acute ischemic changes                        Results for orders placed or performed during the hospital encounter of 05/01/21 (from the past 24 hour(s))   EKG 12-lead   Result Value Ref Range    Interpretation ECG Click View Image link to view waveform and result    Comprehensive metabolic panel   Result Value Ref Range    Sodium 130 (L) 133 - 144 mmol/L    Potassium 2.9 (L) 3.4 - 5.3 mmol/L    Chloride 90 (L) 94 - 109 mmol/L    Carbon Dioxide 35 (H) 20 - 32 mmol/L    Anion Gap 5 3 - 14 mmol/L    Glucose 92 70 - 99 mg/dL    Urea Nitrogen 13 7 - 30 mg/dL    Creatinine 0.67 0.66 - 1.25 mg/dL    GFR Estimate >90 >60 mL/min/[1.73_m2]    GFR Estimate If Black >90 >60 mL/min/[1.73_m2]    Calcium 8.4 (L) 8.5 - 10.1 mg/dL    Bilirubin Total 0.6 0.2 - 1.3 mg/dL    Albumin 2.8 (L) 3.4 - 5.0 g/dL    Protein Total 6.8 6.8 - 8.8 g/dL    Alkaline Phosphatase 80 40 - 150 U/L    ALT 39 0 - 70 U/L    AST 75 (H) 0 - 45 U/L   CBC with platelets differential   Result Value Ref Range    WBC 7.3 4.0 - 11.0 10e9/L    RBC Count 2.43 (L) 4.4 - 5.9 10e12/L    Hemoglobin 8.0 (L) 13.3 - 17.7 g/dL    Hematocrit 23.9 (L) 40.0 - 53.0 %    MCV 98 78 - 100 fl    MCH 32.9 26.5 - 33.0 pg    MCHC 33.5 31.5 - 36.5 g/dL    RDW 20.4 (H) 10.0 - 15.0 %    Platelet Count  106 (L) 150 - 450 10e9/L    Diff Method Automated Method     % Neutrophils 84.7 %    % Lymphocytes 4.8 %    % Monocytes 9.9 %    % Eosinophils 0.1 %    % Basophils 0.1 %    % Immature Granulocytes 0.4 %    Nucleated RBCs 0 0 /100    Absolute Neutrophil 6.1 1.6 - 8.3 10e9/L    Absolute Lymphocytes 0.4 (L) 0.8 - 5.3 10e9/L    Absolute Monocytes 0.7 0.0 - 1.3 10e9/L    Absolute Eosinophils 0.0 0.0 - 0.7 10e9/L    Absolute Basophils 0.0 0.0 - 0.2 10e9/L    Abs Immature Granulocytes 0.0 0 - 0.4 10e9/L    Absolute Nucleated RBC 0.0    Troponin I   Result Value Ref Range    Troponin I ES <0.015 0.000 - 0.045 ug/L   Nt probnp inpatient (BNP)   Result Value Ref Range    N-Terminal Pro BNP Inpatient 930 (H) 0 - 900 pg/mL   D dimer quantitative   Result Value Ref Range    D Dimer 3.6 (H) 0.0 - 0.50 ug/ml FEU   Creatinine POCT   Result Value Ref Range    Creatinine 0.6 (L) 0.66 - 1.25 mg/dL    GFR Estimate >90 >60 mL/min/[1.73_m2]    GFR Estimate If Black >90 >60 mL/min/[1.73_m2]   XR Chest Port 1 View    Impression    RESIDENT PRELIMINARY INTERPRETATION  IMPRESSION:     1. Right lower lobe mixed interstitial and airspace opacities  concerning for infection or inflammatory process.  2. Bilateral nodular opacities suggestive for metastatic lesions,  better appreciated on prior CT exam 4/5/2021.   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 No  acute pulmonary embolus. No evidence for right heart strain.  2. Interval increased right greater than left bibasilar pulmonary  fibrotic changes likely secondary to carboplatin chemotherapy.  3. Waxing and waning prominent and enlarged mediastinal lymph nodes  including interval enlargement of a subcarinal lymph node.  4. Stable size of numerous solid pulmonary metastatic lesions.  5. Interval decrease in size of a metastatic right adrenal mass.    In the event of a positive result for acute pulmonary embolism  resulting in right heart strain, consider calling the   [facility] hospital  for PERT (Pulmonary Embolism Response Team)  Activation?    PERT -- Pulmonary Embolism Response Team (Multidisciplinary team  including cardiology, interventional radiology, critical care,  hematology)   Blood culture    Specimen: Arm, Left; Blood    Portacath   Result Value Ref Range    Specimen Description Blood Portacath     Culture Micro PENDING    Blood culture    Specimen: Portacath; Blood    Left Arm   Result Value Ref Range    Specimen Description Blood Left Arm     Culture Micro PENDING      Medications   acetaminophen (TYLENOL) tablet 325-650 mg (has no administration in time range)   cevimeline (EVOXAC) capsule 30 mg (30 mg Oral Given 5/1/21 2230)   cyclobenzaprine (FLEXERIL) tablet 10 mg (has no administration in time range)   magnesium oxide (MAG-OX) tablet 400 mg (400 mg Oral Not Given 5/1/21 2230)   oxyCODONE (ROXICODONE) tablet 5 mg (has no administration in time range)   senna-docusate (SENOKOT-S/PERICOLACE) 8.6-50 MG per tablet 1 tablet (has no administration in time range)   lidocaine 1 % 0.1-1 mL (has no administration in time range)   lidocaine (LMX4) cream (has no administration in time range)   sodium chloride (PF) 0.9% PF flush 3 mL (3 mLs Intracatheter Not Given 5/1/21 2251)   sodium chloride (PF) 0.9% PF flush 3 mL (has no administration in time range)   melatonin tablet 5 mg (has no administration in  time range)   iopamidol (ISOVUE-370) solution 55 mL (55 mLs Intravenous Given 5/1/21 1739)   sodium chloride (PF) 0.9% PF flush 86 mL (86 mLs Intravenous Given 5/1/21 1740)   cefTRIAXone (ROCEPHIN) 2 g vial to attach to  ml bag for ADULTS or NS 50 ml bag for PEDS (0 g Intravenous Stopped 5/1/21 1851)   azithromycin (ZITHROMAX) 500 mg in sodium chloride 0.9 % 250 mL intermittent infusion (0 mg Intravenous Stopped 5/1/21 2010)   potassium chloride (KLOR-CON) Packet 60 mEq (60 mEq Oral Given 5/1/21 1939)             Assessments & Plan (with Medical Decision Making)   60-year-old man with history of metastatic cancer presenting with shortness of breath for 3 days with hypoxemia.  Differential diagnosis: Pulmonary embolus, pleural effusion, pneumonia, COVID-19 infection, metastatic cancer, fluid overload, CHF, ACS.    After thorough history abd physical exam patient appears to be in no acute distress.  I will obtain laboratory studies and chest x-ray for further diagnostic evaluation.  He agrees with the plan.    Laboratory studies returned with no leukocytosis, WBC is normal at 7300.  There is anemia with hemoglobin of 8.0 which is slightly lower than the value obtained from approximately 2 weeks ago at 9.2.  There is thrombocytopenia with platelets of 106,000.  Electrolytes show hyponatremia with sodium of 130 and potassium of 2.9.  Patient will be given oral potassium chloride solution for repletion as well as intravenous normal saline.  Creatinine is normal at 0.67.  Her troponin is undetectable.  BNP is slightly elevated 930.  D-dimer is elevated 3.6.  I reviewed his chest x-ray and I read the radiology report; there is right lower lobe mixed interstitial and airspace opacities concerning for infection or inflammatory process.  There are bilateral nodular opacity suggestive for chronic metastatic lesions.  I will obtain CT angiogram of the chest given patient's underlying cancer, shortness of breath,  hypoxemia, and elevated D-dimer.  I will initiate intravenous ceftriaxone and intravenous azithromycin and he will be admitted to internal medicine service for further evaluation and management.  He agrees with plan.      This part of the medical record was transcribed by Bette Patel Scribe, from a dictation done by Jose Raul Petersen MD.     I reviewed patient's CT angiogram of the chest and I read the radiology report; no evidence of acute pulmonary bolus or right heart strain.  There is increased right greater than left bibasilar pulmonary fibrotic changes thought to be secondary to carboplatin chemotherapy.  There are numerous solid pulmonary metastatic lesions.  There is metastatic right adrenal mass.  Mediastinal lymph nodes are enlarged.Given patient's worsening shortness of breath over the last 3 days accompanied by hypoxemia with oxygen saturation at 80% on room air I believe he needs to be admitted to the hospital for further evaluation.  This part of the medical record was transcribed by Bette Guillory Scribe, from a dictation done by Jose Raul Petersen MD.     I have reviewed the nursing notes.    I have reviewed the findings, diagnosis, plan and need for follow up with the patient.    Current Discharge Medication List          Final diagnoses:   Shortness of breath   Hypoxemia   Pneumonia of right lower lobe due to infectious organism   I, Jorge Gomez, am serving as a trained medical scribe to document services personally performed by Jose Raul Petersen MD, based on the provider's statements to me.      Jose Raul GRAY MD, was physically present and have reviewed and verified the accuracy of this note documented by Jorge Gomez.     5/1/2021   [facility] EMERGENCY DEPARTMENT     Jose Raul Petersen MD  05/01/21 [id number]

## 2021-05-01 NOTE — ED TRIAGE NOTES
Pt BIBA from a friend's home (was looking for help from friend), pt has been having 3 days of increasing SOB, has a hx lung CA and smoking, on chemo x 1 year. Pt's SOB has been accompanied by CP and pain in upper back and shoulders. Started 3 days ago and has been getting worse.    Pt was at friend's house, fell asleep and woke up after nap and per friend pt was confused. Friend then called EMS.    Pt on arrival A&Ox4. O2 via nonrebreather 15L - per EMS, pt was hypoxic on RA when EMS was called (sats 80s).    Pt refused ASA from EMS, but received 1 spray nitroglycerin  BP diastolic changes 152/101 (R arm) 155/89 (L arm)    BG 84, no changes en route.

## 2021-05-01 NOTE — ED NOTES
Bed: ED21  Expected date: 5/1/21  Expected time: 3:21 PM  Means of arrival: Ambulance  Comments:  Cara [phone]    61 y/o Male    CP/SOB  Hx:  lung cancer

## 2021-05-02 NOTE — PLAN OF CARE
OT/5A: Cancel and HOLD. Per PT, pt is declining any therapy today. Also, per chart review and discussion w/ PT, pt may not require x2 disciplines during IP stay. IP OT to follow from periphery and initiate as indicated.

## 2021-05-02 NOTE — UTILIZATION REVIEW
Admission Status; Secondary Review Determination       Under the authority of the Utilization Management Committee, the utilization review process indicated a secondary review on the above patient. The review outcome is based on review of the medical records, discussions with staff, and applying clinical experience noted on the date of the review.     (x) Inpatient Status Appropriate - This patient's medical care is consistent with medical management for inpatient care and reasonable inpatient medical practice.     RATIONALE FOR DETERMINATION   60 year old male admitted on 5/1/2021. 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 Pt on arrival A&Ox4. O2 via nonrebreather 15L - per EMS, pt was hypoxic on RA when EMS was called (sats 80s).   Discussed with the attending physician there is concern for worsening infiltrate patient will be treated with IV antibiotic and steroid ongoing hospital care is required in a patient who is immunocompromised and has significant comorbidities.      The expected length of stay at the time of admission was more than 2 nights because of the severity of illness, intensity of service provided, and risk for adverse outcome. Inpatient admission is appropriate.     This document was produced using voice recognition software       The information on this document is developed by the utilization review team in order for the business office to ensure compliance. This only denotes the appropriateness of proper admission status and does not reflect the quality of care rendered.   The definitions of Inpatient Status and Observation Status used in making the determination above are those provided in the CMS Coverage Manual,  Chapter 1 and Chapter 6, section 70.4.   Sincerely,   SANG MARTIN MD   System Medical Director   Utilization Management   [facility].

## 2021-05-02 NOTE — PLAN OF CARE
PT 5A: cancel - pt declining therapy this date, reports he is too sore 'all over' to participate. Will reschedule.

## 2021-05-02 NOTE — PLAN OF CARE
"Time: 2030-0730    Reason for admission: SOB, hypoxia    Pt is A&Ox4 but seems to be a poor historian and unable to recall details. Up Ax1 w/ FWW. VSS on 2L O2 NC, on continuous pulse ox, desats to 86-87% on RA. ANDERSEN, denies SOB at rest. C/o pain \"all over\" but also mentioned specifically abd and feet pain -- PRN oxy given x1 w/ relief. Voided x1 this morning into toilet and nursing staff missed measurement, LBM \"5 or 6 days ago\". Regular diet. R chest port SL. Multiple dried cuts on most fingers -- pt cannot recall what they are from. Bilateral feet with dried scabs, missing all toes.     Plan: Oncology and PT/OT consult    "

## 2021-05-02 NOTE — PLAN OF CARE
Neuro: A&Ox3-4. In AM, patient was mildly confused to time. Pt cleared up as day progressed and mood improved from irritable to pleasant and cooperative.   Cardiac: SR with RBBB. VSS.   Respiratory: Sating 95% on 1L.  GI/: Patient reported using bathroom in AM to void. No BM this shift.  Diet/appetite: appetite poor. Ordered lunch but was unable to eat: 0-25% completed.  Activity:  Assist of 1. up to chair with walker. Refused PT today.  Pain: Reports back/shoulder aches. Not a huge benefit from the oxy PRN. Pt inquired about muscle relaxer. Tylenol also given.  Skin: Pt reports finger cuts are related to  work a few days ago. Pt has no toes and is unsteady on feet. No other skin concerns noted at this time.  LDA's: Port heparin locked    Plan: Continue with POC. Notify primary team with changes.

## 2021-05-02 NOTE — PROVIDER NOTIFICATION
Gold 11 paged regarding heparin lock order and flexeril. Heparin orders placed. Flexeril modified. Prednisone and antibiotics started per provider.

## 2021-05-02 NOTE — H&P
[facility]    History and Physical - Hospitalist Service, [facility] 2       Date of Admission:  5/1/2021    Assessment & Plan   Eduardo Rubio is a 60 year old male admitted on 5/1/2021. 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    #  Hypoxia and dyspnea on exertion suspected to be secondary to carbloplatin toxicity and metastatic maxillary sinus squamous cell carcinoma with metastases to the lungs  Imaging worrisome for carboplatin toxicity (no PE).  Given this, will discuss in the AM with Heme/Onc about next steps in management for his malignancy.  Had brief conversation with on-call Oncology fellow on admission regarding suspected carboplatin toxicity, and given overall stability was advised to hold off on steroids at this time.  Received antibiotics in the ED prior to imaging resulted, but will not continue at this time.  COVID negative and is also sp Moderna x2.  -  Consult Oncology in the AM  -  Continue supplemental O2 to maintain sats >92%  -  Holding off on steroids given maintaining O2 sats on 2L, would discuss in AM  -  Not continuing antibiotics at this time, etiology appears consistent with chemo toxicity, no other findings to raise concerns for infectious etiology    #  Generalized weakness and fatigue  Reports having difficulty with ambulation recently.  -  PT  -  OT    #  Dry mouth  -  Continue home cevimiline    #  Hepatitis C  -  Per outpatient Onc providers, treatment not warranted given life expectancy of 2 years for his malignancy    #  Chronic hyponatremia, present on admission  Baseline in the low 130s.  Suspect this is related to his malignancy and some issues with PO  intake.    #  Chronic hypokalemia, present on admission  Baseline in the mid/low 3s.  Suspect this is related to oral intake issues.  -  Follow and replace PRN    #  Chronic hypomagnesemia, present on admission  Suspect due to inadequate intake.  -  Continue mag replacement    #  Chronic malnutrition of uncertain severity secondary to maxillary sinus malignancy, present on admission  Has had ongoing difficulty with PO intake, has used marinol as an outpatient.  Labs notable for hypoalbuminemia.  -  Monitor I/O  -  Calorie counts  -  Consider marinol inpatient    #  Chronic normocytic anemia secondary to malignancy and chemotherapy  -  Trend Hgb    #  Thrombocytopenia secondary to chemotherapy  -  Trend platelets       Diet:   Regular  DVT Prophylaxis: Pneumatic Compression Devices  Cohn Catheter: not present  Code Status:   DNR/DNI         Disposition Plan   Expected discharge: 2 - 3 days, recommended to prior living arrangement once O2 use less than 2 liters/minute, safe disposition plan/ TCU bed available and determination of management plan for malignancy and possible chemotherapy related pulmonary toxicity.  Entered: Reanna Kelsey MD 05/01/2021, 7:52 PM     The patient's care was discussed with the Patient.    Reanna Kelsey MD  [facility]  Contact information available via [facility] Paging/Directory      ______________________________________________________________________    Chief Complaint   Shortness of breath    History is obtained from the patient    History of Present Illness   Eduardo Rubio is a 60 year old male admitted on 5/1/2021. 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    Per report, EMS found his O2 sats to be in the 80s when they picked him up at a friend's home.    Per Mr. Joanne, he has been having worsening dyspnea over the last few days and has also had increasing fatigue.  He reports he is having some difficulty remembering things recently too and is taking longer to recall recent events.  He has diffuse chronic pains (neck, chest, back, legs) and reports it is difficulty to ambulate due to pain and a sensation of heaviness in his legs.  He feels much more comfortably on oxygen now.    No headache, no cough, no nausea or vomiting, no constipation, no urinary issues.  Occasional looser stools.    He has been staying at a friend's house recently to help them out.      Review of Systems    The 10 point Review of Systems is negative other than noted in the HPI or here.     Past Medical History    I have reviewed this patient's medical history and updated it with pertinent information if needed.   Past Medical History:   Diagnosis Date     Gastric ulcer      Low back pain      Maxillary sinus cancer (H)      Toe amputation status     all ten toes       Past Surgical History   I have reviewed this patient's surgical history and updated it with pertinent information if needed.  Past Surgical History:   Procedure Laterality Date     [place]DOMEN [place]      [facility] [place] to [place]     AS AMPUTATION TOE,I-P JT Bilateral     all ten toes     EXENTERATION ORBIT Right 9/24/2019    Procedure: Right Orbital Exenteration;  Surgeon: Jose Roberts MD;  Location: [facility] OR     EXPLORE NECK Right 9/24/2019    Procedure: Right Neck Exploration;  Surgeon: Jose Roberts MD;  Location: [facility] OR     GRAFT FREE VASCULARIZED LATISSIMUS DORSI Right 9/24/2019    Procedure: Right Latissmus Free Flap Reconstruction;  Surgeon: Teresa Pulliam MD;  Location: [facility] OR     INSERT PORT VASCULAR ACCESS Right 10/31/2019    Procedure: place venous access chest port;  Surgeon:  Natasha Mishra PA-C;  Location: [facility] OR     IR CHEST PORT PLACEMENT > 5 YRS OF AGE  10/31/2019     OSTEOTOMY MAXILLA N/A 9/24/2019    Procedure: Right Radicall Maxillectomy, Nasogastric Tube Placement;  Surgeon: Jose Roberts MD;  Location: [facility] OR       Social History   I have reviewed this patient's social history and updated it with pertinent information if needed.  Social History     Tobacco Use     Smoking status: Light Tobacco Smoker     Packs/day: 0.50     Years: 15.00     Pack years: 7.50     Types: Cigarettes     Start date: 2004     Smokeless tobacco: Never Used   Substance Use Topics     Alcohol use: Not Currently     Drug use: Not Currently       Family History   I have reviewed this patient's family history and updated it with pertinent information if needed.  Family History   Problem Relation Age of Onset     Breast Cancer Mother      Glaucoma No family hx of      Macular Degeneration No family hx of        Prior to Admission Medications   Prior to Admission Medications   Prescriptions Last Dose Informant Patient Reported? Taking?   NARCAN 4 MG/0.1ML nasal spray   Yes No   Sig: WAGNER INTO ONE NOSTRIL. MAY REPEAT IN ALTERNATE NOSTRIL WITHIN 2 MINUTES IF NO OR MINIMAL RESPONSE   acetaminophen (TYLENOL) 325 MG tablet   No No   Sig: Take 1-2 tablets (325-650 mg) by mouth every 6 hours as needed for mild pain   cevimeline (EVOXAC) 30 MG capsule   No No   Sig: Take 1 capsule (30 mg) by mouth 3 times daily   cyclobenzaprine (FLEXERIL) 10 MG tablet   Yes No   Sig: TK 1 T PO HS PRN FOR MUSCLE SPASM RELIEF   hydrocortisone 2.5 % cream   No No   Sig: Apply topically 2 times daily Apply to itchy spot on scalp and back twice a day as needed   magnesium oxide (MAG-OX) 400 MG tablet   No No   Sig: Take 1 tablet (400 mg) by mouth 2 times daily   oxyCODONE IR (ROXICODONE) 10 MG tablet   Yes No   Sig: Takes 1/2 pill (5 mg) 2-3 x daily. --[facility]   senna-docusate (SENOKOT-S/PERICOLACE) 8.6-50 MG tablet   No  No   Si tablet by Per Feeding Tube route 2 times daily as needed for constipation   senna-docusate (SENOKOT-S/PERICOLACE) 8.6-50 MG tablet   No No   Sig: Take 1 tablet by mouth 2 times daily as needed for constipation Or by feeding tube      Facility-Administered Medications: None     Allergies   No Known Allergies    Physical Exam   Vital Signs: Temp: 97.6  F (36.4  C) Temp src: Oral BP: 118/77 Pulse: 80   Resp: 18 SpO2: 94 % O2 Device: Nasal cannula Oxygen Delivery: 2 LPM  Weight: 150 lbs 0 oz    General Appearance: Sitting in bed in NAD  Eyes: Right extenerated, left eye EOMI  HEENT: MMM  Respiratory: Crackles in mid fields to bases, breathing comfortably on nasal cannula, no use of accessory muscles  Cardiovascular: RRR, no m/g, peripheral pulses intact    GI: NABS, soft, NT, ND  Skin: No rashes over face, arms, chest, abdomen, legs  Musculoskeletal: Bilateral forefoot amputations, no peripheral edema  Neurologic: Alert, answering questions appropriately, slower recall of recent events  Psychiatric: Full affect    Data   Data reviewed today: I reviewed all medications, new labs and imaging results over the last 24 hours.

## 2021-05-03 NOTE — PLAN OF CARE
"Time: 8035-9704  Reason for admission/Dx:   Shortness of breath [R06.02]  Hypoxemia [R09.02]  Pneumonia of right lower lobe due to infectious organism [J18.9]    [Vitals]: /87 (BP Location: Right arm)   Pulse 67   Temp 96.3  F (35.7  C) (Oral)   Resp 20   Ht 1.829 m (6')   Wt 67.7 kg (149 lb 3.2 oz)   SpO2 94%   BMI 20.24 kg/m      [Pain/PRN/s]: Pt reported generalized pain, PRN oxycodone given with some decrease in pain    [Respiratory]: Pt SOB w/ activity. Per PT, pt O2 sats decreased to 70% on RA w/ activity.  Pt currently on 2 L O2 via NC.    [Cardiac]: Pt tele reading NSR.    [Neuros]: Pt confused to time, and occasionally has trouble understanding conversation.  Ex: writer needed to repeat herself x2 for clarification. Sometimes unable to follow commands. Majority of the time, pt able to hold conversation w/ writer.  -Bed alarm on for safety.  [GI]:Orders Placed This Encounter      Combination Diet Regular Diet Adult  -tolerated PO intake, but having very poor appetite d/t fatigue  -Per pt report, he has not had a stool in \"5 days,\" pt given PRN senna-docusate. Team paged regarding this to start scheduled bowel regimen. No new orders currently.  []: WDL    [Skin/Wounds]: Pt has hx amputated toes, eye patch over R eye.  Pt has excoriation on hands bilaterally w/ scabs.    [Lines]:    Port A Cath Single 10/31/19 Right Chest wall (Active)   Site Assessment WDL 05/03/21 0900   Dressing Status clean;dry;intact 05/03/21 0900   Dressing Intervention Transparent 05/03/21 0900   Dressing change due 05/08/21 05/03/21 0900   Line Status Heparin locked 05/03/21 0900   Access Date 05/01/21 05/03/21 0900   Access Attempts 1 05/01/21 1557   Gauge Power noncoring 90 degree bend;20 gauge;3/4 inch 05/01/21 1557   Line Necessity Yes, meets criteria 05/02/21 1700   Number of days: 550         [Infusions]: Pt to have abx infused later today.    [Activity]: Pt up w/ assist of 1 w/ walker.  [Labs/Lactic]:   Hemoglobin " (g/dL)   Date Value   05/03/2021 8.5 (L)     Creatinine (mg/dL)   Date Value   05/03/2021 0.65 (L)     Platelet Count (10e9/L)   Date Value   05/03/2021 85 (L)     Hematocrit (%)   Date Value   05/03/2021 25.9 (L)     WBC (10e9/L)   Date Value   05/03/2021 9.6       [Electrolytes]:   Potassium (mmol/L)   Date Value   05/03/2021 3.5     Magnesium (mg/dL)   Date Value   04/19/2021 1.8     Phosphorus (mg/dL)   Date Value   02/12/2021 3.3     Sodium   Date Value Ref Range Status   05/03/2021 132 (L) 133 - 144 mmol/L Final           Other:  Report given to 5C EMMA Puentes (pts brother) now designated visitor. Writer provided brief update to family, but family still wanting team to call back after rounds today.  Plan:   Plan transferred to 5C, belongings sent with patient. Continue w/ abx. Awaiting BC results from this morning.

## 2021-05-03 NOTE — PROVIDER NOTIFICATION
Osman guy paged regarding blood cultures growing staph epidermis gram positive cocci and clusters.

## 2021-05-03 NOTE — PROGRESS NOTES
Patient transferred to room 5-401.  Admitted from unit 5A  Belongings with patient.   Skin double check completed by: JI Cesar

## 2021-05-03 NOTE — PLAN OF CARE
Time: 5875-5270    Reason for admit: Shortness of breath [R06.02]  Hypoxemia [R09.02]  Pneumonia of right lower lobe due to infectious organism [J18.9]  Vitals: /65 (BP Location: Left arm)   Pulse 79   Temp 96.6  F (35.9  C) (Oral)   Resp 20   Ht 1.829 m (6')   Wt 67.7 kg (149 lb 3.2 oz)   SpO2 95%   BMI 20.24 kg/m    Activity: A1 w walker, pt unstable due to lack of toes.   Neuro: AOx4, forgetful at times   Mood/Behavior: Cooperative and pleasant. Historically agitated, not noted during shift.   Lines/Drains: R Port access  Cardiac: Pt on Tele, NSR w RBBB  Resp: Above 92% on 1-2L NC.   Diet: Poor appetite  GI/: Voiding w/o difficulty, no BM during shift  Skin: Cut on finger tips, cleaned w antimicrobial and Vaseline applied  Pain: Pt reports back aches and shoulder aches. Pt received PRN oxy and flexeril x1 during shift. Pt reports minimal relief.     New this shift: Although pt was agitated and noncompliant in AM on 5/2, the pt was pleasant and calm during shift. Trail of steroids initiated. Started ceftriaxone and azithromyycin for pneumonia. Echocardiogram done. Replaced K x2 today and replaced Mag    Plan: Expected discharge: TBD, recommended to prior living arrangement once adequate pain management/ tolerating PO medications, antibiotic plan established and O2 use less than 1 liters/minute.

## 2021-05-03 NOTE — PROGRESS NOTES
[facility]    Medicine Progress Note - Hospitalist Service, Gold 11       Date of Admission:  5/1/2021  Assessment & Plan      Eduardo Rubio is a 60 year old male admitted on 5/1/2021. 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    Today's plan   -attempting to wean off oxygen   -overnight blood culture from port reported 1/4 positive for staph epidermitis likely contaminant, Will monitor with repeat cultures  -noted mild downtrend in platlet count this am    -ordered bisacodyl suppository given patient mentions constipation     Plan     Staphylococcus epidermidis   1/4 from port blood cultures, likely containment, Awaiting repeat cultures      Hypoxia and dyspnea on exertion suspected to be secondary to carbloplatin or nivolimumab toxicity and metastatic maxillary sinus squamous cell carcinoma with metastases to the lungs  Imaging worrisome for carboplatin / nivoliumab toxicity.   Spoke to hem/onc team and suspect immunotherapy may have caused pneumonitis vs lymphangitic spread? Empiric treatment might help differentiate the two. PE rule out on admission, BNP was very mildly elevated, echo unremarkable for reduction in ejection fraction or valvular disease, patient noted to have stable anemia on labs       Generalized weakness and fatigue  Reports having difficulty with ambulation recently.  -  PT  -  OT      Dry mouth  -  Continue home cevimiline      Hepatitis C  -  Per outpatient Onc providers, treatment not warranted given life expectancy of 2 years for his malignancy      Chronic hyponatremia, present on admission  Baseline in the low 130s.  Suspect this is related to his malignancy and  some issues with PO intake.     #  Chronic hypokalemia, present on admission  Baseline in the mid/low 3s.  Suspect this is related to oral intake issues.  -  Follow and replace PRN      Chronic hypomagnesemia, present on admission  Suspect due to inadequate intake.  -  Continue mag replacement     Chronic malnutrition of uncertain severity secondary to maxillary sinus malignancy, present on admission  Has had ongoing difficulty with PO intake, has used marinol as an outpatient.  Labs notable for hypoalbuminemia.  -  Monitor I/O  -  Calorie counts  -  Consider marinol inpatient     Chronic normocytic anemia secondary to malignancy and chemotherapy  -  Trend Hgb      Thrombocytopenia secondary to chemotherapy  -  Trend platelets         Diet: Combination Diet Regular Diet Adult    DVT Prophylaxis: Pneumatic Compression Devices  Cohn Catheter: not present  Code Status: Full Code           Disposition Plan   Expected discharge: 2 - 3 days, recommended to prior living arrangement once antibiotic plan established and SIRS/Sepsis treated.  Entered: Ravi Joseph MD 05/03/2021, 1:10 PM       The patient's care was discussed with the Bedside Nurse and Patient.    Ravi Joseph MD  Hospitalist Service, [address]  Contact information available via [facility] Paging/Directory  Please see sign in/sign out for up to date coverage information  ______________________________________________________________________    Interval History   Patient was comfortable,Denies any new abdominal pain, difficulty urinating or worsening malaise.     Data reviewed today: I reviewed all medications, new labs and imaging results over the last 24 hours. I personally reviewed no images or EKG's today.    Physical Exam   Vital Signs: Temp: 98  F (36.7  C) Temp src: Axillary BP: 125/86 Pulse: 71   Resp: 20 SpO2: 96 % O2 Device: Nasal cannula Oxygen Delivery: 2 LPM  Weight: 148 lbs 1.6  oz  Constitutional: awake, alert, cooperative, no apparent distress, and appears stated age  Eyes: Lids and lashes normal, pupils equal, round and reactive to light, extra ocular muscles intact, sclera clear, conjunctiva normal  ENT: Normocephalic, without obvious abnormality, atraumatic, sinuses nontender on palpation, external ears without lesions, oral pharynx with moist mucous membranes, tonsils without erythema or exudates, gums normal and good dentition.  Hematologic / Lymphatic: no cervical lymphadenopathy  Respiratory: crackles right apex, right base and right middle lobe  Cardiovascular: Normal apical impulse, regular rate and rhythm, normal S1 and S2, no S3 or S4, and no murmur noted  GI: No scars, normal bowel sounds, soft, non-distended, non-tender, no masses palpated, no hepatosplenomegally  Genitounirinary:   Skin: no bruising or bleeding  Musculoskeletal: There is no redness, warmth, or swelling of the joints.  Full range of motion noted.  Motor strength is 5 out of 5 all extremities bilaterally.  Tone is normal.  Neurologic: Awake, alert, oriented to name, place and time.  Cranial nerves II-XII are grossly intact.  Motor is 5 out of 5 bilaterally.  Cerebellar finger to nose, heel to shin intact.  Sensory is intact.  Babinski down going, Romberg negative, and gait is normal.  Neuropsychiatric: General: normal, calm and normal eye contact    Data   Recent Labs   Lab 05/03/21  0642 05/03/21  0033 05/02/21  1435 05/02/21  0638 05/01/21  1554   WBC 9.6  --   --  7.2 7.3   HGB 8.5*  --   --  8.8* 8.0*   MCV 99  --   --  101* 98   PLT 85*  --   --  104* 106*   *  --   --  133 130*   POTASSIUM 3.5 3.8 3.4 3.0* 2.9*   CHLORIDE 95  --   --  92* 90*   CO2 32  --   --  34* 35*   BUN 12  --   --  10 13   CR 0.65*  --   --  0.61* 0.67   ANIONGAP 4  --   --  7 5   MIKEY 8.5  --   --  8.2* 8.4*   GLC 90  --   --  68* 92   ALBUMIN 2.4*  --   --  2.3* 2.8*   PROTTOTAL 6.5*  --   --  6.6* 6.8   BILITOTAL 0.4  --    --  0.5 0.6   ALKPHOS 71  --   --  78 80   ALT 26  --   --  32 39   AST 32  --   --  58* 75*   TROPI  --   --   --   --  <0.015     No results found for this or any previous visit (from the past 24 hour(s)).  Medications       azithromycin  250 mg Oral Daily     cefTRIAXone  2 g Intravenous Q24H     cevimeline  30 mg Oral TID     heparin  5 mL Intracatheter Q28 Days     heparin lock flush  5-10 mL Intracatheter Q24H     magnesium oxide  400 mg Oral BID     predniSONE  20 mg Oral Daily     sodium chloride (PF)  10 mL Intracatheter Q7 Days     sodium chloride (PF)  3 mL Intracatheter Q8H

## 2021-05-03 NOTE — PLAN OF CARE
/86   Pulse 71   Temp 98  F (36.7  C) (Axillary)   Resp 20   Ht 1.829 m (6')   Wt 67.2 kg (148 lb 1.6 oz)   SpO2 96%   BMI 20.09 kg/m      S: patient transferred from  to , no acute events 11:45 - 1500    B: 60 year old male admitted on 5/1/2021. 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    A:  Afebrile, VSS, Sats 96% on 2 LPM. Oriented x3, but forgetful. Bed Alarm on. Up with assist x1 + walker. Received flexeril and tylenol x1 for back pain. Will exit bed and bathroom unattended without calling. Declined gait belt.      R: no further nursing concerns, continue plan of care.

## 2021-05-03 NOTE — PROGRESS NOTES
"Temp:  [96.6  F (35.9  C)-97.3  F (36.3  C)] 96.6  F (35.9  C)  Pulse:  [74-90] 79  Resp:  [20] 20  BP: (101-145)/(62-81) 121/65  SpO2:  [94 %-98 %] 95 %    Pt. A/O x4 with forgetfulness. VSS on 2L NC. Pt. requires assist of 1 with walker for transferring. Pt. has difficulty ambulating without toes. Pt. was cooperative overnight and reported wanting to sleep. Pt. has R. Port that was SL.   Lab called with positive blood cultures in polo cath. Provider notified. Orders placed for blood culture redraw in port and peripherally appx. 04:30. Pt. has cuts on fingers from PTA injury. Pt. reported pain 5/10 and given tylenol and oxycodone that was effective in reducing his pain. No difficulty voiding, but pt. is unsure of last BM and reported \"5 days ago.\" Abdomen is distended and bowel sounds active. Mag/K replacement done and K recheck was 3.8 at 00:30. Recheck electolytes in AM with BMP. Tele was SR with RBRUBY.  Plan: Continue abx. course for pneumonia and bacteremia. Pt. to be tolerating PO meds and weaned to less oxygen prior to discharge. Day team to decide abx. course and coverage based on culture results per overnight provider.  "

## 2021-05-03 NOTE — PROGRESS NOTES
05/03/21 0902   Quick Adds   Type of Visit Initial PT Evaluation   Living Environment   People in home other (see comments)  (roommate)   Current Living Arrangements house   Home Accessibility stairs to enter home;stairs within home   Number of Stairs, Main Entrance 4   Stair Railings, Main Entrance railings on both sides of stairs   Number of Stairs, Within Home, Primary 9   Stair Railings, Within Home, Primary railings on both sides of stairs   Transportation Anticipated family or friend will provide   Living Environment Comments pt reports living in a house with an undisclosed number of roommates/friends. reports 4 LARON with rails and 8-9 stairs within the home. pt becomes somewhat confrontational with continued questions of home set up/assistance. reports he is IND at baseline.    Self-Care   Usual Activity Tolerance good   Current Activity Tolerance moderate   Regular Exercise No   Equipment Currently Used at Home none   Activity/Exercise/Self-Care Comment denies DME at home   Disability/Function   Hearing Difficulty or Deaf no   Wear Glasses or Blind yes   Vision Management R eye patch   Concentrating, Remembering or Making Decisions Difficulty yes   Difficulty Communicating no   Difficulty Eating/Swallowing no   Walking or Climbing Stairs Difficulty no   Dressing/Bathing Difficulty no   Toileting issues no   Doing Errands Independently Difficulty (such as shopping) yes   Errands Management friends assist as needed   Fall history within last six months no   Change in Functional Status Since Onset of Current Illness/Injury yes   General Information   Onset of Illness/Injury or Date of Surgery 05/01/21   Referring Physician Reanna Kelsey MD   Patient/Family Therapy Goals Statement (PT) to return home   Pertinent History of Current Problem (include personal factors and/or comorbidities that impact the POC) Eduardo Edwardsquist is a 60 year old male admitted on 5/1/2021. He has a history of metastatic  maxillary sinus squamous cell cancer s/p total maxillectomy with orbital exteneration (9/2019) with metastases to the lungs, currently on chemotherapy (carboplating and erbitux, which began on 2-), Hepatitis C, tobacco use who presented with worsening dyspnea on exertion and hypoxia.   Existing Precautions/Restrictions fall;oxygen therapy device and L/min   General Observations activity: up ad ariana   Cognition   Orientation Status (Cognition) oriented x 3   Cognitive Status Comments pt is alert and oriented to self, place and situation, correctly reports the month however wrong specific date   Pain Assessment   Patient Currently in Pain Yes, see Vital Sign flowsheet  (total body)   Integumentary/Edema   Integumentary/Edema no deficits were identifed   Posture    Posture Forward head position;Protracted shoulders   Range of Motion (ROM)   ROM Comment wfl   Strength   Strength Comments grossly deconditioned however > 3+/5 per observation   Bed Mobility   Comment (Bed Mobility) CGA supine <> Sit   Transfers   Transfer Safety Comments sit <> stand transfer up to FWW with CGA   Gait/Stairs (Locomotion)   Comment (Gait/Stairs) amb x 10' with FWW and CGA, flexed posture and maintaining FWW outside safe proximity. not receptive to cues. SOB with activity on RA.    Balance   Balance Comments impaired, relies on UE support 2/2 lack of toes   Sensory Examination   Sensory Perception Comments hx N/T in B feet however light touch grossly intact   Clinical Impression   Criteria for Skilled Therapeutic Intervention yes, treatment indicated   PT Diagnosis (PT) impaired functional mobility   Influenced by the following impairments weakness, fatigue, balance, O2 demand   Functional limitations due to impairments below baseline performance and tolerance of mobility   Clinical Presentation Stable/Uncomplicated   Clinical Presentation Rationale pt presentation, clinical reasoning   Clinical Decision Making (Complexity) low  complexity   Therapy Frequency (PT) 5x/week   Predicted Duration of Therapy Intervention (days/wks) 2 weeks   Planned Therapy Interventions (PT) balance training;bed mobility training;gait training;home exercise program;stair training;strengthening;transfer training;neuromuscular re-education   Anticipated Equipment Needs at Discharge (PT)   (FWW)   Risk & Benefits of therapy have been explained evaluation/treatment results reviewed;care plan/treatment goals reviewed;risks/benefits reviewed;participants included;patient   Clinical Impression Comments pt presents below baseline. will benefit from FWW for safe mobility.    PT Discharge Planning    PT Discharge Recommendation (DC Rec) Transitional Care Facility;home with assist;home with home care physical therapy   PT Rationale for DC Rec pt appears below baseline with functional mobility, presents as an increased falls risk and limited reception to safety cues. Requires supplemental O2 for mobility and increased support for balance with ambulation. unclear home situation and assistance availability. would benefit from rehab stay however pt declining this option.    PT Brief overview of current status  A x 1 + FWW, impulsive.    Total Evaluation Time   Total Evaluation Time (Minutes) 9

## 2021-05-03 NOTE — PROGRESS NOTES
"CLINICAL NUTRITION SERVICES - ASSESSMENT NOTE     Nutrition Prescription    RECOMMENDATIONS FOR MDs/PROVIDERS TO ORDER:  - Encourage PO intake.   - Highly recommend consideration of an appetite stimulant (Marinol) to assist with improvement of PO intake.    Malnutrition Status:    At least Non-severe malnutrition in the context of chronic illness    Recommendations already ordered by Registered Dietitian (RD):  Vital Cuisine 500 Shake (chocolate) at HS snack    Future/Additional Recommendations:  Monitor adequacy of PO intake. If documentation indicates that pt is consuming <50% nutritionally adequate meals TID, recommend:    Provide additional nutrition education on strategies to increase PO intake    Adjust supplement schedule per pt preference    Calorie Counts     REASON FOR ASSESSMENT  Eduardo Rubio is a/an 60 year old male assessed by the dietitian for Admission Nutrition Risk Screen for positive    Chart reviewed:  admitted on 5/1/2021. 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    NUTRITION HISTORY  Pt was last seen by his outpatient RD on 3/15/2021.   Eduardo reports that his appetite continues to be decreased. He usually eats 2-3 small meals/day and 1 nutrition supplement/day.   Per chart review, Eduardo is receiving meals from [facility].     CURRENT NUTRITION ORDERS  Diet: Regular  Intake/Tolerance: Per flowsheet intake of 0-25% of meals since admit.     LABS  Labs reviewed  Na 132 (L)  Cr 0.65 (L)    MEDICATIONS  Prednisone   Pt has used marinol outpatient     ANTHROPOMETRICS  Height: 182.9 cm (6' 0\")  Most Recent Weight: 67.7 kg (149 lb 3.2 oz) admit wt on 5/1/21  IBW: 80.9 kg ( 84% IBW)  BMI: 20.24 kg/m2 Normal BMI  Weight " History: 2.3 kg (3.3%) wt loss over the past month.  Wt Readings from Last 10 Encounters:   05/01/21 67.7 kg (149 lb 3.2 oz)   04/19/21 70.3 kg (155 lb)   04/14/21 69.9 kg (154 lb 3.2 oz)   04/05/21 70 kg (154 lb 6.4 oz)   04/01/21 70 kg (154 lb 4.8 oz)   03/26/21 70.2 kg (154 lb 11.2 oz)   03/18/21 67.6 kg (149 lb)   03/12/21 66.5 kg (146 lb 11.2 oz)   03/04/21 65.4 kg (144 lb 1.6 oz)   02/25/21 66.5 kg (146 lb 9.6 oz)     Dosing Weight: 67 kg dry admit wt     ASSESSED NUTRITION NEEDS  Estimated Energy Needs: 6838-6285 kcals/day (30 - 35 kcals/kg )  Justification: Repletion  Estimated Protein Needs: 75-95  grams protein/day (1.1 - 1.4 grams of pro/kg)  Justification: Chemo therapy  Estimated Fluid Needs: (1 mL/kcal)   Justification: Maintenance    PHYSICAL FINDINGS  See malnutrition section below.    MALNUTRITION  % Intake: < 75% for > 7 days (non-severe)  % Weight Loss: Up to 5% in 1 month (non-severe)  Subcutaneous Fat Loss: Unable to assess  Muscle Loss: Unable to assess  Fluid Accumulation/Edema: None noted  Malnutrition Diagnosis: At least Non-severe malnutrition in the context of chronic illness  *Pt declined nutrition focused physical assessment at this time    NUTRITION DIAGNOSIS  Unintended weight loss related to decreased appetite 2/2 maxillary sinus squamous cell cancer and chemotherapy as evidenced by pt report and 3% weight loss over the past month.       INTERVENTIONS  Implementation  Nutrition Education: Discussed current PO intake/appetite and role of RD. Discussed importance of adequate kcal/protein. Encouraged small frequent meals. Discussed trial of higher calorie nutrition supplement.   Medical food supplement therapy- Vital cuisine trial    Goals  Patient to consume % of nutritionally adequate meal trays TID, or the equivalent with supplements/snacks.     Monitoring/Evaluation  Progress toward goals will be monitored and evaluated per protocol.    Elaine Ulloa, RD, LD  5C/BMT RD pager  [phone]

## 2021-05-03 NOTE — PLAN OF CARE
/85 (BP Location: Right arm)   Pulse 71   Temp 98.6  F (37  C) (Axillary)   Resp 20   Ht 1.829 m (6')   Wt 67.2 kg (148 lb 1.6 oz)   SpO2 96%   BMI 20.09 kg/m      Shift: 3987-0215  Reason for Admission: Worsening dyspnea on exertion and hypoxia.  VS: VSS on 2L NC  Neuros: A/Ox4, able to make needs known. BA on  GI/: No BMs this shift, pt reports not having a BM in 5-7 days, suppository ordered. Voiding adequately   Nutrition: Tolerating regular diet  Drains/Lines: Port TKO  Activity: A1 + walker  Pain/Nausea: C/o of back pain- PRN Oxycodone given. Denies nausea  Respiratory: Sats >90% on 2L NC. Attempt to wean off O2  New this shift: Suppository given.  Plan of care: Plan to transfer to  soon. Will continue to monitor and follow POC.

## 2021-05-04 NOTE — CONSULTS
"    [facility]    Oncology Initial Consult Note    Patient Name: Eduardo Rubio   MRN: [id number]  YOB: 1960      Date of Service: May 4, 2021  Attending Physician: Dr. 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  Given the patient improvement with continued antibiotics, his presentation is most likely secondary to infectious pneumonia. Nivolumab lung toxicity is less likely given he last received Nivolumab treatment on 01/15/21 and his clinical improvement. In addition, immune checkpoint pulmonary toxicity typically requires prednisone dosing at 1mg/kg/day. While the patient was started on prednisone 20mg daily and did improve, it seems less likely that the steroids are playing significant role. Progression of malignancy also seems less likely given the CT findings per the report suggest overall decrease in size of the pulmonary masses.      Summary of Recommendations:  - discontinue prednisone  - continue antibiotics per primary team for PNA     Thank you for involving us in the care of this patient. We will continue to follow during the hospitalization.    Patient was seen and plan of care developed with Dr. House.    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  He denied any fevers, chills, cough or chest pain.  His symptoms continued to worsen and he ultimately called EMS who noted him to be hypoxic.  In the the ED the patient was noted to be hypoxic and started on supplemental O2, which the patient stated improved his symptoms. CT PE study was negative for a PE but did show extensive groundglass opacities involving the portion of the right lower lobe and right middle lobe, small patchy groundglass opacity right upper lobe and left upper lobe, concerning for superimposed infection and drug reaction.  The patient was started on ceftriaxone/azithromycin for CAP on 5/01 and prednisone 20mg daily on 5/02.     Currently, the patient reports significant improvement since he was admitted with respect to his SOB and fatigue/weakness.  He denies fevers, chills, chest pain, cough, abdominal pain, n/v.      Review of Systems    The 10 point Review of Systems is negative other than noted in the HPI.    Past Medical History    Past Medical History:   Diagnosis Date     Gastric ulcer      Low back pain      Maxillary sinus cancer (H)      Toe amputation status     all ten toes       Past Surgical History   Past Surgical History:   Procedure Laterality Date     ABDOMEN SURGERY      [facility], surgery related to gastric ulcer     AS AMPUTATION TOE,I-P JT Bilateral     all ten toes     EXENTERATION ORBIT Right 9/24/2019    Procedure: Right Orbital Exenteration;  Surgeon: Alejandra  Jose GLEZ MD;  Location: [facility] OR     EXPLORE NECK Right 9/24/2019    Procedure: Right Neck Exploration;  Surgeon: Jose Roberts MD;  Location: [facility] OR     GRAFT FREE VASCULARIZED LATISSIMUS DORSI Right 9/24/2019    Procedure: Right Latissmus Free Flap Reconstruction;  Surgeon: Teresa Pulliam MD;  Location: [facility] OR     INSERT PORT VASCULAR ACCESS Right 10/31/2019    Procedure: place venous access chest port;  Surgeon: Natasha Mishra PA-C;  Location: [facility] OR     IR CHEST PORT PLACEMENT > 5 YRS OF AGE  10/31/2019     OSTEOTOMY MAXILLA N/A 9/24/2019    Procedure: Right Radicall Maxillectomy, Nasogastric Tube Placement;  Surgeon: Jose Roberts MD;  Location: [facility] OR       Social History   Social History     Tobacco Use     Smoking status: Current Every Day Smoker     Packs/day: 0.50     Years: 15.00     Pack years: 7.50     Types: Cigarettes     Start date: 2004     Smokeless tobacco: Never Used   Substance Use Topics     Alcohol use: Not Currently     Drug use: Not Currently       Family History   Family History   Problem Relation Age of Onset     Breast Cancer Mother      Glaucoma No family hx of      Macular Degeneration No family hx of        Prior to Admission Medications   Prior to Admission Medications   Prescriptions Last Dose Informant Patient Reported? Taking?   NARCAN 4 MG/0.1ML nasal spray   Yes No   Sig: WAGNER INTO ONE NOSTRIL. MAY REPEAT IN ALTERNATE NOSTRIL WITHIN 2 MINUTES IF NO OR MINIMAL RESPONSE   acetaminophen (TYLENOL) 325 MG tablet   No No   Sig: Take 1-2 tablets (325-650 mg) by mouth every 6 hours as needed for mild pain   cevimeline (EVOXAC) 30 MG capsule   No No   Sig: Take 1 capsule (30 mg) by mouth 3 times daily   cyclobenzaprine (FLEXERIL) 10 MG tablet   Yes No   Sig: TK 1 T PO HS PRN FOR MUSCLE SPASM RELIEF   hydrocortisone 2.5 % cream   No No   Sig: Apply topically 2 times daily Apply to itchy spot on scalp and back twice a day as needed   magnesium oxide (MAG-OX) 400 MG tablet   No No    Sig: Take 1 tablet (400 mg) by mouth 2 times daily   oxyCODONE IR (ROXICODONE) 10 MG tablet   Yes No   Sig: Takes 1/2 pill (5 mg) 2-3 x daily. --[place] [facility]   senna-docusate (SENOKOT-S/PERICOLACE) 8.6-50 MG tablet   No No   Si tablet by Per Feeding Tube route 2 times daily as needed for constipation   senna-docusate (SENOKOT-S/PERICOLACE) 8.6-50 MG tablet   No No   Sig: Take 1 tablet by mouth 2 times daily as needed for constipation Or by feeding tube      Facility-Administered Medications: None       Allergies    No Known Allergies    Physical Exam   Vital Signs: Temp: 97.3  F (36.3  C) Temp src: Axillary BP: (!) 144/76 Pulse: 70   Resp: 16 SpO2: 93 % O2 Device: Nasal cannula Oxygen Delivery: 1 LPM  Weight: 148 lbs 1.6 oz    GENERAL: Alert, interactive, NAD  HEENT: right eye covered with eye patch  LUNGS: right sided crackles at the middle and lower lung fields  HEART: regular rate and rhythm, no murmurs appreciated   ABDOMEN: Soft, nontender, nondistended. +BS  EXTREMITIES: No LE edema bilaterally  SKIN: Warm and dry, no jaundice or acute rashes  NEURO: alert, moving all 4 extremities equally, grossly nonfocal      Data       CBC  Recent Labs   Lab 21  1004 21  0642 21  0638 21  1554   WBC 10.3 9.6 7.2 7.3   RBC 2.60* 2.61* 2.57* 2.43*   HGB 8.9* 8.5* 8.8* 8.0*   HCT 26.4* 25.9* 25.9* 23.9*   * 99 101* 98   MCH 34.2* 32.6 34.2* 32.9   MCHC 33.7 32.8 34.0 33.5   RDW 20.9* 21.2* 20.8* 20.4*   PLT 72* 85* 104* 106*     CMP  Recent Labs   Lab 21  1004 21  0642 21  0033 21  1435 21  0638 21  1559 21  1554   * 132*  --   --  133  --  130*   POTASSIUM 3.7 3.5 3.8 3.4 3.0*  --  2.9*   CHLORIDE 98 95  --   --  92*  --  90*   CO2 31 32  --   --  34*  --  35*   ANIONGAP 3 4  --   --  7  --  5   GLC 89 90  --   --  68*  --  92   BUN 11 12  --   --  10  --  13   CR 0.59* 0.65*  --   --  0.61*  --  0.67   GFRESTIMATED >90 >90  --   --   >90 >90 >90   GFRESTBLACK >90 >90  --   --  >90 >90 >90   MIKEY 8.6 8.5  --   --  8.2*  --  8.4*   PROTTOTAL 6.3* 6.5*  --   --  6.6*  --  6.8   ALBUMIN 2.3* 2.4*  --   --  2.3*  --  2.8*   BILITOTAL 0.4 0.4  --   --  0.5  --  0.6   ALKPHOS 67 71  --   --  78  --  80   AST 28 32  --   --  58*  --  75*   ALT 22 26  --   --  32  --  39     INRNo lab results found in last 7 days.    Imagin/01/21 CT Chest PE Study  IMPRESSION:   In this patient with history of squamous cell carcinoma of the right  maxillary sinus, currently treated with Cetuximab and Carboplatin:  1. No acute pulmonary embolus. No evidence for right heart strain.  2. Extensive groundglass opacities involving the portion of the right  lower lobe and right middle lobe. Also small patchy groundglass  opacity right upper lobe and left upper lobe. These are concerning for  superimposed infection and drug reaction.  (Covid 19 can have this  appearance but is unlikely given status post vaccination and negative  test.)  3. Waxing and waning prominent and enlarged mediastinal lymph nodes  including interval enlargement of a subcarinal lymph node.  4. Stable size of numerous solid pulmonary metastatic lesions.  5. Interval decrease in size of a metastatic right adrenal mass.

## 2021-05-04 NOTE — PLAN OF CARE
Pt is alert and oriented but a little forgetful. Denies pain. Lungs diminished. Maintained sats on 1.5 L NC overnight. Tele NSR. Calls appropriately. Appeared to sleep well.

## 2021-05-04 NOTE — PLAN OF CARE
Time: 2390-4825     Neuro: A&Ox3, disoriented to time. Pleasant and cooperative.  Cardiac: Slight bradycardia and HTN.  Respiratory: Diminished bases. SpO2 mid-nineties on 2L NC.  GI/: Reports feeling constipated, but bowel sounds normoactive and LBM: 5/3.  Skin: R eye absent. R facial droop and edema (baseline).  Activity: A1 + W.  Pain: Reports generalized pain, most prominent in back. Gave oxycodone w/improvement.  Diet: Reg diet, fair appetite. Denies N/V.  Lines: R chest port infusing TKO.    Shift changes: Afebrile and VSS, aside from HTN, on 2L NC. Transferred back to  from , RN report given.      Blood pressure (!) 166/78, pulse 56, temperature 95.6  F (35.3  C), temperature source Oral, resp. rate 20, height 1.829 m (6'), weight 67.2 kg (148 lb 1.6 oz), SpO2 96 %.

## 2021-05-04 NOTE — PROGRESS NOTES
9520-5937: Afebrile. VSS. Patient was on 1LNC this morning but was able to be weaned to RA in the afternoon. A/Ox4. Up with SBA. Admitted for hypoxia possibly d/t PNA. Receives IV rocephin daily and has been tolerating well. C/O back and shoulder pain this morning. PRN tylenol and oxycodone given with good relief. Port WNL with good blood return. Regular diet with poor appetite. Voiding adequately. Had 1 large bowel movement today.     Daxa Carter RN on 5/4/2021 at 5:47 PM

## 2021-05-04 NOTE — PROGRESS NOTES
05/04/21 1300   Quick Adds   Type of Visit Initial Occupational Therapy Evaluation   Living Environment   People in home other (see comments)  (roommate)   Current Living Arrangements house   Home Accessibility stairs to enter home;stairs within home   Number of Stairs, Main Entrance 4   Stair Railings, Main Entrance railings on both sides of stairs   Number of Stairs, Within Home, Primary 9   Stair Railings, Within Home, Primary railings on both sides of stairs   Transportation Anticipated family or friend will provide   Living Environment Comments Pt lives in a house with his roommate, states there are multiple people around to help as needed. 8-9 stairs to reach his bedroom and bathroom.    Self-Care   Usual Activity Tolerance good   Current Activity Tolerance moderate   Regular Exercise No   Equipment Currently Used at Home none   Activity/Exercise/Self-Care Comment No AE reported at home.     Disability/Function   Hearing Difficulty or Deaf no   Wear Glasses or Blind yes   Vision Management R eye patch, blind in R eye    Concentrating, Remembering or Making Decisions Difficulty yes   Difficulty Communicating no   Difficulty Eating/Swallowing no   Walking or Climbing Stairs Difficulty no   Dressing/Bathing Difficulty no   Toileting issues no   Doing Errands Independently Difficulty (such as shopping) yes   Errands Management friends can assist as needed    Fall history within last six months no   Change in Functional Status Since Onset of Current Illness/Injury yes   General Information   Onset of Illness/Injury or Date of Surgery 05/01/21   Referring Physician Reanna Kelsey MD   Patient/Family Therapy Goal Statement (OT) return home   Additional Occupational Profile Info/Pertinent History of Current Problem Per chart: Eduardo Rubio is a 60 year old male admitted on 5/1/2021. 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   Performance Patterns (Routines, Roles, Habits) Pt reports he is normally independent in all functional mobility and ADLs.    Existing Precautions/Restrictions fall;oxygen therapy device and L/min   Limitations/Impairments safety/cognitive   Left Upper Extremity (Weight-bearing Status) full weight-bearing (FWB)   Right Upper Extremity (Weight-bearing Status) full weight-bearing (FWB)   Left Lower Extremity (Weight-bearing Status) full weight-bearing (FWB)   Right Lower Extremity (Weight-bearing Status) full weight-bearing (FWB)   General Observations and Info Activity: up ad ariana   Cognitive Status Examination   Orientation Status orientation to person, place and time   Affect/Mental Status (Cognitive) WNL   Follows Commands WNL   Safety Deficit at risk behavior observed;awareness of need for assistance;impulsivity;insight into deficits/self-awareness;judgment   Cognitive Status Comments Pt demos deficits in safety awareness and can be very impulsive at times, AxOx4.    Visual Perception   Visual Impairment/Limitations other (see comments)  (blind in R eye, wears eye patch )   Sensory   Sensory Quick Adds No deficits were identified   Pain Assessment   Patient Currently in Pain No   Integumentary/Edema   Integumentary/Edema no deficits were identifed   Posture   Posture forward head position;protracted shoulders   Range of Motion Comprehensive   General Range of Motion no range of motion deficits identified;bilateral upper extremity ROM WNL   Strength Comprehensive (MMT)   Comment, General Manual Muscle Testing (MMT) Assessment BUEs WFL    Coordination   Upper Extremity Coordination No deficits were identified   Gross Motor Coordination Mildly impaired 2/2 toe  amputations.    Fine Motor Coordination Not tested.    Bed Mobility   Comment (Bed Mobility) SBA    Transfers   Transfer Comments SBA    Balance   Balance Comments Unsteady on feet, mild LOB noted with transferring out of shower.    Instrumental Activities of Daily Living (IADL)   Previous Responsibilities meal prep;housekeeping;laundry;medication management;finances   IADL Comments Pt completes light IADLs at home but family and friends can assist with heavier IADLs and driving as needed.    Clinical Impression   Criteria for Skilled Therapeutic Interventions Met (OT) yes;skilled treatment is necessary   OT Diagnosis Decreased ADL-I   OT Problem List-Impairments impacting ADL problems related to;activity tolerance impaired;balance;cognition;coordination;strength;mobility   Assessment of Occupational Performance 1-3 Performance Deficits   Identified Performance Deficits home management, community mobility, bathing, ambulation   Planned Therapy Interventions (OT) ADL retraining;IADL retraining   Clinical Decision Making Complexity (OT) low complexity   Therapy Frequency (OT) Daily   Predicted Duration of Therapy 1 week    Risk & Benefits of therapy have been explained evaluation/treatment results reviewed;care plan/treatment goals reviewed;risks/benefits reviewed;current/potential barriers reviewed;participants voiced agreement with care plan;participants included;patient   OT Discharge Planning    OT Discharge Recommendation (DC Rec) Home with assist;home with home care occupational therapy;Transitional Care Facility   OT Rationale for DC Rec Pt would benefit from TCU stay but will definitely decline recommendations, thereofre REC home with assist from friends as needed and  PT/OT for home safety eval to ensure safe completion of mobility and ADLs.    OT Brief overview of current status  Ax1 for mobility and bathing.    Total Evaluation Time (Minutes)   Total Evaluation Time (Minutes) 4

## 2021-05-04 NOTE — PROGRESS NOTES
[facility]    Medicine Progress Note - Hospitalist Service, Gold Torres       Date of Admission:  5/1/2021  Assessment & Plan      Eduardo Rubio is a 60 year old male admitted on 5/1/2021. 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    Hypoxia and dyspnea on exertion suspected to be secondary to carbloplatin or nivolimumab toxicity and metastatic maxillary sinus squamous cell carcinoma with metastases to the lungs  Imaging worrisome for carboplatin / nivoliumab toxicity.   Spoke to hem/onc team and suspect immunotherapy may have caused pneumonitis vs lymphangitic spread? Empiric treatment might help differentiate the two. PE rule out on admission, BNP was very mildly elevated, echo unremarkable for reduction in ejection fraction or valvular disease, patient noted to have stable anemia on labs. Continues to need oxygen despite steroids. Will discuss with onc plan going forward.  - Continue steroids, continue abx   - Onc officially consulted   - Will likely need O2 at home  - PT/OT suggesting TCU but patient declining       Generalized weakness and fatigue  Reports having difficulty with ambulation recently.  -  PT  -  OT     Staphylococcus epidermidis   1/4 from port blood cultures, likely containment, Awaiting repeat cultures      Dry mouth  -  Continue home cevimiline      Hepatitis C  -  Per outpatient Onc providers, treatment not warranted given life expectancy of 2 years for his malignancy     Chronic hyponatremia, present on admission  Baseline in the low 130s.  Suspect this is related to his malignancy and some issues with PO  intake.     Chronic hypokalemia, present on admission  Baseline in the mid/low 3s.  Suspect this is related to oral intake issues.  -  Follow and replace PRN      Chronic hypomagnesemia, present on admission  Suspect due to inadequate intake.  -  Continue mag replacement     Chronic malnutrition of uncertain severity secondary to maxillary sinus malignancy, present on admission  Has had ongoing difficulty with PO intake, has used marinol as an outpatient.  Labs notable for hypoalbuminemia.  -  Monitor I/O  -  Calorie counts  -  Consider marinol inpatient     Chronic normocytic anemia secondary to malignancy and chemotherapy  -  Trend Hgb      Thrombocytopenia secondary to chemotherapy  -  Trend platelets       Diet: Combination Diet Regular Diet Adult  Snacks/Supplements Adult: Other; Vital Cuisine 500 Shake (chocolate) at HS snack; Between Meals    DVT Prophylaxis: Pneumatic Compression Devices  Cohn Catheter: not present  Code Status: Full Code           Disposition Plan   Expected discharge: 2 - 3 days, recommended to prior living arrangement once antibiotic plan established and SIRS/Sepsis treated.  Entered: Jose Chung DO 05/04/2021, 12:59 PM       The patient's care was discussed with the Bedside Nurse and Patient.    Jose Chung DO  Hospitalist Service, [address]  Contact information available via [facility] Paging/Directory  Please see sign in/sign out for up to date coverage information  ______________________________________________________________________    Interval History     No acute events overnight. Patient feels about the same today still requiring oxygen. Says he worked with PT and got very winded just walking around a bit.    Four point ROS completed and otherwise negative     Data reviewed today: I reviewed all medications, new labs and imaging results over the last 24 hours. I personally reviewed no images or EKG's today.    Physical Exam    Vital Signs: Temp: 97.3  F (36.3  C) Temp src: Axillary BP: (!) 144/76 Pulse: 70   Resp: 16 SpO2: 93 % O2 Device: Nasal cannula Oxygen Delivery: 1 LPM  Weight: 148 lbs 1.6 oz  Constitutional: awake, alert, cooperative, no apparent distress, and appears stated age  Eyes: anicteric, right eye covered with patch  Respiratory: crackles right apex, right base and right middle lobe, no wheezing   Cardiovascular: Normal apical impulse, regular rate and rhythm, normal S1 and S2, no S3 or S4, and no murmur noted  GI:  soft, non-distended, non-tender, no masses palpated  Skin: no bruising or bleeding  Musculoskeletal: There is no redness, warmth, or swelling of the joints.  Full range of motion noted.  Motor strength is 5 out of 5 all extremities bilaterally.  Tone is normal.  Neurologic: Awake, alert, oriented to name, place and time.  Cranial nerves II-XII are grossly intact.  Motor is 5 out of 5 bilaterally.  Neuropsychiatric: General: normal, calm and normal eye contact    Data   Recent Labs   Lab 05/04/21  1004 05/03/21  0642 05/03/21  0033 05/02/21  0638 05/02/21  0638 05/01/21  1554   WBC 10.3 9.6  --   --  7.2 7.3   HGB 8.9* 8.5*  --   --  8.8* 8.0*   * 99  --   --  101* 98   PLT 72* 85*  --   --  104* 106*   * 132*  --   --  133 130*   POTASSIUM 3.7 3.5 3.8   < > 3.0* 2.9*   CHLORIDE 98 95  --   --  92* 90*   CO2 31 32  --   --  34* 35*   BUN 11 12  --   --  10 13   CR 0.59* 0.65*  --   --  0.61* 0.67   ANIONGAP 3 4  --   --  7 5   MIKEY 8.6 8.5  --   --  8.2* 8.4*   GLC 89 90  --   --  68* 92   ALBUMIN 2.3* 2.4*  --   --  2.3* 2.8*   PROTTOTAL 6.3* 6.5*  --   --  6.6* 6.8   BILITOTAL 0.4 0.4  --   --  0.5 0.6   ALKPHOS 67 71  --   --  78 80   ALT 22 26  --   --  32 39   AST 28 32  --   --  58* 75*   TROPI  --   --   --   --   --  <0.015    < > = values in this interval not displayed.     No results found for this or any previous visit (from the past 24 hour(s)).  Medications       azithromycin  250 mg  Oral Daily     cefTRIAXone  2 g Intravenous Q24H     cevimeline  30 mg Oral TID     heparin  5 mL Intracatheter Q28 Days     heparin lock flush  5-10 mL Intracatheter Q24H     magnesium oxide  400 mg Oral BID     polyethylene glycol  17 g Oral Daily     predniSONE  20 mg Oral Daily     sodium chloride (PF)  10 mL Intracatheter Q7 Days     sodium chloride (PF)  3 mL Intracatheter Q8H

## 2021-05-05 NOTE — PLAN OF CARE
Pt is A/Ox4, VSS on 1-2L NC at rest. Pt had walk test earlier in the day and required 15L NC while on the walk, see previous note. Skin is CDI. Pt does not have any toes and wears an eye patch over his R eye that has been removed. On reg diet with poor appetite. R C port used for IV abx and IV mag. Mag was 1.5 with AM labs. Plan is to discharge pt to home vs TCU on IV abx (needs 7 day course, started it on 5/2). TCU is recommended for discharge but pt wants to go home, vs his friends house. Repeat walk test results are below:  Patient has been assessed for Home Oxygen needs. Oxygen readings:    *Pulse oximetry (SpO2) = 92% on room air at rest while awake.    *SpO2 improved to 97% on 1liters/minute at rest.    *SpO2 = 80% on room air during activity/with exercise.    *SpO2 improved to 87% on 6liters/minute during activity/with exercise.  Pt did maintain sats around 90% when increased to 10L NC during the last 2 minutes of the walk.

## 2021-05-05 NOTE — PLAN OF CARE
Time: 1900-0730    Reason for admission/Dx:   Shortness of breath [R06.02]  Hypoxemia [R09.02]  Pneumonia of right lower lobe due to infectious organism [J18.9]     BP (!) 145/71 (BP Location: Left arm)   Pulse 66   Temp 95.1  F (35.1  C) (Oral)   Resp 20   Ht 1.829 m (6')   Wt 67.2 kg (148 lb 1.6 oz)   SpO2 93%   BMI 20.09 kg/m      Shift Summary: No significant changes in pt status. HTN, otherwise VSS, on 1L supp O2 for comfort; declined to wean to RA during NOC. AOx4, slept well throughout NOC. Up SBA. On PO Azithromycin and IV Rocephin abx regimen. Denies pain and nausea, with only fair PO intake/appetite. Voiding and stooling WDL. LBM 5/4. R-port SL. Blood Cx NGTD x3 days.    Plan:  IV Abx. Trending Blood cx. PT/OT.

## 2021-05-05 NOTE — PROGRESS NOTES
[facility]    Oncology Consult Progress Note    Patient Name: Eduardo Rubio   MRN: [id number]  YOB: 1960    Date of Service: May 5, 2021  Attending Physician: Dr. 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  Given the patient improvement with continued antibiotics, his presentation is most likely secondary to infectious pneumonia. Nivolumab lung toxicity is less likely given he last received Nivolumab treatment on 01/15/21 and his clinical improvement. In addition, immune checkpoint pulmonary toxicity typically requires prednisone dosing at 1mg/kg/day. While the patient was started on prednisone 20mg daily and did improve, it seems less likely that the steroids are playing significant role. Progression of malignancy also seems less likely given the CT findings per the report suggest overall decrease in size of the pulmonary masses.    Patient able to maintain O2 sats on room air at rest. However, the patient was noted to be significantly hypoxic with ambulation per the primary team.  We have low suspicion that his hypoxia is related to drug toxicity or progression of his malignancy.  Higher suspicion that his current presentation is due to community acquired pneumonia, deconditioning and malnutrition.       Summary of Recommendations:  - continue antibiotics per  primary team for PNA        Patient was seen and plan of care developed with Dr. Lloyd Andrade MD  Internal Medicine, PGY-3  Oncology Consult Service  [facility]  Contact information available via [facility] Paging/Directory  ______________________________________________________________________      Interval History   No acute events overnight. Patient able to maintain O2 sats on room air at rest. However, the patient was noted to be significantly hypoxic with ambulation per the primary team.  PT/OT recommends patient discharge to a TCU. Patient understands that he needs more assistance at this time but is hoping to go home to stay with a friend who can help him.      Physical Exam   Vital Signs: Temp: 95.1  F (35.1  C) Temp src: Oral BP: (!) 145/71 Pulse: 66   Resp: 20 SpO2: 93 % O2 Device: Nasal cannula Oxygen Delivery: 2 LPM  Weight: 148 lbs 1.6 oz      GENERAL: Alert, interactive, NAD  HEENT: AT/NC, right eye covered with patch  RESP: crackles on auscultation of right middle and lower lung fieds, no wheezing appreciated  CARDIAC: regular rate and rhythm,   ABDOMEN: Soft, nontender, nondistended. +BS  EXTREMITIES: No LE edema  NEURO: alert, moving all 4 extremities equally, grossly nonfocal    Data     Results for orders placed or performed during the hospital encounter of 05/01/21 (from the past 24 hour(s))   CBC with platelets differential   Result Value Ref Range    WBC 8.8 4.0 - 11.0 10e9/L    RBC Count 2.59 (L) 4.4 - 5.9 10e12/L    Hemoglobin 8.5 (L) 13.3 - 17.7 g/dL    Hematocrit 25.7 (L) 40.0 - 53.0 %    MCV 99 78 - 100 fl    MCH 32.8 26.5 - 33.0 pg    MCHC 33.1 31.5 - 36.5 g/dL    RDW 21.3 (H) 10.0 - 15.0 %    Platelet Count 68 (L) 150 - 450 10e9/L    Diff Method Automated Method     % Neutrophils 91.0 %    % Lymphocytes 3.9 %    % Monocytes 4.1 %    % Eosinophils 0.7 %    % Basophils 0.1 %    % Immature Granulocytes 0.2 %    Nucleated RBCs 0 0 /100     Absolute Neutrophil 8.0 1.6 - 8.3 10e9/L    Absolute Lymphocytes 0.3 (L) 0.8 - 5.3 10e9/L    Absolute Monocytes 0.4 0.0 - 1.3 10e9/L    Absolute Eosinophils 0.1 0.0 - 0.7 10e9/L    Absolute Basophils 0.0 0.0 - 0.2 10e9/L    Abs Immature Granulocytes 0.0 0 - 0.4 10e9/L    Absolute Nucleated RBC 0.0    Comprehensive metabolic panel   Result Value Ref Range    Sodium 133 133 - 144 mmol/L    Potassium 3.5 3.4 - 5.3 mmol/L    Chloride 100 94 - 109 mmol/L    Carbon Dioxide 28 20 - 32 mmol/L    Anion Gap 5 3 - 14 mmol/L    Glucose 86 70 - 99 mg/dL    Urea Nitrogen 12 7 - 30 mg/dL    Creatinine 0.61 (L) 0.66 - 1.25 mg/dL    GFR Estimate >90 >60 mL/min/[1.73_m2]    GFR Estimate If Black >90 >60 mL/min/[1.73_m2]    Calcium 8.6 8.5 - 10.1 mg/dL    Bilirubin Total 0.4 0.2 - 1.3 mg/dL    Albumin 2.4 (L) 3.4 - 5.0 g/dL    Protein Total 6.4 (L) 6.8 - 8.8 g/dL    Alkaline Phosphatase 69 40 - 150 U/L    ALT 21 0 - 70 U/L    AST 29 0 - 45 U/L   Magnesium   Result Value Ref Range    Magnesium 1.5 (L) 1.6 - 2.3 mg/dL   CK total   Result Value Ref Range    CK Total 70 30 - 300 U/L

## 2021-05-05 NOTE — PLAN OF CARE
Patient has been assessed for Home Oxygen needs. Oxygen readings:    *Pulse oximetry (SpO2) = 92% on room air at rest while awake.    *SpO2 improved to 97% on 1liters/minute at rest.    *SpO2 = 83% on room air during activity/with exercise.    *SpO2 improved to 87% on 15liters/minute during activity/with exercise.

## 2021-05-05 NOTE — PROGRESS NOTES
[facility]    Medicine Progress Note - Hospitalist Service, Gold 11       Date of Admission:  5/1/2021  Assessment & Plan      Eduardo Rubio is a 60 year old male admitted on 5/1/2021. 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 After discussion with oncology more likely he had an underlying pneumonia than pneumonitis from treatment especially since he never got proper pneumonitis dose of steroids. Now off oxygen but still requiring a fair amount with exertion.     Hypoxia and dyspnea on exertion suspected to be secondary to CAP on top of known lung mets   Imaging worrisome for carboplatin / nivoliumab toxicity. PE rule out on admission, BNP was very mildly elevated, echo unremarkable for reduction in ejection fraction or valvular disease, patient noted to have stable anemia on labs. Patient was started on treatment for CAP and steroids for possible pneumonitis (although not correct dose) and is no longer needing oxygen at rest. He however still needs quite a bit with activity which is unexpected. Will retry walk test and consider further workup as needed. On review TTE with slightly elevated filling pressures despite normal EF so will consider lasix.  - Off of steroids, finish course of Abx for CAP   - Onc officially consulted   - Will likely need O2 at home for   - PT/OT suggesting TCU but patient declining       Generalized weakness and fatigue  Reports having difficulty with ambulation recently.  -  PT  -  OT     Myalgias  Reports new generalized muscle achyness without muscle weakness. CK normal, TSH normal, renal and kidney function  normal, he is not on a statin. He has been on steroids but only a small dose for a short period. No signs of bursitis or joint inflammation. Has been working more with PT so possibly just sore from increased exertion. Also noted magnesium is a little low so can replace that more aggressively.  - 2 G IV mag sulfate today   - Monitor    Staphylococcus epidermidis   1/4 from port blood cultures, likely containment, Awaiting repeat cultures      Dry mouth  -  Continue home cevimiline      Hepatitis C  -  Per outpatient Onc providers, treatment not warranted given life expectancy of 2 years for his malignancy     Chronic hyponatremia, present on admission  Baseline in the low 130s.  Suspect this is related to his malignancy and some issues with PO intake.     Chronic hypokalemia, present on admission  Baseline in the mid/low 3s.  Suspect this is related to oral intake issues.  -  Follow and replace PRN      Chronic hypomagnesemia, present on admission  Suspect due to inadequate intake.  -  Continue mag replacement     Chronic malnutrition of uncertain severity secondary to maxillary sinus malignancy, present on admission  Has had ongoing difficulty with PO intake, has used marinol as an outpatient.  Labs notable for hypoalbuminemia.  -  Monitor I/O  -  Calorie counts  -  Consider marinol inpatient     Chronic normocytic anemia secondary to malignancy and chemotherapy  -  Trend Hgb      Thrombocytopenia secondary to chemotherapy  -  Trend platelets       Diet: Combination Diet Regular Diet Adult  Snacks/Supplements Adult: Other; Vital Cuisine 500 Shake (chocolate) at HS snack; Between Meals    DVT Prophylaxis: Pneumatic Compression Devices  Cohn Catheter: not present  Code Status: Full Code           Disposition Plan   Expected discharge: 2 - 3 days, recommended to prior living arrangement once antibiotic plan established and SIRS/Sepsis treated.  Entered: Jose Chung DO 05/05/2021, 1:50 PM       The patient's care was  discussed with the Bedside Nurse and Patient.    Jose Chung,   Hospitalist Service, [address]  Contact information available via [facility] Paging/Directory  Please see sign in/sign out for up to date coverage information  ______________________________________________________________________    Interval History     No acute events overnight. Patient is feeling more pain today. Describes it as achy muscle pain all over like he has been working out. Does not feel weaker. Walked with the Nurse today and continues to need increased oxygen with movement but none with rest now.    Four point ROS completed and otherwise negative     Data reviewed today: I reviewed all medications, new labs and imaging results over the last 24 hours. I personally reviewed TTE today and it showed upper limit of normal IVC size     Physical Exam   Vital Signs: Temp: 95.1  F (35.1  C) Temp src: Oral BP: (!) 145/71 Pulse: 66   Resp: 20 SpO2: 93 % O2 Device: Nasal cannula Oxygen Delivery: 2 LPM  Weight: 148 lbs 1.6 oz  Constitutional: awake, alert, cooperative, no apparent distress, and appears stated age  Eyes: anicteric, right eye covered with patch  Respiratory: crackles right apex, right base and right middle lobe, no wheezing, B lines noted bilaterally on BSUS   Cardiovascular: Normal apical impulse, regular rate and rhythm,  and no murmur noted, BSUS with difficult windows IVC does look to have > 50% variation   GI:  soft, non-distended, non-tender, no masses palpated  Skin: no bruising or bleeding  Musculoskeletal: There is no redness, warmth, or swelling of the joints.  Full range of motion noted.  Motor strength is 5 out of 5 all extremities bilaterally.  Tone is normal, muscles are not sore on palpation  Neurologic: Awake, alert, oriented to name, place and time.  Cranial nerves II-XII are grossly intact.  Motor is 5 out of 5 bilaterally.  Neuropsychiatric: General: normal, calm  and normal eye contact    Data   Recent Labs   Lab 05/05/21  0741 05/04/21  1004 05/03/21  0642 05/01/21  1554 05/01/21  1554   WBC 8.8 10.3 9.6   < > 7.3   HGB 8.5* 8.9* 8.5*   < > 8.0*   MCV 99 102* 99   < > 98   PLT 68* 72* 85*   < > 106*    132* 132*   < > 130*   POTASSIUM 3.5 3.7 3.5   < > 2.9*   CHLORIDE 100 98 95   < > 90*   CO2 28 31 32   < > 35*   BUN 12 11 12   < > 13   CR 0.61* 0.59* 0.65*   < > 0.67   ANIONGAP 5 3 4   < > 5   MIKEY 8.6 8.6 8.5   < > 8.4*   GLC 86 89 90   < > 92   ALBUMIN 2.4* 2.3* 2.4*   < > 2.8*   PROTTOTAL 6.4* 6.3* 6.5*   < > 6.8   BILITOTAL 0.4 0.4 0.4   < > 0.6   ALKPHOS 69 67 71   < > 80   ALT 21 22 26   < > 39   AST 29 28 32   < > 75*   TROPI  --   --   --   --  <0.015    < > = values in this interval not displayed.     No results found for this or any previous visit (from the past 24 hour(s)).  Medications       cefTRIAXone  2 g Intravenous Q24H     cevimeline  30 mg Oral TID     heparin  5 mL Intracatheter Q28 Days     heparin lock flush  5-10 mL Intracatheter Q24H     magnesium oxide  400 mg Oral BID     magnesium sulfate  2 g Intravenous Once     polyethylene glycol  17 g Oral Daily     sodium chloride (PF)  10 mL Intracatheter Q7 Days     sodium chloride (PF)  3 mL Intracatheter Q8H

## 2021-05-05 NOTE — PROGRESS NOTES
Care Management Follow Up    Length of Stay (days): 4    Expected Discharge Date: 05/06/21     Concerns to be Addressed:  discharge planning       Patient plan of care discussed at interdisciplinary rounds: Yes    Anticipated Discharge Disposition:  TCU vs home     Anticipated Discharge Services:  May need o2 @ discharge.  Anticipated Discharge DME:  N/A    Patient/family educated on Medicare website which has current facility and service quality ratings:  yes  Education Provided on the Discharge Plan:  yes  Patient/Family in Agreement with the Plan:  yes      Referrals Placed by CM/SW:    [address] [id number] [phone]  - unknown if facility has TCU beds or not- found on medicare list. Called facility and provide contact info for call back.    Private pay costs discussed: Not applicable    Additional Information:  RICCARDO received update from Dr. Chung that pt is now considering TCU. SW met w/ pt at bedside to introduce self and provide education about TCU recs. Pt relayed experiencing weakness and noted that he is hopeful to consider his options for discharge planning.    Pt identified a strong preference to return home and care for his dog. Pt reported being open to considering TCU if he continues to feel deconditioned.     SW provided medicare compare list w/ education about star ratings. Pt agreed for a referral to be sent (see above). Pt reported wanting to work w/ PT and nursing to do some more walking and determine how that feels. RICCARDO updated Dr. Chung.    SW agreed to keep following pt and determine discharge needs related to TCU and pt agreed. Pt is medically ready for discharge.    JERSON Harris, SW  5A Acute Care   PH: [phone]  Pager: [phone]  [facility]

## 2021-05-06 NOTE — CONSULTS
"Smoking Cessation Consult   2021    Patient: Eduardo Rubio      :  1960                    MRN:[id number]      Shortness of breath [R06.02];Hypoxemia [R09.02];Pneumonia of right lower lobe due to infectious organism [J18.9] @HX    History of Present Illness: Eduardo Rubio is a 60 year old male admitted on 2021. 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    Reason for Consult: Patient identified as current everyday smoker per patient chart.     Patient hesitant, but open to sharing about his tobacco use and in learning about more options and resources for quitting, staying quit, and in developing a relapse prevention plan. We completed a modified session as there were things he would not answer and did not want to discuss.       Symptoms of craving or withdrawal: Patient is currently experiencing symptoms of withdrawal such as sleeplessness, headache, anxiety, irritability, and intense cravings to smoke.     Motivational Interviewing:     MI Intervention: Expressed Empathy/Understanding, Supported Autonomy, Collaboration, Evocation, Permission to raise concern or advise, Open-ended questions, Reflections: simple and complex, Change talk (evoked) and Reframe    Patients last cigarette/vape/e-cig/smokeless tobacco:   Day of admission    Patients main reason for smoking include: Social, physical, emotional, habit    Top Reasons to quit smoking:   \"This cancer has really kicked me. I want to live\"    Quit Attempts: several    Triggers: after meals and with coffee. When he is bored    Types of Tobacco and Amount   Cigarettes     X   E-Cigs    Smokeless Tobacco    Cigars    Pipes    Waterpipes         Refused to answer " "questions to use the Fagerstrom assessment score for nicotine dependence                                                            Stage of Behavior Change:   Pre-contemplation - No intention    Contemplation - Change on the horizon      X   Preparation - Getting Ready    Action - Consistently changed (within 6 months)    Maintenance - Staying quit (more than 6 months)    Relapse - Recycling      Patients Motivation to Quit Scale    Importance (1-10):    Confidence  (1-10):    Can Patient Imagine a Future without Smoking:    Quit Attempt Date:     Final Quit Date:       Would not place his idea of importance or confidence level on the scale.     Education/Recommendations    Education:    -Provided educational workbook, \"Quitting for Good with Treatment and Support.\" Discussed health risks of continued smoking.   -Provided motivation and encouragement.   -Shared that it's never too late to quit and that the benefits are immedate and profoundly impactful. Shared that slipping up here and there doesn't necessarily mean he failed; rather, it's an opportunity to reflect and modify his behaviors and habits and to employ alternate strategies to deal with strong triggers.    Recommendations:   -Encouraged patient to use workbook to help him/her understand why he smokes, come up with 5 reasons to quit, and to imagine what his future looks like without smoking.  -Encouraged him to develop strategies to distract himself to get past cravings.     Developed Smoking Cessation Relapse Prevention Plan that includes recommendations of:       -Use 7/14/21 mg patch daily, continue the initial patch for 4-6 weeks of smoking abstinence based on withdrawal symptoms.Taper every 4-6 weeks in 7 mg steps as tolerated.       -Agrees to participate in our post-hosp smoking cessation follow-up calls for treatment and support, starting at 48 hrs post discharge, then at 14 days, 1 month, and at 6 months.     Time Spent: I spent 10 minutes with " patient. Will notify provider of recommendations.    Gave contact information and will call 48 hours after discharge to provide support.    ZOILA Proctor, RRT, CTTS  Chronic Pulmonary Disease Specialist  Office: [phone]   Pager: [phone]

## 2021-05-06 NOTE — PLAN OF CARE
PT 5A: patient ambulated with and without supplemental O2 today. See below for details.    Patient has been assessed for Home Oxygen needs. Oxygen readings:  *Pulse oximetry (SpO2) = 93 % on room air at rest while awake.  *SpO2 improved to 96 % on  3 liters/minute via NC at rest.  ?  *SpO2 = 86 % on room air during activity/with exercise.  ?  *SpO2 improved to  92 % on 4 liters/minute via NC during activity/with exercise.  ?

## 2021-05-06 NOTE — DISCHARGE INSTRUCTIONS
Oxygen Provider:  Arranged through [facility] [employer], contact number [phone].  If you have any questions or concerns please call the oxygen company directly.

## 2021-05-06 NOTE — PROGRESS NOTES
Received intake call for home oxygen at 2:35pm. Reviewed patient's chart; Patient qualifies under insurance guidelines and all documentation is in the chart including a good order.   2:45pm- Called to offer choice and patient is okay with [facility] setting them up. Discussed equipment with patient and informed them that we would be to bedside with oxygen in the next 2 hours.   2:55pm- Left voicemail for care coordinator, Leila. Let her know we received the order, and provided them with ETA of oxygen. Provided direct number if she had any further questions. Requested she provide patient with our phone number to call when patient discharges [phone].

## 2021-05-06 NOTE — PLAN OF CARE
Pt A/Ox4, VSS on RA, uses 2-4L with activity. Pt on reg diet with poor appetite. Pt was seen by smoking cessation and discharged with a nicotine patch on RUE. Pt also had walker and oxygen delivered to room. Pt declined TCU at discharge and is leaving to stay with a friend. Home PT and nursing assessment ordered to eval pt at home starting 5/13. Pt is leaving on two days of PO antibiotics. Discharge education done with pt and pt reiterated education via teach back method.

## 2021-05-06 NOTE — PLAN OF CARE
Time: 1900-0730    Reason for admission/Dx:   Shortness of breath [R06.02]  Hypoxemia [R09.02]  Pneumonia of right lower lobe due to infectious organism [J18.9]     /73 (BP Location: Left arm)   Pulse 80   Temp 96.5  F (35.8  C) (Oral)   Resp 18   Ht 1.829 m (6')   Wt 67.2 kg (148 lb 1.6 oz)   SpO2 97%   BMI 20.09 kg/m      Shift Summary: No significant changes in pt status. Mild HTN + intermittently on 1-2L supp O2 NC, however weaned to RA, during NOC, saturating 97% RA, in AM. AOx4, slept well throughout NOC. Up SBA, gait steady. On PO Azithromycin and IV Rocephen abx regimen. Reports back pain @ hs; Tylenol, Oxycodone and Flexeril given with relief. Denies nausea, with only fair PO intake/appetite. Voiding and stooling WDL. LBM 5/4. R-port SL. Blood Cx NGTD.     Plan: Repeat walk test/monitor respiratory status. IV Abx. Trending Blood cx. PT/OT.

## 2021-05-06 NOTE — DISCHARGE SUMMARY
[facility]  Hospitalist Discharge Summary      Date of Admission:  5/1/2021  Date of Discharge:  5/6/2021  Discharging Provider: Jose Chung DO  Discharge Team: Hospitalist Service, Gold 11    Discharge Diagnoses      Hypoxia and dyspnea on exertion suspected to be secondary to CAP on top of known lung mets    Generalized weakness and fatigue  Myalgias  Staphylococcus epidermidis    Dry mouth   Hepatitis C  Chronic hyponatremia, present on admission  Chronic hypokalemia, present on admission   Chronic hypomagnesemia, present on admission  Chronic malnutrition of uncertain severity secondary to maxillary sinus malignancy, present on admission  Chronic normocytic anemia secondary to malignancy and chemotherapy   Thrombocytopenia secondary to chemotherapy    Follow-ups Needed After Discharge   Follow-up Appointments     Follow Up and recommended labs and tests      Please keep your follow up with oncology as scheduled             Unresulted Labs Ordered in the Past 30 Days of this Admission     Date and Time Order Name Status Description    5/4/2021 0000 Blood culture Preliminary     5/3/2021 0419 Blood culture Preliminary     5/1/2021 2022 Blood culture Preliminary       These results will be followed up by Dr. Chung    Discharge Disposition   Discharged to home  Condition at discharge: Stable    Hospital Course     Eduardo Rubio is a 60 year old male admitted on 5/1/2021. 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 After discussion with oncology more likely he had an underlying pneumonia than pneumonitis from treatment especially since he  never got proper pneumonitis dose of steroids. Eventually did not need oxygen while at rest and oxygen needs with ambulation improving but will need oxygen on discharge.     Hypoxia and dyspnea on exertion suspected to be secondary to CAP on top of known lung mets   Imaging worrisome for carboplatin / nivoliumab toxicity. PE rule out on admission, BNP was very mildly elevated, echo unremarkable for reduction in ejection fraction or valvular disease, patient noted to have stable anemia on labs. Patient was started on treatment for CAP and steroids for possible pneumonitis (although not correct dose) and is no longer needing oxygen at rest, but requires oxygen with ambulation so will discharge home on oxygen.  - PT/OT suggesting TCU but patient declining   - Discharge with O2      Generalized weakness and fatigue  Reports having difficulty with ambulation which improved during his stay. Recommended TCU but declines.     Myalgias  Reports new generalized muscle achyness without muscle weakness. CK normal, TSH normal, renal and kidney function normal, he is not on a statin. He has been on steroids but only a small dose for a short period. No signs of bursitis or joint inflammation. Has been working more with PT so possibly just sore from increased exertion. Also noted magnesium is a little low so can replace that more aggressively. Improved next day.     Staphylococcus epidermidis   1/4 from port blood cultures, likely containment, Awaiting repeat cultures       Dry mouth  -  Continue home cevimiline      Hepatitis C  -  Per outpatient Onc providers, treatment not warranted given life expectancy of 2 years for his malignancy     Chronic hyponatremia, present on admission  Baseline in the low 130s.  Suspect this is related to his malignancy and some issues with PO intake.     Chronic hypokalemia, present on admission  Baseline in the mid/low 3s.  Suspect this is related to oral intake issues.  -  Follow and replace  PRN      Chronic hypomagnesemia, present on admission  Suspect due to inadequate intake.  -  Continue mag replacement     Chronic malnutrition of uncertain severity secondary to maxillary sinus malignancy, present on admission  Has had ongoing difficulty with PO intake, has used marinol as an outpatient.  Labs notable for hypoalbuminemia.     Chronic normocytic anemia secondary to malignancy and chemotherapy  -  Trend Hgb      Thrombocytopenia secondary to chemotherapy  -  Trend platelets    Oxygen Documentation:   I certify that this patient, Eduardo Rubio has been under my care (or a nurse practitioner or physican's assistant working with me). This is the face-to-face encounter for oxygen medical necessity.      Eduardo Rubio is now in a chronic stable state and continues to require supplemental oxygen. Patient has continued oxygen desaturation due to Pulmonary Neoplasm C34.90  Pneumonia.    Alternative treatment(s) tried or considered and deemed clinically infective for treatment of Pulmonary Neoplasm C34.90  Pneumonia include nebulizers, inhalers, steroids and pulmonary toileting.  If portability is ordered, is the patient mobile within the home? yes      Consultations This Hospital Stay   PHYSICAL THERAPY ADULT IP CONSULT  OCCUPATIONAL THERAPY ADULT IP CONSULT  PHARMACY TO DOSE VANCO  ONCOLOGY ADULT IP CONSULT    Code Status   Full Code    Time Spent on this Encounter   I, Jose Chung DO, personally saw the patient today and spent greater than 30 minutes discharging this patient.       Jose Chung DO  [facility] [address] [address]  Phone: [phone]  ______________________________________________________________________    Physical Exam   Vital Signs: Temp: 96.7  F (35.9  C) Temp src: Oral BP: (!) 158/75 Pulse: 61   Resp: 18 SpO2: 95 % O2 Device: None (Room air) Oxygen Delivery: 1.5 LPM  Weight: 148 lbs 1.6 oz    Constitutional: awake, alert, cooperative, no  apparent distress, and appears stated age  Eyes: anicteric, right eye covered with patch  Respiratory: crackles right apex, right base and right middle lobe, no wheezing  Cardiovascular: Normal apical impulse, regular rate and rhythm,  and no murmur noted,  GI:  soft, non-distended, non-tender, no masses palpated  Skin: no bruising or bleeding  Musculoskeletal: There is no redness, warmth, or swelling of the joints.  Full range of motion noted.  Motor strength is 5 out of 5 all extremities bilaterally.  Tone is normal, muscles are not sore on palpation  Neurologic: Awake, alert, oriented to name, place and time.  Cranial nerves II-XII are grossly intact.  Motor is 5 out of 5 bilaterally.  Neuropsychiatric: General: normal, calm and normal eye contact       Primary Care Physician   Gianna Ruggiero    Discharge Orders      Home care nursing referral      MD face to face encounter    Documentation of Face to Face and Certification for Home Health Services    I certify that patient: Eduardo Rubio is under my care and that I, or a nurse practitioner or physician's assistant working with me, had a face-to-face encounter that meets the physician face-to-face encounter requirements with this patient on: May 6, 2021.    This encounter with the patient was in whole, or in part, for the following medical condition, which is the primary reason for home health care: 61 yo male with SCCa of the maxillary sincus currently on treatment with carboplatin and cetuximab admitted with community acquired pneumonia.  TCU discharge recommended, patient would prefer to discharge to home with new oxygen and home care services.    I certify that, based on my findings, the following services are medically necessary home health services: Nursing, Occupational Therapy and Physical Therapy.    My clinical findings support the need for the above services because: Nurse is needed: To provide assessment and oversight required in the home to  assure adequate oxygenation with new home supplemental oxygen use, medication management, home safety eval. Occupational and Physical Therapy Services are needed to assess and treat cognitive ability and address ADL safety, complete home safety eval, work towards increasing endurance following CAP.    Further, I certify that my clinical findings support that this patient is homebound (i.e. absences from home require considerable and taxing effort and are for medical reasons or [facility] services or infrequently or of short duration when for other reasons) because: Leaving home is medically contraindicated for the following reason(s): Dyspnea on exertion that makes it so they cannot leave their home for needed services without clinical deterioration...    Based on the above findings. I certify that this patient is confined to the home and needs intermittent skilled nursing care, physical therapy and/or speech therapy.  The patient is under my care, and I have initiated the establishment of the plan of care.  This patient will be followed by a physician who will periodically review the plan of care.  Physician/Provider to provide follow up care: Gianna Ruggiero    Attending hospital physician (the Medicare certified [facility] provider): Jose Chung DO  Physician Signature: See electronic signature associated with these discharge orders.  Date: 5/6/2021     Reason for your hospital stay    You were in the hospital due to acute respiratory failure from pneumonia on top of metastasis to your lungs     Follow Up and recommended labs and tests    Please keep your follow up with oncology as scheduled     Activity    Your activity upon discharge: activity as tolerated     Walker Order    DME Documentation:   Describe the reason for need to support medical necessity: impaired functional mobility.     I, the undersigned, certify that the above prescribed supplies are medically necessary for this patient and is both reasonable  and necessary in reference to accepted standards of medical and necessary in reference to accepted standards of medical practice in the treatment of this patient's condition and is not prescribed as a convenience.     Oxygen Adult/Peds    Oxygen Documentation:   I certify that this patient, Eduardo Rubio has been under my care (or a nurse practitioner or physican's assistant working with me). This is the face-to-face encounter for oxygen medical necessity.      Eduardo Rubio is now in a chronic stable state and continues to require supplemental oxygen. Patient has continued oxygen desaturation due to Pulmonary Neoplasm C34.90  Pneumonia.    Alternative treatment(s) tried or considered and deemed clinically infective for treatment of Pulmonary Neoplasm C34.90  Pneumonia include nebulizers, inhalers, steroids and pulmonary toileting.  If portability is ordered, is the patient mobile within the home? yes    **Patients who qualify for home O2 coverage under the CMS guidelines require ABG tests or O2 sat readings obtained closest to, but no earlier than 2 days prior to the discharge, as evidence of the need for home oxygen therapy. Testing must be performed while patient is in the chronic stable state. See notes for O2 sats.**     Diet    Follow this diet upon discharge: Orders Placed This Encounter      Snacks/Supplements Adult: Other; Vital Cuisine 500 Shake (chocolate) at HS snack; Between Meals      Combination Diet Regular Diet Adult       Significant Results and Procedures   Most Recent 3 CBC's:  Recent Labs   Lab Test 05/06/21  0752 05/05/21  0741 05/04/21  1004   WBC 7.3 8.8 10.3   HGB 8.7* 8.5* 8.9*   MCV 99 99 102*   PLT 74* 68* 72*     Most Recent 3 BMP's:  Recent Labs   Lab Test 05/06/21  0752 05/05/21  0741 05/04/21  1004    133 132*   POTASSIUM 3.8 3.5 3.7   CHLORIDE 100 100 98   CO2 29 28 31   BUN 14 12 11   CR 0.60* 0.61* 0.59*   ANIONGAP 5 5 3   MIKEY 8.6 8.6 8.6   GLC 88 86 89     Most  Recent 2 LFT's:  Recent Labs   Lab Test 05/06/21  0752 05/05/21  0741   AST 29 29   ALT 18 21   ALKPHOS 69 69   BILITOTAL 0.4 0.4     Most Recent 3 INR's:  Recent Labs   Lab Test 10/31/19  0700   INR 1.04   ,   Results for orders placed or performed during the hospital encounter of 05/01/21   XR Chest Port 1 View    Narrative    XR CHEST PORT 1 VIEW  5/1/2021 4:23 PM      HISTORY: SOB    COMPARISON: CT chest, abdomen, and pelvis 4/5/2021. CT chest  2/25/2021.    FINDINGS:   Portable AP upright radiograph of the chest. Right chest Port-A-Cath  tip projects over the high right atrium.    Trachea is midline. Heart size is normal. Bilateral nodular  consolidative opacities, better demonstrated on prior CT exam  4/5/2021. Right lower lobe mixed interstitial and airspace opacities.  No pleural effusion on the left. Right costophrenic angle is partially  collimated out of view. No pneumothorax.    No acute osseous abnormality. Visualized soft tissues are  unremarkable.    Gas-filled loops of small and large bowel in the left upper quadrant.      Impression    IMPRESSION:   1. Right lower lobe mixed interstitial and airspace opacities  concerning for infection or inflammatory process.  2. Bilateral nodular opacities suggestive for metastatic lesions,  better appreciated on prior CT exam 4/5/2021.    I have personally reviewed the examination and initial interpretation  and I agree with the findings.    ALIYA ROGER MD   CT Chest Pulmonary Embolism w Contrast    Narrative    EXAMINATION: CTA pulmonary angiogram, 5/1/2021 6:02 PM     COMPARISON: Chest radiograph same day. CT chest, abdomen, and pelvis  4/5/2021.    HISTORY: Pulmonary embolus is suspected, high probability.    TECHNIQUE: Volumetric helical acquisition of CT images of the chest  from the lung apices to the kidneys were acquired after the  administration of 80 mL of Isovue-370 IV contrast. Flash technique  with free breathing acquisition.  Post-processed  multiplanar and/or  MIP reformations were obtained, archived to PACS and used in  interpretation of this study.     FINDINGS:      Contrast bolus is: adequate.  Exam is negative for acute pulmonary  embolism. No evidence for right heart strain. RV/LV ratio is less than  1. No reflux of contrast into the IVC. Main pulmonary artery is normal  in caliber. No abnormal septal bowing or flattening.      Chest:  Right chest wall Port-A-Cath tip terminates in the right atrium.    Thyroid gland is normal. Heart size is normal. No pericardial  effusion. Left anterior descending artery calcifications. Thoracic  aorta is normal in caliber with common origin of the left common  carotid and brachiocephalic artery.    Waxing and waning prominent and enlarged mediastinal lymph nodes  including a left paratracheal lymph node measuring 2.6 x 2.4 cm,  previously measuring 3.0 x 2.8 cm (series 4, image 20), previously  measuring 3.8 x 2.4 cm. Interval increase in size of subcarinal lymph  node measuring 4.0 x 2.3 cm on (series 4, image 59), previously  measuring 3.1 x 1.9 cm. Calcified mediastinal and hilar lymph nodes  are unchanged. Debris within the esophagus.    Trachea and central airways are clear. Mild upper lobe predominant  paraseptal emphysematous changes. Numerous solid pulmonary masses are  not significant changed in size compared to prior exam. For example a  1.6 x 1.3 cm mass in the left upper lobe (series 8, image 87),  anterior lingula mass measuring 2.5 x 1.7 cm, previously measuring 2.4  x 1.9 cm (series A, image 133), 2.1 x 2.1 cm mass in the right upper  lobe (series 8, image 141), previously measuring 2.1 x 2.1 cm.    Interval increased right greater than left lower lobe predominant  bronchiectasis and patchy centrilobular groundglass opacities with  interstitial thickening and subpleural cystic changes. No pleural  effusion or pneumothorax.    Abdomen:   Visualized abdomen is limited.. Limited evaluation of the  liver due to  contrast bolus timing. Visualized gallbladder is unremarkable. Splenic  granulomas. Pancreas is unremarkable.     Interval decrease in size of a right adrenal mass with peripheral  enhancement measuring 1.8 x 1.1 cm (series 4, image 112), previously  measuring 3.2 x 2.0 cm 2/25/2021. No adrenal mass on the left. No  hydronephrosis. Dilated loop of transverse colon measuring up to 8.8  cm.    Bones and Soft Tissues: No suspicious osseous lesion. Multiple  right-sided posterior rib fractures. Stable metallic fragments within  the chest wall. No suspicious mass.      Impression    IMPRESSION:   In this patient with history of squamous cell carcinoma of the right  maxillary sinus, currently treated with Cetuximab and Carboplatin:  1. No acute pulmonary embolus. No evidence for right heart strain.  2. Extensive groundglass opacities involving the portion of the right  lower lobe and right middle lobe. Also small patchy groundglass  opacity right upper lobe and left upper lobe. These are concerning for  superimposed infection and drug reaction.  (Covid 19 can have this  appearance but is unlikely given status post vaccination and negative  test.)  3. Waxing and waning prominent and enlarged mediastinal lymph nodes  including interval enlargement of a subcarinal lymph node.  4. Stable size of numerous solid pulmonary metastatic lesions.  5. Interval decrease in size of a metastatic right adrenal mass.    In the event of a positive result for acute pulmonary embolism  resulting in right heart strain, consider calling the   [facility] hospital  for PERT (Pulmonary Embolism Response Team)  Activation?    PERT -- Pulmonary Embolism Response Team (Multidisciplinary team  including cardiology, interventional radiology, critical care,  hematology)    I have personally reviewed the examination and initial interpretation  and I agree with the findings.    ALIYA ROGER MD   Echo Complete    Narrative     [id number]  [id number]  [id number]  [id number]^[id number]^ELIZABETH     [facility],[place]  Echocardiography Laboratory  [address] [place] [address]     Name: CLYDE WEST  MRN: [id number]  : 1960  Study Date: 2021 11:18 AM  Age: 60 yrs  Gender: Male  Patient Location: [facility]  Reason For Study: Heart Failure  Ordering Physician: ELIZABETH ADAMS  Performed By: Domonique Meek RDCS     BSA: 1.9 m2  Height: 72 in  Weight: 149 lb  HR: 74  BP: 145/81 mmHg  ______________________________________________________________________________  Procedure  Complete Portable Echo Adult. Echocardiogram with two-dimensional, color and  spectral Doppler performed. Technically difficult study. Poor acoustic  windows.  ______________________________________________________________________________  Interpretation Summary  Technically difficult study.  Left ventricular function, chamber size, wall motion, and wall thickness are  normal.The EF is 60-65%.  Right ventricular function, chamber size, wall motion, and thickness are  normal.  No significant valvular abnormalities are noted.  Dilated aortic root and proximal ascending aorta measuring 4.2 cm (2.2 cm/m2)  and 4 cm (2.1 cm/m2) respectively.  Previous study not available for comparison.  ______________________________________________________________________________  Left Ventricle  Left ventricular function, chamber size, wall motion, and wall thickness are  normal.The EF is 60-65%. Left ventricular diastolic function is indeterminate.     Right Ventricle  Right ventricular function, chamber size, wall motion, and thickness are  normal.     Atria  Both atria appear normal.     Mitral Valve  The mitral valve is normal. Trace mitral insufficiency is present.     Aortic Valve  Aortic valve is normal in structure and function. The aortic valve is  tricuspid.     Tricuspid Valve  The tricuspid valve is normal.     Pulmonic Valve  The pulmonic valve is  normal.     Vessels  The pulmonary artery cannot be assessed. The inferior vena cava was normal in  size with preserved respiratory variability. Dilated aortic root and proximal  ascending aorta measuring 4.2 cm (2.2 cm/m2) and 4 cm (2.1 cm/m2)  respectively. IVC diameter and respiratory changes fall into an intermediate  range suggesting an RA pressure of 8 mmHg.     Pericardium  No pericardial effusion is present.     Compared to Previous Study  Previous study not available for comparison.  ______________________________________________________________________________  MMode/2D Measurements & Calculations     IVSd: 1.1 cm  LVIDd: 5.1 cm  LVPWd: 0.92 cm  LV mass(C)d: 197.1 grams  LV mass(C)dI: 104.8 grams/m2  Ao root diam: 4.2 cm  asc Aorta Diam: 4.0 cm  LVOT diam: 2.3 cm  LVOT area: 4.2 cm2  RWT: 0.36     Doppler Measurements & Calculations  MV E max irina: 63.1 cm/sec  MV A max irina: 50.4 cm/sec  MV E/A: 1.3  MV dec slope: 410.0 cm/sec2  PA acc time: 0.10 sec  E/E' av.2  Lateral E/e': 12.9  Medial E/e': 11.6     ______________________________________________________________________________  Report approved by: Benoit Velez 2021 11:52 AM               Discharge Medications   Current Discharge Medication List      START taking these medications    Details   cefuroxime (CEFTIN) 500 MG tablet Take 1 tablet (500 mg) by mouth 2 times daily for 2 days  Qty: 4 tablet, Refills: 0    Associated Diagnoses: Pneumonia due to infectious organism, unspecified laterality, unspecified part of lung      polyethylene glycol (MIRALAX) 17 GM/Dose powder Take 17 g by mouth daily  Qty: 510 g, Refills: 0    Associated Diagnoses: Malignant neoplasm metastatic to both lungs (H); Chronic hepatitis C without hepatic coma (H)         CONTINUE these medications which have NOT CHANGED    Details   acetaminophen (TYLENOL) 325 MG tablet Take 1-2 tablets (325-650 mg) by mouth every 6 hours as needed for mild pain  Qty: 90  tablet, Refills: 0    Associated Diagnoses: Maxillary sinus cancer (H)      cevimeline (EVOXAC) 30 MG capsule Take 1 capsule (30 mg) by mouth 3 times daily  Qty: 90 capsule, Refills: 11    Associated Diagnoses: Xerostomia      cyclobenzaprine (FLEXERIL) 10 MG tablet TK 1 T PO HS PRN FOR MUSCLE SPASM RELIEF  Refills: 0      hydrocortisone 2.5 % cream Apply topically 2 times daily Apply to itchy spot on scalp and back twice a day as needed  Qty: 453.6 g, Refills: 1    Associated Diagnoses: Itching; Maxillary sinus cancer (H); Malignant neoplasm metastatic to both lungs (H)      magnesium oxide (MAG-OX) 400 MG tablet Take 1 tablet (400 mg) by mouth 2 times daily  Qty: 60 tablet, Refills: 3    Associated Diagnoses: Hypomagnesemia      NARCAN 4 MG/0.1ML nasal spray WAGNER INTO ONE NOSTRIL. MAY REPEAT IN ALTERNATE NOSTRIL WITHIN 2 MINUTES IF NO OR MINIMAL RESPONSE      oxyCODONE IR (ROXICODONE) 10 MG tablet Takes 1/2 pill (5 mg) 2-3 x daily. --[facility]  Qty:        !! senna-docusate (SENOKOT-S/PERICOLACE) 8.6-50 MG tablet Take 1 tablet by mouth 2 times daily as needed for constipation Or by feeding tube  Qty: 180 tablet, Refills: 3    Associated Diagnoses: Malignant neoplasm metastatic to both lungs (H); Maxillary sinus cancer (H); S/P flap graft      !! senna-docusate (SENOKOT-S/PERICOLACE) 8.6-50 MG tablet 1 tablet by Per Feeding Tube route 2 times daily as needed for constipation  Qty: 90 tablet, Refills: 3    Associated Diagnoses: S/P flap graft       !! - Potential duplicate medications found. Please discuss with provider.        Allergies   No Known Allergies

## 2021-05-06 NOTE — PROGRESS NOTES
Care Management Discharge Note    Discharge Date: 05/06/21     Discharge Disposition:  Return home (or a friends home for a short time)    Discharge Services:    [facility]  Phone  [phone]  Fax  [phone]     Okay for SN to start week of 5/13  RN skilled nursing visit.   RN to assess vital signs and weight, cardiac status, pain level and activity tolerance, hydration, nutrition and bowel status   RN to complete home safety evaluation.  RN assess new home oxygen use and lung sounds, respiratory status  RN to complete weekly medication management and set-up     Okay for therapies to start week of 5/18  Physical Therapy to evaluate and treat  Occupational Therapy to evaluate and treat    Discharge DME:      Addendum @ 1453: portable oxygen tank will be delivered to patients room within 2hours (approx 445pm)    Per Care Management Department protocol a referral for new home oxygen was called into [facility] ([facility]) at 1433. They will review the home oxygen assessment, physician oxygen orders, and oxygen face to face documentation for insurance required compliances. Once completed, and approved, they will contact the patient directly to offer choice of Durable Medical Equipment (DME) agency, arrange delivery of a portable oxygen tank to patient s hospital room (for transport home) and arrange delivery of needed oxygen equipment to the patient s home.     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 Pulmonary Neoplasm C34.90    Patient has been assessed for Home Oxygen needs by BRIONNA Peña.  Oxygen readings:  *Pulse oximetry (SpO2) = 93 % on room air at rest while awake.  *SpO2 = 86 % on room  "air during activity/with exercise.  *SpO2 improved to  92 % on 4 liters/minute via NC during activity/with exercise.  _______________________________________________      Discharge Transportation: patient reports \"my friend will pick me up\"    Private pay costs discussed: Not applicable; Medicare & [place]    PAS Confirmation Code:  NA    Patient educated on Medicare website which has current facility and service quality ratings:  Yes; patient has no preference of home care agency \"wantever you gotta do to get me out faster would be great\" - referred to [facility], waiting on confirmation of acceptance    Education Provided on the Discharge Plan:  yes  Persons Notified of Discharge Plans: patient  Patient/ in Agreement with the Plan: yes    Handoff Referral Completed: No; External Handoff needed - patient follows with a PA at [facility]        Leila Montez RN, BSN, PHN  Care Coordinator  [facility]  Direct phone: [phone]  Pager: [phone]    To contact the on-call Weekend Care Coordination Team please page [phone]    "

## 2021-05-06 NOTE — PLAN OF CARE
Occupational Therapy Discharge Summary    Reason for therapy discharge:    Discharged to home with home therapy.    Progress towards therapy goal(s). See goals on Care Plan in [facility] electronic health record for goal details.  Goals partially met.  Barriers to achieving goals:   discharge from facility.    Therapy recommendation(s):    Continued therapy is recommended.  Rationale/Recommendations:  Pt would benefit from  OT evaluation to ensure safety and independence with ADL.  Continue home exercise program.

## 2021-05-07 NOTE — PROGRESS NOTES
Oncology RN Care Coordination Note:     Incoming Call:  This writer received a voicemail from this patient's sister Precious, patient gave consent in the hospital for communication with her. She is requesting a call back with updates on patient's status.     Outgoing Call:  This writer returned Precious's call.  She had a list of questions to run through with me.    -is he still on the clinical trial?  -what's his status like?  -current treatment?  -pain medications, steroids and antibiotics?     This writer answered her questions to the best of my ability.  I explained to Precious that Eduardo was signed onto the clinical trial but disqualified right away from participation because his hepatitis had returned.  Explained to Precious that Eduardo's stage of cancer hasn't changed since we last talked, but he has declined because of the pneumonia admission.  He met with Dr. Effie Smith PM&R physician who indicated he would need PT/OT in the home.  He declined to go to a TCU for therapies after he was discharged.  This writer explained his current treatment status to his sister and explained he is on SOC treatment.  Regarding patient's pain medications, he is receiving oxycodone which was to resume after he was discharged from the hospital.  He received steroids to treat his pneumonia and he is finishing up with oral antibiotics tomorrow.      Precious was appreciative of the time spent discussing Eduardo's status.  Encouraged her to call with any other questions or concerns, she can discuss it with myself or Lilli Baeza, JICC working with Eduardo and Dr. Ferraro since the recent transition of RNCC's.      Cailin Diaz, RN BSN   [facility]  Nurse Coordinator

## 2021-05-07 NOTE — PROGRESS NOTES
[facility]: Post-Discharge Note  SITUATION                                                      Admission:    Admission Date: 05/01/21   Reason for Admission: Pneumonia of right lower lobe due to infectious organism  Discharge:   Discharge Date: 05/06/21  Discharge Diagnosis: Pneumonia of right lower lobe due to infectious organism    BACKGROUND                                                    Eduardo Rubio is a 60 year old male admitted on 5/1/2021. 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 After discussion with oncology more likely he had an underlying pneumonia than pneumonitis from treatment especially since he never got proper pneumonitis dose of steroids. Eventually did not need oxygen while at rest and oxygen needs with ambulation improving but will need oxygen on discharge.     ASSESSMENT      Discharge Assessment  Patient reports symptoms are: Improved  Does the patient have all of their medications?: Yes  Does patient know what their new medications are for?: Yes  Does patient have a follow-up appointment scheduled?: Yes  Does patient have any other questions or concerns?: No    Post-op  Did the patient have surgery or a procedure: No  Fever: No  Chills: No  Eating & Drinking: eating and drinking without complaints/concerns  Bowel Function: normal  Urinary Status: voiding without complaint/concerns        PLAN                                                      Outpatient Plan:  As below    Future Appointments   Date Time Provider Department Center   5/12/2021  1:30 PM  [facility] LAB DRAW KUM[facility]   5/12/2021  2:00 PM  ONC INFUSION NURSE TIMBO [facility]    5/12/2021  5:00 PM [facility]   5/18/2021  2:30 PM Javier Ferraro MD [facility]   5/20/2021  [facility]   5/20/2021  2:00 PM  ONC INFUSION NURSE [facility]   5/26/2021  7:30 AM  [facility]   5/26/2021  8:00 AM  ONC INFUSION NURSE [facility]   6/2/2021  1:30 PM  [facility]   6/2/2021  2:00 PM  ONC INFUSION NURSE [facility]   6/9/2021  1:30 PM  [facility]   6/9/2021  2:00 PM  ONC INFUSION NURSE [facility]   6/16/2021  9:15 AM  [facility]   6/16/2021 10:00 AM Gianna Ruggiero PA-C [facility]   7/14/2021 11:00 AM Teresa Pulliam MD [facility]           Rosario Centeno

## 2021-05-12 NOTE — PROGRESS NOTES
TC to pt following staff messaging regarding having pt see Saba Ruggiero and forgo infusion today. Per Saba, she attempted to reach pt without success. Writer unable to reach pt and unable to leave message as voicemail box full.

## 2021-05-12 NOTE — PROGRESS NOTES
Oncology RN Care Coordination Note:     Incoming Call:  This writer received a voicemail from Eduardo's sister Precious.  She was wondering if we had any information on Eduardo's wishes after he passed away.      This writer reviewed his chart and discussed this with social work to see if there was anything indicating his wishes, however we couldn't find anything.      Outgoing Call:  This writer returned Precious's call to update her that we were unable to find anything indicating his wishes after he passed away.  Expressed my condolences from our team on Eduardo's passing.  She was very appreciative of the care he received here.  Advised Precious to call with any other questions or concerns.  I told her I'd continue to review his chart to see if I could find anything.     Cailin Diaz, RN BSN   [facility]  Nurse Coordinator

## 2021-05-13 ENCOUNTER — TELEPHONE (OUTPATIENT)
Dept: ONCOLOGY | Facility: CLINIC | Age: 61
End: 2021-05-13

## 2021-05-13 NOTE — TELEPHONE ENCOUNTER
[place]- Medical Examiner requesting call back for info on Death Certificate details either by Saba Ruggiero or qualified person.       Call back number is: [phone]    Lucia Howe RN aware and will be following up. Routed encounter

## 2021-05-18 ENCOUNTER — PATIENT OUTREACH (OUTPATIENT)
Dept: ONCOLOGY | Facility: CLINIC | Age: 61
End: 2021-05-18

## 2021-05-18 NOTE — TELEPHONE ENCOUNTER
Called and spoke with Medical Examiners office.  They just wanted to know whom to assign the Cause of Death provider.      Eduardo's death was found to be natural,  And will list cause of death as his cancer.    Left message with brother Dhaval to call me directly if he has any questions.

## 2021-05-18 NOTE — TELEPHONE ENCOUNTER
Medical examiner investigator calling again to speak with Saba JURADO; stated unsure if she is able to sign death certificate but will be coming her way. Pt passed away at home. Callback number is directly to investigator.

## 2021-05-18 NOTE — PROGRESS NOTES
NOTIFICATION OF A  PATIENT  EMAIL THIS COMPLETED INFORMATION TO THE APPROPRIATE ENTITY:    [place]ATHY: [email or web address]   [facility]:  [email or web address]   [email or web address]: [email or web address]   UMP:  [email or web address]     PATIENT INFORMATION   Last name: Joanne First name: Eduardo   Medical Record Number: [id number] County of Death: [place]   YOB: 1960 Date of Death: 2021   PARENT (FOR PATIENTS UNDER 18) OR LEGAL GUARDIAN (IF APPLICABLE):   Relationship to  patient:   Last name: First name:   Date of Birth: Telephone Number:   Address:     City: State: Zip Code:   SURVIVING SPOUSE INFORMATION (IF THERE IS A SURVIVING SPOUSE)   Last name: First name:   Date of Birth: Telephone number:   Address:     City: State: Zip Code:   PERSON THAT INFORMED US OF PATIENT'S DEATH   Last name:[facility] verified [place] Department of Vital Statistics    First name:   Relationship to  patient: Telephone number:

## 2023-05-23 NOTE — TELEPHONE ENCOUNTER
Contacted by Precious, Mr. Winston's sister.  Patient has hx maxillary sinus cancer, currently on Carbo/Cetixumab.  The patient apparently lost his phone but was able to get a message to his sister that he has not been feeling well for several days.  His sister is unsure of what his symptoms are and does not have an immediate way to follow up with him; she is planning to go to his place now.  She wonders what her next step should be.    I advised her that while it is difficult to know without knowing more about his symptoms, if they have serious concerns about his health it would be appropriate to go to the emergency department.  She could also call back after seeing him with further information if she feels he is relatively ok.  She thinks he sounded ill over the phone and suspects they will go to the ED soon.    Long Rojas MD  Hematology/Oncology/Transplant Fellow  
Pain all over body, numbness and tingling.     Neurology   Numbness and tingling at bottom of foot, was on gabapentin symptoms not improving.

## 2023-09-06 NOTE — PROGRESS NOTES
Oncology RN Care Coordination Note:     Writer called patient per Saba Ruggiero PA-C's request because he had cancelled his appointment on Tuesday 12/3 stating he didn't have a ride to the clinic coordinator when he cancelled.  However today when I asked why he didn't come to his appointment he states he was on his way and he even came early, but then something else happened.  He didn't seem to make complete sense.      Writer asked how patient has been doing, he states that he is doing really well.  Denies any problems or concerns.  States he has been eating a little.      He notes he needs a refill of Ativan, he asked Dr. Santillan but was told it was ordered by Dr. Ferraro.  Writer has sent a message to Saba Ruggiero PA-C to send a refill to the [facility] pharmacy for him to  after his radiation appointment.  Patient notes he has been sleeping through the night with 3 ativan at bedtime.      Patient was extremely chatty compared to our last interaction.  He was in high spirits and excited about his last infusion next week.     Cailin Diaz, RN BSN   [facility]  Nurse Coordinator         No

## 2024-07-11 NOTE — PROGRESS NOTES
[facility]                                                                                   OUTPATIENT SPEECH LANGUAGE PATHOLOGY    PLAN OF TREATMENT FOR OUTPATIENT REHABILITATION   Patient's Last Name, First Name, Eduardo Anderson YOB: 1960   Provider's Name   [facility]   Medical Record No.  [id number]     Onset Date:  9/24/19 Start of Care Date:  10/4/2019     Medical Diagnosis:   oropharyngeal dysphagia      SLP Treatment Diagnosis: Moderate oropharyngeal dysphagia  Plan of Treatment  Frequency/Duration:  2-4x/month  /   6 months    Certification date from  1/4/2020   To     4/3/20       See note for plan of treatment details and functional goals     Nelida Giron, SLP                         I CERTIFY THE NEED FOR THESE SERVICES FURNISHED UNDER        THIS PLAN OF TREATMENT AND WHILE UNDER MY CARE     (Physician attestation of this document indicates review and certification of the therapy plan).              Referring Provider:  Jose Roberts    Initial Assessment  See Epic Evaluation-
